# Patient Record
Sex: FEMALE | Race: WHITE | Employment: OTHER | URBAN - METROPOLITAN AREA
[De-identification: names, ages, dates, MRNs, and addresses within clinical notes are randomized per-mention and may not be internally consistent; named-entity substitution may affect disease eponyms.]

---

## 2017-02-27 DIAGNOSIS — G89.4 CHRONIC PAIN SYNDROME: Chronic | ICD-10-CM

## 2017-02-27 NOTE — TELEPHONE ENCOUNTER
Reason for Call:  Medication or medication refill:    Do you use a [facility]?  Name of the pharmacy and phone number for the current request:  Will pu at [facility]    Name of the medication requested:oxyCODONE-acetaminophen (PERCOCET) 5-325 MG per tablet     Other request: call pt when ready to pu at [facility]    Can we leave a detailed message on this number? YES    Phone number patient can be reached at: Home number on file [phone] (home)    Best Time:     Call taken on 2/27/2017 at 9:11 AM by HAN PARIS

## 2017-02-28 NOTE — TELEPHONE ENCOUNTER
Oxycodone-acetaminophen 5-325 mg      Last Written Prescription Date:  10/27/16  Last Fill Quantity: 60,   # refills: 0  Last Office Visit with [facility], P or [facility] prescribing provider: 8/31/16  Future Office visit:       Routing refill request to provider for review/approval because:  Drug not on the [facility], [facility] or [facility] refill protocol or controlled substance

## 2017-03-01 RX ORDER — OXYCODONE AND ACETAMINOPHEN 5; 325 MG/1; MG/1
0.5 TABLET ORAL 2 TIMES DAILY PRN
Qty: 60 TABLET | Refills: 0 | Status: SHIPPED | OUTPATIENT
Start: 2017-03-01 | End: 2017-06-09

## 2017-03-01 NOTE — TELEPHONE ENCOUNTER
Controlled Substance Refill Request for oxyCODONE-acetaminophen (PERCOCET) 5-325 MG per tablet  Problem List Complete:  YesPatient is followed by COY MILNER for ongoing prescription of pain medication.  All refills should be approved by this provider, or covering partner.    Medication(s): percocet.   Maximum quantity per month: 30  Clinic visit frequency required: Q 6  months     Controlled substance agreement:  Encounter-Level CSA:     There are no encounter-level csa.        Pain Clinic evaluation in the past: No    DIRE Total Score(s):  No flowsheet data found.    Last [place]NP website verification:  ?   [email or web address]  No CSA on file    Last  check - 7/7/16       checked in past 6 months?  Yes 03/01/2017   RX [place] ([place] reviewed:  reviewed- no concerns    [place] profile:  [email or web address]

## 2017-04-19 DIAGNOSIS — I10 ESSENTIAL HYPERTENSION: ICD-10-CM

## 2017-04-19 RX ORDER — METOPROLOL SUCCINATE 25 MG/1
TABLET, EXTENDED RELEASE ORAL
Qty: 90 TABLET | Refills: 1 | Status: SHIPPED | OUTPATIENT
Start: 2017-04-19 | End: 2017-11-28

## 2017-04-19 NOTE — TELEPHONE ENCOUNTER
Metoprolol 25 mg      Last Written Prescription Date: 1/7/16  Last Fill Quantity: 90, # refills: 3    Last Office Visit with G, P or [facility] prescribing provider:  8/31/16   Future Office Visit:        BP Readings from Last 3 Encounters:   08/31/16 120/70   12/22/15 140/70   09/29/15 128/68

## 2017-05-01 ENCOUNTER — TELEPHONE (OUTPATIENT)
Dept: FAMILY MEDICINE | Facility: CLINIC | Age: 82
End: 2017-05-01

## 2017-05-01 NOTE — TELEPHONE ENCOUNTER
Theresa Alves is a 89 year old female who's niece Ericka calls with right arm pain.    NURSING ASSESSMENT:  Description: Pain with abduction of right arm, small bruise on outside.  Onset/duration: Friday   Precip. factors: Fall   Associated symptoms: No SOB, no chest pain.   Improves/worsens symptoms:  none  Pain scale (0-10)   10/10  LMP/preg/breast feeding:  NA   Last exam/Treatment:  8/31/16   Allergies: No Known Allergies    MEDICATIONS:   Taking medication(s) as prescribed? Yes - taking percocet   Taking over the counter medication(s?) N/A  Any medication side effects? No significant side effects    Any barriers to taking medication(s) as prescribed?  No  Medication(s) improving/managing symptoms?  N/A  Medication reconciliation completed: No      NURSING PLAN: Nursing advice to patient given    RECOMMENDED DISPOSITION:  See in 24 hours - appt available at clinic today, niece will talk to pt and call reception to schedule.   Will comply with recommendation: Yes  If further questions/concerns or if symptoms do not improve, worsen or new symptoms develop, call your PCP or [facility] as soon as possible.      Guideline used:  Telephone Triage Protocols for Nurses, Fifth Edition, Maria D Drake RN

## 2017-06-09 DIAGNOSIS — G89.4 CHRONIC PAIN SYNDROME: Chronic | ICD-10-CM

## 2017-06-09 RX ORDER — OXYCODONE AND ACETAMINOPHEN 5; 325 MG/1; MG/1
0.5 TABLET ORAL 2 TIMES DAILY PRN
Qty: 60 TABLET | Refills: 0 | Status: SHIPPED | OUTPATIENT
Start: 2017-06-09 | End: 2017-07-05

## 2017-06-09 NOTE — TELEPHONE ENCOUNTER
Controlled Substance Refill Request for oxyCODONE-acetaminophen (PERCOCET) 5-325 MG per tablet  Problem List Complete:  YesPatient is followed by COY MILNER for ongoing prescription of pain medication.  All refills should be approved by this provider, or covering partner.    Medication(s): percocet.   Maximum quantity per month: 30  Clinic visit frequency required: Q 6  months     Controlled substance agreement:  Encounter-Level CSA:     There are no encounter-level csa.        Pain Clinic evaluation in the past: No    DIRE Total Score(s):  No flowsheet data found.    Last [facility] website verification:  ?   [email or web address]  No CSA on file    Last  check - 03/01/2017-no concerns       checked in past 6 months?  Yes 03/01/2017       Last Written Prescription Date: 03/01/2017  Last Fill Quantity: 60,  # refills: 0   Last Office Visit with [facility], [facility] or [facility] prescribing provider: 08/31/2016

## 2017-06-09 NOTE — TELEPHONE ENCOUNTER
Called and left voicemail. Informed patient script is ready for pickup at [facility] location.    Princess CARIE Pearl, CMA

## 2017-06-09 NOTE — TELEPHONE ENCOUNTER
Reason for Call:  Medication or medication refill:    Do you use a [facility]?  Name of the pharmacy and phone number for the current request:  Patient will  at [facility]    Name of the medication requested: oxyCODONE-acetaminophen (PERCOCET) 5-325 MG per tablet    Other request: none    Can we leave a detailed message on this number? YES    Phone number patient can be reached at: Home number on file [phone] (home)    Best Time: any    Call taken on 6/9/2017 at 8:45 AM by Pricilla Ramirez

## 2017-07-05 DIAGNOSIS — G89.4 CHRONIC PAIN SYNDROME: Chronic | ICD-10-CM

## 2017-07-05 RX ORDER — OXYCODONE AND ACETAMINOPHEN 5; 325 MG/1; MG/1
0.5 TABLET ORAL 2 TIMES DAILY PRN
Qty: 60 TABLET | Refills: 0 | Status: SHIPPED | OUTPATIENT
Start: 2017-07-05 | End: 2017-09-19

## 2017-07-05 NOTE — TELEPHONE ENCOUNTER
Controlled Substance Refill Request for oxyCODONE-acetaminophen (PERCOCET) 5-325 MG per tablet  Problem List Complete:  YePatient is followed by COY MILNER for ongoing prescription of pain medication.  All refills should be approved by this provider, or covering partner.    Medication(s): percocet.   Maximum quantity per month: 30  Clinic visit frequency required: Q 6  months     Controlled substance agreement:  Encounter-Level CSA:     There are no encounter-level csa.        Pain Clinic evaluation in the past: No    DIRE Total Score(s):  No flowsheet data found.    Last [facility] website verification:  ?   [email or web address]  No CSA on file    Last  check - 03/01/2017-no concerns     checked in past 6 months?  Yes 03/01/2017         Last Written Prescription Date: 06/09/2017  Last Fill Quantity: 60,  # refills: 0   Last Office Visit with [facility], [facility] or [facility] prescribing provider: 08/31/2016

## 2017-09-19 DIAGNOSIS — G89.4 CHRONIC PAIN SYNDROME: Chronic | ICD-10-CM

## 2017-09-19 RX ORDER — OXYCODONE AND ACETAMINOPHEN 5; 325 MG/1; MG/1
0.5 TABLET ORAL 2 TIMES DAILY PRN
Qty: 60 TABLET | Refills: 0 | Status: SHIPPED | OUTPATIENT
Start: 2017-09-19 | End: 2017-11-28

## 2017-09-19 NOTE — TELEPHONE ENCOUNTER
Controlled Substance Refill Request for Percocet  Problem List Complete:  Yes    Patient is followed by COY MILNER for ongoing prescription of pain medication.  All refills should be approved by this provider, or covering partner.    Medication(s): percocet.   Maximum quantity per month: 30  Clinic visit frequency required: Q 6  months     Controlled substance agreement:  Encounter-Level CSA:     There are no encounter-level csa.        Pain Clinic evaluation in the past: No    DIRE Total Score(s):  No flowsheet data found.    Last [facility] website verification:  ?   [email or web address]  No CSA on file    Last [facility] check - 9-19-17-no concerns

## 2017-09-19 NOTE — TELEPHONE ENCOUNTER
Reason for Call:  Medication or medication refill:  Do you use a [facility]?  Name of the pharmacy and phone number for the current request:  [id number] - [place], [place] - [address]  Name of the medication requested: oxyCODONE-acetaminophen (PERCOCET) 5-325 MG per tablet  Other request: none  Can we leave a detailed message on this number? YES  Phone number patient can be reached at: Home number on file [phone] (home)  Best Time: any  Call taken on 9/19/2017 at 8:05 AM by MILAGROS PNEA

## 2017-09-19 NOTE — TELEPHONE ENCOUNTER
Called patient to let her know that the Rx for percocet is ready for p/u at [facility]. She states that Annette will pick it up.

## 2017-09-19 NOTE — TELEPHONE ENCOUNTER
Oxycodone-acetaminophen 5-325 mg      Last Written Prescription Date:  7/5/17  Last Fill Quantity: 60,   # refills: 0  Last Office Visit with [facility], [facility] or [facility] prescribing provider: 8/31/16  Future Office visit:       Routing refill request to provider for review/approval because:  Drug not on the [facility], [facility] or [facility] refill protocol or controlled substance

## 2017-10-25 DIAGNOSIS — E78.5 HYPERLIPIDEMIA WITH TARGET LDL LESS THAN 130: Chronic | ICD-10-CM

## 2017-10-26 RX ORDER — SIMVASTATIN 20 MG
TABLET ORAL
Qty: 15 TABLET | Refills: 0 | Status: SHIPPED | OUTPATIENT
Start: 2017-10-26 | End: 2017-11-28

## 2017-10-26 NOTE — TELEPHONE ENCOUNTER
Medication is being filled for 1 time refill only due to:  Patient needs to be seen because it has been more than one year since last visit.    Last Office Visit with G, [facility] or [facility] prescribing provider: 08/31/2016       Lab Results   Component Value Date    CHOL 184 01/10/2014     Lab Results   Component Value Date    HDL 68 01/10/2014     Lab Results   Component Value Date    LDL 79 08/31/2016    LDL 95 01/10/2014     Lab Results   Component Value Date    TRIG 106 01/10/2014     Lab Results   Component Value Date    CHOLHDLRATIO 2.7 01/10/2014

## 2017-11-28 ENCOUNTER — OFFICE VISIT (OUTPATIENT)
Dept: FAMILY MEDICINE | Facility: CLINIC | Age: 82
End: 2017-11-28
Payer: COMMERCIAL

## 2017-11-28 VITALS
SYSTOLIC BLOOD PRESSURE: 146 MMHG | RESPIRATION RATE: 20 BRPM | TEMPERATURE: 97.5 F | HEIGHT: 62 IN | HEART RATE: 71 BPM | DIASTOLIC BLOOD PRESSURE: 72 MMHG | BODY MASS INDEX: 23.92 KG/M2 | WEIGHT: 130 LBS | OXYGEN SATURATION: 95 %

## 2017-11-28 DIAGNOSIS — J84.10 PULMONARY FIBROSIS (H): Chronic | ICD-10-CM

## 2017-11-28 DIAGNOSIS — I10 HYPERTENSION GOAL BP (BLOOD PRESSURE) < 140/80: ICD-10-CM

## 2017-11-28 DIAGNOSIS — G89.4 CHRONIC PAIN SYNDROME: Primary | ICD-10-CM

## 2017-11-28 DIAGNOSIS — N18.30 CHRONIC KIDNEY DISEASE, STAGE III (MODERATE) (H): ICD-10-CM

## 2017-11-28 DIAGNOSIS — M47.816 SPONDYLOSIS OF LUMBAR REGION WITHOUT MYELOPATHY OR RADICULOPATHY: ICD-10-CM

## 2017-11-28 DIAGNOSIS — E78.5 HYPERLIPIDEMIA WITH TARGET LDL LESS THAN 130: Chronic | ICD-10-CM

## 2017-11-28 DIAGNOSIS — I50.9 CHRONIC CONGESTIVE HEART FAILURE, UNSPECIFIED CONGESTIVE HEART FAILURE TYPE: ICD-10-CM

## 2017-11-28 LAB
BASOPHILS # BLD AUTO: 0.1 10E9/L (ref 0–0.2)
BASOPHILS NFR BLD AUTO: 1 %
DIFFERENTIAL METHOD BLD: ABNORMAL
EOSINOPHIL # BLD AUTO: 0.3 10E9/L (ref 0–0.7)
EOSINOPHIL NFR BLD AUTO: 4.9 %
ERYTHROCYTE [DISTWIDTH] IN BLOOD BY AUTOMATED COUNT: 14.4 % (ref 10–15)
HCT VFR BLD AUTO: 36.8 % (ref 35–47)
HGB BLD-MCNC: 12.1 G/DL (ref 11.7–15.7)
LYMPHOCYTES # BLD AUTO: 1.6 10E9/L (ref 0.8–5.3)
LYMPHOCYTES NFR BLD AUTO: 24 %
MCH RBC QN AUTO: 33.3 PG (ref 26.5–33)
MCHC RBC AUTO-ENTMCNC: 32.9 G/DL (ref 31.5–36.5)
MCV RBC AUTO: 101 FL (ref 78–100)
MONOCYTES # BLD AUTO: 1.1 10E9/L (ref 0–1.3)
MONOCYTES NFR BLD AUTO: 16 %
NEUTROPHILS # BLD AUTO: 3.7 10E9/L (ref 1.6–8.3)
NEUTROPHILS NFR BLD AUTO: 54.1 %
NT-PROBNP SERPL-MCNC: 2510 PG/ML (ref 0–450)
PLATELET # BLD AUTO: 261 10E9/L (ref 150–450)
RBC # BLD AUTO: 3.63 10E12/L (ref 3.8–5.2)
WBC # BLD AUTO: 6.8 10E9/L (ref 4–11)

## 2017-11-28 PROCEDURE — 99214 OFFICE O/P EST MOD 30 MIN: CPT | Performed by: INTERNAL MEDICINE

## 2017-11-28 PROCEDURE — 80061 LIPID PANEL: CPT | Performed by: INTERNAL MEDICINE

## 2017-11-28 PROCEDURE — 36415 COLL VENOUS BLD VENIPUNCTURE: CPT | Performed by: INTERNAL MEDICINE

## 2017-11-28 PROCEDURE — 83880 ASSAY OF NATRIURETIC PEPTIDE: CPT | Performed by: INTERNAL MEDICINE

## 2017-11-28 PROCEDURE — 80050 GENERAL HEALTH PANEL: CPT | Performed by: INTERNAL MEDICINE

## 2017-11-28 RX ORDER — METOPROLOL SUCCINATE 25 MG/1
25 TABLET, EXTENDED RELEASE ORAL DAILY
Qty: 90 TABLET | Refills: 3 | Status: SHIPPED | OUTPATIENT
Start: 2017-11-28 | End: 2018-02-21

## 2017-11-28 RX ORDER — LOSARTAN POTASSIUM 100 MG/1
100 TABLET ORAL DAILY
Qty: 90 TABLET | Refills: 3 | Status: SHIPPED | OUTPATIENT
Start: 2017-11-28 | End: 2018-02-26

## 2017-11-28 RX ORDER — OXYCODONE AND ACETAMINOPHEN 5; 325 MG/1; MG/1
0.5 TABLET ORAL 2 TIMES DAILY PRN
Qty: 60 TABLET | Refills: 0 | Status: ON HOLD | OUTPATIENT
Start: 2017-12-04 | End: 2018-03-17

## 2017-11-28 RX ORDER — SIMVASTATIN 20 MG
TABLET ORAL
Qty: 45 TABLET | Refills: 3 | Status: SHIPPED | OUTPATIENT
Start: 2017-11-28 | End: 2019-01-01

## 2017-11-28 ASSESSMENT — PATIENT HEALTH QUESTIONNAIRE - PHQ9
SUM OF ALL RESPONSES TO PHQ QUESTIONS 1-9: 6
SUM OF ALL RESPONSES TO PHQ QUESTIONS 1-9: 6
10. IF YOU CHECKED OFF ANY PROBLEMS, HOW DIFFICULT HAVE THESE PROBLEMS MADE IT FOR YOU TO DO YOUR WORK, TAKE CARE OF THINGS AT HOME, OR GET ALONG WITH OTHER PEOPLE: SOMEWHAT DIFFICULT

## 2017-11-28 NOTE — LETTER
November 29, 2017      Theresa RENNY Alves  [address]   [facility] [facility] [address]        Dear ,    We are writing to inform you of your test results.             Most of your lab results are either good, or acceptable, including the liver, kidney, potassium, thyroid, bone marrow, cholesterol, and glucose levels.   Be sure to take the 20 mg of furosemide on 2 days per week.     Resulted Orders   Comprehensive metabolic panel (BMP + Alb, Alk Phos, ALT, AST, Total. Bili, TP)   Result Value Ref Range    Sodium 136 133 - 144 mmol/L    Potassium 4.6 3.4 - 5.3 mmol/L    Chloride 105 94 - 109 mmol/L    Carbon Dioxide 22 20 - 32 mmol/L    Anion Gap 9 3 - 14 mmol/L    Glucose 100 (H) 70 - 99 mg/dL      Comment:      Non Fasting    Urea Nitrogen 30 7 - 30 mg/dL    Creatinine 1.19 (H) 0.52 - 1.04 mg/dL    GFR Estimate 42 (L) >60 mL/min/1.7m2      Comment:      Non  GFR Calc    GFR Estimate If Black 51 (L) >60 mL/min/1.7m2      Comment:       GFR Calc    Calcium 9.1 8.5 - 10.1 mg/dL    Bilirubin Total 1.2 0.2 - 1.3 mg/dL    Albumin 3.4 3.4 - 5.0 g/dL    Protein Total 7.3 6.8 - 8.8 g/dL    Alkaline Phosphatase 60 40 - 150 U/L    ALT 15 0 - 50 U/L    AST 15 0 - 45 U/L   Lipid Profile (Chol, Trig, HDL, LDL calc)   Result Value Ref Range    Cholesterol 142 <200 mg/dL    Triglycerides 107 <150 mg/dL      Comment:      Non Fasting    HDL Cholesterol 69 >49 mg/dL    LDL Cholesterol Calculated 52 <100 mg/dL      Comment:      Desirable:       <100 mg/dl    Non HDL Cholesterol 73 <130 mg/dL   TSH with free T4 reflex   Result Value Ref Range    TSH 2.77 0.40 - 4.00 mU/L   CBC with platelets and differential   Result Value Ref Range    WBC 6.8 4.0 - 11.0 10e9/L    RBC Count 3.63 (L) 3.8 - 5.2 10e12/L    Hemoglobin 12.1 11.7 - 15.7 g/dL    Hematocrit 36.8 35.0 - 47.0 %     (H) 78 - 100 fl    MCH 33.3 (H) 26.5 - 33.0 pg    MCHC 32.9 31.5 - 36.5 g/dL    RDW 14.4 10.0 - 15.0 %     Platelet Count 261 150 - 450 10e9/L    Diff Method Automated Method     % Neutrophils 54.1 %    % Lymphocytes 24.0 %    % Monocytes 16.0 %    % Eosinophils 4.9 %    % Basophils 1.0 %    Absolute Neutrophil 3.7 1.6 - 8.3 10e9/L    Absolute Lymphocytes 1.6 0.8 - 5.3 10e9/L    Absolute Monocytes 1.1 0.0 - 1.3 10e9/L    Absolute Eosinophils 0.3 0.0 - 0.7 10e9/L    Absolute Basophils 0.1 0.0 - 0.2 10e9/L   BNP-N terminal pro   Result Value Ref Range    N-Terminal Pro Bnp 2510 (H) 0 - 450 pg/mL      Comment:         Reference range shown and results flagged as abnormal are for the outpatient,   non acute settings. Establishing a baseline value for each individual patient   is useful for follow-up.  Suggested inpatient cut points for confirming diagnosis of CHF in an acute   setting are:   >450 pg/mL (age 18 to less than 50)   >900 pg/mL (age 50 to less than 75)   >1800 pg/mL (75 yrs and older)  An inpatient or emergency department NT-proPBNP <300 pg/mL effectively rules   out acute CHF, with 99% negative predictive value.          If you have any questions or concerns, please call the clinic at the number listed above.       Sincerely,        Marbin Rouse MD

## 2017-11-28 NOTE — NURSING NOTE
"Chief Complaint   Patient presents with     Lipids     Hypertension     Recheck Medication       Initial /72 (BP Location: Left arm, Patient Position: Chair, Cuff Size: Adult Regular)  Pulse 71  Temp 97.5  F (36.4  C)  Resp 20  Ht 5' 1.5\" (1.562 m)  Wt 130 lb (59 kg)  LMP  (LMP Unknown)  SpO2 95%  Breastfeeding? No  BMI 24.17 kg/m2 Estimated body mass index is 24.17 kg/(m^2) as calculated from the following:    Height as of this encounter: 5' 1.5\" (1.562 m).    Weight as of this encounter: 130 lb (59 kg).  Medication Reconciliation: complete   Doris Ramos LPN  "

## 2017-11-28 NOTE — MR AVS SNAPSHOT
After Visit Summary   11/28/2017    Theresa Alves    MRN: [id number]           Patient Information     Date Of Birth          6/22/1924        Visit Information        Provider Department      11/28/2017 2:15 PM Marbin Rouse MD [facility]        Today's Diagnoses     Chronic pain syndrome    -  1    Pulmonary fibrosis        Chronic kidney disease, stage III (moderate)        Chronic congestive heart failure, unspecified congestive heart failure type (H)        Hyperlipidemia with target LDL less than 130        Hypertension goal BP (blood pressure) < 140/80        Spondylosis of lumbar region without myelopathy or radiculopathy          Care Instructions    I will let you know your lab results.   Have a good safe winter.                      Follow-ups after your visit        Additional Services     DME REFERRAL       Please supply a rolling walker with brakes and seat.                  Who to contact     If you have questions or need follow up information about today's clinic visit or your schedule please contact [facility] directly at [phone].  Normal or non-critical lab and imaging results will be communicated to you by MyChart, letter or phone within 4 business days after the clinic has received the results. If you do not hear from us within 7 days, please contact the clinic through MyChart or phone. If you have a critical or abnormal lab result, we will notify you by phone as soon as possible.  Submit refill requests through [employer] [facility] or call your pharmacy and they will forward the refill request to us. Please allow 3 business days for your refill to be completed.          Additional Information About Your Visit        Care EveryWhere ID     This is your Care EveryWhere ID. This could be used by other organizations to access your [place] medical records  [id number]        Your Vitals Were     Pulse Temperature  "Respirations Height Last Period Pulse Oximetry    71 97.5  F (36.4  C) 20 5' 1.5\" (1.562 m) (LMP Unknown) 95%    Breastfeeding? BMI (Body Mass Index)                No 24.17 kg/m2           Blood Pressure from Last 3 Encounters:   11/28/17 146/72   08/31/16 120/70   12/22/15 140/70    Weight from Last 3 Encounters:   11/28/17 130 lb (59 kg)   08/31/16 133 lb (60.3 kg)   12/22/15 134 lb (60.8 kg)              We Performed the Following     BNP-N terminal pro     CBC with platelets and differential     Comprehensive metabolic panel (BMP + Alb, Alk Phos, ALT, AST, Total. Bili, TP)     DME REFERRAL     Lipid Profile (Chol, Trig, HDL, LDL calc)     TSH with free T4 reflex          Today's Medication Changes          These changes are accurate as of: 11/28/17  2:51 PM.  If you have any questions, ask your nurse or doctor.               These medicines have changed or have updated prescriptions.        Dose/Directions    losartan 100 MG tablet   Commonly known as:  COZAAR   This may have changed:  See the new instructions.   Used for:  Hypertension goal BP (blood pressure) < 140/80   Changed by:  Marbin Rouse MD        Dose:  100 mg   Take 1 tablet (100 mg) by mouth daily   Quantity:  90 tablet   Refills:  3       metoprolol 25 MG 24 hr tablet   Commonly known as:  TOPROL-XL   This may have changed:  See the new instructions.   Used for:  Chronic congestive heart failure, unspecified congestive heart failure type (H), Hypertension goal BP (blood pressure) < 140/80   Changed by:  Marbin Rouse MD        Dose:  25 mg   Take 1 tablet (25 mg) by mouth daily   Quantity:  90 tablet   Refills:  3       simvastatin 20 MG tablet   Commonly known as:  ZOCOR   This may have changed:  See the new instructions.   Used for:  Hyperlipidemia with target LDL less than 130   Changed by:  Marbin Rouse MD        TAKE HALF TABLET BY MOUTH AT BEDTIME   Quantity:  45 tablet   Refills:  3            Where to get your medicines    "   These medications were sent to [id number] - [place], [place] - [address]  [address], [facility] [address]     Phone:  [phone]     losartan 100 MG tablet    metoprolol 25 MG 24 hr tablet    simvastatin 20 MG tablet         Some of these will need a paper prescription and others can be bought over the counter.  Ask your nurse if you have questions.     Bring a paper prescription for each of these medications     oxyCODONE-acetaminophen 5-325 MG per tablet                Primary Care Provider Office Phone # Fax #    Marbin Rouse [phone]       [address] [facility] [address]        Equal Access to Services     KITTY SALINAS : Hadii lata pendleton hadasho Sorhiannaali, waaxda luqadaha, qaybta kaalmada adeegyada, renee de luna . So [facility] [phone].    ATENCIÓN: Si habla español, tiene a salmon disposición servicios gratuitos de asistencia lingüística. [facility] [phone].    We comply with applicable federal civil rights laws and [place] laws. We do not discriminate on the basis of race, color, national origin, age, disability, sex, sexual orientation, or gender identity.            Thank you!     Thank you for choosing [facility]  for your care. Our goal is always to provide you with excellent care. Hearing back from our patients is one way we can continue to improve our services. Please take a few minutes to complete the written survey that you may receive in the mail after your visit with us. Thank you!             Your Updated Medication List - Protect others around you: Learn how to safely use, store and throw away your medicines at www.disposemymeds.org.          This list is accurate as of: 11/28/17  2:51 PM.  Always use your most recent med list.                   Brand Name Dispense Instructions for use Diagnosis    aspirin 81 MG chewable tablet      Take 81 mg by mouth daily. Every other day         furosemide 20 MG tablet    LASIX    30 tablet    Take 1 tablet (20 mg) by mouth twice a week    Hypertension goal BP (blood pressure) < 140/80       losartan 100 MG tablet    COZAAR    90 tablet    Take 1 tablet (100 mg) by mouth daily    Hypertension goal BP (blood pressure) < 140/80       metoprolol 25 MG 24 hr tablet    TOPROL-XL    90 tablet    Take 1 tablet (25 mg) by mouth daily    Chronic congestive heart failure, unspecified congestive heart failure type (H), Hypertension goal BP (blood pressure) < 140/80       oxyCODONE-acetaminophen 5-325 MG per tablet   Start taking on:  12/4/2017    PERCOCET    60 tablet    Take 0.5 tablets by mouth 2 times daily as needed for pain    Chronic pain syndrome       simvastatin 20 MG tablet    ZOCOR    45 tablet    TAKE HALF TABLET BY MOUTH AT BEDTIME    Hyperlipidemia with target LDL less than 130       VITAMIN B 12 PO      Take  by mouth.        VITAMIN D (CHOLECALCIFEROL) PO      Take 1,000 Units by mouth daily.

## 2017-11-29 LAB
ALBUMIN SERPL-MCNC: 3.4 G/DL (ref 3.4–5)
ALP SERPL-CCNC: 60 U/L (ref 40–150)
ALT SERPL W P-5'-P-CCNC: 15 U/L (ref 0–50)
ANION GAP SERPL CALCULATED.3IONS-SCNC: 9 MMOL/L (ref 3–14)
AST SERPL W P-5'-P-CCNC: 15 U/L (ref 0–45)
BILIRUB SERPL-MCNC: 1.2 MG/DL (ref 0.2–1.3)
BUN SERPL-MCNC: 30 MG/DL (ref 7–30)
CALCIUM SERPL-MCNC: 9.1 MG/DL (ref 8.5–10.1)
CHLORIDE SERPL-SCNC: 105 MMOL/L (ref 94–109)
CHOLEST SERPL-MCNC: 142 MG/DL
CO2 SERPL-SCNC: 22 MMOL/L (ref 20–32)
CREAT SERPL-MCNC: 1.19 MG/DL (ref 0.52–1.04)
GFR SERPL CREATININE-BSD FRML MDRD: 42 ML/MIN/1.7M2
GLUCOSE SERPL-MCNC: 100 MG/DL (ref 70–99)
HDLC SERPL-MCNC: 69 MG/DL
LDLC SERPL CALC-MCNC: 52 MG/DL
NONHDLC SERPL-MCNC: 73 MG/DL
POTASSIUM SERPL-SCNC: 4.6 MMOL/L (ref 3.4–5.3)
PROT SERPL-MCNC: 7.3 G/DL (ref 6.8–8.8)
SODIUM SERPL-SCNC: 136 MMOL/L (ref 133–144)
TRIGL SERPL-MCNC: 107 MG/DL
TSH SERPL DL<=0.005 MIU/L-ACNC: 2.77 MU/L (ref 0.4–4)

## 2017-12-01 DIAGNOSIS — I50.9 CHRONIC CONGESTIVE HEART FAILURE, UNSPECIFIED CONGESTIVE HEART FAILURE TYPE: Primary | ICD-10-CM

## 2017-12-01 DIAGNOSIS — I10 HYPERTENSION GOAL BP (BLOOD PRESSURE) < 140/80: ICD-10-CM

## 2017-12-01 NOTE — TELEPHONE ENCOUNTER
Reason for Call:  Medication or medication refill:    Do you use a [facility]?  Name of the pharmacy and phone number for the current request:  [facility]    Name of the medication requested: furosemide (LASIX) 20 MG tablet    Other request: Pt started taking Lasix again but noticed that it  17.  Pt did take one and it was effective but she needs a new rx.    Can we leave a detailed message on this number? YES    Phone number patient can be reached at: Home number on file [phone] (home)    Best Time: ok    Call taken on 2017 at 9:08 AM by ESTEPHANIE MORENO

## 2017-12-05 NOTE — TELEPHONE ENCOUNTER
LOV 11/28/17    Requested Prescriptions   Pending Prescriptions Disp Refills     furosemide (LASIX) 20 MG tablet 30 tablet 3     Sig: Take 1 tablet (20 mg) by mouth twice a week    Diuretics (Including Combos) Protocol Failed    12/1/2017  9:09 AM       Failed - Blood pressure under 140/90    BP Readings from Last 3 Encounters:   11/28/17 146/72   08/31/16 120/70   12/22/15 140/70                Failed - Normal serum creatinine on file in past 12 months    Recent Labs   Lab Test  11/28/17   1454   CR  1.19*             Passed - Recent or future visit with authorizing provider's specialty    Patient had office visit in the last year or has a visit in the next 30 days with authorizing provider.  See chart review.              Passed - Patient is age 18 or older       Passed - No active pregancy on record       Passed - Normal serum potassium on file in past 12 months    Recent Labs   Lab Test  11/28/17   1454   POTASSIUM  4.6                   Passed - Normal serum sodium on file in past 12 months    Recent Labs   Lab Test  11/28/17   1454   NA  136             Passed - No positive pregnancy test in past 12 months

## 2017-12-06 RX ORDER — FUROSEMIDE 20 MG
20 TABLET ORAL
Qty: 30 TABLET | Refills: 11 | Status: SHIPPED | OUTPATIENT
Start: 2017-12-07 | End: 2018-02-21

## 2017-12-06 NOTE — TELEPHONE ENCOUNTER
Routing refill request to provider for review/approval because:  Failed BP and creatinine level goals

## 2018-01-01 ENCOUNTER — TELEPHONE (OUTPATIENT)
Dept: FAMILY MEDICINE | Facility: CLINIC | Age: 83
End: 2018-01-01

## 2018-01-01 DIAGNOSIS — Z53.9 DIAGNOSIS NOT YET DEFINED: Primary | ICD-10-CM

## 2018-01-01 PROCEDURE — G0180 MD CERTIFICATION HHA PATIENT: HCPCS | Performed by: INTERNAL MEDICINE

## 2018-02-19 ENCOUNTER — TELEPHONE (OUTPATIENT)
Dept: FAMILY MEDICINE | Facility: CLINIC | Age: 83
End: 2018-02-19

## 2018-02-19 NOTE — TELEPHONE ENCOUNTER
Reason for call:  Patient reporting a symptom    Symptom or request: back pain and breathing issue    Duration (how long have symptoms been present): unknown    Have you been treated for this before? Yes    Additional comments: states MLO knows back pain and breathing issues-refused appointment states just wants a call    Phone Number patient can be reached at:  Home number on file [phone] (home)    Best Time:  asap    Can we leave a detailed message on this number:  YES    Call taken on 2/19/2018 at 11:29 AM by HAN PARIS

## 2018-02-19 NOTE — TELEPHONE ENCOUNTER
GLORYI only to Dr. Marbin Rouse       1. Patient was called and wants Dr. Marbin Rouse to know she gets very SOB just up and about in the house, needs sit to catch breath.  Taking 20mg Lasix twice/week; no swelling that she she can see.  Has not had cough except little cough this morning-cough is dry.      2. Urine dark yellow in the morning. Doesn't drink much fluids. encouraged to do so more than just coffee.    3. Back pain- uses 1/2 Percocet in the am with heating pad use. Takes edge off of the pain. Only uses the 1/2 Percocet/ day. Upper right arm gets painful; sleeps on both sides.    Appointment set up for 2-21-18.

## 2018-02-21 ENCOUNTER — RADIANT APPOINTMENT (OUTPATIENT)
Dept: GENERAL RADIOLOGY | Facility: CLINIC | Age: 83
End: 2018-02-21
Attending: INTERNAL MEDICINE
Payer: COMMERCIAL

## 2018-02-21 ENCOUNTER — OFFICE VISIT (OUTPATIENT)
Dept: FAMILY MEDICINE | Facility: CLINIC | Age: 83
End: 2018-02-21
Payer: COMMERCIAL

## 2018-02-21 VITALS
BODY MASS INDEX: 24.29 KG/M2 | TEMPERATURE: 97.8 F | OXYGEN SATURATION: 94 % | SYSTOLIC BLOOD PRESSURE: 120 MMHG | HEART RATE: 80 BPM | HEIGHT: 62 IN | WEIGHT: 132 LBS | RESPIRATION RATE: 22 BRPM | DIASTOLIC BLOOD PRESSURE: 70 MMHG

## 2018-02-21 DIAGNOSIS — I48.91 ATRIAL FIBRILLATION, UNSPECIFIED TYPE (H): Primary | ICD-10-CM

## 2018-02-21 DIAGNOSIS — J84.10 PULMONARY FIBROSIS (H): Chronic | ICD-10-CM

## 2018-02-21 DIAGNOSIS — R06.02 SOB (SHORTNESS OF BREATH): ICD-10-CM

## 2018-02-21 DIAGNOSIS — M79.621 PAIN OF RIGHT UPPER ARM: ICD-10-CM

## 2018-02-21 DIAGNOSIS — I50.9 CHRONIC CONGESTIVE HEART FAILURE, UNSPECIFIED CONGESTIVE HEART FAILURE TYPE: ICD-10-CM

## 2018-02-21 DIAGNOSIS — I10 HYPERTENSION GOAL BP (BLOOD PRESSURE) < 140/80: ICD-10-CM

## 2018-02-21 LAB
BASOPHILS # BLD AUTO: 0.1 10E9/L (ref 0–0.2)
BASOPHILS NFR BLD AUTO: 1.1 %
DIFFERENTIAL METHOD BLD: ABNORMAL
EOSINOPHIL # BLD AUTO: 0.3 10E9/L (ref 0–0.7)
EOSINOPHIL NFR BLD AUTO: 3.6 %
ERYTHROCYTE [DISTWIDTH] IN BLOOD BY AUTOMATED COUNT: 15.1 % (ref 10–15)
HCT VFR BLD AUTO: 38.5 % (ref 35–47)
HGB BLD-MCNC: 12.7 G/DL (ref 11.7–15.7)
LYMPHOCYTES # BLD AUTO: 2.2 10E9/L (ref 0.8–5.3)
LYMPHOCYTES NFR BLD AUTO: 25.7 %
MCH RBC QN AUTO: 34.5 PG (ref 26.5–33)
MCHC RBC AUTO-ENTMCNC: 33 G/DL (ref 31.5–36.5)
MCV RBC AUTO: 105 FL (ref 78–100)
MONOCYTES # BLD AUTO: 1.3 10E9/L (ref 0–1.3)
MONOCYTES NFR BLD AUTO: 15.2 %
NEUTROPHILS # BLD AUTO: 4.6 10E9/L (ref 1.6–8.3)
NEUTROPHILS NFR BLD AUTO: 54.4 %
NT-PROBNP SERPL-MCNC: 8519 PG/ML (ref 0–450)
PLATELET # BLD AUTO: 220 10E9/L (ref 150–450)
RBC # BLD AUTO: 3.68 10E12/L (ref 3.8–5.2)
WBC # BLD AUTO: 8.4 10E9/L (ref 4–11)

## 2018-02-21 PROCEDURE — 80048 BASIC METABOLIC PNL TOTAL CA: CPT | Performed by: INTERNAL MEDICINE

## 2018-02-21 PROCEDURE — 73060 X-RAY EXAM OF HUMERUS: CPT | Mod: RT

## 2018-02-21 PROCEDURE — 85025 COMPLETE CBC W/AUTO DIFF WBC: CPT | Performed by: INTERNAL MEDICINE

## 2018-02-21 PROCEDURE — 99214 OFFICE O/P EST MOD 30 MIN: CPT | Mod: 25 | Performed by: INTERNAL MEDICINE

## 2018-02-21 PROCEDURE — 71046 X-RAY EXAM CHEST 2 VIEWS: CPT | Mod: FY

## 2018-02-21 PROCEDURE — 83880 ASSAY OF NATRIURETIC PEPTIDE: CPT | Performed by: INTERNAL MEDICINE

## 2018-02-21 PROCEDURE — 93000 ELECTROCARDIOGRAM COMPLETE: CPT | Performed by: INTERNAL MEDICINE

## 2018-02-21 PROCEDURE — 36415 COLL VENOUS BLD VENIPUNCTURE: CPT | Performed by: INTERNAL MEDICINE

## 2018-02-21 RX ORDER — METOPROLOL SUCCINATE 50 MG/1
50 TABLET, EXTENDED RELEASE ORAL DAILY
Qty: 90 TABLET | Refills: 3 | Status: SHIPPED | OUTPATIENT
Start: 2018-02-21 | End: 2018-02-26

## 2018-02-21 RX ORDER — FUROSEMIDE 20 MG
20 TABLET ORAL EVERY OTHER DAY
Qty: 30 TABLET | Refills: 11 | Status: ON HOLD | COMMUNITY
Start: 2018-02-21 | End: 2018-03-17

## 2018-02-21 NOTE — NURSING NOTE
"Chief Complaint   Patient presents with     Breathing Problem     Consult       Initial /70 (BP Location: Left arm, Patient Position: Chair, Cuff Size: Adult Regular)  Pulse 80  Temp 97.8  F (36.6  C)  Resp 22  Ht 5' 1.5\" (1.562 m)  Wt 132 lb (59.9 kg)  LMP  (LMP Unknown)  SpO2 94%  Breastfeeding? No  BMI 24.54 kg/m2 Estimated body mass index is 24.54 kg/(m^2) as calculated from the following:    Height as of this encounter: 5' 1.5\" (1.562 m).    Weight as of this encounter: 132 lb (59.9 kg).  Medication Reconciliation: complete   Doris Ramos LPN  "

## 2018-02-21 NOTE — PATIENT INSTRUCTIONS
Today we increased the metoprolol to 50 mg per day, and the furosemide to 4 days per week.                  If the Cardiology people cannot see you within 2 weeks, then please see me.

## 2018-02-21 NOTE — PROGRESS NOTES
"  SUBJECTIVE:   Theresa Alves is a 89 year old female who presents to clinic today for the following health issues:    Follow up on SOB-been getting worse over past month? and other multiple health concerns today.                     Here with a friend/relative.                                                                               Getting more SOB; sometimes \"I don't even get dressed\".         Furosemide BIW.           She states it now feels like her heart races with minimal activity.                                                                                                                         Right upper arm pain; during the night.               She reports no injury, and demonstrates fairly good range of motion right shoulder.                           She still lives in an independent apartment.               Having ongoing, worsening back pain.            Problem list and histories reviewed & adjusted, as indicated.      Current Outpatient Prescriptions   Medication Sig Dispense Refill     furosemide (LASIX) 20 MG tablet Take 1 tablet (20 mg) by mouth twice a week 30 tablet 11     simvastatin (ZOCOR) 20 MG tablet TAKE HALF TABLET BY MOUTH AT BEDTIME 45 tablet 3     metoprolol (TOPROL-XL) 25 MG 24 hr tablet Take 1 tablet (25 mg) by mouth daily 90 tablet 3     losartan (COZAAR) 100 MG tablet Take 1 tablet (100 mg) by mouth daily 90 tablet 3     oxyCODONE-acetaminophen (PERCOCET) 5-325 MG per tablet Take 0.5 tablets by mouth 2 times daily as needed for pain 60 tablet 0     Cyanocobalamin (VITAMIN B 12 PO) Take  by mouth.       aspirin 81 MG chewable tablet Take 81 mg by mouth daily. Every other day       VITAMIN D, CHOLECALCIFEROL, PO Take 1,000 Units by mouth daily.         No Known Allergies  BP Readings from Last 3 Encounters:   02/21/18 120/70   11/28/17 146/72   08/31/16 120/70    Wt Readings from Last 3 Encounters:   02/21/18 132 lb (59.9 kg)   11/28/17 130 lb (59 kg)   08/31/16 133 lb " "(60.3 kg)                    Reviewed and updated as needed this visit by clinical staff       Reviewed and updated as needed this visit by Provider         ROS: See above plus  CONSTITUTIONAL:NEGATIVE for fever, chills, change in weight and POSITIVE  for fatigue  RESP:POSITIVE for cough-non productive and SOB/dyspnea and NEGATIVE for hemoptysis and wheezing  CV: POSITIVE for dyspnea on exertion and NEGATIVE for chest pain/chest pressure, orthopnea and palpitations  MUSCULOSKELETAL: POSITIVE  for back pain   NEURO: NEGATIVE for memory problems    OBJECTIVE:                                                    /70 (BP Location: Left arm, Patient Position: Chair, Cuff Size: Adult Regular)  Pulse 80  Temp 97.8  F (36.6  C)  Resp 22  Ht 5' 1.5\" (1.562 m)  Wt 132 lb (59.9 kg)  LMP  (LMP Unknown)  SpO2 94%  Breastfeeding? No  BMI 24.54 kg/m2  Body mass index is 24.54 kg/(m^2).  GENERAL APPEARANCE: alert, mild distress and fatigued  RESP: rales bibasilar  CV: normal S1 S2, no S3 or S4, no murmur, click or rub and irregular rhythm  MS: decreased range of motion spine, with kyphoscoliosis  NEURO: mentation intact, speech normal and gait abnormal ; she uses a walker    Diagnostic test results:  Results for orders placed or performed in visit on 02/21/18 (from the past 24 hour(s))   CBC with platelets and differential   Result Value Ref Range    WBC 8.4 4.0 - 11.0 10e9/L    RBC Count 3.68 (L) 3.8 - 5.2 10e12/L    Hemoglobin 12.7 11.7 - 15.7 g/dL    Hematocrit 38.5 35.0 - 47.0 %     (H) 78 - 100 fl    MCH 34.5 (H) 26.5 - 33.0 pg    MCHC 33.0 31.5 - 36.5 g/dL    RDW 15.1 (H) 10.0 - 15.0 %    Platelet Count 220 150 - 450 10e9/L    Diff Method Automated Method     % Neutrophils 54.4 %    % Lymphocytes 25.7 %    % Monocytes 15.2 %    % Eosinophils 3.6 %    % Basophils 1.1 %    Absolute Neutrophil 4.6 1.6 - 8.3 10e9/L    Absolute Lymphocytes 2.2 0.8 - 5.3 10e9/L    Absolute Monocytes 1.3 0.0 - 1.3 10e9/L    " Absolute Eosinophils 0.3 0.0 - 0.7 10e9/L    Absolute Basophils 0.1 0.0 - 0.2 10e9/L   X-ray of the right humerus unremarkable.  CXR -bilateral pleural effusions with basilar congestion versus fibrosis.  Marked kyphosis.  EKG - atrial fibrillation, rate 125 bpm, with voltage criteria for LVH     ASSESSMENT/PLAN:                                                        ICD-10-CM    1. Atrial fibrillation, unspecified type (H) I48.91 EKG 12-lead complete w/read - Clinics     CARDIOLOGY EVAL ADULT REFERRAL     CANCELED: CARDIOLOGY EVAL ADULT REFERRAL   2. SOB (shortness of breath) R06.02 EKG 12-lead complete w/read - Clinics     XR Chest 2 Views     BNP-N terminal pro     Basic metabolic panel  (Ca, Cl, CO2, Creat, Gluc, K, Na, BUN)     CBC with platelets and differential     CARDIOLOGY EVAL ADULT REFERRAL        3. Pulmonary fibrosis J84.10 XR Chest 2 Views   4. Chronic congestive heart failure, unspecified congestive heart failure type (H) I50.9 EKG 12-lead complete w/read - Clinics     BNP-N terminal pro     Basic metabolic panel  (Ca, Cl, CO2, Creat, Gluc, K, Na, BUN)     CBC with platelets and differential     metoprolol succinate (TOPROL-XL) 50 MG 24 hr tablet     furosemide (LASIX) 20 MG tablet     CARDIOLOGY EVAL ADULT REFERRAL        5. Pain of right upper arm M79.621 XR Humerus Right G/E 2 Views   6. Hypertension goal BP (blood pressure) < 140/80 I10 metoprolol succinate (TOPROL-XL) 50 MG 24 hr tablet     furosemide (LASIX) 20 MG tablet       We discussed her situation at length.  We need to slow her ventricular rate.  However note that at rest for several minutes, her pulse does slow quite a bit.            We discussed anticoagulation.  So far she is not interested.  We discussed treatment options for atrial fibrillation.  She would like a cardiology consult.                  Patient Instructions   Today we increased the metoprolol to 50 mg per day, and the furosemide to 4 days per week.                  If  the Cardiology people cannot see you within 2 weeks, then please see me.         Marbin Rouse MD  [facility]                   Results for orders placed or performed in visit on 02/21/18   BNP-N terminal pro   Result Value Ref Range    N-Terminal Pro Bnp 8519 (H) 0 - 450 pg/mL   Basic metabolic panel  (Ca, Cl, CO2, Creat, Gluc, K, Na, BUN)   Result Value Ref Range    Sodium 139 133 - 144 mmol/L    Potassium 4.9 3.4 - 5.3 mmol/L    Chloride 106 94 - 109 mmol/L    Carbon Dioxide 26 20 - 32 mmol/L    Anion Gap 7 3 - 14 mmol/L    Glucose 107 (H) 70 - 99 mg/dL    Urea Nitrogen 30 7 - 30 mg/dL    Creatinine 1.29 (H) 0.52 - 1.04 mg/dL    GFR Estimate 39 (L) >60 mL/min/1.7m2    GFR Estimate If Black 47 (L) >60 mL/min/1.7m2    Calcium 9.3 8.5 - 10.1 mg/dL

## 2018-02-21 NOTE — MR AVS SNAPSHOT
After Visit Summary   2/21/2018    Theresa Alves    MRN: [id number]           Patient Information     Date Of Birth          6/22/1924        Visit Information        Provider Department      2/21/2018 3:00 PM Marbin Rouse MD [facility]        Today's Diagnoses     SOB (shortness of breath)    -  1    Atrial fibrillation, unspecified type (H)        Pulmonary fibrosis        Chronic congestive heart failure, unspecified congestive heart failure type (H)        Pain of right upper arm        Hypertension goal BP (blood pressure) < 140/80          Care Instructions    Today we increased the metoprolol to 50 mg per day, and the furosemide to 4 days per week.                  If the Cardiology people cannot see you within 2 weeks, then please see me.             Follow-ups after your visit        Additional Services     CARDIOLOGY EVAL ADULT REFERRAL       Preferred location:  [facility] [phone]   [email or web address]              PLEASE SEE THIS PATIENT FOR A CONSULT. SHE HAS RAPID ATRIAL FIB AND CHF.         Please be aware that coverage of these services is subject to the terms and limitations of your health insurance plan.  Call member services at your health plan with any benefit or coverage questions.      Please bring the following to your appointment:  Any x-rays, CTs or MRIs which have been performed. Contact the facility where they were done to arrange for  prior to your scheduled appointment.    List of current medications  This referral request   Any documents/labs given to you for this referral                  Who to contact     If you have questions or need follow up information about today's clinic visit or your schedule please contact [facility] directly at [phone].  Normal or non-critical lab and imaging results will be communicated to  "you by MyChart, letter or phone within 4 business days after the clinic has received the results. If you do not hear from us within 7 days, please contact the clinic through MyChart or phone. If you have a critical or abnormal lab result, we will notify you by phone as soon as possible.  Submit refill requests through [email or web address] or call your pharmacy and they will forward the refill request to us. Please allow 3 business days for your refill to be completed.          Additional Information About Your Visit        Care EveryWhere ID     This is your Care EveryWhere ID. This could be used by other organizations to access your [place] medical records  [id number]        Your Vitals Were     Pulse Temperature Respirations Height Last Period Pulse Oximetry    80 97.8  F (36.6  C) 22 5' 1.5\" (1.562 m) (LMP Unknown) 94%    Breastfeeding? BMI (Body Mass Index)                No 24.54 kg/m2           Blood Pressure from Last 3 Encounters:   02/21/18 120/70   11/28/17 146/72   08/31/16 120/70    Weight from Last 3 Encounters:   02/21/18 132 lb (59.9 kg)   11/28/17 130 lb (59 kg)   08/31/16 133 lb (60.3 kg)              We Performed the Following     Basic metabolic panel  (Ca, Cl, CO2, Creat, Gluc, K, Na, BUN)     BNP-N terminal pro     CARDIOLOGY EVAL ADULT REFERRAL     CBC with platelets and differential     EKG 12-lead complete w/read - Clinics          Today's Medication Changes          These changes are accurate as of 2/21/18  4:35 PM.  If you have any questions, ask your nurse or doctor.               These medicines have changed or have updated prescriptions.        Dose/Directions    LASIX 20 MG tablet   This may have changed:    - how much to take  - how to take this  - when to take this  - additional instructions   Used for:  Hypertension goal BP (blood pressure) < 140/80, Chronic congestive heart failure, unspecified congestive heart failure type (H)   Generic drug:  furosemide   Changed by:  Marbin Rouse MD    "     20 mg, 4 times per week   Quantity:  30 tablet   Refills:  11       metoprolol succinate 50 MG 24 hr tablet   Commonly known as:  TOPROL-XL   This may have changed:    - medication strength  - how much to take   Used for:  Chronic congestive heart failure, unspecified congestive heart failure type (H), Hypertension goal BP (blood pressure) < 140/80   Changed by:  Marbin Rouse MD        Dose:  50 mg   Take 1 tablet (50 mg) by mouth daily   Quantity:  90 tablet   Refills:  3            Where to get your medicines      These medications were sent to [id number] - [place], [place] - [address]  [address] [facility] [address]     Phone:  [phone]     metoprolol succinate 50 MG 24 hr tablet                Primary Care Provider Office Phone # Fax #    Marbin Rouse [phone] [address] [facility] [address]        Equal Access to Services     [facility]: Hadii aad ku hadasho Soomaali, waaxda luqadaha, qaybta kaalmada adeegyada, waxay idiin hayaan steffi de luna . So [facility] [phone].    ATENCIÓN: Si habla español, tiene a salmon disposición servicios gratuitos de asistencia lingüística. Llame al [phone].    We comply with applicable federal civil rights laws and [place] laws. We do not discriminate on the basis of race, color, national origin, age, disability, sex, sexual orientation, or gender identity.            Thank you!     Thank you for choosing [facility]  for your care. Our goal is always to provide you with excellent care. Hearing back from our patients is one way we can continue to improve our services. Please take a few minutes to complete the written survey that you may receive in the mail after your visit with us. Thank you!             Your Updated Medication List - Protect others around you: Learn how to safely use, store and throw away your medicines at www.disposemymeds.org.           This list is accurate as of 2/21/18  4:35 PM.  Always use your most recent med list.                   Brand Name Dispense Instructions for use Diagnosis    aspirin 81 MG chewable tablet      Take 81 mg by mouth daily. Every other day        LASIX 20 MG tablet   Generic drug:  furosemide     30 tablet    20 mg, 4 times per week    Hypertension goal BP (blood pressure) < 140/80, Chronic congestive heart failure, unspecified congestive heart failure type (H)       losartan 100 MG tablet    COZAAR    90 tablet    Take 1 tablet (100 mg) by mouth daily    Hypertension goal BP (blood pressure) < 140/80       metoprolol succinate 50 MG 24 hr tablet    TOPROL-XL    90 tablet    Take 1 tablet (50 mg) by mouth daily    Chronic congestive heart failure, unspecified congestive heart failure type (H), Hypertension goal BP (blood pressure) < 140/80       oxyCODONE-acetaminophen 5-325 MG per tablet    PERCOCET    60 tablet    Take 0.5 tablets by mouth 2 times daily as needed for pain    Chronic pain syndrome       simvastatin 20 MG tablet    ZOCOR    45 tablet    TAKE HALF TABLET BY MOUTH AT BEDTIME    Hyperlipidemia with target LDL less than 130       VITAMIN B 12 PO      Take  by mouth.        VITAMIN D (CHOLECALCIFEROL) PO      Take 1,000 Units by mouth daily.

## 2018-02-22 LAB
ANION GAP SERPL CALCULATED.3IONS-SCNC: 7 MMOL/L (ref 3–14)
BUN SERPL-MCNC: 30 MG/DL (ref 7–30)
CALCIUM SERPL-MCNC: 9.3 MG/DL (ref 8.5–10.1)
CHLORIDE SERPL-SCNC: 106 MMOL/L (ref 94–109)
CO2 SERPL-SCNC: 26 MMOL/L (ref 20–32)
CREAT SERPL-MCNC: 1.29 MG/DL (ref 0.52–1.04)
GFR SERPL CREATININE-BSD FRML MDRD: 39 ML/MIN/1.7M2
GLUCOSE SERPL-MCNC: 107 MG/DL (ref 70–99)
POTASSIUM SERPL-SCNC: 4.9 MMOL/L (ref 3.4–5.3)
SODIUM SERPL-SCNC: 139 MMOL/L (ref 133–144)

## 2018-02-26 ENCOUNTER — OFFICE VISIT (OUTPATIENT)
Dept: CARDIOLOGY | Facility: CLINIC | Age: 83
End: 2018-02-26
Attending: INTERNAL MEDICINE
Payer: COMMERCIAL

## 2018-02-26 VITALS
DIASTOLIC BLOOD PRESSURE: 65 MMHG | BODY MASS INDEX: 24.11 KG/M2 | HEIGHT: 62 IN | HEART RATE: 112 BPM | WEIGHT: 131 LBS | SYSTOLIC BLOOD PRESSURE: 99 MMHG

## 2018-02-26 DIAGNOSIS — I10 HYPERTENSION GOAL BP (BLOOD PRESSURE) < 140/80: ICD-10-CM

## 2018-02-26 DIAGNOSIS — I50.9 CHRONIC CONGESTIVE HEART FAILURE, UNSPECIFIED CONGESTIVE HEART FAILURE TYPE: ICD-10-CM

## 2018-02-26 DIAGNOSIS — I48.91 ATRIAL FIBRILLATION, UNSPECIFIED TYPE (H): Primary | ICD-10-CM

## 2018-02-26 PROCEDURE — 93005 ELECTROCARDIOGRAM TRACING: CPT | Performed by: INTERNAL MEDICINE

## 2018-02-26 PROCEDURE — 99204 OFFICE O/P NEW MOD 45 MIN: CPT | Performed by: INTERNAL MEDICINE

## 2018-02-26 RX ORDER — LOSARTAN POTASSIUM 25 MG/1
25 TABLET ORAL DAILY
Qty: 90 TABLET | Refills: 3 | Status: ON HOLD | OUTPATIENT
Start: 2018-02-26 | End: 2018-03-18

## 2018-02-26 RX ORDER — METOPROLOL SUCCINATE 100 MG/1
100 TABLET, EXTENDED RELEASE ORAL DAILY
Qty: 90 TABLET | Refills: 3 | Status: ON HOLD | OUTPATIENT
Start: 2018-02-26 | End: 2018-03-18

## 2018-02-26 NOTE — LETTER
2/26/2018      Marbin Rouse MD  [address]  [facility] [address]      RE: Theresa Alves       Dear Colleague,    I had the pleasure of seeing Theresa Alves in the [facility].    Service Date: 02/26/2018      REASON FOR CLINIC VISIT:  Establish Cardiology care for newly diagnosed atrial fibrillation.      HISTORY OF PRESENT ILLNESS:  Ms. Alves is a very pleasant, 89-year-old female with a history of hypertension with a history of pulmonary fibrosis and chronic kidney disease, stage 3, who is here to establish Cardiology care.  She is accompanied by her niece.  Last week the patient went to her Primary Care physician's office with dyspnea on exertion and was newly diagnosed with atrial fibrillation with elevated ventricular rates, and at that time metoprolol dose was increased from 25-50 mg daily.  The patient tells me that for the last 1-11/2 months, she has been having progressive shortness of breath with exertion.  She has long-standing dyspnea on exertion, which was mild, but over the last 11/2 months doing any physical activity, she would get short of breath walking a few steps.  At rest, she feels fine.  No orthopnea or PND.  Weights are stable.  EKG done last week was personally reviewed by me, and it shows atrial fibrillation with ventricular rate of 125 beats per minute.  There are some nonspecific diffuse ST changes.  She denies any chest discomfort or tightness.  EKG done today also confirmed that she is in atrial fibrillation with ventricular rate slightly better controlled at 106 beats per minute.  She had an echocardiogram done in 2016 that showed normal LV function and mild LVH.  Severe left atrial enlargement was noted at that time.  There was a comment about a possible brief run of SVT versus atrial fibrillation.  She is on baby aspirin.  She denies any history of bright blood per rectum, melena, hematuria or intracranial bleed.  She does  have frequent wheezing.  No epistaxis.  She uses a walker and lives in [place] independently, although she does have a lady help her with some activities of daily living, like cleaning and cooking once a week.  Presently, she is on metoprolol succinate 50 mg daily and losartan 100 mg daily.  She had 1 fall last year, but no falls in the last 6-7 months.  She uses a walker for ambulation.  She has no history of stroke, MI or CAD.      PHYSICAL EXAMINATION:   VITAL SIGNS:  Blood pressure 99/65, heart rate 112 and irregular, weight 131 pounds, BMI 24.4.   GENERAL:  The patient appears pleasant, comfortable.   NECK:  JVP appears normal with negative hepatojugular reflex.   CARDIOVASCULAR:  Irregular, tachycardia.  No murmur, rub or gallop.   RESPIRATORY:  There are bilateral crackles at the bases.  According to the patient, she always had been told that she had Velcro-like sounds in her lungs, likely due to pulmonary fibrosis history.   ABDOMEN:  Soft, nontender.   EXTREMITIES:  No pitting pedal edema.   NEUROLOGIC:  Alert, oriented x3.     PSYCHIATRIC:  Normal affect.   SKIN:  No obvious rash.     HEENT:  No pallor or icterus.      IMAGING:  EKG as noted above.      IMPRESSION AND PLAN:  A pleasant, 89-year-old female with newly diagnosed atrial fibrillation with rapid ventricular rate with symptoms of dyspnea on exertion.  I had a long discussion with the patient and her niece regarding the pathophysiology of atrial fibrillation, rate versus rhythm control strategy and stroke prophylaxis.  Her CHADS-VASc score is at least 4, and oral anticoagulation is recommended.  She does not have any obvious contraindication.  We discussed the risk of oral anticoagulation, which is an increased risk of bleeding.  She does have a history of 1 fall, but no fall in the last 6-7 months, and she uses a walker for ambulation.  After weighing the pros and cons of oral anticoagulation, the patient wishes to proceed with oral  anticoagulation.  We will use Xarelto 15 mg daily with the evening meals.  I recommend discontinuing aspirin.  Her ventricular rates are still elevated.  I had a long discussion with the patient regarding rate versus rhythm control strategy.  The patient prefers conservative management and as least invasive procedures as possible, which is quite reasonable given her age.  We will respect her wishes.  At this time, I recommend rate control strategy by increasing metoprolol from  mg daily.  I am hopeful that with better rate control, she will feel better.  I also recommend checking an echocardiogram to see what her LV function is.  It is possible that she may have some mild cardiomyopathy due to rapid ventricular rate.  Given the blood pressure is on the borderline low side, I recommend decreasing losartan from 100-25 mg daily, so this will allow more room for increase in metoprolol dose.  In case she continues to be symptomatic and ventricular rates are difficult to control, or she continues to be symptomatic despite adequate ventricular rate control, a rhythm control strategy can be considered.  To be noted, in the past, the left atrium was severely enlarged, so this does decrease the chances of maintaining sinus rhythm.  We will have her back in the Cardiology Clinic in about 7-10 days' time with an echocardiogram, and if she continues to be symptomatic, oral anticoagulation-guided cardioversion can be considered.   1.  Newly diagnosed atrial fibrillation.  Ventricular rate is rapid.   2.  Dyspnea on exertion, progressive at least for the last 11/2 months.  This could be due to a combination of rapid ventricular rate, loss of atrial kick and maybe some element of cardiomyopathy.  No chest discomfort.   3.  Hypertension.   4.  Chronic kidney disease, stage 3.   5.  History of pulmonary fibrosis.      RECOMMENDATIONS:   1.  For now, rate control strategy for atrial fibrillation.  Increase metoprolol succinate  to 100 mg daily.   2.  Decrease losartan to 25 mg daily.   3.  Xarelto 15 mg daily with evening meals.   4.  Discontinue aspirin.   5.  Echocardiogram:  Follow up in 7-10 days' time.  If ventricular rates are well controlled and she continues to be symptomatic, cardioversion can be considered.  I discussed briefly about BRENNEN-guided cardioversion versus oral anticoagulant cardioversion.  The patient does not want to undergo any invasive procedure as much as possible and prefers oral anticoagulation-guided cardioversion if needed.   6.  The plan was extensively discussed with the patient and her niece.      Fifty minutes of total time was spent with more than 50% in counseling.         ANGELINE CARROLL MD             D: 2018   T: 2018   MT: kellen      Name:     YANELI CHUA   MRN:      [id number]        Account:      [id number]   :      1924           Service Date: 2018      Document: [id number]         Outpatient Encounter Prescriptions as of 2018   Medication Sig Dispense Refill     rivaroxaban ANTICOAGULANT (XARELTO) 15 MG TABS tablet Take 1 tablet (15 mg) by mouth daily (with dinner) 30 tablet 3     metoprolol succinate (TOPROL-XL) 100 MG 24 hr tablet Take 1 tablet (100 mg) by mouth daily 90 tablet 3     losartan (COZAAR) 25 MG tablet Take 1 tablet (25 mg) by mouth daily 90 tablet 3     furosemide (LASIX) 20 MG tablet 20 mg, 4 times per week 30 tablet 11     simvastatin (ZOCOR) 20 MG tablet TAKE HALF TABLET BY MOUTH AT BEDTIME 45 tablet 3     oxyCODONE-acetaminophen (PERCOCET) 5-325 MG per tablet Take 0.5 tablets by mouth 2 times daily as needed for pain 60 tablet 0     Cyanocobalamin (VITAMIN B 12 PO) Take  by mouth.       VITAMIN D, CHOLECALCIFEROL, PO Take 1,000 Units by mouth daily.         [DISCONTINUED] metoprolol succinate (TOPROL-XL) 50 MG 24 hr tablet Take 1 tablet (50 mg) by mouth daily 90 tablet 3     [DISCONTINUED] losartan (COZAAR) 100 MG tablet Take 1 tablet (100 mg)  by mouth daily 90 tablet 3     [DISCONTINUED] aspirin 81 MG chewable tablet Take 81 mg by mouth daily. Every other day       No facility-administered encounter medications on file as of 2/26/2018.        Again, thank you for allowing me to participate in the care of your patient.      Sincerely,    Jefry Walker MD     [facility]

## 2018-02-26 NOTE — MR AVS SNAPSHOT
After Visit Summary   2/26/2018    Theresa Alves    MRN: [id number]           Patient Information     Date Of Birth          6/22/1924        Visit Information        Provider Department      2/26/2018 11:15 AM Jefry Walker MD [facility]        Today's Diagnoses     Atrial fibrillation, unspecified type (H)    -  1    Chronic congestive heart failure, unspecified congestive heart failure type (H)        Hypertension goal BP (blood pressure) < 140/80           Follow-ups after your visit        Additional Services     Follow-Up with Cardiac Advanced Practice Provider                 Future tests that were ordered for you today     Open Future Orders        Priority Expected Expires Ordered    Follow-Up with Cardiac Advanced Practice Provider Routine 3/8/2018 2/26/2019 2/26/2018    Echocardiogram Routine 3/8/2018 2/26/2019 2/26/2018            Who to contact     If you have questions or need follow up information about today's clinic visit or your schedule please contact [facility] directly at [phone].  Normal or non-critical lab and imaging results will be communicated to you by MyChart, letter or phone within 4 business days after the clinic has received the results. If you do not hear from us within 7 days, please contact the clinic through [employer] or phone. If you have a critical or abnormal lab result, we will notify you by phone as soon as possible.  Submit refill requests through [employer] or call your pharmacy and they will forward the refill request to us. Please allow 3 business days for your refill to be completed.          Additional Information About Your Visit        Care EveryWhere ID     This is your Care EveryWhere ID. This could be used by other organizations to access your [facility] medical records  [id number]        Your Vitals Were     Pulse Height Last Period BMI (Body Mass Index)          112  "1.562 m (5' 1.5\") (LMP Unknown) 24.35 kg/m2         Blood Pressure from Last 3 Encounters:   02/26/18 99/65   02/21/18 120/70   11/28/17 146/72    Weight from Last 3 Encounters:   02/26/18 59.4 kg (131 lb)   02/21/18 59.9 kg (132 lb)   11/28/17 59 kg (130 lb)              We Performed the Following     EKG 12-lead complete w/read - Clinics (performed today)          Today's Medication Changes          These changes are accurate as of 2/26/18 12:00 PM.  If you have any questions, ask your nurse or doctor.               Start taking these medicines.        Dose/Directions    rivaroxaban ANTICOAGULANT 15 MG Tabs tablet   Commonly known as:  XARELTO   Used for:  Atrial fibrillation, unspecified type (H)   Started by:  Jefry Walker MD        Dose:  15 mg   Take 1 tablet (15 mg) by mouth daily (with dinner)   Quantity:  30 tablet   Refills:  3         These medicines have changed or have updated prescriptions.        Dose/Directions    losartan 25 MG tablet   Commonly known as:  COZAAR   This may have changed:    - medication strength  - how much to take   Used for:  Hypertension goal BP (blood pressure) < 140/80   Changed by:  Jefry Walker MD        Dose:  25 mg   Take 1 tablet (25 mg) by mouth daily   Quantity:  90 tablet   Refills:  3       metoprolol succinate 100 MG 24 hr tablet   Commonly known as:  TOPROL-XL   This may have changed:    - medication strength  - how much to take   Used for:  Chronic congestive heart failure, unspecified congestive heart failure type (H), Hypertension goal BP (blood pressure) < 140/80   Changed by:  Jefry Walker MD        Dose:  100 mg   Take 1 tablet (100 mg) by mouth daily   Quantity:  90 tablet   Refills:  3         Stop taking these medicines if you haven't already. Please contact your care team if you have questions.     aspirin 81 MG chewable tablet   Stopped by:  Jefry Walker MD                Where to get your medicines      These medications were sent to [facility] " [id number] - [place], [place] - [address]  [address], [facility] [address]     Phone:  [phone]     losartan 25 MG tablet    metoprolol succinate 100 MG 24 hr tablet    rivaroxaban ANTICOAGULANT 15 MG Tabs tablet                Primary Care Provider Office Phone # Fax #    Marbin Rouse [phone] [address]  [facility] [address]        Equal Access to Services     [facility]: Hadii aad ku hadasho Soomaali, waaxda luqadaha, qaybta kaalmada adeegyada, waxay idiin hayaan adeeg kharash laagata . So [facility] [phone].    ATENCIÓN: Si habla español, tiene a salmon disposición servicios gratuitos de asistencia lingüística. Ll[facility] [phone].    We comply with applicable federal civil rights laws and [place] laws. We do not discriminate on the basis of race, color, national origin, age, disability, sex, sexual orientation, or gender identity.            Thank you!     Thank you for choosing [facility]  for your care. Our goal is always to provide you with excellent care. Hearing back from our patients is one way we can continue to improve our services. Please take a few minutes to complete the written survey that you may receive in the mail after your visit with us. Thank you!             Your Updated Medication List - Protect others around you: Learn how to safely use, store and throw away your medicines at www.disposemymeds.org.          This list is accurate as of 2/26/18 12:00 PM.  Always use your most recent med list.                   Brand Name Dispense Instructions for use Diagnosis    LASIX 20 MG tablet   Generic drug:  furosemide     30 tablet    20 mg, 4 times per week    Hypertension goal BP (blood pressure) < 140/80, Chronic congestive heart failure, unspecified congestive heart failure type (H)       losartan 25 MG tablet    COZAAR    90 tablet    Take 1 tablet (25 mg) by mouth daily    Hypertension goal  BP (blood pressure) < 140/80       metoprolol succinate 100 MG 24 hr tablet    TOPROL-XL    90 tablet    Take 1 tablet (100 mg) by mouth daily    Chronic congestive heart failure, unspecified congestive heart failure type (H), Hypertension goal BP (blood pressure) < 140/80       oxyCODONE-acetaminophen 5-325 MG per tablet    PERCOCET    60 tablet    Take 0.5 tablets by mouth 2 times daily as needed for pain    Chronic pain syndrome       rivaroxaban ANTICOAGULANT 15 MG Tabs tablet    XARELTO    30 tablet    Take 1 tablet (15 mg) by mouth daily (with dinner)    Atrial fibrillation, unspecified type (H)       simvastatin 20 MG tablet    ZOCOR    45 tablet    TAKE HALF TABLET BY MOUTH AT BEDTIME    Hyperlipidemia with target LDL less than 130       VITAMIN B 12 PO      Take  by mouth.        VITAMIN D (CHOLECALCIFEROL) PO      Take 1,000 Units by mouth daily.

## 2018-02-26 NOTE — PROGRESS NOTES
Service Date: 02/26/2018      REASON FOR CLINIC VISIT:  Establish Cardiology care for newly diagnosed atrial fibrillation.      HISTORY OF PRESENT ILLNESS:  Ms. Alves is a very pleasant, 89-year-old female with a history of hypertension with a history of pulmonary fibrosis and chronic kidney disease, stage 3, who is here to establish Cardiology care.  She is accompanied by her niece.  Last week the patient went to her Primary Care physician's office with dyspnea on exertion and was newly diagnosed with atrial fibrillation with elevated ventricular rates, and at that time metoprolol dose was increased from 25-50 mg daily.  The patient tells me that for the last 1-11/2 months, she has been having progressive shortness of breath with exertion.  She has long-standing dyspnea on exertion, which was mild, but over the last 11/2 months doing any physical activity, she would get short of breath walking a few steps.  At rest, she feels fine.  No orthopnea or PND.  Weights are stable.  EKG done last week was personally reviewed by me, and it shows atrial fibrillation with ventricular rate of 125 beats per minute.  There are some nonspecific diffuse ST changes.  She denies any chest discomfort or tightness.  EKG done today also confirmed that she is in atrial fibrillation with ventricular rate slightly better controlled at 106 beats per minute.  She had an echocardiogram done in 2016 that showed normal LV function and mild LVH.  Severe left atrial enlargement was noted at that time.  There was a comment about a possible brief run of SVT versus atrial fibrillation.  She is on baby aspirin.  She denies any history of bright blood per rectum, melena, hematuria or intracranial bleed.  She does have frequent wheezing.  No epistaxis.  She uses a walker and lives in [place] independently, although she does have a lady help her with some activities of daily living, like cleaning and cooking once a week.  Presently, she is on  metoprolol succinate 50 mg daily and losartan 100 mg daily.  She had 1 fall last year, but no falls in the last 6-7 months.  She uses a walker for ambulation.  She has no history of stroke, MI or CAD.      PHYSICAL EXAMINATION:   VITAL SIGNS:  Blood pressure 99/65, heart rate 112 and irregular, weight 131 pounds, BMI 24.4.   GENERAL:  The patient appears pleasant, comfortable.   NECK:  JVP appears normal with negative hepatojugular reflex.   CARDIOVASCULAR:  Irregular, tachycardia.  No murmur, rub or gallop.   RESPIRATORY:  There are bilateral crackles at the bases.  According to the patient, she always had been told that she had Velcro-like sounds in her lungs, likely due to pulmonary fibrosis history.   ABDOMEN:  Soft, nontender.   EXTREMITIES:  No pitting pedal edema.   NEUROLOGIC:  Alert, oriented x3.     PSYCHIATRIC:  Normal affect.   SKIN:  No obvious rash.     HEENT:  No pallor or icterus.      IMAGING:  EKG as noted above.      IMPRESSION AND PLAN:  A pleasant, 89-year-old female with newly diagnosed atrial fibrillation with rapid ventricular rate with symptoms of dyspnea on exertion.  I had a long discussion with the patient and her niece regarding the pathophysiology of atrial fibrillation, rate versus rhythm control strategy and stroke prophylaxis.  Her CHADS-VASc score is at least 4, and oral anticoagulation is recommended.  She does not have any obvious contraindication.  We discussed the risk of oral anticoagulation, which is an increased risk of bleeding.  She does have a history of 1 fall, but no fall in the last 6-7 months, and she uses a walker for ambulation.  After weighing the pros and cons of oral anticoagulation, the patient wishes to proceed with oral anticoagulation.  We will use Xarelto 15 mg daily with the evening meals.  I recommend discontinuing aspirin.  Her ventricular rates are still elevated.  I had a long discussion with the patient regarding rate versus rhythm control strategy.   The patient prefers conservative management and as least invasive procedures as possible, which is quite reasonable given her age.  We will respect her wishes.  At this time, I recommend rate control strategy by increasing metoprolol from  mg daily.  I am hopeful that with better rate control, she will feel better.  I also recommend checking an echocardiogram to see what her LV function is.  It is possible that she may have some mild cardiomyopathy due to rapid ventricular rate.  Given the blood pressure is on the borderline low side, I recommend decreasing losartan from 100-25 mg daily, so this will allow more room for increase in metoprolol dose.  In case she continues to be symptomatic and ventricular rates are difficult to control, or she continues to be symptomatic despite adequate ventricular rate control, a rhythm control strategy can be considered.  To be noted, in the past, the left atrium was severely enlarged, so this does decrease the chances of maintaining sinus rhythm.  We will have her back in the Cardiology Clinic in about 7-10 days' time with an echocardiogram, and if she continues to be symptomatic, oral anticoagulation-guided cardioversion can be considered.   1.  Newly diagnosed atrial fibrillation.  Ventricular rate is rapid.   2.  Dyspnea on exertion, progressive at least for the last 11/2 months.  This could be due to a combination of rapid ventricular rate, loss of atrial kick and maybe some element of cardiomyopathy.  No chest discomfort.   3.  Hypertension.   4.  Chronic kidney disease, stage 3.   5.  History of pulmonary fibrosis.      RECOMMENDATIONS:   1.  For now, rate control strategy for atrial fibrillation.  Increase metoprolol succinate to 100 mg daily.   2.  Decrease losartan to 25 mg daily.   3.  Xarelto 15 mg daily with evening meals.   4.  Discontinue aspirin.   5.  Echocardiogram:  Follow up in 7-10 days' time.  If ventricular rates are well controlled and she continues  to be symptomatic, cardioversion attempt can be considered.  I discussed briefly about BRENNEN-guided cardioversion versus oral anticoagulant cardioversion.  The patient does not want to undergo any invasive procedure as much as possible and prefers oral anticoagulation-guided cardioversion if needed.   6.  The plan was extensively discussed with the patient and her niece.      Fifty minutes of total time was spent with more than 50% in counseling.         ANGELINE CARORLL MD             D: 2018   T: 2018   MT: kellen      Name:     YANELI CHUA   MRN:      [id number]        Account:      [id number]   :      1924           Service Date: 2018      Document: [id number]

## 2018-02-26 NOTE — PROGRESS NOTES
HPI and Plan:   See dictation(#[id number])    Orders Placed This Encounter   Procedures     Follow-Up with Cardiac Advanced Practice Provider     EKG 12-lead complete w/read - Clinics (performed today)     Echocardiogram       Orders Placed This Encounter   Medications     rivaroxaban ANTICOAGULANT (XARELTO) 15 MG TABS tablet     Sig: Take 1 tablet (15 mg) by mouth daily (with dinner)     Dispense:  30 tablet     Refill:  3     metoprolol succinate (TOPROL-XL) 100 MG 24 hr tablet     Sig: Take 1 tablet (100 mg) by mouth daily     Dispense:  90 tablet     Refill:  3     losartan (COZAAR) 25 MG tablet     Sig: Take 1 tablet (25 mg) by mouth daily     Dispense:  90 tablet     Refill:  3       Medications Discontinued During This Encounter   Medication Reason     aspirin 81 MG chewable tablet      metoprolol succinate (TOPROL-XL) 50 MG 24 hr tablet Reorder     losartan (COZAAR) 100 MG tablet Reorder         Encounter Diagnoses   Name Primary?     Atrial fibrillation, unspecified type (H) Yes     Chronic congestive heart failure, unspecified congestive heart failure type (H)      Hypertension goal BP (blood pressure) < 140/80        CURRENT MEDICATIONS:  Current Outpatient Prescriptions   Medication Sig Dispense Refill     rivaroxaban ANTICOAGULANT (XARELTO) 15 MG TABS tablet Take 1 tablet (15 mg) by mouth daily (with dinner) 30 tablet 3     metoprolol succinate (TOPROL-XL) 100 MG 24 hr tablet Take 1 tablet (100 mg) by mouth daily 90 tablet 3     losartan (COZAAR) 25 MG tablet Take 1 tablet (25 mg) by mouth daily 90 tablet 3     furosemide (LASIX) 20 MG tablet 20 mg, 4 times per week 30 tablet 11     simvastatin (ZOCOR) 20 MG tablet TAKE HALF TABLET BY MOUTH AT BEDTIME 45 tablet 3     oxyCODONE-acetaminophen (PERCOCET) 5-325 MG per tablet Take 0.5 tablets by mouth 2 times daily as needed for pain 60 tablet 0     Cyanocobalamin (VITAMIN B 12 PO) Take  by mouth.       VITAMIN D, CHOLECALCIFEROL, PO Take 1,000 Units by mouth  daily.         [DISCONTINUED] metoprolol succinate (TOPROL-XL) 50 MG 24 hr tablet Take 1 tablet (50 mg) by mouth daily 90 tablet 3     [DISCONTINUED] losartan (COZAAR) 100 MG tablet Take 1 tablet (100 mg) by mouth daily 90 tablet 3       ALLERGIES   No Known Allergies    PAST MEDICAL HISTORY:  Past Medical History:   Diagnosis Date     Chronic back pain      Pulmonary fibrosis (H)      Unspecified essential hypertension     Essential hypertension       PAST SURGICAL HISTORY:  Past Surgical History:   Procedure Laterality Date     CARPAL TUNNEL RELEASE RT/LT  1990's    R     CATARACT IOL, RT/LT  5/03    R     HERNIA REPAIR, INGUINAL RT/LT  1954    R     hiatal hernia repair  6/10    large paraesophageal hiatal hernia; Nissen fundoplication        FAMILY HISTORY:  Family History   Problem Relation Age of Onset     Unknown/Adopted Mother      Unknown/Adopted Father      DIABETES No family hx of      Coronary Artery Disease No family hx of      Hypertension No family hx of      Hyperlipidemia No family hx of      CEREBROVASCULAR DISEASE No family hx of      Breast Cancer No family hx of      Colon Cancer No family hx of      Prostate Cancer No family hx of      Other Cancer No family hx of      Depression No family hx of      Anxiety Disorder No family hx of      MENTAL ILLNESS No family hx of      Substance Abuse No family hx of      Anesthesia Reaction No family hx of      Asthma No family hx of      OSTEOPOROSIS No family hx of      Genetic Disorder No family hx of      Thyroid Disease No family hx of      Obesity No family hx of        SOCIAL HISTORY:  Social History     Social History     Marital status:      Spouse name: N/A     Number of children: 0     Years of education: N/A     Occupational History     worked at [employer] for 40 yrs Retired     Social History Main Topics     Smoking status: Never Smoker     Smokeless tobacco: Never Used     Alcohol use Yes      Comment: daily-vodka tonic x2     Drug use: No  "    Sexual activity: No     Other Topics Concern     None     Social History Narrative       Review of Systems:  Skin:  Negative       Eyes:  Positive for glasses    ENT:  Negative      Respiratory:  Positive for shortness of breath;dyspnea on exertion     Cardiovascular:    Positive for;fatigue a-fib  Gastroenterology: Negative      Genitourinary:  Negative      Musculoskeletal:  Negative      Neurologic:  Negative      Psychiatric:  Negative      Heme/Lymph/Imm:  Negative      Endocrine:  Negative        Physical Exam:  Vitals: BP 99/65  Pulse 112  Ht 1.562 m (5' 1.5\")  Wt 59.4 kg (131 lb)  LMP  (LMP Unknown)  BMI 24.35 kg/m2    Constitutional:           Skin:             Head:           Eyes:           Lymph:      ENT:           Neck:           Respiratory:            Cardiac:                                                           GI:           Extremities and Muscular Skeletal:                 Neurological:           Psych:             CC  Marbin Rouse MD  [address]  [place] [address]                  "

## 2018-02-26 NOTE — LETTER
2/26/2018    Marbin Rouse MD  [address]  [facility] [address]    RE: Theresa Alves       Dear Colleague,    I had the pleasure of seeing Theresa Alves in the [facility].    HPI and Plan:   See dictation(#[id number])    Orders Placed This Encounter   Procedures     Follow-Up with Cardiac Advanced Practice Provider     EKG 12-lead complete w/read - Clinics (performed today)     Echocardiogram       Orders Placed This Encounter   Medications     rivaroxaban ANTICOAGULANT (XARELTO) 15 MG TABS tablet     Sig: Take 1 tablet (15 mg) by mouth daily (with dinner)     Dispense:  30 tablet     Refill:  3     metoprolol succinate (TOPROL-XL) 100 MG 24 hr tablet     Sig: Take 1 tablet (100 mg) by mouth daily     Dispense:  90 tablet     Refill:  3     losartan (COZAAR) 25 MG tablet     Sig: Take 1 tablet (25 mg) by mouth daily     Dispense:  90 tablet     Refill:  3       Medications Discontinued During This Encounter   Medication Reason     aspirin 81 MG chewable tablet      metoprolol succinate (TOPROL-XL) 50 MG 24 hr tablet Reorder     losartan (COZAAR) 100 MG tablet Reorder         Encounter Diagnoses   Name Primary?     Atrial fibrillation, unspecified type (H) Yes     Chronic congestive heart failure, unspecified congestive heart failure type (H)      Hypertension goal BP (blood pressure) < 140/80        CURRENT MEDICATIONS:  Current Outpatient Prescriptions   Medication Sig Dispense Refill     rivaroxaban ANTICOAGULANT (XARELTO) 15 MG TABS tablet Take 1 tablet (15 mg) by mouth daily (with dinner) 30 tablet 3     metoprolol succinate (TOPROL-XL) 100 MG 24 hr tablet Take 1 tablet (100 mg) by mouth daily 90 tablet 3     losartan (COZAAR) 25 MG tablet Take 1 tablet (25 mg) by mouth daily 90 tablet 3     furosemide (LASIX) 20 MG tablet 20 mg, 4 times per week 30 tablet 11     simvastatin (ZOCOR) 20 MG tablet TAKE HALF TABLET BY MOUTH AT BEDTIME 45 tablet 3      oxyCODONE-acetaminophen (PERCOCET) 5-325 MG per tablet Take 0.5 tablets by mouth 2 times daily as needed for pain 60 tablet 0     Cyanocobalamin (VITAMIN B 12 PO) Take  by mouth.       VITAMIN D, CHOLECALCIFEROL, PO Take 1,000 Units by mouth daily.         [DISCONTINUED] metoprolol succinate (TOPROL-XL) 50 MG 24 hr tablet Take 1 tablet (50 mg) by mouth daily 90 tablet 3     [DISCONTINUED] losartan (COZAAR) 100 MG tablet Take 1 tablet (100 mg) by mouth daily 90 tablet 3       ALLERGIES   No Known Allergies    PAST MEDICAL HISTORY:  Past Medical History:   Diagnosis Date     Chronic back pain      Pulmonary fibrosis (H)      Unspecified essential hypertension     Essential hypertension       PAST SURGICAL HISTORY:  Past Surgical History:   Procedure Laterality Date     CARPAL TUNNEL RELEASE RT/LT  1990's    R     CATARACT IOL, RT/LT  5/03    R     HERNIA REPAIR, INGUINAL RT/LT  1954    R     hiatal hernia repair  6/10    large paraesophageal hiatal hernia; Nissen fundoplication        FAMILY HISTORY:  Family History   Problem Relation Age of Onset     Unknown/Adopted Mother      Unknown/Adopted Father      DIABETES No family hx of      Coronary Artery Disease No family hx of      Hypertension No family hx of      Hyperlipidemia No family hx of      CEREBROVASCULAR DISEASE No family hx of      Breast Cancer No family hx of      Colon Cancer No family hx of      Prostate Cancer No family hx of      Other Cancer No family hx of      Depression No family hx of      Anxiety Disorder No family hx of      MENTAL ILLNESS No family hx of      Substance Abuse No family hx of      Anesthesia Reaction No family hx of      Asthma No family hx of      OSTEOPOROSIS No family hx of      Genetic Disorder No family hx of      Thyroid Disease No family hx of      Obesity No family hx of        SOCIAL HISTORY:  Social History     Social History     Marital status:      Spouse name: N/A     Number of children: 0     Years of  "education: N/A     Occupational History     worked at [email or web address] for 40 yrs Retired     Social History Main Topics     Smoking status: Never Smoker     Smokeless tobacco: Never Used     Alcohol use Yes      Comment: daily-vodka tonic x2     Drug use: No     Sexual activity: No     Other Topics Concern     None     Social History Narrative       Review of Systems:  Skin:  Negative       Eyes:  Positive for glasses    ENT:  Negative      Respiratory:  Positive for shortness of breath;dyspnea on exertion     Cardiovascular:    Positive for;fatigue a-fib  Gastroenterology: Negative      Genitourinary:  Negative      Musculoskeletal:  Negative      Neurologic:  Negative      Psychiatric:  Negative      Heme/Lymph/Imm:  Negative      Endocrine:  Negative        Physical Exam:  Vitals: BP 99/65  Pulse 112  Ht 1.562 m (5' 1.5\")  Wt 59.4 kg (131 lb)  LMP  (LMP Unknown)  BMI 24.35 kg/m2    Constitutional:           Skin:             Head:           Eyes:           Lymph:      ENT:           Neck:           Respiratory:            Cardiac:                                                           GI:           Extremities and Muscular Skeletal:                 Neurological:           Psych:             CC  Marbin Rouse MD  [address] [place] [address]                    Thank you for allowing me to participate in the care of your patient.      Sincerely,     Jefry Walker MD     [facility]    cc:   Marbin Rouse MD  [address] [place] [address]        "

## 2018-03-01 ENCOUNTER — TELEPHONE (OUTPATIENT)
Dept: CARDIOLOGY | Facility: CLINIC | Age: 83
End: 2018-03-01

## 2018-03-01 NOTE — TELEPHONE ENCOUNTER
Patient is a 89 year old woman who is on Xarelto for AF and has noticed in the last few days her left nostril keeps bleeding after she blows her nose.  It does take a long time to for it to stop.  She has had problems with nose bleeds in the past.  She has a niece that does help her with transporting to office visits.  I contacted ENT in [place] and they have 8 different sites and the earliest appointment would be next week. Her niece is off work tomorrow and she may need to call for an appointment with ENT clinics if she feels it is necessary.  Recommend to patient to try to avoid blowing her nose if at all possible.  If bleeding does not sop she needs to go to ED.  Has an OV next week.  Reviewed the importance of staying on Xarelto.  Patient will call back if any other questions or concerns.

## 2018-03-06 ENCOUNTER — DOCUMENTATION ONLY (OUTPATIENT)
Dept: CARDIOLOGY | Facility: CLINIC | Age: 83
End: 2018-03-06

## 2018-03-06 ENCOUNTER — HOSPITAL ENCOUNTER (OUTPATIENT)
Dept: CARDIOLOGY | Facility: CLINIC | Age: 83
Discharge: HOME | End: 2018-03-06
Attending: INTERNAL MEDICINE | Admitting: INTERNAL MEDICINE
Payer: COMMERCIAL

## 2018-03-06 ENCOUNTER — TELEPHONE (OUTPATIENT)
Dept: NURSING | Facility: CLINIC | Age: 83
End: 2018-03-06

## 2018-03-06 DIAGNOSIS — I48.91 ATRIAL FIBRILLATION, UNSPECIFIED TYPE (H): ICD-10-CM

## 2018-03-06 PROCEDURE — 93306 TTE W/DOPPLER COMPLETE: CPT | Mod: 26 | Performed by: INTERNAL MEDICINE

## 2018-03-06 PROCEDURE — 93306 TTE W/DOPPLER COMPLETE: CPT

## 2018-03-06 NOTE — PROGRESS NOTES
ECHO ready for review.    F/u OV with Abigal Liegl on 3/8/18 to review with Patient.     Will message Dr. Walker for review and any new recommendations.     ECHO  Interpretation Summary     1. The left ventricle is normal in size. The visual ejection fraction is  estimated at 50-55%.  2. The right ventricle is normal size. The right ventricular systolic function  is mildly reduced.  3. There is severe biatrial enlargement.  4. There is moderate (2+) tricuspid regurgitation.  5. Moderate (46-55mmHg) pulmonary hypertension is present. The right  ventricular systolic pressure is approximated at 50mmHg plus the right atrial  pressure.  6. There is mild (1+) aortic regurgitation.  7. IVC is mildly dilated with ~50% respiratory collapse.     Echo from 1/2016 showed EF 60-65%, mild TR, limited TR jet to measure RVSP,  but likely normal.     _____________________________________________________________________________  __        Left Ventricle  The left ventricle is normal in size. There is mild concentric left  ventricular hypertrophy. The visual ejection fraction is estimated at 50-55%.  Diastolic function not assessed due to atrial fibrillation. Normal left  ventricular wall motion.     Right Ventricle  The right ventricle is normal size. The right ventricular systolic function is  mildly reduced.     Atria  There is severe biatrial enlargement. There is no atrial shunt seen.     Mitral Valve  There is mild (1+) mitral regurgitation.     Tricuspid Valve  There is moderate (2+) tricuspid regurgitation. Moderate (46-55mmHg) pulmonary  hypertension is present. The right ventricular systolic pressure is  approximated at 50mmHg plus the right atrial pressure.        Aortic Valve  There is mild (1+) aortic regurgitation.     Pulmonic Valve  The pulmonic valve is normal in structure and function.     Vessels  Normal ascending, transverse (arch), and descending aorta. IVC is mildly  dilated with ~50% respiratory  collapse.     Pericardium  There is no pericardial effusion.     Rhythm  The rhythm was atrial fibrillation.     _____________________________________________________________________________  __  MMode/2D Measurements & Calculations  IVSd: 1.2 cm  LVIDd: 4.3 cm  LVIDs: 3.2 cm  LVPWd: 1.4 cm  FS: 25.6 %  LV mass(C)d: 203.4 grams  LV mass(C)dI: 129.3 grams/m2  Ao root diam: 2.9 cm     asc Aorta Diam: 2.9 cm  LVOT diam: 1.8 cm  LVOT area: 2.4 cm2  LA Volume (BP): 116.0 ml  LA Volume Index (BP): 73.9 ml/m2  RWT: 0.64  TAPSE: 1.3 cm        Doppler Measurements & Calculations  MV E max geni: 69.8 cm/sec  MV dec time: 0.12 sec     AI P1/2t: 548.6 msec  TR max geni: 329.7 cm/sec  TR max P.6 mmHg  E/E' avg: 10.8  Lateral E/e': 11.6  Medial E/e': 10.0

## 2018-03-06 NOTE — PROGRESS NOTES
Borderline reduced LVEF and severely enlarged LA. Latter makes rhythm control difficult. Would favor rate control unless still symptomatic despite rate control.    Thanks  Jefry

## 2018-03-06 NOTE — TELEPHONE ENCOUNTER
Representative for [facility] requesting copy of order for patient's walker and office notes related to order. Faxed to [phone].

## 2018-03-08 ENCOUNTER — OFFICE VISIT (OUTPATIENT)
Dept: CARDIOLOGY | Facility: CLINIC | Age: 83
End: 2018-03-08
Attending: INTERNAL MEDICINE
Payer: COMMERCIAL

## 2018-03-08 VITALS
DIASTOLIC BLOOD PRESSURE: 74 MMHG | BODY MASS INDEX: 24.33 KG/M2 | HEIGHT: 62 IN | HEART RATE: 64 BPM | WEIGHT: 132.2 LBS | SYSTOLIC BLOOD PRESSURE: 121 MMHG

## 2018-03-08 DIAGNOSIS — I48.91 ATRIAL FIBRILLATION, UNSPECIFIED TYPE (H): ICD-10-CM

## 2018-03-08 PROCEDURE — 99214 OFFICE O/P EST MOD 30 MIN: CPT | Performed by: PHYSICIAN ASSISTANT

## 2018-03-08 NOTE — MR AVS SNAPSHOT
After Visit Summary   3/8/2018    Theresa Alves    MRN: [id number]           Patient Information     Date Of Birth          6/22/1924        Visit Information        Provider Department      3/8/2018 2:30 PM Citlali Umana PA-C [facility]   [facility]        Today's Diagnoses     Atrial fibrillation, unspecified type (H)          Care Instructions    Thank you for your [facility] visit today. Your provider has recommended the following:  Medication Changes:  No medication changes today.  Recommendations:  Schedule the holter monitor today to see what your heart rates are doing.  Follow-up:  See us back for cardiology follow up in a few weeks.  Reminder:  Please bring in all current medications, over the counter supplements and vitamin bottles to your next appointment.            [facility]            Follow-ups after your visit        Additional Services     Follow-Up with Cardiac Advanced Practice Provider                 Future tests that were ordered for you today     Open Future Orders        Priority Expected Expires Ordered    Follow-Up with Cardiac Advanced Practice Provider Routine 3/29/2018 3/8/2019 3/8/2018    Holter Monitor 24 hour - Adult Routine 3/15/2018 3/8/2019 3/8/2018            Who to contact     If you have questions or need follow up information about today's clinic visit or your schedule please contact [facility]   JESSI directly at [phone].  Normal or non-critical lab and imaging results will be communicated to you by MyChart, letter or phone within 4 business days after the clinic has received the results. If you do not hear from us within 7 days, please contact the clinic through MyChart or phone. If you have a critical or abnormal lab result, we will notify you by phone as soon as possible.  Submit refill requests through [email or web address] or call your pharmacy and they will  "forward the refill request to us. Please allow 3 business days for your refill to be completed.          Additional Information About Your Visit        MyChart Information     [employer] lets you send messages to your doctor, view your test results, renew your prescriptions, schedule appointments and more. To sign up, go to [email or web address] . Click on \"Log in\" on the left side of the screen, which will take you to the Welcome page. Then click on \"Sign up Now\" on the right side of the page.     You will be asked to enter the access code listed below, as well as some personal information. Please follow the directions to create your username and password.     Your access code is: [email or web address]  Expires: 2018  3:09 PM     Your access code will  in 90 days. If you need help or a new code, please call your [facility] clinic or [phone].        Care EveryWhere ID     This is your Care EveryWhere ID. This could be used by other organizations to access your [facility] medical records  [id number]        Your Vitals Were     Pulse Height Last Period BMI (Body Mass Index)          64 1.562 m (5' 1.5\") (LMP Unknown) 24.57 kg/m2         Blood Pressure from Last 3 Encounters:   18 121/74   18 99/65   18 120/70    Weight from Last 3 Encounters:   18 60 kg (132 lb 3.2 oz)   18 59.4 kg (131 lb)   18 59.9 kg (132 lb)              We Performed the Following     Follow-Up with Cardiac Advanced Practice Provider        Primary Care Provider Office Phone # Fax #    Marbin Rouse [phone] [address] [facility] [address]        Equal Access to Services     BG SALINAS : Valdemar Lobo, watobi delgado, qaybta ada lynn, renee emery. So [facility] [phone].    ATENCIÓN: Si habla español, tiene a salmon disposición servicios gratuitos de asistencia lingüística. Llame al [phone].    We comply with " applicable federal civil rights laws and [place] laws. We do not discriminate on the basis of race, color, national origin, age, disability, sex, sexual orientation, or gender identity.            Thank you!     Thank you for choosing [facility]  for your care. Our goal is always to provide you with excellent care. Hearing back from our patients is one way we can continue to improve our services. Please take a few minutes to complete the written survey that you may receive in the mail after your visit with us. Thank you!             Your Updated Medication List - Protect others around you: Learn how to safely use, store and throw away your medicines at www.disposemymeds.org.          This list is accurate as of 3/8/18  3:09 PM.  Always use your most recent med list.                   Brand Name Dispense Instructions for use Diagnosis    LASIX 20 MG tablet   Generic drug:  furosemide     30 tablet    20 mg, 4 times per week    Hypertension goal BP (blood pressure) < 140/80, Chronic congestive heart failure, unspecified congestive heart failure type (H)       losartan 25 MG tablet    COZAAR    90 tablet    Take 1 tablet (25 mg) by mouth daily    Hypertension goal BP (blood pressure) < 140/80       metoprolol succinate 100 MG 24 hr tablet    TOPROL-XL    90 tablet    Take 1 tablet (100 mg) by mouth daily    Chronic congestive heart failure, unspecified congestive heart failure type (H), Hypertension goal BP (blood pressure) < 140/80       oxyCODONE-acetaminophen 5-325 MG per tablet    PERCOCET    60 tablet    Take 0.5 tablets by mouth 2 times daily as needed for pain    Chronic pain syndrome       rivaroxaban ANTICOAGULANT 15 MG Tabs tablet    XARELTO    30 tablet    Take 1 tablet (15 mg) by mouth daily (with dinner)    Atrial fibrillation, unspecified type (H)       simvastatin 20 MG tablet    ZOCOR    45 tablet    TAKE HALF TABLET BY MOUTH AT BEDTIME    Hyperlipidemia with  target LDL less than 130       VITAMIN B 12 PO      Take  by mouth.        VITAMIN D (CHOLECALCIFEROL) PO      Take 1,000 Units by mouth daily.

## 2018-03-08 NOTE — PATIENT INSTRUCTIONS
Thank you for your [facility] visit today. Your provider has recommended the following:  Medication Changes:  No medication changes today.  Recommendations:  Schedule the holter monitor today to see what your heart rates are doing.  Follow-up:  See us back for cardiology follow up in a few weeks.  Reminder:  Please bring in all current medications, over the counter supplements and vitamin bottles to your next appointment.            [facility]

## 2018-03-08 NOTE — LETTER
3/8/2018    Marbin Rouse MD  [address]  [facility] [address]    RE: Theresa Alves       Dear Colleague,    I had the pleasure of seeing Theresa Alves in the [facility].      Cardiology Progress Note    Date of Service: 03/08/2018  Patient seen today in follow up of: atrial fibrillation with RVR  Primary cardiologist: Dr. Walker    HPI:  Theresa Alves is a very pleasant 89-year-old female with a history of hypertension, pulmonary fibrosis, CKD stage III was recently seen by Dr. Walker for a new diagnosis of atrial fibrillation with RVR.  She was seen by her PCP on 2/21/18.  She had noted about a month and a half of exertional dyspnea which was progressive. She has some mild chronic shortness of breath with exertion due to her underlying pulmonary fibrosis. An EKG at this visit showed atrial fibrillation with a ventricular rate of 125 bpm.  Her metoprolol dose was increased from 25-50 mg daily and she was referred to cardiology clinic.    She saw Dr. Walker on 2/26/18.persistent atrial fibrillation  EKG at that visit showed with a rate of 106 bpm.  After long discussion, given her [id number] score of at least for oral anticoagulation was recommended.  She elected to start Xarelto 15 mg daily.  Her baby aspirin was discontinued.  Additionally, her losartan was decreased from 100 mg daily to 25 mg daily to allow room for rate control.  Metoprolol XL was increased from 50 mg to 100 mg daily.      A repeat echocardiogram was ordered which was done on 3/6/18.  LVEF was 50-55%.  Severe biatrial enlargement was noted.  There is 2+ tricuspid regurgitation.  Moderate pulmonary hypertension was present as well.  Given the severe biatrial enlargement, rate control was recommended unless she was symptomatic despite adequate rate control.    She returns today for follow-up with her niece and another friend she does not like taking the blood thinners or her water pills at  all..  She tells me she continues to not feel very well.  Her exertional shortness of breath has not improved.  She overall feels weak.  She notes that usually she was able to walk down the cantu to a chair in the hallway before having to stop and now has to stop prior to this.  She has chronic back pain which she tells me is contributing to this as well.  This seems to be more of a subacute change for the past at least one month.  She continues to deny chest discomfort.  She has not been aware of her rapid heartbeats. She has some mild lightheadedness when she does things too quickly. No presyncope.     He did call the nursing staff she reported some nosebleeds. She had one nosebleed that persisted for quite a while but eventually this resolved on its own.    ASSESSMENT/PLAN:  1.  Persistent atrial fibrillation with some rapid ventricular rates.  Her metoprolol dose has been titrated up for better rate control. HR are improved today in the 60 - 70s at rest. Her echocardiogram shows a low normal ejection fraction of 55%.  She has severe biatrial enlargement. She continues to feel poorly with weakness and exertional dyspnea. This sounds somewhat subacute, and I suspect it may possibly be multifactorial. Her HRs are better in clinic today. It is possible she is still having some rapid rates with activity. Additionally, some of her symptoms could be related to the Metoprolol, the atrial fibrillation itself, or perhaps some noncardiac issues. We discussed the options going forward including a holter monitor to assess her rate control with activity. We also discussed switching Metoprolol to a calcium channel blocker to see if this helps. We briefly touched on cardioversion as an option going forward if she continues to feel poorly. She wants to avoid any unnecessary procedures and so we would have to wait until she has been on anticoagulation for four weeks prior to proceeding as she does not want to have a BRENNEN. In the  end, we elected to proceed with a holter monitor. If her rates remain elevated with activity, we will titrate up her Metoprolol. She has some mild lightheadedness so I did not change this today. If not, we will consider switching her medications or pursuing cardioversion and a rhythm control strategy. This will be more difficult given her severe biatrial enlargement and we talked about that today. She is appropriately anticoagulated with Xarelto. She will continue on her lasix 20 mg every other day. I will see her back after the Holter monitor to discuss further. I have asked her to contact us with any issues in the interim.  2.  Hypertension. This is controlled today.     Orders this Visit:  Orders Placed This Encounter   Procedures     Follow-Up with Cardiac Advanced Practice Provider     Holter Monitor 24 hour - Adult     No orders of the defined types were placed in this encounter.    There are no discontinued medications.    CURRENT MEDICATIONS:  Current Outpatient Prescriptions   Medication Sig Dispense Refill     rivaroxaban ANTICOAGULANT (XARELTO) 15 MG TABS tablet Take 1 tablet (15 mg) by mouth daily (with dinner) 30 tablet 3     metoprolol succinate (TOPROL-XL) 100 MG 24 hr tablet Take 1 tablet (100 mg) by mouth daily 90 tablet 3     losartan (COZAAR) 25 MG tablet Take 1 tablet (25 mg) by mouth daily 90 tablet 3     furosemide (LASIX) 20 MG tablet 20 mg, 4 times per week 30 tablet 11     simvastatin (ZOCOR) 20 MG tablet TAKE HALF TABLET BY MOUTH AT BEDTIME 45 tablet 3     oxyCODONE-acetaminophen (PERCOCET) 5-325 MG per tablet Take 0.5 tablets by mouth 2 times daily as needed for pain 60 tablet 0     Cyanocobalamin (VITAMIN B 12 PO) Take  by mouth.       VITAMIN D, CHOLECALCIFEROL, PO Take 1,000 Units by mouth daily.         ALLERGIES  No Known Allergies  PAST MEDICAL HISTORY:  Past Medical History:   Diagnosis Date     Chronic back pain      Pulmonary fibrosis (H)      Unspecified essential hypertension      Essential hypertension     PAST SURGICAL HISTORY:  Past Surgical History:   Procedure Laterality Date     CARPAL TUNNEL RELEASE RT/LT  1990's    R     CATARACT IOL, RT/LT  5/03    R     HERNIA REPAIR, INGUINAL RT/LT  1954    R     hiatal hernia repair  6/10    large paraesophageal hiatal hernia; Nissen fundoplication      FAMILY HISTORY:  Family History   Problem Relation Age of Onset     Unknown/Adopted Mother      Unknown/Adopted Father      DIABETES No family hx of      Coronary Artery Disease No family hx of      Hypertension No family hx of      Hyperlipidemia No family hx of      CEREBROVASCULAR DISEASE No family hx of      Breast Cancer No family hx of      Colon Cancer No family hx of      Prostate Cancer No family hx of      Other Cancer No family hx of      Depression No family hx of      Anxiety Disorder No family hx of      MENTAL ILLNESS No family hx of      Substance Abuse No family hx of      Anesthesia Reaction No family hx of      Asthma No family hx of      OSTEOPOROSIS No family hx of      Genetic Disorder No family hx of      Thyroid Disease No family hx of      Obesity No family hx of      SOCIAL HISTORY:  Social History     Social History     Marital status:      Spouse name: N/A     Number of children: 0     Years of education: N/A     Occupational History     worked at [employer] for 40 yrs Retired     Social History Main Topics     Smoking status: Never Smoker     Smokeless tobacco: Never Used     Alcohol use Yes      Comment: daily-vodka tonic x2     Drug use: No     Sexual activity: No     Other Topics Concern     None     Social History Narrative     Review of Systems:  Skin:  Negative     Eyes:    glasses  ENT:  Negative    Respiratory:  Positive for shortness of breath;dyspnea on exertion  Cardiovascular:  Negative;palpitations;chest pain;edema;exercise intolerance Positive for;fatigue;lightheadedness;dizziness  Gastroenterology: Negative    Genitourinary:  Positive for  "nocturia  Musculoskeletal:  Positive for back pain;arthritis  Neurologic:  Positive for numbness or tingling of feet  Psychiatric:  Negative    Heme/Lymph/Imm:  Negative    Endocrine:  Negative       Physical Exam:  Vitals: /74  Pulse 64  Ht 1.562 m (5' 1.5\")  Wt 60 kg (132 lb 3.2 oz)  LMP  (LMP Unknown)  BMI 24.57 kg/m2   Wt Readings from Last 4 Encounters:   03/08/18 60 kg (132 lb 3.2 oz)   02/26/18 59.4 kg (131 lb)   02/21/18 59.9 kg (132 lb)   11/28/17 59 kg (130 lb)     GEN: well nourished, in no acute distress.  HEENT:  Pupils equal, round. Sclerae nonicteric.   NECK: Supple, no masses appreciated..  C/V: IRR,   RESP: Respirations are unlabored. Clear to auscultation bilaterally without wheezing, rales, or rhonchi.  GI: Abdomen soft, nontender.  EXTREM: 1+ bilateral pedal edema.   NEURO: Alert and oriented, cooperative.  SKIN: Warm and dry.     Recent Lab Results:  LIPID RESULTS:  Lab Results   Component Value Date    CHOL 142 11/28/2017    HDL 69 11/28/2017    LDL 52 11/28/2017    TRIG 107 11/28/2017    CHOLHDLRATIO 2.7 01/10/2014     LIVER ENZYME RESULTS:  Lab Results   Component Value Date    AST 15 11/28/2017    ALT 15 11/28/2017     CBC RESULTS:  Lab Results   Component Value Date    WBC 8.4 02/21/2018    RBC 3.68 (L) 02/21/2018    HGB 12.7 02/21/2018    HCT 38.5 02/21/2018     (H) 02/21/2018    MCH 34.5 (H) 02/21/2018    MCHC 33.0 02/21/2018    RDW 15.1 (H) 02/21/2018     02/21/2018     BMP RESULTS:  Lab Results   Component Value Date     02/21/2018    POTASSIUM 4.9 02/21/2018    CHLORIDE 106 02/21/2018    CO2 26 02/21/2018    ANIONGAP 7 02/21/2018     (H) 02/21/2018    BUN 30 02/21/2018    CR 1.29 (H) 02/21/2018    GFRESTIMATED 39 (L) 02/21/2018    GFRESTBLACK 47 (L) 02/21/2018    PAWEL 9.3 02/21/2018      A1C RESULTS:  No results found for: A1C  INR RESULTS:  No results found for: INR    New/Pertinent imaging results since last visit:  Echocardiogram " 3/6/18  Interpretation Summary  1. The left ventricle is normal in size. The visual ejection fraction is estimated at 50-55%.  2. The right ventricle is normal size. The right ventricular systolic function  is mildly reduced.  3. There is severe biatrial enlargement.  4. There is moderate (2+) tricuspid regurgitation.  5. Moderate (46-55mmHg) pulmonary hypertension is present. The right  ventricular systolic pressure is approximated at 50mmHg plus the right atrial pressure.  6. There is mild (1+) aortic regurgitation.  7. IVC is mildly dilated with ~50% respiratory collapse.    Citlali Umana PA-C  [facility]      Thank you for allowing me to participate in the care of your patient.    Sincerely,     Citlali Umana PA-C     [facility]

## 2018-03-08 NOTE — LETTER
3/8/2018    Marbin Rouse MD  [address]  [facility] [id number]    RE: Theresa Alves       Dear Colleague,    I had the pleasure of seeing Theresa Alves in the [facility].      Cardiology Progress Note    Date of Service: 03/08/2018  Patient seen today in follow up of: atrial fibrillation with RVR  Primary cardiologist: Dr. Walker    HPI:  Theresa Alves is a very pleasant 89-year-old female with a history of hypertension, pulmonary fibrosis, CKD stage III was recently seen by Dr. Walker for a new diagnosis of atrial fibrillation with RVR.  She was seen by her PCP on 2/21/18.  She had noted about a month and a half of exertional dyspnea which was progressive. She has some mild chronic shortness of breath with exertion due to her underlying pulmonary fibrosis. An EKG at this visit showed atrial fibrillation with a ventricular rate of 125 bpm.  Her metoprolol dose was increased from 25-50 mg daily and she was referred to cardiology clinic.    She saw Dr. Walker on 2/26/18.persistent atrial fibrillation  EKG at that visit showed with a rate of 106 bpm.  After long discussion, given her [id number] score of at least for oral anticoagulation was recommended.  She elected to start Xarelto 15 mg daily.  Her baby aspirin was discontinued.  Additionally, her losartan was decreased from 100 mg daily to 25 mg daily to allow room for rate control.  Metoprolol XL was increased from 50 mg to 100 mg daily.      A repeat echocardiogram was ordered which was done on 3/6/18.  LVEF was 50-55%.  Severe biatrial enlargement was noted.  There is 2+ tricuspid regurgitation.  Moderate pulmonary hypertension was present as well.  Given the severe biatrial enlargement, rate control was recommended unless she was symptomatic despite adequate rate control.    She returns today for follow-up with her niece and another friend she does not like taking the blood thinners or her water pills at  all..  She tells me she continues to not feel very well.  Her exertional shortness of breath has not improved.  She overall feels weak.  She notes that usually she was able to walk down the cantu to a chair in the hallway before having to stop and now has to stop prior to this.  She has chronic back pain which she tells me is contributing to this as well.  This seems to be more of a subacute change for the past at least one month.  She continues to deny chest discomfort.  She has not been aware of her rapid heartbeats. She has some mild lightheadedness when she does things too quickly. No presyncope.     He did call the nursing staff she reported some nosebleeds. She had one nosebleed that persisted for quite a while but eventually this resolved on its own.    ASSESSMENT/PLAN:  1.  Persistent atrial fibrillation with some rapid ventricular rates.  Her metoprolol dose has been titrated up for better rate control. HR are improved today in the 60 - 70s at rest. Her echocardiogram shows a low normal ejection fraction of 55%.  She has severe biatrial enlargement. She continues to feel poorly with weakness and exertional dyspnea. This sounds somewhat subacute, and I suspect it may possibly be multifactorial. Her HRs are better in clinic today. It is possible she is still having some rapid rates with activity. Additionally, some of her symptoms could be related to the Metoprolol, the atrial fibrillation itself, or perhaps some noncardiac issues. We discussed the options going forward including a holter monitor to assess her rate control with activity. We also discussed switching Metoprolol to a calcium channel blocker to see if this helps. We briefly touched on cardioversion as an option going forward if she continues to feel poorly. She wants to avoid any unnecessary procedures and so we would have to wait until she has been on anticoagulation for four weeks prior to proceeding as she does not want to have a BRENNEN. In the  end, we elected to proceed with a holter monitor. If her rates remain elevated with activity, we will titrate up her Metoprolol. She has some mild lightheadedness so I did not change this today. If not, we will consider switching her medications or pursuing cardioversion and a rhythm control strategy. This will be more difficult given her severe biatrial enlargement and we talked about that today. She is appropriately anticoagulated with Xarelto. She will continue on her lasix 20 mg every other day. I will see her back after the Holter monitor to discuss further. I have asked her to contact us with any issues in the interim.  2.  Hypertension. This is controlled today.     Orders this Visit:  Orders Placed This Encounter   Procedures     Follow-Up with Cardiac Advanced Practice Provider     Holter Monitor 24 hour - Adult     No orders of the defined types were placed in this encounter.    There are no discontinued medications.    CURRENT MEDICATIONS:  Current Outpatient Prescriptions   Medication Sig Dispense Refill     rivaroxaban ANTICOAGULANT (XARELTO) 15 MG TABS tablet Take 1 tablet (15 mg) by mouth daily (with dinner) 30 tablet 3     metoprolol succinate (TOPROL-XL) 100 MG 24 hr tablet Take 1 tablet (100 mg) by mouth daily 90 tablet 3     losartan (COZAAR) 25 MG tablet Take 1 tablet (25 mg) by mouth daily 90 tablet 3     furosemide (LASIX) 20 MG tablet 20 mg, 4 times per week 30 tablet 11     simvastatin (ZOCOR) 20 MG tablet TAKE HALF TABLET BY MOUTH AT BEDTIME 45 tablet 3     oxyCODONE-acetaminophen (PERCOCET) 5-325 MG per tablet Take 0.5 tablets by mouth 2 times daily as needed for pain 60 tablet 0     Cyanocobalamin (VITAMIN B 12 PO) Take  by mouth.       VITAMIN D, CHOLECALCIFEROL, PO Take 1,000 Units by mouth daily.         ALLERGIES  No Known Allergies  PAST MEDICAL HISTORY:  Past Medical History:   Diagnosis Date     Chronic back pain      Pulmonary fibrosis (H)      Unspecified essential hypertension      Essential hypertension     PAST SURGICAL HISTORY:  Past Surgical History:   Procedure Laterality Date     CARPAL TUNNEL RELEASE RT/LT  1990's    R     CATARACT IOL, RT/LT  5/03    R     HERNIA REPAIR, INGUINAL RT/LT  1954    R     hiatal hernia repair  6/10    large paraesophageal hiatal hernia; Nissen fundoplication      FAMILY HISTORY:  Family History   Problem Relation Age of Onset     Unknown/Adopted Mother      Unknown/Adopted Father      DIABETES No family hx of      Coronary Artery Disease No family hx of      Hypertension No family hx of      Hyperlipidemia No family hx of      CEREBROVASCULAR DISEASE No family hx of      Breast Cancer No family hx of      Colon Cancer No family hx of      Prostate Cancer No family hx of      Other Cancer No family hx of      Depression No family hx of      Anxiety Disorder No family hx of      MENTAL ILLNESS No family hx of      Substance Abuse No family hx of      Anesthesia Reaction No family hx of      Asthma No family hx of      OSTEOPOROSIS No family hx of      Genetic Disorder No family hx of      Thyroid Disease No family hx of      Obesity No family hx of      SOCIAL HISTORY:  Social History     Social History     Marital status:      Spouse name: N/A     Number of children: 0     Years of education: N/A     Occupational History     worked at [email or web address] for 40 yrs Retired     Social History Main Topics     Smoking status: Never Smoker     Smokeless tobacco: Never Used     Alcohol use Yes      Comment: daily-vodka tonic x2     Drug use: No     Sexual activity: No     Other Topics Concern     None     Social History Narrative     Review of Systems:  Skin:  Negative     Eyes:    glasses  ENT:  Negative    Respiratory:  Positive for shortness of breath;dyspnea on exertion  Cardiovascular:  Negative;palpitations;chest pain;edema;exercise intolerance Positive for;fatigue;lightheadedness;dizziness  Gastroenterology: Negative    Genitourinary:  Positive for  "nocturia  Musculoskeletal:  Positive for back pain;arthritis  Neurologic:  Positive for numbness or tingling of feet  Psychiatric:  Negative    Heme/Lymph/Imm:  Negative    Endocrine:  Negative       Physical Exam:  Vitals: /74  Pulse 64  Ht 1.562 m (5' 1.5\")  Wt 60 kg (132 lb 3.2 oz)  LMP  (LMP Unknown)  BMI 24.57 kg/m2   Wt Readings from Last 4 Encounters:   03/08/18 60 kg (132 lb 3.2 oz)   02/26/18 59.4 kg (131 lb)   02/21/18 59.9 kg (132 lb)   11/28/17 59 kg (130 lb)     GEN: well nourished, in no acute distress.  HEENT:  Pupils equal, round. Sclerae nonicteric.   NECK: Supple, no masses appreciated..  C/V: IRR,   RESP: Respirations are unlabored. Clear to auscultation bilaterally without wheezing, rales, or rhonchi.  GI: Abdomen soft, nontender.  EXTREM: 1+ bilateral pedal edema.   NEURO: Alert and oriented, cooperative.  SKIN: Warm and dry.     Recent Lab Results:  LIPID RESULTS:  Lab Results   Component Value Date    CHOL 142 11/28/2017    HDL 69 11/28/2017    LDL 52 11/28/2017    TRIG 107 11/28/2017    CHOLHDLRATIO 2.7 01/10/2014     LIVER ENZYME RESULTS:  Lab Results   Component Value Date    AST 15 11/28/2017    ALT 15 11/28/2017     CBC RESULTS:  Lab Results   Component Value Date    WBC 8.4 02/21/2018    RBC 3.68 (L) 02/21/2018    HGB 12.7 02/21/2018    HCT 38.5 02/21/2018     (H) 02/21/2018    MCH 34.5 (H) 02/21/2018    MCHC 33.0 02/21/2018    RDW 15.1 (H) 02/21/2018     02/21/2018     BMP RESULTS:  Lab Results   Component Value Date     02/21/2018    POTASSIUM 4.9 02/21/2018    CHLORIDE 106 02/21/2018    CO2 26 02/21/2018    ANIONGAP 7 02/21/2018     (H) 02/21/2018    BUN 30 02/21/2018    CR 1.29 (H) 02/21/2018    GFRESTIMATED 39 (L) 02/21/2018    GFRESTBLACK 47 (L) 02/21/2018    PAWEL 9.3 02/21/2018      A1C RESULTS:  No results found for: A1C  INR RESULTS:  No results found for: INR    New/Pertinent imaging results since last visit:  Echocardiogram " 3/6/18  Interpretation Summary  1. The left ventricle is normal in size. The visual ejection fraction is estimated at 50-55%.  2. The right ventricle is normal size. The right ventricular systolic function  is mildly reduced.  3. There is severe biatrial enlargement.  4. There is moderate (2+) tricuspid regurgitation.  5. Moderate (46-55mmHg) pulmonary hypertension is present. The right  ventricular systolic pressure is approximated at 50mmHg plus the right atrial pressure.  6. There is mild (1+) aortic regurgitation.  7. IVC is mildly dilated with ~50% respiratory collapse.    Citlali Umana PA-C  [facility]      Thank you for allowing me to participate in the care of your patient.      Sincerely,     Citlali Umana PA-C     [facility]    cc:   Jefry Walker MD  [address]  [place] [address]

## 2018-03-08 NOTE — PROGRESS NOTES
Cardiology Progress Note    Date of Service: 03/08/2018  Patient seen today in follow up of: atrial fibrillation with RVR  Primary cardiologist: Dr. Walker    HPI:  Theresa Alves is a very pleasant 89-year-old female with a history of hypertension, pulmonary fibrosis, CKD stage III was recently seen by Dr. Walker for a new diagnosis of atrial fibrillation with RVR.  She was seen by her PCP on 2/21/18.  She had noted about a month and a half of exertional dyspnea which was progressive. She has some mild chronic shortness of breath with exertion due to her underlying pulmonary fibrosis. An EKG at this visit showed atrial fibrillation with a ventricular rate of 125 bpm.  Her metoprolol dose was increased from 25-50 mg daily and she was referred to cardiology clinic.    She saw Dr. Walker on 2/26/18.persistent atrial fibrillation  EKG at that visit showed with a rate of 106 bpm.  After long discussion, given her [id number] score of at least for oral anticoagulation was recommended.  She elected to start Xarelto 15 mg daily.  Her baby aspirin was discontinued.  Additionally, her losartan was decreased from 100 mg daily to 25 mg daily to allow room for rate control.  Metoprolol XL was increased from 50 mg to 100 mg daily.      A repeat echocardiogram was ordered which was done on 3/6/18.  LVEF was 50-55%.  Severe biatrial enlargement was noted.  There is 2+ tricuspid regurgitation.  Moderate pulmonary hypertension was present as well.  Given the severe biatrial enlargement, rate control was recommended unless she was symptomatic despite adequate rate control.    She returns today for follow-up with her niece and another friend she does not like taking the blood thinners or her water pills at all..  She tells me she continues to not feel very well.  Her exertional shortness of breath has not improved.  She overall feels weak.  She notes that usually she was able to walk down the cantu to a chair in the hallway before  having to stop and now has to stop prior to this.  She has chronic back pain which she tells me is contributing to this as well.  This seems to be more of a subacute change for the past at least one month.  She continues to deny chest discomfort.  She has not been aware of her rapid heartbeats. She has some mild lightheadedness when she does things too quickly. No presyncope.     He did call the nursing staff she reported some nosebleeds. She had one nosebleed that persisted for quite a while but eventually this resolved on its own.    ASSESSMENT/PLAN:  1.  Persistent atrial fibrillation with some rapid ventricular rates.  Her metoprolol dose has been titrated up for better rate control. HR are improved today in the 60 - 70s at rest. Her echocardiogram shows a low normal ejection fraction of 55%.  She has severe biatrial enlargement. She continues to feel poorly with weakness and exertional dyspnea. This sounds somewhat subacute, and I suspect it may possibly be multifactorial. Her HRs are better in clinic today. It is possible she is still having some rapid rates with activity. Additionally, some of her symptoms could be related to the Metoprolol, the atrial fibrillation itself, or perhaps some noncardiac issues. We discussed the options going forward including a holter monitor to assess her rate control with activity. We also discussed switching Metoprolol to a calcium channel blocker to see if this helps. We briefly touched on cardioversion as an option going forward if she continues to feel poorly. She wants to avoid any unnecessary procedures and so we would have to wait until she has been on anticoagulation for four weeks prior to proceeding as she does not want to have a BRENNEN. In the end, we elected to proceed with a holter monitor. If her rates remain elevated with activity, we will titrate up her Metoprolol. She has some mild lightheadedness so I did not change this today. If not, we will consider switching  her medications or pursuing cardioversion and a rhythm control strategy. This will be more difficult given her severe biatrial enlargement and we talked about that today. She is appropriately anticoagulated with Xarelto. She will continue on her lasix 20 mg every other day. I will see her back after the Holter monitor to discuss further. I have asked her to contact us with any issues in the interim.  2.  Hypertension. This is controlled today.     Orders this Visit:  Orders Placed This Encounter   Procedures     Follow-Up with Cardiac Advanced Practice Provider     Holter Monitor 24 hour - Adult     No orders of the defined types were placed in this encounter.    There are no discontinued medications.    CURRENT MEDICATIONS:  Current Outpatient Prescriptions   Medication Sig Dispense Refill     rivaroxaban ANTICOAGULANT (XARELTO) 15 MG TABS tablet Take 1 tablet (15 mg) by mouth daily (with dinner) 30 tablet 3     metoprolol succinate (TOPROL-XL) 100 MG 24 hr tablet Take 1 tablet (100 mg) by mouth daily 90 tablet 3     losartan (COZAAR) 25 MG tablet Take 1 tablet (25 mg) by mouth daily 90 tablet 3     furosemide (LASIX) 20 MG tablet 20 mg, 4 times per week 30 tablet 11     simvastatin (ZOCOR) 20 MG tablet TAKE HALF TABLET BY MOUTH AT BEDTIME 45 tablet 3     oxyCODONE-acetaminophen (PERCOCET) 5-325 MG per tablet Take 0.5 tablets by mouth 2 times daily as needed for pain 60 tablet 0     Cyanocobalamin (VITAMIN B 12 PO) Take  by mouth.       VITAMIN D, CHOLECALCIFEROL, PO Take 1,000 Units by mouth daily.         ALLERGIES  No Known Allergies  PAST MEDICAL HISTORY:  Past Medical History:   Diagnosis Date     Chronic back pain      Pulmonary fibrosis (H)      Unspecified essential hypertension     Essential hypertension     PAST SURGICAL HISTORY:  Past Surgical History:   Procedure Laterality Date     CARPAL TUNNEL RELEASE RT/LT  1990's    R     CATARACT IOL, RT/LT  5/03    R     HERNIA REPAIR, INGUINAL RT/LT  1954    R      hiatal hernia repair  6/10    large paraesophageal hiatal hernia; Nissen fundoplication      FAMILY HISTORY:  Family History   Problem Relation Age of Onset     Unknown/Adopted Mother      Unknown/Adopted Father      DIABETES No family hx of      Coronary Artery Disease No family hx of      Hypertension No family hx of      Hyperlipidemia No family hx of      CEREBROVASCULAR DISEASE No family hx of      Breast Cancer No family hx of      Colon Cancer No family hx of      Prostate Cancer No family hx of      Other Cancer No family hx of      Depression No family hx of      Anxiety Disorder No family hx of      MENTAL ILLNESS No family hx of      Substance Abuse No family hx of      Anesthesia Reaction No family hx of      Asthma No family hx of      OSTEOPOROSIS No family hx of      Genetic Disorder No family hx of      Thyroid Disease No family hx of      Obesity No family hx of      SOCIAL HISTORY:  Social History     Social History     Marital status:      Spouse name: N/A     Number of children: 0     Years of education: N/A     Occupational History     worked at [employer] for 40 yrs Retired     Social History Main Topics     Smoking status: Never Smoker     Smokeless tobacco: Never Used     Alcohol use Yes      Comment: daily-vodka tonic x2     Drug use: No     Sexual activity: No     Other Topics Concern     None     Social History Narrative     Review of Systems:  Skin:  Negative     Eyes:    glasses  ENT:  Negative    Respiratory:  Positive for shortness of breath;dyspnea on exertion  Cardiovascular:  Negative;palpitations;chest pain;edema;exercise intolerance Positive for;fatigue;lightheadedness;dizziness  Gastroenterology: Negative    Genitourinary:  Positive for nocturia  Musculoskeletal:  Positive for back pain;arthritis  Neurologic:  Positive for numbness or tingling of feet  Psychiatric:  Negative    Heme/Lymph/Imm:  Negative    Endocrine:  Negative       Physical Exam:  Vitals: /74  Pulse  "64  Ht 1.562 m (5' 1.5\")  Wt 60 kg (132 lb 3.2 oz)  LMP  (LMP Unknown)  BMI 24.57 kg/m2   Wt Readings from Last 4 Encounters:   03/08/18 60 kg (132 lb 3.2 oz)   02/26/18 59.4 kg (131 lb)   02/21/18 59.9 kg (132 lb)   11/28/17 59 kg (130 lb)     GEN: well nourished, in no acute distress.  HEENT:  Pupils equal, round. Sclerae nonicteric.   NECK: Supple, no masses appreciated..  C/V: IRR,   RESP: Respirations are unlabored. Clear to auscultation bilaterally without wheezing, rales, or rhonchi.  GI: Abdomen soft, nontender.  EXTREM: 1+ bilateral pedal edema.   NEURO: Alert and oriented, cooperative.  SKIN: Warm and dry.     Recent Lab Results:  LIPID RESULTS:  Lab Results   Component Value Date    CHOL 142 11/28/2017    HDL 69 11/28/2017    LDL 52 11/28/2017    TRIG 107 11/28/2017    CHOLHDLRATIO 2.7 01/10/2014     LIVER ENZYME RESULTS:  Lab Results   Component Value Date    AST 15 11/28/2017    ALT 15 11/28/2017     CBC RESULTS:  Lab Results   Component Value Date    WBC 8.4 02/21/2018    RBC 3.68 (L) 02/21/2018    HGB 12.7 02/21/2018    HCT 38.5 02/21/2018     (H) 02/21/2018    MCH 34.5 (H) 02/21/2018    MCHC 33.0 02/21/2018    RDW 15.1 (H) 02/21/2018     02/21/2018     BMP RESULTS:  Lab Results   Component Value Date     02/21/2018    POTASSIUM 4.9 02/21/2018    CHLORIDE 106 02/21/2018    CO2 26 02/21/2018    ANIONGAP 7 02/21/2018     (H) 02/21/2018    BUN 30 02/21/2018    CR 1.29 (H) 02/21/2018    GFRESTIMATED 39 (L) 02/21/2018    GFRESTBLACK 47 (L) 02/21/2018    PAWEL 9.3 02/21/2018      A1C RESULTS:  No results found for: A1C  INR RESULTS:  No results found for: INR    New/Pertinent imaging results since last visit:  Echocardiogram 3/6/18  Interpretation Summary  1. The left ventricle is normal in size. The visual ejection fraction is estimated at 50-55%.  2. The right ventricle is normal size. The right ventricular systolic function  is mildly reduced.  3. There is severe biatrial " enlargement.  4. There is moderate (2+) tricuspid regurgitation.  5. Moderate (46-55mmHg) pulmonary hypertension is present. The right  ventricular systolic pressure is approximated at 50mmHg plus the right atrial pressure.  6. There is mild (1+) aortic regurgitation.  7. IVC is mildly dilated with ~50% respiratory collapse.    Citlalihal Umana PA-C  [facility]

## 2018-03-14 ENCOUNTER — TELEPHONE (OUTPATIENT)
Dept: CARDIOLOGY | Facility: CLINIC | Age: 83
End: 2018-03-14

## 2018-03-14 PROBLEM — I50.9 CHRONIC CONGESTIVE HEART FAILURE, UNSPECIFIED CONGESTIVE HEART FAILURE TYPE: Status: ACTIVE | Noted: 2017-12-06

## 2018-03-14 NOTE — TELEPHONE ENCOUNTER
Patient  states she has had a nose bleed every day since she was seen by Mague and one episode was so severe she almost called 911.  It was difficult to stop the bleeding.  Current dose 15mg at dinner.  She is wondering if she can have a lesser dose and she states she does not eat very much.  She is scheduled for a Holter 3- and F/U with Mague on 4-5-2018.  Will forward to [facility] for recommendation of current Xarelto dose.

## 2018-03-14 NOTE — TELEPHONE ENCOUNTER
There is not a lower effective dose for Xarelto. If it becomes an issue we would have to stop it all together. I recommend she get a saline nasal spray to keep things moist. If she continues to have nosebleeds we could consider sending her to ENT. Thanks. MARILIN Umana PA-C

## 2018-03-15 NOTE — TELEPHONE ENCOUNTER
Patient notified of recommendations to try saline nasal spray and she states she is planning on seeing her PMD for f/u on fatigue.  Patient verbalizes understanding.

## 2018-03-16 ENCOUNTER — HOSPITAL ENCOUNTER (INPATIENT)
Facility: CLINIC | Age: 83
LOS: 3 days | Discharge: NURSING, REHAB OR LONG-TERM CARE | DRG: 280 | End: 2018-03-20
Attending: EMERGENCY MEDICINE | Admitting: INTERNAL MEDICINE
Payer: COMMERCIAL

## 2018-03-16 ENCOUNTER — APPOINTMENT (OUTPATIENT)
Dept: GENERAL RADIOLOGY | Facility: CLINIC | Age: 83
DRG: 280 | End: 2018-03-16
Attending: EMERGENCY MEDICINE
Payer: COMMERCIAL

## 2018-03-16 DIAGNOSIS — R06.09 DOE (DYSPNEA ON EXERTION): ICD-10-CM

## 2018-03-16 DIAGNOSIS — G89.29 CHRONIC MIDLINE LOW BACK PAIN WITHOUT SCIATICA: ICD-10-CM

## 2018-03-16 DIAGNOSIS — I48.91 ATRIAL FIBRILLATION, UNSPECIFIED TYPE (H): ICD-10-CM

## 2018-03-16 DIAGNOSIS — I48.91 ATRIAL FIBRILLATION WITH RVR (H): ICD-10-CM

## 2018-03-16 DIAGNOSIS — I48.91 UNCONTROLLED ATRIAL FIBRILLATION (H): ICD-10-CM

## 2018-03-16 DIAGNOSIS — J34.89 NASAL DRYNESS: Primary | ICD-10-CM

## 2018-03-16 DIAGNOSIS — Z79.01 ANTICOAGULATED: ICD-10-CM

## 2018-03-16 DIAGNOSIS — R04.0 EPISTAXIS: ICD-10-CM

## 2018-03-16 DIAGNOSIS — M54.50 CHRONIC MIDLINE LOW BACK PAIN WITHOUT SCIATICA: ICD-10-CM

## 2018-03-16 LAB
ALBUMIN SERPL-MCNC: 3.5 G/DL (ref 3.4–5)
ALP SERPL-CCNC: 92 U/L (ref 40–150)
ALT SERPL W P-5'-P-CCNC: 45 U/L (ref 0–50)
ANION GAP SERPL CALCULATED.3IONS-SCNC: 8 MMOL/L (ref 3–14)
AST SERPL W P-5'-P-CCNC: 43 U/L (ref 0–45)
BASOPHILS # BLD AUTO: 0 10E9/L (ref 0–0.2)
BASOPHILS NFR BLD AUTO: 0.3 %
BILIRUB SERPL-MCNC: 1.2 MG/DL (ref 0.2–1.3)
BUN SERPL-MCNC: 36 MG/DL (ref 7–30)
CALCIUM SERPL-MCNC: 9.1 MG/DL (ref 8.5–10.1)
CHLORIDE SERPL-SCNC: 104 MMOL/L (ref 94–109)
CO2 SERPL-SCNC: 26 MMOL/L (ref 20–32)
CREAT SERPL-MCNC: 1.12 MG/DL (ref 0.52–1.04)
DIFFERENTIAL METHOD BLD: ABNORMAL
EOSINOPHIL # BLD AUTO: 0.5 10E9/L (ref 0–0.7)
EOSINOPHIL NFR BLD AUTO: 4 %
ERYTHROCYTE [DISTWIDTH] IN BLOOD BY AUTOMATED COUNT: 14.9 % (ref 10–15)
GFR SERPL CREATININE-BSD FRML MDRD: 45 ML/MIN/1.7M2
GLUCOSE SERPL-MCNC: 127 MG/DL (ref 70–99)
HCT VFR BLD AUTO: 38.3 % (ref 35–47)
HGB BLD-MCNC: 12.7 G/DL (ref 11.7–15.7)
IMM GRANULOCYTES # BLD: 0.1 10E9/L (ref 0–0.4)
IMM GRANULOCYTES NFR BLD: 0.6 %
INTERPRETATION ECG - MUSE: NORMAL
LYMPHOCYTES # BLD AUTO: 1.5 10E9/L (ref 0.8–5.3)
LYMPHOCYTES NFR BLD AUTO: 13.7 %
MCH RBC QN AUTO: 34.1 PG (ref 26.5–33)
MCHC RBC AUTO-ENTMCNC: 33.2 G/DL (ref 31.5–36.5)
MCV RBC AUTO: 103 FL (ref 78–100)
MONOCYTES # BLD AUTO: 1.6 10E9/L (ref 0–1.3)
MONOCYTES NFR BLD AUTO: 13.9 %
NEUTROPHILS # BLD AUTO: 7.6 10E9/L (ref 1.6–8.3)
NEUTROPHILS NFR BLD AUTO: 67.5 %
NRBC # BLD AUTO: 0.1 10*3/UL
NRBC BLD AUTO-RTO: 1 /100
NT-PROBNP SERPL-MCNC: ABNORMAL PG/ML (ref 0–1800)
PLATELET # BLD AUTO: 189 10E9/L (ref 150–450)
POTASSIUM SERPL-SCNC: 4.7 MMOL/L (ref 3.4–5.3)
PROT SERPL-MCNC: 7.9 G/DL (ref 6.8–8.8)
RBC # BLD AUTO: 3.72 10E12/L (ref 3.8–5.2)
SODIUM SERPL-SCNC: 138 MMOL/L (ref 133–144)
TROPONIN I SERPL-MCNC: 0.03 UG/L (ref 0–0.04)
TSH SERPL DL<=0.005 MIU/L-ACNC: 4.13 MU/L (ref 0.4–4)
WBC # BLD AUTO: 11.2 10E9/L (ref 4–11)

## 2018-03-16 PROCEDURE — 96361 HYDRATE IV INFUSION ADD-ON: CPT

## 2018-03-16 PROCEDURE — 99285 EMERGENCY DEPT VISIT HI MDM: CPT | Mod: 25

## 2018-03-16 PROCEDURE — 86901 BLOOD TYPING SEROLOGIC RH(D): CPT | Performed by: EMERGENCY MEDICINE

## 2018-03-16 PROCEDURE — 25000128 H RX IP 250 OP 636: Performed by: EMERGENCY MEDICINE

## 2018-03-16 PROCEDURE — 84439 ASSAY OF FREE THYROXINE: CPT | Performed by: EMERGENCY MEDICINE

## 2018-03-16 PROCEDURE — 25000125 ZZHC RX 250: Performed by: EMERGENCY MEDICINE

## 2018-03-16 PROCEDURE — 96374 THER/PROPH/DIAG INJ IV PUSH: CPT

## 2018-03-16 PROCEDURE — 85025 COMPLETE CBC W/AUTO DIFF WBC: CPT | Performed by: EMERGENCY MEDICINE

## 2018-03-16 PROCEDURE — 80053 COMPREHEN METABOLIC PANEL: CPT | Performed by: EMERGENCY MEDICINE

## 2018-03-16 PROCEDURE — 93005 ELECTROCARDIOGRAM TRACING: CPT

## 2018-03-16 PROCEDURE — 84443 ASSAY THYROID STIM HORMONE: CPT | Performed by: EMERGENCY MEDICINE

## 2018-03-16 PROCEDURE — 71046 X-RAY EXAM CHEST 2 VIEWS: CPT

## 2018-03-16 PROCEDURE — 84484 ASSAY OF TROPONIN QUANT: CPT | Performed by: EMERGENCY MEDICINE

## 2018-03-16 PROCEDURE — 84481 FREE ASSAY (FT-3): CPT | Performed by: EMERGENCY MEDICINE

## 2018-03-16 PROCEDURE — 86850 RBC ANTIBODY SCREEN: CPT | Performed by: EMERGENCY MEDICINE

## 2018-03-16 PROCEDURE — 83880 ASSAY OF NATRIURETIC PEPTIDE: CPT | Performed by: EMERGENCY MEDICINE

## 2018-03-16 PROCEDURE — 86900 BLOOD TYPING SEROLOGIC ABO: CPT | Performed by: EMERGENCY MEDICINE

## 2018-03-16 RX ORDER — DILTIAZEM HYDROCHLORIDE 5 MG/ML
15 INJECTION INTRAVENOUS ONCE
Status: COMPLETED | OUTPATIENT
Start: 2018-03-16 | End: 2018-03-16

## 2018-03-16 RX ORDER — TRANEXAMIC ACID 100 MG/ML
500 INJECTION, SOLUTION INTRAVENOUS ONCE
Status: DISCONTINUED | OUTPATIENT
Start: 2018-03-16 | End: 2018-03-19

## 2018-03-16 RX ADMIN — SODIUM CHLORIDE 500 ML: 9 INJECTION, SOLUTION INTRAVENOUS at 23:50

## 2018-03-16 RX ADMIN — DILTIAZEM HYDROCHLORIDE 15 MG: 5 INJECTION INTRAVENOUS at 23:28

## 2018-03-16 ASSESSMENT — ENCOUNTER SYMPTOMS
DIARRHEA: 0
SHORTNESS OF BREATH: 1
RHINORRHEA: 0
DYSURIA: 0
FEVER: 0
VOMITING: 0
SORE THROAT: 0
COUGH: 0
NAUSEA: 0

## 2018-03-16 NOTE — IP AVS SNAPSHOT
` `     [facility] CARDIAC SPECIALTY CARE: [phone]                 INTERAGENCY TRANSFER FORM - NOTES (H&P, Discharge Summary, Consults, Procedures, Therapies)   3/16/2018                    Hospital Admission Date: 3/16/2018  YANELI CHUA   : 1924  Sex: Female        Patient PCP Information     Provider PCP Type    Marbin Rouse MD General         History & Physicals      H&P by Diamond Zamora MD at 3/17/2018  2:53 AM     Author:  Diamond Zamora MD Service:  Cardiology Author Type:  Physician    Filed:  3/17/2018  6:07 AM Date of Service:  3/17/2018  2:53 AM Creation Time:  3/17/2018  2:53 AM    Status:  Addendum :  Diamond Zamora MD (Physician)         [facility]    History and Physical  Hospitalist       Date of Admission:  3/16/2018    Assessment & Plan   89-year-old female with a history of hypertension, pulmonary fibrosis, CKD stage III who presents to the [facility] ED with atrial fibrillation with RVR. In ED, EKG showed afib with rvr to 140. Chest xray showed mild diffuse interstitial opacities may be slightly more prominent. Component of superimposed interstitial edema/CHF is not excluded. Was given 15 mg of dilt and 0.5 L of NS. This is not a new diagnosis, as she has been following closely with her cardiologist for her afib. Her outpatient cardiology notes from Dr. Walker mention her worsening SOB as possibly multifactorial, as some of her symptoms could be related to the Metoprolol, the atrial fibrillation itself, or perhaps some noncardiac issues. Cardioversion was discussed with her, but she wanted to wait 4 weeks on anticoagulation prior to proceeding (it has only been 3 weeks on the anticoagulation).    Problem 1: SOB likely 2/2 to Afib with rvr  - patient with much elevated HRs today. Degree of elevation in HRs seems to correspond with symptoms. Received 15 mg of IV dilt in ED.  - for now, will cont home dose metop and not regularly schedule dilt. With her  BNP very high at 16 k and chest xray slightly worse than one prior, it is possible she may beenfit from some gentle diuresis. Have ordered 20 mg of IV lasix - can give and assess response.   - if patient needs more blockade for HR control, will give small doses of dilt.  - EP consult appropriate here given difficulty with rate control, and now considering rhythm control strategy. Ordered.     Problem 2: Epistaxis  - resolved in ED without packing, hemoglobin steady.[AK1.1] 2/2 Xarelto use. Consider use of another agent or lower dose.[AK1.2]    Problem 3: HTN  - cont losartan 25 mg.    # Pain Assessment:   Current Pain Score 9/5/2012 9/4/2012 9/4/2012   Pain score (0-10) 5 5 7   Pain location Back Back -   Pain descriptors Aching - -   Theresa s pain level was assessed and she currently denies pain.      DVT Prophylaxis: Warfarin  Code Status: Full Code    Disposition: Expected discharge in 2 days once afib is better controlled and SOB improves.    Diamond Zamora MD    Primary Care Physician   Marbin Rouse    Chief Complaint   SOB    History is obtained from the patient    History of Present Illness   hTeresa Alves is a very pleasant 89-year-old female with a history of hypertension, pulmonary fibrosis, CKD stage III who presents to the [facility] ED with atrial fibrillation with RVR.  Her diagnosis of afib is new from 2/21/18 -  she saw Dr. Walker on 2/26/18.persistent atrial fibrillation. EKG at that visit showed with a rate of 106 bpm.  After long discussion, given her [id number] score of at least for oral anticoagulation was recommended.  She elected to start Xarelto 15 mg daily.  Her baby aspirin was discontinued.  Additionally, her losartan was decreased from 100 mg daily to 25 mg daily to allow room for rate control.  Metoprolol XL was increased from 50 mg to 100 mg daily.       A repeat echocardiogram was ordered which was done on 3/6/18.  LVEF was 50-55%.  Severe biatrial enlargement was noted.  There is  2+ tricuspid regurgitation.  Moderate pulmonary hypertension was present as well.  Given the severe biatrial enlargement, rate control was recommended unless she was symptomatic despite adequate rate control.     The patient reports having shortness of breath over the past 6 months which acutely worsened over the past week to the point where she is unable to ambulate with her walker as she normally can do at baseline. She endorses chest pain a few nights ago which has since resolved. She denies other symptoms such as fever, cough, rhinorrhea, sore throat, vomiting, nausea, diarrhea, or dysuria. She overall feels weak.  She notes that usually she was able to walk down the cantu to a chair in the hallway before having to stop and now has to stop prior to this.    She has been having nosebleeds every day (sometimes more than once a day) for the past week. She can normally stop them herself but presents tonight after a nose bleed that started at 2130 and had not stopped (but eventually did in ED with no packing).     In ED, EKG showed afib with rvr to 140. Chest xray showed mild diffuse interstitial opacities may be  slightly more prominent. Most of the interstitial prominence was present previously and is likely due to fibrosis. Component of superimposed interstitial edema/CHF is not excluded. Was given 15 mg of dilt and 0.5 L of NS.       Past Medical History    I have reviewed this patient's medical history and updated it with pertinent information if needed.[AK1.1]   Past Medical History:   Diagnosis Date     Chronic back pain      Pulmonary fibrosis (H)      Unspecified essential hypertension     Essential hypertension[AK1.3]       Past Surgical History   I have reviewed this patient's surgical history and updated it with pertinent information if needed.[AK1.1]  Past Surgical History:   Procedure Laterality Date     CARPAL TUNNEL RELEASE RT/LT  1990's    R     CATARACT IOL, RT/LT  5/03    R     HERNIA REPAIR,  INGUINAL RT/LT      R     hiatal hernia repair  6/10    large paraesophageal hiatal hernia; Nissen fundoplication[AK1.3]        Prior to Admission Medications   Prior to Admission Medications   Prescriptions Last Dose Informant Patient Reported? Taking?   Cyanocobalamin (VITAMIN B 12 PO)   Yes No   Sig: Take  by mouth.   VITAMIN D, CHOLECALCIFEROL, PO   Yes No   Sig: Take 1,000 Units by mouth daily.     furosemide (LASIX) 20 MG tablet   Yes No   Si mg, 4 times per week   losartan (COZAAR) 25 MG tablet   No No   Sig: Take 1 tablet (25 mg) by mouth daily   metoprolol succinate (TOPROL-XL) 100 MG 24 hr tablet   No No   Sig: Take 1 tablet (100 mg) by mouth daily   oxyCODONE-acetaminophen (PERCOCET) 5-325 MG per tablet   No No   Sig: Take 0.5 tablets by mouth 2 times daily as needed for pain   rivaroxaban ANTICOAGULANT (XARELTO) 15 MG TABS tablet   No No   Sig: Take 1 tablet (15 mg) by mouth daily (with dinner)   simvastatin (ZOCOR) 20 MG tablet   No No   Sig: TAKE HALF TABLET BY MOUTH AT BEDTIME      Facility-Administered Medications: None     Allergies   No Known Allergies    Social History   I have reviewed this patient's social history and updated it with pertinent information if needed. Theresa Alves[AK1.1]  reports that she has never smoked. She has never used smokeless tobacco. She reports that she drinks alcohol. She reports that she does not use illicit drugs.[AK1.3]    Family History   I have reviewed this patient's family history and updated it with pertinent information if needed.[AK1.1]   Family History   Problem Relation Age of Onset     Unknown/Adopted Mother      Unknown/Adopted Father      DIABETES No family hx of      Coronary Artery Disease No family hx of      Hypertension No family hx of      Hyperlipidemia No family hx of      CEREBROVASCULAR DISEASE No family hx of      Breast Cancer No family hx of      Colon Cancer No family hx of      Prostate Cancer No family hx of      Other  Cancer No family hx of      Depression No family hx of      Anxiety Disorder No family hx of      MENTAL ILLNESS No family hx of      Substance Abuse No family hx of      Anesthesia Reaction No family hx of      Asthma No family hx of      OSTEOPOROSIS No family hx of      Genetic Disorder No family hx of      Thyroid Disease No family hx of      Obesity No family hx of[AK1.3]        Review of Systems   The 10 point Review of Systems is negative other than noted in the HPI or here.     Physical Exam   Temp: 97.7  F (36.5  C) Temp src: Oral BP: 128/82 Pulse: 131 Heart Rate: 94 Resp: 16 SpO2: 97 %      Vital Signs with Ranges  Temp:  [97.7  F (36.5  C)] 97.7  F (36.5  C)  Pulse:  [131] 131  Heart Rate:  [] 94  Resp:  [16-22] 16  BP: (128)/(82) 128/82  SpO2:  [94 %-98 %] 97 %  130 lbs 0 oz    GEN: NAD, pleasant  HEENT: no icterus  CV: RRR, normal s1/s2, no murmurs/rubs/s3/s4, no heave. JVP normal.   CHEST: CTAB  ABD: soft, NT/ND, NABS  : no flank/suprapubic tenderness  NEURO: AA&Ox3, fluent/appropriate, motor grossly nonfocal  PSYCH: cooperative, affect appropriate    Data   Data reviewed today:  I personally reviewed the chest x-ray image(s) showing slight pulm edema.    Recent Labs  Lab 03/16/18  2304   WBC 11.2*   HGB 12.7   *         POTASSIUM 4.7   CHLORIDE 104   CO2 26   BUN 36*   CR 1.12*   ANIONGAP 8   PAWEL 9.1   *   ALBUMIN 3.5   PROTTOTAL 7.9   BILITOTAL 1.2   ALKPHOS 92   ALT 45   AST 43   TROPI 0.033       Imaging:  Recent Results (from the past 24 hour(s))   XR Chest 2 Views    Narrative    XR CHEST 2 VW  3/16/2018 11:48 PM     INDICATION: Dyspnea on exertion.    COMPARISON: 2/21/2018.      Impression    IMPRESSION: Cardiomegaly. Mild diffuse interstitial opacities may be  slightly more prominent. Most of the interstitial prominence was  present previously and is likely due to fibrosis. Component of  superimposed interstitial edema/CHF is not excluded. Possible  small  pleural effusions. Degenerative changes and kyphosis thoracic spine.    KATIA BALDERRAMA MD[AK1.1]        Revision History        User Key Date/Time User Provider Type Action    > AK1.2 3/17/2018  6:07 AM Diamond Zamora MD Physician Addend     AK1.3 3/17/2018  3:09 AM Diamond Zamora MD Physician Sign     AK1.1 3/17/2018  2:53 AM Diamond Zamora MD Physician                      Discharge Summaries      Discharge Summaries by Edis Seay MD at 3/18/2018  9:03 AM     Author:  Edis Seay MD Service:  Hospitalist Author Type:  Physician    Filed:  3/20/2018  8:27 AM Date of Service:  3/18/2018  9:03 AM Creation Time:  3/18/2018  9:03 AM    Status:  Signed :  Edis Seay MD (Physician)         [facility]    Discharge Summary  Hospitalist    Date of Admission:  3/16/2018  Date of Discharge:[JM1.1]  3/20/2018[JM1.2]  Discharging Provider:[JM1.1] Edis Seay[JM1.3], MD    Discharge Diagnoses[JM1.1]   Principal Problem:    Atrial fibrillation w RVR  Active Problems:    Pulmonary fibrosis    Hypertension goal BP (blood pressure) < 140/80    SOB (shortness of breath)    Memory loss      History of Present Illness[JM1.3]   89-year-old female with a history of hypertension, pulmonary fibrosis, CKD stage III who presents to the [facility] ED with atrial fibrillation with RVR with EKG showing afib with rvr to 140. Chest xray showed mild diffuse interstitial opacities may be slightly more prominent. Component of superimposed interstitial edema/CHF is not excluded. Was given 15 mg of dilt and 0.5 L of NS. This is not a new diagnosis, as she has been following closely with her cardiologist for her afib. Her outpatient cardiology notes from Dr. Walker mention her worsening SOB as possibly multifactorial, as some of her symptoms could be related to the Metoprolol, the atrial fibrillation itself, or perhaps some noncardiac issues. Cardioversion was discussed with  her, but she wanted to wait 4 weeks on anticoagulation prior to proceeding (it has only been 3 weeks on the anticoagulation).[JM1.1]    Hospital Course[JM1.3]   Seen by cardiology, her Toprol  mg was increased to 200 mg daily, but heart rate still 130's.  Her Cozaar was discontinued due to lower BP (100 to 110 systolic).  At that point IV Digoxin given with heart rate then 70-90's in afib, and SOB seemed better, and able to ambulate without significant dyspnea.  She has been on Xarelto 15 mg daily because of the afib, but unfortunately has had daily nose bleeds for the last several days, did get Tranexamic Acid once to nares with little benefit, was given nasal saline nasal spray to help with dryness, and because on continued bleeding Xarelto will be stopped for 3 days, at which point she can try to resume it.  Follow up in 3 days, check BMP then.  She will be on Digoxin 0.125 mg daily, and her creatinine is 1.15 (estimated GFR 44)[JM1.1], and her Digoxin level at time of discharge was 1.2.[JM1.2]      Edis Seay MD  Pager: [phone]  Cell Phone:  [phone][JM1.1]       Significant Results and Procedures[JM1.3]   As above[JM1.1]    Pending Results[JM1.3]   These results will be followed up by Dr. Seay[JM1.1]  Unresulted Labs Ordered in the Past 30 Days of this Admission     No orders found from 1/15/2018 to 3/17/2018.          Code Status[JM1.3]   Full Code[JM1.1]       Primary Care Physician   Marbin Rouse    Physical Exam   Temp: 98.4  F (36.9  C) Temp src: Oral BP: 120/75   Heart Rate: 72 Resp: 18 SpO2: 95 % O2 Device: None (Room air)    Vitals:    03/18/18 0131 03/19/18 0100 03/20/18 0000   Weight: 59 kg (130 lb) 57.2 kg (126 lb) 56.2 kg (124 lb)[JM1.3]     Vital Signs with Ranges[JM1.1]  Temp:  [97.5  F (36.4  C)-98.4  F (36.9  C)] 98.4  F (36.9  C)  Heart Rate:  [65-83] 72  Resp:  [16-18] 18  BP: (107-129)/(64-75) 120/75  SpO2:  [92 %-98 %] 95 %  I/O last 3 completed shifts:  In:  540 [P.O.:540]  Out: 1875 [Urine:1875][JM1.3]    Exam on discharge:   Lungs clear  CV -- irreg S1S2  Ankles without edema    # Discharge Pain Plan:   - Patient currently has NO PAIN and is not being prescribed pain medications on discharge.[JM1.1]      Discharge Disposition[JM1.3]   Discharged to home  Condition at discharge: Good[JM1.1]    Consultations This Hospital Stay   ELECTROPHYSIOLOGY IP CONSULT  SOCIAL WORK IP CONSULT  PHYSICAL THERAPY ADULT IP CONSULT  OCCUPATIONAL THERAPY ADULT IP CONSULT    Time Spent on this Encounter[JM1.3]   I spent a total of 35 minutes discharging this patient.[JM1.1]     Discharge Orders     Reason for your hospital stay   Atrial fib with rapid rate     Activity   Your activity upon discharge: activity as tolerated     Discharge Instructions   Call Dr. Granados at Pager [phone] if questions, or Cell Phone [phone].     Follow-up and recommended labs and tests    BMP in 3 days and provider at TCU to follow up in 3-5 days.  Dry weight is 120 to 125 lbs.-- consider holding or decreasing diuretic if weight < 120 lbs.     Full Code     Diet   Follow this diet upon discharge: Orders Placed This Encounter     Combination Diet Regular Diet Adult       Discharge Medications   Current Discharge Medication List      START taking these medications    Details   digoxin (LANOXIN) 125 MCG tablet Take 1 tablet (125 mcg) by mouth daily  Qty: 30 tablet, Refills: 3    Associated Diagnoses: Atrial fibrillation with RVR (H)      sodium chloride (OCEAN) 0.65 % nasal spray Spray 1 spray into both nostrils 3 times daily as needed for congestion  Refills: 0    Associated Diagnoses: Nasal dryness         CONTINUE these medications which have CHANGED    Details   furosemide (LASIX) 20 MG tablet Take 2 tablets (40 mg) by mouth daily  Qty: 30 tablet    Associated Diagnoses: HORTON (dyspnea on exertion)      rivaroxaban ANTICOAGULANT (XARELTO) 15 MG TABS tablet Take 1 tablet (15 mg) by mouth daily (with  dinner)  Qty: 30 tablet, Refills: 3    Comments: Hold for 3 days because of nose bleeds.  Associated Diagnoses: Atrial fibrillation, unspecified type (H)      metoprolol succinate (TOPROL-XL) 200 MG 24 hr tablet Take 1 tablet (200 mg) by mouth daily  Qty: 30 tablet, Refills: 3    Associated Diagnoses: Atrial fibrillation with RVR (H)         CONTINUE these medications which have NOT CHANGED    Details   oxyCODONE-acetaminophen (PERCOCET) 5-325 MG per tablet Take 0.5 tablets by mouth every morning      simvastatin (ZOCOR) 20 MG tablet TAKE HALF TABLET BY MOUTH AT BEDTIME  Qty: 45 tablet, Refills: 3    Associated Diagnoses: Hyperlipidemia with target LDL less than 130      Cyanocobalamin (VITAMIN B 12 PO) Take 1 tablet by mouth daily       VITAMIN D, CHOLECALCIFEROL, PO Take 1,000 Units by mouth daily.           STOP taking these medications       losartan (COZAAR) 25 MG tablet Comments:   Reason for Stopping:             Allergies   No Known Allergies  Data[JM1.3]   Most Recent 3 CBC's:[JM1.2]  Recent Labs   Lab Test  03/18/18   0540  03/16/18   2304  02/21/18   1531   WBC  7.7  11.2*  8.4   HGB  11.4*  12.7  12.7   MCV  101*  103*  105*   PLT  187  189  220[JM1.3]      Most Recent 3 BMP's:[JM1.2]  Recent Labs   Lab Test  03/20/18   0540  03/19/18   0621  03/18/18   0540   NA  138  137  138   POTASSIUM  4.3  4.1  4.3   CHLORIDE  106  106  107   CO2  24  22  21   BUN  32*  33*  34*   CR  1.28*  1.14*  1.15*   ANIONGAP  8  9  10   PAWEL  8.3*  8.5  8.6   GLC  88  94  85[JM1.3]     Most Recent 2 LFT's:[JM1.2]  Recent Labs   Lab Test  03/18/18   0540  03/16/18   2304   AST  24  43   ALT  27  45   ALKPHOS  67  92   BILITOTAL  1.8*  1.2[JM1.3]     Most Recent INR's and Anticoagulation Dosing History:  Anticoagulation Dose History     There is no flowsheet data to display.        Most Recent 3 Troponin's:[JM1.2]  Recent Labs   Lab Test  03/16/18   2304  09/03/12   1535  09/03/12   0201   TROPI  0.033  0.049*  0.093*[JM1.3]  "    Most Recent Cholesterol Panel:[JM1.2]  Recent Labs   Lab Test  11/28/17   1454   CHOL  142   LDL  52   HDL  69   TRIG  107[JM1.3]     Most Recent 6 Bacteria Isolates From Any Culture (See EPIC Reports for Culture Details):[JM1.2]  Recent Labs   Lab Test  09/28/12   1029  09/03/12   1645  09/02/12   1840  09/02/12   1825  09/02/12   1820   CULT  >100,000 colonies/mL Methicillin resistant Staphylococcus aureus (MRSA) 10 to 50,000 colonies/mL Coagulase negative Staphylococcus  No growth  Cultured on the 1st day of incubation: Escherichia coli Critical Value, preliminary result only, called to and read back by Carlos Dennis ( patient's nurse @ 1636 on 9/3/2012, cn.  Cultured on the 1st day of incubation: Escherichia coli Critical Value, preliminary result only, called to and read back by Carlos Dennis 9/3/12 @1636 cn Susceptibility testing done on previous specimen  >100,000 colonies/mL Escherichia coli[JM1.3]     Most Recent TSH, T4 and A1c Labs:[JM1.2]  Recent Labs   Lab Test  03/16/18   2304   TSH  4.13*   T4  1.38[JM1.3]         Revision History        User Key Date/Time User Provider Type Action    > JM1.3 3/20/2018  8:27 AM Edis Granados MD Physician Sign     JM1.2 3/20/2018  8:23 AM Edis Granados MD Physician Share     JM1.1 3/18/2018  9:15 AM Edis Granados MD Physician Share                     Consult Notes      Consults by Silvana Jones RD, LD at 3/17/2018  3:25 PM     Author:  Silvana Jones RD, LD Service:  Nutrition Author Type:  Registered Dietitian    Filed:  3/17/2018  3:25 PM Date of Service:  3/17/2018  3:25 PM Creation Time:  3/17/2018  3:22 PM    Status:  Signed :  Silvana Jones RD, LD (Registered Dietitian)         BRIEF NUTRITION ASSESSMENT      REASON FOR ASSESSMENT:  Admit Screen - Reduced oral intake over the last month    NUTRITION HISTORY:  Patient reports that she has not had any recent reduced appetite or intake.  \"I don't like " "to eat.  Except for breakfast\".  States that this is baseline for her.  She likes a lot of healthy foods such and fruits and vegetable.  \"I used to like fudge and candy, but I don't care much for that anymore\".    CURRENT DIET AND INTAKE:  Diet:  Regular diet               Consumed 50% of the omelet - \"it was too overcooked\", 100% of the fruit, and nearly 100% of the toast     ANTHROPOMETRICS:  Height: 5'5\"  Weight: 59 kg (130#)(3/16)  BMI: 21.63 kg/m2  IBW:  56.8 kg   Weight Status: Normal BMI  %IBW: 104%  Weight History:[KK1.1]   Wt Readings from Last 10 Encounters:   03/16/18 59 kg (130 lb)   03/08/18 60 kg (132 lb 3.2 oz)   02/26/18 59.4 kg (131 lb)   02/21/18 59.9 kg (132 lb)   11/28/17 59 kg (130 lb)   08/31/16 60.3 kg (133 lb)   12/22/15 60.8 kg (134 lb)   09/29/15 59.9 kg (132 lb)   01/28/15 61.7 kg (136 lb)   10/01/14 59 kg (130 lb)[KK1.2]   Weight has been stable       LABS:  Labs noted    MALNUTRITION:  Patient does not meet two of the following criteria necessary for diagnosing malnutrition: significant weight loss, reduced intake, subcutaneous fat loss, muscle loss or fluid retention    NUTRITION INTERVENTION:  Nutrition Diagnosis:  No nutrition diagnosis at this time.    Implementation:  Nutrition Education:  Per Provider order if indicated.    FOLLOW UP/MONITORING:   Will re-evaluate in 7 - 10 days, or sooner, if re-consulted.    Silvana Jones RD, LD, [facility]   Clinical Dietitian - [facility][KK1.1]                Revision History        User Key Date/Time User Provider Type Action    > KK1.2 3/17/2018  3:25 PM Silvana Jones RD, ANGELA Registered Dietitian Sign     KK1.1 3/17/2018  3:22 PM Silvana Jones RD, ANGELA Registered Dietitian             Consults by Rashid Dillard MD at 3/17/2018  9:46 AM     Author:  Rashid Dillard MD Service:  Cardiology Author Type:  Physician    Filed:  3/17/2018 10:00 AM Date of Service:  3/17/2018  9:46 AM Creation Time: "  3/17/2018  9:45 AM    Status:  Signed :  Rashid Dillard MD (Physician)     Consult Orders:    1. Electrophysiology IP Consult: Patient to be seen: Routine - within 24 hours; recalcitrant afib; Consultant may enter orders: Yes [[id number]] ordered by Diamond Zamora MD at 03/17/18 0306                [facility]    Cardiology Consultation     Date of Admission:  3/16/2018[YR1.1]    Assessment & Plan[YR1.2]   Theresa Alves is a 89 year old female who was admitted on 3/16/2018. I was asked to see the patient for AF    Summary:   89 yo F with  1) Acute decomenpensated diastolic heart failure HFpEF  2) Pulmonary fibrosis, kyphoscoliosis  3) Hypertension  4) CKD stage 3  5) Persistent AF on xarelto for the last month with persistent epistaxis  6) Significant epistaxis on admission  7) Afib with RVR  8) type 2 MI from heart failure    -Increase rate control with metoprolol increased to 200 daily. Stopped losartan  -Decompensated diastolic heart failure with worsening dyspnea, NTproBNP, pulmonary congestion on X ray and elevated JVP. Will diurese 1 L negative today. Given 20 IV lasix. If does not respond in 6 hours increase dose to 40 IV lasix for next dose in 6 hours  -Hold anticoagulation given frequent epistaxis. WIll need to readress risks benefits of anticoagulation. Currently patient is apprehensive about further anticoagulation given her persistent epistaxis.[YR1.1]    Rashid Dillard[YR1.2], MD[YR1.1]    Code Status    Full Code    Reason for Consult[YR1.2]   Reason for consult: Atrial fibrillation[YR1.1]    Primary Care Physician   Marbin Rouse    Chief Complaint[YR1.2]   Shortness of breath, epistaxis, atrial fibrillation    History is obtained from the patient. She presented with persistent epistaxis from home and has been on xarelto since the beginning of March. She has been having difficult to manage epistaxis since starting that has been frustrating for her and  yesterday was persistent so she presented to the ED. Was also having worsening shortness of breath for the last week and is currently dyspneic even walking a few feet. Denies chest pain and denies symptomatic palpitations. Currently feeling well apart from significant dyspnea on exertion. No stroke or MI in the past.    Last echo showed EF 55% with severe atrial enlargment moderate TR and elevated RVSP consistent with HFpEF.    EKG shows AF with RVR    X ray cardiomegaly with pulmonary congestion worse than prior mild fibrotic changes on last EKG    Labs show Cr 1.1 at baseline, NTproBNP 09271 higher than last month when it was 8519, troponin elevation of 0.03[YR1.1]    History of Present Illness[YR1.2]   Theresa Alves is a 89 year old female who presents with epistaxis[YR1.1]    Past Medical History[YR1.2]   I have reviewed this patient's medical history and updated it with pertinent information if needed.[YR1.1]   Past Medical History:   Diagnosis Date     Chronic back pain      Pulmonary fibrosis (H)      Unspecified essential hypertension     Essential hypertension[YR1.3]       Past Surgical History[YR1.2]   I have reviewed this patient's surgical history and updated it with pertinent information if needed.[YR1.1]  Past Surgical History:   Procedure Laterality Date     CARPAL TUNNEL RELEASE RT/LT      R     CATARACT IOL, RT/LT      R     HERNIA REPAIR, INGUINAL RT/LT      R     hiatal hernia repair  6/10    large paraesophageal hiatal hernia; Nissen fundoplication[YR1.3]        Prior to Admission Medications   Prior to Admission Medications   Prescriptions Last Dose Informant Patient Reported? Taking?   Cyanocobalamin (VITAMIN B 12 PO)   Yes No   Sig: Take  by mouth.   VITAMIN D, CHOLECALCIFEROL, PO   Yes No   Sig: Take 1,000 Units by mouth daily.     furosemide (LASIX) 20 MG tablet   Yes No   Si mg, 4 times per week   losartan (COZAAR) 25 MG tablet   No No   Sig: Take 1 tablet (25 mg)  by mouth daily   metoprolol succinate (TOPROL-XL) 100 MG 24 hr tablet   No No   Sig: Take 1 tablet (100 mg) by mouth daily   oxyCODONE-acetaminophen (PERCOCET) 5-325 MG per tablet   No No   Sig: Take 0.5 tablets by mouth 2 times daily as needed for pain   rivaroxaban ANTICOAGULANT (XARELTO) 15 MG TABS tablet   No No   Sig: Take 1 tablet (15 mg) by mouth daily (with dinner)   simvastatin (ZOCOR) 20 MG tablet   No No   Sig: TAKE HALF TABLET BY MOUTH AT BEDTIME      Facility-Administered Medications: None     Allergies   No Known Allergies    Social History[YR1.2]   I have reviewed this patient's social history and updated it with pertinent information if needed. Theresa LAWSON Alves[YR1.1]  reports that she has never smoked. She has never used smokeless tobacco. She reports that she drinks alcohol. She reports that she does not use illicit drugs.[YR1.3]    Family History[YR1.2]   I have reviewed this patient's family history and updated it with pertinent information if needed.[YR1.1]   Family History   Problem Relation Age of Onset     Unknown/Adopted Mother      Unknown/Adopted Father      DIABETES No family hx of      Coronary Artery Disease No family hx of      Hypertension No family hx of      Hyperlipidemia No family hx of      CEREBROVASCULAR DISEASE No family hx of      Breast Cancer No family hx of      Colon Cancer No family hx of      Prostate Cancer No family hx of      Other Cancer No family hx of      Depression No family hx of      Anxiety Disorder No family hx of      MENTAL ILLNESS No family hx of      Substance Abuse No family hx of      Anesthesia Reaction No family hx of      Asthma No family hx of      OSTEOPOROSIS No family hx of      Genetic Disorder No family hx of      Thyroid Disease No family hx of      Obesity No family hx of[YR1.3]        Review of Systems[YR1.2]   The 10 point Review of Systems is negative other than noted in the HPI or here.[YR1.1]     Physical Exam   Temp: 97.7  F (36.5   C) Temp src: Oral BP: 131/78 Pulse: 91 Heart Rate: 108 Resp: 16 SpO2: 93 % O2 Device: None (Room air)[YR1.2]    Vital Signs with Ranges[YR1.1]  Temp:  [97.7  F (36.5  C)] 97.7  F (36.5  C)  Pulse:  [] 91  Heart Rate:  [] 108  Resp:  [16-22] 16  BP: (121-145)/(78-95) 131/78  SpO2:  [93 %-99 %] 93 %  130 lbs 0 oz[YR1.2]    Constitutional: A and Ox3  Eyes: normal  ENT: No bleedinf currently, JVD elevated to nursing home up neck in upright position  Respiratory: Bilateral inspiratory crackles heard  Cardiovascular: irregularly iregular, 1/6 HSM in tricuspid area  GI: soft, nont tony  Lymph/Hematologic: normal  Genitourinary: Not examined  Skin: normal  Musculoskeletal: no edema  Neurologic: no focal deficit  Neuropsychiatric: normal[YR1.1]    Data[YR1.2]   Results for orders placed or performed during the hospital encounter of 03/16/18 (from the past 24 hour(s))   EKG 12 lead   Result Value Ref Range    Interpretation ECG Click View Image link to view waveform and result    CBC with platelets + differential   Result Value Ref Range    WBC 11.2 (H) 4.0 - 11.0 10e9/L    RBC Count 3.72 (L) 3.8 - 5.2 10e12/L    Hemoglobin 12.7 11.7 - 15.7 g/dL    Hematocrit 38.3 35.0 - 47.0 %     (H) 78 - 100 fl    MCH 34.1 (H) 26.5 - 33.0 pg    MCHC 33.2 31.5 - 36.5 g/dL    RDW 14.9 10.0 - 15.0 %    Platelet Count 189 150 - 450 10e9/L    Diff Method Automated Method     % Neutrophils 67.5 %    % Lymphocytes 13.7 %    % Monocytes 13.9 %    % Eosinophils 4.0 %    % Basophils 0.3 %    % Immature Granulocytes 0.6 %    Nucleated RBCs 1 (H) 0 /100    Absolute Neutrophil 7.6 1.6 - 8.3 10e9/L    Absolute Lymphocytes 1.5 0.8 - 5.3 10e9/L    Absolute Monocytes 1.6 (H) 0.0 - 1.3 10e9/L    Absolute Eosinophils 0.5 0.0 - 0.7 10e9/L    Absolute Basophils 0.0 0.0 - 0.2 10e9/L    Abs Immature Granulocytes 0.1 0 - 0.4 10e9/L    Absolute Nucleated RBC 0.1    Comprehensive metabolic panel   Result Value Ref Range    Sodium 138 133 - 144  mmol/L    Potassium 4.7 3.4 - 5.3 mmol/L    Chloride 104 94 - 109 mmol/L    Carbon Dioxide 26 20 - 32 mmol/L    Anion Gap 8 3 - 14 mmol/L    Glucose 127 (H) 70 - 99 mg/dL    Urea Nitrogen 36 (H) 7 - 30 mg/dL    Creatinine 1.12 (H) 0.52 - 1.04 mg/dL    GFR Estimate 45 (L) >60 mL/min/1.7m2    GFR Estimate If Black 55 (L) >60 mL/min/1.7m2    Calcium 9.1 8.5 - 10.1 mg/dL    Bilirubin Total 1.2 0.2 - 1.3 mg/dL    Albumin 3.5 3.4 - 5.0 g/dL    Protein Total 7.9 6.8 - 8.8 g/dL    Alkaline Phosphatase 92 40 - 150 U/L    ALT 45 0 - 50 U/L    AST 43 0 - 45 U/L   Nt probnp inpatient   Result Value Ref Range    N-Terminal Pro BNP Inpatient 85406 (H) 0 - 1800 pg/mL   TSH   Result Value Ref Range    TSH 4.13 (H) 0.40 - 4.00 mU/L   Troponin I   Result Value Ref Range    Troponin I ES 0.033 0.000 - 0.045 ug/L   ABO/Rh type and screen   Result Value Ref Range    ABO A     RH(D) Pos     Antibody Screen Neg     Test Valid Only At [facility]        Specimen Expires 03/19/2018    T4 free   Result Value Ref Range    T4 Free 1.38 0.76 - 1.46 ng/dL   XR Chest 2 Views    Narrative    XR CHEST 2 VW  3/16/2018 11:48 PM     INDICATION: Dyspnea on exertion.    COMPARISON: 2/21/2018.      Impression    IMPRESSION: Cardiomegaly. Mild diffuse interstitial opacities may be  slightly more prominent. Most of the interstitial prominence was  present previously and is likely due to fibrosis. Component of  superimposed interstitial edema/CHF is not excluded. Possible small  pleural effusions. Degenerative changes and kyphosis thoracic spine.    KATIA BALDERRAMA MD[YR1.4]                Revision History        User Key Date/Time User Provider Type Action    > YR1.3 3/17/2018 10:00 AM Rashid Dillard MD Physician Sign     YR1.4 3/17/2018  9:48 AM Rashid Dillard MD Physician      YR1.2 3/17/2018  9:46 AM Rashid Dillard MD Physician      YR1.1 3/17/2018  9:45 AM Rashid Dillard,  MD Physician                      Progress Notes - Physician (Notes from 03/17/18 through 03/20/18)      Progress Notes by Katya Montoya LICSW at 3/19/2018  8:39 PM     Author:  Katya Montoya LICSW Service:  Social Work Author Type:      Filed:  3/19/2018  8:49 PM Date of Service:  3/19/2018  8:39 PM Creation Time:  3/19/2018  8:39 PM    Status:  Signed :  Katya Montoya LICSW ()         Care Transition Initial Assessment -   Reason For Consult: discharge planning  Met with: Patient    Principal Problem:    Atrial fibrillation w RVR  Active Problems:    Pulmonary fibrosis    Hypertension goal BP (blood pressure) < 140/80    SOB (shortness of breath)    Memory loss       DATA  Lives With: alone  Living Arrangements: apartment     Identified issues/concerns regarding health management:       Transportation Available: family or friend will provide    Per social service protocol for discharge planning.  Patient was admitted on 3-16-18 with atrial fibrillation with RVR.  The tentative date of discharge is 3-20-18.  Reviewed chart and spoke with patient regarding discharge plans.  Per patient report, patient lives alone in an apartment with no steps.  Patient uses a rolling walker at baseline.  Patient has a shower chair and grab bars in the bathroom.  Patient receives assistance with cleaning, grocery shopping, meals, and transportation.  Reviewed the therapy discharge recommendations of tcu placement on discharge and patient is in agreement.  Patient states she would like to go to [place] on discharge.  If they do not have a bed, other possible options include [facility] and [facility].  Referrals sent, via discharge on the double, to check bed availability.    ASSESSMENT  Cognitive Status:  awake and alert  Concerns to be addressed: discharge planning.     PLAN  Financial costs for the patient includes N/A.  Patient given options and choices for discharge TCU  choices.  Patient/family is agreeable to the plan?  Yes  Patient Goals and Preferences: TCU on discharge.  Patient anticipates discharging to:  TCU.    Will confirm a bed, continue to follow, and assist with a safe discharge plan.[SJ1.1]    Katya Montoya[SJ1.2], MS, [facility]  [phone][SJ1.1]           Revision History        User Key Date/Time User Provider Type Action    > SJ1.2 3/19/2018  8:49 PM Katya Montoya LICSW  Sign     SJ1.1 3/19/2018  8:39 PM Katya Montoya LICSW              Progress Notes by Pineda Renee MD at 3/19/2018  1:05 PM     Author:  Pineda Renee MD Service:  Cardiology Author Type:  Physician    Filed:  3/19/2018  6:33 PM Date of Service:  3/19/2018  1:05 PM Creation Time:  3/19/2018  1:05 PM    Status:  Signed :  Pineda Renee MD (Physician)         [facility]  Cardiology Progress Note    Date of Service (when I saw the patient):[KC1.1] 03/19/2018[KC1.2]  Primary Cardiologist: Dr. Walker[KC1.1]       Assessment & Plan[KC1.2]   Theresa Alves is a 89 year old female who was admitted on 3/16/2018 with worsening shortness of breath, exertional fatigue and A-fib with RVR. She carries a history of hypertension, pulmonary fibrosis, CKD stage III and atrial fibrillation. She was planning for elective cardioversion after 30 days of uninterrupted anticoagulation, unfortunately she had a significant episode of epistaxis and Xarelto is being held.     1.  : Atrial Fibrillation   - Rate controlled on Toprol XL, Digoxin, Anticoagulation is on hold due to Epistaxis.   - Patient wishes to not resume any anticoagulation at this time.   - CHADsVasc - 5,  Risks and benefits discussed with patient and family. Family would like some time to discuss other options with patient.    - last echo confirmed normal LV function, mod TR, mod PH, mild AR.      2.  : Exertional shortness of breath   - Mild improvement  - Admission  "BNP elevated  - Continue on PO Lasix  - Down 4 lbs from admission        3. Hypertension  - Well controlled on BB   - LE edema managed on compression stockings.       4.  : Epistaxis  - No reoccurrence   - Xarelto on hold[KC1.1]    I have reviewed patient data, examined patient, and agree with medical plan.    Pineda Renee MD FAC[NC1.1]      JUSTIN Perkins CNP[KC1.2]  Text Page  (M-F, 7:30 am - 4:00 pm)[KC1.1]    Interval History[KC1.2]   C/o exertional shortness of breath, orthopnea.  Denies chest pain, palpitations, LE edema. Refusing anticoagulation at this time, family wishes to discuss other options with patient. Tele confirms rate controlled A-fib (72). No evidence of nose bleeds. Family concerned with her poor appetite. She eats 1 meal per day and 1 \"boost\" supplement.[KC1.1]     Physical Exam   Temp: 98  F (36.7  C) Temp src: Oral BP: 112/71   Heart Rate: 72 Resp: 18 SpO2: 94 % O2 Device: None (Room air)    Vitals:    03/16/18 2300 03/18/18 0131 03/19/18 0100   Weight: 59 kg (130 lb) 59 kg (130 lb) 57.2 kg (126 lb)[KC1.2]     Vital Signs with Ranges[KC1.1]  Temp:  [98  F (36.7  C)-98.6  F (37  C)] 98  F (36.7  C)  Heart Rate:  [] 72  Resp:  [16-18] 18  BP: (112-143)/(71-98) 112/71  SpO2:  [93 %-95 %] 94 %  I/O last 3 completed shifts:  In: 540 [P.O.:540]  Out: 1700 [Urine:1700][KC1.2]    GEN:  In general, this is a thin elderly female in no acute distress. Patient ambulatory.  HEENT:  Pupils equal, round. Sclerae nonicteric. Clear oropharynx. Mucous membranes moist.  NECK: Supple, no masses appreciated. Trachea midline. Neg JVD   C/V: Irregular rhythm, controlled rate, audible murmur   RESP: Respirations are unlabored. No use of accessory muscles. Trace rales bilaterally.  GI: Abdomen soft, nontender, nondistended.  EXTREM: No LE edema. No cyanosis or clubbing.  NEURO: Alert and oriented, cooperative.   PSYCH: Normal affect.  SKIN: Warm and dry. No rashes or petechiae appreciated.[KC1.1] "       Medications     - MEDICATION INSTRUCTIONS -         furosemide  40 mg Oral Daily     simvastatin  20 mg Oral At Bedtime     cholecalciferol  1,000 Units Oral Daily     oxyCODONE IR  2.5 mg Oral Daily    And     acetaminophen  162.5 mg Oral Daily     metoprolol succinate  200 mg Oral Daily     sodium chloride  1 spray Both Nostrils 3 times daily     digoxin  125 mcg Oral Daily       Data[KC1.2]   Reviewed[KC1.1]      JUSTIN Perkins CNP[KC1.2] 3/19/2018[KC1.1]     Revision History        User Key Date/Time User Provider Type Action    > NC1.1 3/19/2018  6:33 PM Pineda Renee MD Physician Sign     KC1.2 3/19/2018  1:53 PM Arlen Lee APRN CNP Nurse Practitioner Sign     KC1.1 3/19/2018  1:05 PM Arlen Lee APRN CNP Nurse Practitioner             Progress Notes by Katya Owen RN at 3/19/2018  1:03 PM     Author:  Katya Owen RN Service:  Care Coordinator Author Type:      Filed:  3/19/2018  4:49 PM Date of Service:  3/19/2018  1:03 PM Creation Time:  3/19/2018  1:03 PM    Status:  Addendum :  Katya Owne RN ()         Met with the pt at the request of the MD. Per Dr. Granados the pt needs to go to TCU. He also stated she would prefer [facility] in [place]. Met with the pt and her dtrs to request more choices in case [facility] does not have any beds. A list of TCUs was given to the pt to review. Encouraged to pick out at least 3 choices. The pt stated she does NOT want to go to [facility]. Went over the those TCUs with amenity fee's.[SM1.1]     1431 Pt would prefer to go to [facility], [facility], or [facility][SM1.2]. Pt states she does not want to pay an amenity fee.[SM1.3]        Revision History        User Key Date/Time User Provider Type Action    > SM1.3 3/19/2018  4:49 PM Katya Owen RN Case Manager Addend     SM1.2 3/19/2018  4:32 PM Katya Owen, RN Case Manager Addend     SM1.1 3/19/2018  1:06  PM Katya Owen, RN Case Manager Sign            Progress Notes by Edis Seay MD at 3/19/2018 11:56 AM     Author:  Edis Seay MD Service:  Hospitalist Author Type:  Physician    Filed:  3/19/2018  3:45 PM Date of Service:  3/19/2018 11:56 AM Creation Time:  3/19/2018 11:56 AM    Status:  Signed :  Edis Seay MD (Physician)         [facility]    Hospitalist Progress Note    Assessment & Plan   Theresa Alves is a 89 year old female who was admitted on 3/16/2018 with SOB and afib with RVR    Impression:   Principal Problem:    Atrial fibrillation w RVR  Active Problems:    Pulmonary fibrosis    Hypertension goal BP (blood pressure) < 140/80    SOB (shortness of breath)    Memory loss    Epistaxis -- related to Xarelto    Plan:  Lasix 40 mg oral daily, check BMP and Digoxin level in AM, patient agreeable to TCU -- wants [facility].       DVT Prophylaxis: Xarelto -- but now held 2nd to epistaxis   Code Status: Prior    Disposition: Expected discharge in 1 day once heart rate controlled and breathing better .    Edis Seay MD  Pager [phone]  Cell Phone [phone]  Text Page (7am to 6pm)    Interval History   Still SBO with walking, but heart rate better and nose bleed has stopped.[JM1.1]  Wait done 4 lbs with lasix.[JM1.2]     Physical Exam   Temp: 98.3  F (36.8  C) Temp src: Tympanic BP: 129/73   Heart Rate: 83 Resp: 16 SpO2: 93 % O2 Device: None (Room air)    Vitals:    03/16/18 2300 03/18/18 0131 03/19/18 0100   Weight: 59 kg (130 lb) 59 kg (130 lb) 57.2 kg (126 lb)     Vital Signs with Ranges  Temp:  [98.3  F (36.8  C)-98.6  F (37  C)] 98.3  F (36.8  C)  Heart Rate:  [] 83  Resp:  [16-18] 16  BP: (123-143)/(72-98) 129/73  SpO2:  [93 %-95 %] 93 %  I/O last 3 completed shifts:  In: 540 [P.O.:540]  Out: 1700 [Urine:1700]    # Pain Assessment:   Current Pain Score 3/19/2018 3/19/2018 3/18/2018   Patient currently in  pain? yes denies sleeping: patient not able to self report   Pain score (0-10) 6 - -   Pain location Back - -   Pain descriptors Constant;Sore - -   Theresa s pain level was assessed and she currently denies pain.        Constitutional: Awake, alert, cooperative, no apparent distress  Respiratory: Clear to auscultation bilaterally, no crackles or wheezing  Cardiovascular: irregular S1 and S2, and no murmur noted  GI: Normal bowel sounds, soft, non-distended, non-tender  Extrem: No calf tenderness, no ankle edema  Neuro: Ox3, no focal motor or sensory deficits    Medications     - MEDICATION INSTRUCTIONS -         furosemide  40 mg Oral Daily     simvastatin  20 mg Oral At Bedtime     cholecalciferol  1,000 Units Oral Daily     oxyCODONE IR  2.5 mg Oral Daily    And     acetaminophen  162.5 mg Oral Daily     metoprolol succinate  200 mg Oral Daily     sodium chloride  1 spray Both Nostrils 3 times daily     digoxin  125 mcg Oral Daily       Data     Recent Labs  Lab 03/19/18  0621 03/18/18  0540 03/16/18  2304   WBC  --  7.7 11.2*   HGB  --  11.4* 12.7   MCV  --  101* 103*   PLT  --  187 189    138 138   POTASSIUM 4.1 4.3 4.7   CHLORIDE 106 107 104   CO2 22 21 26   BUN 33* 34* 36*   CR 1.14* 1.15* 1.12*   ANIONGAP 9 10 8   PAWEL 8.5 8.6 9.1   GLC 94 85 127*   ALBUMIN  --  2.9* 3.5   PROTTOTAL  --  6.3* 7.9   BILITOTAL  --  1.8* 1.2   ALKPHOS  --  67 92   ALT  --  27 45   AST  --  24 43   TROPI  --   --  0.033       Imaging:   No results found for this or any previous visit (from the past 24 hour(s)).[JM1.1]     Revision History        User Key Date/Time User Provider Type Action    > JM1.2 3/19/2018  3:45 PM Edis Granados MD Physician Sign     JM1.1 3/19/2018 11:56 AM Edis Granados MD Physician             Progress Notes by Mat Cheng at 3/19/2018  2:17 PM     Author:  Mat Cheng Service:  Spiritual Health Author Type:      Filed:  3/19/2018  2:23 PM Date of Service:   3/19/2018  2:17 PM Creation Time:  3/19/2018  2:17 PM    Status:  Signed :  Mat Cheng ()         SPIRITUAL HEALTH SERVICES Progress Note  [facility]    F/U visit.  Pt was with 2 nieces who demonstrated loving support of pt.  Pt overall states she's doing well and hopeful about ongoing improvement.  Short term, pt reflected on her possible need for a blood thinner and likely need for rehab after DSC.  Pt and her family thoughtfully spoke about the pros and cons of each decision, and SH affirmed this careful discernment.  Pt said she belongs to [facility] and faithfully listens each week to their [employer] services.  Pt cites no other concerns and invited prayer to close this visit.  SH provided emotional/spiritual support and prayer.                                                                                                                                           Mat Cheng M.Div., [facility]  Staff   Pager [phone][DE1.1]       Revision History        User Key Date/Time User Provider Type Action    > DE1.1 3/19/2018  2:23 PM Mat Cheng  Sign            Progress Notes by Edis Granados MD at 3/18/2018  8:47 PM     Author:  Edis Granados MD Service:  Hospitalist Author Type:  Physician    Filed:  3/18/2018  8:49 PM Date of Service:  3/18/2018  8:47 PM Creation Time:  3/18/2018  8:47 PM    Status:  Signed :  Edis Granados MD (Physician)         [facility]    Hospitalist Progress Note    Assessment & Plan   Theresa Alves is a 89 year old female who was admitted on 3/16/2018 with SOB and afib with RVR    Impression:   Principal Problem:    Atrial fibrillation w RVR  Active Problems:    Pulmonary fibrosis    Hypertension goal BP (blood pressure) < 140/80    SOB (shortness of breath)    Memory loss    Epistaxis -- related to Xarelto    Plan:  Continue IV Lasix, check breathing and BMP in AM.      DVT  Prophylaxis: Xarelto -- but now held 2nd to epistaxis   Code Status: Prior    Disposition: Expected discharge in 1 day once heart rate controlled and breathing better .    Edis Seay MD  Pager [phone]  Cell Phone [phone]  Text Page (7am to 6pm)    Interval History   Still SBO with walking, but heart rate better and nose bleed has stopped.   Physical Exam   Temp: 98.3  F (36.8  C) Temp src: Oral BP: 131/78 Pulse: 75 Heart Rate: 70 Resp: 18 SpO2: 94 % O2 Device: None (Room air)    Vitals:    03/16/18 2300 03/18/18 0131   Weight: 59 kg (130 lb) 59 kg (130 lb)     Vital Signs with Ranges  Temp:  [98.3  F (36.8  C)-99.7  F (37.6  C)] 98.3  F (36.8  C)  Pulse:  [] 75  Heart Rate:  [] 70  Resp:  [18-20] 18  BP: ()/(66-98) 131/78  SpO2:  [94 %-96 %] 94 %  I/O last 3 completed shifts:  In: 660 [P.O.:660]  Out: 1300 [Urine:1300]    # Pain Assessment:   Current Pain Score 3/18/2018 3/18/2018 3/17/2018   Patient currently in pain? sleeping: patient not able to self report yes yes   Pain score (0-10) - 6 5   Pain location - Back Back   Pain descriptors - Sore Aching   Theresa s pain level was assessed and she currently denies pain.        Constitutional: Awake, alert, cooperative, no apparent distress  Respiratory: Clear to auscultation bilaterally, no crackles or wheezing  Cardiovascular: irregular S1 and S2, and no murmur noted  GI: Normal bowel sounds, soft, non-distended, non-tender  Extrem: No calf tenderness, no ankle edema  Neuro: Ox3, no focal motor or sensory deficits    Medications     - MEDICATION INSTRUCTIONS -         simvastatin  20 mg Oral At Bedtime     cholecalciferol  1,000 Units Oral Daily     oxyCODONE IR  2.5 mg Oral Daily    And     acetaminophen  162.5 mg Oral Daily     metoprolol succinate  200 mg Oral Daily     sodium chloride  1 spray Both Nostrils 3 times daily     digoxin  125 mcg Oral Daily     tranexamic acid  500 mg Left nostril Once       Data     Recent  Labs  Lab 03/18/18  0540 03/16/18  2304   WBC 7.7 11.2*   HGB 11.4* 12.7   * 103*    189    138   POTASSIUM 4.3 4.7   CHLORIDE 107 104   CO2 21 26   BUN 34* 36*   CR 1.15* 1.12*   ANIONGAP 10 8   PAWEL 8.6 9.1   GLC 85 127*   ALBUMIN 2.9* 3.5   PROTTOTAL 6.3* 7.9   BILITOTAL 1.8* 1.2   ALKPHOS 67 92   ALT 27 45   AST 24 43   TROPI  --  0.033       Imaging:   No results found for this or any previous visit (from the past 24 hour(s)).[JM1.1]     Revision History        User Key Date/Time User Provider Type Action    > JM1.1 3/18/2018  8:49 PM Edis Granados MD Physician Sign            Progress Notes by Irene Santillan PT at 3/18/2018  4:42 PM     Author:  Irene Santillan PT Service:  (none) Author Type:  Physical Therapist    Filed:  3/18/2018  4:42 PM Date of Service:  3/18/2018  4:42 PM Creation Time:  3/18/2018  4:42 PM    Status:  Signed :  Irene Santillan PT (Physical Therapist)          03/18/18 1500   Quick Adds   Type of Visit Initial PT Evaluation   Living Environment   Lives With alone   Living Arrangements apartment   Number of Stairs to Enter Home 0   Number of Stairs Within Home 0   Transportation Available family or friend will provide   Self-Care   Usual Activity Tolerance moderate   Current Activity Tolerance poor   Regular Exercise no   Equipment Currently Used at Home walker, rolling;shower chair;grab bar  (FWW and 4WW)   Activity/Exercise/Self-Care Comment Has a friend who does her cleaning, grocery shopping and helps with meals. Friends who drive her to appts as needed as well.   Functional Level Prior   Ambulation 1-->assistive equipment   Transferring 1-->assistive equipment   Toileting 1-->assistive equipment   Bathing 3-->assistive equipment and person   Dressing 0-->independent   Eating 0-->independent   Communication 0-->understands/communicates without difficulty   Swallowing 0-->swallows foods/liquids without difficulty   Cognition 0 - no  cognition issues reported   Fall history within last six months no   Which of the above functional risks had a recent onset or change? ambulation;transferring;toileting;bathing   General Information   Onset of Illness/Injury or Date of Surgery - Date 03/16/18   Referring Physician Diamond Zamora MD   Patient/Family Goals Statement None stated   Pertinent History of Current Problem (include personal factors and/or comorbidities that impact the POC) 89 year old female who was admitted on 3/16/2018 with SOB and afib with RVR, PMH significant for pulmonary fibrosis   Precautions/Limitations fall precautions   General Info Comments Activity: up ad marshall   Cognitive Status Examination   Orientation orientation to person, place and time   Level of Consciousness alert   Follows Commands and Answers Questions 100% of the time   Personal Safety and Judgment intact   Memory intact   Pain Assessment   Patient Currently in Pain No   Posture    Posture Forward head position;Protracted shoulders;Kyphosis;Scoliosis   Range of Motion (ROM)   ROM Comment Grossly WFL for AROM of LEs   Strength   Strength Comments Proximal LE weakness observed with transfers and bed mobility   Bed Mobility   Bed Mobility Comments min A supine <> sit   Transfer Skills   Transfer Comments min A sit <> stand with B UE support   Balance   Balance Comments Impaired balance with transfers and gait requiring mod A to correct overt LOB with pivot transfers and B UE support for lateral stability   Coordination   Coordination Comments WFL   General Therapy Interventions   Planned Therapy Interventions balance training;bed mobility training;gait training;neuromuscular re-education;strengthening;transfer training;risk factor education;home program guidelines;progressive activity/exercise   Clinical Impression   Criteria for Skilled Therapeutic Intervention yes, treatment indicated   PT Diagnosis unsteady gait and transfers, high fall risk wtih mobility   Influenced by  "the following impairments balance impairments, postural deficits, HORTON/SOB, fatigue, weakness   Functional limitations due to impairments unsteady gait and transfers, high fall risk wtih mobility   Clinical Presentation Stable/Uncomplicated   Clinical Presentation Rationale improving medical status   Clinical Decision Making (Complexity) Low complexity   Therapy Frequency` daily   Predicted Duration of Therapy Intervention (days/wks) 3 days   Anticipated Discharge Disposition Transitional Care Facility   Risk & Benefits of therapy have been explained Yes   Patient, Family & other staff in agreement with plan of care Yes   [facility] TM \"6 Clicks\"   2016, Trustees of [facility], under license to [employer].  All rights reserved.   6 Clicks Short Forms Basic Mobility Inpatient Short Form   [facility] AM-PAC  \"6 Clicks\" V.2 Basic Mobility Inpatient Short Form   1. Turning from your back to your side while in a flat bed without using bedrails? 3 - A Little   2. Moving from lying on your back to sitting on the side of a flat bed without using bedrails? 3 - A Little   3. Moving to and from a bed to a chair (including a wheelchair)? 3 - A Little   4. Standing up from a chair using your arms (e.g., wheelchair, or bedside chair)? 3 - A Little   5. To walk in hospital room? 3 - A Little   6. Climbing 3-5 steps with a railing? 2 - A Lot   Basic Mobility Raw Score (Score out of 24.Lower scores equate to lower levels of function) 17   Total Evaluation Time   Total Evaluation Time (Minutes) 9[KM1.1]        Revision History        User Key Date/Time User Provider Type Action    > KM1.1 3/18/2018  4:42 PM Irene Santillan, HECTOR Physical Therapist Sign            Progress Notes by Rashid Dillard MD at 3/18/2018  9:43 AM     Author:  Rashid Dillard MD Service:  Cardiology Author Type:  Physician    Filed:  3/18/2018  9:47 AM Date of Service:  3/18/2018  9:43 AM Creation " Time:  3/18/2018  9:43 AM    Status:  Signed :  Rashid Dillard MD (Physician)         [facility]    Cardiology Progress Note     Assessment & Plan   Theresa Alves is a 89 year old female who was admitted on 3/16/2018.     Summary:   89 yo F with  1) Acute decomenpensated diastolic heart failure HFpEF  2) Pulmonary fibrosis, kyphoscoliosis  3) Hypertension  4) CKD stage 3  5) Persistent AF on xarelto for the last month with persistent epistaxis  6) Significant epistaxis on admission  7) Afib with RVR  8) type 2 MI from heart failure     -Increase rate control with metoprolol increased to 200 daily. Should reach steady state by tomorrow.  -Was loaded with digoxin yesterday. If rates controlled tomorrow would decrease digoxin to q48 hours or discontinue with her age and renal function. Rates should improve as heart failure is controlled better  -Diuresed well with 20 IV lasix yesterday. Remains with elevated JVP and rales. Repeat 20 IV this am.   -Hold anticoagulation given frequent epistaxis. WIll need to readress risks benefits of anticoagulation. Currently patient is apprehensive about further anticoagulation given her persistent epistaxis and appears that she does not want to continue.    Rashid Dillard MD    Interval History   Brisk diuresis with lasix but remains dyspneic    Physical Exam   Temp: 99.7  F (37.6  C) Temp src: Oral BP: 131/81 Pulse: 111 Heart Rate: 77 Resp: 18 SpO2: 96 % O2 Device: None (Room air)    Vitals:    03/16/18 2300 03/18/18 0131   Weight: 59 kg (130 lb) 59 kg (130 lb)     Vital Signs with Ranges  Temp:  [97.2  F (36.2  C)-99.7  F (37.6  C)] 99.7  F (37.6  C)  Pulse:  [111] 111  Heart Rate:  [] 77  Resp:  [18-20] 18  BP: (107-144)/(67-96) 131/81  SpO2:  [93 %-96 %] 96 %  I/O last 3 completed shifts:  In: 360 [P.O.:360]  Out: 1700 [Urine:1700]    Patient Active Problem List   Diagnosis     Health Care Home     Chronic kidney disease, stage  III (moderate)     Hyperlipidemia with target LDL less than 130     Edema     Pulmonary fibrosis     Preventive measure     Hypertension goal BP (blood pressure) < 140/80     SOB (shortness of breath)     Congestive heart failure (H)     Chronic fatigue     Dizziness - light-headed     Degenerative arthritis of spine     Osteoporosis     Weight loss     Right shoulder pain     Low back pain     Physical deconditioning     Chronic pain syndrome     Memory loss     Numbness of right foot     Chronic congestive heart failure, unspecified congestive heart failure type (H)     Atrial fibrillation, unspecified type (H)     Atrial fibrillation w RVR     Constitutional: A and Ox3  Eyes: normal  ENT: No bleedinf currently, JVD elevated to CHCF up neck in upright position  Respiratory: Bilateral inspiratory crackles heard in base  Cardiovascular: irregularly iregular, 1/6 HSM in tricuspid area  GI: soft, nont tony  Lymph/Hematologic: normal  Genitourinary: Not examined  Skin: normal  Musculoskeletal: no edema  Neurologic: no focal deficit  Neuropsychiatric: normal    Medications     - MEDICATION INSTRUCTIONS -         furosemide  20 mg Intravenous Once     simvastatin  20 mg Oral At Bedtime     cholecalciferol  1,000 Units Oral Daily     oxyCODONE IR  2.5 mg Oral Daily    And     acetaminophen  162.5 mg Oral Daily     metoprolol succinate  200 mg Oral Daily     sodium chloride  1 spray Both Nostrils 3 times daily     digoxin  125 mcg Oral Daily     tranexamic acid  500 mg Left nostril Once       Data[YR1.1]   Results for orders placed or performed during the hospital encounter of 03/16/18 (from the past 24 hour(s))   Comprehensive metabolic panel   Result Value Ref Range    Sodium 138 133 - 144 mmol/L    Potassium 4.3 3.4 - 5.3 mmol/L    Chloride 107 94 - 109 mmol/L    Carbon Dioxide 21 20 - 32 mmol/L    Anion Gap 10 3 - 14 mmol/L    Glucose 85 70 - 99 mg/dL    Urea Nitrogen 34 (H) 7 - 30 mg/dL    Creatinine 1.15 (H) 0.52  - 1.04 mg/dL    GFR Estimate 44 (L) >60 mL/min/1.7m2    GFR Estimate If Black 53 (L) >60 mL/min/1.7m2    Calcium 8.6 8.5 - 10.1 mg/dL    Bilirubin Total 1.8 (H) 0.2 - 1.3 mg/dL    Albumin 2.9 (L) 3.4 - 5.0 g/dL    Protein Total 6.3 (L) 6.8 - 8.8 g/dL    Alkaline Phosphatase 67 40 - 150 U/L    ALT 27 0 - 50 U/L    AST 24 0 - 45 U/L   CBC with platelets   Result Value Ref Range    WBC 7.7 4.0 - 11.0 10e9/L    RBC Count 3.37 (L) 3.8 - 5.2 10e12/L    Hemoglobin 11.4 (L) 11.7 - 15.7 g/dL    Hematocrit 34.0 (L) 35.0 - 47.0 %     (H) 78 - 100 fl    MCH 33.8 (H) 26.5 - 33.0 pg    MCHC 33.5 31.5 - 36.5 g/dL    RDW 14.7 10.0 - 15.0 %    Platelet Count 187 150 - 450 10e9/L[YR1.2]        Revision History        User Key Date/Time User Provider Type Action    > YR1.2 3/18/2018  9:47 AM Rashid Dillard MD Physician Sign     YR1.1 3/18/2018  9:43 AM Rashid Dillard MD Physician             Progress Notes by Sami Turner RN at 3/17/2018  8:43 PM     Author:  Sami Turner RN Service:  Skilled Nursing Author Type:  Registered Nurse    Filed:  3/17/2018  8:46 PM Date of Service:  3/17/2018  8:43 PM Creation Time:  3/17/2018  8:43 PM    Status:  Signed :  Sami Turner RN (Registered Nurse)         1800 Dig held d/t HR 70's-104, orders to hold if HR <90, pt better rate controlled after first dose.  Up to BSC w/ 1-2 assist, diuresing well.  Denies pain.  Pt only had one meal today, did not want to eat more.  Nose bleed this afternoon stopped w/ gauze packing, pt also coughing up scant blood, Xarelto held.[MS1.1]     Revision History        User Key Date/Time User Provider Type Action    > MS1.1 3/17/2018  8:46 PM Sami Turner, RN Registered Nurse Sign            Progress Notes by Edis Granados MD at 3/17/2018 11:18 AM     Author:  Edis Granados MD Service:  Hospitalist Author Type:  Physician    Filed:  3/17/2018  5:11 PM Date of Service:  3/17/2018  11:18 AM Creation Time:  3/17/2018 11:18 AM    Status:  Signed :  Edis Seay MD (Physician)         [facility]    Hospitalist Progress Note[JM1.1]    Assessment & Plan[JM1.2]   Theresa Alves is a 89 year old female who was admitted on 3/16/2018[JM1.1] with SOB and afib with RVR[JM1.3]    Impression:[JM1.1]   Principal Problem:    Atrial fibrillation w RVR  Active Problems:    Pulmonary fibrosis    Hypertension goal BP (blood pressure) < 140/80    SOB (shortness of breath)    Memory loss[JM1.2]    Epistaxis -- related to Xarelto[JM1.3]    Plan:[JM1.1]  Seen by cardiology, Metoprolol succinate  increased to 200 mg daily and BP down to 110 systolic, Heart rate still 130 -- will add digoxin (IV load ordered) and then oral daily digoxin dose starting tomorrow.[JM1.3]      DVT Prophylaxis:[JM1.1] Xarelto -- but now held 2nd to epistaxis[JM1.3]   Code Status:[JM1.1] Full Code[JM1.2]    Disposition: Expected discharge in[JM1.1] 1[JM1.3] day once[JM1.1] heart rate controlled.[JM1.3] .[JM1.1]    Edis Seay[JM1.2], MD  Pager [phone]  Cell Phone [phone]  Text Page (7am to 6pm)[JM1.1]    Interval History[JM1.2]   Has had nose bleeds off and on this past week.  Nose is dry.  Heart rate 130's this AM.  SOB when admitted, feels OK at rest.[JM1.3]     Physical Exam   Temp: 97.2  F (36.2  C) Temp src: Oral BP: 107/87 Pulse: 91 Heart Rate: 114 Resp: 18 SpO2: 95 % O2 Device: None (Room air)    Vitals:    03/16/18 2300   Weight: 59 kg (130 lb)[JM1.2]     Vital Signs with Ranges[JM1.1]  Temp:  [97.2  F (36.2  C)-97.9  F (36.6  C)] 97.2  F (36.2  C)  Pulse:  [] 91  Heart Rate:  [] 114  Resp:  [16-22] 18  BP: (107-145)/(78-96) 107/87  SpO2:  [93 %-99 %] 95 %  I/O last 3 completed shifts:  In: -   Out: 900 [Urine:900][JM1.2]    # Pain Assessment:[JM1.1]   Current Pain Score 3/17/2018 3/17/2018 3/17/2018   Patient currently in pain? denies denies denies   Pain score  (0-10) - - -   Pain location - - -   Pain descriptors - - -[JM1.2]   Theresa alfaro pain level was assessed and she currently denies pain.[JM1.3]        Constitutional: Awake, alert, cooperative, no apparent distress  Respiratory: Clear to auscultation bilaterally, no crackles or wheezing  Cardiovascular:[JM1.1] irr[JM1.3]egular S1 and S2, and no murmur noted  GI: Normal bowel sounds, soft, non-distended, non-tender  Extrem: No calf tenderness, no ankle edema  Neuro: Ox[JM1.1]3 but took 2 guesses to get date correct[JM1.3], no focal motor or sensory deficits[JM1.1]    Medications     - MEDICATION INSTRUCTIONS -         simvastatin  20 mg Oral At Bedtime     cholecalciferol  1,000 Units Oral Daily     oxyCODONE IR  2.5 mg Oral Daily    And     acetaminophen  162.5 mg Oral Daily     [START ON 3/18/2018] metoprolol succinate  200 mg Oral Daily     sodium chloride  1 spray Both Nostrils 3 times daily     digoxin  250 mcg Intravenous Q3H     [START ON 3/18/2018] digoxin  125 mcg Oral Daily     tranexamic acid  500 mg Left nostril Once       Data     Recent Labs  Lab 03/16/18  2304   WBC 11.2*   HGB 12.7   *         POTASSIUM 4.7   CHLORIDE 104   CO2 26   BUN 36*   CR 1.12*   ANIONGAP 8   PAWEL 9.1   *   ALBUMIN 3.5   PROTTOTAL 7.9   BILITOTAL 1.2   ALKPHOS 92   ALT 45   AST 43   TROPI 0.033[JM1.2]       Imaging:[JM1.1]   Recent Results (from the past 24 hour(s))   XR Chest 2 Views    Narrative    XR CHEST 2 VW  3/16/2018 11:48 PM     INDICATION: Dyspnea on exertion.    COMPARISON: 2/21/2018.      Impression    IMPRESSION: Cardiomegaly. Mild diffuse interstitial opacities may be  slightly more prominent. Most of the interstitial prominence was  present previously and is likely due to fibrosis. Component of  superimposed interstitial edema/CHF is not excluded. Possible small  pleural effusions. Degenerative changes and kyphosis thoracic spine.    KATIA BALDERRAMA MD[JM1.2]        Revision History      "   User Key Date/Time User Provider Type Action    > JM1.2 3/17/2018  5:11 PM Edis Granados MD Physician Sign     JM1.3 3/17/2018  5:06 PM Edis Granados MD Physician      JM1.1 3/17/2018 11:18 AM Edis Granados MD Physician             Progress Notes by Carrie Stuart at 3/17/2018  1:42 PM     Author:  Carrie Stuart Service:  Spiritual Health Author Type:      Filed:  3/17/2018  1:50 PM Date of Service:  3/17/2018  1:42 PM Creation Time:  3/17/2018  1:42 PM    Status:  Signed :  Carrie Stuart ()         SPIRITUAL HEALTH SERVICES Progress Note  [facility]    Visited pt per request. Pt[EW1.1] has had several medical struggles but is hoping to go home tomorrow. When asked if pt has any family supporting her, she replied, \"No. I've been alone for 23 years.\" Pt has no living siblings and pt's   decades ago. Pt never had children. Pt has several friends and nieces and nephews who are supportive, and pt names them as blessings in her life. Pt is [employer] and misses going to [employer]. Currently, pt listens to a [employer] service at [facility] that is broadcast over the phone. Pt calls in every  morning. SH provided reflective listening, emotional support, and prayer.  will continue to follow pt as available.[EW1.2]      Carrie Stuart  Chaplain Resident  Pager: [phone]  Office: [phone][EW1.1]     Revision History        User Key Date/Time User Provider Type Action    > EW1.2 3/17/2018  1:50 PM Carrie Stuart  Sign     EW1.1 3/17/2018  1:42 PM Carrie Stuart              ED Provider Notes by Lacie Browne MD at 3/16/2018 10:55 PM     Author:  Lacie Browne MD Service:  Emergency Medicine Author Type:  Physician    Filed:  3/17/2018  8:36 AM Date of Service:  3/16/2018 10:55 PM Creation Time:  3/16/2018 11:10 PM    Status:  Signed :  Lacie Browne MD (Physician)           History     Chief Complaint:  Epistaxis and " shortness of breath     The history is provided by[AA1.1] the patient[KD1.1].      Theresa Alves is a 89 year old female with a history of recently diagnosed A[AA1.1]F[KD1.1]ib[AA1.1] (February 2018)[KD1.1] on Xarelto[AA1.1] (starting 3/1/18)[KD1.1],[AA1.1] pulmonary fibrosis,[KD1.1] and CKD who presents with epistaxis[AA1.1] and dyspnea on exertion[KD1.1].  The patient reports having shortness of breath over the past 6 months which acutely worsened over the past week to the point where she is unable to ambulate with her walker as she normally[AA1.1] does[KD1.1] at baseline.[AA1.1] Notes shortness of breath present only with exertion and denies current dyspnea.[KD1.1] She endorses chest pain a few nights ago which[AA1.1] spontaneously[KD1.1] resolved. She denies other symptoms such as fever, cough, rhinorrhea, sore throat, vomiting, nausea, diarrhea, or dysuria. She is followed closely by her cardiologist and[AA1.1] has plan for h[KD1.1]olter monitor[AA1.1] to be[KD1.1] placed in 3 days and has seen her PCP within the past few weeks.[AA1.1] She is also concerned regarding[KD1.1] nosebleeds every day (sometimes more than once a day) for the past week. She can normally stop them herself[AA1.1] after a few minutes with pressure,[KD1.1] but presents tonight after a nose bleed that started[AA1.1] about 1.5 hours ago[KD1.1] and has not stopped.     Allergies:  No known drug allergies      Medications:    Xarelto  Metoprolol  Losartan  Lasix  Simvastatin     Past Medical History:    Chronic back pain  Pulmonary fibrosis  Hypertension  A[AA1.1]F[KD1.1]ib  Hyperlipidemia  CHF  CKD Stage III    Past Surgical History:    Carpal tunnel release  Cataract surgery  Hernia repair x2    Family History:    History review. No contributing family history.     Social History:  Smoking status: no  Alcohol use: yes[AA1.1]   The patient lives in a private, independent residence[AA1.2]  PCP: Marbin Rouse   Patient presents  with[AA1.1] friend, Annette[KD1.1].   Marital Status:        Review of Systems   Constitutional: Negative for fever.   HENT: Positive for nosebleeds. Negative for rhinorrhea and sore throat.    Respiratory: Positive for shortness of breath. Negative for cough.    Cardiovascular: Positive for chest pain (resolved).   Gastrointestinal: Negative for diarrhea, nausea and vomiting.   Genitourinary: Negative for dysuria.   All other systems reviewed and are negative.    Physical Exam[AA1.1]     Patient Vitals for the past 24 hrs:   BP Temp Temp src Pulse Heart Rate Resp SpO2 Weight   03/17/18 0030 - - - - 94 16 97 % -   03/17/18 0015 - - - - 87 22 97 % -   03/17/18 0000 - - - - 83 16 95 % -   03/16/18 2330 - - - - 146 22 94 % -   03/16/18 2315 - - - - 125 - 95 % -   03/16/18 2300 128/82 97.7  F (36.5  C) Oral 131 131 20 98 % 59 kg (130 lb)[AA1.3]     Physical Exam[AA1.1]  General: Well-developed and well-nourished; well appearing elderly  female; cooperative  Head:  Atraumatic  Eyes:  Conjunctivae, lids, and sclerae are normal  ENT:    Right nare normal[AA1.4];[KD1.1] left nare with slow oozing without easily identifiable source; no septal hematoma; moist mucous membranes  Neck:  Supple; normal range of motion  CV:  Tachycardic rate and irregularly irregular rhythm; normal heart sounds with no murmurs, rubs, or gallops detected  Resp:  No respiratory distress;[AA1.4] scattered rales throughout without[KD1.1] without decreased breath sounds, wheezing, or rhonchi  GI:  Soft; non-distended; non-tender    MS:  Normal ROM; no bilateral lower extremity edema  Skin:  Warm; non-diaphoretic; no pallor  Neuro:  Awake; A&Ox3; normal strength  Psych: Normal mood and affect; normal speech  Vitals reviewed.[AA1.4]    Emergency Department Course   EKG  Indication: shortness of breath   Time: 2258  Rate 139 bpm. QRS duration 72. QT/QTc 302/459.   Atrial fibrillation with rapid ventricular response.   Cannot rule out  anterior infarct, age undetermined.[AA1.1]   ST & T wave abnormality, consider inferolateral ischemia.    Abnormal ECG.  No[AA1.5] acute ST changes.[AA1.1]  No significant changes[AA1.5] as compared to prior, dated[AA1.1] 2[AA1.5]/[AA1.1]26[AA1.5]/[AA1.1]18[AA1.5].     Imaging:[AA1.1]  Radiographic findings were communicated with the patient who voiced understanding of the findings.    X-ray Chest, 2 views:  Cardiomegaly. Mild diffuse interstitial opacities may be  slightly more prominent. Most of the interstitial prominence was  present previously and is likely due to fibrosis. Component of  superimposed interstitial edema/CHF is not excluded. Possible small  pleural effusions. Degenerative changes and kyphosis thoracic spine.   As read by Radiology.[AA1.6]     Laboratory:[AA1.1]  CBC: WBC 11.2 (H), o/w WNL (HGB 12.7,  )  CMP: Glucose 127 (H), BUN 36 (H), Creatinine 1.12 (H), GFR 45 (L), o/w WNL   BNP: 10570 (H)  TSH: 4.13 (H)  T3 free:[AA1.6] pending[AA1.4]  T4 free: 1.38  Troponin: 0.033  Blood type: A+[AA1.6]     Interventions:[AA1.1]  2328: Diltiazem 15 mg IV  2350: NS 0.5L IV Bolus[AA1.6]       Emergency Department Course:  Past medical records, nursing notes, and vitals reviewed.  2305: I performed an exam of the patient and obtained history, as documented above. GCS 15.[AA1.1]  IV inserted and blood drawn.  2340: I rechecked the patient.[AA1.7] She continues to have minimal bleeding from the left nare.[AA1.8]  0037: I rechecked the patient[AA1.9] and[AA1.10] placed[KD1.1] TXA[AA1.11] soaked cotton ball[KD1.1].  0106: I rechecked and updated the patient. The first[AA1.10] TXA[AA1.11] did not work.[AA1.10]  0132: I rechecked the patient and[AA1.12] her bleeding has stopped[AA1.13].[AA1.12]  0200: The patient failed trial of ambulation with significant dyspnea and heart rate to 130.  Findings and plan explained to the[AA1.6] p[KD1.1]atient and[AA1.6] friend[KD1.1] who consent to admission.[AA1.6]      0218[AA1.2]: Discussed the patient with [AA1.6] Noah[AA1.14], who will admit the patient to a[AA1.6] [facility][AA1.2] bed for further monitoring, evaluation, and treatment.[AA1.6]      Impression & Plan      Medical Decision Making:[AA1.1]  Joyce is a 89-year-old female who lives alone and reports she was recently diagnosed with atrial fibrillation and started on Xarelto March 1.  She notes she has had shortness of breath for about 6 months but it worsened over the last week such that she has significant dyspnea with exertion[KD1.2] and is unable to ambulate at baseline[KD1.1].  She also notes that she has had epistaxis daily for the last few days though it has resolved with pressure at home.  She called the ambulance today for epistaxis which has not resolved and they noted she was significantly dyspneic on exertion[KD1.2],[KD1.1] even[KD1.2] with a few steps to[KD1.1] their cart.  On examination patient has a very mild oozing from the left nare and is in atrial fibrillation with rapid ventricular response with a rate in the 150s during my exam.  Otherwise, patient's exam is reassuring.[KD1.2]  Scattered rales on lung auscultation expected in a patient with pulmonary fibrosis.[KD1.1]    EKG confirms atrial fibrillation with RVR without acute ST changes.  Patient was given 0.25 mg/kg of diltiazem with appropriate improvement in her heart rate.  Patient then remained in atrial fibrillation with controlled rates throughout the remainder of her emergency department stay.  I used a TXA soaked cotton ball with near resolution of the epistaxis.  After further pressure she then had resolution of the epistaxis though nursing staff reports she had one other smaller episode that also resolved with pressure.  CBC is reassuring with normal hemoglobin.  CMP is at patient's baseline with a creatinine of 1.12.  BNP is significantly elevated at 16,679 though chest x-ray[KD1.2] shows[AA1.14] only slight more prominence in her  diffuse interstitial opacities.  There is no clear evidence of pulmonary edema or CHF exacerbation.  She does not have any lower extremity edema on exam and I am somewhat concerned that her uncontrolled A[KD1.2]F[KD1.1]ib with RVR with exertion may be due to a degree of dehydration.  Patient did[KD1.2] get[KD1.1] 500 cc of IV fluids during her workup though on trial[KD1.2] of[KD1.1] ambulation[KD1.2],[AA1.14] when patient stood[KD1.2],[KD1.1] she became significantly short of breath and her heart rate returned to 130 bpm.  Thus, it is unlikely that dehydration is the etiology of her uncontrolled A[KD1.2]F[KD1.1]ib.  Of note, troponin is negative at 0.[KD1.2]0[AA1.14]33 and TSH is elevated at 4 with a normal T4 and T3 pending.  It is highly unlikely that the source[KD1.2] of[AA1.14] patient's shortness of breath is pulmonary embolus as she is appropriately anticoagulated.  However, exact etiology of her dyspnea on exertion is unclear and given her persistent symptoms with standing in the emergency department as stated before, as well as uncontrolled atrial fibrillation requiring diltiazem, I believe patient warrants admission to the hospital for further monitoring, workup, and treatment as indicated.[KD1.2]  Dyspnea may simply be due to worsening underlying pulmonary fibrosis.[KD1.1]  I discussed this with the patient and she is[KD1.2] pleased with plan as she is worried about[KD1.1] discharge[KD1.2] because[KD1.1] she lives alone.[KD1.2]  I[KD1.1] reviewed the results of her laboratory and imaging studies and I answered all her questions.  She and her friend Annette at bedside verbalized understanding.  I discussed patient's case with [KD1.2] Noah[AA1.14], hospitalist, who accepts admission[KD1.2] and[KD1.1] has no further orders.[KD1.2]    Diagnosis:[AA1.1]    ICD-10-CM    1. Uncontrolled atrial fibrillation (H) I48.91 Comprehensive metabolic panel     Nt probnp inpatient     Nt probnp inpatient     TSH     TSH      Troponin I     Troponin I     ABO/Rh type and screen     ABO/Rh type and screen     T3 Free     T3 Free     T4 free     T4 free     CANCELED: Nt probnp inpatient (BNP)     CANCELED: TSH     CANCELED: Troponin I     CANCELED: T3 Free     CANCELED: T4 free   2. Atrial fibrillation with RVR (H) I48.91    3. Epistaxis R04.0    4. Anticoagulated Z79.01    5. HORTON (dyspnea on exertion) R06.09[AA1.3]        Disposition:[AA1.1]  Admitted to[AA1.6] [facility][KD1.2]    Gladis Colon  3/16/2018    EMERGENCY DEPARTMENT  I, Gladis Isaiah, am serving as a scribe at 11:14 PM on 3/16/2018 to document services personally performed by Lacie Browne MD based on my observations and the provider's statements to me.[AA1.1]        Lacie Browne MD  03/17/18 0836  [KD1.3]     Revision History        User Key Date/Time User Provider Type Action    > KD1.3 3/17/2018  8:36 AM Lacie Browne MD Physician Sign     KD1.1 3/17/2018  8:24 AM Lacie Browne MD Physician      AA1.14 3/17/2018  5:37 AM Gladis Colon Scribe Share     KD1.2 3/17/2018  5:31 AM Lacie Browne MD Physician Share     AA1.4 3/17/2018  4:52 AM Gladis Colon Scribe Share     AA1.2 3/17/2018  2:28 AM Gladis Colon Scribe Share     AA1.3 3/17/2018  2:04 AM Vimal Colonyssa Scribe Share     AA1.6 3/17/2018  1:59 AM Gladis Colon Scribe      AA1.11 3/17/2018  1:36 AM Gladis Colon Scribe Share     [N/A] 3/17/2018  1:34 AM Vimal Colonyssa Scribe Share     AA1.13 3/17/2018  1:33 AM Vimal Colonyssa Scribe Share     AA1.12 3/17/2018  1:32 AM Gladis Colon Scribe      AA1.10 3/17/2018  1:12 AM Isaiah Gladis Scribe Share     AA1.9 3/17/2018 12:39 AM Isaiah, Gladis Scribe Share     AA1.8 3/16/2018 11:51 PM Isaiah, Gladis Scribe Share     AA1.7 3/16/2018 11:46 PM Isaiah, Gladis Sotoibe Share     [N/A] 3/16/2018 11:39 PM Isaiah, Gladis Scribe Share     AA1.5 3/16/2018 11:27 PM Isaiah, Gladis Scribe Share     AA1.1 3/16/2018 11:10 PM Isaiah, Gladis Brittanyibe             ED Notes by Gianna Brambila,  RN at 3/17/2018  3:57 AM     Author:  Quern, Gianna, RN Service:  Emergency Medicine Author Type:  Registered Nurse    Filed:  3/17/2018  3:58 AM Date of Service:  3/17/2018  3:57 AM Creation Time:  3/17/2018  3:58 AM    Status:  Signed :  Gianna Brambila RN (Registered Nurse)         Patient had a minor nose bleed out of left nostril, stopped with five minutes of pressure being held[SQ1.1]     Revision History        User Key Date/Time User Provider Type Action    > SQ1.1 3/17/2018  3:58 AM Gianna Brambila RN Registered Nurse Sign            Progress Notes by Stephani Patricio RN at 3/17/2018  3:27 AM     Author:  Stephani Patricio RN Service:  (none) Author Type:  Registered Nurse    Filed:  3/17/2018  3:27 AM Date of Service:  3/17/2018  3:27 AM Creation Time:  3/17/2018  3:27 AM    Status:  Signed :  Stephani Patricio RN (Registered Nurse)         RECEIVING UNIT ED HANDOFF REVIEW    ED Nurse Handoff Report was reviewed by: Stephani Patricio on March 17, 2018 at 3:27 AM[JA1.1]            Revision History        User Key Date/Time User Provider Type Action    > JA1.1 3/17/2018  3:27 AM Stephani Patricio RN Registered Nurse Sign            ED Notes by Nayana Christiansen RN at 3/17/2018  1:20 AM     Author:  Nayana Christiansen RN Service:  (none) Author Type:  Registered Nurse    Filed:  3/17/2018  1:23 AM Date of Service:  3/17/2018  1:20 AM Creation Time:  3/17/2018  1:23 AM    Status:  Signed :  Nayana Christiansen RN (Registered Nurse)         [facility]  ED Nurse Handoff Report    ED Chief complaint: Epistaxis (pt on blood thinner for recent dx of afib;  nose bleeding and pt also short of breath with exertion / more so than usual)      ED Diagnosis:   Final diagnoses:   Uncontrolled atrial fibrillation (H)   Atrial fibrillation with RVR (H)   Epistaxis   Anticoagulated   HORTON (dyspnea on exertion)       Code Status: Full Code    Allergies: No Known Allergies    Activity level -  Baseline/Home:  Independent    Activity Level - Current:   Stand with Assist     Needed?: No    Isolation: Yes  Infection: Not Applicable  MRSA    Bariatric?: No    Vital Signs:   Vitals:    03/16/18 2330 03/17/18 0000 03/17/18 0015 03/17/18 0030   BP:       Pulse:       Resp: 22 16 22 16   Temp:       TempSrc:       SpO2: 94% 95% 97% 97%   Weight:           Cardiac Rhythm: ,   Cardiac  Cardiac Rhythm: Atrial fibrillation    Pain level:      Is this patient confused?: No     Patient Report: Initial Complaint: nose bleed  Focused Assessment: 89 year old female with a history of recently diagnosed Afib on Xarelto, CHF, Hypertension and CKD who presents with epistaxis.  She presents tonight with two complaints of both worsening shortness of breath and epistaxis. The patient reports having shortness of breath over the past 6 months which acutely worsened over the past week to the point where she is unable to ambulate with her walker as she normally can do at baseline. She endorses chest pain a few nights ago which has since resolved. She denies other symptoms such as fever, cough, rhinorrhea, sore throat, vomiting, nausea, diarrhea, or dysuria. She is followed closely by her cardiologist and will have a Holter monitor placed in 3 days and has seen her PCP within the past few weeks.      The patient reports being diagnosed with Afib on 2/21/18 and has been taking blood thinners since March 1. She has been having nosebleeds every day (sometimes more than once a day) for the past week. She can normally stop them herself but presents tonight after a nose bleed that started at 2130 and has not stopped.   Pt A&O.    Tests Performed: labs  Abnormal Results:[KC1.1]   Results for orders placed or performed during the hospital encounter of 03/16/18 (from the past 24 hour(s))   EKG 12 lead   Result Value Ref Range    Interpretation ECG Click View Image link to view waveform and result    CBC with platelets +  differential   Result Value Ref Range    WBC 11.2 (H) 4.0 - 11.0 10e9/L    RBC Count 3.72 (L) 3.8 - 5.2 10e12/L    Hemoglobin 12.7 11.7 - 15.7 g/dL    Hematocrit 38.3 35.0 - 47.0 %     (H) 78 - 100 fl    MCH 34.1 (H) 26.5 - 33.0 pg    MCHC 33.2 31.5 - 36.5 g/dL    RDW 14.9 10.0 - 15.0 %    Platelet Count 189 150 - 450 10e9/L    Diff Method Automated Method     % Neutrophils 67.5 %    % Lymphocytes 13.7 %    % Monocytes 13.9 %    % Eosinophils 4.0 %    % Basophils 0.3 %    % Immature Granulocytes 0.6 %    Nucleated RBCs 1 (H) 0 /100    Absolute Neutrophil 7.6 1.6 - 8.3 10e9/L    Absolute Lymphocytes 1.5 0.8 - 5.3 10e9/L    Absolute Monocytes 1.6 (H) 0.0 - 1.3 10e9/L    Absolute Eosinophils 0.5 0.0 - 0.7 10e9/L    Absolute Basophils 0.0 0.0 - 0.2 10e9/L    Abs Immature Granulocytes 0.1 0 - 0.4 10e9/L    Absolute Nucleated RBC 0.1    Comprehensive metabolic panel   Result Value Ref Range    Sodium 138 133 - 144 mmol/L    Potassium 4.7 3.4 - 5.3 mmol/L    Chloride 104 94 - 109 mmol/L    Carbon Dioxide 26 20 - 32 mmol/L    Anion Gap 8 3 - 14 mmol/L    Glucose 127 (H) 70 - 99 mg/dL    Urea Nitrogen 36 (H) 7 - 30 mg/dL    Creatinine 1.12 (H) 0.52 - 1.04 mg/dL    GFR Estimate 45 (L) >60 mL/min/1.7m2    GFR Estimate If Black 55 (L) >60 mL/min/1.7m2    Calcium 9.1 8.5 - 10.1 mg/dL    Bilirubin Total 1.2 0.2 - 1.3 mg/dL    Albumin 3.5 3.4 - 5.0 g/dL    Protein Total 7.9 6.8 - 8.8 g/dL    Alkaline Phosphatase 92 40 - 150 U/L    ALT 45 0 - 50 U/L    AST 43 0 - 45 U/L   Nt probnp inpatient   Result Value Ref Range    N-Terminal Pro BNP Inpatient 96643 (H) 0 - 1800 pg/mL   TSH   Result Value Ref Range    TSH 4.13 (H) 0.40 - 4.00 mU/L   Troponin I   Result Value Ref Range    Troponin I ES 0.033 0.000 - 0.045 ug/L   ABO/Rh type and screen   Result Value Ref Range    ABO A     RH(D) Pos     Antibody Screen Neg     Test Valid Only At [facility]        Specimen Expires 03/19/2018    XR Chest 2 Views    Narrative     XR CHEST 2 VW  3/16/2018 11:48 PM     INDICATION: Dyspnea on exertion.    COMPARISON: 2/21/2018.      Impression    IMPRESSION: Cardiomegaly. Mild diffuse interstitial opacities may be  slightly more prominent. Most of the interstitial prominence was  present previously and is likely due to fibrosis. Component of  superimposed interstitial edema/CHF is not excluded. Possible small  pleural effusions. Degenerative changes and kyphosis thoracic spine.    KATIA BALDERRAMA MD[KC1.2]       Treatments provided: packing to nose, dilt    Family Comments: friend at bedside    OBS brochure/video discussed/provided to patient: N/A    ED Medications:   Medications   tranexamic acid (CYKLOKAPRON) spray 500 mg (not administered)   0.9% sodium chloride BOLUS (500 mLs Intravenous New Bag 3/16/18 9612)   diltiazem (CARDIZEM) injection 15 mg (15 mg Intravenous Given 3/16/18 6439)       Drips infusing?:  No    For the majority of the shift this patient was Green.   Interventions performed were xx.    Severe Sepsis OR Septic Shock Diagnosis Present: No      ED NURSE PHONE NUMBER: call dept[KC1.1]            Revision History        User Key Date/Time User Provider Type Action    > KC1.2 3/17/2018  1:23 AM Nayaan Christiansen, RN Registered Nurse Sign     KC1.1 3/17/2018  1:20 AM Nayana Christiansen, RN Registered Nurse                   Procedure Notes     No notes of this type exist for this encounter.         Progress Notes - Therapies (Notes from 03/17/18 through 03/20/18)      Progress Notes by Irene Santillan PT at 3/18/2018  4:42 PM     Author:  Irene Santillan PT Service:  (none) Author Type:  Physical Therapist    Filed:  3/18/2018  4:42 PM Date of Service:  3/18/2018  4:42 PM Creation Time:  3/18/2018  4:42 PM    Status:  Signed :  Irene Santillan PT (Physical Therapist)          03/18/18 1500   Quick Adds   Type of Visit Initial PT Evaluation   Living Environment   Lives With alone   Living Arrangements apartment    Number of Stairs to Enter Home 0   Number of Stairs Within Home 0   Transportation Available family or friend will provide   Self-Care   Usual Activity Tolerance moderate   Current Activity Tolerance poor   Regular Exercise no   Equipment Currently Used at Home walker, rolling;shower chair;grab bar  (FWW and 4WW)   Activity/Exercise/Self-Care Comment Has a friend who does her cleaning, grocery shopping and helps with meals. Friends who drive her to appts as needed as well.   Functional Level Prior   Ambulation 1-->assistive equipment   Transferring 1-->assistive equipment   Toileting 1-->assistive equipment   Bathing 3-->assistive equipment and person   Dressing 0-->independent   Eating 0-->independent   Communication 0-->understands/communicates without difficulty   Swallowing 0-->swallows foods/liquids without difficulty   Cognition 0 - no cognition issues reported   Fall history within last six months no   Which of the above functional risks had a recent onset or change? ambulation;transferring;toileting;bathing   General Information   Onset of Illness/Injury or Date of Surgery - Date 03/16/18   Referring Physician Diamond Zamora MD   Patient/Family Goals Statement None stated   Pertinent History of Current Problem (include personal factors and/or comorbidities that impact the POC) 89 year old female who was admitted on 3/16/2018 with SOB and afib with RVR, PMH significant for pulmonary fibrosis   Precautions/Limitations fall precautions   General Info Comments Activity: up ad marshall   Cognitive Status Examination   Orientation orientation to person, place and time   Level of Consciousness alert   Follows Commands and Answers Questions 100% of the time   Personal Safety and Judgment intact   Memory intact   Pain Assessment   Patient Currently in Pain No   Posture    Posture Forward head position;Protracted shoulders;Kyphosis;Scoliosis   Range of Motion (ROM)   ROM Comment Grossly WFL for AROM of LEs   Strength  "  Strength Comments Proximal LE weakness observed with transfers and bed mobility   Bed Mobility   Bed Mobility Comments min A supine <> sit   Transfer Skills   Transfer Comments min A sit <> stand with B UE support   Balance   Balance Comments Impaired balance with transfers and gait requiring mod A to correct overt LOB with pivot transfers and B UE support for lateral stability   Coordination   Coordination Comments WFL   General Therapy Interventions   Planned Therapy Interventions balance training;bed mobility training;gait training;neuromuscular re-education;strengthening;transfer training;risk factor education;home program guidelines;progressive activity/exercise   Clinical Impression   Criteria for Skilled Therapeutic Intervention yes, treatment indicated   PT Diagnosis unsteady gait and transfers, high fall risk wtih mobility   Influenced by the following impairments balance impairments, postural deficits, HORTON/SOB, fatigue, weakness   Functional limitations due to impairments unsteady gait and transfers, high fall risk wtih mobility   Clinical Presentation Stable/Uncomplicated   Clinical Presentation Rationale improving medical status   Clinical Decision Making (Complexity) Low complexity   Therapy Frequency` daily   Predicted Duration of Therapy Intervention (days/wks) 3 days   Anticipated Discharge Disposition Transitional Care Facility   Risk & Benefits of therapy have been explained Yes   Patient, Family & other staff in agreement with plan of care Yes   [facility] [employer] \"6 Clicks\"   2016, Trustees of [facility], under license to [employer].  All rights reserved.   6 Clicks Short Forms Basic Mobility Inpatient Short Form   [facility] [employer]  \"6 Clicks\" V.2 Basic Mobility Inpatient Short Form   1. Turning from your back to your side while in a flat bed without using bedrails? 3 - A Little   2. Moving from lying on your back to sitting on the side of a flat bed without using " bedrails? 3 - A Little   3. Moving to and from a bed to a chair (including a wheelchair)? 3 - A Little   4. Standing up from a chair using your arms (e.g., wheelchair, or bedside chair)? 3 - A Little   5. To walk in hospital room? 3 - A Little   6. Climbing 3-5 steps with a railing? 2 - A Lot   Basic Mobility Raw Score (Score out of 24.Lower scores equate to lower levels of function) 17   Total Evaluation Time   Total Evaluation Time (Minutes) 9[KM1.1]        Revision History        User Key Date/Time User Provider Type Action    > KM1.1 3/18/2018  4:42 PM Irene Santillan PT Physical Therapist Sign

## 2018-03-16 NOTE — IP AVS SNAPSHOT
` [facility]: [phone]            Medication Administration Report for Theresa Alves as of 03/20/18 1316   Legend:    Given Hold Not Given Due Canceled Entry Other Actions    Time Time (Time) Time  Time-Action       Inactive    Active    Linked        Medications 03/14/18 03/15/18 03/16/18 03/17/18 03/18/18 03/19/18 03/20/18    apixaban ANTICOAGULANT (ELIQUIS) tablet 2.5 mg  Dose: 2.5 mg  Freq: 2 TIMES DAILY Route: PO  Start: 03/20/18 1215          [ ] 1215       [ ] 2100           cholecalciferol (vitamin D3) tablet 1,000 Units  Dose: 1,000 Units  Freq: DAILY Route: PO  Start: 03/17/18 0900       0855 (1,000 Units)-Given        0910 (1,000 Units)-Given        0907 (1,000 Units)-Given        0839 (1,000 Units)-Given           digoxin (LANOXIN) tablet 125 mcg  Dose: 125 mcg  Freq: DAILY Route: PO  Start: 03/18/18 0900   Admin Instructions: Hold if heart rate < 70         0910 (125 mcg)-Given        0907 (125 mcg)-Given        0839 (125 mcg)-Given           furosemide (LASIX) tablet 40 mg  Dose: 40 mg  Freq: DAILY Route: PO  Start: 03/19/18 1200         1250 (40 mg)-Given        0839 (40 mg)-Given           melatonin tablet 1 mg  Dose: 1 mg  Freq: AT BEDTIME PRN Route: PO  PRN Reason: sleep  Start: 03/17/18 0441   Admin Instructions: Do not give unless at least 6 hours of uninterrupted sleep is expected.               metoprolol succinate (TOPROL-XL) 24 hr tablet 200 mg  Dose: 200 mg  Freq: DAILY Route: PO  Start: 03/18/18 0900   Admin Instructions: DO NOT CRUSH. Tablet may be split in half along score line.         0910 (200 mg)-Given        0907 (200 mg)-Given        0843 (200 mg)-Given           naloxone (NARCAN) injection 0.1-0.4 mg  Dose: 0.1-0.4 mg  Freq: EVERY 2 MIN PRN Route: IV  PRN Reason: opioid reversal  Start: 03/17/18 0441   Admin Instructions: For respiratory rate LESS than or EQUAL to 8.  Partial reversal dose:  0.1 mg titrated q 2 minutes for Analgesia  Side Effects Monitoring Sedation Level of 3 (frequently drowsy, arousable, drifts to sleep during conversation).Full reversal dose:  0.4 mg bolus for Analgesia Side Effects Monitoring Sedation Level of 4 (somnolent, minimal or no response to stimulation).  For ordered doses up to 2mg give IVP. Give each 0.4mg over 15 seconds in emergency situations. For non-emergent situations further dilute in 9mL of NS to facilitate titration of response.               oxyCODONE IR (ROXICODONE) half-tab 2.5 mg  Dose: 2.5 mg  Freq: DAILY Route: PO  Start: 03/17/18 0945   Admin Instructions: Give with Tylenol liquid to = 1/2 tab of Percocet        1050 (2.5 mg)-Given        0910 (2.5 mg)-Given        0907 (2.5 mg)-Given        0839 (2.5 mg)-Given          And  acetaminophen (TYLENOL) solution 162.5 mg  Dose: 162.5 mg  Freq: DAILY Route: PO  Start: 03/17/18 0945   Admin Instructions: Give with Oxycodone to = 1/2 tab of Percocet  Maximum acetaminophen dose from all sources= 75 mg/kg/day not to exceed 4 grams/day.                0910 (162.5 mg)-Given        0907 (162.5 mg)-Given        0839 (162.5 mg)-Given           Patient is already receiving anticoagulation with heparin, enoxaparin (LOVENOX), warfarin (COUMADIN)  or other anticoagulant medication  Freq: CONTINUOUS PRN Route: XX  Start: 03/17/18 0441              simvastatin (ZOCOR) tablet 20 mg  Dose: 20 mg  Freq: AT BEDTIME Route: PO  Start: 03/17/18 0445       0506 (20 mg)-Given       2119 (20 mg)-Given        2059 (20 mg)-Given               2133 (20 mg)-Given        [ ] 2200           sodium chloride (OCEAN) 0.65 % nasal spray 1 spray  Dose: 1 spray  Freq: 3 TIMES DAILY Route: BOTH NOSTRIL  Start: 03/17/18 1215       (1340)-Not Given [C]       2124 (1 spray)-Given        0610 (1 spray)-Given       (1425)-Not Given       2104 (1 spray)-Given        (0912)-Not Given       (1301)-Not Given       (2006)-Not Given        0648 (1 spray)-Given       [ ] 1300       [ ] 1900           Completed Medications  Medications 03/14/18 03/15/18 03/16/18 03/17/18 03/18/18 03/19/18 03/20/18         Dose: 375 mcg  Freq: ONCE Route: IV  Start: 03/17/18 1500   End: 03/17/18 1545   Admin Instructions: Up to 0.5 mg may be given IV Push undiluted over a minimum of 5 minutes.  Vesicant. For ordered doses up to 0.5 mg, give IV Push undiluted over a minimum of 5 minutes.Monitor apical pulse for 60 seconds before giving dose. Hold if pulse is less than 60.        1540 (375 mcg)-Given [C]                Dose: 20 mg  Freq: ONCE Route: IV  Start: 03/18/18 0945   End: 03/18/18 1035   Admin Instructions: For ordered doses up to 40 mg, give IV Push undiluted over 1-2 minutes.         1034 (20 mg)-Given            Discontinued Medications  Medications 03/14/18 03/15/18 03/16/18 03/17/18 03/18/18 03/19/18 03/20/18         Dose: 250 mcg  Freq: EVERY 3 HOURS Route: IV  Start: 03/17/18 1800   End: 03/17/18 2359   Admin Instructions: Up to 0.5 mg may be given IV Push undiluted over a minimum of 5 minutes.  Hold if heart rate < 90  Vesicant. For ordered doses up to 0.5 mg, give IV Push undiluted over a minimum of 5 minutes.Monitor apical pulse for 60 seconds before giving dose. Hold if pulse is less than 60.        (1824)-Not Given [C]       (2118)-Not Given [C]       2359-Med Discontinued            Dose: 15 mg  Freq: DAILY WITH SUPPER Route: PO  Start: 03/17/18 1700   End: 03/17/18 1450       1450-Med Discontinued            Dose: 500 mg  Freq: ONCE Route: LEFT NOSTRIL  Start: 03/16/18 2352   End: 03/19/18 1104   Admin Instructions: This is the injectable product to be used intraNASALLY. Withdraw dose from vial and apply using gauze.                 1104-Med Discontinued

## 2018-03-16 NOTE — IP AVS SNAPSHOT
"[facility] CARDIAC SPECIALTY CARE: [phone]                                              INTERAGENCY TRANSFER FORM - PHYSICIAN ORDERS   3/16/2018                    Hospital Admission Date: 3/16/2018  YANELI CHUA   : 1924  Sex: Female        Attending Provider: Diamond Zamora MD     Allergies:  No Known Allergies    Infection:  MRSA-Contact Isolation   Service:  HOSPITALIST    Ht:  1.651 m (5' 5\")   Wt:  56.2 kg (124 lb)   Admission Wt:  59 kg (130 lb)    BMI:  20.63 kg/m 2   BSA:  1.61 m 2            Patient PCP Information     Provider PCP Type    Marbin Rouse MD General      ED Clinical Impression     Diagnosis Description Comment Added By Time Added    Uncontrolled atrial fibrillation (H) [I48.91] Uncontrolled atrial fibrillation (H) [I48.91]  Lacie Browne MD 3/16/2018 11:19 PM    Atrial fibrillation with RVR (H) [I48.91] Atrial fibrillation with RVR (H) [I48.91]  Lacie Browne MD 3/16/2018 11:19 PM    Epistaxis [R04.0] Epistaxis [R04.0]  Lacie Browne MD 3/16/2018 11:19 PM    Anticoagulated [Z79.01] Anticoagulated [Z79.01]  Lacie Browne MD 3/16/2018 11:19 PM    HORTON (dyspnea on exertion) [R06.09] HORTON (dyspnea on exertion) [R06.09]  Lacie Browne MD 3/16/2018 11:19 PM      Hospital Problems as of 3/20/2018              Priority Class Noted POA    Pulmonary fibrosis High  Unknown Yes    Hypertension goal BP (blood pressure) < 140/80 High  2013 Yes    SOB (shortness of breath) Medium  2013 Yes    Memory loss Medium  2016 Yes    * (Principal)Atrial fibrillation w RVR Medium  3/17/2018 Yes      Non-Hospital Problems as of 3/20/2018              Priority Class Noted    Health Care Home Low  2012    Chronic kidney disease, stage III (moderate) Medium  2012    Hyperlipidemia with target LDL less than 130 Medium  2012    Edema Medium  2012    Preventive measure Low  2013    Congestive heart failure (H) Medium  2013    Chronic fatigue " Medium  5/17/2013    Dizziness - light-headed Medium  5/17/2013    Degenerative arthritis of spine Medium  10/1/2013    Osteoporosis Medium  4/1/2014    Weight loss Medium  10/1/2014    Right shoulder pain Medium  1/28/2015    Low back pain Medium  2/3/2015    Physical deconditioning Medium  2/3/2015    Chronic pain syndrome Medium  9/14/2015    Numbness of right foot Medium  8/31/2016    Chronic congestive heart failure, unspecified congestive heart failure type (H) Medium  12/6/2017    Atrial fibrillation, unspecified type (H) Medium  2/21/2018      Code Status History     Date Active Date Inactive Code Status Order ID Comments User Context    3/20/2018 11:57 AM  Full Code [id number]  Edis Granados MD Outpatient    3/18/2018  9:02 AM 3/20/2018 11:57 AM Full Code [id number]  Edis Granados MD Outpatient    3/17/2018  4:41 AM 3/18/2018  9:02 AM Full Code [id number]  Diamond Zamora MD Inpatient    9/5/2012  9:19 AM 3/17/2018  4:41 AM Full Code [id number]  Estefany Pelletier PA-C Outpatient    9/2/2012  9:53 PM 9/5/2012  9:19 AM Full Code [id number]  Kigns Dennis RN Inpatient         Medication Review      START taking        Dose / Directions Comments    apixaban ANTICOAGULANT 2.5 MG tablet   Commonly known as:  ELIQUIS   Used for:  Uncontrolled atrial fibrillation (H)   Notes to Patient:  Take everyday with breakfast and dinner        Dose:  2.5 mg   Take 1 tablet (2.5 mg) by mouth 2 times daily   Refills:  1    For afib       digoxin 125 MCG tablet   Commonly known as:  LANOXIN   Used for:  Atrial fibrillation with RVR (H)        Dose:  125 mcg   Take 1 tablet (125 mcg) by mouth daily   Quantity:  30 tablet   Refills:  3        sodium chloride 0.65 % nasal spray   Commonly known as:  OCEAN   Used for:  Nasal dryness        Dose:  1 spray   Spray 1 spray into both nostrils 3 times daily as needed for congestion   Refills:  0          CONTINUE these medications which may have CHANGED, or  have new prescriptions. If we are uncertain of the size of tablets/capsules you have at home, strength may be listed as something that might have changed.        Dose / Directions Comments    furosemide 20 MG tablet   Commonly known as:  LASIX   This may have changed:    - medication strength  - how much to take  - when to take this   Used for:  HORTON (dyspnea on exertion)        Dose:  40 mg   Take 2 tablets (40 mg) by mouth daily   Quantity:  30 tablet   Refills:  0        metoprolol succinate 200 MG 24 hr tablet   Commonly known as:  TOPROL-XL   This may have changed:    - medication strength  - how much to take   Used for:  Atrial fibrillation with RVR (H)        Dose:  200 mg   Take 1 tablet (200 mg) by mouth daily   Quantity:  30 tablet   Refills:  3          CONTINUE these medications which have NOT CHANGED        Dose / Directions Comments    oxyCODONE-acetaminophen 5-325 MG per tablet   Commonly known as:  PERCOCET   Used for:  Chronic midline low back pain without sciatica        Dose:  0.5 tablet   Take 0.5 tablets by mouth every morning   Quantity:  14 tablet   Refills:  0        simvastatin 20 MG tablet   Commonly known as:  ZOCOR   Used for:  Hyperlipidemia with target LDL less than 130        TAKE HALF TABLET BY MOUTH AT BEDTIME   Quantity:  45 tablet   Refills:  3        VITAMIN B 12 PO        Dose:  1 tablet   Take 1 tablet by mouth daily   Refills:  0        VITAMIN D (CHOLECALCIFEROL) PO        Dose:  1000 Units   Take 1,000 Units by mouth daily.   Refills:  0          STOP taking     losartan 25 MG tablet   Commonly known as:  COZAAR           rivaroxaban ANTICOAGULANT 15 MG Tabs tablet   Commonly known as:  XARELTO                   Summary of Visit     Reason for your hospital stay       Atrial fib with rapid rate       Reason for your hospital stay       Atrial fib with rapid rate, and associated CHF with SOB             After Care     Activity       Your activity upon discharge: activity as  tolerated       Activity - Up ad marshall           Additional Discharge Instructions       Call Dr. Granados at Pager [phone] if questions, or Cell Phone [phone].       Advance Diet as Tolerated       Follow this diet upon discharge: Orders Placed This Encounter      Combination Diet Regular Diet Adult      Diet       Diet       Follow this diet upon discharge: Orders Placed This Encounter      Combination Diet Regular Diet Adult       Discharge Instructions       Call Dr. Granados at Pager [phone] if questions, or Cell Phone [phone].       General info for SNF       Length of Stay Estimate: Short Term Care: Estimated # of Days <30  Condition at Discharge: Improving  Level of care:skilled   Rehabilitation Potential: Good  Admission H&P remains valid and up-to-date: Yes  Recent Chemotherapy: N/A  Use Nursing Home Standing Orders: Yes       Mantoux instructions       Give two-step Mantoux (PPD) Per Facility Policy Yes             Referrals     Occupational Therapy Adult Consult       Evaluate and treat as clinically indicated.    Reason:  weakness       Physical Therapy Adult Consult       Evaluate and treat as clinically indicated.    Reason:  weakness             Your next 10 appointments already scheduled     Apr 03, 2018 12:30 PM CDT   Return Visit with Citlali Umana PA-C   [facility] ([facility])    [address] [place] [id number] [phone]              Follow-Up Appointment Instructions     Future Labs/Procedures    Follow-up and recommended labs and tests      Comments:    BMP in 3 days and provider at TCU to follow up in 3-5 days.  Dry weight is 120 to 125 lbs.-- consider holding or decreasing diuretic if weight < 120 lbs.      Follow-Up Appointment Instructions     Follow-up and recommended labs and tests        BMP in 3 days and provider at TCU to follow up in 3-5 days.  Dry weight is 120 to 125 lbs.-- consider  holding or decreasing diuretic if weight < 120 lbs.             Statement of Approval     Ordered          03/20/18 0821  I have reviewed and agree with all the recommendations and orders detailed in this document.  EFFECTIVE NOW     Comments:  Discharge to TCU when bed available.   Approved and electronically signed by:  Edis Granados MD

## 2018-03-16 NOTE — IP AVS SNAPSHOT
` ` Patient Information     Patient Name Sex Theresa Perez ([id number]) Female 1924       Room Bed    [facility] 0256-01      Patient Demographics     Address Phone    [address]   [facility] [place] [address] [phone] (Home)  [phone] (Mobile)      Patient Ethnicity & Race     Ethnic Group Patient Race    American White      Emergency Contact(s)     Name Relation Home Work Mobile    Caron Montoya Relative [phone]      Ericka Kelley Relative [phone]      Ny (Weinert) Jan Relative [phone]        Documents on File        Status Date Received Description       Documents for the Patient    Privacy Notice - [place] Received 12     Face Sheet Received () 07/02/10     Insurance Card Received () 10/02/12 [facility]    External Medication Information Consent Accepted 12     Patient ID Received () 17     Consent for Services - Hospital/Clinic Received () 12     Privacy Notice - [place] Received 12     Consent for Services - Hospital/Clinic  () 11     Privacy Notice - [place] Received 12     Consent for Services - Hospital/Clinic Received () 12     Insurance Card Received 12 Medicare    Consent for EHR Access  13 Copied from existing Consent for services - C/HOD collected on 2012    [facility] Specified Other       Insurance Card Received () 13 [facility]    Insurance Card Received 17 Medicare    Consent for Services - Hospital/Clinic Received () 10/01/13     Consent to Communicate Received 14 Consent to Communicate    Consent for Services - Hospital/Clinic Received () 10/01/14     Insurance Card Received () 17 [facility]    Consent for Services - Hospital/Clinic Received () 12/22/15     Consent for Services/Privacy Notice - Hospital/Clinic Received () 16     HIM SARAH Authorization  () 17 Authorization for  batch [employer] 17    HIM SARAH Authorization  () 17 Authorization for batch [employer] CMS 17    Care Everywhere Prospective Auth Received 17     Consent for Services/Privacy Notice - Hospital/Clinic Received 17     Insurance Card Received 18 [employer]       Documents for the Encounter    CMS IM for Patient Signature Received 18     Discharge Attachment   METHICILLIN-RESISTANT STAPHYLOCOCCUS AUREUS (MRSA) (ENGLISH)    Discharge Attachment   RIVAROXABAN (ENGLISH)    Discharge Attachment   FALLS, PREVENTING, EXERCISES TO IMPROVE BALANCE, FLEXIBILITY, STRENGTH, AND STAYING POWER (ENGLISH)    Discharge Attachment   SHORTNESS OF BREATH: MAXIMIZING YOUR ENERGY (ENGLISH)      Admission Information     Attending Provider Admitting Provider Admission Type Admission Date/Time    Diamond Zamora MD Khan, Asrar, MD Emergency 18    Discharge Date Hospital Service Auth/Cert Status Service Area     Hospitalist [facility]    Unit Room/Bed Admission Status       SH CARDIAC SPEC CARE 0256/0256-01 Admission (Confirmed)       Admission     Complaint    Atrial fibrillation (H)      Hospital Account     Name Acct ID Class Status Primary Coverage    Theresa Alves V [id number] Inpatient Open UCARE - [employer] FOR [employer]            Guarantor Account (for Hospital Account #[id number])     Name Relation to Pt Service Area Active? Acct Type    Theresa Alves V  FCS Yes Personal/Family    Address Phone          [address] [place] [address] [phone](H)              Coverage Information (for Hospital Account #[id number])     F/O Payor/Plan Precert #    UCARE/UCARE FOR SENIORS     Subscriber Subscriber #    Theresa Alves [id number]    Address Phone    [address]  [place] [address] [phone]

## 2018-03-16 NOTE — IP AVS SNAPSHOT
MRN:[id number]                      After Visit Summary   3/16/2018    Theresa Alves    MRN: [id number]           Thank you!     Thank you for choosing [facility] for your care. Our goal is always to provide you with excellent care. Hearing back from our patients is one way we can continue to improve our services. Please take a few minutes to complete the written survey that you may receive in the mail after you visit with us. Thank you!        Patient Information     Date Of Birth          6/22/1924        Designated Caregiver       Most Recent Value    Caregiver    Will someone help with your care after discharge? yes    Name of designated caregiver florencia Bryant    Phone number of caregiver home [phone] cell [phone]    Caregiver address [address] [facility]      About your hospital stay     You were admitted on:  March 17, 2018 You last received care in the:  [facility]    You were discharged on:  March 20, 2018        Reason for your hospital stay       Atrial fib with rapid rate            Reason for your hospital stay       Atrial fib with rapid rate, and associated CHF with SOB                  Who to Call     For medical emergencies, please call 911.  For non-urgent questions about your medical care, please call your primary care provider or clinic, [phone]          Attending Provider     Provider Specialty    Lacie Browne MD Emergency Medicine    Diamond Zamora MD Internal Medicine       Primary Care Provider Office Phone # Fax #    Marbin Rouse [phone]      After Care Instructions     Activity       Your activity upon discharge: activity as tolerated            Activity - Up ad marshall           Additional Discharge Instructions       Call Dr. Granados at Pager [phone] if questions, or Cell Phone [phone].            Advance Diet as Tolerated       Follow this diet upon discharge: Orders Placed  This Encounter      Combination Diet Regular Diet Adult      Diet            Diet       Follow this diet upon discharge: Orders Placed This Encounter      Combination Diet Regular Diet Adult            Discharge Instructions       Call Dr. Granados at Pager [phone] if questions, or Cell Phone [phone].            General info for SNF       Length of Stay Estimate: Short Term Care: Estimated # of Days <30  Condition at Discharge: Improving  Level of care:skilled   Rehabilitation Potential: Good  Admission H&P remains valid and up-to-date: Yes  Recent Chemotherapy: N/A  Use Nursing Home Standing Orders: Yes            Mantoux instructions       Give two-step Mantoux (PPD) Per Facility Policy Yes                  Follow-up Appointments     Follow-up and recommended labs and tests        BMP in 3 days and provider at TCU to follow up in 3-5 days.  Dry weight is 120 to 125 lbs.-- consider holding or decreasing diuretic if weight < 120 lbs.                  Your next 10 appointments already scheduled     Apr 03, 2018 12:30 PM CDT   Return Visit with Citlali Umana PA-C   [facility] ([facility])    [address] [place] [address] [id number] [phone]              Additional Services     Occupational Therapy Adult Consult       Evaluate and treat as clinically indicated.    Reason:  weakness            Physical Therapy Adult Consult       Evaluate and treat as clinically indicated.    Reason:  weakness                  Pending Results     No orders found from 3/14/2018 to 3/17/2018.            Statement of Approval     Ordered          03/20/18 0821  I have reviewed and agree with all the recommendations and orders detailed in this document.  EFFECTIVE NOW     Comments:  Discharge to TCU when bed available.   Approved and electronically signed by:  Edis Granados MD             Admission Information     Date & Time Provider Department  "Dept. Phone    3/16/2018 Diamond Zamora MD [facility] [phone]      Your Vitals Were     Blood Pressure Pulse Temperature Respirations Height Weight    99/53 (BP Location: Right arm) 75 98.1  F (36.7  C) (Oral) 18 1.651 m (5' 5\") 56.2 kg (124 lb)    Last Period Pulse Oximetry BMI (Body Mass Index)             (LMP Unknown) 92% 20.63 kg/m2         [employer] Information     [facility] lets you send messages to your doctor, view your test results, renew your prescriptions, schedule appointments and more. To sign up, go to [email or web address] . Click on \"Log in\" on the left side of the screen, which will take you to the Welcome page. Then click on \"Sign up Now\" on the right side of the page.     You will be asked to enter the access code listed below, as well as some personal information. Please follow the directions to create your username and password.     Your access code is: [email or web address]  Expires: 2018  4:09 PM     Your access code will  in 90 days. If you need help or a new code, please call your [facility] clinic or [phone].        Care EveryWhere ID     This is your Care EveryWhere ID. This could be used by other organizations to access your [facility] medical records  [id number]        Equal Access to Services     BG SALINAS : Hadii lata dunlap Soarnoldo, waaxda luqadaha, qaybta kaalmada leah, renee emery. So [facility] [phone].    ATENCIÓN: Si habla español, tiene a salmon disposición servicios gratuitos de asistencia lingüística. Leo tobar [phone].    We comply with applicable federal civil rights laws and [place] laws. We do not discriminate on the basis of race, color, national origin, age, disability, sex, sexual orientation, or gender identity.               Review of your medicines      START taking        Dose / Directions    apixaban ANTICOAGULANT 2.5 MG tablet   Commonly known as:  ELIQUIS   Used for:  Uncontrolled " atrial fibrillation (H)   Notes to Patient:  Take everyday with breakfast and dinner        Dose:  2.5 mg   Take 1 tablet (2.5 mg) by mouth 2 times daily   Refills:  1       digoxin 125 MCG tablet   Commonly known as:  LANOXIN   Used for:  Atrial fibrillation with RVR (H)        Dose:  125 mcg   Take 1 tablet (125 mcg) by mouth daily   Quantity:  30 tablet   Refills:  3       sodium chloride 0.65 % nasal spray   Commonly known as:  OCEAN   Used for:  Nasal dryness        Dose:  1 spray   Spray 1 spray into both nostrils 3 times daily as needed for congestion   Refills:  0         CONTINUE these medicines which may have CHANGED, or have new prescriptions. If we are uncertain of the size of tablets/capsules you have at home, strength may be listed as something that might have changed.        Dose / Directions    furosemide 20 MG tablet   Commonly known as:  LASIX   This may have changed:    - medication strength  - how much to take  - when to take this   Used for:  HORTON (dyspnea on exertion)        Dose:  40 mg   Take 2 tablets (40 mg) by mouth daily   Quantity:  30 tablet   Refills:  0       metoprolol succinate 200 MG 24 hr tablet   Commonly known as:  TOPROL-XL   This may have changed:    - medication strength  - how much to take   Used for:  Atrial fibrillation with RVR (H)        Dose:  200 mg   Take 1 tablet (200 mg) by mouth daily   Quantity:  30 tablet   Refills:  3         CONTINUE these medicines which have NOT CHANGED        Dose / Directions    oxyCODONE-acetaminophen 5-325 MG per tablet   Commonly known as:  PERCOCET   Used for:  Chronic midline low back pain without sciatica        Dose:  0.5 tablet   Take 0.5 tablets by mouth every morning   Quantity:  14 tablet   Refills:  0       simvastatin 20 MG tablet   Commonly known as:  ZOCOR   Used for:  Hyperlipidemia with target LDL less than 130        TAKE HALF TABLET BY MOUTH AT BEDTIME   Quantity:  45 tablet   Refills:  3       VITAMIN B 12 PO        Dose:   1 tablet   Take 1 tablet by mouth daily   Refills:  0       VITAMIN D (CHOLECALCIFEROL) PO        Dose:  1000 Units   Take 1,000 Units by mouth daily.   Refills:  0         STOP taking     losartan 25 MG tablet   Commonly known as:  COZAAR           rivaroxaban ANTICOAGULANT 15 MG Tabs tablet   Commonly known as:  XARELTO                Where to get your medicines      These medications were sent to [facility] [place], [place] - [address]  [address], [place] [address]     Phone:  [phone]     digoxin 125 MCG tablet    metoprolol succinate 200 MG 24 hr tablet         Some of these will need a paper prescription and others can be bought over the counter. Ask your nurse if you have questions.     Bring a paper prescription for each of these medications     oxyCODONE-acetaminophen 5-325 MG per tablet       You don't need a prescription for these medications     apixaban ANTICOAGULANT 2.5 MG tablet    furosemide 20 MG tablet    sodium chloride 0.65 % nasal spray                Protect others around you: Learn how to safely use, store and throw away your medicines at www.disposemymeds.org.        Information about OPIOIDS     PRESCRIPTION OPIOIDS: WHAT YOU NEED TO KNOW    Prescription opioids can be used to help relieve moderate to severe pain and are often prescribed following a surgery or injury, or for certain health conditions. These medications can be an important part of treatment but also come with serious risks. It is important to work with your health care provider to make sure you are getting the safest, most effective care.    WHAT ARE THE RISKS AND SIDE EFFECTS OF OPIOID USE?  Prescription opioids carry serious risks of addiction and overdose, especially with prolonged use. An opioid overdose, often marked by slowed breathing can cause sudden death. The use of prescription opioids can have a number of side effects as well, even when taken as directed:      Tolerance -  meaning you might need to take more of a medication for the same pain relief    Physical dependence - meaning you have symptoms of withdrawal when a medication is stopped    Increased sensitivity to pain    Constipation    Nausea, vomiting, and dry mouth    Sleepiness and dizziness    Confusion    Depression    Low levels of testosterone that can result in lower sex drive, energy, and strength    Itching and sweating    RISKS ARE GREATER WITH:    History of drug misuse, substance use disorder, or overdose    Mental health conditions (such as depression or anxiety)    Sleep apnea    Older age (65 years or older)    Pregnancy    Avoid alcohol while taking prescription opioids.   Also, unless specifically advised by your health care provider, medications to avoid include:    Benzodiazepines (such as Xanax or Valium)    Muscle relaxants (such as Soma or Flexeril)    Hypnotics (such as Ambien or Lunesta)    Other prescription opioids    KNOW YOUR OPTIONS:  Talk to your health care provider about ways to manage your pain that do not involve prescription opioids. Some of these options may actually work better and have fewer risks and side effects:    Pain relievers such as acetaminophen, ibuprofen, and naproxen    Some medications that are also used for depression or seizures    Physical therapy and exercise    Cognitive behavioral therapy, a psychological, goal-directed approach, in which patients learn how to modify physical, behavioral, and emotional triggers of pain and stress    IF YOU ARE PRESCRIBED OPIOIDS FOR PAIN:    Never take opioids in greater amounts or more often than prescribed    Follow up with your primary health care provider and work together to create a plan on how to manage your pain.    Talk about ways to help manage your pain that do not involve prescription opioids    Talk about all concerns and side effects    Help prevent misuse and abuse    Never sell or share prescription opioids    Never use  another person's prescription opioids    Store prescription opioids in a secure place and out of reach of others (this may include visitors, children, friends, and family)    Visit www.cdc.gov/drugoverdose to learn about risks of opioid abuse and overdose    If you believe you may be struggling with addiction, tell your health care provider and ask for guidance or call [facility]'s National Helpline at 6-616-313-HELP    LEARN MORE / www.cdc.gov/drugoverdose/prescribing/guideline.html    Safely dispose of unused prescription opioids: Find your local drug take-back programs and more information about the importance of safe disposal at www.doseofreality.[place].gov             Medication List: This is a list of all your medications and when to take them. Check marks below indicate your daily home schedule. Keep this list as a reference.      Medications           Morning Afternoon Evening Bedtime As Needed    apixaban ANTICOAGULANT 2.5 MG tablet   Commonly known as:  ELIQUIS   Take 1 tablet (2.5 mg) by mouth 2 times daily   Next Dose Due:  TONIGHT (3/20) at dinner   Notes to Patient:  Take everyday with breakfast and dinner                                      digoxin 125 MCG tablet   Commonly known as:  LANOXIN   Take 1 tablet (125 mcg) by mouth daily   Last time this was given:  125 mcg on 3/20/2018  8:39 AM   Next Dose Due:  Tomorrow (3/21) in AM                                   furosemide 20 MG tablet   Commonly known as:  LASIX   Take 2 tablets (40 mg) by mouth daily   Last time this was given:  40 mg on 3/20/2018  8:39 AM   Next Dose Due:  Tomorrow (3/21) in AM                                     metoprolol succinate 200 MG 24 hr tablet   Commonly known as:  TOPROL-XL   Take 1 tablet (200 mg) by mouth daily   Last time this was given:  200 mg on 3/20/2018  8:43 AM   Next Dose Due:  Tomorrow (3/21) in AM                                     oxyCODONE-acetaminophen 5-325 MG per tablet   Commonly known as:  PERCOCET   Take  0.5 tablets by mouth every morning   Next Dose Due:  Tomorrow (3/21) in AM                                   simvastatin 20 MG tablet   Commonly known as:  ZOCOR   TAKE HALF TABLET BY MOUTH AT BEDTIME   Last time this was given:  20 mg on 3/19/2018  9:33 PM   Next Dose Due:  TONIGHT (3/20) at bedtime                                   sodium chloride 0.65 % nasal spray   Commonly known as:  OCEAN   Spray 1 spray into both nostrils 3 times daily as needed for congestion   Last time this was given:  1 spray on 3/20/2018  6:48 AM                                   VITAMIN B 12 PO   Take 1 tablet by mouth daily   Next Dose Due:  Tomorrow (3/21) in AM                                     VITAMIN D (CHOLECALCIFEROL) PO   Take 1,000 Units by mouth daily.   Last time this was given:  1,000 Units on 3/20/2018  8:39 AM   Next Dose Due:  Tomorrow (3/21) in AM                                               More Information        Methicillin-Resistant Staphylococcus aureus (MRSA)  What is MRSA?  Staphylococcus aureus bacteria or  staph  is a very common germ. It is found on the skin or in the nose of many people. Sometimes the bacteria causes no problem, or it causes a mild infection. But it can also cause severe infections of the skin, lungs, blood, or other organs or tissues. Some staph infections can be easily treated with antibiotics. But one type of staph, Methicillin-Resistant staphylococcus areas (MRSA) can t. It s called Methicillin-Resistant because the antibiotic methicillin, which used to be effective treatment, no longer works. MRSA is common in hospitals and nursing homes or long-term care facilities. It is also spreading among healthy children and adults outside the health care system. There are two different ways MRSA can appear in the body:    Colonization: When a person carries the MRSA bacteria (often in nose or on skin), but is healthy. This person can spread MRSA to others.    Infection: When a person gets sick  because of the bacteria, it's called being infected with MRSA. This person can also spread MRSA to others. If not treated properly, MRSA infections can be very serious.    What are the symptoms of MRSA infection?  MRSA skin infections start as small red bumps on the skin that look like pimples or spider bites. The small bumps usually get larger and become swollen, painful, warm to the touch and filled with pus. MRSA can also start in other ways, and it can spread deeper into the body. Common places and symptoms include:    Urinary tract: pain and burning when urinating, the need to urinate more often, fever    Blood: high fever, chills, nausea and vomiting, shortness of breath, confusion    Bone, muscle, or other tissue infections    Lungs: pneumonia in one or both lungs    Surgical wound infections    Heart: Infection of the lining of the heart called endocarditis  Who s at risk?  Anyone can get a staph infection. But certain factors make infection more likely, including:    A current or recent stay in the hospital    Living in a nursing home or long-term care facility or other crowded areas, like  barracks or penitentiary    Taking antibiotics    Having certain health conditions, such as diabetes or HIV    Getting kidney dialysis    Sharing sports equipment, razors or other sharp objects  How does MRSA spread?  MRSA usually spreads through skin-to-skin contact, whether through contact with an infected person or on the hands of health care workers who work with infected patients. MRSA can also spread through contact with contaminated objects, such as cart handles and bedrails or shared towels or athletic equipment.  How is MRSA treated?  MRSA infections are usually treated with antibiotics. These may be in pill form or through a vein (intravenous or IV). If you have a skin abscess, we may drain it.   Patients who test positive for MRSA colonization may go through a process called decolonization. We apply a topical  antibiotic inside your nose to kill the bacteria. We may also wash your skin with a special soap.  Controlling and Preventing MRSA: In the hospital  Hospitals and nursing homes control and prevent MRSA by doing the following:    Handwashing. This is the single most important way to prevent the spread of germs.    Protective clothing. Health care workers and visitors may wear gloves and a gown when entering the room of a patient with MRSA. They remove these items before leaving.    Avoid antibiotic overuse. Too much use can cause germs to resist some antibiotics.    Monitoring. Hospitals monitor the spread of MRSA and educate all staff on the best ways to prevent it.  What you can do as a patient    Ask all hospital staff to wash their hands before touching you. Don t be afraid to speak up!    Wash your own hands often with soap and warm water, or use an alcohol-based hand gel. This is especially important:    After using the bathroom    After touching a bandage    Before eating    Encourage family and friends to wash hands as well    If you need to have a test done, such as an X-ray, follow instructions from staff. You may need to change into a clean hospital gown and wash your hands just before leaving your room.  Controlling and Preventing MRSA: at Home  Patients:    Wash your hands often with soap and warm water, or use an alcohol-based hand gel. This is especially important:    After using the bathroom    After touching a bandage    Before eating    Follow instructions we give you for caring for surgical wounds or any tubes that you have, such as a catheter or dialysis port.    Keep cuts and scrapes clean and covered until they heal.    Do not share towels, razors, clothing or athletic equipment.    Take all antibiotics your doctor prescribed. Don t take half doses or stop the antibiotics, even if you feel better.  Caregivers:    Wash your hands well with soap and warm water before and after any contact with the  patient. You can use an alcohol-based hand gel if your hands aren t visibly dirty.    Wear disposable gloves when changing a bandage, touching an infected wound or handling dirty laundry. Throw away the gloves after each use. Then wash your hands well.    Change the patient s bedding once a week, or more often if it s soiled with feces or body fluids. Wash and dry it alone in a washer and dryer using the warmest temperatures recommended on the labels. Use liquid bleach during the wash cycle if the label permits.    Clean surfaces like tabletops and sinks really well. Keep bathrooms, toilets and bedside commodes clean. A mixture of 1/4 cup of bleach to 1 quart of water works great for this.  Understanding drug resistance  Hard-to-kill (resistant) germs, such as MRSA, develop when antibiotics are taken longer than needed. They can also develop when antibiotics are taken when they aren't needed, or are not taken exactly as directed. This is because any germs that survive treatment with an antibiotic can multiply and thus create more resistant germs. The more often antibiotics are used, the more chances resistant germs have to develop. This is why your care team may not prescribe antibiotics unless he or she is certain that they are needed.  Date Last Reviewed: 10/1/2016    6688-1622 [employer]. [address] [place] [address], [place] [address]. All rights reserved. This information is not intended as a substitute for professional medical care. Always follow your healthcare professional's instructions.  This information has been modified by your health care provider with permission from the publisher.                Exercises to Prevent Falls  Certain types of exercises may help make you less likely to fall. Try the ones below. Or do other exercises that your healthcare provider suggests. Depending on your health, you may need to start slowly. Don't let that stop you. Even small amounts of exercise can help you.  "Be sure to talk to your healthcare provider before starting any exercise program.       Improve balance  Many types of exercise can help improve balance. Myron chi and yoga are good examples. Here's another one to try. You can do it anytime and almost anywhere.    Stand next to a counter or solid support.    Push yourself up onto your tiptoes.    Hold for 5 seconds. If you start to lose your balance, hold on to the counter.    Rest and repeat 5 times. Work up to holding for 20 to 30 seconds, if you can. Increase flexibility  Being more flexible makes it easier for you to move around safely. Try exercises like the seated hamstring stretch.    Sit in a chair and put one foot on a stool.    Straighten your leg and reach with both hands down either side of your leg. Reach as far down your leg as you can.    Hold for about 20 seconds.    Go back to the starting position. Then repeat 5 times. Switch legs. Build strength  \"Resistance\" exercises help build strength. You can do them without equipment. Or you can use weights, elastic bands, or special machines. One such exercise is called the biceps curl. You can hold a 1-pound weight or even a can of soup. Do this exercise at least 3 times a week. Strive for every day.    Sit up straight in a chair.    Keep your elbow close to your body and your wrist straight.    Bend your arm, moving your hand up to your shoulder. Then slowly lower your arm.    Repeat 5 times. Switch to the other arm.   Build your staying power  Aerobic exercises make your heart and lungs stronger so you can keep moving longer. Walking and swimming are two of the best types of exercises you can do. Using a stationary bike is great, too. Find an aerobic exercise that you enjoy. Start slowly and build up. Even 5 minutes is helpful. Aim for a goal of 30 minutes, at least 3 times a week. You don't have to do 30 minutes in 1 session. Break it up and walk a little throughout the day.  More helpful tips    Start " easy. Slowly work up to doing more.    Talk with your healthcare provider about the best exercises for you.    Call senior centers or health clubs about exercise programs.    If needed, have a family member watch you walk every so often to check your stability.    Exercise with a friend. Choose an activity you both enjoy.    Consider chela chi or yoga to strengthen your balance.    Try exercises that you can do anytime, anywhere. Here are 2 examples. Have someone with you when you first try these:    Practice walking by placing 1 foot right in front of the other.    Stand up and sit down 10 times. Repeat this throughout the day.   Date Last Reviewed: 5/1/2017 2000-2017 [employer]. [address] [place] [address], [place] [address]. All rights reserved. This information is not intended as a substitute for professional medical care. Always follow your healthcare professional's instructions.                Shortness of Breath: Maximizing Your Energy    Fear of shortness of breath may stop you from being as active as you once were. You don t have to live this way. Managing your time and pacing yourself can help you conserve energy and do more. It s even OK if you re short of breath sometimes. You can learn to work through this without limiting your activities.  Manage your time  Shortness of breath can make everyday tasks take longer. This means there s less time to do the things you enjoy. You can help prevent this by managing your time. Try these tips:    Plan ahead so your tasks are spaced throughout the day. As you plan, keep in mind the times of day you tend to have the most energy.    Do only one thing at a time. Finish one task before starting another.    Gather everything you need before you start a task. This cuts out unneeded steps while you re working.    Think about what you really need to do. Be realistic about what you can get done in a day.  Balance activity and rest    When you re tired, your  activities will take longer. Fatigue also makes you more likely to get an infection. Plan your day so that your tasks are spaced throughout the day. To have more energy:    Stop and rest when you need to. Don t wait until you re overtired.    Switch back and forth between hard tasks and easy ones.    Give yourself plenty of time for each task, so you don t have to hurry.    Take 20- to 30-minute rest breaks after meals and throughout the day.    If an activity takes a lot of energy, break it into smaller parts. For instance, fold the laundry first. Then take a break before putting it away.    Try not to exert yourself in extreme cold or heat.   Find ways to conserve energy  Conserving your energy can help you stay active and breathe better. The way you use your body during a task can help you conserve energy. Think of ways to make things easier, and take your time to ease shortness of breath.  For some tasks, you can also use special aids designed to reduce the amount of energy needed. Here are some tips:    Sit whenever possible, and keep your arms close to your body. Use slow, smooth motions.    Sit to dress and undress, shave, brush your teeth, and comb your hair. Use a long-handled reacher to pull on socks and shoes, and long-handled items like shoe horns.    Sit on a bench to shower. Use warm water, not hot. (Steam can make it harder to breathe.) Dry off by wrapping yourself in a terrycloth robe.    Use energy-saving appliances, such as an electric can opener, a power toothbrush, and a .    Use a cart with wheels to move groceries, laundry, dishes, and other items around the house. Some carts have seats so you can rest when you need to.    Use lightweight, nonstick pots and pans to cook. Soak dirty dishes instead of scrubbing them. Air-dry dishes, or use a .    Mix, cook, serve, and store foods in the same dish.    Keep the things you use most at waist level, so you can get them without  reaching or bending.    Use steps slowly, pausing at each step. If you have steps outside or in your home, think about adding ramps or stair lifts.    Think about ways that others can help you. You might get help from friends, family members, or home health aides.  Remember to breathe  Sometimes people with an illness or condition that affects the lungs try to rush through tasks so they won't get short of breath. This uses more energy and can actually increase shortness of breath. Instead, slow down and pace your breathing. These tips may help:    Move slowly during tasks that take a lot of effort, such as climbing stairs or pushing a shopping cart.    Use pursed-lip and diaphragmatic breathing while you go about a task.    Breathe out (exhale) when you exert effort. For example, breathe out as you lift up a grocery bag. Once you re holding the bag, breathe in. Ask the  at the [employer] to pack your grocery bags so they are light and easy to carry.    Concentrate on taking slow, deep breaths. If your breathing is shallow, you don t take in as much air.    Remember that it s OK to be short of breath. Just pace yourself and do pursed-lip breathing.  Talk with your healthcare provider about:    Whether you should use supplemental oxygen    Whether you need a referral to occupational and physical therapy. Therapists can help you with exercise and daily activities, and how to make things easier.  Pursed-lip breathing  This type of breathing helps you exhale better. Breathing this way during activity will help you reduce shortness of breath:    Relax your neck and shoulder muscles. Inhale slowly through your nose for 2 counts or more.    Pucker your lips as if you are going to blow out a candle. Exhale slowly and gently through your lips for at least twice as long as you inhaled.   Date Last Reviewed: 5/1/2016 2000-2017 [employer]. [address] [facility], [place] [address]. All rights reserved.  This information is not intended as a substitute for professional medical care. Always follow your healthcare professional's instructions.                Apixaban Oral tablet  What is this medicine?  APIXABAN (a PIX a ban) is an anticoagulant (blood thinner). It is used to lower the chance of stroke in people with a medical condition called atrial fibrillation. It is also used to treat or prevent blood clots in the lungs or in the veins.  This medicine may be used for other purposes; ask your health care provider or pharmacist if you have questions.  What should I tell my health care provider before I take this medicine?  They need to know if you have any of these conditions:    bleeding disorders    bleeding in the brain    blood in your stools (black or tarry stools) or if you have blood in your vomit    history of stomach bleeding    kidney disease    liver disease    mechanical heart valve    an unusual or allergic reaction to apixaban, other medicines, foods, dyes, or preservatives    pregnant or trying to get pregnant    breast-feeding  How should I use this medicine?  Take this medicine by mouth with a glass of water. Follow the directions on the prescription label. You can take it with or without food. If it upsets your stomach, take it with food. Take your medicine at regular intervals. Do not take it more often than directed. Do not stop taking except on your doctor's advice. Stopping this medicine may increase your risk of a blot clot. Be sure to refill your prescription before you run out of medicine.  Talk to your pediatrician regarding the use of this medicine in children. Special care may be needed.  Overdosage: If you think you have taken too much of this medicine contact a poison control center or emergency room at once.  NOTE: This medicine is only for you. Do not share this medicine with others.  What if I miss a dose?  If you miss a dose, take it as soon as you can. If it is almost time for your  next dose, take only that dose. Do not take double or extra doses.  What may interact with this medicine?  This medicine may interact with the following:    aspirin and aspirin-like medicines    certain medicines for fungal infections like ketoconazole and itraconazole    certain medicines for seizures like carbamazepine and phenytoin    certain medicines that treat or prevent blood clots like warfarin, enoxaparin, and dalteparin    clarithromycin    NSAIDs, medicines for pain and inflammation, like ibuprofen or naproxen    rifampin    ritonavir    Yeadon's wort  This list may not describe all possible interactions. Give your health care provider a list of all the medicines, herbs, non-prescription drugs, or dietary supplements you use. Also tell them if you smoke, drink alcohol, or use illegal drugs. Some items may interact with your medicine.  What should I watch for while using this medicine?  Notify your doctor or health care professional and seek emergency treatment if you develop breathing problems; changes in vision; chest pain; severe, sudden headache; pain, swelling, warmth in the leg; trouble speaking; sudden numbness or weakness of the face, arm, or leg. These can be signs that your condition has gotten worse.  If you are going to have surgery, tell your doctor or health care professional that you are taking this medicine.  Tell your health care professional that you use this medicine before you have a spinal or epidural procedure. Sometimes people who take this medicine have bleeding problems around the spine when they have a spinal or epidural procedure. This bleeding is very rare. If you have a spinal or epidural procedure while on this medicine, call your health care professional immediately if you have back pain, numbness or tingling (especially in your legs and feet), muscle weakness, paralysis, or loss of bladder or bowel control.  Avoid sports and activities that might cause injury while you are  using this medicine. Severe falls or injuries can cause unseen bleeding. Be careful when using sharp tools or knives. Consider using an electric razor. Take special care brushing or flossing your teeth. Report any injuries, bruising, or red spots on the skin to your doctor or health care professional.  What side effects may I notice from receiving this medicine?  Side effects that you should report to your doctor or health care professional as soon as possible:    allergic reactions like skin rash, itching or hives, swelling of the face, lips, or tongue    signs and symptoms of bleeding such as bloody or black, tarry stools; red or dark-brown urine; spitting up blood or brown material that looks like coffee grounds; red spots on the skin; unusual bruising or bleeding from the eye, gums, or nose  This list may not describe all possible side effects. Call your doctor for medical advice about side effects. You may report side effects to FDA at 7-613-FDA-0492.  Where should I keep my medicine?  Keep out of the reach of children.  Store at room temperature between 20 and 25 degrees C (68 and 77 degrees F). Throw away any unused medicine after the expiration date.  NOTE: This sheet is a summary. It may not cover all possible information. If you have questions about this medicine, talk to your doctor, pharmacist, or health care provider.  NOTE:This sheet is a summary. It may not cover all possible information. If you have questions about this medicine, talk to your doctor, pharmacist, or health care provider. Copyright  2016 [employer]

## 2018-03-16 NOTE — IP AVS SNAPSHOT
"` `     [facility]: [phone]                                              INTERAGENCY TRANSFER FORM - NURSING   3/16/2018                    Hospital Admission Date: 3/16/2018  YANELI CHUA   : [id number]  Sex: Female        Attending Provider: Diamond Zamora MD     Allergies:  No Known Allergies    Infection:  MRSA-Contact Isolation   Service:  HOSPITALIST    Ht:  1.651 m (5' 5\")   Wt:  56.2 kg (124 lb)   Admission Wt:  59 kg (130 lb)    BMI:  20.63 kg/m 2   BSA:  1.61 m 2            Patient PCP Information     Provider PCP Type    Marbin Rouse MD General      Current Code Status     Date Active Code Status Order ID Comments User Context       Prior      Code Status History     Date Active Date Inactive Code Status Order ID Comments User Context    3/20/2018 11:57 AM  Full Code [id number]  Edis Granados MD Outpatient    3/18/2018  9:02 AM 3/20/2018 11:57 AM Full Code [id number]  Edis Granados MD Outpatient    3/17/2018  4:41 AM 3/18/2018  9:02 AM Full Code [id number]  Diamond Zamora MD Inpatient    2012  9:19 AM 3/17/2018  4:41 AM Full Code [id number]  Estefany Pelletier PA-C Outpatient    2012  9:53 PM 2012  9:19 AM Full Code [id number]  Kings Dennis RN Inpatient      Advance Directives        Scanned docmt in ACP Activity?           Yes, scanned ACP docmt        Hospital Problems as of 3/20/2018              Priority Class Noted POA    Pulmonary fibrosis High  Unknown Yes    Hypertension goal BP (blood pressure) < 140/80 High  2013 Yes    SOB (shortness of breath) Medium  2013 Yes    Memory loss Medium  2016 Yes    * (Principal)Atrial fibrillation w RVR Medium  3/17/2018 Yes      Non-Hospital Problems as of 3/20/2018              Priority Class Noted    Health Care Home Low  2012    Chronic kidney disease, stage III (moderate) Medium  2012    Hyperlipidemia with target LDL less than 130 Medium  2012    " Edema Medium  9/11/2012    Preventive measure Low  5/7/2013    Congestive heart failure (H) Medium  5/8/2013    Chronic fatigue Medium  5/17/2013    Dizziness - light-headed Medium  5/17/2013    Degenerative arthritis of spine Medium  10/1/2013    Osteoporosis Medium  4/1/2014    Weight loss Medium  10/1/2014    Right shoulder pain Medium  1/28/2015    Low back pain Medium  2/3/2015    Physical deconditioning Medium  2/3/2015    Chronic pain syndrome Medium  9/14/2015    Numbness of right foot Medium  8/31/2016    Chronic congestive heart failure, unspecified congestive heart failure type (H) Medium  12/6/2017    Atrial fibrillation, unspecified type (H) Medium  2/21/2018      Immunizations     Name Date      Pneumo Conj 13-V (2010&after) 09/29/15     Pneumococcal 23 valent 10/26/09     TD (ADULT, 7+) 10/26/09          END      ASSESSMENT     Discharge Profile Flowsheet     DISCHARGE NEEDS ASSESSMENT     Other Resources  Other (see comment) 03/12/15 1102    Concerns To Be Addressed  no discharge needs identified;denies needs/concerns at this time 03/12/15 1102   Senior Linkage Line Referral Placed  -- (N/A) 03/12/15 1102    Concerns Comments  Patient needs help with bathing 02/26/15 1113   F/U Appointment Brochure Provided  -- (N/A) 03/12/15 1102    Equipment Currently Used at Home  walker, rolling;shower chair;grab bar (FWW and 4WW) 03/18/18 1639   Existing Resources/Services  Housekeeping/Chore Agency (Friend helps her with laundry, cleaning and grocery shopping) 03/12/15 1102    Transportation Available  family or friend will provide 03/18/18 1639   SKIN      # of Referrals Placed by CTS  Post Acute Facilities 03/19/18 2039   Inspection of bony prominences  Full 03/20/18 1308    Equipment Used at Home  cane, straight;walker, rolling 03/12/15 1102   Inspection under devices  Full 03/19/18 2006    GASTROINTESTINAL (ADULT,PEDIATRIC,OB)     Skin WDL  ex 03/20/18 0056    GI WDL  WDL 03/20/18 1308   Skin  "Color/Characteristics  bruised (ecchymotic) 03/20/18 1308    Abdominal Appearance  rounded;nondistended 03/20/18 0929   Skin Temperature  warm 03/20/18 1308    All Quadrants Bowel Sounds  audible and active in all quadrants 03/20/18 0056   Skin Moisture  dry 03/20/18 1308    Last Bowel Movement  03/19/18 03/19/18 1734   Skin Elasticity  quick return to original state 03/20/18 0929    Passing flatus  yes 03/20/18 0056   Skin Integrity  bruise(s) 03/20/18 1308    COMMUNICATION ASSESSMENT     SAFETY      Patient's communication style  spoken language (English or Bilingual) 03/16/18 2256   Safety WDL  WDL 03/20/18 1308    FINAL RESOURCES     All Alarms  alarm(s) activated and audible 03/20/18 0929    Resources List  Other Resources 03/12/15 1102                      Assessment WDL (Within Defined Limits) Definitions           Safety WDL     Effective: 09/28/15    Row Information: <b>WDL Definition:</b> Bed in low position, wheels locked; call light in reach; upper side rails up x 2; ID band on<br> <font color=\"gray\"><i>Item=AS safety wdl>>List=AS safety wdl>>Version=F14</i></font>      Skin WDL     Effective: 09/28/15    Row Information: <b>WDL Definition:</b> Warm; dry; intact; elastic; without discoloration; pressure points without redness<br> <font color=\"gray\"><i>Item=AS skin wdl>>List=AS skin wdl>>Version=F14</i></font>      Vitals     Vital Signs Flowsheet     QUICK ADDS     Side Effects Monitoring: Respiratory Quality  R 03/20/18 0926    Quick Adds  Comments 03/18/18 1523   Side Effects Monitoring: Respiratory Depth  N 03/20/18 0926    COMMENTS     Side Effects Monitoring: Sedation Level  1 03/20/18 0926    Comments  Ambulation in room with PT 03/18/18 1541   HEIGHT AND WEIGHT      VITAL SIGNS     Height  1.651 m (5' 5\") 03/17/18 0438    Temp  98.1  F (36.7  C) 03/20/18 1126   Weight  56.2 kg (124 lb) 03/20/18 0053    Temp src  Oral 03/20/18 1126   Weight Method  Standing scale 03/20/18 0053    Resp  18 03/20/18 " 1126   POSITIONING      Pulse  75 03/18/18 1141   Body Position  independently positioning 03/20/18 0313    Heart Rate  72 03/20/18 1126   Head of Bed (HOB)  HOB at 20-30 degrees 03/20/18 0313    Pulse/Heart Rate Source  Monitor 03/20/18 1126   Positioning/Transfer Devices  pillows;in use 03/20/18 0313    BP  99/53 03/20/18 1126   DAILY CARE      BP Location  Right arm 03/20/18 1126   Activity Management  activity adjusted per tolerance 03/20/18 0929    OXYGEN THERAPY     Activity Assistance Provided  assistance, 1 person 03/20/18 0652    SpO2  92 % 03/20/18 1126   Assistive Device Utilized  walker;gait belt 03/20/18 0652    O2 Device  None (Room air) 03/20/18 1126   ECG      PAIN/COMFORT     Equipment  electrodes changed;telemetry batteries changed 03/17/18 1305    Patient Currently in Pain  yes 03/20/18 0926   EKG MONITORING      Preferred Pain Scale  number (Numeric Rating Pain Scale) 03/20/18 0926   Cardiac Regularity  Irregular 03/16/18 2315    0-10 Pain Scale  5 03/20/18 0839   Cardiac Rhythm  Atrial fibrillation 03/16/18 2315    Pain Location  Back 03/20/18 0926   BERENICE COMA SCALE      Pain Descriptors  Constant 03/20/18 0926   Best Eye Response  4-->(E4) spontaneous 03/20/18 0929    Pain Management Interventions  pain plan reviewed with patient/caregiver 03/20/18 0926   Best Motor Response  6-->(M6) obeys commands 03/20/18 0929    Pain Intervention(s)  Medication (See eMAR) 03/20/18 0926   Best Verbal Response  5-->(V5) oriented 03/20/18 0929    Response to Interventions  No change in pain 03/19/18 1327   Berenice Coma Scale Score  15 03/20/18 0929    ANALGESIA SIDE EFFECTS MONITORING                   Patient Lines/Drains/Airways Status    Active LINES/DRAINS/AIRWAYS     Name: Placement date: Placement time: Site: Days: Last dressing change:    Wound 09/03/12 Left Elbow Abrasion(s) 09/03/12   0000   Elbow   2024     Wound 03/19/18 Inner;Bilateral redness on medial feet 03/19/18   0900      1              Patient Lines/Drains/Airways Status    Active PICC/CVC     None            Intake/Output Detail Report     Date Intake   Output Net    Shift P.O. IV Piggyback Total Urine Total       Noc 03/18/18 2300 - 03/19/18 0659 -- -- -- 400 400 -400    Day 03/19/18 0700 - 03/19/18 1459 300 -- 300 150 150 150    Maggie 03/19/18 1500 - 03/19/18 2259 240 -- 240 1150 1150 -910    Noc 03/19/18 2300 - 03/20/18 0659 -- -- -- 575 575 -575    Day 03/20/18 0700 - 03/20/18 1459 -- -- -- 125 125 -125      Last Void/BM       Most Recent Value    Urine Occurrence 1 at 03/19/2018 1958    Stool Occurrence 1 at 03/19/2018 1958      Case Management/Discharge Planning     Case Management/Discharge Planning Flowsheet     REFERRAL INFORMATION     Concerns To Be Addressed  no discharge needs identified;denies needs/concerns at this time 03/12/15 1102    Did the Initial Social Work Assessment result in a Social Work Case?  Yes 03/19/18 2039   Concerns Comments  Patient needs help with bathing 02/26/15 1113    Admission Type  inpatient 03/19/18 2039   DISCHARGE PLANNING      Arrived From  home or self-care 03/19/18 2039   Transportation Available  family or friend will provide 03/18/18 1639    Referral Source  physician 03/19/18 2039   Equipment Used at Home  cane, straight;walker, rolling 03/12/15 1102    # of Referrals Placed by CTS  Post Acute Facilities 03/19/18 2039   FINAL RESOURCES      Post Acute Facilities  TCU 03/19/18 2039   Equipment Currently Used at Home  walker, rolling;shower chair;grab bar (FWW and 4WW) 03/18/18 1639    Reason For Consult  discharge planning 03/19/18 2039   Resources List  Other Resources 03/12/15 1102    Record Reviewed  medical record 03/19/18 2039   Other Resources  Other (see comment) 03/12/15 1102    CTS Assigned to Case  Katya Jan 03/19/18 2039   Senior Linkage Line Referral Placed  -- (N/A) 03/12/15 1102    LIVING ENVIRONMENT     F/U Appointment Brochure Provided  -- (N/A) 03/12/15 1102    Lives With  alone  03/19/18 2039   Existing Resources/Services  Housekeeping/Chore Agency (Friend helps her with laundry, cleaning and grocery shopping) 03/12/15 1102    Living Arrangements  apartment 03/19/18 2039   ABUSE RISK SCREEN      Provides Primary Care For  no one 03/19/18 2039   QUESTION TO PATIENT:  Has a member of your family or a partner(now or in the past) intimidated, hurt, manipulated, or controlled you in any way?  no 03/16/18 2303    ASSESSMENT OF FAMILY/SOCIAL SUPPORT     QUESTION TO PATIENT: Do you feel safe going back to the place where you are living?  yes 03/16/18 2303    Marital Status   03/19/18 2039   OBSERVATION: Is there reason to believe there has been maltreatment of a vulnerable adult (ie. Physical/Sexual/Emotional abuse, self neglect, lack of adequate food, shelter, medical care, or financial exploitation)?  no 03/16/18 2303    EMPLOYMENT     OTHER      Do you work full or part-time?  no 03/19/18 2039   Are you depressed or being treated for depression?  No 03/17/18 0533    COPING/STRESS     HOMICIDE RISK      Major Change/Loss/Stressor  none 03/17/18 0533   Feels Like Hurting Others  no 03/16/18 2303    ASSESSMENT/CONCERNS TO BE ADDRESSED

## 2018-03-16 NOTE — IP AVS SNAPSHOT
"          [facility] CARDIAC SPECIALTY CARE: [phone]                                              INTERAGENCY TRANSFER FORM - LAB / IMAGING / EKG / EMG RESULTS   3/16/2018                    Hospital Admission Date: 3/16/2018  YANELI CHUA   : 1924  Sex: Female        Attending Provider: Diamond Zamora MD     Allergies:  No Known Allergies    Infection:  MRSA-Contact Isolation   Service:  HOSPITALIST    Ht:  1.651 m (5' 5\")   Wt:  56.2 kg (124 lb)   Admission Wt:  59 kg (130 lb)    BMI:  20.63 kg/m 2   BSA:  1.61 m 2            Patient PCP Information     Provider PCP Type    Marbin Rouse MD General         Lab Results - 3 Days      Digoxin level [[id number]]  Resulted: 18 06, Result status: Final result    Ordering provider: Edis Granados MD  18 0000 Resulting lab: [facility]    Specimen Information    Type Source Collected On   Blood  18 0540          Components       Value Reference Range Flag Lab   Digoxin Level 1.2 0.5 - 2.0 ug/L  FrStHsLb            Basic metabolic panel [[id number]] (Abnormal)  Resulted: 18 0611, Result status: Final result    Ordering provider: Edis Granados MD  18 0000 Resulting lab: [facility]    Specimen Information    Type Source Collected On   Blood  18 0540          Components       Value Reference Range Flag Lab   Sodium 138 133 - 144 mmol/L  FrStHsLb   Potassium 4.3 3.4 - 5.3 mmol/L  FrStHsLb   Chloride 106 94 - 109 mmol/L  FrStHsLb   Carbon Dioxide 24 20 - 32 mmol/L  FrStHsLb   Anion Gap 8 3 - 14 mmol/L  FrStHsLb   Glucose 88 70 - 99 mg/dL  FrStHsLb   Urea Nitrogen 32 7 - 30 mg/dL H FrStHsLb   Creatinine 1.28 0.52 - 1.04 mg/dL H FrStHsLb   GFR Estimate 39 >60 mL/min/1.7m2 L FrStHsLb   Comment:  Non  GFR Calc   GFR Estimate If Black 47 >60 mL/min/1.7m2 L FrStHsLb   Comment:  African American GFR Calc   Calcium 8.3 8.5 - 10.1 mg/dL L FrStHsLb       "      Basic metabolic panel [[id number]] (Abnormal)  Resulted: 03/19/18 0658, Result status: Final result    Ordering provider: Edis Granados MD  03/19/18 0000 Resulting lab: [facility]    Specimen Information    Type Source Collected On   Blood  03/19/18 0621          Components       Value Reference Range Flag Lab   Sodium 137 133 - 144 mmol/L  FrStHsLb   Potassium 4.1 3.4 - 5.3 mmol/L  FrStHsLb   Chloride 106 94 - 109 mmol/L  FrStHsLb   Carbon Dioxide 22 20 - 32 mmol/L  FrStHsLb   Anion Gap 9 3 - 14 mmol/L  FrStHsLb   Glucose 94 70 - 99 mg/dL  FrStHsLb   Urea Nitrogen 33 7 - 30 mg/dL H FrStHsLb   Creatinine 1.14 0.52 - 1.04 mg/dL H FrStHsLb   GFR Estimate 44 >60 mL/min/1.7m2 L FrStHsLb   Comment:  Non  GFR Calc   GFR Estimate If Black 54 >60 mL/min/1.7m2 L FrStHsLb   Comment:  African American GFR Calc   Calcium 8.5 8.5 - 10.1 mg/dL  FrStHsLb            Comprehensive metabolic panel [[id number]] (Abnormal)  Resulted: 03/18/18 0613, Result status: Final result    Ordering provider: Diamond Zamora MD  03/18/18 0000 Resulting lab: [facility]    Specimen Information    Type Source Collected On   Blood  03/18/18 0540          Components       Value Reference Range Flag Lab   Sodium 138 133 - 144 mmol/L  FrStHsLb   Potassium 4.3 3.4 - 5.3 mmol/L  FrStHsLb   Chloride 107 94 - 109 mmol/L  FrStHsLb   Carbon Dioxide 21 20 - 32 mmol/L  FrStHsLb   Anion Gap 10 3 - 14 mmol/L  FrStHsLb   Glucose 85 70 - 99 mg/dL  FrStHsLb   Urea Nitrogen 34 7 - 30 mg/dL H FrStHsLb   Creatinine 1.15 0.52 - 1.04 mg/dL H FrStHsLb   GFR Estimate 44 >60 mL/min/1.7m2 L FrStHsLb   Comment:  Non  GFR Calc   GFR Estimate If Black 53 >60 mL/min/1.7m2 L FrStHsLb   Comment:  African American GFR Calc   Calcium 8.6 8.5 - 10.1 mg/dL  FrStHsLb   Bilirubin Total 1.8 0.2 - 1.3 mg/dL H FrStHsLb   Albumin 2.9 3.4 - 5.0 g/dL L FrStHsLb   Protein Total 6.3 6.8 - 8.8 g/dL L FrStHsLb   Alkaline  Phosphatase 67 40 - 150 U/L  FrStHsLb   ALT 27 0 - 50 U/L  FrStHsLb   AST 24 0 - 45 U/L  FrStHsLb            CBC with platelets [[id number]] (Abnormal)  Resulted: 03/18/18 0549, Result status: Final result    Ordering provider: Diamond Zamora MD  03/18/18 0000 Resulting lab: [facility] [place]    Specimen Information    Type Source Collected On   Blood  03/18/18 0540          Components       Value Reference Range Flag Lab   WBC 7.7 4.0 - 11.0 10e9/L  FrStHsLb   RBC Count 3.37 3.8 - 5.2 10e12/L L FrStHsLb   Hemoglobin 11.4 11.7 - 15.7 g/dL L FrStHsLb   Hematocrit 34.0 35.0 - 47.0 % L FrStHsLb    78 - 100 fl H FrStHsLb   MCH 33.8 26.5 - 33.0 pg H FrStHsLb   MCHC 33.5 31.5 - 36.5 g/dL  FrStHsLb   RDW 14.7 10.0 - 15.0 %  FrStHsLb   Platelet Count 187 150 - 450 10e9/L  FrStHsLb            T3 Free [[id number]]  Resulted: 03/17/18 1103, Result status: Final result    Ordering provider: Lacie Browne MD  03/16/18 2304 Resulting lab: [facility]    Specimen Information    Type Source Collected On     03/16/18 2304          Components       Value Reference Range Flag Lab   Free T3 2.9 2.3 - 4.2 pg/mL  51            T4 free [[id number]]  Resulted: 03/17/18 0153, Result status: Final result    Ordering provider: Lacie Browne MD  03/16/18 2304 Resulting lab: [facility] [place]    Specimen Information    Type Source Collected On     03/16/18 2304          Components       Value Reference Range Flag Lab   T4 Free 1.38 0.76 - 1.46 ng/dL  FrStHsLb            TSH [[id number]] (Abnormal)  Resulted: 03/16/18 2351, Result status: Final result    Ordering provider: Lacie Browne MD  03/16/18 2304 Resulting lab: [facility] [place]    Specimen Information    Type Source Collected On     03/16/18 0246          Components       Value Reference Range Flag Lab   TSH 4.13 0.40 - 4.00 mU/L H FrStHsLb            Troponin I [[id number]]  Resulted: 03/16/18 7311, Result status:  Final result    Ordering provider: Lacie Browne MD  03/16/18 2304 Resulting lab: [facility]    Specimen Information    Type Source Collected On     03/16/18 2304          Components       Value Reference Range Flag Lab   Troponin I ES 0.033 0.000 - 0.045 ug/L  FrStHsLb   Comment:         The 99th percentile for upper reference range is 0.045 ug/L.  Troponin values   in the range of 0.045 - 0.120 ug/L may be associated with risks of adverse   clinical events.              Nt probnp inpatient [[id number]] (Abnormal)  Resulted: 03/16/18 2347, Result status: Final result    Ordering provider: Lacie Browne MD  03/16/18 2304 Resulting lab: [facility]    Specimen Information    Type Source Collected On     03/16/18 2304          Components       Value Reference Range Flag Lab   N-Terminal Pro BNP Inpatient 00851 0 - 1800 pg/mL H FrStHsLb   Comment:            Reference range shown and results flagged as abnormal are suggested inpatient   cut points for confirming diagnosis if CHF in an acute setting. Establishing a   baseline value for each individual patient is useful for follow-up. An   inpatient or emergency department NT-proPBNP <300 pg/mL effectively rules out   acute CHF, with 99% negative predictive value.  The outpatient non-acute reference range for ruling out CHF is:   0-125 pg/mL (age 18 to less than 75)   0-450 pg/mL (age 75 yrs and older)              Comprehensive metabolic panel [[id number]] (Abnormal)  Resulted: 03/16/18 2337, Result status: Final result    Ordering provider: Lacie Browne MD  03/16/18 2305 Resulting lab: [facility]    Specimen Information    Type Source Collected On   Blood  03/16/18 2304          Components       Value Reference Range Flag Lab   Sodium 138 133 - 144 mmol/L  FrStHsLb   Potassium 4.7 3.4 - 5.3 mmol/L  FrStHsLb   Chloride 104 94 - 109 mmol/L  FrStHsLb   Carbon Dioxide 26 20 - 32 mmol/L  FrStHsLb   Anion Gap 8 3 - 14  mmol/L  FrStHsLb   Glucose 127 70 - 99 mg/dL H FrStHsLb   Urea Nitrogen 36 7 - 30 mg/dL H FrStHsLb   Creatinine 1.12 0.52 - 1.04 mg/dL H FrStHsLb   GFR Estimate 45 >60 mL/min/1.7m2 L FrStHsLb   Comment:  Non  GFR Calc   GFR Estimate If Black 55 >60 mL/min/1.7m2 L FrStHsLb   Comment:  African American GFR Calc   Calcium 9.1 8.5 - 10.1 mg/dL  FrStHsLb   Bilirubin Total 1.2 0.2 - 1.3 mg/dL  FrStHsLb   Albumin 3.5 3.4 - 5.0 g/dL  FrStHsLb   Protein Total 7.9 6.8 - 8.8 g/dL  FrStHsLb   Alkaline Phosphatase 92 40 - 150 U/L  FrStHsLb   ALT 45 0 - 50 U/L  FrStHsLb   AST 43 0 - 45 U/L  FrStHsLb            CBC with platelets + differential [[id number]] (Abnormal)  Resulted: 03/16/18 2317, Result status: Final result    Ordering provider: Lacie Browne MD  03/16/18 2305 Resulting lab: [facility]    Specimen Information    Type Source Collected On   Blood  03/16/18 2304          Components       Value Reference Range Flag Lab   WBC 11.2 4.0 - 11.0 10e9/L H FrStHsLb   RBC Count 3.72 3.8 - 5.2 10e12/L L FrStHsLb   Hemoglobin 12.7 11.7 - 15.7 g/dL  FrStHsLb   Hematocrit 38.3 35.0 - 47.0 %  FrStHsLb    78 - 100 fl H FrStHsLb   MCH 34.1 26.5 - 33.0 pg H FrStHsLb   MCHC 33.2 31.5 - 36.5 g/dL  FrStHsLb   RDW 14.9 10.0 - 15.0 %  FrStHsLb   Platelet Count 189 150 - 450 10e9/L  FrStHsLb   Diff Method Automated Method   FrStHsLb   % Neutrophils 67.5 %  FrStHsLb   % Lymphocytes 13.7 %  FrStHsLb   % Monocytes 13.9 %  FrStHsLb   % Eosinophils 4.0 %  FrStHsLb   % Basophils 0.3 %  FrStHsLb   % Immature Granulocytes 0.6 %  FrStHsLb   Nucleated RBCs 1 0 /100 H FrStHsLb   Absolute Neutrophil 7.6 1.6 - 8.3 10e9/L  FrStHsLb   Absolute Lymphocytes 1.5 0.8 - 5.3 10e9/L  FrStHsLb   Absolute Monocytes 1.6 0.0 - 1.3 10e9/L H FrStHsLb   Absolute Eosinophils 0.5 0.0 - 0.7 10e9/L  FrStHsLb   Absolute Basophils 0.0 0.0 - 0.2 10e9/L  FrStHsLb   Abs Immature Granulocytes 0.1 0 - 0.4 10e9/L  FrStHsLb   Absolute  Nucleated RBC 0.1   FrStHsLb            Testing Performed By     Lab - Abbreviation Name Director Address Valid Date Range    14 - FrStHsLb [facility] Unknown [address] [place] [address] 05/08/15 1057 - Present    51 - Unknown [facility] Unknown [address] [facility] [address] 12/31/14 1010 - Present            Unresulted Labs     None         Imaging Results - 3 Days      XR Chest 2 Views [[id number]]  Resulted: 03/17/18 0036, Result status: Final result    Ordering provider: Lacie Browne MD  03/16/18 2318 Resulted by: Larry Hwang MD    Performed: 03/16/18 2333 - 03/16/18 2348 Resulting lab: RADIOLOGY RESULTS    Narrative:       XR CHEST 2 VW  3/16/2018 11:48 PM     INDICATION: Dyspnea on exertion.    COMPARISON: 2/21/2018.      Impression:       IMPRESSION: Cardiomegaly. Mild diffuse interstitial opacities may be  slightly more prominent. Most of the interstitial prominence was  present previously and is likely due to fibrosis. Component of  superimposed interstitial edema/CHF is not excluded. Possible small  pleural effusions. Degenerative changes and kyphosis thoracic spine.    LARRY HWANG MD      Testing Performed By     Lab - Abbreviation Name Director Address Valid Date Range    104 - Rad Rslts RADIOLOGY RESULTS Unknown Unknown 02/16/05 1553 - Present            Encounter-Level Documents:     There are no encounter-level documents.      Order-Level Documents:     There are no order-level documents.

## 2018-03-17 PROBLEM — I48.91 ATRIAL FIBRILLATION (H): Status: ACTIVE | Noted: 2018-03-17

## 2018-03-17 LAB
ABO + RH BLD: NORMAL
ABO + RH BLD: NORMAL
BLD GP AB SCN SERPL QL: NORMAL
BLOOD BANK CMNT PATIENT-IMP: NORMAL
SPECIMEN EXP DATE BLD: NORMAL
T3FREE SERPL-MCNC: 2.9 PG/ML (ref 2.3–4.2)
T4 FREE SERPL-MCNC: 1.38 NG/DL (ref 0.76–1.46)

## 2018-03-17 PROCEDURE — 25000132 ZZH RX MED GY IP 250 OP 250 PS 637: Performed by: INTERNAL MEDICINE

## 2018-03-17 PROCEDURE — 99222 1ST HOSP IP/OBS MODERATE 55: CPT | Performed by: INTERNAL MEDICINE

## 2018-03-17 PROCEDURE — 25000128 H RX IP 250 OP 636: Performed by: INTERNAL MEDICINE

## 2018-03-17 PROCEDURE — 21000001 ZZH R&B HEART CARE

## 2018-03-17 PROCEDURE — 99232 SBSQ HOSP IP/OBS MODERATE 35: CPT | Performed by: INTERNAL MEDICINE

## 2018-03-17 RX ORDER — OXYCODONE AND ACETAMINOPHEN 5; 325 MG/1; MG/1
0.5 TABLET ORAL EVERY MORNING
Status: ON HOLD | COMMUNITY
End: 2018-03-20

## 2018-03-17 RX ORDER — DIGOXIN 0.25 MG/ML
250 INJECTION INTRAMUSCULAR; INTRAVENOUS
Status: DISPENSED | OUTPATIENT
Start: 2018-03-17 | End: 2018-03-17

## 2018-03-17 RX ORDER — LOSARTAN POTASSIUM 25 MG/1
25 TABLET ORAL DAILY
Status: DISCONTINUED | OUTPATIENT
Start: 2018-03-17 | End: 2018-03-17

## 2018-03-17 RX ORDER — DIGOXIN 125 MCG
125 TABLET ORAL DAILY
Status: DISCONTINUED | OUTPATIENT
Start: 2018-03-18 | End: 2018-03-20 | Stop reason: HOSPADM

## 2018-03-17 RX ORDER — METOPROLOL SUCCINATE 100 MG/1
200 TABLET, EXTENDED RELEASE ORAL DAILY
Status: DISCONTINUED | OUTPATIENT
Start: 2018-03-18 | End: 2018-03-20 | Stop reason: HOSPADM

## 2018-03-17 RX ORDER — METOPROLOL SUCCINATE 100 MG/1
100 TABLET, EXTENDED RELEASE ORAL DAILY
Status: DISCONTINUED | OUTPATIENT
Start: 2018-03-17 | End: 2018-03-17

## 2018-03-17 RX ORDER — NALOXONE HYDROCHLORIDE 0.4 MG/ML
.1-.4 INJECTION, SOLUTION INTRAMUSCULAR; INTRAVENOUS; SUBCUTANEOUS
Status: DISCONTINUED | OUTPATIENT
Start: 2018-03-17 | End: 2018-03-20 | Stop reason: HOSPADM

## 2018-03-17 RX ORDER — DIGOXIN 0.25 MG/ML
375 INJECTION INTRAMUSCULAR; INTRAVENOUS ONCE
Status: COMPLETED | OUTPATIENT
Start: 2018-03-17 | End: 2018-03-17

## 2018-03-17 RX ORDER — SIMVASTATIN 20 MG
20 TABLET ORAL AT BEDTIME
Status: DISCONTINUED | OUTPATIENT
Start: 2018-03-17 | End: 2018-03-20 | Stop reason: HOSPADM

## 2018-03-17 RX ORDER — METOPROLOL SUCCINATE 100 MG/1
100 TABLET, EXTENDED RELEASE ORAL DAILY
Status: COMPLETED | OUTPATIENT
Start: 2018-03-17 | End: 2018-03-17

## 2018-03-17 RX ORDER — FUROSEMIDE 10 MG/ML
20 INJECTION INTRAMUSCULAR; INTRAVENOUS ONCE
Status: COMPLETED | OUTPATIENT
Start: 2018-03-17 | End: 2018-03-17

## 2018-03-17 RX ADMIN — METOPROLOL SUCCINATE 100 MG: 100 TABLET, EXTENDED RELEASE ORAL at 08:56

## 2018-03-17 RX ADMIN — Medication 2.5 MG: at 10:50

## 2018-03-17 RX ADMIN — Medication 1 SPRAY: at 21:24

## 2018-03-17 RX ADMIN — SIMVASTATIN 20 MG: 20 TABLET, FILM COATED ORAL at 05:06

## 2018-03-17 RX ADMIN — DIGOXIN 375 MCG: 0.25 INJECTION INTRAMUSCULAR; INTRAVENOUS at 15:40

## 2018-03-17 RX ADMIN — SIMVASTATIN 20 MG: 20 TABLET, FILM COATED ORAL at 21:19

## 2018-03-17 RX ADMIN — LOSARTAN POTASSIUM 25 MG: 25 TABLET ORAL at 09:07

## 2018-03-17 RX ADMIN — METOPROLOL SUCCINATE 100 MG: 100 TABLET, EXTENDED RELEASE ORAL at 10:50

## 2018-03-17 RX ADMIN — FUROSEMIDE 20 MG: 10 INJECTION, SOLUTION INTRAVENOUS at 08:56

## 2018-03-17 RX ADMIN — CHOLECALCIFEROL TAB 25 MCG (1000 UNIT) 1000 UNITS: 25 TAB at 08:55

## 2018-03-17 ASSESSMENT — PAIN DESCRIPTION - DESCRIPTORS: DESCRIPTORS: ACHING

## 2018-03-17 NOTE — ED NOTES
[facility]  ED Nurse Handoff Report    ED Chief complaint: Epistaxis (pt on blood thinner for recent dx of afib;  nose bleeding and pt also short of breath with exertion / more so than usual)      ED Diagnosis:   Final diagnoses:   Uncontrolled atrial fibrillation (H)   Atrial fibrillation with RVR (H)   Epistaxis   Anticoagulated   HORTON (dyspnea on exertion)       Code Status: Full Code    Allergies: No Known Allergies    Activity level - Baseline/Home:  Independent    Activity Level - Current:   Stand with Assist     Needed?: No    Isolation: Yes  Infection: Not Applicable  MRSA    Bariatric?: No    Vital Signs:   Vitals:    03/16/18 2330 03/17/18 0000 03/17/18 0015 03/17/18 0030   BP:       Pulse:       Resp: 22 16 22 16   Temp:       TempSrc:       SpO2: 94% 95% 97% 97%   Weight:           Cardiac Rhythm: ,   Cardiac  Cardiac Rhythm: Atrial fibrillation    Pain level:      Is this patient confused?: No     Patient Report: Initial Complaint: nose bleed  Focused Assessment: 89 year old female with a history of recently diagnosed Afib on Xarelto, CHF, Hypertension and CKD who presents with epistaxis.  She presents tonight with two complaints of both worsening shortness of breath and epistaxis. The patient reports having shortness of breath over the past 6 months which acutely worsened over the past week to the point where she is unable to ambulate with her walker as she normally can do at baseline. She endorses chest pain a few nights ago which has since resolved. She denies other symptoms such as fever, cough, rhinorrhea, sore throat, vomiting, nausea, diarrhea, or dysuria. She is followed closely by her cardiologist and will have a Holter monitor placed in 3 days and has seen her PCP within the past few weeks.      The patient reports being diagnosed with Afib on 2/21/18 and has been taking blood thinners since March 1. She has been having nosebleeds every day (sometimes more than once  a day) for the past week. She can normally stop them herself but presents tonight after a nose bleed that started at 2130 and has not stopped.   Pt A&O.    Tests Performed: labs  Abnormal Results:   Results for orders placed or performed during the hospital encounter of 03/16/18 (from the past 24 hour(s))   EKG 12 lead   Result Value Ref Range    Interpretation ECG Click View Image link to view waveform and result    CBC with platelets + differential   Result Value Ref Range    WBC 11.2 (H) 4.0 - 11.0 10e9/L    RBC Count 3.72 (L) 3.8 - 5.2 10e12/L    Hemoglobin 12.7 11.7 - 15.7 g/dL    Hematocrit 38.3 35.0 - 47.0 %     (H) 78 - 100 fl    MCH 34.1 (H) 26.5 - 33.0 pg    MCHC 33.2 31.5 - 36.5 g/dL    RDW 14.9 10.0 - 15.0 %    Platelet Count 189 150 - 450 10e9/L    Diff Method Automated Method     % Neutrophils 67.5 %    % Lymphocytes 13.7 %    % Monocytes 13.9 %    % Eosinophils 4.0 %    % Basophils 0.3 %    % Immature Granulocytes 0.6 %    Nucleated RBCs 1 (H) 0 /100    Absolute Neutrophil 7.6 1.6 - 8.3 10e9/L    Absolute Lymphocytes 1.5 0.8 - 5.3 10e9/L    Absolute Monocytes 1.6 (H) 0.0 - 1.3 10e9/L    Absolute Eosinophils 0.5 0.0 - 0.7 10e9/L    Absolute Basophils 0.0 0.0 - 0.2 10e9/L    Abs Immature Granulocytes 0.1 0 - 0.4 10e9/L    Absolute Nucleated RBC 0.1    Comprehensive metabolic panel   Result Value Ref Range    Sodium 138 133 - 144 mmol/L    Potassium 4.7 3.4 - 5.3 mmol/L    Chloride 104 94 - 109 mmol/L    Carbon Dioxide 26 20 - 32 mmol/L    Anion Gap 8 3 - 14 mmol/L    Glucose 127 (H) 70 - 99 mg/dL    Urea Nitrogen 36 (H) 7 - 30 mg/dL    Creatinine 1.12 (H) 0.52 - 1.04 mg/dL    GFR Estimate 45 (L) >60 mL/min/1.7m2    GFR Estimate If Black 55 (L) >60 mL/min/1.7m2    Calcium 9.1 8.5 - 10.1 mg/dL    Bilirubin Total 1.2 0.2 - 1.3 mg/dL    Albumin 3.5 3.4 - 5.0 g/dL    Protein Total 7.9 6.8 - 8.8 g/dL    Alkaline Phosphatase 92 40 - 150 U/L    ALT 45 0 - 50 U/L    AST 43 0 - 45 U/L   Nt probnp inpatient    Result Value Ref Range    N-Terminal Pro BNP Inpatient 21963 (H) 0 - 1800 pg/mL   TSH   Result Value Ref Range    TSH 4.13 (H) 0.40 - 4.00 mU/L   Troponin I   Result Value Ref Range    Troponin I ES 0.033 0.000 - 0.045 ug/L   ABO/Rh type and screen   Result Value Ref Range    ABO A     RH(D) Pos     Antibody Screen Neg     Test Valid Only At [facility]        Specimen Expires 03/19/2018    XR Chest 2 Views    Narrative    XR CHEST 2 VW  3/16/2018 11:48 PM     INDICATION: Dyspnea on exertion.    COMPARISON: 2/21/2018.      Impression    IMPRESSION: Cardiomegaly. Mild diffuse interstitial opacities may be  slightly more prominent. Most of the interstitial prominence was  present previously and is likely due to fibrosis. Component of  superimposed interstitial edema/CHF is not excluded. Possible small  pleural effusions. Degenerative changes and kyphosis thoracic spine.    KATIA BALDERRAMA MD       Treatments provided: packing to nose, dilt    Family Comments: friend at bedside    OBS brochure/video discussed/provided to patient: N/A    ED Medications:   Medications   tranexamic acid (CYKLOKAPRON) spray 500 mg (not administered)   0.9% sodium chloride BOLUS (500 mLs Intravenous New Bag 3/16/18 7059)   diltiazem (CARDIZEM) injection 15 mg (15 mg Intravenous Given 3/16/18 9932)       Drips infusing?:  No    For the majority of the shift this patient was Green.   Interventions performed were xx.    Severe Sepsis OR Septic Shock Diagnosis Present: No      ED NURSE PHONE NUMBER: call dept

## 2018-03-17 NOTE — H&P
[facility]    History and Physical  Hospitalist       Date of Admission:  3/16/2018    Assessment & Plan   89-year-old female with a history of hypertension, pulmonary fibrosis, CKD stage III who presents to the [facility] ED with atrial fibrillation with RVR. In ED, EKG showed afib with rvr to 140. Chest xray showed mild diffuse interstitial opacities may be slightly more prominent. Component of superimposed interstitial edema/CHF is not excluded. Was given 15 mg of dilt and 0.5 L of NS. This is not a new diagnosis, as she has been following closely with her cardiologist for her afib. Her outpatient cardiology notes from Dr. Walker mention her worsening SOB as possibly multifactorial, as some of her symptoms could be related to the Metoprolol, the atrial fibrillation itself, or perhaps some noncardiac issues. Cardioversion was discussed with her, but she wanted to wait 4 weeks on anticoagulation prior to proceeding (it has only been 3 weeks on the anticoagulation).    Problem 1: SOB likely 2/2 to Afib with rvr  - patient with much elevated HRs today. Degree of elevation in HRs seems to correspond with symptoms. Received 15 mg of IV dilt in ED.  - for now, will cont home dose metop and not regularly schedule dilt. With her BNP very high at 16 k and chest xray slightly worse than one prior, it is possible she may beenfit from some gentle diuresis. Have ordered 20 mg of IV lasix - can give and assess response.   - if patient needs more blockade for HR control, will give small doses of dilt.  - EP consult appropriate here given difficulty with rate control, and now considering rhythm control strategy. Ordered.     Problem 2: Epistaxis  - resolved in ED without packing, hemoglobin steady. 2/2 Xarelto use. Consider use of another agent or lower dose.    Problem 3: HTN  - cont losartan 25 mg.    # Pain Assessment:   Current Pain Score 9/5/2012 9/4/2012 9/4/2012   Pain score (0-10) 5 5 7   Pain location Back  Back -   Pain descriptors Aching - -   Theresa s pain level was assessed and she currently denies pain.      DVT Prophylaxis: Warfarin  Code Status: Full Code    Disposition: Expected discharge in 2 days once afib is better controlled and SOB improves.    Diamond Zamora MD    Primary Care Physician   Marbin Rouse    Chief Complaint   SOB    History is obtained from the patient    History of Present Illness   Theresa Alves is a very pleasant 89-year-old female with a history of hypertension, pulmonary fibrosis, CKD stage III who presents to the [facility] ED with atrial fibrillation with RVR.  Her diagnosis of afib is new from 2/21/18 -  she saw Dr. Walker on 2/26/18.persistent atrial fibrillation. EKG at that visit showed with a rate of 106 bpm.  After long discussion, given her [id number] score of at least for oral anticoagulation was recommended.  She elected to start Xarelto 15 mg daily.  Her baby aspirin was discontinued.  Additionally, her losartan was decreased from 100 mg daily to 25 mg daily to allow room for rate control.  Metoprolol XL was increased from 50 mg to 100 mg daily.       A repeat echocardiogram was ordered which was done on 3/6/18.  LVEF was 50-55%.  Severe biatrial enlargement was noted.  There is 2+ tricuspid regurgitation.  Moderate pulmonary hypertension was present as well.  Given the severe biatrial enlargement, rate control was recommended unless she was symptomatic despite adequate rate control.     The patient reports having shortness of breath over the past 6 months which acutely worsened over the past week to the point where she is unable to ambulate with her walker as she normally can do at baseline. She endorses chest pain a few nights ago which has since resolved. She denies other symptoms such as fever, cough, rhinorrhea, sore throat, vomiting, nausea, diarrhea, or dysuria. She overall feels weak.  She notes that usually she was able to walk down the cantu to a chair in the  hallway before having to stop and now has to stop prior to this.    She has been having nosebleeds every day (sometimes more than once a day) for the past week. She can normally stop them herself but presents tonight after a nose bleed that started at 2130 and had not stopped (but eventually did in ED with no packing).     In ED, EKG showed afib with rvr to 140. Chest xray showed mild diffuse interstitial opacities may be  slightly more prominent. Most of the interstitial prominence was present previously and is likely due to fibrosis. Component of superimposed interstitial edema/CHF is not excluded. Was given 15 mg of dilt and 0.5 L of NS.       Past Medical History    I have reviewed this patient's medical history and updated it with pertinent information if needed.   Past Medical History:   Diagnosis Date     Chronic back pain      Pulmonary fibrosis (H)      Unspecified essential hypertension     Essential hypertension       Past Surgical History   I have reviewed this patient's surgical history and updated it with pertinent information if needed.  Past Surgical History:   Procedure Laterality Date     CARPAL TUNNEL RELEASE RT/LT      R     CATARACT IOL, RT/LT      R     HERNIA REPAIR, INGUINAL RT/LT      R     hiatal hernia repair  6/10    large paraesophageal hiatal hernia; Nissen fundoplication        Prior to Admission Medications   Prior to Admission Medications   Prescriptions Last Dose Informant Patient Reported? Taking?   Cyanocobalamin (VITAMIN B 12 PO)   Yes No   Sig: Take  by mouth.   VITAMIN D, CHOLECALCIFEROL, PO   Yes No   Sig: Take 1,000 Units by mouth daily.     furosemide (LASIX) 20 MG tablet   Yes No   Si mg, 4 times per week   losartan (COZAAR) 25 MG tablet   No No   Sig: Take 1 tablet (25 mg) by mouth daily   metoprolol succinate (TOPROL-XL) 100 MG 24 hr tablet   No No   Sig: Take 1 tablet (100 mg) by mouth daily   oxyCODONE-acetaminophen (PERCOCET) 5-325 MG per tablet   No  No   Sig: Take 0.5 tablets by mouth 2 times daily as needed for pain   rivaroxaban ANTICOAGULANT (XARELTO) 15 MG TABS tablet   No No   Sig: Take 1 tablet (15 mg) by mouth daily (with dinner)   simvastatin (ZOCOR) 20 MG tablet   No No   Sig: TAKE HALF TABLET BY MOUTH AT BEDTIME      Facility-Administered Medications: None     Allergies   No Known Allergies    Social History   I have reviewed this patient's social history and updated it with pertinent information if needed. Theresa Alves  reports that she has never smoked. She has never used smokeless tobacco. She reports that she drinks alcohol. She reports that she does not use illicit drugs.    Family History   I have reviewed this patient's family history and updated it with pertinent information if needed.   Family History   Problem Relation Age of Onset     Unknown/Adopted Mother      Unknown/Adopted Father      DIABETES No family hx of      Coronary Artery Disease No family hx of      Hypertension No family hx of      Hyperlipidemia No family hx of      CEREBROVASCULAR DISEASE No family hx of      Breast Cancer No family hx of      Colon Cancer No family hx of      Prostate Cancer No family hx of      Other Cancer No family hx of      Depression No family hx of      Anxiety Disorder No family hx of      MENTAL ILLNESS No family hx of      Substance Abuse No family hx of      Anesthesia Reaction No family hx of      Asthma No family hx of      OSTEOPOROSIS No family hx of      Genetic Disorder No family hx of      Thyroid Disease No family hx of      Obesity No family hx of        Review of Systems   The 10 point Review of Systems is negative other than noted in the HPI or here.     Physical Exam   Temp: 97.7  F (36.5  C) Temp src: Oral BP: 128/82 Pulse: 131 Heart Rate: 94 Resp: 16 SpO2: 97 %      Vital Signs with Ranges  Temp:  [97.7  F (36.5  C)] 97.7  F (36.5  C)  Pulse:  [131] 131  Heart Rate:  [] 94  Resp:  [16-22] 16  BP: (128)/(82)  128/82  SpO2:  [94 %-98 %] 97 %  130 lbs 0 oz    GEN: NAD, pleasant  HEENT: no icterus  CV: RRR, normal s1/s2, no murmurs/rubs/s3/s4, no heave. JVP normal.   CHEST: CTAB  ABD: soft, NT/ND, NABS  : no flank/suprapubic tenderness  NEURO: AA&Ox3, fluent/appropriate, motor grossly nonfocal  PSYCH: cooperative, affect appropriate    Data   Data reviewed today:  I personally reviewed the chest x-ray image(s) showing slight pulm edema.    Recent Labs  Lab 03/16/18  2304   WBC 11.2*   HGB 12.7   *         POTASSIUM 4.7   CHLORIDE 104   CO2 26   BUN 36*   CR 1.12*   ANIONGAP 8   PAWEL 9.1   *   ALBUMIN 3.5   PROTTOTAL 7.9   BILITOTAL 1.2   ALKPHOS 92   ALT 45   AST 43   TROPI 0.033       Imaging:  Recent Results (from the past 24 hour(s))   XR Chest 2 Views    Narrative    XR CHEST 2 VW  3/16/2018 11:48 PM     INDICATION: Dyspnea on exertion.    COMPARISON: 2/21/2018.      Impression    IMPRESSION: Cardiomegaly. Mild diffuse interstitial opacities may be  slightly more prominent. Most of the interstitial prominence was  present previously and is likely due to fibrosis. Component of  superimposed interstitial edema/CHF is not excluded. Possible small  pleural effusions. Degenerative changes and kyphosis thoracic spine.    KATIA BALDERRAMA MD

## 2018-03-17 NOTE — PROGRESS NOTES
"SPIRITUAL HEALTH SERVICES Progress Note  [facility]    [place]ited [place] per request. Pt has had several medical struggles but is hoping to go home tomorrow. When asked if pt has any family supporting her, she replied, \"No. I've been alone for 23 years.\" Pt has no living siblings and pt's   decades ago. Pt never had children. Pt has several friends and nieces and nephews who are supportive, and pt names them as blessings in her life. Pt is [employer]al and misses going to [employer]. Currently, pt listens to a [employer] service at [facility] that is broadcast over the phone. Pt calls in every  morning.  provided reflective listening, emotional support, and prayer.  will continue to follow pt as available.      Carrie Stuart  Chaplain Resident  Pager: [phone]  Office: [phone]  "

## 2018-03-17 NOTE — ED NOTES
Patient had a minor nose bleed out of left nostril, stopped with five minutes of pressure being held

## 2018-03-17 NOTE — CONSULTS
[facility]    Cardiology Consultation     Date of Admission:  3/16/2018    Assessment & Plan   Theresa Alves is a 89 year old female who was admitted on 3/16/2018. I was asked to see the patient for AF    Summary:   89 yo F with  1) Acute decomenpensated diastolic heart failure HFpEF  2) Pulmonary fibrosis, kyphoscoliosis  3) Hypertension  4) CKD stage 3  5) Persistent AF on xarelto for the last month with persistent epistaxis  6) Significant epistaxis on admission  7) Afib with RVR  8) type 2 MI from heart failure    -Increase rate control with metoprolol increased to 200 daily. Stopped losartan  -Decompensated diastolic heart failure with worsening dyspnea, NTproBNP, pulmonary congestion on X ray and elevated JVP. Will diurese 1 L negative today. Given 20 IV lasix. If does not respond in 6 hours increase dose to 40 IV lasix for next dose in 6 hours  -Hold anticoagulation given frequent epistaxis. WIll need to readress risks benefits of anticoagulation. Currently patient is apprehensive about further anticoagulation given her persistent epistaxis.    Rashid Dillard MD    Code Status    Full Code    Reason for Consult   Reason for consult: Atrial fibrillation    Primary Care Physician   Marbin Rouse    Chief Complaint   Shortness of breath, epistaxis, atrial fibrillation    History is obtained from the patient. She presented with persistent epistaxis from home and has been on xarelto since the beginning of March. She has been having difficult to manage epistaxis since starting that has been frustrating for her and yesterday was persistent so she presented to the ED. Was also having worsening shortness of breath for the last week and is currently dyspneic even walking a few feet. Denies chest pain and denies symptomatic palpitations. Currently feeling well apart from significant dyspnea on exertion. No stroke or MI in the past.    Last echo showed EF 55% with severe atrial enlargment moderate  TR and elevated RVSP consistent with HFpEF.    EKG shows AF with RVR    X ray cardiomegaly with pulmonary congestion worse than prior mild fibrotic changes on last EKG    Labs show Cr 1.1 at baseline, NTproBNP 87271 higher than last month when it was 8519, troponin elevation of 0.03    History of Present Illness   Theresa Alves is a 89 year old female who presents with epistaxis    Past Medical History   I have reviewed this patient's medical history and updated it with pertinent information if needed.   Past Medical History:   Diagnosis Date     Chronic back pain      Pulmonary fibrosis (H)      Unspecified essential hypertension     Essential hypertension       Past Surgical History   I have reviewed this patient's surgical history and updated it with pertinent information if needed.  Past Surgical History:   Procedure Laterality Date     CARPAL TUNNEL RELEASE RT/LT      R     CATARACT IOL, RT/LT      R     HERNIA REPAIR, INGUINAL RT/LT      R     hiatal hernia repair  6/10    large paraesophageal hiatal hernia; Nissen fundoplication        Prior to Admission Medications   Prior to Admission Medications   Prescriptions Last Dose Informant Patient Reported? Taking?   Cyanocobalamin (VITAMIN B 12 PO)   Yes No   Sig: Take  by mouth.   VITAMIN D, CHOLECALCIFEROL, PO   Yes No   Sig: Take 1,000 Units by mouth daily.     furosemide (LASIX) 20 MG tablet   Yes No   Si mg, 4 times per week   losartan (COZAAR) 25 MG tablet   No No   Sig: Take 1 tablet (25 mg) by mouth daily   metoprolol succinate (TOPROL-XL) 100 MG 24 hr tablet   No No   Sig: Take 1 tablet (100 mg) by mouth daily   oxyCODONE-acetaminophen (PERCOCET) 5-325 MG per tablet   No No   Sig: Take 0.5 tablets by mouth 2 times daily as needed for pain   rivaroxaban ANTICOAGULANT (XARELTO) 15 MG TABS tablet   No No   Sig: Take 1 tablet (15 mg) by mouth daily (with dinner)   simvastatin (ZOCOR) 20 MG tablet   No No   Sig: TAKE HALF TABLET BY  MOUTH AT BEDTIME      Facility-Administered Medications: None     Allergies   No Known Allergies    Social History   I have reviewed this patient's social history and updated it with pertinent information if needed. Theresa Alves  reports that she has never smoked. She has never used smokeless tobacco. She reports that she drinks alcohol. She reports that she does not use illicit drugs.    Family History   I have reviewed this patient's family history and updated it with pertinent information if needed.   Family History   Problem Relation Age of Onset     Unknown/Adopted Mother      Unknown/Adopted Father      DIABETES No family hx of      Coronary Artery Disease No family hx of      Hypertension No family hx of      Hyperlipidemia No family hx of      CEREBROVASCULAR DISEASE No family hx of      Breast Cancer No family hx of      Colon Cancer No family hx of      Prostate Cancer No family hx of      Other Cancer No family hx of      Depression No family hx of      Anxiety Disorder No family hx of      MENTAL ILLNESS No family hx of      Substance Abuse No family hx of      Anesthesia Reaction No family hx of      Asthma No family hx of      OSTEOPOROSIS No family hx of      Genetic Disorder No family hx of      Thyroid Disease No family hx of      Obesity No family hx of        Review of Systems   The 10 point Review of Systems is negative other than noted in the HPI or here.     Physical Exam   Temp: 97.7  F (36.5  C) Temp src: Oral BP: 131/78 Pulse: 91 Heart Rate: 108 Resp: 16 SpO2: 93 % O2 Device: None (Room air)    Vital Signs with Ranges  Temp:  [97.7  F (36.5  C)] 97.7  F (36.5  C)  Pulse:  [] 91  Heart Rate:  [] 108  Resp:  [16-22] 16  BP: (121-145)/(78-95) 131/78  SpO2:  [93 %-99 %] 93 %  130 lbs 0 oz    Constitutional: A and Ox3  Eyes: normal  ENT: No bleedinf currently, JVD elevated to intermediate up neck in upright position  Respiratory: Bilateral inspiratory crackles heard  Cardiovascular:  irregularly iregular, 1/6 HSM in tricuspid area  GI: soft, nont tony  Lymph/Hematologic: normal  Genitourinary: Not examined  Skin: normal  Musculoskeletal: no edema  Neurologic: no focal deficit  Neuropsychiatric: normal    Data   Results for orders placed or performed during the hospital encounter of 03/16/18 (from the past 24 hour(s))   EKG 12 lead   Result Value Ref Range    Interpretation ECG Click View Image link to view waveform and result    CBC with platelets + differential   Result Value Ref Range    WBC 11.2 (H) 4.0 - 11.0 10e9/L    RBC Count 3.72 (L) 3.8 - 5.2 10e12/L    Hemoglobin 12.7 11.7 - 15.7 g/dL    Hematocrit 38.3 35.0 - 47.0 %     (H) 78 - 100 fl    MCH 34.1 (H) 26.5 - 33.0 pg    MCHC 33.2 31.5 - 36.5 g/dL    RDW 14.9 10.0 - 15.0 %    Platelet Count 189 150 - 450 10e9/L    Diff Method Automated Method     % Neutrophils 67.5 %    % Lymphocytes 13.7 %    % Monocytes 13.9 %    % Eosinophils 4.0 %    % Basophils 0.3 %    % Immature Granulocytes 0.6 %    Nucleated RBCs 1 (H) 0 /100    Absolute Neutrophil 7.6 1.6 - 8.3 10e9/L    Absolute Lymphocytes 1.5 0.8 - 5.3 10e9/L    Absolute Monocytes 1.6 (H) 0.0 - 1.3 10e9/L    Absolute Eosinophils 0.5 0.0 - 0.7 10e9/L    Absolute Basophils 0.0 0.0 - 0.2 10e9/L    Abs Immature Granulocytes 0.1 0 - 0.4 10e9/L    Absolute Nucleated RBC 0.1    Comprehensive metabolic panel   Result Value Ref Range    Sodium 138 133 - 144 mmol/L    Potassium 4.7 3.4 - 5.3 mmol/L    Chloride 104 94 - 109 mmol/L    Carbon Dioxide 26 20 - 32 mmol/L    Anion Gap 8 3 - 14 mmol/L    Glucose 127 (H) 70 - 99 mg/dL    Urea Nitrogen 36 (H) 7 - 30 mg/dL    Creatinine 1.12 (H) 0.52 - 1.04 mg/dL    GFR Estimate 45 (L) >60 mL/min/1.7m2    GFR Estimate If Black 55 (L) >60 mL/min/1.7m2    Calcium 9.1 8.5 - 10.1 mg/dL    Bilirubin Total 1.2 0.2 - 1.3 mg/dL    Albumin 3.5 3.4 - 5.0 g/dL    Protein Total 7.9 6.8 - 8.8 g/dL    Alkaline Phosphatase 92 40 - 150 U/L    ALT 45 0 - 50 U/L    AST  43 0 - 45 U/L   Nt probnp inpatient   Result Value Ref Range    N-Terminal Pro BNP Inpatient 20645 (H) 0 - 1800 pg/mL   TSH   Result Value Ref Range    TSH 4.13 (H) 0.40 - 4.00 mU/L   Troponin I   Result Value Ref Range    Troponin I ES 0.033 0.000 - 0.045 ug/L   ABO/Rh type and screen   Result Value Ref Range    ABO A     RH(D) Pos     Antibody Screen Neg     Test Valid Only At [facility]        Specimen Expires 03/19/2018    T4 free   Result Value Ref Range    T4 Free 1.38 0.76 - 1.46 ng/dL   XR Chest 2 Views    Narrative    XR CHEST 2 VW  3/16/2018 11:48 PM     INDICATION: Dyspnea on exertion.    COMPARISON: 2/21/2018.      Impression    IMPRESSION: Cardiomegaly. Mild diffuse interstitial opacities may be  slightly more prominent. Most of the interstitial prominence was  present previously and is likely due to fibrosis. Component of  superimposed interstitial edema/CHF is not excluded. Possible small  pleural effusions. Degenerative changes and kyphosis thoracic spine.    KATIA BALDERRAMA MD

## 2018-03-17 NOTE — PHARMACY-ADMISSION MEDICATION HISTORY
Admission medication history interview status for the 3/16/2018  admission is complete. See EPIC admission navigator for prior to admission medications     Medication history source reliability:Good    Actions taken by pharmacist (provider contacted, etc):None     Additional medication history information not noted on PTA med list :None    Medication reconciliation/reorder completed by provider prior to medication history? No    Time spent in this activity:  15 minutes    Prior to Admission medications    Medication Sig Last Dose Taking? Auth Provider   oxyCODONE-acetaminophen (PERCOCET) 5-325 MG per tablet Take 0.5 tablets by mouth every morning 3/16/2018 at AM Yes Unknown, Entered By History   FUROSEMIDE PO Take 20 mg by mouth every other day 3/15/2018 at AM Yes Unknown, Entered By History   rivaroxaban ANTICOAGULANT (XARELTO) 15 MG TABS tablet Take 1 tablet (15 mg) by mouth daily (with dinner) 3/16/2018 at PM Yes Jefry Walker MD   metoprolol succinate (TOPROL-XL) 100 MG 24 hr tablet Take 1 tablet (100 mg) by mouth daily 3/16/2018 at AM Yes Jefry Walker MD   losartan (COZAAR) 25 MG tablet Take 1 tablet (25 mg) by mouth daily 3/16/2018 at AM Yes Jefry Walker MD   simvastatin (ZOCOR) 20 MG tablet TAKE HALF TABLET BY MOUTH AT BEDTIME 3/16/2018 at HS Yes Marbin Rouse MD   Cyanocobalamin (VITAMIN B 12 PO) Take 1 tablet by mouth daily  3/16/2018 at AM Yes Reported, Patient   VITAMIN D, CHOLECALCIFEROL, PO Take 1,000 Units by mouth daily.   3/16/2018 at AM Yes Unknown, Entered By History

## 2018-03-17 NOTE — PROGRESS NOTES
[facility]    Hospitalist Progress Note    Assessment & Plan   Theresa Alves is a 89 year old female who was admitted on 3/16/2018 with SOB and afib with RVR    Impression:   Principal Problem:    Atrial fibrillation w RVR  Active Problems:    Pulmonary fibrosis    Hypertension goal BP (blood pressure) < 140/80    SOB (shortness of breath)    Memory loss    Epistaxis -- related to Xarelto    Plan:  Seen by cardiology, Metoprolol succinate  increased to 200 mg daily and BP down to 110 systolic, Heart rate still 130 -- will add digoxin (IV load ordered) and then oral daily digoxin dose starting tomorrow.      DVT Prophylaxis: Xarelto -- but now held 2nd to epistaxis   Code Status: Full Code    Disposition: Expected discharge in 1 day once heart rate controlled. .    Edis Seay MD  Pager [phone]  Cell Phone [phone]  Text Page (7am to 6pm)    Interval History   Has had nose bleeds off and on this past week.  Nose is dry.  Heart rate 130's this AM.  SOB when admitted, feels OK at rest.     Physical Exam   Temp: 97.2  F (36.2  C) Temp src: Oral BP: 107/87 Pulse: 91 Heart Rate: 114 Resp: 18 SpO2: 95 % O2 Device: None (Room air)    Vitals:    03/16/18 2300   Weight: 59 kg (130 lb)     Vital Signs with Ranges  Temp:  [97.2  F (36.2  C)-97.9  F (36.6  C)] 97.2  F (36.2  C)  Pulse:  [] 91  Heart Rate:  [] 114  Resp:  [16-22] 18  BP: (107-145)/(78-96) 107/87  SpO2:  [93 %-99 %] 95 %  I/O last 3 completed shifts:  In: -   Out: 900 [Urine:900]    # Pain Assessment:   Current Pain Score 3/17/2018 3/17/2018 3/17/2018   Patient currently in pain? denies denies denies   Pain score (0-10) - - -   Pain location - - -   Pain descriptors - - -   Theresa alfaro pain level was assessed and she currently denies pain.        Constitutional: Awake, alert, cooperative, no apparent distress  Respiratory: Clear to auscultation bilaterally, no crackles or wheezing  Cardiovascular: irregular S1  and S2, and no murmur noted  GI: Normal bowel sounds, soft, non-distended, non-tender  Extrem: No calf tenderness, no ankle edema  Neuro: Ox3 but took 2 guesses to get date correct, no focal motor or sensory deficits    Medications     - MEDICATION INSTRUCTIONS -         simvastatin  20 mg Oral At Bedtime     cholecalciferol  1,000 Units Oral Daily     oxyCODONE IR  2.5 mg Oral Daily    And     acetaminophen  162.5 mg Oral Daily     [START ON 3/18/2018] metoprolol succinate  200 mg Oral Daily     sodium chloride  1 spray Both Nostrils 3 times daily     digoxin  250 mcg Intravenous Q3H     [START ON 3/18/2018] digoxin  125 mcg Oral Daily     tranexamic acid  500 mg Left nostril Once       Data     Recent Labs  Lab 03/16/18  2304   WBC 11.2*   HGB 12.7   *         POTASSIUM 4.7   CHLORIDE 104   CO2 26   BUN 36*   CR 1.12*   ANIONGAP 8   PAWEL 9.1   *   ALBUMIN 3.5   PROTTOTAL 7.9   BILITOTAL 1.2   ALKPHOS 92   ALT 45   AST 43   TROPI 0.033       Imaging:   Recent Results (from the past 24 hour(s))   XR Chest 2 Views    Narrative    XR CHEST 2 VW  3/16/2018 11:48 PM     INDICATION: Dyspnea on exertion.    COMPARISON: 2/21/2018.      Impression    IMPRESSION: Cardiomegaly. Mild diffuse interstitial opacities may be  slightly more prominent. Most of the interstitial prominence was  present previously and is likely due to fibrosis. Component of  superimposed interstitial edema/CHF is not excluded. Possible small  pleural effusions. Degenerative changes and kyphosis thoracic spine.    KATIA BALDERRAMA MD

## 2018-03-17 NOTE — PROVIDER NOTIFICATION
MD Notification    Notified Person: MD    Notified Person Name: Dr. Granados    Notification Date/Time: 3/17/2018 @ 1455    Notification Interaction:     Purpose of Notification: Pt's 's-150's Afib RVR    Orders Received: Dig load then start PO in morning    Comments:

## 2018-03-17 NOTE — PLAN OF CARE
Problem: Patient Care Overview  Goal: Plan of Care/Patient Progress Review  Outcome: Improving  Heart Failure Care Pathway  GOALS TO BE MET BEFORE DISCHARGE:    1. Decrease congestion and/or edema with diuretic therapy to achieve near      optimal volume status.            Dyspnea improved:  No, please explain: dyspneiic with exertion            Edema improved:     No, please explain: just arrived on unit        Net I/O and Weights since admission:                       Vitals:    03/16/18 2300   Weight: 59 kg (130 lb)       2.  O2 sats > 92% on RA or at prior home O2 therapy level.          Current oxygenation status:       SpO2: 99 %         O2 Device: None (Room air),            Able to wean O2 this shift to keep sats > 92%:  NA       Does patient use Home O2? No    3.  Tolerates ambulation and mobility near baseline: No, please explain: pt lives alone baseline, could barely stand for a minute for RN to remove clothing without losing her breathe        How many times did the patient ambulate with nursing staff this shift? 1 at bedside    Please review the Heart Failure Care Pathway for additional HF goal outcomes.    Stephani Patricio RN  3/17/2018     Pt VSS on RA. Tele afib rvr. A&Ox4. Contact precautions initiated. Up with A of 1 and walker, using commode at bedside. Skin integrity intact. Pt states she has chronic back pain she takes percocet for as well as a heating pad. Slept. Plan for ep to consult and to continue to diurese. Continue to monitor.

## 2018-03-17 NOTE — ED PROVIDER NOTES
History     Chief Complaint:  Epistaxis and shortness of breath     The history is provided by the patient.      Theresa Alves is a 89 year old female with a history of recently diagnosed AFib (February 2018) on Xarelto (starting 3/1/18), pulmonary fibrosis, and CKD who presents with epistaxis and dyspnea on exertion.  The patient reports having shortness of breath over the past 6 months which acutely worsened over the past week to the point where she is unable to ambulate with her walker as she normally does at baseline. Notes shortness of breath present only with exertion and denies current dyspnea. She endorses chest pain a few nights ago which spontaneously resolved. She denies other symptoms such as fever, cough, rhinorrhea, sore throat, vomiting, nausea, diarrhea, or dysuria. She is followed closely by her cardiologist and has plan for holter monitor to be placed in 3 days and has seen her PCP within the past few weeks. She is also concerned regarding nosebleeds every day (sometimes more than once a day) for the past week. She can normally stop them herself after a few minutes with pressure, but presents tonight after a nose bleed that started about 1.5 hours ago and has not stopped.     Allergies:  No known drug allergies      Medications:    Xarelto  Metoprolol  Losartan  Lasix  Simvastatin     Past Medical History:    Chronic back pain  Pulmonary fibrosis  Hypertension  AFib  Hyperlipidemia  CHF  CKD Stage III    Past Surgical History:    Carpal tunnel release  Cataract surgery  Hernia repair x2    Family History:    History review. No contributing family history.     Social History:  Smoking status: no  Alcohol use: yes   The patient lives in a private, independent residence  PCP: Marbin Rouse   Patient presents with friend, Annette.   Marital Status:        Review of Systems   Constitutional: Negative for fever.   HENT: Positive for nosebleeds. Negative for rhinorrhea and sore throat.     Respiratory: Positive for shortness of breath. Negative for cough.    Cardiovascular: Positive for chest pain (resolved).   Gastrointestinal: Negative for diarrhea, nausea and vomiting.   Genitourinary: Negative for dysuria.   All other systems reviewed and are negative.    Physical Exam     Patient Vitals for the past 24 hrs:   BP Temp Temp src Pulse Heart Rate Resp SpO2 Weight   03/17/18 0030 - - - - 94 16 97 % -   03/17/18 0015 - - - - 87 22 97 % -   03/17/18 0000 - - - - 83 16 95 % -   03/16/18 2330 - - - - 146 22 94 % -   03/16/18 2315 - - - - 125 - 95 % -   03/16/18 2300 128/82 97.7  F (36.5  C) Oral 131 131 20 98 % 59 kg (130 lb)     Physical Exam  General: Well-developed and well-nourished; well appearing elderly  female; cooperative  Head:  Atraumatic  Eyes:  Conjunctivae, lids, and sclerae are normal  ENT:    Right nare normal; left nare with slow oozing without easily identifiable source; no septal hematoma; moist mucous membranes  Neck:  Supple; normal range of motion  CV:  Tachycardic rate and irregularly irregular rhythm; normal heart sounds with no murmurs, rubs, or gallops detected  Resp:  No respiratory distress; scattered rales throughout without without decreased breath sounds, wheezing, or rhonchi  GI:  Soft; non-distended; non-tender    MS:  Normal ROM; no bilateral lower extremity edema  Skin:  Warm; non-diaphoretic; no pallor  Neuro:  Awake; A&Ox3; normal strength  Psych: Normal mood and affect; normal speech  Vitals reviewed.    Emergency Department Course   EKG  Indication: shortness of breath   Time: 2258  Rate 139 bpm. QRS duration 72. QT/QTc 302/459.   Atrial fibrillation with rapid ventricular response.   Cannot rule out anterior infarct, age undetermined.   ST & T wave abnormality, consider inferolateral ischemia.    Abnormal ECG.  No acute ST changes.  No significant changes as compared to prior, dated 2/26/18.     Imaging:  Radiographic findings were communicated with the  patient who voiced understanding of the findings.    X-ray Chest, 2 views:  Cardiomegaly. Mild diffuse interstitial opacities may be  slightly more prominent. Most of the interstitial prominence was  present previously and is likely due to fibrosis. Component of  superimposed interstitial edema/CHF is not excluded. Possible small  pleural effusions. Degenerative changes and kyphosis thoracic spine.   As read by Radiology.     Laboratory:  CBC: WBC 11.2 (H), o/w WNL (HGB 12.7,  )  CMP: Glucose 127 (H), BUN 36 (H), Creatinine 1.12 (H), GFR 45 (L), o/w WNL   BNP: 65774 (H)  TSH: 4.13 (H)  T3 free: pending  T4 free: 1.38  Troponin: 0.033  Blood type: A+     Interventions:  2328: Diltiazem 15 mg IV  2350: NS 0.5L IV Bolus       Emergency Department Course:  Past medical records, nursing notes, and vitals reviewed.  2305: I performed an exam of the patient and obtained history, as documented above. GCS 15.  IV inserted and blood drawn.  2340: I rechecked the patient. She continues to have minimal bleeding from the left nare.  0037: I rechecked the patient and placed TXA soaked cotton ball.  0106: I rechecked and updated the patient. The first TXA did not work.  0132: I rechecked the patient and her bleeding has stopped.  0200: The patient failed trial of ambulation with significant dyspnea and heart rate to 130.  Findings and plan explained to the patient and friend who consent to admission.     0218: Discussed the patient with Dr. Zamora, who will admit the patient [place] [facility] [place] for further monitoring, evaluation, and treatment.      Impression & Plan      Medical Decision Making:  Joyce is a 89-year-old female who lives alone and reports she was recently diagnosed with atrial fibrillation and started on Xarelto March 1.  She notes she has had shortness of breath for about 6 months but it worsened over the last week such that she has significant dyspnea with exertion and is unable to ambulate at baseline.  She also  notes that she has had epistaxis daily for the last few days though it has resolved with pressure at home.  She called the ambulance today for epistaxis which has not resolved and they noted she was significantly dyspneic on exertion, even with a few steps to their cart.  On examination patient has a very mild oozing from the left nare and is in atrial fibrillation with rapid ventricular response with a rate in the 150s during my exam.  Otherwise, patient's exam is reassuring.  Scattered rales on lung auscultation expected in a patient with pulmonary fibrosis.    EKG confirms atrial fibrillation with RVR without acute ST changes.  Patient was given 0.25 mg/kg of diltiazem with appropriate improvement in her heart rate.  Patient then remained in atrial fibrillation with controlled rates throughout the remainder of her emergency department stay.  I used a TXA soaked cotton ball with near resolution of the epistaxis.  After further pressure she then had resolution of the epistaxis though nursing staff reports she had one other smaller episode that also resolved with pressure.  CBC is reassuring with normal hemoglobin.  CMP is at patient's baseline with a creatinine of 1.12.  BNP is significantly elevated at 16,679 though chest x-ray shows only slight more prominence in her diffuse interstitial opacities.  There is no clear evidence of pulmonary edema or CHF exacerbation.  She does not have any lower extremity edema on exam and I am somewhat concerned that her uncontrolled AFib with RVR with exertion may be due to a degree of dehydration.  Patient did get 500 cc of IV fluids during her workup though on trial of ambulation, when patient stood, she became significantly short of breath and her heart rate returned to 130 bpm.  Thus, it is unlikely that dehydration is the etiology of her uncontrolled AFib.  Of note, troponin is negative at 0.033 and TSH is elevated at 4 with a normal T4 and T3 pending.  It is highly unlikely  that the source of patient's shortness of breath is pulmonary embolus as she is appropriately anticoagulated.  However, exact etiology of her dyspnea on exertion is unclear and given her persistent symptoms with standing in the emergency department as stated before, as well as uncontrolled atrial fibrillation requiring diltiazem, I believe patient warrants admission to the hospital for further monitoring, workup, and treatment as indicated.  Dyspnea may simply be due to worsening underlying pulmonary fibrosis.  I discussed this with the patient and she is pleased with plan as she is worried about discharge because she lives alone.  I reviewed the results of her laboratory and imaging studies and I answered all her questions.  She and her friend Annette at bedside verbalized understanding.  I discussed patient's case with Dr. Zamora, hospitalist, who accepts admission and has no further orders.    Diagnosis:    ICD-10-CM    1. Uncontrolled atrial fibrillation (H) I48.91 Comprehensive metabolic panel     Nt probnp inpatient     Nt probnp inpatient     TSH     TSH     Troponin I     Troponin I     ABO/Rh type and screen     ABO/Rh type and screen     T3 Free     T3 Free     T4 free     T4 free     CANCELED: Nt probnp inpatient (BNP)     CANCELED: TSH     CANCELED: Troponin I     CANCELED: T3 Free     CANCELED: T4 free   2. Atrial fibrillation with RVR (H) I48.91    3. Epistaxis R04.0    4. Anticoagulated Z79.01    5. HORTON (dyspnea on exertion) R06.09        Disposition:  Admitted to [facility]    Gladis Colon  3/16/2018    EMERGENCY DEPARTMENT  [place], am serving as a scribe at 11:14 PM on 3/16/2018 to document services personally performed by Lacie Browne MD based on my observations and the provider's statements to me.        Lacie Browne MD  03/17/18 0836

## 2018-03-17 NOTE — CONSULTS
"BRIEF NUTRITION ASSESSMENT      REASON FOR ASSESSMENT:  Admit Screen - Reduced oral intake over the last month    NUTRITION HISTORY:  Patient reports that she has not had any recent reduced appetite or intake.  \"I don't like to eat.  Except for breakfast\".  States that this is baseline for her.  She likes a lot of healthy foods such and fruits and vegetable.  \"I used to like fudge and candy, but I don't care much for that anymore\".    CURRENT DIET AND INTAKE:  Diet:  Regular diet               Consumed 50% of the omelet - \"it was too overcooked\", 100% of the fruit, and nearly 100% of the toast     ANTHROPOMETRICS:  Height: 5'5\"  Weight: 59 kg (130#)(3/16)  BMI: 21.63 kg/m2  IBW:  56.8 kg   Weight Status: Normal BMI  %IBW: 104%  Weight History:   Wt Readings from Last 10 Encounters:   03/16/18 59 kg (130 lb)   03/08/18 60 kg (132 lb 3.2 oz)   02/26/18 59.4 kg (131 lb)   02/21/18 59.9 kg (132 lb)   11/28/17 59 kg (130 lb)   08/31/16 60.3 kg (133 lb)   12/22/15 60.8 kg (134 lb)   09/29/15 59.9 kg (132 lb)   01/28/15 61.7 kg (136 lb)   10/01/14 59 kg (130 lb)   Weight has been stable       LABS:  Labs noted    MALNUTRITION:  Patient does not meet two of the following criteria necessary for diagnosing malnutrition: significant weight loss, reduced intake, subcutaneous fat loss, muscle loss or fluid retention    NUTRITION INTERVENTION:  Nutrition Diagnosis:  No nutrition diagnosis at this time.    Implementation:  Nutrition Education:  Per Provider order if indicated.    FOLLOW UP/MONITORING:   Will re-evaluate in 7 - 10 days, or sooner, if re-consulted.    Silvana Jones RD, LD, [facility]   Clinical Dietitian - [facility]             "

## 2018-03-17 NOTE — PROGRESS NOTES
RECEIVING UNIT ED HANDOFF REVIEW    ED Nurse Handoff Report was reviewed by: Stephani Patricio on March 17, 2018 at 3:27 AM

## 2018-03-18 ENCOUNTER — APPOINTMENT (OUTPATIENT)
Dept: PHYSICAL THERAPY | Facility: CLINIC | Age: 83
DRG: 280 | End: 2018-03-18
Attending: INTERNAL MEDICINE
Payer: COMMERCIAL

## 2018-03-18 LAB
ALBUMIN SERPL-MCNC: 2.9 G/DL (ref 3.4–5)
ALP SERPL-CCNC: 67 U/L (ref 40–150)
ALT SERPL W P-5'-P-CCNC: 27 U/L (ref 0–50)
ANION GAP SERPL CALCULATED.3IONS-SCNC: 10 MMOL/L (ref 3–14)
AST SERPL W P-5'-P-CCNC: 24 U/L (ref 0–45)
BILIRUB SERPL-MCNC: 1.8 MG/DL (ref 0.2–1.3)
BUN SERPL-MCNC: 34 MG/DL (ref 7–30)
CALCIUM SERPL-MCNC: 8.6 MG/DL (ref 8.5–10.1)
CHLORIDE SERPL-SCNC: 107 MMOL/L (ref 94–109)
CO2 SERPL-SCNC: 21 MMOL/L (ref 20–32)
CREAT SERPL-MCNC: 1.15 MG/DL (ref 0.52–1.04)
ERYTHROCYTE [DISTWIDTH] IN BLOOD BY AUTOMATED COUNT: 14.7 % (ref 10–15)
GFR SERPL CREATININE-BSD FRML MDRD: 44 ML/MIN/1.7M2
GLUCOSE SERPL-MCNC: 85 MG/DL (ref 70–99)
HCT VFR BLD AUTO: 34 % (ref 35–47)
HGB BLD-MCNC: 11.4 G/DL (ref 11.7–15.7)
MCH RBC QN AUTO: 33.8 PG (ref 26.5–33)
MCHC RBC AUTO-ENTMCNC: 33.5 G/DL (ref 31.5–36.5)
MCV RBC AUTO: 101 FL (ref 78–100)
PLATELET # BLD AUTO: 187 10E9/L (ref 150–450)
POTASSIUM SERPL-SCNC: 4.3 MMOL/L (ref 3.4–5.3)
PROT SERPL-MCNC: 6.3 G/DL (ref 6.8–8.8)
RBC # BLD AUTO: 3.37 10E12/L (ref 3.8–5.2)
SODIUM SERPL-SCNC: 138 MMOL/L (ref 133–144)
WBC # BLD AUTO: 7.7 10E9/L (ref 4–11)

## 2018-03-18 PROCEDURE — 80053 COMPREHEN METABOLIC PANEL: CPT | Performed by: INTERNAL MEDICINE

## 2018-03-18 PROCEDURE — 21000001 ZZH R&B HEART CARE

## 2018-03-18 PROCEDURE — 97116 GAIT TRAINING THERAPY: CPT | Mod: GP | Performed by: PHYSICAL THERAPIST

## 2018-03-18 PROCEDURE — 99233 SBSQ HOSP IP/OBS HIGH 50: CPT | Performed by: INTERNAL MEDICINE

## 2018-03-18 PROCEDURE — 85027 COMPLETE CBC AUTOMATED: CPT | Performed by: INTERNAL MEDICINE

## 2018-03-18 PROCEDURE — 40000193 ZZH STATISTIC PT WARD VISIT: Performed by: PHYSICAL THERAPIST

## 2018-03-18 PROCEDURE — 97530 THERAPEUTIC ACTIVITIES: CPT | Mod: GP | Performed by: PHYSICAL THERAPIST

## 2018-03-18 PROCEDURE — 99232 SBSQ HOSP IP/OBS MODERATE 35: CPT | Performed by: INTERNAL MEDICINE

## 2018-03-18 PROCEDURE — 36415 COLL VENOUS BLD VENIPUNCTURE: CPT | Performed by: INTERNAL MEDICINE

## 2018-03-18 PROCEDURE — 97161 PT EVAL LOW COMPLEX 20 MIN: CPT | Mod: GP | Performed by: PHYSICAL THERAPIST

## 2018-03-18 PROCEDURE — 99207 ZZC CDG-MDM COMPONENT: MEETS MODERATE - UP CODED: CPT | Performed by: INTERNAL MEDICINE

## 2018-03-18 PROCEDURE — 25000132 ZZH RX MED GY IP 250 OP 250 PS 637: Performed by: INTERNAL MEDICINE

## 2018-03-18 PROCEDURE — 25000128 H RX IP 250 OP 636: Performed by: INTERNAL MEDICINE

## 2018-03-18 RX ORDER — DIGOXIN 125 MCG
125 TABLET ORAL DAILY
Qty: 30 TABLET | Refills: 3 | Status: SHIPPED | OUTPATIENT
Start: 2018-03-18 | End: 2018-03-18

## 2018-03-18 RX ORDER — FUROSEMIDE 10 MG/ML
20 INJECTION INTRAMUSCULAR; INTRAVENOUS ONCE
Status: COMPLETED | OUTPATIENT
Start: 2018-03-18 | End: 2018-03-18

## 2018-03-18 RX ORDER — DIGOXIN 125 MCG
125 TABLET ORAL DAILY
Qty: 30 TABLET | Refills: 3 | Status: SHIPPED | OUTPATIENT
Start: 2018-03-18 | End: 2018-04-10

## 2018-03-18 RX ORDER — METOPROLOL SUCCINATE 200 MG/1
200 TABLET, EXTENDED RELEASE ORAL DAILY
Qty: 30 TABLET | Refills: 3 | Status: SHIPPED | OUTPATIENT
Start: 2018-03-18 | End: 2018-03-18

## 2018-03-18 RX ORDER — METOPROLOL SUCCINATE 200 MG/1
200 TABLET, EXTENDED RELEASE ORAL DAILY
Qty: 30 TABLET | Refills: 3 | Status: SHIPPED | OUTPATIENT
Start: 2018-03-18 | End: 2018-06-05

## 2018-03-18 RX ADMIN — DIGOXIN 125 MCG: 125 TABLET ORAL at 09:10

## 2018-03-18 RX ADMIN — ACETAMINOPHEN 162.5 MG: 325 SOLUTION ORAL at 09:10

## 2018-03-18 RX ADMIN — METOPROLOL SUCCINATE 200 MG: 100 TABLET, EXTENDED RELEASE ORAL at 09:10

## 2018-03-18 RX ADMIN — SIMVASTATIN 20 MG: 20 TABLET, FILM COATED ORAL at 20:59

## 2018-03-18 RX ADMIN — CHOLECALCIFEROL TAB 25 MCG (1000 UNIT) 1000 UNITS: 25 TAB at 09:10

## 2018-03-18 RX ADMIN — FUROSEMIDE 20 MG: 10 INJECTION, SOLUTION INTRAVENOUS at 10:34

## 2018-03-18 RX ADMIN — Medication 1 SPRAY: at 06:10

## 2018-03-18 RX ADMIN — Medication 2.5 MG: at 09:10

## 2018-03-18 RX ADMIN — Medication 1 SPRAY: at 21:04

## 2018-03-18 ASSESSMENT — PAIN DESCRIPTION - DESCRIPTORS: DESCRIPTORS: SORE

## 2018-03-18 NOTE — PLAN OF CARE
Problem: Cardiac: Heart Failure (Adult)  Goal: Signs and Symptoms of Listed Potential Problems Will be Absent, Minimized or Managed (Cardiac: Heart Failure)  Signs and symptoms of listed potential problems will be absent, minimized or managed by discharge/transition of care (reference Cardiac: Heart Failure (Adult) CPG).   Patient alert and oriented. Short of breath getting up to chair or bathroom- slowly improving throughout the day. Tolerating regular diet. Tele afib. Continue to monitor.

## 2018-03-18 NOTE — PROGRESS NOTES
03/18/18 1500   Quick Adds   Type of Visit Initial PT Evaluation   Living Environment   Lives With alone   Living Arrangements apartment   Number of Stairs to Enter Home 0   Number of Stairs Within Home 0   Transportation Available family or friend will provide   Self-Care   Usual Activity Tolerance moderate   Current Activity Tolerance poor   Regular Exercise no   Equipment Currently Used at Home walker, rolling;shower chair;grab bar  (FWW and 4WW)   Activity/Exercise/Self-Care Comment Has a friend who does her cleaning, grocery shopping and helps with meals. Friends who drive her to appts as needed as well.   Functional Level Prior   Ambulation 1-->assistive equipment   Transferring 1-->assistive equipment   Toileting 1-->assistive equipment   Bathing 3-->assistive equipment and person   Dressing 0-->independent   Eating 0-->independent   Communication 0-->understands/communicates without difficulty   Swallowing 0-->swallows foods/liquids without difficulty   Cognition 0 - no cognition issues reported   Fall history within last six months no   Which of the above functional risks had a recent onset or change? ambulation;transferring;toileting;bathing   General Information   Onset of Illness/Injury or Date of Surgery - Date 03/16/18   Referring Physician Diamond Zamora MD   Patient/Family Goals Statement None stated   Pertinent History of Current Problem (include personal factors and/or comorbidities that impact the POC) 89 year old female who was admitted on 3/16/2018 with SOB and afib with RVR, PMH significant for pulmonary fibrosis   Precautions/Limitations fall precautions   General Info Comments Activity: up ad marshall   Cognitive Status Examination   Orientation orientation to person, place and time   Level of Consciousness alert   Follows Commands and Answers Questions 100% of the time   Personal Safety and Judgment intact   Memory intact   Pain Assessment   Patient Currently in Pain No   Posture    Posture  "Forward head position;Protracted shoulders;Kyphosis;Scoliosis   Range of Motion (ROM)   ROM Comment Grossly WFL for AROM of LEs   Strength   Strength Comments Proximal LE weakness observed with transfers and bed mobility   Bed Mobility   Bed Mobility Comments min A supine <> sit   Transfer Skills   Transfer Comments min A sit <> stand with B UE support   Balance   Balance Comments Impaired balance with transfers and gait requiring mod A to correct overt LOB with pivot transfers and B UE support for lateral stability   Coordination   Coordination Comments WFL   General Therapy Interventions   Planned Therapy Interventions balance training;bed mobility training;gait training;neuromuscular re-education;strengthening;transfer training;risk factor education;home program guidelines;progressive activity/exercise   Clinical Impression   Criteria for Skilled Therapeutic Intervention yes, treatment indicated   PT Diagnosis unsteady gait and transfers, high fall risk wtih mobility   Influenced by the following impairments balance impairments, postural deficits, HORTON/SOB, fatigue, weakness   Functional limitations due to impairments unsteady gait and transfers, high fall risk wtih mobility   Clinical Presentation Stable/Uncomplicated   Clinical Presentation Rationale improving medical status   Clinical Decision Making (Complexity) Low complexity   Therapy Frequency` daily   Predicted Duration of Therapy Intervention (days/wks) 3 days   Anticipated Discharge Disposition Transitional Care Facility   Risk & Benefits of therapy have been explained Yes   Patient, Family & other staff in agreement with plan of care Yes   [facility] [employer] \"6 Clicks\"   2016, Trustees of [facility], under license to [employer].  All rights reserved.   6 Clicks Short Forms Basic Mobility Inpatient Short Form   [facility] [employer]  \"6 Clicks\" V.2 Basic Mobility Inpatient Short Form   1. Turning from your back to your side while in " a flat bed without using bedrails? 3 - A Little   2. Moving from lying on your back to sitting on the side of a flat bed without using bedrails? 3 - A Little   3. Moving to and from a bed to a chair (including a wheelchair)? 3 - A Little   4. Standing up from a chair using your arms (e.g., wheelchair, or bedside chair)? 3 - A Little   5. To walk in hospital room? 3 - A Little   6. Climbing 3-5 steps with a railing? 2 - A Lot   Basic Mobility Raw Score (Score out of 24.Lower scores equate to lower levels of function) 17   Total Evaluation Time   Total Evaluation Time (Minutes) 9

## 2018-03-18 NOTE — DISCHARGE SUMMARY
[facility]    Discharge Summary  Hospitalist    Date of Admission:  3/16/2018  Date of Discharge:  3/20/2018  Discharging Provider: Edis Seay MD    Discharge Diagnoses   Principal Problem:    Atrial fibrillation w RVR  Active Problems:    Pulmonary fibrosis    Hypertension goal BP (blood pressure) < 140/80    SOB (shortness of breath)    Memory loss      History of Present Illness   89-year-old female with a history of hypertension, pulmonary fibrosis, CKD stage III who presents to the [facility] ED with atrial fibrillation with RVR with EKG showing afib with rvr to 140. Chest xray showed mild diffuse interstitial opacities may be slightly more prominent. Component of superimposed interstitial edema/CHF is not excluded. Was given 15 mg of dilt and 0.5 L of NS. This is not a new diagnosis, as she has been following closely with her cardiologist for her afib. Her outpatient cardiology notes from Dr. Walker mention her worsening SOB as possibly multifactorial, as some of her symptoms could be related to the Metoprolol, the atrial fibrillation itself, or perhaps some noncardiac issues. Cardioversion was discussed with her, but she wanted to wait 4 weeks on anticoagulation prior to proceeding (it has only been 3 weeks on the anticoagulation).    Hospital Course   Seen by cardiology, her Toprol  mg was increased to 200 mg daily, but heart rate still 130's.  Her Cozaar was discontinued due to lower BP (100 to 110 systolic).  At that point IV Digoxin given with heart rate then 70-90's in afib, and SOB seemed better, and able to ambulate without significant dyspnea.  She has been on Xarelto 15 mg daily because of the afib, but unfortunately has had daily nose bleeds for the last several days, did get Tranexamic Acid once to nares with little benefit, was given nasal saline nasal spray to help with dryness, and because on continued bleeding Xarelto will be stopped for 3 days, at which point  she can try to resume it.  Follow up in 3 days, check BMP then.  She will be on Digoxin 0.125 mg daily, and her creatinine is 1.15 (estimated GFR 44), and her Digoxin level at time of discharge was 1.2.      Edis Seay MD  Pager: [phone]  Cell Phone:  [phone]       Significant Results and Procedures   As above    Pending Results   These results will be followed up by Dr. Seay  Unresulted Labs Ordered in the Past 30 Days of this Admission     No orders found from 1/15/2018 to 3/17/2018.          Code Status   Full Code       Primary Care Physician   Marbin Rouse    Physical Exam   Temp: 98.4  F (36.9  C) Temp src: Oral BP: 120/75   Heart Rate: 72 Resp: 18 SpO2: 95 % O2 Device: None (Room air)    Vitals:    03/18/18 0131 03/19/18 0100 03/20/18 0000   Weight: 59 kg (130 lb) 57.2 kg (126 lb) 56.2 kg (124 lb)     Vital Signs with Ranges  Temp:  [97.5  F (36.4  C)-98.4  F (36.9  C)] 98.4  F (36.9  C)  Heart Rate:  [65-83] 72  Resp:  [16-18] 18  BP: (107-129)/(64-75) 120/75  SpO2:  [92 %-98 %] 95 %  I/O last 3 completed shifts:  In: 540 [P.O.:540]  Out: 1875 [Urine:1875]    Exam on discharge:   Lungs clear  CV -- irreg S1S2  Ankles without edema    # Discharge Pain Plan:   - Patient currently has NO PAIN and is not being prescribed pain medications on discharge.      Discharge Disposition   Discharged to home  Condition at discharge: Good    Consultations This Hospital Stay   ELECTROPHYSIOLOGY IP CONSULT  SOCIAL WORK IP CONSULT  PHYSICAL THERAPY ADULT IP CONSULT  OCCUPATIONAL THERAPY ADULT IP CONSULT    Time Spent on this Encounter   I spent a total of 35 minutes discharging this patient.     Discharge Orders     Reason for your hospital stay   Atrial fib with rapid rate     Activity   Your activity upon discharge: activity as tolerated     Discharge Instructions   Call Dr. Seay at Pager [phone] if questions, or Cell Phone [phone].     Follow-up and recommended labs and tests     BMP in 3 days and provider at TCU to follow up in 3-5 days.  Dry weight is 120 to 125 lbs.-- consider holding or decreasing diuretic if weight < 120 lbs.     Full Code     Diet   Follow this diet upon discharge: Orders Placed This Encounter     Combination Diet Regular Diet Adult       Discharge Medications   Current Discharge Medication List      START taking these medications    Details   digoxin (LANOXIN) 125 MCG tablet Take 1 tablet (125 mcg) by mouth daily  Qty: 30 tablet, Refills: 3    Associated Diagnoses: Atrial fibrillation with RVR (H)      sodium chloride (OCEAN) 0.65 % nasal spray Spray 1 spray into both nostrils 3 times daily as needed for congestion  Refills: 0    Associated Diagnoses: Nasal dryness         CONTINUE these medications which have CHANGED    Details   furosemide (LASIX) 20 MG tablet Take 2 tablets (40 mg) by mouth daily  Qty: 30 tablet    Associated Diagnoses: HORTON (dyspnea on exertion)      rivaroxaban ANTICOAGULANT (XARELTO) 15 MG TABS tablet Take 1 tablet (15 mg) by mouth daily (with dinner)  Qty: 30 tablet, Refills: 3    Comments: Hold for 3 days because of nose bleeds.  Associated Diagnoses: Atrial fibrillation, unspecified type (H)      metoprolol succinate (TOPROL-XL) 200 MG 24 hr tablet Take 1 tablet (200 mg) by mouth daily  Qty: 30 tablet, Refills: 3    Associated Diagnoses: Atrial fibrillation with RVR (H)         CONTINUE these medications which have NOT CHANGED    Details   oxyCODONE-acetaminophen (PERCOCET) 5-325 MG per tablet Take 0.5 tablets by mouth every morning      simvastatin (ZOCOR) 20 MG tablet TAKE HALF TABLET BY MOUTH AT BEDTIME  Qty: 45 tablet, Refills: 3    Associated Diagnoses: Hyperlipidemia with target LDL less than 130      Cyanocobalamin (VITAMIN B 12 PO) Take 1 tablet by mouth daily       VITAMIN D, CHOLECALCIFEROL, PO Take 1,000 Units by mouth daily.           STOP taking these medications       losartan (COZAAR) 25 MG tablet Comments:   Reason for  Stopping:             Allergies   No Known Allergies  Data   Most Recent 3 CBC's:  Recent Labs   Lab Test  03/18/18   0540  03/16/18   2304  02/21/18   1531   WBC  7.7  11.2*  8.4   HGB  11.4*  12.7  12.7   MCV  101*  103*  105*   PLT  187  189  220      Most Recent 3 BMP's:  Recent Labs   Lab Test  03/20/18   0540  03/19/18   0621  03/18/18   0540   NA  138  137  138   POTASSIUM  4.3  4.1  4.3   CHLORIDE  106  106  107   CO2  24  22  21   BUN  32*  33*  34*   CR  1.28*  1.14*  1.15*   ANIONGAP  8  9  10   PAWEL  8.3*  8.5  8.6   GLC  88  94  85     Most Recent 2 LFT's:  Recent Labs   Lab Test  03/18/18   0540  03/16/18   2304   AST  24  43   ALT  27  45   ALKPHOS  67  92   BILITOTAL  1.8*  1.2     Most Recent INR's and Anticoagulation Dosing History:  Anticoagulation Dose History     There is no flowsheet data to display.        Most Recent 3 Troponin's:  Recent Labs   Lab Test  03/16/18   2304  09/03/12   1535  09/03/12   0201   TROPI  0.033  0.049*  0.093*     Most Recent Cholesterol Panel:  Recent Labs   Lab Test  11/28/17   1454   CHOL  142   LDL  52   HDL  69   TRIG  107     Most Recent 6 Bacteria Isolates From Any Culture (See EPIC Reports for Culture Details):  Recent Labs   Lab Test  09/28/12   1029  09/03/12   1645  09/02/12   1840  09/02/12   1825  09/02/12   1820   CULT  >100,000 colonies/mL Methicillin resistant Staphylococcus aureus (MRSA) 10 to 50,000 colonies/mL Coagulase negative Staphylococcus  No growth  Cultured on the 1st day of incubation: Escherichia coli Critical Value, preliminary result only, called to and read back by Carlos Dennis ( patient's nurse @ 1636 on 9/3/2012, cn.  Cultured on the 1st day of incubation: Escherichia coli Critical Value, preliminary result only, called to and read back by Carlos Dennis 9/3/12 @1636 cn Susceptibility testing done on previous specimen  >100,000 colonies/mL Escherichia coli     Most Recent TSH, T4 and A1c Labs:  Recent Labs   Lab Test  03/16/18    2304   TSH  4.13*   T4  1.38

## 2018-03-18 NOTE — PROGRESS NOTES
1800 Dig held d/t HR 70's-104, orders to hold if HR <90, pt better rate controlled after first dose.  Up to BSC w/ 1-2 assist, diuresing well.  Denies pain.  Pt only had one meal today, did not want to eat more.  Nose bleed this afternoon stopped w/ gauze packing, pt also coughing up scant blood, Xarelto held.

## 2018-03-18 NOTE — PLAN OF CARE
Problem: Patient Care Overview  Goal: Plan of Care/Patient Progress Review  PT: Eval completed and treatment initiated.    Discharge Planner PT   Patient plan for discharge: agreeable to TCU  Current status: requires min A for supine <> sit, min A x 1 for sit <> stand and stand pivot with B UE support. 1 overt LOB when pivoting to commode requiring mod A to correct. Educated on safe transfer techniques with FWW and recs for use of FWW and gait belt when up with nursing staff due to high fall risk. Walker and gait belt placed in room and white board updated with recs. Pt ambulated x 20 ft with FWW and CGA, slowed gait speed, flexed and kyphotic posture. Limited by SOB but all VSS and HR max 103 bpm after ambulation.   Barriers to return to prior living situation: high fall risk, level of assist with transfers, SOB with minimal activity  Recommendations for discharge: TCU  Rationale for recommendations: for daily therapies to progress functional strength, balance and endurance for increased safety and indep with transfers and gait         Entered by: Irene Chatterjee 03/18/2018 3:39 PM

## 2018-03-18 NOTE — PROGRESS NOTES
[facility]    Cardiology Progress Note     Assessment & Plan   Theresa Alves is a 89 year old female who was admitted on 3/16/2018.     Summary:   89 yo F with  1) Acute decomenpensated diastolic heart failure HFpEF  2) Pulmonary fibrosis, kyphoscoliosis  3) Hypertension  4) CKD stage 3  5) Persistent AF on xarelto for the last month with persistent epistaxis  6) Significant epistaxis on admission  7) Afib with RVR  8) type 2 MI from heart failure     -Increase rate control with metoprolol increased to 200 daily. Should reach steady state by tomorrow.  -Was loaded with digoxin yesterday. If rates controlled tomorrow would decrease digoxin to q48 hours or discontinue with her age and renal function. Rates should improve as heart failure is controlled better  -Diuresed well with 20 IV lasix yesterday. Remains with elevated JVP and rales. Repeat 20 IV this am.   -Hold anticoagulation given frequent epistaxis. WIll need to readress risks benefits of anticoagulation. Currently patient is apprehensive about further anticoagulation given her persistent epistaxis and appears that she does not want to continue.    Rashid Dillard MD    Interval History   Brisk diuresis with lasix but remains dyspneic    Physical Exam   Temp: 99.7  F (37.6  C) Temp src: Oral BP: 131/81 Pulse: 111 Heart Rate: 77 Resp: 18 SpO2: 96 % O2 Device: None (Room air)    Vitals:    03/16/18 2300 03/18/18 0131   Weight: 59 kg (130 lb) 59 kg (130 lb)     Vital Signs with Ranges  Temp:  [97.2  F (36.2  C)-99.7  F (37.6  C)] 99.7  F (37.6  C)  Pulse:  [111] 111  Heart Rate:  [] 77  Resp:  [18-20] 18  BP: (107-144)/(67-96) 131/81  SpO2:  [93 %-96 %] 96 %  I/O last 3 completed shifts:  In: 360 [P.O.:360]  Out: 1700 [Urine:1700]    Patient Active Problem List   Diagnosis     Health Care Home     Chronic kidney disease, stage III (moderate)     Hyperlipidemia with target LDL less than 130     Edema     Pulmonary fibrosis      Preventive measure     Hypertension goal BP (blood pressure) < 140/80     SOB (shortness of breath)     Congestive heart failure (H)     Chronic fatigue     Dizziness - light-headed     Degenerative arthritis of spine     Osteoporosis     Weight loss     Right shoulder pain     Low back pain     Physical deconditioning     Chronic pain syndrome     Memory loss     Numbness of right foot     Chronic congestive heart failure, unspecified congestive heart failure type (H)     Atrial fibrillation, unspecified type (H)     Atrial fibrillation w RVR     Constitutional: A and Ox3  Eyes: normal  ENT: No bleedinf currently, JVD elevated to FDC up neck in upright position  Respiratory: Bilateral inspiratory crackles heard in base  Cardiovascular: irregularly iregular, 1/6 HSM in tricuspid area  GI: soft, nont tony  Lymph/Hematologic: normal  Genitourinary: Not examined  Skin: normal  Musculoskeletal: no edema  Neurologic: no focal deficit  Neuropsychiatric: normal    Medications     - MEDICATION INSTRUCTIONS -         furosemide  20 mg Intravenous Once     simvastatin  20 mg Oral At Bedtime     cholecalciferol  1,000 Units Oral Daily     oxyCODONE IR  2.5 mg Oral Daily    And     acetaminophen  162.5 mg Oral Daily     metoprolol succinate  200 mg Oral Daily     sodium chloride  1 spray Both Nostrils 3 times daily     digoxin  125 mcg Oral Daily     tranexamic acid  500 mg Left nostril Once       Data   Results for orders placed or performed during the hospital encounter of 03/16/18 (from the past 24 hour(s))   Comprehensive metabolic panel   Result Value Ref Range    Sodium 138 133 - 144 mmol/L    Potassium 4.3 3.4 - 5.3 mmol/L    Chloride 107 94 - 109 mmol/L    Carbon Dioxide 21 20 - 32 mmol/L    Anion Gap 10 3 - 14 mmol/L    Glucose 85 70 - 99 mg/dL    Urea Nitrogen 34 (H) 7 - 30 mg/dL    Creatinine 1.15 (H) 0.52 - 1.04 mg/dL    GFR Estimate 44 (L) >60 mL/min/1.7m2    GFR Estimate If Black 53 (L) >60 mL/min/1.7m2     Calcium 8.6 8.5 - 10.1 mg/dL    Bilirubin Total 1.8 (H) 0.2 - 1.3 mg/dL    Albumin 2.9 (L) 3.4 - 5.0 g/dL    Protein Total 6.3 (L) 6.8 - 8.8 g/dL    Alkaline Phosphatase 67 40 - 150 U/L    ALT 27 0 - 50 U/L    AST 24 0 - 45 U/L   CBC with platelets   Result Value Ref Range    WBC 7.7 4.0 - 11.0 10e9/L    RBC Count 3.37 (L) 3.8 - 5.2 10e12/L    Hemoglobin 11.4 (L) 11.7 - 15.7 g/dL    Hematocrit 34.0 (L) 35.0 - 47.0 %     (H) 78 - 100 fl    MCH 33.8 (H) 26.5 - 33.0 pg    MCHC 33.5 31.5 - 36.5 g/dL    RDW 14.7 10.0 - 15.0 %    Platelet Count 187 150 - 450 10e9/L

## 2018-03-18 NOTE — PLAN OF CARE
Discharge Planner OT   Patient plan for discharge: TCU   Current status: Pt admitted on 3/16 with acute decompensated heart failure. Per discussion, pt is agreeable to discharging to TCU and will be leaving in the next day or two. Due to this, will defer OT services to this setting.   Barriers to return to prior living situation: See PT note  Recommendations for discharge: Defer discharge recommendations to PT   Rationale for recommendations: Orders completed. If needs change, please notify.        Entered by: Stephani Sotelo 03/18/2018 5:20 PM

## 2018-03-18 NOTE — PLAN OF CARE
Problem: Patient Care Overview  Goal: Plan of Care/Patient Progress Review  Outcome: No Change  Heart Failure Care Pathway  GOALS TO BE MET BEFORE DISCHARGE:    1. Decrease congestion and/or edema with diuretic therapy to achieve near      optimal volume status.            Dyspnea improved:  No, please explain: HORTON present             Edema improved:     +1 BLE edema present         Net I/O and Weights since admission:          03/12 2300 - 03/17 2259  In: 240 [P.O.:240]  Out: 1400 [Urine:1400]  Net: -1160            Vitals:    03/16/18 2300 03/18/18 0131   Weight: 59 kg (130 lb) 59 kg (130 lb)       2.  O2 sats > 92% on RA or at prior home O2 therapy level.          Current oxygenation status:       SpO2: 94 %         O2 Device: None (Room air),            Able to wean O2 this shift to keep sats > 92%:  NA       Does patient use Home O2? No    3.  Tolerates ambulation and mobility near baseline: Yes        How many times did the patient ambulate with nursing staff this shift? 1    Please review the Heart Failure Care Pathway for additional HF goal outcomes.  Pt A&O. Denies chest pain , continues to have HORTON. Afib w/CVR on tele. VSS. Up w/ A-1 to commode. Cardiology following , possible EP consult. Will continue to monitor pt.     Anish Guerrero RN  3/18/2018

## 2018-03-19 LAB
ANION GAP SERPL CALCULATED.3IONS-SCNC: 9 MMOL/L (ref 3–14)
BUN SERPL-MCNC: 33 MG/DL (ref 7–30)
CALCIUM SERPL-MCNC: 8.5 MG/DL (ref 8.5–10.1)
CHLORIDE SERPL-SCNC: 106 MMOL/L (ref 94–109)
CO2 SERPL-SCNC: 22 MMOL/L (ref 20–32)
CREAT SERPL-MCNC: 1.14 MG/DL (ref 0.52–1.04)
GFR SERPL CREATININE-BSD FRML MDRD: 44 ML/MIN/1.7M2
GLUCOSE SERPL-MCNC: 94 MG/DL (ref 70–99)
POTASSIUM SERPL-SCNC: 4.1 MMOL/L (ref 3.4–5.3)
SODIUM SERPL-SCNC: 137 MMOL/L (ref 133–144)

## 2018-03-19 PROCEDURE — 99232 SBSQ HOSP IP/OBS MODERATE 35: CPT | Performed by: INTERNAL MEDICINE

## 2018-03-19 PROCEDURE — 25000132 ZZH RX MED GY IP 250 OP 250 PS 637: Performed by: INTERNAL MEDICINE

## 2018-03-19 PROCEDURE — 21000001 ZZH R&B HEART CARE

## 2018-03-19 PROCEDURE — 36415 COLL VENOUS BLD VENIPUNCTURE: CPT | Performed by: INTERNAL MEDICINE

## 2018-03-19 PROCEDURE — 80048 BASIC METABOLIC PNL TOTAL CA: CPT | Performed by: INTERNAL MEDICINE

## 2018-03-19 RX ORDER — FUROSEMIDE 40 MG
40 TABLET ORAL DAILY
Status: DISCONTINUED | OUTPATIENT
Start: 2018-03-19 | End: 2018-03-20 | Stop reason: HOSPADM

## 2018-03-19 RX ADMIN — SIMVASTATIN 20 MG: 20 TABLET, FILM COATED ORAL at 21:33

## 2018-03-19 RX ADMIN — ACETAMINOPHEN 162.5 MG: 325 SOLUTION ORAL at 09:07

## 2018-03-19 RX ADMIN — METOPROLOL SUCCINATE 200 MG: 100 TABLET, EXTENDED RELEASE ORAL at 09:07

## 2018-03-19 RX ADMIN — FUROSEMIDE 40 MG: 40 TABLET ORAL at 12:50

## 2018-03-19 RX ADMIN — CHOLECALCIFEROL TAB 25 MCG (1000 UNIT) 1000 UNITS: 25 TAB at 09:07

## 2018-03-19 RX ADMIN — DIGOXIN 125 MCG: 125 TABLET ORAL at 09:07

## 2018-03-19 RX ADMIN — Medication 2.5 MG: at 09:07

## 2018-03-19 NOTE — PROGRESS NOTES
"[facility]  Cardiology Progress Note    Date of Service (when I saw the patient): 03/19/2018  Primary Cardiologist: Dr. Walker       Assessment & Plan   Theresa Alves is a 89 year old female who was admitted on 3/16/2018 with worsening shortness of breath, exertional fatigue and A-fib with RVR. She carries a history of hypertension, pulmonary fibrosis, CKD stage III and atrial fibrillation. She was planning for elective cardioversion after 30 days of uninterrupted anticoagulation, unfortunately she had a significant episode of epistaxis and Xarelto is being held.     1.  : Atrial Fibrillation   - Rate controlled on Toprol XL, Digoxin, Anticoagulation is on hold due to Epistaxis.   - Patient wishes to not resume any anticoagulation at this time.   - CHADsVasc - 5,  Risks and benefits discussed with patient and family. Family would like some time to discuss other options with patient.    - last echo confirmed normal LV function, mod TR, mod PH, mild AR.      2.  : Exertional shortness of breath   - Mild improvement  - Admission BNP elevated  - Continue on PO Lasix  - Down 4 lbs from admission        3. Hypertension  - Well controlled on BB   - LE edema managed on compression stockings.       4.  : Epistaxis  - No reoccurrence   - Xarelto on hold    I have reviewed patient data, examined patient, and agree with medical plan.    Pineda Renee MD [facility]      JUSTIN Perkins CNP  Text Page  (M-F, 7:30 am - 4:00 pm)    Interval History   C/o exertional shortness of breath, orthopnea.  Denies chest pain, palpitations, LE edema. Refusing anticoagulation at this time, family wishes to discuss other options with patient. Tele confirms rate controlled A-fib (72). No evidence of nose bleeds. Family concerned with her poor appetite. She eats 1 meal per day and 1 \"boost\" supplement.     Physical Exam   Temp: 98  F (36.7  C) Temp src: Oral BP: 112/71   Heart Rate: 72 Resp: 18 SpO2: 94 % O2 Device: None " (Room air)    Vitals:    03/16/18 2300 03/18/18 0131 03/19/18 0100   Weight: 59 kg (130 lb) 59 kg (130 lb) 57.2 kg (126 lb)     Vital Signs with Ranges  Temp:  [98  F (36.7  C)-98.6  F (37  C)] 98  F (36.7  C)  Heart Rate:  [] 72  Resp:  [16-18] 18  BP: (112-143)/(71-98) 112/71  SpO2:  [93 %-95 %] 94 %  I/O last 3 completed shifts:  In: 540 [P.O.:540]  Out: 1700 [Urine:1700]    GEN:  In general, this is a thin elderly female in no acute distress. Patient ambulatory.  HEENT:  Pupils equal, round. Sclerae nonicteric. Clear oropharynx. Mucous membranes moist.  NECK: Supple, no masses appreciated. Trachea midline. Neg JVD   C/V: Irregular rhythm, controlled rate, audible murmur   RESP: Respirations are unlabored. No use of accessory muscles. Trace rales bilaterally.  GI: Abdomen soft, nontender, nondistended.  EXTREM: No LE edema. No cyanosis or clubbing.  NEURO: Alert and oriented, cooperative.   PSYCH: Normal affect.  SKIN: Warm and dry. No rashes or petechiae appreciated.       Medications     - MEDICATION INSTRUCTIONS -         furosemide  40 mg Oral Daily     simvastatin  20 mg Oral At Bedtime     cholecalciferol  1,000 Units Oral Daily     oxyCODONE IR  2.5 mg Oral Daily    And     acetaminophen  162.5 mg Oral Daily     metoprolol succinate  200 mg Oral Daily     sodium chloride  1 spray Both Nostrils 3 times daily     digoxin  125 mcg Oral Daily       Data   Reviewed      JUSTIN Perkins CNP 3/19/2018

## 2018-03-19 NOTE — PROGRESS NOTES
SPIRITUAL HEALTH SERVICES Progress Note  [facility]    F/U visit.  Pt was with 2 nieces who demonstrated loving support of pt.  Pt overall states she's doing well and hopeful about ongoing improvement.  Short term, pt reflected on her possible need for a blood thinner and likely need for rehab after DSC.  Pt and her family thoughtfully spoke about the pros and cons of each decision, and SH affirmed this careful discernment.  Pt said she belongs to [employer] [facility] and faithfully listens each week to their [employer] services.  Pt cites no other concerns and invited prayer to close this visit.  SH provided emotional/spiritual support and prayer.                                                                                                                                           Mat Cheng M.Div., [facility]  Staff   Pager [phone]

## 2018-03-19 NOTE — PROGRESS NOTES
[facility]    Hospitalist Progress Note    Assessment & Plan   Theresa Alves is a 89 year old female who was admitted on 3/16/2018 with SOB and afib with RVR    Impression:   Principal Problem:    Atrial fibrillation w RVR  Active Problems:    Pulmonary fibrosis    Hypertension goal BP (blood pressure) < 140/80    SOB (shortness of breath)    Memory loss    Epistaxis -- related to Xarelto    Plan:  Lasix 40 mg oral daily, check BMP and Digoxin level in AM, patient agreeable to TCU -- wants [facility].       DVT Prophylaxis: Xarelto -- but now held 2nd to epistaxis   Code Status: Prior    Disposition: Expected discharge in 1 day once heart rate controlled and breathing better .    Edis Seay MD  Pager [phone]  Cell Phone [phone]  Text Page (7am to 6pm)    Interval History   Still SBO with walking, but heart rate better and nose bleed has stopped.  Wait done 4 lbs with lasix.     Physical Exam   Temp: 98.3  F (36.8  C) Temp src: Tympanic BP: 129/73   Heart Rate: 83 Resp: 16 SpO2: 93 % O2 Device: None (Room air)    Vitals:    03/16/18 2300 03/18/18 0131 03/19/18 0100   Weight: 59 kg (130 lb) 59 kg (130 lb) 57.2 kg (126 lb)     Vital Signs with Ranges  Temp:  [98.3  F (36.8  C)-98.6  F (37  C)] 98.3  F (36.8  C)  Heart Rate:  [] 83  Resp:  [16-18] 16  BP: (123-143)/(72-98) 129/73  SpO2:  [93 %-95 %] 93 %  I/O last 3 completed shifts:  In: 540 [P.O.:540]  Out: 1700 [Urine:1700]    # Pain Assessment:   Current Pain Score 3/19/2018 3/19/2018 3/18/2018   Patient currently in pain? yes denies sleeping: patient not able to self report   Pain score (0-10) 6 - -   Pain location Back - -   Pain descriptors Constant;Sore - -   Theresa alfaro pain level was assessed and she currently denies pain.        Constitutional: Awake, alert, cooperative, no apparent distress  Respiratory: Clear to auscultation bilaterally, no crackles or wheezing  Cardiovascular: irregular S1 and S2, and no  murmur noted  GI: Normal bowel sounds, soft, non-distended, non-tender  Extrem: No calf tenderness, no ankle edema  Neuro: Ox3, no focal motor or sensory deficits    Medications     - MEDICATION INSTRUCTIONS -         furosemide  40 mg Oral Daily     simvastatin  20 mg Oral At Bedtime     cholecalciferol  1,000 Units Oral Daily     oxyCODONE IR  2.5 mg Oral Daily    And     acetaminophen  162.5 mg Oral Daily     metoprolol succinate  200 mg Oral Daily     sodium chloride  1 spray Both Nostrils 3 times daily     digoxin  125 mcg Oral Daily       Data     Recent Labs  Lab 03/19/18  0621 03/18/18  0540 03/16/18  2304   WBC  --  7.7 11.2*   HGB  --  11.4* 12.7   MCV  --  101* 103*   PLT  --  187 189    138 138   POTASSIUM 4.1 4.3 4.7   CHLORIDE 106 107 104   CO2 22 21 26   BUN 33* 34* 36*   CR 1.14* 1.15* 1.12*   ANIONGAP 9 10 8   PAWEL 8.5 8.6 9.1   GLC 94 85 127*   ALBUMIN  --  2.9* 3.5   PROTTOTAL  --  6.3* 7.9   BILITOTAL  --  1.8* 1.2   ALKPHOS  --  67 92   ALT  --  27 45   AST  --  24 43   TROPI  --   --  0.033       Imaging:   No results found for this or any previous visit (from the past 24 hour(s)).

## 2018-03-19 NOTE — PROGRESS NOTES
Met with the pt at the request of the MD. Per Dr. Granados the pt needs to go to TCU. He also stated she would prefer [facility] in [place]. Met with the pt and her nieces to request more choices in case [facility] does not have any beds. A list of TCUs was given to the pt to review. Encouraged to pick out at least 3 choices. The pt stated she does NOT want to go to [facility]. Went over the those TCUs with amenity fee's.     1431 Pt would prefer to go to [facility], [facility], or [facility]. Pt states she does not want to pay an amenity fee.

## 2018-03-19 NOTE — PROGRESS NOTES
[facility]    Hospitalist Progress Note    Assessment & Plan   Theresa Alves is a 89 year old female who was admitted on 3/16/2018 with SOB and afib with RVR    Impression:   Principal Problem:    Atrial fibrillation w RVR  Active Problems:    Pulmonary fibrosis    Hypertension goal BP (blood pressure) < 140/80    SOB (shortness of breath)    Memory loss    Epistaxis -- related to Xarelto    Plan:  Continue IV Lasix, check breathing and BMP in AM.      DVT Prophylaxis: Xarelto -- but now held 2nd to epistaxis   Code Status: Prior    Disposition: Expected discharge in 1 day once heart rate controlled and breathing better .    Edis Seay MD  Pager [phone]  Cell Phone [phone]  Text Page (7am to 6pm)    Interval History   Still SBO with walking, but heart rate better and nose bleed has stopped.   Physical Exam   Temp: 98.3  F (36.8  C) Temp src: Oral BP: 131/78 Pulse: 75 Heart Rate: 70 Resp: 18 SpO2: 94 % O2 Device: None (Room air)    Vitals:    03/16/18 2300 03/18/18 0131   Weight: 59 kg (130 lb) 59 kg (130 lb)     Vital Signs with Ranges  Temp:  [98.3  F (36.8  C)-99.7  F (37.6  C)] 98.3  F (36.8  C)  Pulse:  [] 75  Heart Rate:  [] 70  Resp:  [18-20] 18  BP: ()/(66-98) 131/78  SpO2:  [94 %-96 %] 94 %  I/O last 3 completed shifts:  In: 660 [P.O.:660]  Out: 1300 [Urine:1300]    # Pain Assessment:   Current Pain Score 3/18/2018 3/18/2018 3/17/2018   Patient currently in pain? sleeping: patient not able to self report yes yes   Pain score (0-10) - 6 5   Pain location - Back Back   Pain descriptors - Sore Aching   Theresa alfaro pain level was assessed and she currently denies pain.        Constitutional: Awake, alert, cooperative, no apparent distress  Respiratory: Clear to auscultation bilaterally, no crackles or wheezing  Cardiovascular: irregular S1 and S2, and no murmur noted  GI: Normal bowel sounds, soft, non-distended, non-tender  Extrem: No calf  tenderness, no ankle edema  Neuro: Ox3, no focal motor or sensory deficits    Medications     - MEDICATION INSTRUCTIONS -         simvastatin  20 mg Oral At Bedtime     cholecalciferol  1,000 Units Oral Daily     oxyCODONE IR  2.5 mg Oral Daily    And     acetaminophen  162.5 mg Oral Daily     metoprolol succinate  200 mg Oral Daily     sodium chloride  1 spray Both Nostrils 3 times daily     digoxin  125 mcg Oral Daily     tranexamic acid  500 mg Left nostril Once       Data     Recent Labs  Lab 03/18/18  0540 03/16/18  2304   WBC 7.7 11.2*   HGB 11.4* 12.7   * 103*    189    138   POTASSIUM 4.3 4.7   CHLORIDE 107 104   CO2 21 26   BUN 34* 36*   CR 1.15* 1.12*   ANIONGAP 10 8   PAWEL 8.6 9.1   GLC 85 127*   ALBUMIN 2.9* 3.5   PROTTOTAL 6.3* 7.9   BILITOTAL 1.8* 1.2   ALKPHOS 67 92   ALT 27 45   AST 24 43   TROPI  --  0.033       Imaging:   No results found for this or any previous visit (from the past 24 hour(s)).

## 2018-03-19 NOTE — PLAN OF CARE
Problem: Cardiac: Heart Failure (Adult)  Goal: Signs and Symptoms of Listed Potential Problems Will be Absent, Minimized or Managed (Cardiac: Heart Failure)  Signs and symptoms of listed potential problems will be absent, minimized or managed by discharge/transition of care (reference Cardiac: Heart Failure (Adult) CPG).   Outcome: No Change  Heart Failure Care Pathway  GOALS TO BE MET BEFORE DISCHARGE:    1. Decrease congestion and/or edema with diuretic therapy to achieve near      optimal volume status.            Dyspnea improved:  No, please explain: HORTON; unable to tolerate longer distance walk            Edema improved:     Yes        Net I/O and Weights since admission:          03/14 0700 - 03/19 0659  In: 900 [P.O.:900]  Out: 3400 [Urine:3400]  Net: -2500            Vitals:    03/16/18 2300 03/18/18 0131 03/19/18 0100   Weight: 59 kg (130 lb) 59 kg (130 lb) 57.2 kg (126 lb)       2.  O2 sats > 92% on RA or at prior home O2 therapy level.          Current oxygenation status:       SpO2: 94 %         O2 Device: None (Room air),            Able to wean O2 this shift to keep sats > 92%:  Yes       Does patient use Home O2? No    3.  Tolerates ambulation and mobility near baseline: No, please explain: cannot tolerate longer distance. Ambulating to bathroom with SBA, walker, and GB        How many times did the patient ambulate with nursing staff this shift? 1    Please review the Heart Failure Care Pathway for additional HF goal outcomes.    A/Ox4. Crackles on lower lobes. Baseline n/t on BLE. SBA, walker, and GB for mobility. HORTON. Chronic back pain. Sclerosis. Redness on medial feet. Tele A.fib w/ CVR. Continue digoxin, toprol and po lasix. Pt refusing xaleto. Cardiology aware and education given. Regular diet. PIV SL. Voiding. Passing flatus. Afebrile. VSS on RA. Discharge pending to TCU.     Gifty Avelar RN  3/19/2018

## 2018-03-19 NOTE — PLAN OF CARE
Problem: Patient Care Overview  Goal: Plan of Care/Patient Progress Review  Outcome: No Change  Pt A&O. Pt has HORTON, denies chest pain. A fib w/ CVR. On metoprolol for rate control. VSS. Up to bedside commode w. A-1 and walker.Likely TCU on d/c. Will continue to monitor pt.

## 2018-03-20 VITALS
BODY MASS INDEX: 20.66 KG/M2 | HEIGHT: 65 IN | HEART RATE: 75 BPM | WEIGHT: 124 LBS | DIASTOLIC BLOOD PRESSURE: 53 MMHG | TEMPERATURE: 98.1 F | OXYGEN SATURATION: 92 % | RESPIRATION RATE: 18 BRPM | SYSTOLIC BLOOD PRESSURE: 99 MMHG

## 2018-03-20 VITALS
SYSTOLIC BLOOD PRESSURE: 111 MMHG | WEIGHT: 127.3 LBS | RESPIRATION RATE: 18 BRPM | TEMPERATURE: 97.2 F | OXYGEN SATURATION: 95 % | BODY MASS INDEX: 21.18 KG/M2 | HEART RATE: 69 BPM | DIASTOLIC BLOOD PRESSURE: 69 MMHG

## 2018-03-20 LAB
ANION GAP SERPL CALCULATED.3IONS-SCNC: 8 MMOL/L (ref 3–14)
BUN SERPL-MCNC: 32 MG/DL (ref 7–30)
CALCIUM SERPL-MCNC: 8.3 MG/DL (ref 8.5–10.1)
CHLORIDE SERPL-SCNC: 106 MMOL/L (ref 94–109)
CO2 SERPL-SCNC: 24 MMOL/L (ref 20–32)
CREAT SERPL-MCNC: 1.28 MG/DL (ref 0.52–1.04)
DIGOXIN SERPL-MCNC: 1.2 UG/L (ref 0.5–2)
GFR SERPL CREATININE-BSD FRML MDRD: 39 ML/MIN/1.7M2
GLUCOSE SERPL-MCNC: 88 MG/DL (ref 70–99)
POTASSIUM SERPL-SCNC: 4.3 MMOL/L (ref 3.4–5.3)
SODIUM SERPL-SCNC: 138 MMOL/L (ref 133–144)

## 2018-03-20 PROCEDURE — 36415 COLL VENOUS BLD VENIPUNCTURE: CPT | Performed by: INTERNAL MEDICINE

## 2018-03-20 PROCEDURE — 25000132 ZZH RX MED GY IP 250 OP 250 PS 637: Performed by: INTERNAL MEDICINE

## 2018-03-20 PROCEDURE — 80048 BASIC METABOLIC PNL TOTAL CA: CPT | Performed by: INTERNAL MEDICINE

## 2018-03-20 PROCEDURE — 25000132 ZZH RX MED GY IP 250 OP 250 PS 637: Performed by: NURSE PRACTITIONER

## 2018-03-20 PROCEDURE — 99232 SBSQ HOSP IP/OBS MODERATE 35: CPT | Performed by: INTERNAL MEDICINE

## 2018-03-20 PROCEDURE — 80162 ASSAY OF DIGOXIN TOTAL: CPT | Performed by: INTERNAL MEDICINE

## 2018-03-20 PROCEDURE — 99207 ZZC CDG-MDM COMPONENT: MEETS LOW - DOWN CODED: CPT | Performed by: INTERNAL MEDICINE

## 2018-03-20 RX ORDER — FUROSEMIDE 20 MG
40 TABLET ORAL DAILY
Qty: 30 TABLET
Start: 2018-03-20 | End: 2018-05-03

## 2018-03-20 RX ORDER — OXYCODONE AND ACETAMINOPHEN 5; 325 MG/1; MG/1
0.5 TABLET ORAL EVERY MORNING
Qty: 14 TABLET | Refills: 0 | Status: SHIPPED | OUTPATIENT
Start: 2018-03-20 | End: 2018-04-10

## 2018-03-20 RX ADMIN — DIGOXIN 125 MCG: 125 TABLET ORAL at 08:39

## 2018-03-20 RX ADMIN — CHOLECALCIFEROL TAB 25 MCG (1000 UNIT) 1000 UNITS: 25 TAB at 08:39

## 2018-03-20 RX ADMIN — APIXABAN 2.5 MG: 2.5 TABLET, FILM COATED ORAL at 13:23

## 2018-03-20 RX ADMIN — FUROSEMIDE 40 MG: 40 TABLET ORAL at 08:39

## 2018-03-20 RX ADMIN — Medication 2.5 MG: at 08:39

## 2018-03-20 RX ADMIN — ACETAMINOPHEN 162.5 MG: 325 SOLUTION ORAL at 08:39

## 2018-03-20 RX ADMIN — METOPROLOL SUCCINATE 200 MG: 100 TABLET, EXTENDED RELEASE ORAL at 08:43

## 2018-03-20 RX ADMIN — Medication 1 SPRAY: at 06:48

## 2018-03-20 ASSESSMENT — PAIN DESCRIPTION - DESCRIPTORS: DESCRIPTORS: CONSTANT

## 2018-03-20 NOTE — PLAN OF CARE
Problem: Patient Care Overview  Goal: Plan of Care/Patient Progress Review  Outcome: Improving  A&O. VSS on room air. Tele: A fib w/ CVR, HR 70-90's. No c/o SOB or CP overnight. HORTON improving per patient report. Up w/ SBA and walker to bathroom. Red blanchable heels, LE elevated. Plan to d/c TCU when bed available. Pt voiced her preference to go to [facility] if beds available.

## 2018-03-20 NOTE — PROGRESS NOTES
[facility]  Cardiology Progress Note    Date of Service (when I saw the patient): 03/20/2018  Primary Cardiologist: Dr. Walker       Assessment & Plan   Theresa Alves is a 89 year old female who was admitted on 3/16/2018 with worsening shortness of breath, exertional fatigue and A-fib with RVR. She carries a history of hypertension, pulmonary fibrosis, CKD stage III and atrial fibrillation. She was planning for elective cardioversion after 30 days of uninterrupted anticoagulation, unfortunately she had a significant episode of epistaxis and Xarelto was held.     1.  : Atrial Fibrillation   - Rate controlled on Toprol XL, Digoxin   - Dig level 1.2   - Patient will consent to Eliquis as it is an acceptable cost. She meets criteria for 2.5mg BID ( > 80 yr and < 60kg)  - CHADsVasc - 5,  Risks and benefits discussed with patient and family.     - last echo confirmed normal LV function, mod TR, mod PH, mild AR.      2.  : Exertional shortness of breath   - Improving, faint crackles in bases  - Encourage activity and PT once she transfers to TCU  - Continue on Lasix,   - Down 6lbs from admission    3. Hypertension  - Mildly low this am  - Continue on BB   - LE edema managed on compression stockings.       4.  : Epistaxis  - No reoccurrence   - Will start low dose Eliquis today, watch closely for bleeding.     Disposition: OK to transfer to TCU on a cardiac standpoint. Start Eliquis and monitor closely for bleeding. Continue on Dig and metoprolol for rate control of a-fib. Follow up with cardiology [facility] on 4/3/18.     I have reviewed patient data, examined patient, and agree with medical plan.    Pineda Renee MD [facility]        JUSTIN Perkins CNP  Text Page  (M-F, 7:30 am - 4:00 pm)    Interval History    Exertional shortness of breath improving, c/o fatigue.  Denies chest pain, palpitations, LE edema. Agreeable to Eliquis if cost is acceptable. Tele confirms rate controlled A-fib (67). No  evidence of nose bleeds. Appetite better today.       Physical Exam   Temp: 98.1  F (36.7  C) Temp src: Oral BP: 99/53   Heart Rate: 72 Resp: 18 SpO2: 92 % O2 Device: None (Room air)    Vitals:    03/18/18 0131 03/19/18 0100 03/20/18 0000   Weight: 59 kg (130 lb) 57.2 kg (126 lb) 56.2 kg (124 lb)     Vital Signs with Ranges  Temp:  [97.5  F (36.4  C)-98.4  F (36.9  C)] 98.1  F (36.7  C)  Heart Rate:  [65-72] 72  Resp:  [18] 18  BP: ()/(53-75) 99/53  SpO2:  [92 %-98 %] 92 %  I/O last 3 completed shifts:  In: 540 [P.O.:540]  Out: 1875 [Urine:1875]    GEN:  In general, this is a thin elderly female in no acute distress. Patient ambulatory.  HEENT:  Pupils equal, round. Sclerae nonicteric. Clear oropharynx. Mucous membranes moist.  NECK: Supple, no masses appreciated. Trachea midline. Neg JVD   C/V: Irregular rhythm, controlled rate, audible murmur   RESP: Respirations are unlabored. No use of accessory muscles. Trace rales bilaterally.  GI: Abdomen soft, nontender, nondistended.  EXTREM: No LE edema. No cyanosis or clubbing.  NEURO: Alert and oriented, cooperative.   PSYCH: Normal affect.  SKIN: Warm and dry. No rashes or petechiae appreciated.       Medications     - MEDICATION INSTRUCTIONS -         furosemide  40 mg Oral Daily     simvastatin  20 mg Oral At Bedtime     cholecalciferol  1,000 Units Oral Daily     oxyCODONE IR  2.5 mg Oral Daily    And     acetaminophen  162.5 mg Oral Daily     metoprolol succinate  200 mg Oral Daily     sodium chloride  1 spray Both Nostrils 3 times daily     digoxin  125 mcg Oral Daily       Data   Reviewed      JUSTIN Perkins CNP 3/20/2018

## 2018-03-20 NOTE — PROGRESS NOTES
Care Transition Initial Assessment -   Reason For Consult: discharge planning  Met with: Patient    Principal Problem:    Atrial fibrillation w RVR  Active Problems:    Pulmonary fibrosis    Hypertension goal BP (blood pressure) < 140/80    SOB (shortness of breath)    Memory loss       DATA  Lives With: alone  Living Arrangements: apartment     Identified issues/concerns regarding health management:       Transportation Available: family or friend will provide    Per social service protocol for discharge planning.  Patient was admitted on 3-16-18 with atrial fibrillation with RVR.  The tentative date of discharge is 3-20-18.  Reviewed chart and spoke with patient regarding discharge plans.  Per patient report, patient lives alone in an apartment with no steps.  Patient uses a rolling walker at baseline.  Patient has a shower chair and grab bars in the bathroom.  Patient receives assistance with cleaning, grocery shopping, meals, and transportation.  Reviewed the therapy discharge recommendations of tcu placement on discharge and patient is in agreement.  Patient states she would like to go to [facility] on discharge.  If they do not have a bed, other possible options include [facility] and [facility].  Referrals sent, via discharge on the double, to check bed availability.    ASSESSMENT  Cognitive Status:  awake and alert  Concerns to be addressed: discharge planning.     PLAN  Financial costs for the patient includes N/A.  Patient given options and choices for discharge TCU choices.  Patient/family is agreeable to the plan?  Yes  Patient Goals and Preferences: TCU on discharge.  Patient anticipates discharging to:  TCU.    Will confirm a bed, continue to follow, and assist with a safe discharge plan.    TRISHA Oliva, [facility]  [phone]

## 2018-03-20 NOTE — PLAN OF CARE
Problem: Patient Care Overview  Goal: Plan of Care/Patient Progress Review  Outcome: Adequate for Discharge Date Met: 03/20/18  VSS. No c/o pain. IV's were removed. Pt belongings are accounted for. Discharge education complete with emphasis on importance of resuming anticoagulation today. Pt plans to d/c to [facility] today via wheelchair with niece. No further questions at this time.

## 2018-03-20 NOTE — DISCHARGE SUMMARY
[facility]    Discharge Summary  Hospitalist    Date of Admission:  3/16/2018  Date of Discharge:  3/20/2018   Discharging Provider: Edis Seay MD    Discharge Diagnoses   Principal Problem:    Atrial fibrillation w RVR  Active Problems:    Pulmonary fibrosis    Hypertension goal BP (blood pressure) < 140/80    SOB (shortness of breath)    Memory loss      History of Present Illness   89-year-old female with a history of hypertension, pulmonary fibrosis, CKD stage III who presents to the [facility] ED with atrial fibrillation with RVR. In ED, EKG showed afib with rvr to 140. Chest xray showed mild diffuse interstitial opacities may be slightly more prominent. Component of superimposed interstitial edema/CHF is not excluded. Was given 15 mg of dilt and 0.5 L of NS. This is not a new diagnosis, as she has been following closely with her cardiologist for her afib. Her outpatient cardiology notes from Dr. Walker mention her worsening SOB as possibly multifactorial, as some of her symptoms could be related to the Metoprolol, the atrial fibrillation itself, or perhaps some noncardiac issues. Cardioversion was discussed with her, but she wanted to wait 4 weeks on anticoagulation prior to proceeding (it has only been 3 weeks on the anticoagulation).    Hospital Course    Seen by cardiology, her Toprol  mg was increased to 200 mg daily, but heart rate still 130's.  Her Cozaar was discontinued due to lower BP (100 to 110 systolic).  At that point IV Digoxin given with heart rate then 70-90's in afib, and SOB seemed better, and able to ambulate without significant dyspnea.  She has been on Xarelto 15 mg daily because of the afib, but unfortunately has had daily nose bleeds for the last several days, did get Tranexamic Acid once to nares with little benefit, was given nasal saline nasal spray to help with dryness, and because on continued bleeding Xarelto will be stopped for 3 days, at which  point she can try to resume it.  Follow up in 3 days, check BMP then.  She will be on Digoxin 0.125 mg daily, and her creatinine is 1.15 (estimated GFR 44), and her Digoxin level at time of discharge was 1.2.        Edis Seay MD  Pager: [phone]  Cell Phone:  [phone]       Significant Results and Procedures   As above    Pending Results   These results will be followed up by Dr. Seay  Unresulted Labs Ordered in the Past 30 Days of this Admission     No orders found from 1/15/2018 to 3/17/2018.          Code Status   Full Code       Primary Care Physician   Marbin Rouse    Physical Exam   Temp: 98.1  F (36.7  C) Temp src: Oral BP: 99/53   Heart Rate: 72 Resp: 18 SpO2: 92 % O2 Device: None (Room air)    Vitals:    03/18/18 0131 03/19/18 0100 03/20/18 0000   Weight: 59 kg (130 lb) 57.2 kg (126 lb) 56.2 kg (124 lb)     Vital Signs with Ranges  Temp:  [97.5  F (36.4  C)-98.4  F (36.9  C)] 98.1  F (36.7  C)  Heart Rate:  [65-72] 72  Resp:  [18] 18  BP: ()/(53-75) 99/53  SpO2:  [92 %-98 %] 92 %  I/O last 3 completed shifts:  In: 540 [P.O.:540]  Out: 1875 [Urine:1875]    Exam on discharge:   Lungs clear  CV -- irreg S1S2    # Discharge Pain Plan:   - Patient currently has NO PAIN and is not being prescribed pain medications on discharge.      Discharge Disposition   Discharged to long-term care facility  Condition at discharge: Good    Consultations This Hospital Stay   ELECTROPHYSIOLOGY IP CONSULT  SOCIAL WORK IP CONSULT  PHYSICAL THERAPY ADULT IP CONSULT  OCCUPATIONAL THERAPY ADULT IP CONSULT  PHYSICAL THERAPY ADULT IP CONSULT  OCCUPATIONAL THERAPY ADULT IP CONSULT    Time Spent on this Encounter   I spent a total of 35 minutes discharging this patient.     Discharge Orders     Reason for your hospital stay   Atrial fib with rapid rate     Activity   Your activity upon discharge: activity as tolerated     Discharge Instructions   Call Dr. Seay at Pager [phone] if questions, or  Cell Phone [phone].     Follow-up and recommended labs and tests    BMP in 3 days and provider at TCU to follow up in 3-5 days.  Dry weight is 120 to 125 lbs.-- consider holding or decreasing diuretic if weight < 120 lbs.     General info for SNF   Length of Stay Estimate: Short Term Care: Estimated # of Days <30  Condition at Discharge: Improving  Level of care:skilled   Rehabilitation Potential: Good  Admission H&P remains valid and up-to-date: Yes  Recent Chemotherapy: N/A  Use Nursing Home Standing Orders: Yes     Mantoux instructions   Give two-step Mantoux (PPD) Per Facility Policy Yes     Reason for your hospital stay   Atrial fib with rapid rate, and associated CHF with SOB     Additional Discharge Instructions   Call Dr. Granados at Pager [phone] if questions, or Cell Phone [phone].     Activity - Up ad marshall     Full Code     Full Code     Physical Therapy Adult Consult   Evaluate and treat as clinically indicated.    Reason:  weakness     Occupational Therapy Adult Consult   Evaluate and treat as clinically indicated.    Reason:  weakness     Diet   Follow this diet upon discharge: Orders Placed This Encounter     Combination Diet Regular Diet Adult     Advance Diet as Tolerated   Follow this diet upon discharge: Orders Placed This Encounter     Combination Diet Regular Diet Adult     Diet       Discharge Medications   Current Discharge Medication List      START taking these medications    Details   apixaban ANTICOAGULANT (ELIQUIS) 2.5 MG tablet Take 1 tablet (2.5 mg) by mouth 2 times daily  Refills: 1    Comments: For afib  Associated Diagnoses: Uncontrolled atrial fibrillation (H)      digoxin (LANOXIN) 125 MCG tablet Take 1 tablet (125 mcg) by mouth daily  Qty: 30 tablet, Refills: 3    Associated Diagnoses: Atrial fibrillation with RVR (H)      sodium chloride (OCEAN) 0.65 % nasal spray Spray 1 spray into both nostrils 3 times daily as needed for congestion  Refills: 0    Associated  Diagnoses: Nasal dryness         CONTINUE these medications which have CHANGED    Details   furosemide (LASIX) 20 MG tablet Take 2 tablets (40 mg) by mouth daily  Qty: 30 tablet    Associated Diagnoses: HORTON (dyspnea on exertion)      oxyCODONE-acetaminophen (PERCOCET) 5-325 MG per tablet Take 0.5 tablets by mouth every morning  Qty: 14 tablet, Refills: 0    Associated Diagnoses: Chronic midline low back pain without sciatica      metoprolol succinate (TOPROL-XL) 200 MG 24 hr tablet Take 1 tablet (200 mg) by mouth daily  Qty: 30 tablet, Refills: 3    Associated Diagnoses: Atrial fibrillation with RVR (H)         CONTINUE these medications which have NOT CHANGED    Details   simvastatin (ZOCOR) 20 MG tablet TAKE HALF TABLET BY MOUTH AT BEDTIME  Qty: 45 tablet, Refills: 3    Associated Diagnoses: Hyperlipidemia with target LDL less than 130      Cyanocobalamin (VITAMIN B 12 PO) Take 1 tablet by mouth daily       VITAMIN D, CHOLECALCIFEROL, PO Take 1,000 Units by mouth daily.           STOP taking these medications       rivaroxaban ANTICOAGULANT (XARELTO) 15 MG TABS tablet Comments:   Reason for Stopping:         losartan (COZAAR) 25 MG tablet Comments:   Reason for Stopping:             Allergies   No Known Allergies  Data   Most Recent 3 CBC's:  Recent Labs   Lab Test  03/18/18   0540  03/16/18   2304  02/21/18   1531   WBC  7.7  11.2*  8.4   HGB  11.4*  12.7  12.7   MCV  101*  103*  105*   PLT  187  189  220      Most Recent 3 BMP's:  Recent Labs   Lab Test  03/20/18   0540  03/19/18   0621  03/18/18   0540   NA  138  137  138   POTASSIUM  4.3  4.1  4.3   CHLORIDE  106  106  107   CO2  24  22  21   BUN  32*  33*  34*   CR  1.28*  1.14*  1.15*   ANIONGAP  8  9  10   PAWEL  8.3*  8.5  8.6   GLC  88  94  85     Most Recent 2 LFT's:  Recent Labs   Lab Test  03/18/18   0540  03/16/18   2304   AST  24  43   ALT  27  45   ALKPHOS  67  92   BILITOTAL  1.8*  1.2     Most Recent INR's and Anticoagulation Dosing  History:  Anticoagulation Dose History     There is no flowsheet data to display.        Most Recent 3 Troponin's:  Recent Labs   Lab Test  03/16/18   2304  09/03/12   1535  09/03/12   0201   TROPI  0.033  0.049*  0.093*     Most Recent Cholesterol Panel:  Recent Labs   Lab Test  11/28/17   1454   CHOL  142   LDL  52   HDL  69   TRIG  107     Most Recent 6 Bacteria Isolates From Any Culture (See EPIC Reports for Culture Details):  Recent Labs   Lab Test  09/28/12   1029  09/03/12   1645  09/02/12   1840  09/02/12   1825  09/02/12   1820   CULT  >100,000 colonies/mL Methicillin resistant Staphylococcus aureus (MRSA) 10 to 50,000 colonies/mL Coagulase negative Staphylococcus  No growth  Cultured on the 1st day of incubation: Escherichia coli Critical Value, preliminary result only, called to and read back by Carlos Dennis ( patient's nurse @ 1636 on 9/3/2012, cn.  Cultured on the 1st day of incubation: Escherichia coli Critical Value, preliminary result only, called to and read back by Carlos Dennis 9/3/12 @1636 cn Susceptibility testing done on previous specimen  >100,000 colonies/mL Escherichia coli     Most Recent TSH, T4 and A1c Labs:  Recent Labs   Lab Test  03/16/18   2304   TSH  4.13*   T4  1.38

## 2018-03-20 NOTE — PROGRESS NOTES
Checked with discharge pharmacy for cost estimate on Eliquis. Per Pharmacy Eliquis is $40 a month. Pt thinks this is much more reasonable than Xarelto and agrees to take this medication.

## 2018-03-20 NOTE — PLAN OF CARE
Problem: Cardiac: Heart Failure (Adult)  Goal: Signs and Symptoms of Listed Potential Problems Will be Absent, Minimized or Managed (Cardiac: Heart Failure)  Signs and symptoms of listed potential problems will be absent, minimized or managed by discharge/transition of care (reference Cardiac: Heart Failure (Adult) CPG).   A/o x4. VSS. RA. IV SL. Tele Afib CVR. Having pauses, longest one 2.2 asymptomatic. PO lasix. HORTON improving per pt. Regular diet, tolerating well. +BM this shift. Voiding. Holding Xarelto x3 days due to nose bleed.  Plan d/c to tcu once HORTON is improved.     Heart Failure Care Pathway  GOALS TO BE MET BEFORE DISCHARGE:    1. Decrease congestion and/or edema with diuretic therapy to achieve near      optimal volume status.            Dyspnea improved:  Yes            Edema improved:     Yes        Net I/O and Weights since admission:          03/14 1500 - 03/19 1459  In: 1200 [P.O.:1200]  Out: 3550 [Urine:3550]  Net: -2350            Vitals:    03/16/18 2300 03/18/18 0131 03/19/18 0100   Weight: 59 kg (130 lb) 59 kg (130 lb) 57.2 kg (126 lb)       2.  O2 sats > 92% on RA or at prior home O2 therapy level.          Current oxygenation status:       SpO2: 96 %         O2 Device: None (Room air),            Able to wean O2 this shift to keep sats > 92%:  NA       Does patient use Home O2? No    3.  Tolerates ambulation and mobility near baseline: Yes        How many times did the patient ambulate with nursing staff this shift? 4    Please review the Heart Failure Care Pathway for additional HF goal outcomes.    Ana Garcia RN  3/19/2018

## 2018-03-21 ENCOUNTER — CARE COORDINATION (OUTPATIENT)
Dept: CARDIOLOGY | Facility: CLINIC | Age: 83
End: 2018-03-21

## 2018-03-21 ENCOUNTER — NURSING HOME VISIT (OUTPATIENT)
Dept: GERIATRICS | Facility: CLINIC | Age: 83
End: 2018-03-21
Payer: COMMERCIAL

## 2018-03-21 DIAGNOSIS — I10 HYPERTENSION GOAL BP (BLOOD PRESSURE) < 140/80: ICD-10-CM

## 2018-03-21 DIAGNOSIS — I50.31 ACUTE DIASTOLIC CONGESTIVE HEART FAILURE (H): ICD-10-CM

## 2018-03-21 DIAGNOSIS — R04.0 EPISTAXIS: ICD-10-CM

## 2018-03-21 DIAGNOSIS — N18.30 CHRONIC KIDNEY DISEASE, STAGE III (MODERATE) (H): ICD-10-CM

## 2018-03-21 DIAGNOSIS — J84.10 PULMONARY FIBROSIS (H): ICD-10-CM

## 2018-03-21 DIAGNOSIS — I48.20 CHRONIC ATRIAL FIBRILLATION (H): Primary | ICD-10-CM

## 2018-03-21 DIAGNOSIS — R53.81 PHYSICAL DECONDITIONING: ICD-10-CM

## 2018-03-21 PROCEDURE — 99309 SBSQ NF CARE MODERATE MDM 30: CPT | Performed by: NURSE PRACTITIONER

## 2018-03-21 NOTE — PROGRESS NOTES
[place] [facility]  PRIMARY CARE PROVIDER AND CLINIC:  Marbin Rouse [address] / [facility] [address]  Chief Complaint   Patient presents with     Hospital F/U       HPI:    Theresa Alves is a 89 year old  (6/22/1924), history of hypertension, pulmonary fibrosis, CKD stage III who presents to the [facility] ED with atrial fibrillation with RVR admitted to the [facility] from Hospital  [facility].  Hospital stay 3/16/18 through 3/20/18.    Atrial fIb with RVR: metoprolol xl increased from 100 to 200mg QD, Cozaar DC due to low BP, IV digoxin given, on xarelto PTA  Espistaxis: xarelto held due to epistaxis, resolved without packing, restarted AC prior to discharge after discussing risks and benefit  DCHF: acute Echo shows , elevated BNP, pulmonary congetion, IV lasix   Admitted to this facility for  rehab, medical management and nursing care.  HPI information obtained from: facility chart records, facility staff, patient report and [facility] chart review.  Current issues are: On exam today patient is alert, pleasant, states SOB has improved, continues to have HORTON, no SOB at rest, denies CP, palpitations, N/V/D or constipation, fever, states she has not had epistaxis in 2 days, no other concerns at this time.        Last 3 BPs: 111/69, 114/62, 98/61 mmHg  HR Ranges: 69 bpm  Admission Weight: 127.3 lbs    CODE STATUS/ADVANCE DIRECTIVES DISCUSSION:   DNR  Patient's living condition: lives alone    ALLERGIES:Review of patient's allergies indicates no known allergies.  PAST MEDICAL HISTORY:  has a past medical history of Chronic back pain; Pulmonary fibrosis (H); and Unspecified essential hypertension.  PAST SURGICAL HISTORY:  has a past surgical history that includes hiatal hernia repair (6/10); cataract iol, rt/lt (5/03); hernia repair, inguinal rt/lt (1954); and carpal tunnel release rt/lt (1990's).  FAMILY HISTORY: family history includes Unknown/Adopted in her  father and mother. There is no history of DIABETES, Coronary Artery Disease, Hypertension, Hyperlipidemia, CEREBROVASCULAR DISEASE, Breast Cancer, Colon Cancer, Prostate Cancer, Other Cancer, Depression, Anxiety Disorder, MENTAL ILLNESS, Substance Abuse, Anesthesia Reaction, Asthma, OSTEOPOROSIS, Genetic Disorder, Thyroid Disease, or Obesity.  SOCIAL HISTORY:  reports that she has never smoked. She has never used smokeless tobacco. She reports that she drinks alcohol. She reports that she does not use illicit drugs.    Post Discharge Medication Reconciliation Status: discharge medications reconciled, continue medications without change.  Current Outpatient Prescriptions   Medication Sig Dispense Refill     furosemide (LASIX) 20 MG tablet Take 2 tablets (40 mg) by mouth daily 30 tablet      oxyCODONE-acetaminophen (PERCOCET) 5-325 MG per tablet Take 0.5 tablets by mouth every morning 14 tablet 0     apixaban ANTICOAGULANT (ELIQUIS) 2.5 MG tablet Take 1 tablet (2.5 mg) by mouth 2 times daily  1     metoprolol succinate (TOPROL-XL) 200 MG 24 hr tablet Take 1 tablet (200 mg) by mouth daily 30 tablet 3     digoxin (LANOXIN) 125 MCG tablet Take 1 tablet (125 mcg) by mouth daily 30 tablet 3     sodium chloride (OCEAN) 0.65 % nasal spray Spray 1 spray into both nostrils 3 times daily as needed for congestion  0     simvastatin (ZOCOR) 20 MG tablet TAKE HALF TABLET BY MOUTH AT BEDTIME 45 tablet 3     Cyanocobalamin (VITAMIN B 12 PO) Take 1 tablet by mouth daily        VITAMIN D, CHOLECALCIFEROL, PO Take 1,000 Units by mouth daily.           ROS:  10 point ROS of systems including Constitutional, Eyes, Respiratory, Cardiovascular, Gastroenterology, Genitourinary, Integumentary, Muscularskeletal, Psychiatric were all negative except for pertinent positives noted in my HPI.    Exam:  /69  Pulse 69  Temp 97.2  F (36.2  C)  Resp 18  Wt 127 lb 4.8 oz (57.7 kg)  LMP  (LMP Unknown)  SpO2 95%  BMI 21.18 kg/m2  GENERAL  APPEARANCE:  Alert, in no distress, thin  ENT:  Mouth and posterior oropharynx normal, moist mucous membranes, [place]  EYES:  EOM, conjunctivae, lids, pupils and irises normal, PERRL  RESP:  respiratory effort and palpation of chest normal, lungs clear to auscultation , no respiratory distress, diminished breath sounds bases bilaterally  CV:  Palpation and auscultation of heart done , HR irregular, rate controlled, no murmur, rub, or gallop, peripheral edema 1+ in LE bilaterally  ABDOMEN:  normal bowel sounds, soft, nontender, no hepatosplenomegaly or other masses  M/S:   Examination of:   right upper extremity, left upper extremity, right lower extremity and left lower extremity  Inspection, ROM, stability and muscle strength normal  SKIN:  Inspection of skin and subcutaneous tissue baseline  NEURO:   Cranial nerves 2-12 are normal tested and grossly at patient's baseline, speech WNL  PSYCH:  affect and mood normal    Lab/Diagnostic data:  CBC RESULTS:   Recent Labs   Lab Test  03/18/18   0540  03/16/18   2304   WBC  7.7  11.2*   RBC  3.37*  3.72*   HGB  11.4*  12.7   HCT  34.0*  38.3   MCV  101*  103*   MCH  33.8*  34.1*   MCHC  33.5  33.2   RDW  14.7  14.9   PLT  187  189       Last Basic Metabolic Panel:  Recent Labs   Lab Test  03/20/18   0540  03/19/18   0621   NA  138  137   POTASSIUM  4.3  4.1   CHLORIDE  106  106   PAWEL  8.3*  8.5   CO2  24  22   BUN  32*  33*   CR  1.28*  1.14*   GLC  88  94       Liver Function Studies -   Recent Labs   Lab Test  03/18/18   0540  03/16/18   2304   PROTTOTAL  6.3*  7.9   ALBUMIN  2.9*  3.5   BILITOTAL  1.8*  1.2   ALKPHOS  67  92   AST  24  43   ALT  27  45       TSH   Date Value Ref Range Status   03/16/2018 4.13 (H) 0.40 - 4.00 mU/L Final   11/28/2017 2.77 0.40 - 4.00 mU/L Final       ASSESSMENT/PLAN:  Chronic atrial fibrillation (H)  Hypertension goal BP (blood pressure) < 140/80  Physical deconditioning  Acute/ongoing: HR goal < 100, SBP goal > 100  Vitals daily and prn,  BMP twice weekly, continue digoxin 125mcg QD, metoprolol xl 200mg QD, apixaban 2.5mg BID  F/u with cardiology in 2-3 weeks    Acute diastolic congestive heart failure (H)  Chronic kidney disease, stage III (moderate)  Acute/ongiong dry weight 120-125lbs, daily weights, vitals daily and prn, BMP twice weekly, lasix 40mg QD, metoprolol xl 200mg QD, losartan DC during hospitalization due to low BP    Epistaxis  Intermittent bleeding, continue to monitor on apixaban 2.5mg BID, ocean nasal spray in each nostril TID for dry nose    Pulmonary fibrosis  Chronic: monitor SaO2 at rest and with activity, goal SaO2 > 90%       Orders:  BMP twice weekly  Hgb weekly  Daily weights    Total time with patient visit: 25 minutes including discussions about the POC and care coordination with the patient. Greater than 50% of total time spent with counseling and coordinating care due to review chart and clarify orders.      Electronically signed by:  Tonya Lynn Haase, APRN CNP

## 2018-03-21 NOTE — PROGRESS NOTES
Patient was evaluated by cardiology while inpatient for afib with RVR. RN called [facility] TCU to confirm the follow up plan for patient with Care Coordinator. RN confirmed with Care Coordinator that patient has a scheduled F/U appt on 4/3/18 with SARA Citlali Liegl. RN confirmed with Care Coordinator that patient will bring list of daily weights/vitals, last progress note, MAR, active orders, and provider order sheet to appt.

## 2018-03-22 ENCOUNTER — TRANSFERRED RECORDS (OUTPATIENT)
Dept: HEALTH INFORMATION MANAGEMENT | Facility: CLINIC | Age: 83
End: 2018-03-22

## 2018-03-22 LAB
BUN SERPL-MCNC: 32 MG/DL (ref 9–26)
CALCIUM SERPL-MCNC: 9 MG/DL (ref 8.4–10.2)
CHLORIDE SERPLBLD-SCNC: 106 MMOL/L (ref 98–109)
CO2 SERPL-SCNC: 23 MMOL/L (ref 22–31)
CREAT SERPL-MCNC: 1.17 MG/DL (ref 0.55–1.02)
GFR SERPL CREATININE-BSD FRML MDRD: 40 ML/MIN/1.73M2
GLUCOSE SERPL-MCNC: 91 MG/DL (ref 70–100)
POTASSIUM SERPL-SCNC: 4.5 MMOL/L (ref 3.5–5.2)
SODIUM SERPL-SCNC: 137 MMOL/L (ref 136–145)

## 2018-03-24 ENCOUNTER — NURSING HOME VISIT (OUTPATIENT)
Dept: GERIATRICS | Facility: CLINIC | Age: 83
End: 2018-03-24
Payer: COMMERCIAL

## 2018-03-24 DIAGNOSIS — I10 HYPERTENSION GOAL BP (BLOOD PRESSURE) < 140/80: ICD-10-CM

## 2018-03-24 DIAGNOSIS — I50.31 ACUTE DIASTOLIC CONGESTIVE HEART FAILURE (H): ICD-10-CM

## 2018-03-24 DIAGNOSIS — R04.0 EPISTAXIS: ICD-10-CM

## 2018-03-24 DIAGNOSIS — I48.20 CHRONIC ATRIAL FIBRILLATION (H): Primary | ICD-10-CM

## 2018-03-24 PROCEDURE — 99305 1ST NF CARE MODERATE MDM 35: CPT | Performed by: INTERNAL MEDICINE

## 2018-03-26 ENCOUNTER — TRANSFERRED RECORDS (OUTPATIENT)
Dept: HEALTH INFORMATION MANAGEMENT | Facility: CLINIC | Age: 83
End: 2018-03-26

## 2018-03-26 LAB
BUN SERPL-MCNC: 34 MG/DL (ref 9–26)
CALCIUM SERPL-MCNC: 9.3 MG/DL (ref 8.4–10.2)
CHLORIDE SERPLBLD-SCNC: 103 MMOL/L (ref 98–109)
CO2 SERPL-SCNC: 24 MMOL/L (ref 22–31)
CREAT SERPL-MCNC: 1.29 MG/DL (ref 0.55–1.02)
GFR SERPL CREATININE-BSD FRML MDRD: 36 ML/MIN/1.73M2
GLUCOSE SERPL-MCNC: 76 MG/DL (ref 70–100)
POTASSIUM SERPL-SCNC: 4.4 MMOL/L (ref 3.5–5.2)
SODIUM SERPL-SCNC: 136 MMOL/L (ref 136–145)

## 2018-03-28 ENCOUNTER — NURSING HOME VISIT (OUTPATIENT)
Dept: GERIATRICS | Facility: CLINIC | Age: 83
End: 2018-03-28
Payer: COMMERCIAL

## 2018-03-28 VITALS
BODY MASS INDEX: 20.48 KG/M2 | RESPIRATION RATE: 16 BRPM | TEMPERATURE: 96.9 F | SYSTOLIC BLOOD PRESSURE: 109 MMHG | HEART RATE: 70 BPM | OXYGEN SATURATION: 95 % | WEIGHT: 123.1 LBS | DIASTOLIC BLOOD PRESSURE: 64 MMHG

## 2018-03-28 DIAGNOSIS — I10 HYPERTENSION GOAL BP (BLOOD PRESSURE) < 140/80: ICD-10-CM

## 2018-03-28 DIAGNOSIS — J84.10 PULMONARY FIBROSIS (H): ICD-10-CM

## 2018-03-28 DIAGNOSIS — D62 ANEMIA DUE TO BLOOD LOSS, ACUTE: ICD-10-CM

## 2018-03-28 DIAGNOSIS — I48.20 CHRONIC ATRIAL FIBRILLATION (H): Primary | ICD-10-CM

## 2018-03-28 DIAGNOSIS — I50.31 ACUTE DIASTOLIC CONGESTIVE HEART FAILURE (H): ICD-10-CM

## 2018-03-28 DIAGNOSIS — N18.30 CHRONIC KIDNEY DISEASE, STAGE III (MODERATE) (H): ICD-10-CM

## 2018-03-28 DIAGNOSIS — R04.0 EPISTAXIS: ICD-10-CM

## 2018-03-28 DIAGNOSIS — R53.81 PHYSICAL DECONDITIONING: ICD-10-CM

## 2018-03-28 PROCEDURE — 99309 SBSQ NF CARE MODERATE MDM 30: CPT | Performed by: NURSE PRACTITIONER

## 2018-03-28 NOTE — PROGRESS NOTES
[place] [facility]    Chief Complaint   Patient presents with     RECHECK       HPI:    Theresa Alves is a 89 year old  (6/22/1924), who is being seen today for an episodic care visit at [facility].  HPI information obtained from: facility chart records, facility staff, patient report and [facility] chart review.Today's concern is:  Atrial fib: patient denies CP, palpitations, SOB, working with therapy walking up to 100 feet, states she feels tired and fatigued.   Anemia: slightly low in hospital, patient feels like she has anemia because she is fatigued, wondering about starting iron, denies SOB, vertigo  CHF: weights stable, denies SOB at rest or during the night, some HORTON  Epistaxis: no further nose bleeds during TCU stay  Last 3 BPs: 109/64, 131/79, 126/77 mmHg  HR Ranges: 68-75 bpm  Admission Weight: 127.3 lbs  Current Weight: 3/28: 123.1 lbs, 3/27: 122.4 lbs, 3/26: 123.8 lbs      ALLERGIES: Review of patient's allergies indicates no known allergies.  Past Medical, Surgical, Family and Social History reviewed and updated in [facility].    Current Outpatient Prescriptions   Medication Sig Dispense Refill     furosemide (LASIX) 20 MG tablet Take 2 tablets (40 mg) by mouth daily 30 tablet      oxyCODONE-acetaminophen (PERCOCET) 5-325 MG per tablet Take 0.5 tablets by mouth every morning 14 tablet 0     apixaban ANTICOAGULANT (ELIQUIS) 2.5 MG tablet Take 1 tablet (2.5 mg) by mouth 2 times daily  1     metoprolol succinate (TOPROL-XL) 200 MG 24 hr tablet Take 1 tablet (200 mg) by mouth daily 30 tablet 3     digoxin (LANOXIN) 125 MCG tablet Take 1 tablet (125 mcg) by mouth daily 30 tablet 3     sodium chloride (OCEAN) 0.65 % nasal spray Spray 1 spray into both nostrils 3 times daily as needed for congestion  0     simvastatin (ZOCOR) 20 MG tablet TAKE HALF TABLET BY MOUTH AT BEDTIME 45 tablet 3     Cyanocobalamin (VITAMIN B 12 PO) Take 1 tablet by mouth daily        VITAMIN D, CHOLECALCIFEROL, PO  Take 1,000 Units by mouth daily.         Medications reviewed:  Medications reconciled to facility chart and changes were made to reflect current medications as identified as above med list. Below are the changes that were made:   Medications stopped since last EPIC medication reconciliation:   There are no discontinued medications.    Medications started since last [facility] medication reconciliation:  No orders of the defined types were placed in this encounter.        REVIEW OF SYSTEMS:  10 point ROS of systems including Constitutional, Eyes, Respiratory, Cardiovascular, Gastroenterology, Genitourinary, Integumentary, Muscularskeletal, Psychiatric were all negative except for pertinent positives noted in my HPI.    Physical Exam:  /64  Pulse 70  Temp 96.9  F (36.1  C)  Resp 16  Wt 123 lb 1.6 oz (55.8 kg)  LMP  (LMP Unknown)  SpO2 95%  BMI 20.48 kg/m2  GENERAL APPEARANCE:  Alert, in no distress, thin  ENT:  Mouth and posterior oropharynx normal, moist mucous membranes, [place]  EYES:  EOM, conjunctivae, lids, pupils and irises normal, PERRL  RESP:  respiratory effort and palpation of chest normal, lungs clear to auscultation , no respiratory distress, diminished breath sounds bases bilaterally  CV:  Palpation and auscultation of heart done , HR irregular, rate controlled, no murmur, rub, or gallop, peripheral edema trace + in LE bilaterally  ABDOMEN:  normal bowel sounds, soft, nontender, no hepatosplenomegaly or other masses  M/S:   Examination of:   right upper extremity, left upper extremity, right lower extremity and left lower extremity  Inspection, ROM, stability and muscle strength normal  SKIN:  Inspection of skin and subcutaneous tissue baseline  NEURO:   Cranial nerves 2-12 are normal tested and grossly at patient's baseline, speech WNL  PSYCH:  affect and mood normal    Recent Labs:    CBC RESULTS:    Recent Labs   Lab Test 3/27/18  03/18/18   0540  03/16/18   2304   WBC   7.7  11.2*   RBC   3.37*   3.72*   HGB 13.2  11.4*  12.7   HCT   34.0*  38.3   MCV   101*  103*   MCH   33.8*  34.1*   MCHC   33.5  33.2   RDW   14.7  14.9   PLT   187  189       Last Basic Metabolic Panel:  Recent Labs   Lab Test 03/26/18 03/22/18   NA  136  137   POTASSIUM  4.4  4.5   CHLORIDE  103  106   PAWEL  9.3  9.0   CO2  24  23   BUN  34*  32*   CR  1.29*  1.17*   GLC  76  91       Liver Function Studies -   Recent Labs   Lab Test  03/18/18   0540  03/16/18   2304   PROTTOTAL  6.3*  7.9   ALBUMIN  2.9*  3.5   BILITOTAL  1.8*  1.2   ALKPHOS  67  92   AST  24  43   ALT  27  45       TSH   Date Value Ref Range Status   03/16/2018 4.13 (H) 0.40 - 4.00 mU/L Final   11/28/2017 2.77 0.40 - 4.00 mU/L Final         Assessment/Plan:  Chronic atrial fibrillation (H)  Hypertension goal BP (blood pressure) < 140/80  Physical deconditioning  Acute/ongoing: HR goal < 100, SBP goal > 100  Vitals daily and prn, BMP twice weekly, continue digoxin 125mcg QD, metoprolol xl 200mg QD, apixaban 2.5mg BID  F/u with cardiology in 2-3 weeks     Anemia:improved: Hgb up from hospitalization, no need for iron supplement, will continue to monitor    Acute diastolic congestive heart failure (H)  Chronic kidney disease, stage III (moderate)  Acute/ongiong dry weight 120-125lbs, daily weights, vitals daily and prn, BMP twice weekly, lasix 40mg QD, metoprolol xl 200mg QD, losartan DC during hospitalization due to low BP     Epistaxis  Intermittent bleeding, continue to monitor on apixaban 2.5mg BID, ocean nasal spray in each nostril TID for dry nose, no further epistaxis during TCU stay          Orders:  No new orders today        Electronically signed by  Tonya Lynn Haase, APRN CNP

## 2018-03-29 ENCOUNTER — TRANSFERRED RECORDS (OUTPATIENT)
Dept: HEALTH INFORMATION MANAGEMENT | Facility: CLINIC | Age: 83
End: 2018-03-29

## 2018-03-29 ENCOUNTER — DISCHARGE SUMMARY NURSING HOME (OUTPATIENT)
Dept: GERIATRICS | Facility: CLINIC | Age: 83
End: 2018-03-29
Payer: COMMERCIAL

## 2018-03-29 VITALS
DIASTOLIC BLOOD PRESSURE: 78 MMHG | HEART RATE: 63 BPM | SYSTOLIC BLOOD PRESSURE: 142 MMHG | RESPIRATION RATE: 15 BRPM | OXYGEN SATURATION: 95 % | BODY MASS INDEX: 20.48 KG/M2 | TEMPERATURE: 99 F | WEIGHT: 123.1 LBS

## 2018-03-29 DIAGNOSIS — I48.20 CHRONIC ATRIAL FIBRILLATION (H): Primary | ICD-10-CM

## 2018-03-29 DIAGNOSIS — R04.0 EPISTAXIS: ICD-10-CM

## 2018-03-29 DIAGNOSIS — N18.30 CHRONIC KIDNEY DISEASE, STAGE III (MODERATE) (H): ICD-10-CM

## 2018-03-29 DIAGNOSIS — I50.31 ACUTE DIASTOLIC CONGESTIVE HEART FAILURE (H): ICD-10-CM

## 2018-03-29 DIAGNOSIS — D62 ANEMIA DUE TO BLOOD LOSS, ACUTE: ICD-10-CM

## 2018-03-29 DIAGNOSIS — R53.81 PHYSICAL DECONDITIONING: ICD-10-CM

## 2018-03-29 DIAGNOSIS — I10 HYPERTENSION GOAL BP (BLOOD PRESSURE) < 140/80: ICD-10-CM

## 2018-03-29 LAB
BUN SERPL-MCNC: 37 MG/DL (ref 9–26)
CALCIUM SERPL-MCNC: 9.2 MG/DL (ref 8.4–10.2)
CHLORIDE SERPLBLD-SCNC: 104 MMOL/L (ref 98–109)
CO2 SERPL-SCNC: 22 MMOL/L (ref 22–31)
CREAT SERPL-MCNC: 1.23 MG/DL (ref 0.55–1.02)
GFR SERPL CREATININE-BSD FRML MDRD: 38 ML/MIN/1.73M2
GLUCOSE SERPL-MCNC: 89 MG/DL (ref 70–100)
POTASSIUM SERPL-SCNC: 4.2 MMOL/L (ref 3.5–5.2)
SODIUM SERPL-SCNC: 134 MMOL/L (ref 136–145)

## 2018-03-29 PROCEDURE — 99316 NF DSCHRG MGMT 30 MIN+: CPT | Performed by: NURSE PRACTITIONER

## 2018-03-29 NOTE — PROGRESS NOTES
[place] [facility] DISCHARGE SUMMARY    PATIENT'S NAME: Theresa Alves  YOB: 1924  MEDICAL RECORD NUMBER:  [id number]    PRIMARY CARE PROVIDER AND CLINIC RESPONSIBLE AFTER TRANSFER: Marbin Rouse [address] / [facility] [address]     CODE STATUS/ADVANCE DIRECTIVES DISCUSSION:   DNR only     No Known Allergies    TRANSFERRING PROVIDERS: Tonya Lynn Haase, APRN CNP, Dr. Carolyn MD  DATE OF SNF ADMISSION:  March / 20 / 2018  DATE OF SNF (anticipated) DISCHARGE: April / 02 / 2018  DISCHARGE DISPOSITION: [facility] Provider   Nursing Facility: [facility] stay 3/16/18 to 3/20/18.     Condition on Discharge:  Stable.  Function:  Walking up to 150 feet using a RW, indep with ADL's  Cognitive Scores: BIMS: 15/15, SBT: 2/28    Equipment: walker    DISCHARGE DIAGNOSIS:   1. Chronic atrial fibrillation (H)    2. Hypertension goal BP (blood pressure) < 140/80    3. Physical deconditioning    4. Anemia due to blood loss, acute    5. Acute diastolic congestive heart failure (H)    6. Chronic kidney disease, stage III (moderate)    7. Epistaxis            PAST MEDICAL HISTORY:  has a past medical history of Chronic back pain; Pulmonary fibrosis (H); and Unspecified essential hypertension.    DISCHARGE MEDICATIONS:  Current Outpatient Prescriptions   Medication Sig Dispense Refill     furosemide (LASIX) 20 MG tablet Take 2 tablets (40 mg) by mouth daily 30 tablet      oxyCODONE-acetaminophen (PERCOCET) 5-325 MG per tablet Take 0.5 tablets by mouth every morning 14 tablet 0     apixaban ANTICOAGULANT (ELIQUIS) 2.5 MG tablet Take 1 tablet (2.5 mg) by mouth 2 times daily  1     metoprolol succinate (TOPROL-XL) 200 MG 24 hr tablet Take 1 tablet (200 mg) by mouth daily 30 tablet 3     digoxin (LANOXIN) 125 MCG tablet Take 1 tablet (125 mcg) by mouth daily 30 tablet 3     sodium chloride (OCEAN) 0.65 % nasal spray Spray 1 spray into both nostrils 3 times  daily as needed for congestion  0     simvastatin (ZOCOR) 20 MG tablet TAKE HALF TABLET BY MOUTH AT BEDTIME 45 tablet 3     Cyanocobalamin (VITAMIN B 12 PO) Take 1 tablet by mouth daily        VITAMIN D, CHOLECALCIFEROL, PO Take 1,000 Units by mouth daily.           MEDICATION CHANGES/RATIONALE:   There were no changes in medication during TCU stay  Patient was wondering about starting on iron supplement but HGb is stable.   Last 3 BPs: 142/78, 109/66, 109/64 mmHg  HR Ranges: 63-75 bpm  Admission Weight: 127.3 lbs  Current Weight: 123.1 lbs    Controlled medications sent with patient:   not applicable/none     ROS:    10 point ROS of systems including Constitutional, Eyes, Respiratory, Cardiovascular, Gastroenterology, Genitourinary, Integumentary, Muscularskeletal, Psychiatric were all negative except for pertinent positives noted in my HPI.    Physical Exam:   Vitals: /78  Pulse 63  Temp 99  F (37.2  C)  Resp 15  Wt 123 lb 1.6 oz (55.8 kg)  LMP  (LMP Unknown)  SpO2 95%  BMI 20.48 kg/m2  BMI= Body mass index is 20.48 kg/(m^2).  GENERAL APPEARANCE:  Alert, in no distress, thin  ENT:  Mouth and posterior oropharynx normal, moist mucous membranes, [place]  EYES:  EOM, conjunctivae, lids, pupils and irises normal, PERRL  RESP:  respiratory effort and palpation of chest normal, lungs clear to auscultation , no respiratory distress, diminished breath sounds bases bilaterally  CV:  Palpation and auscultation of heart done , HR irregular, rate controlled, no murmur, rub, or gallop, peripheral edema trace + in LE bilaterally  ABDOMEN:  normal bowel sounds, soft, nontender, no hepatosplenomegaly or other masses  M/S:   Examination of:   right upper extremity, left upper extremity, right lower extremity and left lower extremity  Inspection, ROM, stability and muscle strength normal  SKIN:  Inspection of skin and subcutaneous tissue baseline  NEURO:   Cranial nerves 2-12 are normal tested and grossly at patient's  baseline, speech WNL  PSYCH:  affect and mood normal    HPI Nursing Facility Course: Patient progressed in therapy to walking up to 150 feet using a RW, indep with ADL's will return home to live in apt alone with home PT and HHA through [facility].   HPI information obtained from: facility chart records, facility staff, patient report and [facility] chart review.      Chronic atrial fibrillation (H)  Hypertension goal BP (blood pressure) < 140/80  Physical deconditioning  Acute/ongoing: HR goal < 100, SBP goal > 100 continue digoxin 125mcg QD, metoprolol xl 200mg QD, apixaban 2.5mg BID  F/u with cardiology as OP within one week      Anemia:stable, f/u with PCP     Acute diastolic congestive heart failure (H)  Chronic kidney disease, stage III (moderate)  Acute/ongiong dry weight 120-125lbs, continue daily weights,  lasix 40mg QD, metoprolol xl 200mg QD, losartan DC during hospitalization due to low BP      Epistaxis  resolved, continue to monitor on apixaban 2.5mg BID, ocean nasal spray in each nostril TID for dry nose, no further epistaxis during TCU stay       DISCHARGE PLAN:  Physical Therapy, Home Health Aide and From:  [facility]  Patient instructed to follow-up with:  PCP in 7-10 days       Current [place] scheduled appointments:  Future Appointments  Date Time Provider Department Center   4/4/2018 11:30 AM Marbin Rouse MD [facility]       MTM referral needed and placed by this provider: No    Pending labs: none  SNF labs   CBC RESULTS:    Recent Labs   Lab Test 3/27/18  03/18/18   0540  03/16/18   2304   WBC   7.7  11.2*   RBC   3.37*  3.72*   HGB 13.2  11.4*  12.7   HCT   34.0*  38.3   MCV   101*  103*   MCH   33.8*  34.1*   MCHC   33.5  33.2   RDW   14.7  14.9   PLT   187  189       Last Basic Metabolic Panel:  Recent Labs   Lab Test 03/26/18 03/22/18   NA  136  137   POTASSIUM  4.4  4.5   CHLORIDE  103  106   PAWEL  9.3  9.0   CO2  24  23   BUN  34*  32*   CR  1.29*  1.17*   GLC  76  91       Liver  Function Studies -   Recent Labs   Lab Test  03/18/18   0540  03/16/18   2304   PROTTOTAL  6.3*  7.9   ALBUMIN  2.9*  3.5   BILITOTAL  1.8*  1.2   ALKPHOS  67  92   AST  24  43   ALT  27  45       TSH   Date Value Ref Range Status   03/16/2018 4.13 (H) 0.40 - 4.00 mU/L Final   11/28/2017 2.77 0.40 - 4.00 mU/L Final         Discharge Treatments:none    TOTAL DISCHARGE TIME:   Greater than 30 minutes  Electronically signed by:  Tonya Lynn Haase, APRN CNP

## 2018-04-03 ENCOUNTER — PATIENT OUTREACH (OUTPATIENT)
Dept: CARE COORDINATION | Facility: CLINIC | Age: 83
End: 2018-04-03

## 2018-04-03 NOTE — LETTER
Health Care Home - Access Care Plan    About Me  Patient Name:  Theresa Alves    YOB: 1924  Age:                             89 year old   Julio Cesar MRN:            [id number] Telephone Information:     Home Phone [phone]   Mobile [phone]       Address:    [address]   [facility] [place] [facility] [address] Email address:  No e-mail address on record      Emergency Contact(s)  Name Relationship Lgl Grd Work Phone Home Phone Mobile Phone   1. TUNG OLIVER Relative   [phone]    2. HOPE TOAN Relative   [phone]    3. BREANN (FAY) KENNETH* Relative   [phone]              Health Maintenance: Routine Health maintenance Reviewed: Not assessed    My Access Plan  Medical Emergency 911   Questions or concerns during clinic hours Primary Clinic Line, I will call the clinic directly: [facility] - [phone]   24 Hour Appointment Line [phone] or  [phone] [place] [phone]) (toll free)   24 Hour Nurse Line [phone] (toll free)   Questions or concerns outside clinic hours 24 Hour Appointment Line, I will call the after-hours on-call line:   [facility] [phone] or [phone]) (toll-free)   Preferred Urgent Care     Preferred Hospital [facility]  [phone]   Preferred Pharmacy [id number] - [place], [place] - [address]     Behavioral Health Crisis Line The National Suicide Prevention Lifeline at 1-504.507.7135 or 914     My Care Team Members  Patient Care Team       Relationship Specialty Notifications Start End    Marbin Rouse MD PCP - General Internal Medicine  9/2/12     Phone: [phone] Fax: [phone]         [address] [facility] [address]    Alyce Reyes, ALISHA Clinic Care Coordinator Primary Care - CC Admissions 4/3/18     Phone: [phone] Fax: [phone]            My Medical and Care Information  Problem List   Patient Active  Problem List   Diagnosis     Health Care Home     Chronic kidney disease, stage III (moderate)     Hyperlipidemia with target LDL less than 130     Edema     Pulmonary fibrosis     Preventive measure     Hypertension goal BP (blood pressure) < 140/80     SOB (shortness of breath)     Congestive heart failure (H)     Chronic fatigue     Dizziness - light-headed     Degenerative arthritis of spine     Osteoporosis     Weight loss     Right shoulder pain     Low back pain     Physical deconditioning     Chronic pain syndrome     Memory loss     Numbness of right foot     Chronic congestive heart failure, unspecified congestive heart failure type (H)     Atrial fibrillation, unspecified type (H)     Atrial fibrillation w RVR      Current Medications and Allergies:  See printed Medication Report

## 2018-04-03 NOTE — PROGRESS NOTES
Clinic Care Coordination Contact  Care Coordination Communication    Referral Source: IP Handoff    Clinical Data: Patient was hospitalized at [facility]  from 3/16/18 to 3/20/18 with diagnosis of chronic atrial fib and physical deconditioning.    Patient was transferred to [facility]. Discharged to home on 4/2/18 with  home care     Home Care Contact:              Home Care Agency: [place]              Contact name () and phone number: HECTOR Stokes              Care Coordination contacted home care: Yes              Anticipated start of care date: 4/3/18    Clinic care coordinator spoke with HECTOR Stokes. She is requesting nursing visit as well, pt 2 times per week for three weeks, HHA weekly for three weeks.  Verbal orders given. Divya added clinic care coordinator's contact information to patient's home car echart.     Patient Contact:               Introduced self and role of care coordination.               Discharge instructions were reviewed with patient/caregiver.               Do you have any questions about your medications? Spoke with both patient and her niece Hope.  We reviewed medications.               Follow up appointment is scheduled for 4/10/18.              Provided 24 Hour Nurse Line and/or 24 Hour Appointment Scheduling: Yes              Home care has contacted patient: Yes              Patient questions/concerns: Verified appointment and medications    Niece states patient has a neighbor that checks on her frequently and helps with housekeeping, grocery shopping and meal prep. Family is concerned about her ability to stay in independent apartment in the future.      Plan: RN/SW Care Coordinator will await notification from home care staff informing RN/SW Care Coordinator of patients discharge plans/needs. RN/SW Care Coordinator will review chart and outreach to home care every 4 weeks and as needed.      Alyce Reyes RN, CCM - Care Coordinator     4/3/2018    2:05  PM  [phone]

## 2018-04-09 ENCOUNTER — TELEPHONE (OUTPATIENT)
Dept: FAMILY MEDICINE | Facility: CLINIC | Age: 83
End: 2018-04-09

## 2018-04-09 ENCOUNTER — HOSPITAL ENCOUNTER (OUTPATIENT)
Dept: CARDIOLOGY | Facility: CLINIC | Age: 83
Discharge: HOME | End: 2018-04-09
Attending: PHYSICIAN ASSISTANT | Admitting: PHYSICIAN ASSISTANT
Payer: COMMERCIAL

## 2018-04-09 DIAGNOSIS — I48.91 ATRIAL FIBRILLATION, UNSPECIFIED TYPE (H): ICD-10-CM

## 2018-04-09 PROCEDURE — 93227 XTRNL ECG REC<48 HR R&I: CPT | Performed by: INTERNAL MEDICINE

## 2018-04-09 PROCEDURE — 93225 XTRNL ECG REC<48 HRS REC: CPT

## 2018-04-10 ENCOUNTER — OFFICE VISIT (OUTPATIENT)
Dept: FAMILY MEDICINE | Facility: CLINIC | Age: 83
End: 2018-04-10
Payer: COMMERCIAL

## 2018-04-10 VITALS
SYSTOLIC BLOOD PRESSURE: 120 MMHG | OXYGEN SATURATION: 95 % | BODY MASS INDEX: 22.08 KG/M2 | RESPIRATION RATE: 20 BRPM | TEMPERATURE: 97.8 F | WEIGHT: 120 LBS | DIASTOLIC BLOOD PRESSURE: 60 MMHG | HEIGHT: 62 IN | HEART RATE: 62 BPM

## 2018-04-10 DIAGNOSIS — I50.9 CHRONIC CONGESTIVE HEART FAILURE, UNSPECIFIED CONGESTIVE HEART FAILURE TYPE: Primary | ICD-10-CM

## 2018-04-10 DIAGNOSIS — J84.10 PULMONARY FIBROSIS (H): Chronic | ICD-10-CM

## 2018-04-10 DIAGNOSIS — G89.29 CHRONIC MIDLINE LOW BACK PAIN WITHOUT SCIATICA: ICD-10-CM

## 2018-04-10 DIAGNOSIS — J20.9 ACUTE BRONCHITIS, UNSPECIFIED ORGANISM: ICD-10-CM

## 2018-04-10 DIAGNOSIS — M54.50 CHRONIC MIDLINE LOW BACK PAIN WITHOUT SCIATICA: ICD-10-CM

## 2018-04-10 DIAGNOSIS — I48.91 ATRIAL FIBRILLATION, UNSPECIFIED TYPE (H): ICD-10-CM

## 2018-04-10 DIAGNOSIS — R53.82 CHRONIC FATIGUE: ICD-10-CM

## 2018-04-10 PROCEDURE — 99214 OFFICE O/P EST MOD 30 MIN: CPT | Performed by: INTERNAL MEDICINE

## 2018-04-10 RX ORDER — DOXYCYCLINE HYCLATE 100 MG
100 TABLET ORAL 2 TIMES DAILY
Qty: 14 TABLET | Refills: 0 | Status: SHIPPED | OUTPATIENT
Start: 2018-04-10 | End: 2018-06-05

## 2018-04-10 RX ORDER — LOSARTAN POTASSIUM 25 MG/1
TABLET ORAL
COMMUNITY
Start: 2018-02-26 | End: 2018-04-10

## 2018-04-10 RX ORDER — OXYCODONE AND ACETAMINOPHEN 5; 325 MG/1; MG/1
0.5 TABLET ORAL EVERY MORNING
Qty: 30 TABLET | Refills: 0 | Status: SHIPPED | OUTPATIENT
Start: 2018-05-07 | End: 2018-06-05

## 2018-04-10 RX ORDER — FUROSEMIDE 40 MG
TABLET ORAL
COMMUNITY
Start: 2018-04-02 | End: 2018-04-10

## 2018-04-10 RX ORDER — DIGOXIN 125 MCG
125 TABLET ORAL EVERY OTHER DAY
Qty: 30 TABLET | Refills: 3 | COMMUNITY
Start: 2018-04-10 | End: 2018-05-07

## 2018-04-10 ASSESSMENT — ANXIETY QUESTIONNAIRES
3. WORRYING TOO MUCH ABOUT DIFFERENT THINGS: NOT AT ALL
6. BECOMING EASILY ANNOYED OR IRRITABLE: MORE THAN HALF THE DAYS
GAD7 TOTAL SCORE: 2
4. TROUBLE RELAXING: NOT AT ALL
7. FEELING AFRAID AS IF SOMETHING AWFUL MIGHT HAPPEN: NOT AT ALL
7. FEELING AFRAID AS IF SOMETHING AWFUL MIGHT HAPPEN: NOT AT ALL
2. NOT BEING ABLE TO STOP OR CONTROL WORRYING: NOT AT ALL
GAD7 TOTAL SCORE: 2
5. BEING SO RESTLESS THAT IT IS HARD TO SIT STILL: NOT AT ALL
GAD7 TOTAL SCORE: 2
1. FEELING NERVOUS, ANXIOUS, OR ON EDGE: NOT AT ALL

## 2018-04-10 ASSESSMENT — PATIENT HEALTH QUESTIONNAIRE - PHQ9
SUM OF ALL RESPONSES TO PHQ QUESTIONS 1-9: 9
SUM OF ALL RESPONSES TO PHQ QUESTIONS 1-9: 9
10. IF YOU CHECKED OFF ANY PROBLEMS, HOW DIFFICULT HAVE THESE PROBLEMS MADE IT FOR YOU TO DO YOUR WORK, TAKE CARE OF THINGS AT HOME, OR GET ALONG WITH OTHER PEOPLE: NOT DIFFICULT AT ALL

## 2018-04-10 NOTE — NURSING NOTE
"Chief Complaint   Patient presents with     Hospital F/U       Initial /84 (BP Location: Left arm, Patient Position: Chair, Cuff Size: Adult Regular)  Pulse 62  Temp 97.8  F (36.6  C)  Resp 20  Ht 5' 1.5\" (1.562 m)  Wt 120 lb (54.4 kg)  LMP  (LMP Unknown)  SpO2 95%  Breastfeeding? No  BMI 22.31 kg/m2 Estimated body mass index is 22.31 kg/(m^2) as calculated from the following:    Height as of this encounter: 5' 1.5\" (1.562 m).    Weight as of this encounter: 120 lb (54.4 kg).  Medication Reconciliation: complete   Doris Ramos LPN  "

## 2018-04-10 NOTE — PROGRESS NOTES
SUBJECTIVE:   Theresa Alves is a 89 year old female who presents to clinic today for the following health issues:          Hospital Follow-up Visit:    Hospital/Nursing Home/ Rehab Facility: [facility]  Date of Admission: 3/16/2018(Hosp.) and 3/21/2018()  Date of Discharge: 3/20/2018(Hosp.) and 4/02/2018()  Reason(s) for Admission: A-Fib with RVR            Problems taking medications regularly:  None       Medication changes since discharge: see med list       Problems adhering to non-medication therapy:  None    Summary of hospitalization:  [facility] discharge summary reviewed  See outside records, reviewed and scanned  Diagnostic Tests/Treatments reviewed.  Follow up needed: 5/2/2018 with Cardio  And discuss 24Holter Monitor results  Other Healthcare Providers Involved in Patient s Care:         Homecare and Care Coordination  Update since discharge: stable.     Post Discharge Medication Reconciliation: discharge medications reconciled and changed, per note/orders (see AVS).  Plan of care communicated with patient and family     Coding guidelines for this visit:  Type of Medical   Decision Making Face-to-Face Visit       within 7 Days of discharge Face-to-Face Visit        within 14 days of discharge   Moderate Complexity [id number]   High Complexity [id number]                                 Here with 2 nieces.               She was hospitalized 4 weeks ago with rapid atrial fibrillation.  Heart rate was slowed with metoprolol and digoxin and she was diuresed.                  Then she was transferred to a rehab center.     Her chief complaint currently is chronic fatigue.            She also now has a cough productive of yellow mucus which is a new problem for her.  Her weight seems to have stabilized at 120 pounds.  She was having nosebleeds with anticoagulation, and is now taking Eliquis 2.5 mg twice daily, and has not had more epistaxis in the  last 3 days.  She is currently wearing a Holter monitor.                        She is considering a move to assisted living.        She continues with oxycodone 2.5 mg once per day and is requesting a refill.  Problem list and histories reviewed & adjusted, as indicated.  Additional history: as documented    Current Outpatient Prescriptions   Medication Sig Dispense Refill     furosemide (LASIX) 20 MG tablet Take 2 tablets (40 mg) by mouth daily 30 tablet      oxyCODONE-acetaminophen (PERCOCET) 5-325 MG per tablet Take 0.5 tablets by mouth every morning 14 tablet 0     apixaban ANTICOAGULANT (ELIQUIS) 2.5 MG tablet Take 1 tablet (2.5 mg) by mouth 2 times daily  1     metoprolol succinate (TOPROL-XL) 200 MG 24 hr tablet Take 1 tablet (200 mg) by mouth daily 30 tablet 3     digoxin (LANOXIN) 125 MCG tablet Take 1 tablet (125 mcg) by mouth daily 30 tablet 3     sodium chloride (OCEAN) 0.65 % nasal spray Spray 1 spray into both nostrils 3 times daily as needed for congestion  0     simvastatin (ZOCOR) 20 MG tablet TAKE HALF TABLET BY MOUTH AT BEDTIME 45 tablet 3     Cyanocobalamin (VITAMIN B 12 PO) Take 1 tablet by mouth daily        VITAMIN D, CHOLECALCIFEROL, PO Take 1,000 Units by mouth daily.         losartan (COZAAR) 25 MG tablet        [DISCONTINUED] furosemide (LASIX) 40 MG tablet        No Known Allergies  BP Readings from Last 3 Encounters:   04/10/18 140/84   03/29/18 142/78   03/28/18 109/64    Wt Readings from Last 3 Encounters:   04/10/18 120 lb (54.4 kg)   03/29/18 123 lb 1.6 oz (55.8 kg)   03/28/18 123 lb 1.6 oz (55.8 kg)                    Reviewed and updated as needed this visit by clinical staff       Reviewed and updated as needed this visit by Provider         ROS:  CONSTITUTIONAL:NEGATIVE  for chills and fever   RESP:POSITIVE for cough-productive and dyspnea on exertion  CV: NEGATIVE for chest pain, palpitations or peripheral edema  MUSCULOSKELETAL: POSITIVE  for back pain   NEURO: POSITIVE for  "gait disturbance    OBJECTIVE:                                                    /60  Pulse 62  Temp 97.8  F (36.6  C)  Resp 20  Ht 5' 1.5\" (1.562 m)  Wt 120 lb (54.4 kg)  LMP  (LMP Unknown)  SpO2 95%  Breastfeeding? No  BMI 22.31 kg/m2  Body mass index is 22.31 kg/(m^2).  GENERAL APPEARANCE: alert, no distress and fatigued  RESP: rales bibasilar  CV: irregular rhythm and the rate is controlled, no leg edema  MS: arthritic changes of the spine  NEURO: mentation intact, speech normal and gait abnormal ; she uses a walker    Diagnostic test results:  Recent Results (from the past 1000 hour(s))   EKG 12 lead    Collection Time: 03/16/18 10:58 PM   Result Value Ref Range    Interpretation ECG Click View Image link to view waveform and result    CBC with platelets + differential    Collection Time: 03/16/18 11:04 PM   Result Value Ref Range    WBC 11.2 (H) 4.0 - 11.0 10e9/L    RBC Count 3.72 (L) 3.8 - 5.2 10e12/L    Hemoglobin 12.7 11.7 - 15.7 g/dL    Hematocrit 38.3 35.0 - 47.0 %     (H) 78 - 100 fl    MCH 34.1 (H) 26.5 - 33.0 pg    MCHC 33.2 31.5 - 36.5 g/dL    RDW 14.9 10.0 - 15.0 %    Platelet Count 189 150 - 450 10e9/L    Diff Method Automated Method     % Neutrophils 67.5 %    % Lymphocytes 13.7 %    % Monocytes 13.9 %    % Eosinophils 4.0 %    % Basophils 0.3 %    % Immature Granulocytes 0.6 %    Nucleated RBCs 1 (H) 0 /100    Absolute Neutrophil 7.6 1.6 - 8.3 10e9/L    Absolute Lymphocytes 1.5 0.8 - 5.3 10e9/L    Absolute Monocytes 1.6 (H) 0.0 - 1.3 10e9/L    Absolute Eosinophils 0.5 0.0 - 0.7 10e9/L    Absolute Basophils 0.0 0.0 - 0.2 10e9/L    Abs Immature Granulocytes 0.1 0 - 0.4 10e9/L    Absolute Nucleated RBC 0.1    Comprehensive metabolic panel    Collection Time: 03/16/18 11:04 PM   Result Value Ref Range    Sodium 138 133 - 144 mmol/L    Potassium 4.7 3.4 - 5.3 mmol/L    Chloride 104 94 - 109 mmol/L    Carbon Dioxide 26 20 - 32 mmol/L    Anion Gap 8 3 - 14 mmol/L    Glucose 127 (H) " 70 - 99 mg/dL    Urea Nitrogen 36 (H) 7 - 30 mg/dL    Creatinine 1.12 (H) 0.52 - 1.04 mg/dL    GFR Estimate 45 (L) >60 mL/min/1.7m2    GFR Estimate If Black 55 (L) >60 mL/min/1.7m2    Calcium 9.1 8.5 - 10.1 mg/dL    Bilirubin Total 1.2 0.2 - 1.3 mg/dL    Albumin 3.5 3.4 - 5.0 g/dL    Protein Total 7.9 6.8 - 8.8 g/dL    Alkaline Phosphatase 92 40 - 150 U/L    ALT 45 0 - 50 U/L    AST 43 0 - 45 U/L   Nt probnp inpatient    Collection Time: 03/16/18 11:04 PM   Result Value Ref Range    N-Terminal Pro BNP Inpatient 95584 (H) 0 - 1800 pg/mL   TSH    Collection Time: 03/16/18 11:04 PM   Result Value Ref Range    TSH 4.13 (H) 0.40 - 4.00 mU/L   Troponin I    Collection Time: 03/16/18 11:04 PM   Result Value Ref Range    Troponin I ES 0.033 0.000 - 0.045 ug/L   ABO/Rh type and screen    Collection Time: 03/16/18 11:04 PM   Result Value Ref Range    ABO A     RH(D) Pos     Antibody Screen Neg     Test Valid Only At [facility]        Specimen Expires 03/19/2018    T3 Free    Collection Time: 03/16/18 11:04 PM   Result Value Ref Range    Free T3 2.9 2.3 - 4.2 pg/mL   T4 free    Collection Time: 03/16/18 11:04 PM   Result Value Ref Range    T4 Free 1.38 0.76 - 1.46 ng/dL   Comprehensive metabolic panel    Collection Time: 03/18/18  5:40 AM   Result Value Ref Range    Sodium 138 133 - 144 mmol/L    Potassium 4.3 3.4 - 5.3 mmol/L    Chloride 107 94 - 109 mmol/L    Carbon Dioxide 21 20 - 32 mmol/L    Anion Gap 10 3 - 14 mmol/L    Glucose 85 70 - 99 mg/dL    Urea Nitrogen 34 (H) 7 - 30 mg/dL    Creatinine 1.15 (H) 0.52 - 1.04 mg/dL    GFR Estimate 44 (L) >60 mL/min/1.7m2    GFR Estimate If Black 53 (L) >60 mL/min/1.7m2    Calcium 8.6 8.5 - 10.1 mg/dL    Bilirubin Total 1.8 (H) 0.2 - 1.3 mg/dL    Albumin 2.9 (L) 3.4 - 5.0 g/dL    Protein Total 6.3 (L) 6.8 - 8.8 g/dL    Alkaline Phosphatase 67 40 - 150 U/L    ALT 27 0 - 50 U/L    AST 24 0 - 45 U/L   CBC with platelets    Collection Time: 03/18/18  5:40 AM   Result  Value Ref Range    WBC 7.7 4.0 - 11.0 10e9/L    RBC Count 3.37 (L) 3.8 - 5.2 10e12/L    Hemoglobin 11.4 (L) 11.7 - 15.7 g/dL    Hematocrit 34.0 (L) 35.0 - 47.0 %     (H) 78 - 100 fl    MCH 33.8 (H) 26.5 - 33.0 pg    MCHC 33.5 31.5 - 36.5 g/dL    RDW 14.7 10.0 - 15.0 %    Platelet Count 187 150 - 450 10e9/L   Basic metabolic panel    Collection Time: 03/19/18  6:21 AM   Result Value Ref Range    Sodium 137 133 - 144 mmol/L    Potassium 4.1 3.4 - 5.3 mmol/L    Chloride 106 94 - 109 mmol/L    Carbon Dioxide 22 20 - 32 mmol/L    Anion Gap 9 3 - 14 mmol/L    Glucose 94 70 - 99 mg/dL    Urea Nitrogen 33 (H) 7 - 30 mg/dL    Creatinine 1.14 (H) 0.52 - 1.04 mg/dL    GFR Estimate 44 (L) >60 mL/min/1.7m2    GFR Estimate If Black 54 (L) >60 mL/min/1.7m2    Calcium 8.5 8.5 - 10.1 mg/dL   Digoxin level    Collection Time: 03/20/18  5:40 AM   Result Value Ref Range    Digoxin Level 1.2 0.5 - 2.0 ug/L   Basic metabolic panel    Collection Time: 03/20/18  5:40 AM   Result Value Ref Range    Sodium 138 133 - 144 mmol/L    Potassium 4.3 3.4 - 5.3 mmol/L    Chloride 106 94 - 109 mmol/L    Carbon Dioxide 24 20 - 32 mmol/L    Anion Gap 8 3 - 14 mmol/L    Glucose 88 70 - 99 mg/dL    Urea Nitrogen 32 (H) 7 - 30 mg/dL    Creatinine 1.28 (H) 0.52 - 1.04 mg/dL    GFR Estimate 39 (L) >60 mL/min/1.7m2    GFR Estimate If Black 47 (L) >60 mL/min/1.7m2    Calcium 8.3 (L) 8.5 - 10.1 mg/dL   Basic metabolic panel    Collection Time: 03/22/18 12:00 AM   Result Value Ref Range    Sodium 137 136 - 145 mmol/L    Potassium 4.5 3.5 - 5.2 mmol/L    Chloride 106 98 - 109 mmol/L    Carbon Dioxide 23 22 - 31 mmol/L    Glucose 91 70 - 100 mg/dL    Urea Nitrogen 32 (H) 9 - 26 mg/dL    Creatinine 1.17 (H) 0.55 - 1.02 mg/dL    Calcium 9.0 8.4 - 10.2 mg/dL    GFR Estimate 40 (L) >60 ml/min/1.73m2    GFR Estimate If Black 47 (L) >60 ml/min/1.73m2   Basic metabolic panel    Collection Time: 03/26/18 12:00 AM   Result Value Ref Range    Sodium 136 136 - 145  mmol/L    Potassium 4.4 3.5 - 5.2 mmol/L    Chloride 103 98 - 109 mmol/L    Carbon Dioxide 24 22 - 31 mmol/L    Glucose 76 70 - 100 mg/dL    Urea Nitrogen 34 (A) 9 - 26 mg/dL    Creatinine 1.29 (A) 0.55 - 1.02 mg/dL    Calcium 9.3 8.4 - 10.2 mg/dL    GFR Estimate 36 (A) >60 ml/min/1.73m2    GFR Estimate If Black 41 (A) >60 ml/min/1.73m2   Basic metabolic panel    Collection Time: 03/29/18 12:00 AM   Result Value Ref Range    Sodium 134 (L) 136 - 145 mmol/L    Potassium 4.2 3.5 - 5.2 mmol/L    Chloride 104 98 - 109 mmol/L    Carbon Dioxide 22 22 - 31 mmol/L    Glucose 89 70 - 100 mg/dL    Urea Nitrogen 37 (H) 9 - 26 mg/dL    Creatinine 1.23 (H) 0.55 - 1.02 mg/dL    Calcium 9.2 8.4 - 10.2 mg/dL    GFR Estimate 38 (L) >60 ml/min/1.73m2    GFR Estimate If Black 44 (L) >60 ml/min/1.73m2            ASSESSMENT/PLAN:                                                        ICD-10-CM    1. Chronic congestive heart failure, unspecified congestive heart failure type (H) I50.9    2. Atrial fibrillation, unspecified type (H) I48.91    3. Pulmonary fibrosis J84.10    4. Chronic fatigue R53.82    5. Acute bronchitis, unspecified organism J20.9 doxycycline (VIBRA-TABS) 100 MG tablet   6. Chronic midline low back pain without sciatica M54.5 oxyCODONE-acetaminophen (PERCOCET) 5-325 MG per tablet    G89.29        Summary and implications:  We discussed her situation at length.            Her fatigue is chronic and multifactorial and I do not expect it to improve.            Her atrial fibrillation is under control.  Her digoxin level was 1.2, and she is at risk of it getting higher.  Therefore I recommend that she cut back the dosage.                   She seems to be dealing with a bronchitis in addition to her pulmonary fibrosis, so doxycycline will be added.  She has follow-up with cardiology in 3 weeks, and I should see her in approximately 2 months.                                        She can continue the low-dose  oxycodone.  Patient Instructions   Let's do this: cut back the digoxin to every other day.                Take the doxycycline twice per day for 7 days.            Hopefully this will help cut back the productive cough.                                                Marbin Rouse MD  [facility]  Answers for HPI/ROS submitted by the patient on 4/10/2018   If you checked off any problems, how difficult have these problems made it for you to do your work, take care of things at home, or get along with other people?: Not difficult at all  PHQ9 TOTAL SCORE: 9  LAUREANO 7 TOTAL SCORE: 2

## 2018-04-10 NOTE — PATIENT INSTRUCTIONS
Let's do this: cut back the digoxin to every other day.                Take the doxycycline twice per day for 7 days.            Hopefully this will help cut back the productive cough.

## 2018-04-10 NOTE — MR AVS SNAPSHOT
After Visit Summary   4/10/2018    Theresa Alves    MRN: [id number]           Patient Information     Date Of Birth          6/22/1924        Visit Information        Provider Department      4/10/2018 11:30 AM Marbin Rouse MD [facility]        Today's Diagnoses     Chronic congestive heart failure, unspecified congestive heart failure type (H)    -  1    Atrial fibrillation, unspecified type (H)        Pulmonary fibrosis        Chronic fatigue        Acute bronchitis, unspecified organism        Chronic midline low back pain without sciatica          Care Instructions    Let's do this: cut back the digoxin to every other day.                Take the doxycycline twice per day for 7 days.            Hopefully this will help cut back the productive cough.                                                    Follow-ups after your visit        Your next 10 appointments already scheduled     May 02, 2018  1:50 PM CDT   Return Discharge with Lolis Davenport PA-C   [facility]    [address] [place] [address] [id number] [phone] OPT 2              Who to contact     If you have questions or need follow up information about today's clinic visit or your schedule please contact [facility] directly at [phone].  Normal or non-critical lab and imaging results will be communicated to you by MyChart, letter or phone within 4 business days after the clinic has received the results. If you do not hear from us within 7 days, please contact the clinic through MyChart or phone. If you have a critical or abnormal lab result, we will notify you by phone as soon as possible.  Submit refill requests through [employer] or call your pharmacy and they will forward the refill request to us. Please allow 3 business days for your refill to be completed.          Additional  "Information About Your Visit        MyChart Information     [email or web address] lets you send messages to your doctor, view your test results, renew your prescriptions, schedule appointments and more. To sign up, go to [email or web address] . Click on \"Log in\" on the left side of the screen, which will take you to the Welcome page. Then click on \"Sign up Now\" on the right side of the page.     You will be asked to enter the access code listed below, as well as some personal information. Please follow the directions to create your username and password.     Your access code is: [email or web address]  Expires: 2018  4:09 PM     Your access code will  in 90 days. If you need help or a new code, please call your [facility] clinic or [phone].        Care EveryWhere ID     This is your Care EveryWhere ID. This could be used by other organizations to access your [facility] medical records  [id number]        Your Vitals Were     Pulse Temperature Respirations Height Last Period Pulse Oximetry    62 97.8  F (36.6  C) 20 5' 1.5\" (1.562 m) (LMP Unknown) 95%    Breastfeeding? BMI (Body Mass Index)                No 22.31 kg/m2           Blood Pressure from Last 3 Encounters:   04/10/18 120/60   18 142/78   18 109/64    Weight from Last 3 Encounters:   04/10/18 120 lb (54.4 kg)   18 123 lb 1.6 oz (55.8 kg)   18 123 lb 1.6 oz (55.8 kg)              Today, you had the following     No orders found for display         Today's Medication Changes          These changes are accurate as of 4/10/18 12:35 PM.  If you have any questions, ask your nurse or doctor.               Start taking these medicines.        Dose/Directions    doxycycline 100 MG tablet   Commonly known as:  VIBRA-TABS   Used for:  Acute bronchitis, unspecified organism   Started by:  Marbin Rouse MD        Dose:  100 mg   Take 1 tablet (100 mg) by mouth 2 times daily   Quantity:  14 tablet   Refills:  0         These medicines have changed or " have updated prescriptions.        Dose/Directions    digoxin 125 MCG tablet   Commonly known as:  LANOXIN   This may have changed:    - when to take this  - additional instructions   Changed by:  Marbin Rouse MD        Dose:  125 mcg   Take 1 tablet (125 mcg) by mouth every other day As of 4/10/18   Quantity:  30 tablet   Refills:  3            Where to get your medicines      These medications were sent to [id number] - [place], [place] - [address]  [address] [facility] [address]     Phone:  [phone]     doxycycline 100 MG tablet         Some of these will need a paper prescription and others can be bought over the counter.  Ask your nurse if you have questions.     Bring a paper prescription for each of these medications     oxyCODONE-acetaminophen 5-325 MG per tablet                Primary Care Provider Office Phone # Fax #    Marbin Rouse [phone] [address] [facility] [address]        Equal Access to Services     [facility]: Hadii aad ku hadasho Soomaali, waaxda luqadaha, qaybta kaalmada adeegyada, waxay idiin hayaaadriana de luna . So [facility] [phone].    ATENCIÓN: Si habla español, tiene a salmon disposición servicios gratuitos de asistencia lingüística. [facility] [phone].    We comply with applicable federal civil rights laws and [place] laws. We do not discriminate on the basis of race, color, national origin, age, disability, sex, sexual orientation, or gender identity.            Thank you!     Thank you for choosing [facility]  for your care. Our goal is always to provide you with excellent care. Hearing back from our patients is one way we can continue to improve our services. Please take a few minutes to complete the written survey that you may receive in the mail after your visit with us. Thank you!             Your Updated Medication List - Protect others around you: Learn how to  safely use, store and throw away your medicines at www.disposemymeds.org.          This list is accurate as of 4/10/18 12:35 PM.  Always use your most recent med list.                   Brand Name Dispense Instructions for use Diagnosis    apixaban ANTICOAGULANT 2.5 MG tablet    ELIQUIS     Take 1 tablet (2.5 mg) by mouth 2 times daily    Uncontrolled atrial fibrillation (H)       digoxin 125 MCG tablet    LANOXIN    30 tablet    Take 1 tablet (125 mcg) by mouth every other day As of 4/10/18        doxycycline 100 MG tablet    VIBRA-TABS    14 tablet    Take 1 tablet (100 mg) by mouth 2 times daily    Acute bronchitis, unspecified organism       furosemide 20 MG tablet    LASIX    30 tablet    Take 2 tablets (40 mg) by mouth daily    HORTON (dyspnea on exertion)       metoprolol succinate 200 MG 24 hr tablet    TOPROL-XL    30 tablet    Take 1 tablet (200 mg) by mouth daily    Atrial fibrillation with RVR (H)       oxyCODONE-acetaminophen 5-325 MG per tablet   Start taking on:  5/7/2018    PERCOCET    30 tablet    Take 0.5 tablets by mouth every morning    Chronic midline low back pain without sciatica       simvastatin 20 MG tablet    ZOCOR    45 tablet    TAKE HALF TABLET BY MOUTH AT BEDTIME    Hyperlipidemia with target LDL less than 130       sodium chloride 0.65 % nasal spray    OCEAN     Spray 1 spray into both nostrils 3 times daily as needed for congestion    Nasal dryness       VITAMIN B 12 PO      Take 1 tablet by mouth daily        VITAMIN D (CHOLECALCIFEROL) PO      Take 1,000 Units by mouth daily.

## 2018-04-11 ASSESSMENT — PATIENT HEALTH QUESTIONNAIRE - PHQ9: SUM OF ALL RESPONSES TO PHQ QUESTIONS 1-9: 9

## 2018-04-11 ASSESSMENT — ANXIETY QUESTIONNAIRES: GAD7 TOTAL SCORE: 2

## 2018-04-13 LAB — INTERPRETATION MONITOR -MUSE: NORMAL

## 2018-04-16 ENCOUNTER — TELEPHONE (OUTPATIENT)
Dept: FAMILY MEDICINE | Facility: CLINIC | Age: 83
End: 2018-04-16

## 2018-04-16 NOTE — TELEPHONE ENCOUNTER
Reason for Call:  Form, our goal is to have forms completed with 72 hours, however, some forms may require a visit or additional information.    Type of letter, form or note:  medical    Who is the form from?: Home care    Where did the form come from: form was faxed in    What clinic location was the form placed at?: [facility]    Where the form was placed: 's Box: Marbin Rouse MD    What number is listed as a contact on the form?: [phone]       Additional comments: HC SN 2 w 1, 1 w 2, 3 as needed     Call taken on 4/16/2018 at 4:32 PM by Rachana Ontiveros

## 2018-04-17 ENCOUNTER — TELEPHONE (OUTPATIENT)
Dept: FAMILY MEDICINE | Facility: CLINIC | Age: 83
End: 2018-04-17

## 2018-04-17 DIAGNOSIS — Z53.9 DIAGNOSIS NOT YET DEFINED: Primary | ICD-10-CM

## 2018-04-17 PROCEDURE — G0180 MD CERTIFICATION HHA PATIENT: HCPCS | Performed by: INTERNAL MEDICINE

## 2018-04-17 NOTE — TELEPHONE ENCOUNTER
Reason for Call:  Form, our goal is to have forms completed with 72 hours, however, some forms may require a visit or additional information.    Type of letter, form or note:  medical    Who is the form from?: Home care    Where did the form come from: form was faxed in    What clinic location was the form placed at?: [facility]    Where the form was placed: 's Box: Marbin Rouse MD    What number is listed as a contact on the form?: [phone]       Additional comments: [facility] 4/3/18 to 6/1/18    Call taken on 4/17/2018 at 12:26 PM by Rachana Ontiveros

## 2018-04-19 ENCOUNTER — TELEPHONE (OUTPATIENT)
Dept: NURSING | Facility: CLINIC | Age: 83
End: 2018-04-19

## 2018-04-19 NOTE — TELEPHONE ENCOUNTER
Verbal okay given for SKN visits 1x week x3 wks,HHA 1x week x3 wks,with 3 as needed. Will send in order for signature.

## 2018-04-20 ENCOUNTER — TELEPHONE (OUTPATIENT)
Dept: FAMILY MEDICINE | Facility: CLINIC | Age: 83
End: 2018-04-20

## 2018-04-25 ENCOUNTER — TELEPHONE (OUTPATIENT)
Dept: FAMILY MEDICINE | Facility: CLINIC | Age: 83
End: 2018-04-25

## 2018-04-25 NOTE — TELEPHONE ENCOUNTER
Reason for Call:  Form, our goal is to have forms completed with 72 hours, however, some forms may require a visit or additional information.    Type of letter, form or note:  medical    Who is the form from?: Home care    Where did the form come from: form was faxed in    What clinic location was the form placed at?: [facility]    Where the form was placed: 's Box: Marbin Rouse MD    What number is listed as a contact on the form?: [phone]       Additional comments: [facility] SN 1 w 3, 3 as needed & HA 1 w 4    Call taken on 4/25/2018 at 1:58 PM by Rachana Ontiveros

## 2018-04-25 NOTE — TELEPHONE ENCOUNTER
Reason for Call:  Form, our goal is to have forms completed with 72 hours, however, some forms may require a visit or additional information.    Type of letter, form or note:  medical    Who is the form from?: Home care    Where did the form come from: form was faxed in    What clinic location was the form placed at?: [facility]    Where the form was placed: 's Box: Marbin Rouse MD    What number is listed as a contact on the form?: [phone]       Additional comments: [facility] PT 2 w 2    Call taken on 4/25/2018 at 2:19 PM by Rachana Ontiveros

## 2018-05-02 ENCOUNTER — OFFICE VISIT (OUTPATIENT)
Dept: CARDIOLOGY | Facility: CLINIC | Age: 83
End: 2018-05-02
Attending: PHYSICIAN ASSISTANT
Payer: COMMERCIAL

## 2018-05-02 ENCOUNTER — PATIENT OUTREACH (OUTPATIENT)
Dept: CARE COORDINATION | Facility: CLINIC | Age: 83
End: 2018-05-02

## 2018-05-02 VITALS
SYSTOLIC BLOOD PRESSURE: 133 MMHG | DIASTOLIC BLOOD PRESSURE: 70 MMHG | BODY MASS INDEX: 21.9 KG/M2 | HEIGHT: 62 IN | HEART RATE: 50 BPM | WEIGHT: 119 LBS

## 2018-05-02 DIAGNOSIS — E78.5 HYPERLIPIDEMIA WITH TARGET LDL LESS THAN 130: Primary | Chronic | ICD-10-CM

## 2018-05-02 DIAGNOSIS — I48.91 ATRIAL FIBRILLATION, UNSPECIFIED TYPE (H): ICD-10-CM

## 2018-05-02 DIAGNOSIS — I50.30 (HFPEF) HEART FAILURE WITH PRESERVED EJECTION FRACTION (H): ICD-10-CM

## 2018-05-02 LAB
ANION GAP SERPL CALCULATED.3IONS-SCNC: 13.7 MMOL/L (ref 6–17)
BUN SERPL-MCNC: 34 MG/DL (ref 7–30)
CALCIUM SERPL-MCNC: 9.8 MG/DL (ref 8.5–10.5)
CHLORIDE SERPL-SCNC: 97 MMOL/L (ref 98–107)
CO2 SERPL-SCNC: 29 MMOL/L (ref 23–29)
CREAT SERPL-MCNC: 1.65 MG/DL (ref 0.7–1.3)
GFR SERPL CREATININE-BSD FRML MDRD: 29 ML/MIN/1.7M2
GLUCOSE SERPL-MCNC: 144 MG/DL (ref 70–105)
HGB BLD-MCNC: 12.5 G/DL (ref 11.7–15.7)
POTASSIUM SERPL-SCNC: 4.7 MMOL/L (ref 3.5–5.1)
SODIUM SERPL-SCNC: 135 MMOL/L (ref 136–145)

## 2018-05-02 PROCEDURE — 85018 HEMOGLOBIN: CPT | Performed by: PHYSICIAN ASSISTANT

## 2018-05-02 PROCEDURE — 99214 OFFICE O/P EST MOD 30 MIN: CPT | Performed by: PHYSICIAN ASSISTANT

## 2018-05-02 PROCEDURE — 80048 BASIC METABOLIC PNL TOTAL CA: CPT | Performed by: PHYSICIAN ASSISTANT

## 2018-05-02 PROCEDURE — 36415 COLL VENOUS BLD VENIPUNCTURE: CPT | Performed by: PHYSICIAN ASSISTANT

## 2018-05-02 NOTE — PROGRESS NOTES
Clinic Care Coordination Contact  Care Team Conversations    Clinic care coordinator outreach to  home care.   Patient continues to receive home care nursing weekly, HHA weekly and PT twice a week.     Patient has had follow up PCP visit. Heart rate is currently controlled.     Patient has CORE clinic appointment today, 5/2/18.     Will continue to follow in clinic care coordination.

## 2018-05-02 NOTE — PROGRESS NOTES
[id number]  HPI and Plan:   See dictation    Orders this Visit:  Orders Placed This Encounter   Procedures     Basic metabolic panel     Hemoglobin     Basic metabolic panel     Palliative Care Referral     Follow-Up with [facility] Clinic - SARA visit     No orders of the defined types were placed in this encounter.    There are no discontinued medications.      Encounter Diagnoses   Name Primary?     Atrial fibrillation, unspecified type (H)      Hyperlipidemia with target LDL less than 130 Yes     (HFpEF) heart failure with preserved ejection fraction (H)        CURRENT MEDICATIONS:  Current Outpatient Prescriptions   Medication Sig Dispense Refill     apixaban ANTICOAGULANT (ELIQUIS) 2.5 MG tablet Take 1 tablet (2.5 mg) by mouth 2 times daily  1     Cyanocobalamin (VITAMIN B 12 PO) Take 1 tablet by mouth daily        digoxin (LANOXIN) 125 MCG tablet Take 1 tablet (125 mcg) by mouth every other day As of 4/10/18 30 tablet 3     furosemide (LASIX) 20 MG tablet Take 2 tablets (40 mg) by mouth daily 30 tablet      metoprolol succinate (TOPROL-XL) 200 MG 24 hr tablet Take 1 tablet (200 mg) by mouth daily 30 tablet 3     [START ON 5/7/2018] oxyCODONE-acetaminophen (PERCOCET) 5-325 MG per tablet Take 0.5 tablets by mouth every morning 30 tablet 0     simvastatin (ZOCOR) 20 MG tablet TAKE HALF TABLET BY MOUTH AT BEDTIME (Patient taking differently: Pt take one tab every other day.) 45 tablet 3     sodium chloride (OCEAN) 0.65 % nasal spray Spray 1 spray into both nostrils 3 times daily as needed for congestion  0     VITAMIN D, CHOLECALCIFEROL, PO Take 1,000 Units by mouth daily.         doxycycline (VIBRA-TABS) 100 MG tablet Take 1 tablet (100 mg) by mouth 2 times daily (Patient not taking: Reported on 5/2/2018) 14 tablet 0       ALLERGIES   No Known Allergies    PAST MEDICAL HISTORY:  Past Medical History:   Diagnosis Date     Chronic back pain      Pulmonary fibrosis (H)      Unspecified essential hypertension     Essential  hypertension       PAST SURGICAL HISTORY:  Past Surgical History:   Procedure Laterality Date     CARPAL TUNNEL RELEASE RT/LT  1990's    R     CATARACT IOL, RT/LT  5/03    R     HERNIA REPAIR, INGUINAL RT/LT  1954    R     hiatal hernia repair  6/10    large paraesophageal hiatal hernia; Nissen fundoplication        FAMILY HISTORY:  Family History   Problem Relation Age of Onset     Unknown/Adopted Mother      Unknown/Adopted Father      DIABETES No family hx of      Coronary Artery Disease No family hx of      Hypertension No family hx of      Hyperlipidemia No family hx of      CEREBROVASCULAR DISEASE No family hx of      Breast Cancer No family hx of      Colon Cancer No family hx of      Prostate Cancer No family hx of      Other Cancer No family hx of      Depression No family hx of      Anxiety Disorder No family hx of      MENTAL ILLNESS No family hx of      Substance Abuse No family hx of      Anesthesia Reaction No family hx of      Asthma No family hx of      OSTEOPOROSIS No family hx of      Genetic Disorder No family hx of      Thyroid Disease No family hx of      Obesity No family hx of        SOCIAL HISTORY:  Social History     Social History     Marital status:      Spouse name: N/A     Number of children: 0     Years of education: N/A     Occupational History     worked at [employer] for 40 yrs Retired     Social History Main Topics     Smoking status: Never Smoker     Smokeless tobacco: Never Used     Alcohol use Yes      Comment: daily-vodka tonic x2     Drug use: No     Sexual activity: No     Other Topics Concern     None     Social History Narrative       Review of Systems:  Skin:  Negative     Eyes:  Positive for glasses  ENT:  Negative    Respiratory:  Positive for shortness of breath;dyspnea on exertion  Cardiovascular:  Negative;palpitations;chest pain;edema;exercise intolerance Positive for;fatigue;lightheadedness;dizziness  Gastroenterology: Negative    Genitourinary:  Positive for  "nocturia  Musculoskeletal:  Positive for back pain;arthritis  Neurologic:  Positive for numbness or tingling of feet  Psychiatric:  Negative    Heme/Lymph/Imm:  Negative    Endocrine:  Negative      Physical Exam:  Vitals: /70  Pulse 50  Ht 1.562 m (5' 1.5\")  Wt 54 kg (119 lb)  LMP  (LMP Unknown)  BMI 22.12 kg/m2   Please refer to dictation for physical exam    Recent Lab Results:  LIPID RESULTS:  Lab Results   Component Value Date    CHOL 142 11/28/2017    HDL 69 11/28/2017    LDL 52 11/28/2017    TRIG 107 11/28/2017    CHOLHDLRATIO 2.7 01/10/2014       LIVER ENZYME RESULTS:  Lab Results   Component Value Date    AST 24 03/18/2018    ALT 27 03/18/2018       CBC RESULTS:  Lab Results   Component Value Date    WBC 7.7 03/18/2018    RBC 3.37 (L) 03/18/2018    HGB 11.4 (L) 03/18/2018    HCT 34.0 (L) 03/18/2018     (H) 03/18/2018    MCH 33.8 (H) 03/18/2018    MCHC 33.5 03/18/2018    RDW 14.7 03/18/2018     03/18/2018       BMP RESULTS:  Lab Results   Component Value Date     (L) 03/29/2018    POTASSIUM 4.2 03/29/2018    CHLORIDE 104 03/29/2018    CO2 22 03/29/2018    ANIONGAP 8 03/20/2018    GLC 89 03/29/2018    BUN 37 (H) 03/29/2018    CR 1.23 (H) 03/29/2018    GFRESTIMATED 38 (L) 03/29/2018    GFRESTBLACK 44 (L) 03/29/2018    PAWEL 9.2 03/29/2018        A1C RESULTS:  No results found for: A1C    INR RESULTS:  No results found for: INR        CC  Citlali Umana PA-C  [address]  [address]  [place] [id number]      "

## 2018-05-02 NOTE — PATIENT INSTRUCTIONS
Please call CORE nurse for any questions or concerns:       [phone]  Your monitor showed that your pulse was good.      Please make an appointment with Dr. Leiva to discuss how to live with your back pain, breathing etc.      1. Medication changes:  I'll leave it up to you if you want to stay on the Eliquis or not.  If you do, please use vaseline in your nose to prevent nosebleeds.      2.  Weigh yourself daily and write it down.     3. Call CORE nurse if your weight is up more than 2 pounds in one day, or 5 pounds in one week.    4. Call CORE nurse if you feel more short of breath, have more abdominal bloating or leg swelling.    5. Eat a low sodium diet (less than 2,000mg daily). If you eat less salt, you will retain less fluid.     6.  We'll check labs today.      7.  Follow up: with myself in 6 weeks.      Scheduling phone number: [phone]  Reminder: Please bring in all current medications, over the counter supplements and vitamin bottles to your next appointment.

## 2018-05-02 NOTE — MR AVS SNAPSHOT
After Visit Summary   5/2/2018    Theresa Alves    MRN: [id number]           Patient Information     Date Of Birth          6/22/1924        Visit Information        Provider Department      5/2/2018 1:50 PM More, Lolis Gonzalez PA-C [facility]        Today's Diagnoses     Hyperlipidemia with target LDL less than 130    -  1    Atrial fibrillation, unspecified type (H)        (HFpEF) heart failure with preserved ejection fraction (H)          Care Instructions    Please call CORE nurse for any questions or concerns:       [phone]  Your monitor showed that your pulse was good.      Please make an appointment with Dr. Leiva to discuss how to live with your back pain, breathing etc.      1. Medication changes:  I'll leave it up to you if you want to stay on the Eliquis or not.  If you do, please use vaseline in your nose to prevent nosebleeds.      2.  Weigh yourself daily and write it down.     3. Call CORE nurse if your weight is up more than 2 pounds in one day, or 5 pounds in one week.    4. Call CORE nurse if you feel more short of breath, have more abdominal bloating or leg swelling.    5. Eat a low sodium diet (less than 2,000mg daily). If you eat less salt, you will retain less fluid.     6.  We'll check labs today.      7.  Follow up: with myself in 6 weeks.      Scheduling phone number: [phone]  Reminder: Please bring in all current medications, over the counter supplements and vitamin bottles to your next appointment.            Follow-ups after your visit        Additional Services     Palliative Care Referral       Your provider has referred you to Palliative Care Services.    To schedule an Outpatient Palliative Care Referral appointment, please call: [facility]: [facility] [phone]   [email or web address]    Please be aware that coverage of these services is subject to the terms and limitations  of your health insurance plan.  Call member services at your health plan with any benefit or coverage questions.      Please bring the following with you to your appointment:    (1) Any X-Rays, CTs or MRIs which have been performed.  Contact the facility where they were done to arrange for  prior to your scheduled appointment.   (2) If you have recently seen a provider outside of the [facility], please bring your most recent clinic note and/or imaging results  (3) List of current medications - please bring ALL of the medications that you are currently taking (in their original bottles) to your appointment  (4) This referral request  (5) Any documents/labs given to you for this referral    Services Requested: Evaluate and treat     Please complete the following questions:  1. What is patient's life-limiting diagnosis?   2. What is the reason for the referral?   3. What is the patient's prognosis?     Palliative Care Definition:    Palliative Care is specialized medical care for people with serious illness.  This type of care is focused on providing patients with relief from symptoms, pain and stresses of serious illness - whatever the diagnosis may be.  The goal of Palliative Care is to improve quality of life for both the patient and the family.  Palliative Care is provided by a team of doctors, nurses and other specialists who work with the patient's other doctors to provide an extra layer of support.  Palliative Care is appropriate at any age and at any stage in a serious illness, and can be provided together with curative treatment.                  Your next 10 appointments already scheduled     May 08, 2018 11:15 AM CDT   New Visit with Omar Leiva MD   [facility] ([facility])    [address]  [facility] [address]   [phone]            Jun 13, 2018  1:50 PM CDT   LAB with [facility]   [facility]  [facility]    [address]  [facility] [address]   [phone]           Please do not eat 10-12 hours before your appointment if you are coming in fasting for labs on lipids, cholesterol, or glucose (sugar). This does not apply to pregnant women. Water, hot tea and black coffee (with nothing added) are okay. Do not drink other fluids, diet soda or chew gum.            Jun 13, 2018  3:00 PM CDT   RETURN 45 with Lolis Davenport PA-C   [facility] ([facility])    [address]  [facility] [address]   [phone] OPT 2              Future tests that were ordered for you today     Open Future Orders        Priority Expected Expires Ordered    Palliative Care Referral Routine 5/9/2018 5/2/2019 5/2/2018    Basic metabolic panel Routine 5/2/2018 5/2/2019 5/2/2018    Hemoglobin Routine 5/2/2018 5/2/2019 5/2/2018            Who to contact     If you have questions or need follow up information about today's clinic visit or your schedule please contact [facility] directly at [phone].  Normal or non-critical lab and imaging results will be communicated to you by [employer], letter or phone within 4 business days after the clinic has received the results. If you do not hear from us within 7 days, please contact the clinic through [employer] or phone. If you have a critical or abnormal lab result, we will notify you by phone as soon as possible.  Submit refill requests through [email or web address] or call your pharmacy and they will forward the refill request to us. Please allow 3 business days for your refill to be completed.          Additional Information About Your Visit        [employer] Information     [email or web address] lets you send messages to your doctor, view your test results, renew your prescriptions, schedule appointments and more. To sign up, go to [email or web address] . Click on \"Log in\" " "on the left side of the screen, which will take you to the Welcome page. Then click on \"Sign up Now\" on the right side of the page.     You will be asked to enter the access code listed below, as well as some personal information. Please follow the directions to create your username and password.     Your access code is: [email or web address]  Expires: 2018  4:09 PM     Your access code will  in 90 days. If you need help or a new code, please call your [facility] clinic or [phone].        Care EveryWhere ID     This is your Care EveryWhere ID. This could be used by other organizations to access your [facility] medical records  [id number]        Your Vitals Were     Pulse Height Last Period BMI (Body Mass Index)          50 1.562 m (5' 1.5\") (LMP Unknown) 22.12 kg/m2         Blood Pressure from Last 3 Encounters:   18 133/70   04/10/18 120/60   18 142/78    Weight from Last 3 Encounters:   18 54 kg (119 lb)   04/10/18 54.4 kg (120 lb)   18 55.8 kg (123 lb 1.6 oz)              We Performed the Following     Follow-Up with Cardiac Advanced Practice Provider          Today's Medication Changes          These changes are accurate as of 18  2:44 PM.  If you have any questions, ask your nurse or doctor.               These medicines have changed or have updated prescriptions.        Dose/Directions    simvastatin 20 MG tablet   Commonly known as:  ZOCOR   This may have changed:  additional instructions   Used for:  Hyperlipidemia with target LDL less than 130        TAKE HALF TABLET BY MOUTH AT BEDTIME   Quantity:  45 tablet   Refills:  3                Primary Care Provider Office Phone # Fax #    Marbin Rouse [phone] [address]  [facility] [address]        Equal Access to Services     KITTY SALINAS : Valdemar Lobo, waaxda luqadaha, qaybta kaalmada leah, renee emery. [facility] [phone].    ATENCIÓN: " Si kristin wells, tiene a salmon disposición servicios gratuitos de asistencia lingüística. Leo tobar [phone].    We comply with applicable federal civil rights laws and [place] laws. We do not discriminate on the basis of race, color, national origin, age, disability, sex, sexual orientation, or gender identity.            Thank you!     Thank you for choosing [facility]  for your care. Our goal is always to provide you with excellent care. Hearing back from our patients is one way we can continue to improve our services. Please take a few minutes to complete the written survey that you may receive in the mail after your visit with us. Thank you!             Your Updated Medication List - Protect others around you: Learn how to safely use, store and throw away your medicines at www.disposemymeds.org.          This list is accurate as of 5/2/18  2:44 PM.  Always use your most recent med list.                   Brand Name Dispense Instructions for use Diagnosis    apixaban ANTICOAGULANT 2.5 MG tablet    ELIQUIS     Take 1 tablet (2.5 mg) by mouth 2 times daily    Uncontrolled atrial fibrillation (H)       digoxin 125 MCG tablet    LANOXIN    30 tablet    Take 1 tablet (125 mcg) by mouth every other day As of 4/10/18        doxycycline 100 MG tablet    VIBRA-TABS    14 tablet    Take 1 tablet (100 mg) by mouth 2 times daily    Acute bronchitis, unspecified organism       furosemide 20 MG tablet    LASIX    30 tablet    Take 2 tablets (40 mg) by mouth daily    HORTON (dyspnea on exertion)       metoprolol succinate 200 MG 24 hr tablet    TOPROL-XL    30 tablet    Take 1 tablet (200 mg) by mouth daily    Atrial fibrillation with RVR (H)       oxyCODONE-acetaminophen 5-325 MG per tablet   Start taking on:  5/7/2018    PERCOCET    30 tablet    Take 0.5 tablets by mouth every morning    Chronic midline low back pain without sciatica       simvastatin 20 MG tablet    ZOCOR    45 tablet     TAKE HALF TABLET BY MOUTH AT BEDTIME    Hyperlipidemia with target LDL less than 130       sodium chloride 0.65 % nasal spray    OCEAN     Spray 1 spray into both nostrils 3 times daily as needed for congestion    Nasal dryness       VITAMIN B 12 PO      Take 1 tablet by mouth daily        VITAMIN D (CHOLECALCIFEROL) PO      Take 1,000 Units by mouth daily.

## 2018-05-02 NOTE — LETTER
5/2/2018    Marbin Rouse MD  [address]  [facility] [address]    RE: Theresa Alves       Dear Colleague,    I had the pleasure of seeing Theresa Alves in the [facility].    [id number]  HPI and Plan:   See dictation    Orders this Visit:  Orders Placed This Encounter   Procedures     Basic metabolic panel     Hemoglobin     Basic metabolic panel     Palliative Care Referral     Follow-Up with [facility] - [facility] visit     No orders of the defined types were placed in this encounter.    There are no discontinued medications.      Encounter Diagnoses   Name Primary?     Atrial fibrillation, unspecified type (H)      Hyperlipidemia with target LDL less than 130 Yes     (HFpEF) heart failure with preserved ejection fraction (H)        CURRENT MEDICATIONS:  Current Outpatient Prescriptions   Medication Sig Dispense Refill     apixaban ANTICOAGULANT (ELIQUIS) 2.5 MG tablet Take 1 tablet (2.5 mg) by mouth 2 times daily  1     Cyanocobalamin (VITAMIN B 12 PO) Take 1 tablet by mouth daily        digoxin (LANOXIN) 125 MCG tablet Take 1 tablet (125 mcg) by mouth every other day As of 4/10/18 30 tablet 3     furosemide (LASIX) 20 MG tablet Take 2 tablets (40 mg) by mouth daily 30 tablet      metoprolol succinate (TOPROL-XL) 200 MG 24 hr tablet Take 1 tablet (200 mg) by mouth daily 30 tablet 3     [START ON 5/7/2018] oxyCODONE-acetaminophen (PERCOCET) 5-325 MG per tablet Take 0.5 tablets by mouth every morning 30 tablet 0     simvastatin (ZOCOR) 20 MG tablet TAKE HALF TABLET BY MOUTH AT BEDTIME (Patient taking differently: Pt take one tab every other day.) 45 tablet 3     sodium chloride (OCEAN) 0.65 % nasal spray Spray 1 spray into both nostrils 3 times daily as needed for congestion  0     VITAMIN D, CHOLECALCIFEROL, PO Take 1,000 Units by mouth daily.         doxycycline (VIBRA-TABS) 100 MG tablet Take 1 tablet (100 mg) by mouth 2 times daily (Patient not taking: Reported on  5/2/2018) 14 tablet 0       ALLERGIES   No Known Allergies    PAST MEDICAL HISTORY:  Past Medical History:   Diagnosis Date     Chronic back pain      Pulmonary fibrosis (H)      Unspecified essential hypertension     Essential hypertension       PAST SURGICAL HISTORY:  Past Surgical History:   Procedure Laterality Date     CARPAL TUNNEL RELEASE RT/LT  1990's    R     CATARACT IOL, RT/LT  5/03    R     HERNIA REPAIR, INGUINAL RT/LT  1954    R     hiatal hernia repair  6/10    large paraesophageal hiatal hernia; Nissen fundoplication        FAMILY HISTORY:  Family History   Problem Relation Age of Onset     Unknown/Adopted Mother      Unknown/Adopted Father      DIABETES No family hx of      Coronary Artery Disease No family hx of      Hypertension No family hx of      Hyperlipidemia No family hx of      CEREBROVASCULAR DISEASE No family hx of      Breast Cancer No family hx of      Colon Cancer No family hx of      Prostate Cancer No family hx of      Other Cancer No family hx of      Depression No family hx of      Anxiety Disorder No family hx of      MENTAL ILLNESS No family hx of      Substance Abuse No family hx of      Anesthesia Reaction No family hx of      Asthma No family hx of      OSTEOPOROSIS No family hx of      Genetic Disorder No family hx of      Thyroid Disease No family hx of      Obesity No family hx of        SOCIAL HISTORY:  Social History     Social History     Marital status:      Spouse name: N/A     Number of children: 0     Years of education: N/A     Occupational History     worked at [employer] for 40 yrs Retired     Social History Main Topics     Smoking status: Never Smoker     Smokeless tobacco: Never Used     Alcohol use Yes      Comment: daily-vodka tonic x2     Drug use: No     Sexual activity: No     Other Topics Concern     None     Social History Narrative       Review of Systems:  Skin:  Negative     Eyes:  Positive for glasses  ENT:  Negative    Respiratory:  Positive for  "shortness of breath;dyspnea on exertion  Cardiovascular:  Negative;palpitations;chest pain;edema;exercise intolerance Positive for;fatigue;lightheadedness;dizziness  Gastroenterology: Negative    Genitourinary:  Positive for nocturia  Musculoskeletal:  Positive for back pain;arthritis  Neurologic:  Positive for numbness or tingling of feet  Psychiatric:  Negative    Heme/Lymph/Imm:  Negative    Endocrine:  Negative      Physical Exam:  Vitals: /70  Pulse 50  Ht 1.562 m (5' 1.5\")  Wt 54 kg (119 lb)  LMP  (LMP Unknown)  BMI 22.12 kg/m2   Please refer to dictation for physical exam    Recent Lab Results:  LIPID RESULTS:  Lab Results   Component Value Date    CHOL 142 11/28/2017    HDL 69 11/28/2017    LDL 52 11/28/2017    TRIG 107 11/28/2017    CHOLHDLRATIO 2.7 01/10/2014       LIVER ENZYME RESULTS:  Lab Results   Component Value Date    AST 24 03/18/2018    ALT 27 03/18/2018       CBC RESULTS:  Lab Results   Component Value Date    WBC 7.7 03/18/2018    RBC 3.37 (L) 03/18/2018    HGB 11.4 (L) 03/18/2018    HCT 34.0 (L) 03/18/2018     (H) 03/18/2018    MCH 33.8 (H) 03/18/2018    MCHC 33.5 03/18/2018    RDW 14.7 03/18/2018     03/18/2018       BMP RESULTS:  Lab Results   Component Value Date     (L) 03/29/2018    POTASSIUM 4.2 03/29/2018    CHLORIDE 104 03/29/2018    CO2 22 03/29/2018    ANIONGAP 8 03/20/2018    GLC 89 03/29/2018    BUN 37 (H) 03/29/2018    CR 1.23 (H) 03/29/2018    GFRESTIMATED 38 (L) 03/29/2018    GFRESTBLACK 44 (L) 03/29/2018    PAWEL 9.2 03/29/2018        A1C RESULTS:  No results found for: A1C    INR RESULTS:  No results found for: INR        CC  Citlali Umana PA-C  [address]  [place] [address]        Thank you for allowing me to participate in the care of your patient.      Sincerely,     Lolis Davenport PA-C     [facility]    cc:   Citlali Umana PA-C  [address]  [place] [address]        "

## 2018-05-02 NOTE — LETTER
2018      Marbin Rouse MD  [address]  [facility] [address]      RE: Theresa Alves       Dear Colleague,    I had the pleasure of seeing Theresa Alves in the [facility].    Service Date: 2018      PRIMARY CARDIOLOGIST:  Dr. Walker.      REASON FOR VISIT:  Hospital followup, AFib.      HISTORY OF PRESENT ILLNESS:  Ms. Alves is a 89-year-old woman with past medical history significant for the followin.  Hypertension.   2.  Pulmonary fibrosis.   3.  Stage III CKD.   4.  Kyphoscoliosis.   5.  Recent diagnosis of atrial fibrillation with hospitalization in mid March for difficult-to-control rates.  At that point, she was also felt to have some heart failure with preserved EF due to tachycardia.  At that hospitalization her rates were controlled with Toprol 200 mg daily as well as digoxin.  She was switched from Xarelto to Eliquis 2.5 mg b.i.d.  In addition, she continued on Lasix 40 mg daily.  She was discharged to TCU and now has been home about 2 weeks.  At this point, she tells me she continues to be short of breath.  She is fine if she is sitting still, but if she does anything at all, she is winded.  She also is very frustrated with the fact that she has had about 5 nosebleeds since she has been home.  She had 3 on Monday alone.  These stop after some effort but then restart very easily when she tries to remove the gauze or blow her nose again.  She is using a nasal spray.  She denies orthopnea or PND.  She lies flat in bed but on her side and is able to breathe just fine.  She has not had any peripheral edema.  She also complains of leg weakness.  She is getting physical therapy.      SOCIAL HISTORY:  She lives in an independent apartment at the [facility].  She is considering moving to [facility], which has some assisted living with it.  She has no appetite, so she hardly eats at all, although she does have 1 vodka-tonic nightly.   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  She comes in today with her 2 nieces.  She does not have any children.      PHYSICAL EXAMINATION:   GENERAL:  Elderly, frail-appearing woman who appears somewhat frustrated.   HEENT:  Normocephalic, atraumatic.   SKIN:  Pale without ecchymosis.   HEART:  Irregular but not tachycardic.  There is a 2/6 systolic murmur heard best at the lower left sternal border.  Her heart is irregular.   LUNGS:  Have rales that her dry through the middle lobes.   EXTREMITIES:  Do not have peripheral edema.   NECK:  Veins are flat at 90 degrees.      ASSESSMENT AND PLAN:   1.  Persistent atrial fibrillation in a woman with severe biatrial enlargement.  We would be unlikely to maintain sinus rhythm with cardioversion.  Her rates are well controlled on Holter monitor that was completed 04/09.  At that point, her average heart rate was 57 with a minimum of 35 in the morning and maximum of 139.  I think this is likely the best control we will get on her, although there is a chance that she is slightly garrett and we could consider decreasing her Toprol down to 150 at the next visit or even decreasing digoxin or discontinuing it.  I discussed with her anticoagulation.  Her CHADS-VASc is a minimum of 4 for age, female gender and hypertension.  I discussed her risk of stroke today versus her risk of bleeding.  While the nosebleeds are inconvenient, they are likely insignificant from a hemoglobin standpoint, although frustrating from a lifestyle standpoint.  We are very open about if she does not want to be on anticoagulation, I will not force her to do that as long as she understands the risk of a stroke, which may be fatal, but worse would be debilitating.  The patient will consider it.  In the meantime, I recommend she use nasal spray plus Vaseline inside her nose to help with moisture and hopefully prevent bleeding.   2.  Heart failure with preserved  EF, likely secondary to loss of atrial kick with her AFib.  No creatinine since the end of March.  We will repeat a BMP today as well as a hemoglobin.   3.  Hypertension, well controlled.   4.  End-of-life discussion.  I did discuss with this patient if she hopes to live to 100 if she is okay passing away and what her concerns are.  She tells me she does not care if she lives to 100, and if she dies she is okay with that because she does not feel like she has a reasonable quality of life at this time.  She is bothered by back pain and shortness of breath, neither of which we have solutions or cures for.  I offered her a visit today with our Palliative Care doctor to discuss coping with these chronic illnesses and anything he may have to offer to help her as well.  She is agreeable to that, and that will be arranged with Dr. Leiva.      As long as the patient would like to continue following in clinic and not going down the palliative or hospice approach, we will see her back in about 6 weeks.  BMP and hemoglobin today, and BMP prior to her next visit.      Thank you for allowing me to participate in this delightful patient's care.      LENO Medrano PA-C             D: 2018   T: 2018   MT: JENNIFER      Name:     YANELI CHUA   MRN:      [id number]        Account:      [id number]   :      1924           Service Date: 2018      Document: [id number]         Outpatient Encounter Prescriptions as of 2018   Medication Sig Dispense Refill     Cyanocobalamin (VITAMIN B 12 PO) Take 1 tablet by mouth daily        metoprolol succinate (TOPROL-XL) 200 MG 24 hr tablet Take 1 tablet (200 mg) by mouth daily 30 tablet 3     oxyCODONE-acetaminophen (PERCOCET) 5-325 MG per tablet Take 0.5 tablets by mouth every morning 30 tablet 0     simvastatin (ZOCOR) 20 MG tablet TAKE HALF TABLET BY MOUTH AT BEDTIME (Patient taking differently: Pt take one tab every other day.)  45 tablet 3     sodium chloride (OCEAN) 0.65 % nasal spray Spray 1 spray into both nostrils 3 times daily as needed for congestion  0     VITAMIN D, CHOLECALCIFEROL, PO Take 1,000 Units by mouth daily.         [DISCONTINUED] apixaban ANTICOAGULANT (ELIQUIS) 2.5 MG tablet Take 1 tablet (2.5 mg) by mouth 2 times daily  1     [DISCONTINUED] digoxin (LANOXIN) 125 MCG tablet Take 1 tablet (125 mcg) by mouth every other day As of 4/10/18 30 tablet 3     [DISCONTINUED] furosemide (LASIX) 20 MG tablet Take 2 tablets (40 mg) by mouth daily 30 tablet      doxycycline (VIBRA-TABS) 100 MG tablet Take 1 tablet (100 mg) by mouth 2 times daily (Patient not taking: Reported on 5/2/2018) 14 tablet 0     No facility-administered encounter medications on file as of 5/2/2018.        Again, thank you for allowing me to participate in the care of your patient.      Sincerely,    Lolis Davenport PA-C     [facility]

## 2018-05-03 ENCOUNTER — TELEPHONE (OUTPATIENT)
Dept: CARDIOLOGY | Facility: CLINIC | Age: 83
End: 2018-05-03

## 2018-05-03 DIAGNOSIS — R06.09 DOE (DYSPNEA ON EXERTION): ICD-10-CM

## 2018-05-03 RX ORDER — FUROSEMIDE 20 MG
TABLET ORAL
Qty: 45 TABLET | Refills: 1
Start: 2018-05-03 | End: 2018-05-07

## 2018-05-03 NOTE — PROGRESS NOTES
Service Date: 2018      PRIMARY CARDIOLOGIST:  Dr. Walker.      REASON FOR VISIT:  Hospital followup, AFib.      HISTORY OF PRESENT ILLNESS:  Ms. Alves is a 89-year-old woman with past medical history significant for the followin.  Hypertension.   2.  Pulmonary fibrosis.   3.  Stage III CKD.   4.  Kyphoscoliosis.   5.  Recent diagnosis of atrial fibrillation with hospitalization in mid March for difficult-to-control rates.  At that point, she was also felt to have some heart failure with preserved EF due to tachycardia.  At that hospitalization her rates were controlled with Toprol 200 mg daily as well as digoxin.  She was switched from Xarelto to Eliquis 2.5 mg b.i.d.  In addition, she continued on Lasix 40 mg daily.  She was discharged to TCU and now has been home about 2 weeks.  At this point, she tells me she continues to be short of breath.  She is fine if she is sitting still, but if she does anything at all, she is winded.  She also is very frustrated with the fact that she has had about 5 nosebleeds since she has been home.  She had 3 on Monday alone.  These stop after some effort but then restart very easily when she tries to remove the gauze or blow her nose again.  She is using a nasal spray.  She denies orthopnea or PND.  She lies flat in bed but on her side and is able to breathe just fine.  She has not had any peripheral edema.  She also complains of leg weakness.  She is getting physical therapy.      SOCIAL HISTORY:  She lives in an independent apartment at the [facility].  She is considering moving to [facility], which has some assisted living with it.  She has no appetite, so she hardly eats at all, although she does have 1 vodka-tonic nightly.  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  She comes in today with her 2 nieces.  She does not have any children.      PHYSICAL EXAMINATION:   GENERAL:  Elderly, frail-appearing woman who  appears somewhat frustrated.   HEENT:  Normocephalic, atraumatic.   SKIN:  Pale without ecchymosis.   HEART:  Irregular but not tachycardic.  There is a 2/6 systolic murmur heard best at the lower left sternal border.  Her heart is irregular.   LUNGS:  Have rales that her dry through the middle lobes.   EXTREMITIES:  Do not have peripheral edema.   NECK:  Veins are flat at 90 degrees.      ASSESSMENT AND PLAN:   1.  Persistent atrial fibrillation in a woman with severe biatrial enlargement.  We would be unlikely to maintain sinus rhythm with cardioversion.  Her rates are well controlled on Holter monitor that was completed 04/09.  At that point, her average heart rate was 57 with a minimum of 35 in the morning and maximum of 139.  I think this is likely the best control we will get on her, although there is a chance that she is slightly garrett and we could consider decreasing her Toprol down to 150 at the next visit or even decreasing digoxin or discontinuing it.  I discussed with her anticoagulation.  Her CHADS-VASc is a minimum of 4 for age, female gender and hypertension.  I discussed her risk of stroke today versus her risk of bleeding.  While the nosebleeds are inconvenient, they are likely insignificant from a hemoglobin standpoint, although frustrating from a lifestyle standpoint.  We are very open about if she does not want to be on anticoagulation, I will not force her to do that as long as she understands the risk of a stroke, which may be fatal, but worse would be debilitating.  The patient will consider it.  In the meantime, I recommend she use nasal spray plus Vaseline inside her nose to help with moisture and hopefully prevent bleeding.   2.  Heart failure with preserved EF, likely secondary to loss of atrial kick with her AFib.  No creatinine since the end of March.  We will repeat a BMP today as well as a hemoglobin.   3.  Hypertension, well controlled.   4.  End-of-life discussion.  I did discuss  with this patient if she hopes to live to 100 if she is okay passing away and what her concerns are.  She tells me she does not care if she lives to 100, and if she dies she is okay with that because she does not feel like she has a reasonable quality of life at this time.  She is bothered by back pain and shortness of breath, neither of which we have solutions or cures for.  I offered her a visit today with our Palliative Care doctor to discuss coping with these chronic illnesses and anything he may have to offer to help her as well.  She is agreeable to that, and that will be arranged with Dr. Leiva.      As long as the patient would like to continue following in clinic and not going down the palliative or hospice approach, we will see her back in about 6 weeks.  BMP and hemoglobin today, and BMP prior to her next visit.      Thank you for allowing me to participate in this delightful patient's care.      LENO Medrano PA-C             D: 2018   T: 2018   MT: JENNIFER      Name:     YANELI CHUA   MRN:      [id number]        Account:      [id number]   :      1924           Service Date: 2018      Document: [id number]

## 2018-05-03 NOTE — TELEPHONE ENCOUNTER
Per results note:  Notes Recorded by Lolis Davenport PA-C on 5/3/2018 at 4:06 PM  Cr elevated from baseline suggesting dehydration.  pls have her decrease lasix to 20 mg alternating with 40 mg every other day.    Hgb is normal.  No significant blood loss from nosebleeds.  Lolis Davenport PA-C 5/3/2018 4:05 PM    Patient called. Updated on medication change from Lasix 40 mg daily to 20 mg every other day, alternating with 20 mg every other day.   Also updated with normal Hgb.   Verbalized that she would call us should questions or concerns arise.

## 2018-05-07 DIAGNOSIS — I50.9 CHRONIC CONGESTIVE HEART FAILURE, UNSPECIFIED CONGESTIVE HEART FAILURE TYPE: Primary | ICD-10-CM

## 2018-05-07 DIAGNOSIS — R06.09 DOE (DYSPNEA ON EXERTION): ICD-10-CM

## 2018-05-07 DIAGNOSIS — I48.91 UNCONTROLLED ATRIAL FIBRILLATION (H): ICD-10-CM

## 2018-05-07 RX ORDER — DIGOXIN 125 MCG
125 TABLET ORAL EVERY OTHER DAY
Qty: 15 TABLET | Refills: 11 | Status: SHIPPED | OUTPATIENT
Start: 2018-05-07 | End: 2018-06-05

## 2018-05-07 RX ORDER — FUROSEMIDE 40 MG
40 TABLET ORAL DAILY
Qty: 90 TABLET | Refills: 4 | Status: SHIPPED | OUTPATIENT
Start: 2018-05-07 | End: 2018-06-14

## 2018-05-07 NOTE — TELEPHONE ENCOUNTER
Reason for Call:  Medication or medication refill:    Do you use a [facility]?  Name of the pharmacy and phone number for the current request:  [facility]     Name of the medication requested:   apixaban ANTICOAGULANT (ELIQUIS) 2.5 MG tablet , digoxin 125mg and lasix saying 40mg  All out of medication and worried.  Pharmacy gave her 3 pills on Friday  Other request: needs today    Can we leave a detailed message on this number? YES    Phone number patient can be reached at: Home number on file [phone] (home)    Best Time: today    Call taken on 5/7/2018 at 9:21 AM by DA GARCIA

## 2018-05-07 NOTE — TELEPHONE ENCOUNTER
"Requested Prescriptions   Pending Prescriptions Disp Refills     furosemide (LASIX) 20 MG tablet 45 tablet 1    Last Written Prescription Date:  5/3/18  Last Fill Quantity: 45  # refills: 1  Last office visit: 4/10/2018   Future Office Visit:     Sig: Lasix 20 mg every other day, alternating with 40 mg every other day.    Diuretics (Including Combos) Protocol Failed    5/7/2018  9:25 AM       Failed - Normal serum creatinine on file in past 12 months    Recent Labs   Lab Test  05/02/18   1447   CR  1.65*             Failed - Normal serum sodium on file in past 12 months    Recent Labs   Lab Test  05/02/18   1447   NA  135*             Passed - Blood pressure under 140/90 in past 12 months    BP Readings from Last 3 Encounters:   05/02/18 133/70   04/10/18 120/60   03/29/18 142/78                Passed - Recent (12 mo) or future (30 days) visit within the authorizing provider's specialty    Patient had office visit in the last 12 months or has a visit in the next 30 days with authorizing provider or within the authorizing provider's specialty.  See \"Patient Info\" tab in inbasket, or \"Choose Columns\" in Meds & Orders section of the refill encounter.           Passed - Patient is age 18 or older       Passed - No active pregancy on record       Passed - Normal serum potassium on file in past 12 months    Recent Labs   Lab Test  05/02/18   1447   POTASSIUM  4.7                   Passed - No positive pregnancy test in past 12 months        digoxin (LANOXIN) 125 MCG tablet 30 tablet 3    Last Written Prescription Date:  4/10/18  Last Fill Quantity: 30,  # refills: 3   Last office visit: 4/10/2018    Future Office Visit:     Sig: Take 1 tablet (125 mcg) by mouth every other day As of 4/10/18    Cardiac Glycoside Agents Protocol Failed    5/7/2018  9:25 AM       Failed - Normal serum creatinine on file in past 12 mos    Recent Labs   Lab Test  05/02/18   1447   CR  1.65*            Passed - Normal digoxin level on file in " "past 12 mos    Recent Labs   Lab Test  03/20/18   0540   DIGOXIN  1.2            Passed - Patient is 18 years of age or older       Passed - Patient is not pregnant       Passed - No positive pregnancy test on file in past 12 mos       Passed - Recent (6 mo) or future (30 days) visit within the authorizing provider's specialty    Patient had office visit in the last 6 months or has a visit in the next 30 days with authorizing provider or within the authorizing provider's specialty.  See \"Patient Info\" tab in inbasket, or \"Choose Columns\" in Meds & Orders section of the refill encounter.            apixaban ANTICOAGULANT (ELIQUIS) 2.5 MG tablet  1    Last Written Prescription Date:  3/20/18 Last Fill Quantity: ?,  # refills: 1  Last office visit: 4/10/2018 with prescribing provider:  sophie Seay   Future Office Visit:     Sig: Take 1 tablet (2.5 mg) by mouth 2 times daily    Platelet Inhibitors Failed    5/7/2018  9:25 AM       Failed - Normal serum creatinine on file in past 12 months    Recent Labs   Lab Test  05/02/18   1447   CR  1.65*            Passed - Normal ALT on file in past 12 months    Recent Labs   Lab Test  03/18/18   0540   ALT  27            Passed - Normal HGB on file in past 12 months    Recent Labs   Lab Test  05/02/18   1447   HGB  12.5              Passed - Normal AST on file in past 12 months    Recent Labs   Lab Test  03/18/18   0540   AST  24            Passed - Normal Platelets on file in past 12 months    Recent Labs   Lab Test  03/18/18   0540   PLT  187              Passed - Recent (12 mo) or future (30 days) visit within the authorizing provider's specialty    Patient had office visit in the last 12 months or has a visit in the next 30 days with authorizing provider or within the authorizing provider's specialty.  See \"Patient Info\" tab in inbasket, or \"Choose Columns\" in Meds & Orders section of the refill encounter.           Passed - Patient is age 18 or older       Passed " - No active pregnancy on record       Passed - No positive pregnancy test in past 12 months

## 2018-05-07 NOTE — TELEPHONE ENCOUNTER
Routing refill request to provider for review/approval because:  Medications are reported/historical

## 2018-05-08 ENCOUNTER — OFFICE VISIT (OUTPATIENT)
Dept: PALLIATIVE MEDICINE | Facility: CLINIC | Age: 83
End: 2018-05-08
Payer: COMMERCIAL

## 2018-05-08 ENCOUNTER — TELEPHONE (OUTPATIENT)
Dept: FAMILY MEDICINE | Facility: CLINIC | Age: 83
End: 2018-05-08

## 2018-05-08 ENCOUNTER — HOSPITAL ENCOUNTER (EMERGENCY)
Facility: CLINIC | Age: 83
Discharge: HOME | End: 2018-05-08
Attending: EMERGENCY MEDICINE | Admitting: EMERGENCY MEDICINE
Payer: COMMERCIAL

## 2018-05-08 VITALS
OXYGEN SATURATION: 99 % | TEMPERATURE: 97.9 F | RESPIRATION RATE: 18 BRPM | HEART RATE: 65 BPM | WEIGHT: 119 LBS | DIASTOLIC BLOOD PRESSURE: 89 MMHG | HEIGHT: 65 IN | SYSTOLIC BLOOD PRESSURE: 142 MMHG | BODY MASS INDEX: 19.83 KG/M2

## 2018-05-08 VITALS
SYSTOLIC BLOOD PRESSURE: 136 MMHG | HEART RATE: 64 BPM | BODY MASS INDEX: 21.77 KG/M2 | HEIGHT: 62 IN | WEIGHT: 118.3 LBS | DIASTOLIC BLOOD PRESSURE: 58 MMHG

## 2018-05-08 DIAGNOSIS — I50.30 (HFPEF) HEART FAILURE WITH PRESERVED EJECTION FRACTION (H): Primary | ICD-10-CM

## 2018-05-08 DIAGNOSIS — Z79.01 LONG TERM CURRENT USE OF ANTICOAGULANT THERAPY: ICD-10-CM

## 2018-05-08 DIAGNOSIS — G89.29 CHRONIC MIDLINE LOW BACK PAIN WITHOUT SCIATICA: ICD-10-CM

## 2018-05-08 DIAGNOSIS — M54.50 CHRONIC MIDLINE LOW BACK PAIN WITHOUT SCIATICA: ICD-10-CM

## 2018-05-08 DIAGNOSIS — I48.20 CHRONIC ATRIAL FIBRILLATION (H): ICD-10-CM

## 2018-05-08 DIAGNOSIS — R04.0 EPISTAXIS: ICD-10-CM

## 2018-05-08 LAB — HGB BLD-MCNC: 12 G/DL (ref 11.7–15.7)

## 2018-05-08 PROCEDURE — 99284 EMERGENCY DEPT VISIT MOD MDM: CPT

## 2018-05-08 PROCEDURE — 30901 CONTROL OF NOSEBLEED: CPT | Mod: RT

## 2018-05-08 PROCEDURE — 99205 OFFICE O/P NEW HI 60 MIN: CPT | Performed by: INTERNAL MEDICINE

## 2018-05-08 PROCEDURE — 85018 HEMOGLOBIN: CPT | Performed by: EMERGENCY MEDICINE

## 2018-05-08 RX ORDER — OXYMETAZOLINE HYDROCHLORIDE 0.05 G/100ML
2 SPRAY NASAL ONCE
Status: DISCONTINUED | OUTPATIENT
Start: 2018-05-08 | End: 2018-05-08 | Stop reason: HOSPADM

## 2018-05-08 ASSESSMENT — ENCOUNTER SYMPTOMS: BRUISES/BLEEDS EASILY: 1

## 2018-05-08 NOTE — DISCHARGE INSTRUCTIONS
Humidifier, vaseline in your nares before bed in 2 days.  Avoid straining, bending over, picking or blowing the nose.  You may use the nose clamp for up to 30 minutes to stop bleeding. Recheck in the clinic this week if you have further bleeding, return to the ER if it becomes heavy and you can't get it stopped.  You may want to talk to your doctor about seeing an ENT doctor.        Nosebleed (Adult)    Bleeding from the nose most commonly occurs because of injury or drying and cracking of the inner lining of the nose. Most nosebleeds are because of dry air or nose-picking. They can occur during a common cold or an allergy attack. They can also occur on a very hot day, or from dry air in the winter.  If the bleeding site is found, it may be cauterized. This means it is treated to cause a blood clot to form. This may be done with a chemical, heat, or electricity. If the bleeding continues after the site is cauterized, or if the site cannot be found, packing may be put in your nose. This is to apply pressure and stop the bleeding. The packing may be made of gauze or sponge. A small balloon catheter is sometimes used. These must be removed by your healthcare provider. Some types of packing dissolve on their own. If you are taking blood thinning (anticoagulant) medicine, you may have a blood test.  Home care    If packing was put in your nose, unless told otherwise, do not pull on it or try to remove it yourself. You will be given an appointment to have it removed. You may also have been given antibiotics to prevent a sinus infection. If so, finish all of the medicine.    Don't blow your nose for 12 hours after the bleeding stops. This will allow a strong blood clot to form. Don't pick your nose. This may restart bleeding.    Don't drink alcohol or hot liquids for the next 2 days. Alcohol or hot liquids in your mouth can dilate blood vessels in your nose. This can cause bleeding to start again.    Don't take ibuprofen,  naproxen, or medicines that contain aspirin. These thin the blood and may cause your nose to bleed. You may take acetaminophen for pain, unless another pain medicine was prescribed.    If the bleeding starts again, sit up and lean forward to prevent swallowing blood. Pinch your nose tightly on both sides, as shown above, for 10 to 15 minutes. Time yourself. Don t release the pressure on your nose until 10 minutes is up. If bleeding does not stop, continue to pinch your nose and call your healthcare provider or return to this facility.    If you have a cold, allergies, or dry nasal membranes, lubricate the nasal passages. Apply a small amount of petroleum jelly inside the nose with a cotton swab twice a day (morning and night).    Don't overheat your home. This can dry the air and make your condition worse.    Put a humidifier in the room where you sleep. This will add moisture to the air. Clean the humidifier as advised by the .    Use a saline nasal spray to keep nasal passages moist.    Don't pick your nose. Keep fingernails trimmed to decrease risk of bleeds.    Don't smoke.  Follow-up care  Follow up with your healthcare provider, or as advised. Nasal packing should be rechecked or removed within 2 to 3 days.  When to seek medical advice  Call your healthcare provider right away if any of these occur.    You have another nosebleed that you cannot control    Dizziness, weakness, or fainting    You become tired or confused    Fever of 100.4 F (38 C) or higher, or as directed by your healthcare provider    Headache    Sinus or facial pain    Shortness of breath or trouble breathing  Date Last Reviewed: 11/1/2017 2000-2017 [employer]. [address] [place] [address], [place] [address]. All rights reserved. This information is not intended as a substitute for professional medical care. Always follow your healthcare professional's instructions.

## 2018-05-08 NOTE — ED AVS SNAPSHOT
Emergency Department    [address] [facility] [address]    Phone:  [phone]    Fax:  [phone]                                       Theresa Alves   MRN: [id number]    Department:   Emergency Department   Date of Visit:  5/8/2018           After Visit Summary Signature Page     I have received my discharge instructions, and my questions have been answered. I have discussed any challenges I see with this plan with the nurse or doctor.    ..........................................................................................................................................  Patient/Patient Representative Signature      ..........................................................................................................................................  Patient Representative Print Name and Relationship to Patient    ..................................................               ................................................  Date                                            Time    ..........................................................................................................................................  Reviewed by Signature/Title    ...................................................              ..............................................  Date                                                            Time

## 2018-05-08 NOTE — ED AVS SNAPSHOT
Emergency Department    [address] [facility] [address]    Phone:  [phone]    Fax:  [phone]                                       Theresa Alves   MRN: [id number]    Department:   Emergency Department   Date of Visit:  5/8/2018           Patient Information     Date Of Birth          6/22/1924        Your diagnoses for this visit were:     Epistaxis     Long term current use of anticoagulant therapy        You were seen by Pricilla Alvarez MD.      Follow-up Information     Follow up with Marbin Rouse MD.    Specialty:  Internal Medicine    Contact information:    [address]  [facility] [address] [phone]          Discharge Instructions       Humidifier, vaseline in your nares before bed in 2 days.  Avoid straining, bending over, picking or blowing the nose.  You may use the nose clamp for up to 30 minutes to stop bleeding. Recheck in the clinic this week if you have further bleeding, return to the ER if it becomes heavy and you can't get it stopped.  You may want to talk to your doctor about seeing an ENT doctor.        Nosebleed (Adult)    Bleeding from the nose most commonly occurs because of injury or drying and cracking of the inner lining of the nose. Most nosebleeds are because of dry air or nose-picking. They can occur during a common cold or an allergy attack. They can also occur on a very hot day, or from dry air in the winter.  If the bleeding site is found, it may be cauterized. This means it is treated to cause a blood clot to form. This may be done with a chemical, heat, or electricity. If the bleeding continues after the site is cauterized, or if the site cannot be found, packing may be put in your nose. This is to apply pressure and stop the bleeding. The packing may be made of gauze or sponge. A small balloon catheter is sometimes used. These must be removed by your healthcare provider. Some types of packing dissolve on their own. If you  are taking blood thinning (anticoagulant) medicine, you may have a blood test.  Home care    If packing was put in your nose, unless told otherwise, do not pull on it or try to remove it yourself. You will be given an appointment to have it removed. You may also have been given antibiotics to prevent a sinus infection. If so, finish all of the medicine.    Don't blow your nose for 12 hours after the bleeding stops. This will allow a strong blood clot to form. Don't pick your nose. This may restart bleeding.    Don't drink alcohol or hot liquids for the next 2 days. Alcohol or hot liquids in your mouth can dilate blood vessels in your nose. This can cause bleeding to start again.    Don't take ibuprofen, naproxen, or medicines that contain aspirin. These thin the blood and may cause your nose to bleed. You may take acetaminophen for pain, unless another pain medicine was prescribed.    If the bleeding starts again, sit up and lean forward to prevent swallowing blood. Pinch your nose tightly on both sides, as shown above, for 10 to 15 minutes. Time yourself. Don t release the pressure on your nose until 10 minutes is up. If bleeding does not stop, continue to pinch your nose and call your healthcare provider or return to this facility.    If you have a cold, allergies, or dry nasal membranes, lubricate the nasal passages. Apply a small amount of petroleum jelly inside the nose with a cotton swab twice a day (morning and night).    Don't overheat your home. This can dry the air and make your condition worse.    Put a humidifier in the room where you sleep. This will add moisture to the air. Clean the humidifier as advised by the .    Use a saline nasal spray to keep nasal passages moist.    Don't pick your nose. Keep fingernails trimmed to decrease risk of bleeds.    Don't smoke.  Follow-up care  Follow up with your healthcare provider, or as advised. Nasal packing should be rechecked or removed within 2 to  3 days.  When to seek medical advice  Call your healthcare provider right away if any of these occur.    You have another nosebleed that you cannot control    Dizziness, weakness, or fainting    You become tired or confused    Fever of 100.4 F (38 C) or higher, or as directed by your healthcare provider    Headache    Sinus or facial pain    Shortness of breath or trouble breathing  Date Last Reviewed: 11/1/2017 2000-2017 [employer]. [address] [place] [id number]. All rights reserved. This information is not intended as a substitute for professional medical care. Always follow your healthcare professional's instructions.            Your next 10 appointments already scheduled     May 08, 2018 11:15 AM CDT   New Visit with mOar Leiva MD   [facility] ([facility])    [address] [place], [place] [address]  [facility] [address] [phone]            Jun 13, 2018  1:50 PM CDT   LAB with [facility]   [facility] ([facility])    [address] [place] [address] [phone]           Please do not eat 10-12 hours before your appointment if you are coming in fasting for labs on lipids, cholesterol, or glucose (sugar). This does not apply to pregnant women. Water, hot tea and black coffee (with nothing added) are okay. Do not drink other fluids, diet soda or chew gum.            Jun 13, 2018  3:00 PM CDT   Return Visit with FRANCISCA WalshC   [facility]   [place] ([facility])    [address] [place] [id number] [phone] OPT 2              24 Hour Appointment Hotline       To make an appointment at any [facility], call [phone] ([phone]). If you don't have a family doctor or clinic, we will help you find one. [facility] are conveniently located to serve the needs of you and your  family.             Review of your medicines      Our records show that you are taking the medicines listed below. If these are incorrect, please call your family doctor or clinic.        Dose / Directions Last dose taken    apixaban ANTICOAGULANT 2.5 MG tablet   Commonly known as:  ELIQUIS   Dose:  2.5 mg   Quantity:  60 tablet        Take 1 tablet (2.5 mg) by mouth 2 times daily   Refills:  11        digoxin 125 MCG tablet   Commonly known as:  LANOXIN   Dose:  125 mcg   Quantity:  15 tablet        Take 1 tablet (125 mcg) by mouth every other day As of 4/10/18   Refills:  11        doxycycline 100 MG tablet   Commonly known as:  VIBRA-TABS   Dose:  100 mg   Quantity:  14 tablet        Take 1 tablet (100 mg) by mouth 2 times daily   Refills:  0        furosemide 40 MG tablet   Commonly known as:  LASIX   Dose:  40 mg   Quantity:  90 tablet        Take 1 tablet (40 mg) by mouth daily   Refills:  4        metoprolol succinate 200 MG 24 hr tablet   Commonly known as:  TOPROL-XL   Dose:  200 mg   Quantity:  30 tablet        Take 1 tablet (200 mg) by mouth daily   Refills:  3        oxyCODONE-acetaminophen 5-325 MG per tablet   Commonly known as:  PERCOCET   Dose:  0.5 tablet   Quantity:  30 tablet        Take 0.5 tablets by mouth every morning   Refills:  0        simvastatin 20 MG tablet   Commonly known as:  ZOCOR   Quantity:  45 tablet        TAKE HALF TABLET BY MOUTH AT BEDTIME   Refills:  3        sodium chloride 0.65 % nasal spray   Commonly known as:  OCEAN   Dose:  1 spray        Spray 1 spray into both nostrils 3 times daily as needed for congestion   Refills:  0        VITAMIN B 12 PO   Dose:  1 tablet        Take 1 tablet by mouth daily   Refills:  0        VITAMIN D (CHOLECALCIFEROL) PO   Dose:  1000 Units        Take 1,000 Units by mouth daily.   Refills:  0                Procedures and tests performed during your visit     Hemoglobin      Orders Needing Specimen Collection     None      Pending Results      No orders found from 5/6/2018 to 5/9/2018.            Pending Culture Results     No orders found from 5/6/2018 to 5/9/2018.            Pending Results Instructions     If you had any lab results that were not finalized at the time of your Discharge, you can call the ED Lab Result RN at [phone]. You will be contacted by this team for any positive Lab results or changes in treatment. The nurses are available 7 days a week from 10A to 6:30P.  You can leave a message 24 hours per day and they will return your call.        Test Results From Your Hospital Stay        5/8/2018 10:47 AM      Component Results     Component Value Ref Range & Units Status    Hemoglobin 12.0 11.7 - 15.7 g/dL Final                Clinical Quality Measure: Blood Pressure Screening     Your blood pressure was checked while you were in the emergency department today. The last reading we obtained was  BP: 142/89 . Please read the guidelines below about what these numbers mean and what you should do about them.  If your systolic blood pressure (the top number) is less than 120 and your diastolic blood pressure (the bottom number) is less than 80, then your blood pressure is normal. There is nothing more that you need to do about it.  If your systolic blood pressure (the top number) is 120-139 or your diastolic blood pressure (the bottom number) is 80-89, your blood pressure may be higher than it should be. You should have your blood pressure rechecked within a year by a primary care provider.  If your systolic blood pressure (the top number) is 140 or greater or your diastolic blood pressure (the bottom number) is 90 or greater, you may have high blood pressure. High blood pressure is treatable, but if left untreated over time it can put you at risk for heart attack, stroke, or kidney failure. You should have your blood pressure rechecked by a primary care provider within the next 4 weeks.  If your provider in the emergency department today  "gave you specific instructions to follow-up with your doctor or provider even sooner than that, you should follow that instruction and not wait for up to 4 weeks for your follow-up visit.        Thank you for choosing [facility]       Thank you for choosing [facility] for your care. Our goal is always to provide you with excellent care. Hearing back from our patients is one way we can continue to improve our services. Please take a few minutes to complete the written survey that you may receive in the mail after you visit with us. Thank you!        [employer] Information     [employer] lets you send messages to your doctor, view your test results, renew your prescriptions, schedule appointments and more. To sign up, go to [email or web address] . Click on \"Log in\" on the left side of the screen, which will take you to the Welcome page. Then click on \"Sign up Now\" on the right side of the page.     You will be asked to enter the access code listed below, as well as some personal information. Please follow the directions to create your username and password.     Your access code is: [email or web address]  Expires: 2018  4:09 PM     Your access code will  in 90 days. If you need help or a new code, please call your [facility] clinic or [phone].        Care EveryWhere ID     This is your Care EveryWhere ID. This could be used by other organizations to access your [facility] medical records  [id number]        Equal Access to Services     KITTY SALINAS : Hadii lata Lobo, waaxda sandy, qaybta kaalmada leah, renee de luna . So [facility] [phone].    ATENCIÓN: Si habla español, tiene a salmon disposición servicios gratuitos de asistencia lingüística. Aguedaame al [phone].    We comply with applicable federal civil rights laws and [place] laws. We do not discriminate on the basis of race, color, national origin, age, disability, sex, sexual orientation, or gender identity.          "   After Visit Summary       This is your record. Keep this with you and show to your community pharmacist(s) and doctor(s) at your next visit.

## 2018-05-08 NOTE — ED PROVIDER NOTES
"  History     Chief Complaint:  Epistaxis    NIMCO Alves is a 89 year old female with a history of hypertension, hyperlipidemia, CHF, CKD, and atrial fibrillation on Eliquis who presents via EMS with epistaxis. The patient reports she developed a nose bleed this morning around 8:30 am that she could not control given her anticoagulant use, prompting her to call EMS for transport to the ED. She states she has had nose bleeds like this before, most recently one week ago, and was told to take half her Eliquis in the morning and half in the evening rather than all of it at once. She states she stopped taking it for a few days and did not have any issues with bleeding, so she restarted her Eliquis last night. She denies any trauma to the nose. Of note, the patient has not seen ENT for this issue yet. She does have a cardiology appointment scheduled for later this morning.    Allergies:  No known drug allergies     Medications:    Eliquis  Lanoxin  Lasix  Metoprolol succinate  Zocor     Past Medical History:    Chronic back pain  Pulmonary fibrosis  Hypertension  Hyperlipidemia  CKD  CHF  Chronic fatigue  Atrial fibrillation  Osteoporosis    Past Surgical History:    Carpal tunnel release  Cataract surgery  Inguinal hernia repair  Hiatal hernia repair    Family History:    History reviewed. No pertinent family history.      Social History:  Smoking status: Never smoker  Alcohol use: Yes   Marital Status:   [5]     Review of Systems   HENT: Positive for nosebleeds.    Hematological: Bruises/bleeds easily.   All other systems reviewed and are negative.    Physical Exam     Patient Vitals for the past 24 hrs:   BP Temp Temp src Pulse Resp SpO2 Height Weight   05/08/18 0946 142/89 97.9  F (36.6  C) Oral 65 20 98 % 1.651 m (5' 5\") 54 kg (119 lb)      Physical Exam  Nursing note and vitals reviewed.    Constitutional:  Appears well-developed and well-nourished, comfortable.  Nose clamp in place.  HENT: "    No evidence of facial or scalp trauma.      Nose reveals some blood in the right nares.  Location of bleeding is noted over the anterior aspect of the right septum.  No active bleeding at this time.  No discharge.      Oropharynx reveals some blood but no active bleeding at this time.  Eyes:    Conjunctivae are normal without injection. No lid droop.     Pupils are equal, round, and reactive to light.      Right eye exhibits no discharge. Left eye exhibits no discharge.      No scleral icterus.  Cardiovascular:  Normal rate, regular rhythm with normal S1 and S2.      Normal heart sounds and peripheral pulses 2+ and equal.       No murmur or lauren.  Pulmonary:  Effort normal and breath sounds clear to auscultation bilaterally.         No wheezes. No rales. No chest wall tenderness.    Skin:    Skin is warm and dry. No rash noted. No diaphoresis.      No erythema. No pallor.  No lesions.  Psychiatric:   Behavior is normal. Appropriate mood and affect.     Judgment and thought content normal.     Emergency Department Course   Laboratory:  Hemoglobin: 12.0    Procedures:  Silver Nitrate Cautery:    PROCEDURE NOTE: The patient's right nare was prepped with Afrin nasal spray.  The location of the patient's bleeding in the right anterior nare was identified and cauterized with silver nitrate.  Due to continued bleeding, I put Arixtra powder in and put a clamp on for 10 minutes and bleeding stopped. The patient tolerated the procedure well and there were no complications.  She was observed in the emergency department following the procedure and had no recurrence of his bleeding.    Interventions:  1005: Afrin 2 sprays Intranasal     Emergency Department Course:  The patient arrived in the emergency department via EMS.  Past medical records, nursing notes, and vitals reviewed.  1004: I performed an exam of the patient and obtained history, as documented above.  IV inserted and blood drawn.     1034: I rechecked the  patient. I cauterized the patient's nosebleed, as noted above. Explained findings to the patient.    1103: I rechecked the patient. Findings and plan explained to the Patient. Patient discharged home with instructions regarding supportive care, medications, and reasons to return. The importance of close follow-up was reviewed.      Impression & Plan    Medical Decision Making:  Patient is on chronic anticoagulants with Eliquis.  She comes in with a recurrent nosebleed in the right nare.  It was stopped with a clamp.  She blew her nose, Afrin was sprayed in, and the nose clamp was reapplied.  I then saw the location over the anterior part of the anterior plexus and cauterized it with silver nitrate.  It started bleeding again and pressure was applied.  It stopped bleeding and then I put Arixtra powder in and put the clamp on for another 10 minutes.  She was rechecked and there was no further bleeding.  She got up and moved around and no further bleeding.  Pressure was good.  She has an appointment with the cardiologist at 1115 and she will be wheeled up there with the instructions to return if she has recurrent bleeding.  She should follow-up with her doctor regarding the nosebleed and I gave her strict instructions not to strain her bend over or pick or blow her nose.  She may want to see an ENT at some point for consultation as this has become more frequent and recurrent.    Diagnosis:    ICD-10-CM   1. Epistaxis R04.0   2. Long term current use of anticoagulant therapy Z79.01     Disposition:  Discharged to home. Humidifier, Vaseline in your nares before bed in 2 days.  Avoid straining, bending over, picking or blowing the nose.  You may use the nose clamp for up to 30 minutes to stop bleeding. Recheck in the clinic this week if you have further bleeding, return to the ER if it becomes heavy and you can't get it stopped.  You may want to talk to your doctor about seeing an ENT doctor.    Noemí Lo  5/8/2018     EMERGENCY DEPARTMENT    I, Noemí Lo, am serving as a scribe at 10:04 AM on 5/8/2018 to document services personally performed by Pricilla Alvarez MD based on my observations and the provider's statements to me.       Pricilla Alvarez MD  05/08/18 5548

## 2018-05-08 NOTE — ED NOTES
Bed: ED02  Expected date: 5/8/18  Expected time: 9:36 AM  Means of arrival: Ambulance  Comments:  432 83F nose bleed ETA 0905

## 2018-05-08 NOTE — TELEPHONE ENCOUNTER
Marianne, Hope calling.    Theresa was in the ED today with bloody nose- this has resolved but she describes the experience as very stressful for Theresa.  She also made it to her Palliative care appt today.     Theresa and her neices had a discussion with [facility] ED and Dr Leiva and decided to go off Eliquis.  Should she  go back to taking a baby aspirin every other day as she did in the past?  She is looking for Dr Elias advice.         Also they discovered at the ED appt she was not taking digoxin every other day as the med list reads. She was taking it daily.   Should she switch to every other day now?  They are looking for Dr Elias advice on this also.    Triage: ok to call Theresa with Dr Elias advice on baby aspirin and digoxin  Marbella Borja RN- Triage [employer]

## 2018-05-08 NOTE — TELEPHONE ENCOUNTER
Okay to resume aspirin, unless this also results in major problems of epistaxis.                                                                      Yes, she should take the digoxin every other day.

## 2018-05-08 NOTE — PROGRESS NOTES
Palliative Staff/Outpatient Clinic    (This note was transcribed using voice recognition software. While I review and edit the transcription, I may miss errors. Also, the software capitalizes words strangely sometimes. Please let me know of any serious mis-transcriptions and I will addend this note.)    CC/Patient ID:  She has A fib with RVR, HFpEF, CKD eGFR ~30, on metoprolol and Eliquis. Complicated by frequent nose bleeds.     From recent cardiology note:  End-of-life discussion.  I did discuss with this patient if she hopes to live to 100 if she is okay passing away and what her concerns are.  She tells me she does not care if she lives to 100, and if she dies she is okay with that because she does not feel like she has a reasonable quality of life at this time.  She is bothered by back pain and shortness of breath, neither of which we have solutions or cures for.  I offered her a visit today with our Palliative Care doctor to discuss coping with these chronic illnesses and anything he may have to offer to help her as well.  She is agreeable to that, and that will be arranged with Dr. Leiva.         History:  She is with her nieces today who supplement the history.  Notably she was just in the emergency room this morning for another nose bleed which he describes as awful and serious.  It was cauterized and temporarily stopped.  She notes that she had held her evening Eliquis for couple days and then took it again last night and then had this nosebleed early this morning.    We have a long discussion about her health condition goals of care and plan of care.  She describes her quality of life is poor at baseline.  She has significant dyspnea on exertion and can walk at most down her whole which she thinks is 90 feet before needing to stop for about 5 minutes to catch her breath.  Obviously this is very limiting for her.  She also has very significant chronic low back pain which she describes as being from  "scoliosis and spinal stenosis and describes an extensive number of injections of various types over the years and physical therapy and notes that \"nothing helps.\"  She does take half a Percocet in the morning and heating pads and Salon Pas patches which helped modestly.    She is demoralized with this but denies depressive symptoms.  She does want to die but also indicates her major goals are to have her acceptable quality of life and that much more important to her than longevity.  We talk a lot about the risks and benefits of ongoing systemic anticoagulation with her MARILIN. fib.  She was quoted a 8% annual stroke risk, she tells me.  We talked about systemic anticoagulation cuts at risk probably in half.  However obviously it is causing recurrent and morbid nosebleeds that are bad enough she needs to seek emergency care for them.  She does not want to have a stroke but essentially tells me if she did have a serious stroke she would just want comfort care and not want life prolonging treatments for that.  Again quality is more important to her than time.  It sounds like in the past she has tolerated baby aspirin use without nosebleeds she tells me she has not had a history of peptic ulcer disease.    She believes she has a DNR/DNI order and I let her know that she is full code in our system.  She thinks she has a healthcare directive but we do not have it on file unfortunately.  She would want her nieces as well as a nephew to be her decision makers she thinks.  We talk about CODE STATUS and is very clear she wants a DNR/DNI order we completed a POLST today.    SH:   She lives in an independent apartment at the [facility].  She is considering moving to [facility] which has some assisted living with it.  She has no appetite, so she hardly eats at all, although she does have 1 vodka-tonic nightly.  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 building.  She comes in today with her " "2 nieces.  She does not have any children.     ROS:  Comprehensive review of systems is negative otherwise     PE: /58  Pulse 64  Ht 1.562 m (5' 1.5\")  Wt 53.7 kg (118 lb 4.8 oz)  LMP  (LMP Unknown)  BMI 21.99 kg/m2  Alert pleasant frail woman sitting in a wheelchair in her dressing down which is covered in blood.  Head NCAT nares have dried blood but no active bleeding.  Mouth is dry without any lesions.  Neck is supple without adenopathy.  Lungs unlabored, clear bilaterally.  CV is slow and irregular with an S1 and S2.  Back  has moderate kyphoscoliosis and nontender otherwise.  Abdomen is soft nontender nondistended.  Trace ankle edema little worse on the right.  Skin is fragile with scattered bruises.  Affect is flat sensorium is clear speech is fluent memory and thought processes are grossly normal    Impression & Recommendations:  89-year-old woman with A. fib on systemic anticoagulation in the context of heart failure with preserved ejection fraction.  Chronic low back pain from spinal stenosis and arthritis.    Discussed her overall situation and goals of care including the decision about anticoagulation at length.  She does understand she has a modest the increased stroke risk with stopping anticoagulant agent.  Aspirin does reduce stroke risk but not as much as systemic anti-coagulation.  However given her recurrent and \"awful\" epistaxis she experiences it we agreed today to stop the systemic anticoagulation.  I recommend she take a baby aspirin daily.   To an extent I spoke with her and with her nieces about what she would want if she had a serious stroke, my impression is the patient would want comfort care and would not want to be kept alive if she had a serious stroke but she was not really definitive about that.  She very clearly wants a DNR/DNI order me completed a POLST.  I do not think she qualifies for hospice care right now.  I think she is receiving great care from her heart failure " team and her primary care doctor and she should continue follow-up with them.  I am happy to see her at any time if further conversations can be helpful for her for any of her caregivers.  They have our contact information.    Try diclofenac gel for her back pain.    Serious Illness Conversation    SERIOUS ILLNESS CONVERSATION 5/8/2018   People Present Patient;Other family   Other Family Name(s) NEICES   What is your understanding now of where you are with your illness? Appropriate/accurate understanding of prognosis   How much information about what is likely to be ahead with your illness would you like from me? Patient asks to be fully informed   What did you communicate to the patient? Time - Chronic disease/injury.  Prognosis in future will change based on trajectory.   Prognosis Communication Comments Serious/life threatening complication could happen at any time   What are your most important goals if your health situation worsens? Select all that apply Live as long as possible as long as quality of life is good (document what that means to patient in comment row)   What are your biggest fears and worries about the future with your health? Loss of control   What gives you strength as you think about your future with your illness? Family/loved ones   If you become sicker, how much are you willing to go through for the possibility of gaining more time?  Be in the hospital   Recommendation/Next Step Comment REcommended she review her HCD with her neices to make sure still accurate         Chart data reviewed today:    Family History   Problem Relation Age of Onset     Unknown/Adopted Mother      Unknown/Adopted Father      DIABETES No family hx of      Coronary Artery Disease No family hx of      Hypertension No family hx of      Hyperlipidemia No family hx of      CEREBROVASCULAR DISEASE No family hx of      Breast Cancer No family hx of      Colon Cancer No family hx of      Prostate Cancer No family hx of       Other Cancer No family hx of      Depression No family hx of      Anxiety Disorder No family hx of      MENTAL ILLNESS No family hx of      Substance Abuse No family hx of      Anesthesia Reaction No family hx of      Asthma No family hx of      OSTEOPOROSIS No family hx of      Genetic Disorder No family hx of      Thyroid Disease No family hx of      Obesity No family hx of      Past Medical History:   Diagnosis Date     Chronic back pain      Pulmonary fibrosis (H)      Unspecified essential hypertension     Essential hypertension     Past Surgical History:   Procedure Laterality Date     CARPAL TUNNEL RELEASE RT/LT  1990's    R     CATARACT IOL, RT/LT  5/03    R     HERNIA REPAIR, INGUINAL RT/LT  1954    R     hiatal hernia repair  6/10    large paraesophageal hiatal hernia; Nissen fundoplication      No Known Allergies       Medications:   I have reviewed this patient's medication profile.      Data reviewed:  I reviewed recent labs and imaging, comments on pertinent findings:  Echo  1. The left ventricle is normal in size. The visual ejection fraction is  estimated at 50-55%.  2. The right ventricle is normal size. The right ventricular systolic function  is mildly reduced.  3. There is severe biatrial enlargement.  4. There is moderate (2+) tricuspid regurgitation.  5. Moderate (46-55mmHg) pulmonary hypertension is present. The right  ventricular systolic pressure is approximated at 50mmHg plus the right atrial  pressure.  6. There is mild (1+) aortic regurgitation.  7. IVC is mildly dilated with ~50% respiratory collapse.    Thank you for involving us in the patient's care.   Omar Leiva MD / Palliative Medicine / Pager [phone] / [facility] Inpatient Team Consult Pager [phone] (answered 8am-430pm M-F) - ok to text page via [email or web address] / After-Hours Answering Service [phone] / Palliative Clinic in the [facility] [place] [facility] [phone] (scheduling); [phone] (triage).

## 2018-05-08 NOTE — MR AVS SNAPSHOT
After Visit Summary   5/8/2018    Theresa Alves    MRN: [id number]           Patient Information     Date Of Birth          6/22/1924        Visit Information        Provider Department      5/8/2018 11:15 AM Omar Leiva MD [facility]        Today's Diagnoses     (HFpEF) heart failure with preserved ejection fraction (H)    -  1    Chronic midline low back pain without sciatica        Epistaxis        Chronic atrial fibrillation (H)           Follow-ups after your visit        Your next 10 appointments already scheduled     Jun 13, 2018  1:50 PM CDT   LAB with [facility]   [facility] ([facility])    [address] [place] [id number]   [phone]           Please do not eat 10-12 hours before your appointment if you are coming in fasting for labs on lipids, cholesterol, or glucose (sugar). This does not apply to pregnant women. Water, hot tea and black coffee (with nothing added) are okay. Do not drink other fluids, diet soda or chew gum.            Jun 13, 2018  3:00 PM CDT   Return Visit with Lolis Davenport PA-C   [facility] ([facility])    [address] [place] [id number] [phone] OPT 2              Who to contact     If you have questions or need follow up information about today's clinic visit or your schedule please contact [facility] directly at [phone].  Normal or non-critical lab and imaging results will be communicated to you by MyChart, letter or phone within 4 business days after the clinic has received the results. If you do not hear from us within 7 days, please contact the clinic through MyChart or phone. If you have a critical or abnormal lab result, we will notify you by phone as soon as possible.  Submit refill requests through  "[employer] or call your pharmacy and they will forward the refill request to us. Please allow 3 business days for your refill to be completed.          Additional Information About Your Visit        MyChart Information     [employer] lets you send messages to your doctor, view your test results, renew your prescriptions, schedule appointments and more. To sign up, go to [email or web address] . Click on \"Log in\" on the left side of the screen, which will take you to the Welcome page. Then click on \"Sign up Now\" on the right side of the page.     You will be asked to enter the access code listed below, as well as some personal information. Please follow the directions to create your username and password.     Your access code is: [email or web address]  Expires: 2018  4:09 PM     Your access code will  in 90 days. If you need help or a new code, please call your [place] clinic or [phone].        Care EveryWhere ID     This is your Care EveryWhere ID. This could be used by other organizations to access your [place] medical records  [id number]        Your Vitals Were     Pulse Height Last Period BMI (Body Mass Index)          64 1.562 m (5' 1.5\") (LMP Unknown) 21.99 kg/m2         Blood Pressure from Last 3 Encounters:   18 142/89   18 136/58   18 133/70    Weight from Last 3 Encounters:   18 54 kg (119 lb)   18 53.7 kg (118 lb 4.8 oz)   18 54 kg (119 lb)              We Performed the Following     DNR/DNI     Palliative Care Referral          Today's Medication Changes          These changes are accurate as of 18 12:51 PM.  If you have any questions, ask your nurse or doctor.               Start taking these medicines.        Dose/Directions    diclofenac 1 % Gel topical gel   Commonly known as:  VOLTAREN   Used for:  Chronic midline low back pain without sciatica   Started by:  Omar Leiva MD        Apply 2 grams to painful areas up to twice a day   Quantity:  100 " g   Refills:  3         These medicines have changed or have updated prescriptions.        Dose/Directions    simvastatin 20 MG tablet   Commonly known as:  ZOCOR   This may have changed:  additional instructions   Used for:  Hyperlipidemia with target LDL less than 130        TAKE HALF TABLET BY MOUTH AT BEDTIME   Quantity:  45 tablet   Refills:  3         Stop taking these medicines if you haven't already. Please contact your care team if you have questions.     apixaban ANTICOAGULANT 2.5 MG tablet   Commonly known as:  ELIQUIS   Stopped by:  Omar Leiva MD                Where to get your medicines      These medications were sent to [facility] #[id number] - [place], [place] - [address]  [address], [facility] [address]     Phone:  [phone]     diclofenac 1 % Gel topical gel                Primary Care Provider Office Phone # Fax #    Marbin Rouse [phone] [address] [facility] [address]        Equal Access to Services     [facility]: Hadii aad ku hadasho Soomaali, waaxda luqadaha, qaybta kaalmada adeegyada, waxay idiin hayaan steffi mackeyarabryon de luna . So [facility] [phone].    ATENCIÓN: Si habla español, tiene a salmon disposición servicios gratuitos de asistencia lingüística. [facility] [phone].    We comply with applicable federal civil rights laws and [place] laws. We do not discriminate on the basis of race, color, national origin, age, disability, sex, sexual orientation, or gender identity.            Thank you!     Thank you for choosing [facility] [place]  for your care. Our goal is always to provide you with excellent care. Hearing back from our patients is one way we can continue to improve our services. Please take a few minutes to complete the written survey that you may receive in the mail after your visit with us. Thank you!             Your Updated Medication List - Protect others around you:  Learn how to safely use, store and throw away your medicines at www.disposemymeds.org.          This list is accurate as of 5/8/18 12:51 PM.  Always use your most recent med list.                   Brand Name Dispense Instructions for use Diagnosis    diclofenac 1 % Gel topical gel    VOLTAREN    100 g    Apply 2 grams to painful areas up to twice a day    Chronic midline low back pain without sciatica       digoxin 125 MCG tablet    LANOXIN    15 tablet    Take 1 tablet (125 mcg) by mouth every other day As of 4/10/18    Uncontrolled atrial fibrillation (H)       doxycycline 100 MG tablet    VIBRA-TABS    14 tablet    Take 1 tablet (100 mg) by mouth 2 times daily    Acute bronchitis, unspecified organism       furosemide 40 MG tablet    LASIX    90 tablet    Take 1 tablet (40 mg) by mouth daily    HORTON (dyspnea on exertion)       metoprolol succinate 200 MG 24 hr tablet    TOPROL-XL    30 tablet    Take 1 tablet (200 mg) by mouth daily    Atrial fibrillation with RVR (H)       oxyCODONE-acetaminophen 5-325 MG per tablet    PERCOCET    30 tablet    Take 0.5 tablets by mouth every morning    Chronic midline low back pain without sciatica       simvastatin 20 MG tablet    ZOCOR    45 tablet    TAKE HALF TABLET BY MOUTH AT BEDTIME    Hyperlipidemia with target LDL less than 130       sodium chloride 0.65 % nasal spray    OCEAN     Spray 1 spray into both nostrils 3 times daily as needed for congestion    Nasal dryness       VITAMIN B 12 PO      Take 1 tablet by mouth daily        VITAMIN D (CHOLECALCIFEROL) PO      Take 1,000 Units by mouth daily.

## 2018-05-09 ENCOUNTER — PATIENT OUTREACH (OUTPATIENT)
Dept: FAMILY MEDICINE | Facility: CLINIC | Age: 83
End: 2018-05-09

## 2018-05-09 NOTE — PROGRESS NOTES
Clinic Care Coordination Contact    Clinic Care Coordination Contact  OUTREACH    Referral Information:  Referral Source: IP Handoff    Primary Diagnosis: Cardiovascular - other    Chief Complaint   Patient presents with     Clinic Care Coordination - Post Hospital     ED visit        [place] Utilization:   Clinic Utilization  Difficulty keeping appointments:: No  Utilization    Last refreshed: 5/8/2018 10:49 PM:  No Show Count (past year) 1       Last refreshed: 5/8/2018 10:49 PM:  ED visits 1       Last refreshed: 5/8/2018 10:49 PM:  Hospital admissions 1          Current as of: 5/8/2018 10:49 PM           Clinical Concerns:    Current Medical Concerns:  89 year old female seen in ED with severe nose bleed.  Patient is on anticoagulation.    Patient was seen in Palliative care after ED visit.  Decision was made to discontinue anticoagulation and to use a baby aspirin daily for stroke prevention.     Patient also signed a POLST.     Left message with patient. Requested to return call if she is still having bleeding.      Chronic pain (GOAL):: No  Health Maintenance Reviewed:        Medication Management:  Manages her own medications     Functional Status:  Dependent ADLs:: Ambulation-walker  Bed or wheelchair confined:: No  Mobility Status: Independent w/Device    Living Situation:  Current living arrangement:: I live alone  Type of residence:: Apartment - [facility]    Patient and nieces are considering assisted living facilities    Diet/Exercise/Sleep:  Diet:: No added salt  Inadequate nutrition (GOAL):: No  Food Insecurity: No  Tube Feeding: No  Exercise:: Currently not exercising  Inadequate activity/exercise (GOAL):: No  Significant changes in sleep pattern (GOAL): No    Transportation:  Transportation concerns (GOAL):: No  Transportation means:: Regular car     Psychosocial:  Worship or spiritual beliefs that impact treatment:: No  Mental health DX:: No  Mental health management concern  (GOAL):: No  Informal Support system:: Family, Neighbors     Financial/Insurance:   Financial/Insurance concerns (GOAL):: No       Resources and Interventions:  Current Resources:   List of home care services:: Skilled Nursing, Physicial Therapy, Home Health Aid;   Community Resources: Home Care    Patient/Caregiver understanding: na    Outreach Frequency: monthly  Future Appointments              In 3 weeks Marbin Rouse MD [facility], [facility]    In 1 month [facility]    In 1 month More, Lolis Gonzalez PA-C [facility] CL[facility]          Plan: Reminded patient of her PCP appointment.     Alyce Reyes RN, CCM - Care Coordinator     5/9/2018    9:50 AM  [phone]

## 2018-05-09 NOTE — LETTER
Health Care Home - Access Care Plan    About Me  Patient Name:  Theresa Alves    YOB: 1924  Age:                             89 year old   Julio Cesar MRN:            [id number] Telephone Information:     Home Phone [phone]   Mobile [phone]       Address:    [address]   [facility] [place] [address] Email address:  No e-mail address on record      Emergency Contact(s)  Name Relationship Lgl Grd Work Phone Home Phone Mobile Phone   1. TUNG OLIVER Relative   [phone]    2. HOPE TOAN Relative   [phone]    3. BREANN (FAY) KENNETH* Relative   [phone]              Health Maintenance:      My Access Plan  Medical Emergency 911   Questions or concerns during clinic hours Primary Clinic Line, I will call the clinic directly: [facility] [phone]   24 Hour Appointment Line [phone] or  [phone]) (toll free)   24 Hour Nurse Line [phone] (toll free)   Questions or concerns outside clinic hours 24 Hour Appointment Line, I will call the after-hours on-call line:   [facility] [phone] or [phone] ([phone]) (toll-free)   Preferred Urgent Care     Preferred Hospital [facility]  [phone]   Preferred Pharmacy [id number] - [place], [place] - [address]     Behavioral Health Crisis Line The National Suicide Prevention Lifeline at 1-801.704.1172 or 910     My Care Team Members  Patient Care Team       Relationship Specialty Notifications Start End    Marbin Rouse MD PCP - General Internal Medicine  9/2/12     Phone: [phone] Fax: [phone]         [address] [facility] [address]    Emely Hernandez, RN Lead Care Coordinator Primary Care - CC Admissions 5/7/18     Phone: [phone] Fax: [phone]               My Medical and Care Information  Problem List   Patient Active Problem List   Diagnosis     Health Care  Home     Chronic kidney disease, stage III (moderate)     Hyperlipidemia with target LDL less than 130     Edema     Pulmonary fibrosis     Preventive measure     Hypertension goal BP (blood pressure) < 140/80     SOB (shortness of breath)     Congestive heart failure (H)     Chronic fatigue     Dizziness - light-headed     Degenerative arthritis of spine     Osteoporosis     Weight loss     Right shoulder pain     Low back pain     Physical deconditioning     Chronic pain syndrome     Memory loss     Numbness of right foot     Chronic congestive heart failure, unspecified congestive heart failure type (H)     Atrial fibrillation, unspecified type (H)     Atrial fibrillation w RVR      Current Medications and Allergies:  See printed Medication Report

## 2018-05-10 ENCOUNTER — TELEPHONE (OUTPATIENT)
Dept: FAMILY MEDICINE | Facility: CLINIC | Age: 83
End: 2018-05-10

## 2018-05-10 NOTE — TELEPHONE ENCOUNTER
Verbal okay given for the following orders:     Skilled nursing services 1 x a week for 3 weeks for: medication education    Home health Aide 1 x a week for 4 weeks  For:bathing and self cares    Gladys Ortiz RN

## 2018-05-17 ENCOUNTER — TELEPHONE (OUTPATIENT)
Dept: FAMILY MEDICINE | Facility: CLINIC | Age: 83
End: 2018-05-17

## 2018-05-25 ENCOUNTER — TELEPHONE (OUTPATIENT)
Dept: FAMILY MEDICINE | Facility: CLINIC | Age: 83
End: 2018-05-25

## 2018-05-25 NOTE — TELEPHONE ENCOUNTER
Reason for Call:  Form, our goal is to have forms completed with 72 hours, however, some forms may require a visit or additional information.    Type of letter, form or note:  medical    Who is the form from?: Home care    Where did the form come from: form was faxed in    What clinic location was the form placed at?: [facility]    Where the form was placed: 's Box: Marbin Rouse MD    What number is listed as a contact on the form?: [phone]       Additional comments: [facility] HA D/C [facility]HA    Call taken on 5/25/2018 at 3:57 PM by Rachana Ontiveros

## 2018-05-29 ENCOUNTER — TELEPHONE (OUTPATIENT)
Dept: FAMILY MEDICINE | Facility: CLINIC | Age: 83
End: 2018-05-29

## 2018-05-29 NOTE — TELEPHONE ENCOUNTER
Reason for Call:  Form, our goal is to have forms completed with 72 hours, however, some forms may require a visit or additional information.    Type of letter, form or note:  medical    Who is the form from?: Home care    Where did the form come from: form was faxed in    What clinic location was the form placed at?: [facility]    Where the form was placed: 's Box: Marbin Rouse MD    What number is listed as a contact on the form?: [phone]       Additional comments: [facility] HA 1 w 2    Call taken on 5/29/2018 at 4:17 PM by Rachana Ontiveros

## 2018-06-05 ENCOUNTER — OFFICE VISIT (OUTPATIENT)
Dept: FAMILY MEDICINE | Facility: CLINIC | Age: 83
End: 2018-06-05
Payer: COMMERCIAL

## 2018-06-05 VITALS
DIASTOLIC BLOOD PRESSURE: 80 MMHG | HEIGHT: 62 IN | WEIGHT: 116 LBS | TEMPERATURE: 97.8 F | RESPIRATION RATE: 20 BRPM | OXYGEN SATURATION: 96 % | BODY MASS INDEX: 21.35 KG/M2 | SYSTOLIC BLOOD PRESSURE: 130 MMHG | HEART RATE: 72 BPM

## 2018-06-05 DIAGNOSIS — I50.9 CHRONIC CONGESTIVE HEART FAILURE, UNSPECIFIED CONGESTIVE HEART FAILURE TYPE: Primary | ICD-10-CM

## 2018-06-05 DIAGNOSIS — I48.91 ATRIAL FIBRILLATION, UNSPECIFIED TYPE (H): ICD-10-CM

## 2018-06-05 DIAGNOSIS — R63.4 WEIGHT LOSS: ICD-10-CM

## 2018-06-05 DIAGNOSIS — G89.29 CHRONIC MIDLINE LOW BACK PAIN WITHOUT SCIATICA: ICD-10-CM

## 2018-06-05 DIAGNOSIS — R53.82 CHRONIC FATIGUE: ICD-10-CM

## 2018-06-05 DIAGNOSIS — M54.50 CHRONIC MIDLINE LOW BACK PAIN WITHOUT SCIATICA: ICD-10-CM

## 2018-06-05 DIAGNOSIS — D62 ANEMIA DUE TO BLOOD LOSS, ACUTE: ICD-10-CM

## 2018-06-05 LAB
BASOPHILS # BLD AUTO: 0.1 10E9/L (ref 0–0.2)
BASOPHILS NFR BLD AUTO: 1.1 %
CORTIS SERPL-MCNC: 16.4 UG/DL (ref 4–22)
DIFFERENTIAL METHOD BLD: ABNORMAL
EOSINOPHIL # BLD AUTO: 0.3 10E9/L (ref 0–0.7)
EOSINOPHIL NFR BLD AUTO: 2.8 %
ERYTHROCYTE [DISTWIDTH] IN BLOOD BY AUTOMATED COUNT: 14.5 % (ref 10–15)
HCT VFR BLD AUTO: 40.3 % (ref 35–47)
HGB BLD-MCNC: 13.4 G/DL (ref 11.7–15.7)
LYMPHOCYTES # BLD AUTO: 2.6 10E9/L (ref 0.8–5.3)
LYMPHOCYTES NFR BLD AUTO: 25.1 %
MCH RBC QN AUTO: 34.6 PG (ref 26.5–33)
MCHC RBC AUTO-ENTMCNC: 33.3 G/DL (ref 31.5–36.5)
MCV RBC AUTO: 104 FL (ref 78–100)
MONOCYTES # BLD AUTO: 1.4 10E9/L (ref 0–1.3)
MONOCYTES NFR BLD AUTO: 13.6 %
NEUTROPHILS # BLD AUTO: 5.9 10E9/L (ref 1.6–8.3)
NEUTROPHILS NFR BLD AUTO: 57.4 %
PLATELET # BLD AUTO: 221 10E9/L (ref 150–450)
RBC # BLD AUTO: 3.87 10E12/L (ref 3.8–5.2)
WBC # BLD AUTO: 10.2 10E9/L (ref 4–11)

## 2018-06-05 PROCEDURE — 99214 OFFICE O/P EST MOD 30 MIN: CPT | Performed by: INTERNAL MEDICINE

## 2018-06-05 PROCEDURE — 82533 TOTAL CORTISOL: CPT | Performed by: INTERNAL MEDICINE

## 2018-06-05 PROCEDURE — 82728 ASSAY OF FERRITIN: CPT | Performed by: INTERNAL MEDICINE

## 2018-06-05 PROCEDURE — 36415 COLL VENOUS BLD VENIPUNCTURE: CPT | Performed by: INTERNAL MEDICINE

## 2018-06-05 PROCEDURE — 85025 COMPLETE CBC W/AUTO DIFF WBC: CPT | Performed by: INTERNAL MEDICINE

## 2018-06-05 RX ORDER — METOPROLOL SUCCINATE 200 MG/1
200 TABLET, EXTENDED RELEASE ORAL DAILY
Qty: 90 TABLET | Refills: 4 | Status: ON HOLD | OUTPATIENT
Start: 2018-06-05 | End: 2018-11-07

## 2018-06-05 RX ORDER — DIGOXIN 125 MCG
125 TABLET ORAL EVERY OTHER DAY
Qty: 45 TABLET | Refills: 4 | Status: ON HOLD | OUTPATIENT
Start: 2018-06-05 | End: 2018-11-07

## 2018-06-05 RX ORDER — OXYCODONE AND ACETAMINOPHEN 5; 325 MG/1; MG/1
0.5 TABLET ORAL EVERY MORNING
Qty: 30 TABLET | Refills: 0 | Status: SHIPPED | OUTPATIENT
Start: 2018-07-02 | End: 2018-08-16

## 2018-06-05 ASSESSMENT — ANXIETY QUESTIONNAIRES
7. FEELING AFRAID AS IF SOMETHING AWFUL MIGHT HAPPEN: NOT AT ALL
2. NOT BEING ABLE TO STOP OR CONTROL WORRYING: NOT AT ALL
3. WORRYING TOO MUCH ABOUT DIFFERENT THINGS: NOT AT ALL
GAD7 TOTAL SCORE: 2
6. BECOMING EASILY ANNOYED OR IRRITABLE: SEVERAL DAYS
5. BEING SO RESTLESS THAT IT IS HARD TO SIT STILL: NOT AT ALL
4. TROUBLE RELAXING: NOT AT ALL
1. FEELING NERVOUS, ANXIOUS, OR ON EDGE: SEVERAL DAYS
7. FEELING AFRAID AS IF SOMETHING AWFUL MIGHT HAPPEN: NOT AT ALL

## 2018-06-05 ASSESSMENT — PATIENT HEALTH QUESTIONNAIRE - PHQ9
SUM OF ALL RESPONSES TO PHQ QUESTIONS 1-9: 14
SUM OF ALL RESPONSES TO PHQ QUESTIONS 1-9: 14
10. IF YOU CHECKED OFF ANY PROBLEMS, HOW DIFFICULT HAVE THESE PROBLEMS MADE IT FOR YOU TO DO YOUR WORK, TAKE CARE OF THINGS AT HOME, OR GET ALONG WITH OTHER PEOPLE: SOMEWHAT DIFFICULT

## 2018-06-05 NOTE — MR AVS SNAPSHOT
After Visit Summary   6/5/2018    Theresa Alves    MRN: [id number]           Patient Information     Date Of Birth          6/22/1924        Visit Information        Provider Department      6/5/2018 11:15 AM Marbin Rouse MD [facility]        Today's Diagnoses     Chronic congestive heart failure, unspecified congestive heart failure type (H)    -  1    Atrial fibrillation, unspecified type (H)        Chronic fatigue        Weight loss        Chronic midline low back pain without sciatica        Anemia due to blood loss, acute           Follow-ups after your visit        Your next 10 appointments already scheduled     Jun 13, 2018  1:50 PM CDT   LAB with [facility]   [facility] [place] ([facility]    [address]  [place] [address] [phone]           Please do not eat 10-12 hours before your appointment if you are coming in fasting for labs on lipids, cholesterol, or glucose (sugar). This does not apply to pregnant women. Water, hot tea and black coffee (with nothing added) are okay. Do not drink other fluids, diet soda or chew gum.            Jun 13, 2018  3:00 PM CDT   Return Visit with Lolis Davenport PA-C   [facility] ([facility])    [facility]  [place] [address] [phone] OPT 2            Sep 11, 2018 11:15 AM CDT   SHORT with Marbin Rouse MD   [facility] ([facility])    [address]  [address]  [place] [address] [phone]              Future tests that were ordered for you today     Open Future Orders        Priority Expected Expires Ordered    Digoxin level Routine 6/13/2018 9/5/2018 6/5/2018            Who to contact     If you have questions or need follow up information about today's clinic visit or your schedule  "please contact [facility] directly at [phone].  Normal or non-critical lab and imaging results will be communicated to you by MyChart, letter or phone within 4 business days after the clinic has received the results. If you do not hear from us within 7 days, please contact the clinic through MyChart or phone. If you have a critical or abnormal lab result, we will notify you by phone as soon as possible.  Submit refill requests through [employer] or call your pharmacy and they will forward the refill request to us. Please allow 3 business days for your refill to be completed.          Additional Information About Your Visit        Care EveryWhere ID     This is your Care EveryWhere ID. This could be used by other organizations to access your [facility] medical records  [id number]        Your Vitals Were     Pulse Temperature Respirations Height Last Period Pulse Oximetry    72 97.8  F (36.6  C) 20 5' 1.5\" (1.562 m) (LMP Unknown) 96%    Breastfeeding? BMI (Body Mass Index)                No 21.56 kg/m2           Blood Pressure from Last 3 Encounters:   06/05/18 130/80   05/08/18 142/89   05/08/18 136/58    Weight from Last 3 Encounters:   06/05/18 116 lb (52.6 kg)   05/08/18 119 lb (54 kg)   05/08/18 118 lb 4.8 oz (53.7 kg)              We Performed the Following     CBC with platelets and differential     Cortisol     Ferritin          Today's Medication Changes          These changes are accurate as of 6/5/18 11:59 AM.  If you have any questions, ask your nurse or doctor.               These medicines have changed or have updated prescriptions.        Dose/Directions    oxyCODONE-acetaminophen 5-325 MG per tablet   Commonly known as:  PERCOCET   This may have changed:  These instructions start on 7/2/2018. If you are unsure what to do until then, ask your doctor or other care provider.   Used for:  Chronic midline low back pain without sciatica   Changed by:  Marbin Rouse MD     "    Dose:  0.5 tablet   Start taking on:  7/2/2018   Take 0.5 tablets by mouth every morning   Quantity:  30 tablet   Refills:  0       simvastatin 20 MG tablet   Commonly known as:  ZOCOR   This may have changed:  additional instructions   Used for:  Hyperlipidemia with target LDL less than 130        TAKE HALF TABLET BY MOUTH AT BEDTIME   Quantity:  45 tablet   Refills:  3            Where to get your medicines      These medications were sent to [id number] - [place], [place] - [address]  [address] [facility] [address]     Phone:  [phone]     digoxin 125 MCG tablet    metoprolol succinate 200 MG 24 hr tablet         Some of these will need a paper prescription and others can be bought over the counter.  Ask your nurse if you have questions.     Bring a paper prescription for each of these medications     oxyCODONE-acetaminophen 5-325 MG per tablet               Information about OPIOIDS     PRESCRIPTION OPIOIDS: WHAT YOU NEED TO KNOW   You have a prescription for an opioid (narcotic) pain medicine. Opioids can cause addiction. If you have a history of chemical dependency of any type, you are at a higher risk of becoming addicted to opioids. Only take this medicine after all other options have been tried. Take it for as short a time and as few doses as possible.     Do not:    Drive. If you drive while taking these medicines, you could be arrested for driving under the influence (DUI).    Operate heavy machinery    Do any other dangerous activities while taking these medicines.     Drink any alcohol while taking these medicines.      Take with any other medicines that contain acetaminophen. Read all labels carefully. Look for the word  acetaminophen  or  Tylenol.  Ask your pharmacist if you have questions or are unsure.    Store your pills in a secure place, locked if possible. We will not replace any lost or stolen medicine. If you don t finish your medicine, please throw away  (dispose) as directed by your pharmacist. The [place] Pollution Control Agency has more information about safe disposal: https://www.pca.[facility].[place].us/living-green/managing-unwanted-medications    All opioids tend to cause constipation. Drink plenty of water and eat foods that have a lot of fiber, such as fruits, vegetables, prune juice, apple juice and high-fiber cereal. Take a laxative (Miralax, milk of magnesia, Colace, Senna) if you don t move your bowels at least every other day.          Primary Care Provider Office Phone # Fax #    Marbin Rouse [phone]       [address] [facility] [address]        Equal Access to Services     KITTY SALINAS : Hadii aad ku hadasho Sorhiannaali, waaxda luqadaha, qaybta kaalmada adeegyada, renee emery. So [facility] [phone].    ATENCIÓN: Si habla español, tiene a salmon disposición servicios gratuitos de asistencia lingüística. Llame al [phone].    We comply with applicable federal civil rights laws and [place] laws. We do not discriminate on the basis of race, color, national origin, age, disability, sex, sexual orientation, or gender identity.            Thank you!     Thank you for choosing [facility]  for your care. Our goal is always to provide you with excellent care. Hearing back from our patients is one way we can continue to improve our services. Please take a few minutes to complete the written survey that you may receive in the mail after your visit with us. Thank you!             Your Updated Medication List - Protect others around you: Learn how to safely use, store and throw away your medicines at www.disposemymeds.org.          This list is accurate as of 6/5/18 11:59 AM.  Always use your most recent med list.                   Brand Name Dispense Instructions for use Diagnosis    ASPIRIN PO      Take 81 mg by mouth every other day        diclofenac 1 % Gel topical gel     VOLTAREN    100 g    Apply 2 grams to painful areas up to twice a day    Chronic midline low back pain without sciatica       digoxin 125 MCG tablet    LANOXIN    45 tablet    Take 1 tablet (125 mcg) by mouth every other day As of 4/10/18    Atrial fibrillation, unspecified type (H)       furosemide 40 MG tablet    LASIX    90 tablet    Take 1 tablet (40 mg) by mouth daily    HORTON (dyspnea on exertion)       metoprolol succinate 200 MG 24 hr tablet    TOPROL-XL    90 tablet    Take 1 tablet (200 mg) by mouth daily    Atrial fibrillation, unspecified type (H)       oxyCODONE-acetaminophen 5-325 MG per tablet   Start taking on:  7/2/2018    PERCOCET    30 tablet    Take 0.5 tablets by mouth every morning    Chronic midline low back pain without sciatica       simvastatin 20 MG tablet    ZOCOR    45 tablet    TAKE HALF TABLET BY MOUTH AT BEDTIME    Hyperlipidemia with target LDL less than 130       sodium chloride 0.65 % nasal spray    OCEAN     Spray 1 spray into both nostrils 3 times daily as needed for congestion    Nasal dryness       VITAMIN B 12 PO      Take 1 tablet by mouth daily        VITAMIN D (CHOLECALCIFEROL) PO      Take 1,000 Units by mouth daily.

## 2018-06-05 NOTE — LETTER
June 6, 2018      Theresa Alves  [address]   [facility] [address]        Dear ,    We are writing to inform you of your test results.            Your hemoglobin is still normal despite your recent problems with nosebleeds.             Your iron level (ferritin) is in the high normal range.                Taking any extra iron is not indicated or necessary.    Resulted Orders   Ferritin   Result Value Ref Range    Ferritin 267 (H) 8 - 252 ng/mL   CBC with platelets and differential   Result Value Ref Range    WBC 10.2 4.0 - 11.0 10e9/L    RBC Count 3.87 3.8 - 5.2 10e12/L    Hemoglobin 13.4 11.7 - 15.7 g/dL    Hematocrit 40.3 35.0 - 47.0 %     (H) 78 - 100 fl    MCH 34.6 (H) 26.5 - 33.0 pg    MCHC 33.3 31.5 - 36.5 g/dL    RDW 14.5 10.0 - 15.0 %    Platelet Count 221 150 - 450 10e9/L    Diff Method Automated Method     % Neutrophils 57.4 %    % Lymphocytes 25.1 %    % Monocytes 13.6 %    % Eosinophils 2.8 %    % Basophils 1.1 %    Absolute Neutrophil 5.9 1.6 - 8.3 10e9/L    Absolute Lymphocytes 2.6 0.8 - 5.3 10e9/L    Absolute Monocytes 1.4 (H) 0.0 - 1.3 10e9/L    Absolute Eosinophils 0.3 0.0 - 0.7 10e9/L    Absolute Basophils 0.1 0.0 - 0.2 10e9/L   Cortisol   Result Value Ref Range    Cortisol Serum 16.4 4 - 22 ug/dL      Comment:      8 AM Cortisol Reference Range = 4-22 ug/dL  4 PM Cortisol Reference Range = 3-17 ug/dL         If you have any questions or concerns, please call the clinic at the number listed above.       Sincerely,        Marbin Rouse MD

## 2018-06-05 NOTE — PROGRESS NOTES
SUBJECTIVE:   Theresa Alves is a 89 year old female who presents to clinic today for the following health issues:      Hypertension Follow-up      Outpatient blood pressures are not being checked at home.     Low Salt Diet: no added salt      Amount of exercise or physical activity: None    Problems taking medications regularly: No    Medication side effects: none    Diet: low salt and low fat/cholesterol             ED/UC Followup:    Facility:  [facility]  Date of visit: 5/8/2018  Reason for visit: Epistaxis  Current Status: stable, off Eliquis now.                         She is here with her niece.               She complains of ongoing severe chronic fatigue, with ongoing dyspnea on exertion, and back pain, and anorexia.       She stopped Eliquis due to recurrent epistaxis.  She is taking low-dose aspirin.                      She wonders about taking iron.            I have wondered about cutting back her beta-blocker dosage.  However she is aware      that her heart rate speeds up when she walks even short distances.                    She is considering moving to assisted living.      Patient Active Problem List    Diagnosis Date Noted     Atrial fibrillation, unspecified type (H) 02/21/2018     Priority: High     Echo nml EF; Xarelto added by Cardiology ; stopped by palliative care due to epistaxis       Chronic congestive heart failure, unspecified congestive heart failure type (H) 12/06/2017     Priority: High     Chronic pain syndrome 09/14/2015     Priority: High     Patient is followed by [facility] for ongoing prescription of pain medication.  All refills should be approved by this provider, or covering partner.    Medication(s): percocet.   Maximum quantity per month: 30  Clinic visit frequency required: Q 6  months     Controlled substance agreement:  Encounter-Level CSA:     There are no encounter-level csa.        Pain Clinic evaluation in the past: No    DIRE Total Score(s):  No flowsheet  data found.    Last [facility] website verification:  ?   [email or web address]  No CSA on file    Last  check -9-18-17-no concerns           Weight loss 10/01/2014     Priority: High     Osteoporosis 04/01/2014     Priority: High     Rx Evista [id number], Actonel [id number]  Problem list name updated by automated process. Provider to review       Chronic fatigue 05/17/2013     Priority: High     SOB (shortness of breath) 05/08/2013     Priority: High     Congestive heart failure (H) 05/08/2013     Priority: High     BNP 1890; echo in 9/12; mild LVH, nml LV EF; trial of furosemide.                            Echo in 1/16= mild LVH,dilated L atrium,? Run of a fib/SVT during the echo       Hypertension goal BP (blood pressure) < 140/80 05/07/2013     Priority: High     Pulmonary fibrosis      Priority: High     See pulmonary notes 1/16 and 2/16         Chronic kidney disease, stage III (moderate) 09/06/2012     Priority: High     Atrial fibrillation w RVR 03/17/2018     Priority: Medium     Memory loss 08/31/2016     Priority: Medium     Numbness of right foot 08/31/2016     Priority: Medium     Low back pain 02/03/2015     Priority: Medium     Diagnosis updated by automated process. Provider to review and confirm.       Physical deconditioning 02/03/2015     Priority: Medium     Right shoulder pain 01/28/2015     Priority: Medium     Degenerative arthritis of spine 10/01/2013     Priority: Medium     With scoliosis; see [employer] data base       Dizziness - light-headed 05/17/2013     Priority: Medium     Edema 09/11/2012     Priority: Medium     Hyperlipidemia with target LDL less than 130 09/06/2012     Priority: Medium     Diagnosis updated by automated process. Provider to review and confirm.       Preventive measure 05/07/2013     Priority: Low     APRIMA DATA BASE UNDER THE 12/11/12 NOTE                            She avoids influenza vaccine  Colonoscopy 5/00;  Pap smear 1990; mammogram 2010  Refuses influenza  vac         [facility] 09/05/2012     Priority: Low     Estefany Ross, BS, RN, PHN  [facility]    [phone]               DX V65.8 REPLACED WITH [id number] [facility] (04/08/2013)           Current Outpatient Prescriptions   Medication Sig Dispense Refill     ASPIRIN PO Take 81 mg by mouth every other day        Cyanocobalamin (VITAMIN B 12 PO) Take 1 tablet by mouth daily        diclofenac (VOLTAREN) 1 % GEL topical gel Apply 2 grams to painful areas up to twice a day 100 g 3     digoxin (LANOXIN) 125 MCG tablet Take 1 tablet (125 mcg) by mouth every other day As of 4/10/18 15 tablet 11     furosemide (LASIX) 40 MG tablet Take 1 tablet (40 mg) by mouth daily 90 tablet 4     metoprolol succinate (TOPROL-XL) 200 MG 24 hr tablet Take 1 tablet (200 mg) by mouth daily 30 tablet 3     oxyCODONE-acetaminophen (PERCOCET) 5-325 MG per tablet Take 0.5 tablets by mouth every morning 30 tablet 0     simvastatin (ZOCOR) 20 MG tablet TAKE HALF TABLET BY MOUTH AT BEDTIME (Patient taking differently: Pt take one tab every other day.) 45 tablet 3     sodium chloride (OCEAN) 0.65 % nasal spray Spray 1 spray into both nostrils 3 times daily as needed for congestion  0     VITAMIN D, CHOLECALCIFEROL, PO Take 1,000 Units by mouth daily.         BP Readings from Last 3 Encounters:   06/05/18 130/80   05/08/18 142/89   05/08/18 136/58    Wt Readings from Last 3 Encounters:   06/05/18 116 lb (52.6 kg)   05/08/18 119 lb (54 kg)   05/08/18 118 lb 4.8 oz (53.7 kg)                    Reviewed and updated as needed this visit by clinical staff       Reviewed and updated as needed this visit by Provider         ROS:  CONSTITUTIONAL:POSITIVE  for fatigue and weight loss  RESP:POSITIVE for dyspnea on exertion  CV: POSITIVE for palpitations and NEGATIVE for chest pain/chest pressure and lower extremity edema  MUSCULOSKELETAL: POSITIVE  for back pain   NEURO: POSITIVE for gait disturbance    OBJECTIVE:                                 "                    /80 (BP Location: Right arm, Patient Position: Chair, Cuff Size: Adult Small)  Pulse 105  Temp 97.8  F (36.6  C)  Resp 20  Ht 5' 1.5\" (1.562 m)  Wt 116 lb (52.6 kg)  LMP  (LMP Unknown)  SpO2 96%  Breastfeeding? No  BMI 21.56 kg/m2  Body mass index is 21.56 kg/(m^2).  GENERAL APPEARANCE: alert and fatigued  RESP: rales bibasilar  CV: normal S1 S2, no S3 or S4, irregularly irregular rhythm and her heart rate was over 100 when she first sat down in the exam room.   Later the heart rate was much slower.            No leg edema.  MS: Kyphosis  NEURO: gait abnormal     Diagnostic test results:  Results for orders placed or performed during the hospital encounter of 05/08/18   Hemoglobin   Result Value Ref Range    Hemoglobin 12.0 11.7 - 15.7 g/dL        ASSESSMENT/PLAN:                                                        ICD-10-CM    1. Chronic congestive heart failure, unspecified congestive heart failure type (H) I50.9    2. Atrial fibrillation, unspecified type (H) I48.91 Digoxin level     digoxin (LANOXIN) 125 MCG tablet     metoprolol succinate (TOPROL-XL) 200 MG 24 hr tablet   3. Chronic fatigue R53.82 Cortisol   4. Weight loss R63.4    5. Chronic midline low back pain without sciatica M54.5 oxyCODONE-acetaminophen (PERCOCET) 5-325 MG per tablet    G89.29    6. Anemia due to blood loss, acute D62 Ferritin     CBC with platelets and differential    due to epistaxis       Summary and implications:  We reviewed her situation at length.        Cutting back her beta-blocker dosage does not seem indicated at this time, given that her heart rate is still relatively rapid with exertion.  She will be seeing cardiology next week,       and they have already ordered a basic metabolic panel; I have added a digoxin level.                 Today at her request we will check iron levels; I have added a cortisol level.                                                She can continue her low-dose " narcotic analgesia.  She just takes one half tablet of a Percocet per day.  Follow up with Provider -3 months or as needed    Marbin Rouse MD  [facility] HospitalXESAnswers for HPI/ROS submitted by the patient on 6/5/2018   If you checked off any problems, how difficult have these problems made it for you to do your work, take care of things at home, or get along with other people?: Somewhat difficult  PHQ9 TOTAL SCORE: 14                    Results for orders placed or performed in visit on 06/05/18   Ferritin   Result Value Ref Range    Ferritin 267 (H) 8 - 252 ng/mL   CBC with platelets and differential   Result Value Ref Range    WBC 10.2 4.0 - 11.0 10e9/L    RBC Count 3.87 3.8 - 5.2 10e12/L    Hemoglobin 13.4 11.7 - 15.7 g/dL    Hematocrit 40.3 35.0 - 47.0 %     (H) 78 - 100 fl    MCH 34.6 (H) 26.5 - 33.0 pg    MCHC 33.3 31.5 - 36.5 g/dL    RDW 14.5 10.0 - 15.0 %    Platelet Count 221 150 - 450 10e9/L    Diff Method Automated Method     % Neutrophils 57.4 %    % Lymphocytes 25.1 %    % Monocytes 13.6 %    % Eosinophils 2.8 %    % Basophils 1.1 %    Absolute Neutrophil 5.9 1.6 - 8.3 10e9/L    Absolute Lymphocytes 2.6 0.8 - 5.3 10e9/L    Absolute Monocytes 1.4 (H) 0.0 - 1.3 10e9/L    Absolute Eosinophils 0.3 0.0 - 0.7 10e9/L    Absolute Basophils 0.1 0.0 - 0.2 10e9/L   Cortisol   Result Value Ref Range    Cortisol Serum 16.4 4 - 22 ug/dL     Letter sent.                       Your hemoglobin is still normal despite your recent problems with nosebleeds.             Your iron level (ferritin) is in the high normal range.                Taking any extra iron is not indicated or necessary.

## 2018-06-06 LAB — FERRITIN SERPL-MCNC: 267 NG/ML (ref 8–252)

## 2018-06-06 ASSESSMENT — ANXIETY QUESTIONNAIRES: GAD7 TOTAL SCORE: 2

## 2018-06-06 ASSESSMENT — PATIENT HEALTH QUESTIONNAIRE - PHQ9: SUM OF ALL RESPONSES TO PHQ QUESTIONS 1-9: 14

## 2018-06-13 ENCOUNTER — OFFICE VISIT (OUTPATIENT)
Dept: CARDIOLOGY | Facility: CLINIC | Age: 83
End: 2018-06-13
Payer: COMMERCIAL

## 2018-06-13 VITALS
OXYGEN SATURATION: 97 % | WEIGHT: 117.6 LBS | DIASTOLIC BLOOD PRESSURE: 66 MMHG | BODY MASS INDEX: 21.64 KG/M2 | SYSTOLIC BLOOD PRESSURE: 120 MMHG | HEIGHT: 62 IN | HEART RATE: 58 BPM

## 2018-06-13 DIAGNOSIS — I50.30 (HFPEF) HEART FAILURE WITH PRESERVED EJECTION FRACTION (H): ICD-10-CM

## 2018-06-13 LAB
ANION GAP SERPL CALCULATED.3IONS-SCNC: 14.6 MMOL/L (ref 6–17)
BUN SERPL-MCNC: 29 MG/DL (ref 7–30)
CALCIUM SERPL-MCNC: 10.1 MG/DL (ref 8.5–10.5)
CHLORIDE SERPL-SCNC: 100 MMOL/L (ref 98–107)
CO2 SERPL-SCNC: 28 MMOL/L (ref 23–29)
CREAT SERPL-MCNC: 1.67 MG/DL (ref 0.7–1.3)
GFR SERPL CREATININE-BSD FRML MDRD: 29 ML/MIN/1.7M2
GLUCOSE SERPL-MCNC: 123 MG/DL (ref 70–105)
POTASSIUM SERPL-SCNC: 4.6 MMOL/L (ref 3.5–5.1)
SODIUM SERPL-SCNC: 138 MMOL/L (ref 136–145)

## 2018-06-13 PROCEDURE — 80048 BASIC METABOLIC PNL TOTAL CA: CPT | Performed by: PHYSICIAN ASSISTANT

## 2018-06-13 PROCEDURE — 36415 COLL VENOUS BLD VENIPUNCTURE: CPT | Performed by: PHYSICIAN ASSISTANT

## 2018-06-13 PROCEDURE — 99214 OFFICE O/P EST MOD 30 MIN: CPT | Performed by: PHYSICIAN ASSISTANT

## 2018-06-13 RX ORDER — FUROSEMIDE 20 MG
TABLET ORAL
Qty: 180 TABLET | Refills: 3 | Status: SHIPPED | OUTPATIENT
Start: 2018-06-13 | End: 2018-07-25

## 2018-06-13 NOTE — LETTER
"2018      Marbin Rouse MD  [address]  [place] [address]      RE: Theresa Alves       Dear Colleague,    I had the pleasure of seeing Theresa Alves in the [facility].    Service Date: 2018      PRIMARY CARDIOLOGIST:  Dr. Walker.      REASON FOR VISIT:  AFib, HFpEF followup.      HISTORY OF PRESENT ILLNESS:  Ms. Alves is a 89-year-old woman with past medical history significant for the followin.  Pulmonary fibrosis.   2.  Hypertension.   3.  Stage III CKD.   4.  Kyphoscoliosis.   5.  Recent diagnosis of atrial fibrillation.   6.  HFpEF.      She was hospitalized in March for difficult to control AFib, and she had retained some fluid at that point.  Her rates were eventually controlled with Toprol 200 mg daily, as well as digoxin.  She was started on Eliquis and post-discharge after TCU she has had multiple nosebleeds that just completely infuriated her.  We had a long discussion about risks and benefits of anticoagulation, and after eventually being seen by Palliative she decided to go down to an aspirin 81 mg daily.  Since then she has had just 1 nosebleed.  Outside of that, she continues to have poor energy, have a stiff and sore back.  She also has dyspnea on exertion and feels winded even getting dressed.  This is stable to slowly worsening.  She tells me that this \"better get better.\"  We discussed hospice and palliative in the past, and she is not interested in either of those.  She denies nhung orthopnea or PND.      SOCIAL HISTORY:  She lives in an independent apartment at [facility].  She is considering moving to [facility], which has some assisted living.  She has a poor appetite, so she does not eat much.  She does drink about 1 vodka-tonic nightly.  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  She comes in today with her niece.  She does not have any children.      PHYSICAL EXAMINATION:   GENERAL:  Elderly, " frail-appearing woman who looks better than last visit.   HEENT:  Normocephalic, atraumatic.   HEART:  Irregular but not tachycardic.  She has a 2/6 murmur heard best at the lower left sternal border.   LUNGS:  With dry rales throughout the middle lobes.   EXTREMITIES:  No peripheral edema.   NECK:  Veins are flat at 90 degrees.      ASSESSMENT AND PLAN:   1.  Persistent atrial fibrillation in a woman with severe biatrial enlargement.  Unlikely to maintain sinus rhythm and with well-controlled rates on a monitor in April.  She does have a CHADS-VASc score of 4 for age, female gender and hypertension, and I previously have spoken at length to her about the risk of stroke versus bleeding, as has Dr. Leiva from Palliative.  She elects to be on aspirin 81 mg daily, which is fine as I know she is making an informed decision, and she would like to continue on this path.  We will continue her on rate controlling medications which include Toprol 200 mg a day and digoxin 125 mcg every other day.   2.  Heart failure with preserved EF, likely secondary to loss of her atrial kick with her AFib.  We will adjust her Lasix from 40 mg every day to 40 mg alternating with 20 mg every other day, as her creatinine is elevated to 1.67 and she appears euvolemic on exam.  I will have her watch her weights closely and call us if they start to increase and we can adjust.   3.  Hypertension, well controlled.   4.  Pulmonary fibrosis, likely the biggest underlying source of her shortness of breath.  I reviewed with her today that this is not a curable disease and I do not expect her to feel better.  We had a long discussion about end of life in the past.  She had no interest in palliative or hospice at that time.  We will continue to do the best we can.  I appreciate Dr. Leiva's assistance as well.  She is a DNR/DNI, which is reasonable.      We will see her back in about 6 weeks with Dr. Walker or myself.      Thank you for allowing me  to participate in her care.      LENO Medrano PA-C             D: 2018   T: 2018   MT: JENNIFER      Name:     YANELI CHUA   MRN:      [id number]        Account:      [id number]   :      1924           Service Date: 2018      Document: [id number]         Outpatient Encounter Prescriptions as of 2018   Medication Sig Dispense Refill     ASPIRIN PO Take 81 mg by mouth every other day        Cyanocobalamin (VITAMIN B 12 PO) Take 1 tablet by mouth daily        diclofenac (VOLTAREN) 1 % GEL topical gel Apply 2 grams to painful areas up to twice a day 100 g 3     digoxin (LANOXIN) 125 MCG tablet Take 1 tablet (125 mcg) by mouth every other day As of 4/10/18 45 tablet 4     furosemide (LASIX) 20 MG tablet Take 2 pills alternating with 1 pill every other day. 180 tablet 3     metoprolol succinate (TOPROL-XL) 200 MG 24 hr tablet Take 1 tablet (200 mg) by mouth daily 90 tablet 4     [START ON 2018] oxyCODONE-acetaminophen (PERCOCET) 5-325 MG per tablet Take 0.5 tablets by mouth every morning 30 tablet 0     simvastatin (ZOCOR) 20 MG tablet TAKE HALF TABLET BY MOUTH AT BEDTIME (Patient taking differently: Pt take one tab every other day.) 45 tablet 3     sodium chloride (OCEAN) 0.65 % nasal spray Spray 1 spray into both nostrils 3 times daily as needed for congestion  0     VITAMIN D, CHOLECALCIFEROL, PO Take 1,000 Units by mouth daily.         [DISCONTINUED] furosemide (LASIX) 40 MG tablet Take 1 tablet (40 mg) by mouth daily 90 tablet 4     No facility-administered encounter medications on file as of 2018.        Again, thank you for allowing me to participate in the care of your patient.      Sincerely,    Lolis Davenport PA-C     [facility]

## 2018-06-13 NOTE — LETTER
6/13/2018    Marbin Rouse MD  [address]  [facility] [address]    RE: Theresa Alves       Dear Colleague,    I had the pleasure of seeing Theresa Alves in the [facility].    [id number]  HPI and Plan:   See dictation    Orders this Visit:  Orders Placed This Encounter   Procedures     Basic metabolic panel     Follow-Up with [facility] - SARA visit     Orders Placed This Encounter   Medications     furosemide (LASIX) 20 MG tablet     Sig: Take 2 pills alternating with 1 pill every other day.     Dispense:  180 tablet     Refill:  3     Medications Discontinued During This Encounter   Medication Reason     furosemide (LASIX) 40 MG tablet          Encounter Diagnosis   Name Primary?     (HFpEF) heart failure with preserved ejection fraction (H)        CURRENT MEDICATIONS:  Current Outpatient Prescriptions   Medication Sig Dispense Refill     ASPIRIN PO Take 81 mg by mouth every other day        Cyanocobalamin (VITAMIN B 12 PO) Take 1 tablet by mouth daily        diclofenac (VOLTAREN) 1 % GEL topical gel Apply 2 grams to painful areas up to twice a day 100 g 3     digoxin (LANOXIN) 125 MCG tablet Take 1 tablet (125 mcg) by mouth every other day As of 4/10/18 45 tablet 4     furosemide (LASIX) 20 MG tablet Take 2 pills alternating with 1 pill every other day. 180 tablet 3     metoprolol succinate (TOPROL-XL) 200 MG 24 hr tablet Take 1 tablet (200 mg) by mouth daily 90 tablet 4     [START ON 7/2/2018] oxyCODONE-acetaminophen (PERCOCET) 5-325 MG per tablet Take 0.5 tablets by mouth every morning 30 tablet 0     simvastatin (ZOCOR) 20 MG tablet TAKE HALF TABLET BY MOUTH AT BEDTIME (Patient taking differently: Pt take one tab every other day.) 45 tablet 3     sodium chloride (OCEAN) 0.65 % nasal spray Spray 1 spray into both nostrils 3 times daily as needed for congestion  0     VITAMIN D, CHOLECALCIFEROL, PO Take 1,000 Units by mouth daily.         [DISCONTINUED]  furosemide (LASIX) 40 MG tablet Take 1 tablet (40 mg) by mouth daily 90 tablet 4       ALLERGIES   No Known Allergies    PAST MEDICAL HISTORY:  Past Medical History:   Diagnosis Date     Chronic back pain      Pulmonary fibrosis (H)      Unspecified essential hypertension     Essential hypertension       PAST SURGICAL HISTORY:  Past Surgical History:   Procedure Laterality Date     CARPAL TUNNEL RELEASE RT/LT  1990's    R     CATARACT IOL, RT/LT  5/03    R     HERNIA REPAIR, INGUINAL RT/LT  1954    R     hiatal hernia repair  6/10    large paraesophageal hiatal hernia; Nissen fundoplication        FAMILY HISTORY:  Family History   Problem Relation Age of Onset     Unknown/Adopted Mother      Unknown/Adopted Father      DIABETES No family hx of      Coronary Artery Disease No family hx of      Hypertension No family hx of      Hyperlipidemia No family hx of      CEREBROVASCULAR DISEASE No family hx of      Breast Cancer No family hx of      Colon Cancer No family hx of      Prostate Cancer No family hx of      Other Cancer No family hx of      Depression No family hx of      Anxiety Disorder No family hx of      MENTAL ILLNESS No family hx of      Substance Abuse No family hx of      Anesthesia Reaction No family hx of      Asthma No family hx of      OSTEOPOROSIS No family hx of      Genetic Disorder No family hx of      Thyroid Disease No family hx of      Obesity No family hx of        SOCIAL HISTORY:  Social History     Social History     Marital status:      Spouse name: N/A     Number of children: 0     Years of education: N/A     Occupational History     worked at [employer] for 40 yrs Retired     Social History Main Topics     Smoking status: Never Smoker     Smokeless tobacco: Never Used     Alcohol use Yes      Comment: daily-vodka tonic x1     Drug use: No     Sexual activity: No     Other Topics Concern     None     Social History Narrative       Review of Systems:  Skin:  Negative     Eyes:  Positive  "for glasses  ENT:  Negative    Respiratory:  Positive for shortness of breath;dyspnea on exertion  Cardiovascular:  Negative;palpitations;chest pain;edema;exercise intolerance Positive for;lightheadedness;dizziness  Gastroenterology: Negative    Genitourinary:  Positive for nocturia  Musculoskeletal:  Positive for back pain;arthritis  Neurologic:  Positive for numbness or tingling of feet  Psychiatric:  Negative    Heme/Lymph/Imm:  Negative    Endocrine:  Negative      Physical Exam:  Vitals: /66 (BP Location: Right arm, Patient Position: Chair, Cuff Size: Adult Small)  Pulse 58  Ht 1.562 m (5' 1.5\")  Wt 53.3 kg (117 lb 9.6 oz)  LMP  (LMP Unknown)  SpO2 97%  BMI 21.86 kg/m2   Please refer to dictation for physical exam    Recent Lab Results:  LIPID RESULTS:  Lab Results   Component Value Date    CHOL 142 11/28/2017    HDL 69 11/28/2017    LDL 52 11/28/2017    TRIG 107 11/28/2017    CHOLHDLRATIO 2.7 01/10/2014       LIVER ENZYME RESULTS:  Lab Results   Component Value Date    AST 24 03/18/2018    ALT 27 03/18/2018       CBC RESULTS:  Lab Results   Component Value Date    WBC 10.2 06/05/2018    RBC 3.87 06/05/2018    HGB 13.4 06/05/2018    HCT 40.3 06/05/2018     (H) 06/05/2018    MCH 34.6 (H) 06/05/2018    MCHC 33.3 06/05/2018    RDW 14.5 06/05/2018     06/05/2018       BMP RESULTS:  Lab Results   Component Value Date     06/13/2018    POTASSIUM 4.6 06/13/2018    CHLORIDE 100 06/13/2018    CO2 28 06/13/2018    ANIONGAP 14.6 06/13/2018     (H) 06/13/2018    BUN 29 06/13/2018    CR 1.67 (H) 06/13/2018    GFRESTIMATED 29 (L) 06/13/2018    GFRESTBLACK 35 (L) 06/13/2018    PAWEL 10.1 06/13/2018        A1C RESULTS:  No results found for: A1C    INR RESULTS:  No results found for: INR        CC  No referring provider defined for this encounter.        Thank you for allowing me to participate in the care of your patient.      Sincerely,     Lolis Davenport PABESSIE     [facility]    cc:   No referring provider defined for this encounter.

## 2018-06-13 NOTE — PATIENT INSTRUCTIONS
Please call [facility] nurse for any questions or concerns:       [phone]    1. Medication changes:  Decrease lasix to 40 mg one day then 20 mg the next day.  Your new pills will be 20 mg tablets.     2.  Weigh yourself daily and write it down.   If your weight hits 120 pounds please call, it may be too much fluid.      3. Call [facility] nurse if you feel more short of breath, have more abdominal bloating or leg swelling.    4. Results: kidney numbers are high but should get better with changes.    Component      Latest Ref Rng & Units 6/13/2018   Sodium      136 - 145 mmol/L 138   Potassium      3.5 - 5.1 mmol/L 4.6   Chloride      98 - 107 mmol/L 100   Carbon Dioxide      23 - 29 mmol/L 28   Anion Gap      6 - 17 mmol/L 14.6   Glucose      70 - 105 mg/dL 123 (H)   Urea Nitrogen      7 - 30 mg/dL 29   Creatinine      0.70 - 1.30 mg/dL 1.67 (H)   GFR Estimate      >60 mL/min/1.7m2 29 (L)   GFR Estimate If Black      >60 mL/min/1.7m2 35 (L)   Calcium      8.5 - 10.5 mg/dL 10.1     7.  Follow up: with Dr. Walker or myself in about 6 weeks.      Scheduling phone number: [phone]  Reminder: Please bring in all current medications, over the counter supplements and vitamin bottles to your next appointment.

## 2018-06-13 NOTE — MR AVS SNAPSHOT
After Visit Summary   6/13/2018    Theresa Alves    MRN: [id number]           Patient Information     Date Of Birth          6/22/1924        Visit Information        Provider Department      6/13/2018 3:00 PM Lolis Davenport PA-C [facility]   [place]        Today's Diagnoses     (HFpEF) heart failure with preserved ejection fraction (H)          Care Instructions    Please call [facility] nurse for any questions or concerns:       [phone]    1. Medication changes:  Decrease lasix to 40 mg one day then 20 mg the next day.  Your new pills will be 20 mg tablets.     2.  Weigh yourself daily and write it down.   If your weight hits 120 pounds please call, it may be too much fluid.      3. Call [facility] nurse if you feel more short of breath, have more abdominal bloating or leg swelling.    4. Results: kidney numbers are high but should get better with changes.    Component      Latest Ref Rng & Units 6/13/2018   Sodium      136 - 145 mmol/L 138   Potassium      3.5 - 5.1 mmol/L 4.6   Chloride      98 - 107 mmol/L 100   Carbon Dioxide      23 - 29 mmol/L 28   Anion Gap      6 - 17 mmol/L 14.6   Glucose      70 - 105 mg/dL 123 (H)   Urea Nitrogen      7 - 30 mg/dL 29   Creatinine      0.70 - 1.30 mg/dL 1.67 (H)   GFR Estimate      >60 mL/min/1.7m2 29 (L)   GFR Estimate If Black      >60 mL/min/1.7m2 35 (L)   Calcium      8.5 - 10.5 mg/dL 10.1     7.  Follow up: with Dr. Walker or myself in about 6 weeks.      Scheduling phone number: [phone]  Reminder: Please bring in all current medications, over the counter supplements and vitamin bottles to your next appointment.          Follow-ups after your visit        Additional Services     Follow-Up with [facility] - SARA visit                 Your next 10 appointments already scheduled     Sep 11, 2018 11:15 AM CDT   SHORT with Marbin Rouse MD   [facility] ([facility])    [address] [id number] [phone]              Future tests that were ordered for you today     Open Future Orders        Priority Expected Expires Ordered    Basic metabolic panel Routine 7/25/2018 6/13/2019 6/13/2018    Follow-Up with [facility] - SARA visit Routine 7/25/2018 6/13/2019 6/13/2018            Who to contact     If you have questions or need follow up information about today's clinic visit or your schedule please contact [facility] directly at [phone].  Normal or non-critical lab and imaging results will be communicated to you by MyChart, letter or phone within 4 business days after the clinic has received the results. If you do not hear from us within 7 days, please contact the clinic through MyChart or phone. If you have a critical or abnormal lab result, we will notify you by phone as soon as possible.  Submit refill requests through [employer] or call your pharmacy and they will forward the refill request to us. Please allow 3 business days for your refill to be completed.          Additional Information About Your Visit        Care EveryWhere ID     This is your Care EveryWhere ID. This could be used by other organizations to access your [facility] medical records  [id number]        Your Vitals Were     Pulse Height Last Period Pulse Oximetry BMI (Body Mass Index)       58 1.562 m (5' 1.5\") (LMP Unknown) 97% 21.86 kg/m2        Blood Pressure from Last 3 Encounters:   06/13/18 120/66   06/05/18 130/80   05/08/18 142/89    Weight from Last 3 Encounters:   06/13/18 53.3 kg (117 lb 9.6 oz)   06/05/18 52.6 kg (116 lb)   05/08/18 54 kg (119 lb)              We Performed the Following     Follow-Up with [facility] - [facility] visit          Today's Medication Changes          These changes are accurate as of 6/13/18  3:31 PM.  If you have any questions, ask your nurse or doctor.               These medicines have " changed or have updated prescriptions.        Dose/Directions    * furosemide 40 MG tablet   Commonly known as:  LASIX   This may have changed:  Another medication with the same name was added. Make sure you understand how and when to take each.   Used for:  HORTON (dyspnea on exertion)   Changed by:  Lolis Davenport PA-C        Dose:  40 mg   Take 1 tablet (40 mg) by mouth daily   Quantity:  90 tablet   Refills:  4       * furosemide 20 MG tablet   Commonly known as:  LASIX   This may have changed:  You were already taking a medication with the same name, and this prescription was added. Make sure you understand how and when to take each.   Used for:  (HFpEF) heart failure with preserved ejection fraction (H)   Changed by:  Lolis Davenport PA-C        Take 2 pills alternating with 1 pill every other day.   Quantity:  180 tablet   Refills:  3       simvastatin 20 MG tablet   Commonly known as:  ZOCOR   This may have changed:  additional instructions   Used for:  Hyperlipidemia with target LDL less than 130        TAKE HALF TABLET BY MOUTH AT BEDTIME   Quantity:  45 tablet   Refills:  3       * Notice:  This list has 2 medication(s) that are the same as other medications prescribed for you. Read the directions carefully, and ask your doctor or other care provider to review them with you.         Where to get your medicines      These medications [facility] to [id number] - [place] [place] [facility] [address] [facility]  [address] [facility] [address]     Phone:  [phone]     furosemide 20 MG tablet                Primary Care Provider Office Phone # Fax #    Marbin Rouse [phone] [address] [facility] [address]        Equal Access to Services     [facility]: Hadii lata pendleton hadasho Soarnoldo, waaxda luqadaha, qaybta kaallyndsey lynn, renee emery. [facility] [phone].    ATENCIÓN: polo Nicholson salmon  disposición servicios gratuitos de asistencia lingüística. Leo tobar [phone].    We comply with applicable federal civil rights laws and [place] laws. We do not discriminate on the basis of race, color, national origin, age, disability, sex, sexual orientation, or gender identity.            Thank you!     Thank you for choosing [facility]  for your care. Our goal is always to provide you with excellent care. Hearing back from our patients is one way we can continue to improve our services. Please take a few minutes to complete the written survey that you may receive in the mail after your visit with us. Thank you!             Your Updated Medication List - Protect others around you: Learn how to safely use, store and throw away your medicines at www.disposemymeds.org.          This list is accurate as of 6/13/18  3:31 PM.  Always use your most recent med list.                   Brand Name Dispense Instructions for use Diagnosis    ASPIRIN PO      Take 81 mg by mouth every other day        diclofenac 1 % Gel topical gel    VOLTAREN    100 g    Apply 2 grams to painful areas up to twice a day    Chronic midline low back pain without sciatica       digoxin 125 MCG tablet    LANOXIN    45 tablet    Take 1 tablet (125 mcg) by mouth every other day As of 4/10/18    Atrial fibrillation, unspecified type (H)       * furosemide 40 MG tablet    LASIX    90 tablet    Take 1 tablet (40 mg) by mouth daily    HORTON (dyspnea on exertion)       * furosemide 20 MG tablet    LASIX    180 tablet    Take 2 pills alternating with 1 pill every other day.    (HFpEF) heart failure with preserved ejection fraction (H)       metoprolol succinate 200 MG 24 hr tablet    TOPROL-XL    90 tablet    Take 1 tablet (200 mg) by mouth daily    Atrial fibrillation, unspecified type (H)       oxyCODONE-acetaminophen 5-325 MG per tablet   Start taking on:  7/2/2018    PERCOCET    30 tablet    Take 0.5 tablets  by mouth every morning    Chronic midline low back pain without sciatica       simvastatin 20 MG tablet    ZOCOR    45 tablet    TAKE HALF TABLET BY MOUTH AT BEDTIME    Hyperlipidemia with target LDL less than 130       sodium chloride 0.65 % nasal spray    OCEAN     Spray 1 spray into both nostrils 3 times daily as needed for congestion    Nasal dryness       VITAMIN B 12 PO      Take 1 tablet by mouth daily        VITAMIN D (CHOLECALCIFEROL) PO      Take 1,000 Units by mouth daily.        * Notice:  This list has 2 medication(s) that are the same as other medications prescribed for you. Read the directions carefully, and ask your doctor or other care provider to review them with you.

## 2018-06-14 NOTE — PROGRESS NOTES
[id number]  HPI and Plan:   See dictation    Orders this Visit:  Orders Placed This Encounter   Procedures     Basic metabolic panel     Follow-Up with [facility] - [facility] visit     Orders Placed This Encounter   Medications     furosemide (LASIX) 20 MG tablet     Sig: Take 2 pills alternating with 1 pill every other day.     Dispense:  180 tablet     Refill:  3     Medications Discontinued During This Encounter   Medication Reason     furosemide (LASIX) 40 MG tablet          Encounter Diagnosis   Name Primary?     (HFpEF) heart failure with preserved ejection fraction (H)        CURRENT MEDICATIONS:  Current Outpatient Prescriptions   Medication Sig Dispense Refill     ASPIRIN PO Take 81 mg by mouth every other day        Cyanocobalamin (VITAMIN B 12 PO) Take 1 tablet by mouth daily        diclofenac (VOLTAREN) 1 % GEL topical gel Apply 2 grams to painful areas up to twice a day 100 g 3     digoxin (LANOXIN) 125 MCG tablet Take 1 tablet (125 mcg) by mouth every other day As of 4/10/18 45 tablet 4     furosemide (LASIX) 20 MG tablet Take 2 pills alternating with 1 pill every other day. 180 tablet 3     metoprolol succinate (TOPROL-XL) 200 MG 24 hr tablet Take 1 tablet (200 mg) by mouth daily 90 tablet 4     [START ON 7/2/2018] oxyCODONE-acetaminophen (PERCOCET) 5-325 MG per tablet Take 0.5 tablets by mouth every morning 30 tablet 0     simvastatin (ZOCOR) 20 MG tablet TAKE HALF TABLET BY MOUTH AT BEDTIME (Patient taking differently: Pt take one tab every other day.) 45 tablet 3     sodium chloride (OCEAN) 0.65 % nasal spray Spray 1 spray into both nostrils 3 times daily as needed for congestion  0     VITAMIN D, CHOLECALCIFEROL, PO Take 1,000 Units by mouth daily.         [DISCONTINUED] furosemide (LASIX) 40 MG tablet Take 1 tablet (40 mg) by mouth daily 90 tablet 4       ALLERGIES   No Known Allergies    PAST MEDICAL HISTORY:  Past Medical History:   Diagnosis Date     Chronic back pain      Pulmonary fibrosis (H)       Unspecified essential hypertension     Essential hypertension       PAST SURGICAL HISTORY:  Past Surgical History:   Procedure Laterality Date     CARPAL TUNNEL RELEASE RT/LT  1990's    R     CATARACT IOL, RT/LT  5/03    R     HERNIA REPAIR, INGUINAL RT/LT  1954    R     hiatal hernia repair  6/10    large paraesophageal hiatal hernia; Nissen fundoplication        FAMILY HISTORY:  Family History   Problem Relation Age of Onset     Unknown/Adopted Mother      Unknown/Adopted Father      DIABETES No family hx of      Coronary Artery Disease No family hx of      Hypertension No family hx of      Hyperlipidemia No family hx of      CEREBROVASCULAR DISEASE No family hx of      Breast Cancer No family hx of      Colon Cancer No family hx of      Prostate Cancer No family hx of      Other Cancer No family hx of      Depression No family hx of      Anxiety Disorder No family hx of      MENTAL ILLNESS No family hx of      Substance Abuse No family hx of      Anesthesia Reaction No family hx of      Asthma No family hx of      OSTEOPOROSIS No family hx of      Genetic Disorder No family hx of      Thyroid Disease No family hx of      Obesity No family hx of        SOCIAL HISTORY:  Social History     Social History     Marital status:      Spouse name: N/A     Number of children: 0     Years of education: N/A     Occupational History     worked at [employer] for 40 yrs Retired     Social History Main Topics     Smoking status: Never Smoker     Smokeless tobacco: Never Used     Alcohol use Yes      Comment: daily-vodka tonic x1     Drug use: No     Sexual activity: No     Other Topics Concern     None     Social History Narrative       Review of Systems:  Skin:  Negative     Eyes:  Positive for glasses  ENT:  Negative    Respiratory:  Positive for shortness of breath;dyspnea on exertion  Cardiovascular:  Negative;palpitations;chest pain;edema;exercise intolerance Positive for;lightheadedness;dizziness  Gastroenterology:  "Negative    Genitourinary:  Positive for nocturia  Musculoskeletal:  Positive for back pain;arthritis  Neurologic:  Positive for numbness or tingling of feet  Psychiatric:  Negative    Heme/Lymph/Imm:  Negative    Endocrine:  Negative      Physical Exam:  Vitals: /66 (BP Location: Right arm, Patient Position: Chair, Cuff Size: Adult Small)  Pulse 58  Ht 1.562 m (5' 1.5\")  Wt 53.3 kg (117 lb 9.6 oz)  LMP  (LMP Unknown)  SpO2 97%  BMI 21.86 kg/m2   Please refer to dictation for physical exam    Recent Lab Results:  LIPID RESULTS:  Lab Results   Component Value Date    CHOL 142 11/28/2017    HDL 69 11/28/2017    LDL 52 11/28/2017    TRIG 107 11/28/2017    CHOLHDLRATIO 2.7 01/10/2014       LIVER ENZYME RESULTS:  Lab Results   Component Value Date    AST 24 03/18/2018    ALT 27 03/18/2018       CBC RESULTS:  Lab Results   Component Value Date    WBC 10.2 06/05/2018    RBC 3.87 06/05/2018    HGB 13.4 06/05/2018    HCT 40.3 06/05/2018     (H) 06/05/2018    MCH 34.6 (H) 06/05/2018    MCHC 33.3 06/05/2018    RDW 14.5 06/05/2018     06/05/2018       BMP RESULTS:  Lab Results   Component Value Date     06/13/2018    POTASSIUM 4.6 06/13/2018    CHLORIDE 100 06/13/2018    CO2 28 06/13/2018    ANIONGAP 14.6 06/13/2018     (H) 06/13/2018    BUN 29 06/13/2018    CR 1.67 (H) 06/13/2018    GFRESTIMATED 29 (L) 06/13/2018    GFRESTBLACK 35 (L) 06/13/2018    PAWEL 10.1 06/13/2018        A1C RESULTS:  No results found for: A1C    INR RESULTS:  No results found for: INR        CC  No referring provider defined for this encounter.      "

## 2018-06-15 NOTE — PROGRESS NOTES
"Service Date: 2018      PRIMARY CARDIOLOGIST:  Dr. Walker.      REASON FOR VISIT:  AFib, HFpEF followup.      HISTORY OF PRESENT ILLNESS:  Ms. Alves is a 89-year-old woman with past medical history significant for the followin.  Pulmonary fibrosis.   2.  Hypertension.   3.  Stage III CKD.   4.  Kyphoscoliosis.   5.  Recent diagnosis of atrial fibrillation.   6.  HFpEF.      She was hospitalized in March for difficult to control AFib, and she had retained some fluid at that point.  Her rates were eventually controlled with Toprol 200 mg daily, as well as digoxin.  She was started on Eliquis and post-discharge after TCU she has had multiple nosebleeds that just completely infuriated her.  We had a long discussion about risks and benefits of anticoagulation, and after eventually being seen by Palliative she decided to go down to an aspirin 81 mg daily.  Since then she has had just 1 nosebleed.  Outside of that, she continues to have poor energy, have a stiff and sore back.  She also has dyspnea on exertion and feels winded even getting dressed.  This is stable to slowly worsening.  She tells me that this \"better get better.\"  We discussed hospice and palliative in the past, and she is not interested in either of those.  She denies nhung orthopnea or PND.      SOCIAL HISTORY:  She lives in an independent apartment at [facility].  She is considering moving to [facility], which has some assisted living.  She has a poor appetite, so she does not eat much.  She does drink about 1 vodka-tonic nightly.  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  She comes in today with her niece.  She does not have any children.      PHYSICAL EXAMINATION:   GENERAL:  Elderly, frail-appearing woman who looks better than last visit.   HEENT:  Normocephalic, atraumatic.   HEART:  Irregular but not tachycardic.  She has a 2/6 murmur heard best at the lower left sternal border.   LUNGS:  With dry rales throughout " the middle lobes.   EXTREMITIES:  No peripheral edema.   NECK:  Veins are flat at 90 degrees.      ASSESSMENT AND PLAN:   1.  Persistent atrial fibrillation in a woman with severe biatrial enlargement.  Unlikely to maintain sinus rhythm and with well-controlled rates on a monitor in April.  She does have a CHADS-VASc score of 4 for age, female gender and hypertension, and I previously have spoken at length to her about the risk of stroke versus bleeding, as has Dr. Leiva from Palliative.  She elects to be on aspirin 81 mg daily, which is fine as I know she is making an informed decision, and she would like to continue on this path.  We will continue her on rate controlling medications which include Toprol 200 mg a day and digoxin 125 mcg every other day.   2.  Heart failure with preserved EF, likely secondary to loss of her atrial kick with her AFib.  We will adjust her Lasix from 40 mg every day to 40 mg alternating with 20 mg every other day, as her creatinine is elevated to 1.67 and she appears euvolemic on exam.  I will have her watch her weights closely and call us if they start to increase and we can adjust.   3.  Hypertension, well controlled.   4.  Pulmonary fibrosis, likely the biggest underlying source of her shortness of breath.  I reviewed with her today that this is not a curable disease and I do not expect her to feel better.  We had a long discussion about end of life in the past.  She had no interest in palliative or hospice at that time.  We will continue to do the best we can.  I appreciate Dr. Leiva's assistance as well.  She is a DNR/DNI, which is reasonable.      We will see her back in about 6 weeks with Dr. Walker or myself.      Thank you for allowing me to participate in her care.      LENO Medrano PA-C             D: 2018   T: 2018   MT: JENNIFER      Name:     YANELI CHUA   MRN:      [id number]        Account:      [id number]   :       06/22/1924           Service Date: 06/13/2018      Document: [id number]

## 2018-06-25 ENCOUNTER — TELEPHONE (OUTPATIENT)
Dept: FAMILY MEDICINE | Facility: CLINIC | Age: 83
End: 2018-06-25

## 2018-06-25 DIAGNOSIS — G89.29 CHRONIC MIDLINE LOW BACK PAIN WITHOUT SCIATICA: ICD-10-CM

## 2018-06-25 DIAGNOSIS — M54.50 CHRONIC MIDLINE LOW BACK PAIN WITHOUT SCIATICA: ICD-10-CM

## 2018-06-25 NOTE — TELEPHONE ENCOUNTER
Reason for Call:  Other call back    Detailed comments: Patient has her Rx for Percocet, but the pharmacy will not fill it early. Patient states she had to take a few extra pills so she is now out. She is requesting an early refill be called into her pharmacy. [facility] on [address]    Phone Number Patient can be reached at: Home number on file [phone] (home)    Best Time: any    Can we leave a detailed message on this number? YES    Call taken on 6/25/2018 at 10:56 AM by Luci Pierce

## 2018-07-20 ENCOUNTER — APPOINTMENT (OUTPATIENT)
Dept: CT IMAGING | Facility: CLINIC | Age: 83
End: 2018-07-20
Attending: EMERGENCY MEDICINE
Payer: COMMERCIAL

## 2018-07-20 ENCOUNTER — APPOINTMENT (OUTPATIENT)
Dept: GENERAL RADIOLOGY | Facility: CLINIC | Age: 83
End: 2018-07-20
Attending: EMERGENCY MEDICINE
Payer: COMMERCIAL

## 2018-07-20 ENCOUNTER — HOSPITAL ENCOUNTER (EMERGENCY)
Facility: CLINIC | Age: 83
Discharge: HOME | End: 2018-07-20
Attending: EMERGENCY MEDICINE | Admitting: EMERGENCY MEDICINE
Payer: COMMERCIAL

## 2018-07-20 VITALS
WEIGHT: 116 LBS | HEIGHT: 66 IN | SYSTOLIC BLOOD PRESSURE: 155 MMHG | RESPIRATION RATE: 16 BRPM | DIASTOLIC BLOOD PRESSURE: 63 MMHG | BODY MASS INDEX: 18.64 KG/M2 | TEMPERATURE: 97.8 F | OXYGEN SATURATION: 96 % | HEART RATE: 73 BPM

## 2018-07-20 DIAGNOSIS — S80.02XA CONTUSION OF LEFT KNEE, INITIAL ENCOUNTER: ICD-10-CM

## 2018-07-20 DIAGNOSIS — S93.401A SPRAIN OF RIGHT ANKLE, UNSPECIFIED LIGAMENT, INITIAL ENCOUNTER: ICD-10-CM

## 2018-07-20 DIAGNOSIS — S09.90XA CLOSED HEAD INJURY, INITIAL ENCOUNTER: ICD-10-CM

## 2018-07-20 PROCEDURE — 73562 X-RAY EXAM OF KNEE 3: CPT | Mod: LT

## 2018-07-20 PROCEDURE — 73610 X-RAY EXAM OF ANKLE: CPT | Mod: RT

## 2018-07-20 PROCEDURE — 70450 CT HEAD/BRAIN W/O DYE: CPT

## 2018-07-20 PROCEDURE — 99285 EMERGENCY DEPT VISIT HI MDM: CPT | Mod: 25

## 2018-07-20 ASSESSMENT — ENCOUNTER SYMPTOMS
NUMBNESS: 1
NECK PAIN: 0

## 2018-07-20 NOTE — ED AVS SNAPSHOT
Emergency Department    [address] [facility] [address]    Phone:  [phone]    Fax:  [phone]                                       Theresa Alves   MRN: [id number]    Department:   Emergency Department   Date of Visit:  7/20/2018           Patient Information     Date Of Birth          6/22/1924        Your diagnoses for this visit were:     Closed head injury, initial encounter     Contusion of left knee, initial encounter     Sprain of right ankle, unspecified ligament, initial encounter        You were seen by Dick Francis MD.      Follow-up Information     Follow up with Marbin Rouse MD.    Specialty:  Internal Medicine    Contact information:    [address]  [facility] [address] [phone]          Discharge Instructions         Discharge Instructions  Head Injury    You have been seen today for a head injury. You were checked for serious problems, like bleeding on the brain, but these problems cannot always be found right away.  Due to this risk, you should not be alone for 24 hours after your injury.  Follow up with your regular physician in 2-3 days. If you are taking a blood thinner, such as aspirin, Pradaxa  (dabigatran), Coumadin  (warfarin), or Plavix  (clopidogrel), you are at especially high risk for immediate or delayed bleeding, and need to re-check with a physician in 24 hours, or sooner if any of the symptoms below happen.     Return to the Emergency Department if:    You are confused, have amnesia, or you are not acting right.    Your headache gets worse or you start to have a really bad headache even with your recommended treatment plan.    You vomit more than once.    You have a convulsion or seizure.    You have trouble walking.    You have weakness or paralysis in an arm or a leg.    You have blood or fluid coming from your ears or nose.    You have new symptoms or anything that worries you.    Sleeping:  It is okay for you to sleep, but  someone should wake you up as instructed by your doctor, and someone should check on you at your usual time to wake up.     Activity:    Do not drive for at least 24 hours.    Do not drive if you have dizzy spells or trouble concentrating, or remembering things.    Do not return to any contact sports until cleared by your regular doctor.     Follow-up:  It is very important that you make an appointment with your clinic and go to the appointment.  If you do not follow-up with your regular doctor, it may result in missing an important development which could result in permanent injury or disability and/or lasting pain.  If there is any problem keeping your appointment, call your doctor or return to the Emergency Department.    MORE INFORMATION:    Concussion:  A concussion is a minor head injury that may cause temporary problems with the way your brain works.  Some symptoms include:  confusion, amnesia, nausea and vomiting, dizziness, fatigue, memory or concentration problems, irritability and sleep problems.    CT Scans: Your evaluation today may have included a CT scan (CAT scan) to look for things like bleeding or a skull fracture (break).  CT scans involve radiation and too many CT scans can cause serious health problems like cancer, especially in children.  Because of this, your doctor may not have ordered a CT scan today if they think you are at low risk for a serious or life threatening problem.    If you were given a prescription for medicine here today, be sure to read all of the information (including the package insert) that comes with your prescription.  This will include important information about the medicine, its side effects, and any warnings that you need to know about.  The pharmacist who fills the prescription can provide more information and answer questions you may have about the medicine.  If you have questions or concerns that the pharmacist cannot address, please call or return to the  Emergency Department.     Opioid Medication Information    Pain medications are among the most commonly prescribed medicines, so we are including this information for all our patients. If you did not receive pain medication or get a prescription for pain medicine, you can ignore it.     You may have been given a prescription for an opioid (narcotic) pain medicine and/or have received a pain medicine while here in the Emergency Department. These medicines can make you drowsy or impaired. You must not drive, operate dangerous equipment, or engage in any other dangerous activities while taking these medications. If you drive while taking these medications, you could be arrested for DUI, or driving under the influence. Do not drink any alcohol while you are taking these medications.     Opioid pain medications can cause addiction. If you have a history of chemical dependency of any type, you are at a higher risk of becoming addicted to pain medications.  Only take these prescribed medications to treat your pain when all other options have been tried. Take it for as short a time and as few doses as possible. Store your pain pills in a secure place, as they are frequently stolen and provide a dangerous opportunity for children or visitors in your house to start abusing these powerful medications. We will not replace any lost or stolen medicine.  As soon as your pain is better, you should flush all your remaining medication.     Many prescription pain medications contain Tylenol  (acetaminophen), including Vicodin , Tylenol #3 , Norco , Lortab , and Percocet .  You should not take any extra pills of Tylenol  if you are using these prescription medications or you can get very sick.  Do not ever take more than 3000 mg of acetaminophen in any 24 hour period.    All opioids tend to cause constipation. Drink plenty of water and eat foods that have a lot of fiber, such as fruits, vegetables, prune juice, apple juice and high  fiber cereal.  Take a laxative if you don t move your bowels at least every other day. Miralax , Milk of Magnesia, Colace , or Senna  can be used to keep you regular.      Remember that you can always come back to the Emergency Department if you are not able to see your regular doctor in the amount of time listed above, if you get any new symptoms, or if there is anything that worries you.          Soft Tissue Contusion  You have a contusion. This is also called a bruise. There is swelling and some bleeding under the skin. This injury generally takes a few days to a few weeks to heal.  During that time, the bruise will typically change in color from reddish, to purple-blue, to greenish-yellow, then to yellow-brown.  Home care    Elevate the injured area to reduce pain and swelling. As much as possible, sit or lie down with the injured area raised about the level of your heart. This is especially important during the first 48 hours.    Ice the injured area to help reduce pain and swelling. Wrap a cold source (ice pack or ice cubes in a plastic bag) in a thin towel. Apply to the bruised area for 20 minutes every 1 to 2 hours the first day. Continue this 3 to 4 times a day until the pain and swelling goes away.    Unless another medicine was prescribed, you can take acetaminophen, ibuprofen, or naproxen to control pain. (If you have chronic liver or kidney disease or ever had a stomach ulcer or gastrointestinal bleeding, talk with your doctor before using these medicines.)  Follow-up care  Follow up with your healthcare provider or our staff as advised. Call if you are not better in 1 to 2 weeks.  When to seek medical advice   Call your healthcare provider right away if you have any of the following:    Increased pain or swelling    Bruise is on an arm or leg and arm or leg becomes cold, blue, numb or tingly    Signs of infection: Warmth, drainage, or increased redness or pain around the contusion    Inability to move  the injured area or body part     Bruise is near your eye and you have problems with your eyesight or eye     Frequent bruising for unknown reasons  Date Last Reviewed: 5/1/2017 2000-2017 [employer] [address] [place] [address], [place] [address]. All rights reserved. This information is not intended as a substitute for professional medical care. Always follow your healthcare professional's instructions.      1.  Tylenol 500 mg tablets, can take 1-2 tablets three times a day  2.  1/2 tablet percocet three times a day as needed for pain  3.  Ice as needed to leg  4.  Return if worse  5.  Follow up with primary MD    Your next 10 appointments already scheduled     Jul 25, 2018 12:20 PM CDT   LAB with [facility]   [facility] ([facility])    [address] [place] [id number] [phone]           Please do not eat 10-12 hours before your appointment if you are coming in fasting for labs on lipids, cholesterol, or glucose (sugar). This does not apply to pregnant women. Water, hot tea and black coffee (with nothing added) are okay. Do not drink other fluids, diet soda or chew gum.            Jul 25, 2018  1:10 PM CDT   Core Return with Lolis Davenport PA-C   [facility] ([facility])    [address] [place] [id number] [phone] OPT 2            Sep 11, 2018 11:15 AM CDT   SHORT with Marbin Rouse MD   [facility]    [address] [id number] [phone]              24 Hour Appointment Hotline       To make an appointment at any [facility], call [phone] ([phone]). If you don't have a family doctor or clinic, we will help you find one. [facility] are conveniently located to serve the needs of you and your family.              Review of your medicines      Our records show that you are taking the medicines listed below. If these are incorrect, please call your family doctor or clinic.        Dose / Directions Last dose taken    ASPIRIN PO   Dose:  81 mg        Take 81 mg by mouth every other day   Refills:  0        diclofenac 1 % Gel topical gel   Commonly known as:  VOLTAREN   Quantity:  100 g        Apply 2 grams to painful areas up to twice a day   Refills:  3        digoxin 125 MCG tablet   Commonly known as:  LANOXIN   Dose:  125 mcg   Quantity:  45 tablet        Take 1 tablet (125 mcg) by mouth every other day As of 4/10/18   Refills:  4        furosemide 20 MG tablet   Commonly known as:  LASIX   Quantity:  180 tablet        Take 2 pills alternating with 1 pill every other day.   Refills:  3        metoprolol succinate 200 MG 24 hr tablet   Commonly known as:  TOPROL-XL   Dose:  200 mg   Quantity:  90 tablet        Take 1 tablet (200 mg) by mouth daily   Refills:  4        oxyCODONE-acetaminophen 5-325 MG per tablet   Commonly known as:  PERCOCET   Dose:  0.5 tablet   Quantity:  30 tablet        Take 0.5 tablets by mouth every morning   Refills:  0        simvastatin 20 MG tablet   Commonly known as:  ZOCOR   Quantity:  45 tablet        TAKE HALF TABLET BY MOUTH AT BEDTIME   Refills:  3        sodium chloride 0.65 % nasal spray   Commonly known as:  OCEAN   Dose:  1 spray        Spray 1 spray into both nostrils 3 times daily as needed for congestion   Refills:  0        VITAMIN B 12 PO   Dose:  1 tablet        Take 1 tablet by mouth daily   Refills:  0        VITAMIN D (CHOLECALCIFEROL) PO   Dose:  1000 Units        Take 1,000 Units by mouth daily.   Refills:  0                Procedures and tests performed during your visit     Ankle XR, G/E 3 views, right    CT Head w/o Contrast    XR Knee Left 3 Views      Orders Needing Specimen Collection     None      Pending Results     Date and Time Order Name Status Description     7/20/2018 1653 CT Head w/o Contrast Preliminary             Pending Culture Results     No orders found from 7/18/2018 to 7/21/2018.            Pending Results Instructions     If you had any lab results that were not finalized at the time of your Discharge, you can call the ED Lab Result RN at [phone]. You will be contacted by this team for any positive Lab results or changes in treatment. The nurses are available 7 days a week from 10A to 6:30P.  You can leave a message 24 hours per day and they will return your call.        Test Results From Your Hospital Stay        7/20/2018  5:34 PM      Narrative     CT SCAN OF THE HEAD WITHOUT CONTRAST   7/20/2018 5:27 PM     HISTORY: Trauma, bruising.    TECHNIQUE:  Axial images of the head and coronal reformations without  IV contrast material.  Radiation dose for this scan was reduced using  automated exposure control, adjustment of the mA and/or kV according  to patient size, or iterative reconstruction technique.    COMPARISON: None.    FINDINGS: Moderate cerebral atrophy is present along with some mild  cerebellar atrophy. Patchy white matter changes are seen in both  hemispheres without mass effect. There is no evidence for intracranial  hemorrhage, mass effect, acute infarct, or skull fracture. There is  some calcification along the left tentorium. This is felt to be  benign. The mastoid air cells are clear. Visualized paranasal sinuses  are clear. Vascular calcifications are noted.        Impression     IMPRESSION: Chronic changes. No evidence for intracranial hemorrhage  or any acute process.         7/20/2018  5:34 PM      Narrative     LEFT KNEE THREE VIEWS  7/20/2018 5:19 PM     HISTORY: Fall, left knee pain, difficult bearing weight.     COMPARISON: None.        Impression     IMPRESSION: There is some mild-to-moderate medial compartment and  patellofemoral joint space narrowing. There is also a small joint  effusion. Extensive vascular calcifications  noted. There is no  evidence for fracture.    PRISCILLA ROWLEY MD         7/20/2018  5:34 PM      Narrative     RIGHT ANKLE THREE VIEWS   7/20/2018 5:19 PM     HISTORY: Fall and bruising, pain.     COMPARISON: None.        Impression     IMPRESSION: Some lateral soft tissue swelling is present. There is no  evidence for acute fracture. There are two small curvilinear densities  inferior to the medial malleolus on the oblique view which are  presumably due to old trauma. Vascular calcifications noted.    PRISCILLA ROWLEY MD                Clinical Quality Measure: Blood Pressure Screening     Your blood pressure was checked while you were in the emergency department today. The last reading we obtained was  BP: 155/63 . Please read the guidelines below about what these numbers mean and what you should do about them.  If your systolic blood pressure (the top number) is less than 120 and your diastolic blood pressure (the bottom number) is less than 80, then your blood pressure is normal. There is nothing more that you need to do about it.  If your systolic blood pressure (the top number) is 120-139 or your diastolic blood pressure (the bottom number) is 80-89, your blood pressure may be higher than it should be. You should have your blood pressure rechecked within a year by a primary care provider.  If your systolic blood pressure (the top number) is 140 or greater or your diastolic blood pressure (the bottom number) is 90 or greater, you may have high blood pressure. High blood pressure is treatable, but if left untreated over time it can put you at risk for heart attack, stroke, or kidney failure. You should have your blood pressure rechecked by a primary care provider within the next 4 weeks.  If your provider in the emergency department today gave you specific instructions to follow-up with your doctor or provider even sooner than that, you should follow that instruction and not wait for up to 4 weeks for your follow-up  visit.        Thank you for choosing [facility]       Thank you for choosing [facility] for your care. Our goal is always to provide you with excellent care. Hearing back from our patients is one way we can continue to improve our services. Please take a few minutes to complete the written survey that you may receive in the mail after you visit with us. Thank you!        Care EveryWhere ID     This is your Care EveryWhere ID. This could be used by other organizations to access your [facility] medical records  [id number]        Equal Access to Services     KITTY SALINAS : Valdemar Lobo, waaxda lujayeadaha, qaybta kaalmada leah, renee de luna . So [facility] [phone].    ATENCIÓN: Si habla español, tiene a salmon disposición servicios gratuitos de asistencia lingüística. Leo al [phone].    We comply with applicable federal civil rights laws and [place] laws. We do not discriminate on the basis of race, color, national origin, age, disability, sex, sexual orientation, or gender identity.            After Visit Summary       This is your record. Keep this with you and show to your community pharmacist(s) and doctor(s) at your next visit.

## 2018-07-20 NOTE — DISCHARGE INSTRUCTIONS
Discharge Instructions  Head Injury    You have been seen today for a head injury. You were checked for serious problems, like bleeding on the brain, but these problems cannot always be found right away.  Due to this risk, you should not be alone for 24 hours after your injury.  Follow up with your regular physician in 2-3 days. If you are taking a blood thinner, such as aspirin, Pradaxa  (dabigatran), Coumadin  (warfarin), or Plavix  (clopidogrel), you are at especially high risk for immediate or delayed bleeding, and need to re-check with a physician in 24 hours, or sooner if any of the symptoms below happen.     Return to the Emergency Department if:    You are confused, have amnesia, or you are not acting right.    Your headache gets worse or you start to have a really bad headache even with your recommended treatment plan.    You vomit more than once.    You have a convulsion or seizure.    You have trouble walking.    You have weakness or paralysis in an arm or a leg.    You have blood or fluid coming from your ears or nose.    You have new symptoms or anything that worries you.    Sleeping:  It is okay for you to sleep, but someone should wake you up as instructed by your doctor, and someone should check on you at your usual time to wake up.     Activity:    Do not drive for at least 24 hours.    Do not drive if you have dizzy spells or trouble concentrating, or remembering things.    Do not return to any contact sports until cleared by your regular doctor.     Follow-up:  It is very important that you make an appointment with your clinic and go to the appointment.  If you do not follow-up with your regular doctor, it may result in missing an important development which could result in permanent injury or disability and/or lasting pain.  If there is any problem keeping your appointment, call your doctor or return to the Emergency Department.    MORE INFORMATION:    Concussion:  A concussion is a minor head  injury that may cause temporary problems with the way your brain works.  Some symptoms include:  confusion, amnesia, nausea and vomiting, dizziness, fatigue, memory or concentration problems, irritability and sleep problems.    CT Scans: Your evaluation today may have included a CT scan (CAT scan) to look for things like bleeding or a skull fracture (break).  CT scans involve radiation and too many CT scans can cause serious health problems like cancer, especially in children.  Because of this, your doctor may not have ordered a CT scan today if they think you are at low risk for a serious or life threatening problem.    If you were given a prescription for medicine here today, be sure to read all of the information (including the package insert) that comes with your prescription.  This will include important information about the medicine, its side effects, and any warnings that you need to know about.  The pharmacist who fills the prescription can provide more information and answer questions you may have about the medicine.  If you have questions or concerns that the pharmacist cannot address, please call or return to the Emergency Department.     Opioid Medication Information    Pain medications are among the most commonly prescribed medicines, so we are including this information for all our patients. If you did not receive pain medication or get a prescription for pain medicine, you can ignore it.     You may have been given a prescription for an opioid (narcotic) pain medicine and/or have received a pain medicine while here in the Emergency Department. These medicines can make you drowsy or impaired. You must not drive, operate dangerous equipment, or engage in any other dangerous activities while taking these medications. If you drive while taking these medications, you could be arrested for DUI, or driving under the influence. Do not drink any alcohol while you are taking these medications.     Opioid pain  medications can cause addiction. If you have a history of chemical dependency of any type, you are at a higher risk of becoming addicted to pain medications.  Only take these prescribed medications to treat your pain when all other options have been tried. Take it for as short a time and as few doses as possible. Store your pain pills in a secure place, as they are frequently stolen and provide a dangerous opportunity for children or visitors in your house to start abusing these powerful medications. We will not replace any lost or stolen medicine.  As soon as your pain is better, you should flush all your remaining medication.     Many prescription pain medications contain Tylenol  (acetaminophen), including Vicodin , Tylenol #3 , Norco , Lortab , and Percocet .  You should not take any extra pills of Tylenol  if you are using these prescription medications or you can get very sick.  Do not ever take more than 3000 mg of acetaminophen in any 24 hour period.    All opioids tend to cause constipation. Drink plenty of water and eat foods that have a lot of fiber, such as fruits, vegetables, prune juice, apple juice and high fiber cereal.  Take a laxative if you don t move your bowels at least every other day. Miralax , Milk of Magnesia, Colace , or Senna  can be used to keep you regular.      Remember that you can always come back to the Emergency Department if you are not able to see your regular doctor in the amount of time listed above, if you get any new symptoms, or if there is anything that worries you.          Soft Tissue Contusion  You have a contusion. This is also called a bruise. There is swelling and some bleeding under the skin. This injury generally takes a few days to a few weeks to heal.  During that time, the bruise will typically change in color from reddish, to purple-blue, to greenish-yellow, then to yellow-brown.  Home care    Elevate the injured area to reduce pain and swelling. As much as  possible, sit or lie down with the injured area raised about the level of your heart. This is especially important during the first 48 hours.    Ice the injured area to help reduce pain and swelling. Wrap a cold source (ice pack or ice cubes in a plastic bag) in a thin towel. Apply to the bruised area for 20 minutes every 1 to 2 hours the first day. Continue this 3 to 4 times a day until the pain and swelling goes away.    Unless another medicine was prescribed, you can take acetaminophen, ibuprofen, or naproxen to control pain. (If you have chronic liver or kidney disease or ever had a stomach ulcer or gastrointestinal bleeding, talk with your doctor before using these medicines.)  Follow-up care  Follow up with your healthcare provider or our staff as advised. Call if you are not better in 1 to 2 weeks.  When to seek medical advice   Call your healthcare provider right away if you have any of the following:    Increased pain or swelling    Bruise is on an arm or leg and arm or leg becomes cold, blue, numb or tingly    Signs of infection: Warmth, drainage, or increased redness or pain around the contusion    Inability to move the injured area or body part     Bruise is near your eye and you have problems with your eyesight or eye     Frequent bruising for unknown reasons  Date Last Reviewed: 5/1/2017 2000-2017 [employer]. [address] [place] [address]. All rights reserved. This information is not intended as a substitute for professional medical care. Always follow your healthcare professional's instructions.      1.  Tylenol 500 mg tablets, can take 1-2 tablets three times a day  2.  1/2 tablet percocet three times a day as needed for pain  3.  Ice as needed to leg  4.  Return if worse  5.  Follow up with primary MD

## 2018-07-20 NOTE — ED PROVIDER NOTES
"  History     Chief Complaint:  Fall     HPI   Theresa Alves is a 89 year old female who presents to the emergency department today for evaluation of a fall. The patient reports that she was sitting out on the porch on 7/15/18 and forgot her walker. When she got up to return inside she states that she tripped over the leash of her neighbor's dog, causing her to fall. Since then she reports pain and bruising to her forehead, left knee, and right ankle. She notes that the pain in her left knee is so bad that she is unable to walk on it. However, she notes that she is able to minimally maneuver with her walker. The patient also endorses numbness in both feet and hip pain, however these things are chronic for her. She explains that she has been using her half a pill of percocet for her chronic pain to also treat her current pains. The patient also states that she has been putting a cold pack on her knee. She denies any neck pain.     Allergies:  No Known Drug Allergies     Medications:    Aspirin  Voltaren  Lanoxin  Lasix  Toprol  Zocor  Percocet    Past Medical History:    Chronic back pain  Pulmonary fibrosis  Hypertension    Past Surgical History:    Right carpal tunnel release  Right cataract  Hiatal hernia repair    Family History:    Family history unknown as the patient is adopted.     Social History:  The patient was accompanied to the ED by family.  Smoking Status: Never Smoker  Smokeless Tobacco: Never Used  Alcohol Use: Positive  Marital Status:        Review of Systems   Musculoskeletal: Negative for neck pain.        Bruising and pain to head, left knee, right ankle  Chronic hip pain   Neurological: Positive for numbness (chronic).   All other systems reviewed and are negative.    Physical Exam     Patient Vitals for the past 24 hrs:   BP Temp Temp src Pulse Resp SpO2 Height Weight   07/20/18 1639 155/63 97.8  F (36.6  C) Oral 73 16 96 % 1.676 m (5' 6\") 52.6 kg (116 lb)     Physical " Exam  GENERAL: well developed, pleasant  HEAD: atraumatic  EYES: pupils reactive, extraocular muscles intact, conjunctivae normal  ENT:  mucus membranes moist  NECK:  trachea midline, normal range of motion  RESPIRATORY: no tachypnea, breath sounds clear to auscultation   CVS: normal S1/S2, no murmurs, intact distal pulses  ABDOMEN: soft, nontender, nondistention  MUSCULOSKELETAL: no deformities, bruising to left head, left knee and anterior shin, dorsum of right foot, and right ankle  SKIN: warm and dry, no acute rashes or ulceration  NEURO: GCS 15, cranial nerves intact, alert and oriented x3  PSYCH:  Mood/affect normal    Emergency Department Course     Imaging:  Radiology findings were communicated with the patient who voiced understanding of the findings.    XR Knee Left 3 Views  There is some mild-to-moderate medial compartment and  patellofemoral joint space narrowing. There is also a small joint  effusion. Extensive vascular calcifications noted. There is no  evidence for fracture.  PRISCILLA ROWLEY MD  Reading per radiology    Ankle XR, G/E 3 views, right  Some lateral soft tissue swelling is present. There is no  evidence for acute fracture. There are two small curvilinear densities  inferior to the medial malleolus on the oblique view which are  presumably due to old trauma. Vascular calcifications noted.  PRISCILLA ROWLEY MD    CT Head w/o Contrast  Chronic changes. No evidence for intracranial hemorrhage  or any acute process.  Reading per radiology    Emergency Department Course:    1640 Nursing notes and vitals reviewed.    1651 I performed an exam of the patient as documented above.     1718 The patient was sent for xrays while in the emergency department, results above.     1720 The patient was sent for a CT while in the emergency department, results above.     1800 I personally reviewed the imaging results with the patient and answered all related questions prior to discharge.    Impression & Plan       Medical Decision Making:  Theresa Alves is a 89 year old female who presents to the emergency department today for evaluation of a fall with increasing pain to her leg. She has predominantly left knee pain as a source of pain. She was able to walk on it for several days, but it is worsening. I do not see any signs of an infection. CT head is unremarkable for subdural or intracranial hemorrhage. Xray of ankle shows some soft tissue swelling but no fracture. Xray of the knee shows a fair amount of arthritis and a small joint effusion but no fracture. We are currently awaiting a road test. I discussed admission vs going home and she would like to go home. She does take a half a percocet daily and does not want anything for pain.     Diagnosis:    ICD-10-CM    1. Closed head injury, initial encounter S09.90XA    2. Contusion of left knee, initial encounter S80.02XA    3. Sprain of right ankle, unspecified ligament, initial encounter S93.401A      Disposition:   The patient is discharged to home.    Discharge Medications:  No discharge medications.    Scribe Disclosure:  I, Prabha Sam, am serving as a scribe at 4:51 PM on 7/20/2018 to document services personally performed by Dick Francis MD based on my observations and the provider's statements to me.     EMERGENCY DEPARTMENT       Dick Francis MD  07/21/18 5232

## 2018-07-20 NOTE — ED AVS SNAPSHOT
Emergency Department    [address] [facility] [address]    Phone:  [phone]    Fax:  [phone]                                       Theresa Alves   MRN: [id number]    Department:   Emergency Department   Date of Visit:  7/20/2018           After Visit Summary Signature Page     I have received my discharge instructions, and my questions have been answered. I have discussed any challenges I see with this plan with the nurse or doctor.    ..........................................................................................................................................  Patient/Patient Representative Signature      ..........................................................................................................................................  Patient Representative Print Name and Relationship to Patient    ..................................................               ................................................  Date                                            Time    ..........................................................................................................................................  Reviewed by Signature/Title    ...................................................              ..............................................  Date                                                            Time

## 2018-07-20 NOTE — ED NOTES
Bed: ED22  Expected date:   Expected time:   Means of arrival:   [facility]5

## 2018-07-23 ENCOUNTER — TELEPHONE (OUTPATIENT)
Dept: FAMILY MEDICINE | Facility: CLINIC | Age: 83
End: 2018-07-23

## 2018-07-23 NOTE — TELEPHONE ENCOUNTER
Patient's niece Ericka called. Reporting pt fell on the porch on the 15th and she was seen at the ER on 7/20. Although the X-rays were clear, she still complains of pain in the left leg closer to the knee. Advised patient to RICE and be seen if symptoms are not getting better. Ericka will talk to the patient and call back for an OV.

## 2018-07-25 ENCOUNTER — OFFICE VISIT (OUTPATIENT)
Dept: CARDIOLOGY | Facility: CLINIC | Age: 83
End: 2018-07-25
Attending: PHYSICIAN ASSISTANT
Payer: COMMERCIAL

## 2018-07-25 VITALS
DIASTOLIC BLOOD PRESSURE: 66 MMHG | BODY MASS INDEX: 21.35 KG/M2 | WEIGHT: 116 LBS | SYSTOLIC BLOOD PRESSURE: 124 MMHG | HEIGHT: 62 IN | HEART RATE: 68 BPM

## 2018-07-25 DIAGNOSIS — I48.91 ATRIAL FIBRILLATION, UNSPECIFIED TYPE (H): ICD-10-CM

## 2018-07-25 DIAGNOSIS — I50.9 CHRONIC CONGESTIVE HEART FAILURE, UNSPECIFIED CONGESTIVE HEART FAILURE TYPE: ICD-10-CM

## 2018-07-25 DIAGNOSIS — I50.30 (HFPEF) HEART FAILURE WITH PRESERVED EJECTION FRACTION (H): ICD-10-CM

## 2018-07-25 DIAGNOSIS — E78.5 HYPERLIPIDEMIA WITH TARGET LDL LESS THAN 130: Chronic | ICD-10-CM

## 2018-07-25 DIAGNOSIS — R06.02 SOB (SHORTNESS OF BREATH): ICD-10-CM

## 2018-07-25 DIAGNOSIS — I10 HYPERTENSION GOAL BP (BLOOD PRESSURE) < 140/80: Primary | ICD-10-CM

## 2018-07-25 LAB
ANION GAP SERPL CALCULATED.3IONS-SCNC: 13 MMOL/L (ref 6–17)
BUN SERPL-MCNC: 31 MG/DL (ref 7–30)
CALCIUM SERPL-MCNC: 9.6 MG/DL (ref 8.5–10.5)
CHLORIDE SERPL-SCNC: 99 MMOL/L (ref 98–107)
CO2 SERPL-SCNC: 27 MMOL/L (ref 23–29)
CREAT SERPL-MCNC: 1.6 MG/DL (ref 0.7–1.3)
GFR SERPL CREATININE-BSD FRML MDRD: 30 ML/MIN/1.7M2
GLUCOSE SERPL-MCNC: 122 MG/DL (ref 70–105)
POTASSIUM SERPL-SCNC: 4 MMOL/L (ref 3.5–5.1)
SODIUM SERPL-SCNC: 135 MMOL/L (ref 136–145)

## 2018-07-25 PROCEDURE — 36415 COLL VENOUS BLD VENIPUNCTURE: CPT | Performed by: PHYSICIAN ASSISTANT

## 2018-07-25 PROCEDURE — 80048 BASIC METABOLIC PNL TOTAL CA: CPT | Performed by: PHYSICIAN ASSISTANT

## 2018-07-25 PROCEDURE — 99214 OFFICE O/P EST MOD 30 MIN: CPT | Performed by: PHYSICIAN ASSISTANT

## 2018-07-25 RX ORDER — FUROSEMIDE 40 MG
40 TABLET ORAL DAILY
COMMUNITY
End: 2018-11-05

## 2018-07-25 RX ORDER — ACETAMINOPHEN 500 MG
500 TABLET ORAL DAILY
Status: ON HOLD | COMMUNITY
End: 2018-11-07

## 2018-07-25 NOTE — PATIENT INSTRUCTIONS
Thanks for coming into [facility] today.    We discussed: use ice on your leg 20 minutes three times a day.  You can take 1 aleve twice a day or 1 advil three times a day- but take these as little as possible.    IF the pain isn't getting better by Friday, please go to [facility] or [facility] walk in clinic.      Medication changes:  Continue other medications.      Follow up: with me with labs in about 3 months.      Please call my nurse at [phone] with any questions or concerns.    Scheduling phone number: [phone]  Reminder: Please bring in all current medications, over the counter supplements and vitamin bottles to your next appointment.

## 2018-07-25 NOTE — MR AVS SNAPSHOT
After Visit Summary   7/25/2018    Theresa Alves    MRN: [id number]           Patient Information     Date Of Birth          6/22/1924        Visit Information        Provider Department      7/25/2018 1:10 PM More, Lolis Gonzalez PA-C [facility]   [place]        Today's Diagnoses     Hypertension goal BP (blood pressure) < 140/80    -  1    (HFpEF) heart failure with preserved ejection fraction (H)        Hyperlipidemia with target LDL less than 130        SOB (shortness of breath)        Chronic congestive heart failure, unspecified congestive heart failure type (H)        Atrial fibrillation, unspecified type (H)          Care Instructions    Thanks for coming into [facility] clinic today.    We discussed: use ice on your leg 20 minutes three times a day.  You can take 1 aleve twice a day or 1 advil three times a day- but take these as little as possible.    IF the pain isn't getting better by Friday, please go to [facility] or [facility] walk in clinic.      Medication changes:  Continue other medications.      Follow up: with me with labs in about 3 months.      Please call my nurse at [phone] with any questions or concerns.    Scheduling phone number: [phone]  Reminder: Please bring in all current medications, over the counter supplements and vitamin bottles to your next appointment.                Follow-ups after your visit        Additional Services     [facility]-Up with [facility]                 Your next 10 appointments already scheduled     Jul 30, 2018  3:00 PM CDT   [facility]    [address] [place] [address] [phone]           Bring a current list of meds and any records pertaining to this visit. For Physicals, please bring immunization records and any  "forms needing to be filled out. Please arrive 10 minutes early to complete paperwork.            Sep 11, 2018 11:15 AM CDT   [facility] MD   [facility]    [address] [place] [id number] [phone]              Future tests that were ordered for you today     Open Future Orders        Priority Expected Expires Ordered    Follow-Up with [facility] Routine 10/23/2018 7/25/2019 7/25/2018    Basic metabolic panel Routine 10/23/2018 7/25/2019 7/25/2018            Who to contact     If you have questions or need follow up information about today's clinic visit or your schedule please contact [facility] directly at [phone].  Normal or non-critical lab and imaging results will be communicated to you by MyChart, letter or phone within 4 business days after the clinic has received the results. If you do not hear from us within 7 days, please contact the clinic through MyChart or phone. If you have a critical or abnormal lab result, we will notify you by phone as soon as possible.  Submit refill requests through [email or web address] or call your pharmacy and they will forward the refill request to us. Please allow 3 business days for your refill to be completed.          Additional Information About Your Visit        Care EveryWhere ID     This is your Care EveryWhere ID. This could be used by other organizations to access your Industry medical records  [id number]        Your Vitals Were     Pulse Height Last Period BMI (Body Mass Index)          68 1.562 m (5' 1.5\") (LMP Unknown) 21.56 kg/m2         Blood Pressure from Last 3 Encounters:   07/25/18 124/66   07/20/18 155/63   06/13/18 120/66    Weight from Last 3 Encounters:   07/25/18 52.6 kg (116 lb)   07/20/18 52.6 kg (116 lb)   06/13/18 53.3 kg (117 lb 9.6 oz)              We Performed the Following     " Follow-Up with [facility]          Today's Medication Changes          These changes are accurate as of 7/25/18  1:46 PM.  If you have any questions, ask your nurse or doctor.               These medicines have changed or have updated prescriptions.        Dose/Directions    furosemide 40 MG tablet   Commonly known as:  LASIX   This may have changed:  Another medication with the same name was removed. Continue taking this medication, and follow the directions you see here.   Changed by:  Lolis Davenport PA-C        Dose:  40 mg   Take 40 mg by mouth daily   Refills:  0       simvastatin 20 MG tablet   Commonly known as:  ZOCOR   This may have changed:  additional instructions   Used for:  Hyperlipidemia with target LDL less than 130        TAKE HALF TABLET BY MOUTH AT BEDTIME   Quantity:  45 tablet   Refills:  3                Primary Care Provider Office Phone # Fax #    Marbin Rouse [phone] [address] [facility] [address]        Equal Access to Services     [facility] Center[facility] : Hadii aad ku hadasho Soomaali, waaxda luqadaha, qaybta kaalmada adeegyada, waxay maria teresain haycarolynn steffi de luna . So [facility] [phone].    ATENCIÓN: Si habla español, tiene a salmon disposición servicios gratuitos de asistencia lingüística. Leo al [phone].    We comply with applicable federal civil rights laws and [place] laws. We do not discriminate on the basis of race, color, national origin, age, disability, sex, sexual orientation, or gender identity.            Thank you!     Thank you for choosing [facility]  for your care. Our goal is always to provide you with excellent care. Hearing back from our patients is one way we can continue to improve our services. Please take a few minutes to complete the written survey that you may receive in the mail after your visit with us. Thank you!             Your Updated Medication List -  Protect others around you: Learn how to safely use, store and throw away your medicines at www.disposemymeds.org.          This list is accurate as of 7/25/18  1:46 PM.  Always use your most recent med list.                   Brand Name Dispense Instructions for use Diagnosis    ASPIRIN PO      Take 81 mg by mouth every other day        diclofenac 1 % Gel topical gel    VOLTAREN    100 g    Apply 2 grams to painful areas up to twice a day    Chronic midline low back pain without sciatica       digoxin 125 MCG tablet    LANOXIN    45 tablet    Take 1 tablet (125 mcg) by mouth every other day As of 4/10/18    Atrial fibrillation, unspecified type (H)       furosemide 40 MG tablet    LASIX     Take 40 mg by mouth daily        metoprolol succinate 200 MG 24 hr tablet    TOPROL-XL    90 tablet    Take 1 tablet (200 mg) by mouth daily    Atrial fibrillation, unspecified type (H)       oxyCODONE-acetaminophen 5-325 MG per tablet    PERCOCET    30 tablet    Take 0.5 tablets by mouth every morning    Chronic midline low back pain without sciatica       simvastatin 20 MG tablet    ZOCOR    45 tablet    TAKE HALF TABLET BY MOUTH AT BEDTIME    Hyperlipidemia with target LDL less than 130       sodium chloride 0.65 % nasal spray    OCEAN     Spray 1 spray into both nostrils 3 times daily as needed for congestion    Nasal dryness       TYLENOL 500 MG tablet   Generic drug:  acetaminophen      Take 1,000 mg by mouth 2 times daily        VITAMIN B 12 PO      Take 1 tablet by mouth daily        VITAMIN D (CHOLECALCIFEROL) PO      Take 1,000 Units by mouth daily.

## 2018-07-25 NOTE — LETTER
2018      Marbin Rouse MD  [address]  [facility] [address]      RE: Theresa Alves       Dear Colleague,    I had the pleasure of seeing Theresa Alves in the [facility].    Service Date: 2018      PRIMARY CARDIOLOGIST:  Dr. Walker.      REASON FOR VISIT:  AFib, HFpEF followup.      HISTORY OF PRESENT ILLNESS:  Ms. Alves is a pleasant 89-year-old woman with past medical history significant for the followin.  Pulmonary fibrosis.   2.  Hypertension.   3.  Stage III CKD.   4.  Kyphoscoliosis.   5.  Persistent atrial fibrillation.   6.  HFpEF.        She was hospitalized in March for difficult-to-control AFib and she was retaining fluid secondary to that.  Eventually, her rates were controlled on Toprol 200 mg daily and digoxin 125 mcg every other day.  She was started on Eliquis but had 1 fairly significant nosebleed and was very distressed about this.  She was eventually seen by Palliative and decided to go back down to 81 mg daily.  Unfortunately, about 10 days ago she tripped over her neighbor's dog's leash and took a fall.  She made it a couple of days, and then the pain progressed and she went to the ER.  She did have imaging of her left knee and her right ankle and a CT of her head.  CT of her head was negative for bleeding.  There was no fracture identified on her leg.  She tells me that since then, the pain has moved down her left knee into her left shin.  She has a hard time walking on it.  Today she woke up, and she has felt very shaky and tired.  All she has had to eat was a Boost drink, where she typically will have an egg sandwich plus some juice.  She said her breathing is stable, which is overall poor.  She can walk from her bed to her chair, although she is short of breath with this.  She denies orthopnea or PND.  She does note that ice helps her pain of her leg.  She also took half of a Percocet.  This is helpful, but it makes  her feel loopy.  She has been taking some Extra-Strength Tylenol, and this has not been helpful.  She has been told to avoid NSAIDs, so she has not taken them.      SOCIAL HISTORY:  She lives in an independent apartment at the [facility].  There is a unit available at [facility], which has assisted living that she is considering.  She has a poor appetite.  She drinks 1 vodka-tonic nightly.  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  She comes in today with her niece, Ericka.  She does not have any children.      PHYSICAL EXAMINATION:   GENERAL:  Elderly, frail-appearing woman but in no acute distress.    HEENT:  She has got ecchymosis on her left forehead and a goose egg.  This is in the healing stages and yellow-green.  She is otherwise normocephalic.   HEART:  Irregularly irregular with a 2/6 systolic murmur heard best at the lower left sternal border.   LUNGS:  Diminished with some dry rales heard throughout both lobes but minimal today.   EXTREMITIES:  Have 1+ edema just in her feet.  Her left shin has ecchymosis covering the proximal 2/3.  Her left knee is bruised.  Her right knee is skinned.  These all appear to be in states of healing.  She is minimally tender.  There is no erythema.  She has 2+ dorsalis pedis and posterior tibialis pulses on the left.  She has good cap refill on her toes.   NECK:  Veins are flat at 90 degrees.      ASSESSMENT AND PLAN:   1.  Recent fall with ongoing left knee and shin pain that she says has progressed.  I did ask her to put ice on her leg, and I think for a very limited short term she could consider taking 1 Aleve twice a day or 1 Advil 3 times a day.  I told her I would prefer if she tries ice.  I also said if she does not continue to improve, I would suggest she visit either a walk-in clinic at one of the local orthopedic practices.  She does have good sensation and circulation, so there is no emergency at this time.   2.  Persistent atrial fibrillation with  associated heart failure with preserved EF.  At one point we tried to put her on 40 mg of Lasix alternating with 20 mg daily.  Her feet got too swollen with this, and we will continue her on 40 mg daily.  We discussed that her creatinine is elevated at 1.6, BUN of 31, and we agreed that we are going for more of a palliative approach and we want her symptoms to be controlled instead of watching her numbers too much, and we will continue on that.  She has a CHADS-VASc score of 4 for age, gender and hypertension, and she has been educated at length, risks and benefits of stroke versus bleeding, and she has elected to be on aspirin 81 mg daily.  Given her extensive bruising with that alone, I think this appears to be a wise decision.   3.  Hypertension, well controlled.   4.  Pulmonary fibrosis, which I suspect is the biggest cause of her underlying shortness of breath.  This is an incurable disease.  She did see Dr. Leiva's clinic for palliative, but has not wish to pursue this further.  Will defer to Dr. Rouse      Thank you for allowing me to participate in this patient's care.  I will see her back in about 3 months in [facility], sooner with concerns.      Lolis Maier PA-C      cc:   Marbin Rouse MD    [address]  [address]         LOLIS MAIER PA-C             D: 2018   T: 2018   MT: JENNIFER      Name:     YANELI CHUA   MRN:      [id number]        Account:      [id number]   :      1924           Service Date: 2018      Document: [id number]         Outpatient Encounter Prescriptions as of 2018   Medication Sig Dispense Refill     acetaminophen (TYLENOL) 500 MG tablet Take 1,000 mg by mouth 2 times daily       ASPIRIN PO Take 81 mg by mouth every other day        Cyanocobalamin (VITAMIN B 12 PO) Take 1 tablet by mouth daily        digoxin (LANOXIN) 125 MCG tablet Take 1 tablet (125 mcg)  by mouth every other day As of 4/10/18 45 tablet 4     furosemide (LASIX) 40 MG tablet Take 40 mg by mouth daily       metoprolol succinate (TOPROL-XL) 200 MG 24 hr tablet Take 1 tablet (200 mg) by mouth daily 90 tablet 4     oxyCODONE-acetaminophen (PERCOCET) 5-325 MG per tablet Take 0.5 tablets by mouth every morning 30 tablet 0     simvastatin (ZOCOR) 20 MG tablet TAKE HALF TABLET BY MOUTH AT BEDTIME (Patient taking differently: Pt take one tab every other day.) 45 tablet 3     VITAMIN D, CHOLECALCIFEROL, PO Take 1,000 Units by mouth daily.         diclofenac (VOLTAREN) 1 % GEL topical gel Apply 2 grams to painful areas up to twice a day (Patient not taking: Reported on 7/25/2018) 100 g 3     sodium chloride (OCEAN) 0.65 % nasal spray Spray 1 spray into both nostrils 3 times daily as needed for congestion (Patient not taking: Reported on 7/25/2018)  0     [DISCONTINUED] furosemide (LASIX) 20 MG tablet Take 2 pills alternating with 1 pill every other day. (Patient not taking: Reported on 7/25/2018) 180 tablet 3     No facility-administered encounter medications on file as of 7/25/2018.        Again, thank you for allowing me to participate in the care of your patient.      Sincerely,    Lolis Davenport PA-C     [facility]

## 2018-07-25 NOTE — PROGRESS NOTES
[id number]  HPI and Plan:   See dictation    Orders this Visit:  Orders Placed This Encounter   Procedures     Basic metabolic panel     [facility]-Up with [facility] - [facility] visit     Orders Placed This Encounter   Medications     furosemide (LASIX) 40 MG tablet     Sig: Take 40 mg by mouth daily     acetaminophen (TYLENOL) 500 MG tablet     Sig: Take 1,000 mg by mouth 2 times daily     Medications Discontinued During This Encounter   Medication Reason     furosemide (LASIX) 20 MG tablet      furosemide (LASIX) 20 MG tablet          Encounter Diagnoses   Name Primary?     (HFpEF) heart failure with preserved ejection fraction (H)      Hypertension goal BP (blood pressure) < 140/80 Yes     Hyperlipidemia with target LDL less than 130      SOB (shortness of breath)      Chronic congestive heart failure, unspecified congestive heart failure type (H)      Atrial fibrillation, unspecified type (H)        CURRENT MEDICATIONS:  Current Outpatient Prescriptions   Medication Sig Dispense Refill     acetaminophen (TYLENOL) 500 MG tablet Take 1,000 mg by mouth 2 times daily       ASPIRIN PO Take 81 mg by mouth every other day        Cyanocobalamin (VITAMIN B 12 PO) Take 1 tablet by mouth daily        digoxin (LANOXIN) 125 MCG tablet Take 1 tablet (125 mcg) by mouth every other day As of 4/10/18 45 tablet 4     furosemide (LASIX) 40 MG tablet Take 40 mg by mouth daily       metoprolol succinate (TOPROL-XL) 200 MG 24 hr tablet Take 1 tablet (200 mg) by mouth daily 90 tablet 4     oxyCODONE-acetaminophen (PERCOCET) 5-325 MG per tablet Take 0.5 tablets by mouth every morning 30 tablet 0     simvastatin (ZOCOR) 20 MG tablet TAKE HALF TABLET BY MOUTH AT BEDTIME (Patient taking differently: Pt take one tab every other day.) 45 tablet 3     VITAMIN D, CHOLECALCIFEROL, PO Take 1,000 Units by mouth daily.         diclofenac (VOLTAREN) 1 % GEL topical gel Apply 2 grams to painful areas up to twice a day (Patient not taking: Reported on  7/25/2018) 100 g 3     sodium chloride (OCEAN) 0.65 % nasal spray Spray 1 spray into both nostrils 3 times daily as needed for congestion (Patient not taking: Reported on 7/25/2018)  0     [DISCONTINUED] furosemide (LASIX) 20 MG tablet Take 2 pills alternating with 1 pill every other day. (Patient not taking: Reported on 7/25/2018) 180 tablet 3       ALLERGIES   No Known Allergies    PAST MEDICAL HISTORY:  Past Medical History:   Diagnosis Date     Chronic back pain      Pulmonary fibrosis (H)      Unspecified essential hypertension     Essential hypertension       PAST SURGICAL HISTORY:  Past Surgical History:   Procedure Laterality Date     CARPAL TUNNEL RELEASE RT/LT  1990's    R     CATARACT IOL, RT/LT  5/03    R     HERNIA REPAIR, INGUINAL RT/LT  1954    R     hiatal hernia repair  6/10    large paraesophageal hiatal hernia; Nissen fundoplication        FAMILY HISTORY:  Family History   Problem Relation Age of Onset     Unknown/Adopted Mother      Unknown/Adopted Father      Diabetes No family hx of      Coronary Artery Disease No family hx of      Hypertension No family hx of      Hyperlipidemia No family hx of      Cerebrovascular Disease No family hx of      Breast Cancer No family hx of      Colon Cancer No family hx of      Prostate Cancer No family hx of      Other Cancer No family hx of      Depression No family hx of      Anxiety Disorder No family hx of      Mental Illness No family hx of      Substance Abuse No family hx of      Anesthesia Reaction No family hx of      Asthma No family hx of      Osteoperosis No family hx of      Genetic Disorder No family hx of      Thyroid Disease No family hx of      Obesity No family hx of        SOCIAL HISTORY:  Social History     Social History     Marital status:      Spouse name: N/A     Number of children: 0     Years of education: N/A     Occupational History     worked at [employer] for 40 yrs Retired     Social History Main Topics     Smoking status:  "Never Smoker     Smokeless tobacco: Never Used     Alcohol use Yes      Comment: daily-vodka tonic x1     Drug use: No     Sexual activity: No     Other Topics Concern     None     Social History Narrative       Review of Systems:  Skin:  Positive for     Eyes:  Positive for glasses  ENT:  Negative    Respiratory:  Positive for cough;dyspnea on exertion  Cardiovascular:    fatigue;Positive for  Gastroenterology: Positive for constipation  Genitourinary:  Negative    Musculoskeletal:  Positive for back pain;arthritis  Neurologic:  Positive for numbness or tingling of feet  Psychiatric:  Negative    Heme/Lymph/Imm:  Negative    Endocrine:  Negative      Physical Exam:  Vitals: /66  Pulse 68  Ht 1.562 m (5' 1.5\")  Wt 52.6 kg (116 lb)  LMP  (LMP Unknown)  BMI 21.56 kg/m2   Please refer to dictation for physical exam    Recent Lab Results:  LIPID RESULTS:  Lab Results   Component Value Date    CHOL 142 11/28/2017    HDL 69 11/28/2017    LDL 52 11/28/2017    TRIG 107 11/28/2017    CHOLHDLRATIO 2.7 01/10/2014       LIVER ENZYME RESULTS:  Lab Results   Component Value Date    AST 24 03/18/2018    ALT 27 03/18/2018       CBC RESULTS:  Lab Results   Component Value Date    WBC 10.2 06/05/2018    RBC 3.87 06/05/2018    HGB 13.4 06/05/2018    HCT 40.3 06/05/2018     (H) 06/05/2018    MCH 34.6 (H) 06/05/2018    MCHC 33.3 06/05/2018    RDW 14.5 06/05/2018     06/05/2018       BMP RESULTS:  Lab Results   Component Value Date     (L) 07/25/2018    POTASSIUM 4.0 07/25/2018    CHLORIDE 99 07/25/2018    CO2 27 07/25/2018    ANIONGAP 13 07/25/2018     (H) 07/25/2018    BUN 31 (H) 07/25/2018    CR 1.60 (H) 07/25/2018    GFRESTIMATED 30 (L) 07/25/2018    GFRESTBLACK 36 (L) 07/25/2018    PAWEL 9.6 07/25/2018        A1C RESULTS:  No results found for: A1C    INR RESULTS:  No results found for: INR        CC  Lolis Davenport PA-C  [address]  [place] [id number]      "

## 2018-07-25 NOTE — LETTER
7/25/2018    Marbin Rouse MD  [address]  [facility] [address]    RE: Theresa Alves       Dear Colleague,    I had the pleasure of seeing Theresa Alves in the [facility].    [id number]  HPI and Plan:   See dictation    Orders this Visit:  Orders Placed This Encounter   Procedures     Basic metabolic panel     Follow-Up with [facility] - [facility] visit     Orders Placed This Encounter   Medications     furosemide (LASIX) 40 MG tablet     Sig: Take 40 mg by mouth daily     acetaminophen (TYLENOL) 500 MG tablet     Sig: Take 1,000 mg by mouth 2 times daily     Medications Discontinued During This Encounter   Medication Reason     furosemide (LASIX) 20 MG tablet      furosemide (LASIX) 20 MG tablet          Encounter Diagnoses   Name Primary?     (HFpEF) heart failure with preserved ejection fraction (H)      Hypertension goal BP (blood pressure) < 140/80 Yes     Hyperlipidemia with target LDL less than 130      SOB (shortness of breath)      Chronic congestive heart failure, unspecified congestive heart failure type (H)      Atrial fibrillation, unspecified type (H)        CURRENT MEDICATIONS:  Current Outpatient Prescriptions   Medication Sig Dispense Refill     acetaminophen (TYLENOL) 500 MG tablet Take 1,000 mg by mouth 2 times daily       ASPIRIN PO Take 81 mg by mouth every other day        Cyanocobalamin (VITAMIN B 12 PO) Take 1 tablet by mouth daily        digoxin (LANOXIN) 125 MCG tablet Take 1 tablet (125 mcg) by mouth every other day As of 4/10/18 45 tablet 4     furosemide (LASIX) 40 MG tablet Take 40 mg by mouth daily       metoprolol succinate (TOPROL-XL) 200 MG 24 hr tablet Take 1 tablet (200 mg) by mouth daily 90 tablet 4     oxyCODONE-acetaminophen (PERCOCET) 5-325 MG per tablet Take 0.5 tablets by mouth every morning 30 tablet 0     simvastatin (ZOCOR) 20 MG tablet TAKE HALF TABLET BY MOUTH AT BEDTIME (Patient taking differently: Pt take one tab every  other day.) 45 tablet 3     VITAMIN D, CHOLECALCIFEROL, PO Take 1,000 Units by mouth daily.         diclofenac (VOLTAREN) 1 % GEL topical gel Apply 2 grams to painful areas up to twice a day (Patient not taking: Reported on 7/25/2018) 100 g 3     sodium chloride (OCEAN) 0.65 % nasal spray Spray 1 spray into both nostrils 3 times daily as needed for congestion (Patient not taking: Reported on 7/25/2018)  0     [DISCONTINUED] furosemide (LASIX) 20 MG tablet Take 2 pills alternating with 1 pill every other day. (Patient not taking: Reported on 7/25/2018) 180 tablet 3       ALLERGIES   No Known Allergies    PAST MEDICAL HISTORY:  Past Medical History:   Diagnosis Date     Chronic back pain      Pulmonary fibrosis (H)      Unspecified essential hypertension     Essential hypertension       PAST SURGICAL HISTORY:  Past Surgical History:   Procedure Laterality Date     CARPAL TUNNEL RELEASE RT/LT  1990's    R     CATARACT IOL, RT/LT  5/03    R     HERNIA REPAIR, INGUINAL RT/LT  1954    R     hiatal hernia repair  6/10    large paraesophageal hiatal hernia; Nissen fundoplication        FAMILY HISTORY:  Family History   Problem Relation Age of Onset     Unknown/Adopted Mother      Unknown/Adopted Father      Diabetes No family hx of      Coronary Artery Disease No family hx of      Hypertension No family hx of      Hyperlipidemia No family hx of      Cerebrovascular Disease No family hx of      Breast Cancer No family hx of      Colon Cancer No family hx of      Prostate Cancer No family hx of      Other Cancer No family hx of      Depression No family hx of      Anxiety Disorder No family hx of      Mental Illness No family hx of      Substance Abuse No family hx of      Anesthesia Reaction No family hx of      Asthma No family hx of      Osteoperosis No family hx of      Genetic Disorder No family hx of      Thyroid Disease No family hx of      Obesity No family hx of        SOCIAL HISTORY:  Social History     Social  "History     Marital status:      Spouse name: N/A     Number of children: 0     Years of education: N/A     Occupational History     worked at [employer] for 40 yrs Retired     Social History Main Topics     Smoking status: Never Smoker     Smokeless tobacco: Never Used     Alcohol use Yes      Comment: daily-vodka tonic x1     Drug use: No     Sexual activity: No     Other Topics Concern     None     Social History Narrative       Review of Systems:  Skin:  Positive for     Eyes:  Positive for glasses  ENT:  Negative    Respiratory:  Positive for cough;dyspnea on exertion  Cardiovascular:    fatigue;Positive for  Gastroenterology: Positive for constipation  Genitourinary:  Negative    Musculoskeletal:  Positive for back pain;arthritis  Neurologic:  Positive for numbness or tingling of feet  Psychiatric:  Negative    Heme/Lymph/Imm:  Negative    Endocrine:  Negative      Physical Exam:  Vitals: /66  Pulse 68  Ht 1.562 m (5' 1.5\")  Wt 52.6 kg (116 lb)  LMP  (LMP Unknown)  BMI 21.56 kg/m2   Please refer to dictation for physical exam    Recent Lab Results:  LIPID RESULTS:  Lab Results   Component Value Date    CHOL 142 11/28/2017    HDL 69 11/28/2017    LDL 52 11/28/2017    TRIG 107 11/28/2017    CHOLHDLRATIO 2.7 01/10/2014       LIVER ENZYME RESULTS:  Lab Results   Component Value Date    AST 24 03/18/2018    ALT 27 03/18/2018       CBC RESULTS:  Lab Results   Component Value Date    WBC 10.2 06/05/2018    RBC 3.87 06/05/2018    HGB 13.4 06/05/2018    HCT 40.3 06/05/2018     (H) 06/05/2018    MCH 34.6 (H) 06/05/2018    MCHC 33.3 06/05/2018    RDW 14.5 06/05/2018     06/05/2018       BMP RESULTS:  Lab Results   Component Value Date     (L) 07/25/2018    POTASSIUM 4.0 07/25/2018    CHLORIDE 99 07/25/2018    CO2 27 07/25/2018    ANIONGAP 13 07/25/2018     (H) 07/25/2018    BUN 31 (H) 07/25/2018    CR 1.60 (H) 07/25/2018    GFRESTIMATED 30 (L) 07/25/2018    GFRESTBLACK 36 (L) " 07/25/2018    PAWEL 9.6 07/25/2018        A1C RESULTS:  No results found for: A1C    INR RESULTS:  No results found for: INR        CC  Lolis Davenport PA-C  [address]  [place] [id number]        Thank you for allowing me to participate in the care of your patient.      Sincerely,     Lolis Davenport PA-C     [facility]    cc:   Lolis Davenport PA-C  [address]  [place] [id number]

## 2018-07-26 NOTE — PROGRESS NOTES
Service Date: 2018      PRIMARY CARDIOLOGIST:  Dr. Walker.      REASON FOR VISIT:  AFib, HFpEF followup.      HISTORY OF PRESENT ILLNESS:  Ms. Alves is a pleasant 89-year-old woman with past medical history significant for the followin.  Pulmonary fibrosis.   2.  Hypertension.   3.  Stage III CKD.   4.  Kyphoscoliosis.   5.  Persistent atrial fibrillation.   6.  HFpEF.        She was hospitalized in March for difficult-to-control AFib and she was retaining fluid secondary to that.  Eventually, her rates were controlled on Toprol 200 mg daily and digoxin 125 mcg every other day.  She was started on Eliquis but had 1 fairly significant nosebleed and was very distressed about this.  She was eventually seen by Palliative and decided to go back down to 81 mg daily.  Unfortunately, about 10 days ago she tripped over her neighbor's dog's leash and took a fall.  She made it a couple of days, and then the pain progressed and she went to the ER.  She did have imaging of her left knee and her right ankle and a CT of her head.  CT of her head was negative for bleeding.  There was no fracture identified on her leg.  She tells me that since then, the pain has moved down her left knee into her left shin.  She has a hard time walking on it.  Today she woke up, and she has felt very shaky and tired.  All she has had to eat was a Boost drink, where she typically will have an egg sandwich plus some juice.  She said her breathing is stable, which is overall poor.  She can walk from her bed to her chair, although she is short of breath with this.  She denies orthopnea or PND.  She does note that ice helps her pain of her leg.  She also took half of a Percocet.  This is helpful, but it makes her feel loopy.  She has been taking some Extra-Strength Tylenol, and this has not been helpful.  She has been told to avoid NSAIDs, so she has not taken them.      SOCIAL HISTORY:  She lives in an independent apartment at [facility] in  [place].  There is a unit available at [facility], which has assisted living that she is considering.  She has a poor appetite.  She drinks 1 vodka-tonic nightly.  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  She comes in today with her niece, Ericka.  She does not have any children.      PHYSICAL EXAMINATION:   GENERAL:  Elderly, frail-appearing woman but in no acute distress.    HEENT:  She has got ecchymosis on her left forehead and a goose egg.  This is in the healing stages and yellow-green.  She is otherwise normocephalic.   HEART:  Irregularly irregular with a 2/6 systolic murmur heard best at the lower left sternal border.   LUNGS:  Diminished with some dry rales heard throughout both lobes but minimal today.   EXTREMITIES:  Have 1+ edema just in her feet.  Her left shin has ecchymosis covering the proximal 2/3.  Her left knee is bruised.  Her right knee is skinned.  These all appear to be in states of healing.  She is minimally tender.  There is no erythema.  She has 2+ dorsalis pedis and posterior tibialis pulses on the left.  She has good cap refill on her toes.   NECK:  Veins are flat at 90 degrees.      ASSESSMENT AND PLAN:   1.  Recent fall with ongoing left knee and shin pain that she says has progressed.  I did ask her to put ice on her leg, and I think for a very limited short term she could consider taking 1 Aleve twice a day or 1 Advil 3 times a day.  I told her I would prefer if she tries ice.  I also said if she does not continue to improve, I would suggest she visit either a walk-in clinic at one of the local orthopedic practices.  She does have good sensation and circulation, so there is no emergency at this time.   2.  Persistent atrial fibrillation with associated heart failure with preserved EF.  At one point we tried to put her on 40 mg of Lasix alternating with 20 mg daily.  Her feet got too swollen with this, and we will continue her on 40 mg daily.  We discussed that her creatinine  is elevated at 1.6, BUN of 31, and we agreed that we are going for more of a palliative approach and we want her symptoms to be controlled instead of watching her numbers too much, and we will continue on that.  She has a CHADS-VASc score of 4 for age, gender and hypertension, and she has been educated at length, risks and benefits of stroke versus bleeding, and she has elected to be on aspirin 81 mg daily.  Given her extensive bruising with that alone, I think this appears to be a wise decision.   3.  Hypertension, well controlled.   4.  Pulmonary fibrosis, which I suspect is the biggest cause of her underlying shortness of breath.  This is an incurable disease.  She did see Dr. Leiva's clinic for palliative, but has not wish to pursue this further.  Will defer to Dr. Rouse      Thank you for allowing me to participate in this patient's care.  I will see her back in about 3 months in [facility], sooner with concerns.      Lolis Maier PA-C      cc:   Marbin Rouse MD    [address]  [id number]         LOLIS MAIER PA-C             D: 2018   T: 2018   MT: JENNIFER      Name:     YANELI CHUA   MRN:      [id number]        Account:      [id number]   :      1924           Service Date: 2018      Document: [id number]

## 2018-07-30 ENCOUNTER — RADIANT APPOINTMENT (OUTPATIENT)
Dept: GENERAL RADIOLOGY | Facility: CLINIC | Age: 83
End: 2018-07-30
Attending: INTERNAL MEDICINE
Payer: COMMERCIAL

## 2018-07-30 ENCOUNTER — OFFICE VISIT (OUTPATIENT)
Dept: FAMILY MEDICINE | Facility: CLINIC | Age: 83
End: 2018-07-30
Payer: COMMERCIAL

## 2018-07-30 VITALS
BODY MASS INDEX: 21.35 KG/M2 | DIASTOLIC BLOOD PRESSURE: 80 MMHG | WEIGHT: 116 LBS | SYSTOLIC BLOOD PRESSURE: 140 MMHG | TEMPERATURE: 97.9 F | OXYGEN SATURATION: 95 % | HEIGHT: 62 IN | HEART RATE: 67 BPM | RESPIRATION RATE: 20 BRPM

## 2018-07-30 DIAGNOSIS — S80.12XD CONTUSION OF LEFT LEG, SUBSEQUENT ENCOUNTER: Primary | ICD-10-CM

## 2018-07-30 DIAGNOSIS — S80.12XD CONTUSION OF LEFT LEG, SUBSEQUENT ENCOUNTER: ICD-10-CM

## 2018-07-30 PROCEDURE — 99213 OFFICE O/P EST LOW 20 MIN: CPT | Performed by: INTERNAL MEDICINE

## 2018-07-30 PROCEDURE — 73590 X-RAY EXAM OF LOWER LEG: CPT | Mod: LT

## 2018-07-30 NOTE — LETTER
July 30, 2018      Theresa Alves  [address]   [facility] [address]        To Whom It May Concern:           Due to multiple health issues and complications, needs to move out of her current living situation as soon as possible, into an assisted living facility.      If you have any questions or concerns, please call the clinic at the number listed above.       Sincerely,        Marbin Rouse MD

## 2018-07-30 NOTE — MR AVS SNAPSHOT
After Visit Summary   7/30/2018    Theresa Alves    MRN: [id number]           Patient Information     Date Of Birth          6/22/1924        Visit Information        Provider Department      7/30/2018 3:00 PM Marbin Rouse MD [facility] [place]        Today's Diagnoses     Contusion of left leg, subsequent encounter    -  1       Follow-ups after your visit        Your next 10 appointments already scheduled     Sep 11, 2018 11:15 AM CDT   SHORT with Marbin Rouse MD   [facility] ([facility]    [address] [facility] [address]  [facility] [address]   [phone]            Oct 15, 2018  1:30 PM CDT   LAB with [facility]   [facility] ([facility])    [address] [place] [id number] [phone]           Please do not eat 10-12 hours before your appointment if you are coming in fasting for labs on lipids, cholesterol, or glucose (sugar). This does not apply to pregnant women. Water, hot tea and black coffee (with nothing added) are okay. Do not drink other fluids, diet soda or chew gum.            Oct 15, 2018  2:30 PM CDT   Core Return with Lolis Davenport PA-C   [facility] ([facility])    [address] [place] [id number] [phone] OPT 2              Who to contact     If you have questions or need follow up information about today's clinic visit or your schedule please contact [facility] directly at [phone].  Normal or non-critical lab and imaging results will be communicated to you by MyChart, letter or phone within 4 business days after the clinic has received the results. If you do not hear from us within 7 days, please contact the clinic through MyChart or phone. If you have a critical or abnormal lab  "result, we will notify you by phone as soon as possible.  Submit refill requests through W4 or call your pharmacy and they will forward the refill request to us. Please allow 3 business days for your refill to be completed.          Additional Information About Your Visit        Care EveryWhere ID     This is your Care EveryWhere ID. This could be used by other organizations to access your [place] medical records  [id number]        Your Vitals Were     Pulse Temperature Respirations Height Last Period Pulse Oximetry    67 97.9  F (36.6  C) 20 5' 1.5\" (1.562 m) (LMP Unknown) 95%    Breastfeeding? BMI (Body Mass Index)                No 21.56 kg/m2           Blood Pressure from Last 3 Encounters:   07/30/18 140/80   07/25/18 124/66   07/20/18 155/63    Weight from Last 3 Encounters:   07/30/18 116 lb (52.6 kg)   07/25/18 116 lb (52.6 kg)   07/20/18 116 lb (52.6 kg)              We Performed the Following     PAF COMPLETED          Today's Medication Changes          These changes are accurate as of 7/30/18  5:49 PM.  If you have any questions, ask your nurse or doctor.               These medicines have changed or have updated prescriptions.        Dose/Directions    simvastatin 20 MG tablet   Commonly known as:  ZOCOR   This may have changed:  additional instructions   Used for:  Hyperlipidemia with target LDL less than 130        TAKE HALF TABLET BY MOUTH AT BEDTIME   Quantity:  45 tablet   Refills:  3                Primary Care Provider Office Phone # Fax #    Marbin Rouse [phone]       [address] [facility] [address]        Equal Access to Services     [facility]: Hadii lata dunlap Soarnoldo, waaxda luqadaha, [place] . So [facility] [phone].    ATENCIÓN: Si habla español, tiene a salmon disposición servicios gratuitos de asistencia lingüística. Llame al [phone].    We comply with applicable federal civil " rights laws and [place] laws. We do not discriminate on the basis of race, color, national origin, age, disability, sex, sexual orientation, or gender identity.            Thank you!     Thank you for choosing [facility]  for your care. Our goal is always to provide you with excellent care. Hearing back from our patients is one way we can continue to improve our services. Please take a few minutes to complete the written survey that you may receive in the mail after your visit with us. Thank you!             Your Updated Medication List - Protect others around you: Learn how to safely use, store and throw away your medicines at www.disposemymeds.org.          This list is accurate as of 7/30/18  5:49 PM.  Always use your most recent med list.                   Brand Name Dispense Instructions for use Diagnosis    ASPIRIN PO      Take 81 mg by mouth every other day        diclofenac 1 % Gel topical gel    VOLTAREN    100 g    Apply 2 grams to painful areas up to twice a day    Chronic midline low back pain without sciatica       digoxin 125 MCG tablet    LANOXIN    45 tablet    Take 1 tablet (125 mcg) by mouth every other day As of 4/10/18    Atrial fibrillation, unspecified type (H)       furosemide 40 MG tablet    LASIX     Take 40 mg by mouth daily        metoprolol succinate 200 MG 24 hr tablet    TOPROL-XL    90 tablet    Take 1 tablet (200 mg) by mouth daily    Atrial fibrillation, unspecified type (H)       oxyCODONE-acetaminophen 5-325 MG per tablet    PERCOCET    30 tablet    Take 0.5 tablets by mouth every morning    Chronic midline low back pain without sciatica       simvastatin 20 MG tablet    ZOCOR    45 tablet    TAKE HALF TABLET BY MOUTH AT BEDTIME    Hyperlipidemia with target LDL less than 130       sodium chloride 0.65 % nasal spray    OCEAN     Spray 1 spray into both nostrils 3 times daily as needed for congestion    Nasal dryness       TYLENOL 500 MG tablet    Generic drug:  acetaminophen      Take 1,000 mg by mouth 2 times daily        VITAMIN B 12 PO      Take 1 tablet by mouth daily        VITAMIN D (CHOLECALCIFEROL) PO      Take 1,000 Units by mouth daily.

## 2018-07-30 NOTE — PROGRESS NOTES
SUBJECTIVE:   Theresa Alves is a 89 year old female who presents to clinic today for the following health issues:      ED/UC Followup:    Facility:  [facility]  Date of visit: 7/20/2018  Reason for visit: 7/15/2018  Current Status: stable, but still having bi-lat leg pain, Left one worse.     Also need to discuss living into assisted living now and needing letter?                       This patient is still having pain, swelling, and ecchymosis the anterior left lower leg from the knee part way down the anterior leg.  We reviewed the emergency room note from July 20, and the left knee x-ray which were negative for fracture.                        for analgesia, she is taking one half tablet Percocet per day, occasionally a second half tablet.             She tried Aleve, but had lots of nausea and stopped right away.              Extra strength Tylenol has not helped much.  Cold packs help.       Walking is even more difficult than it had been.  She arrives today being pushed in a walker.             She has decided she needs to move to assisted living as soon as possible.  She wants me to dictate a letter for her current landlord indicating that she needs to move for health reasons.                 Problem list and histories reviewed & adjusted, as indicated.      Current Outpatient Prescriptions   Medication Sig Dispense Refill     acetaminophen (TYLENOL) 500 MG tablet Take 1,000 mg by mouth 2 times daily       ASPIRIN PO Take 81 mg by mouth every other day        Cyanocobalamin (VITAMIN B 12 PO) Take 1 tablet by mouth daily        digoxin (LANOXIN) 125 MCG tablet Take 1 tablet (125 mcg) by mouth every other day As of 4/10/18 45 tablet 4     furosemide (LASIX) 40 MG tablet Take 40 mg by mouth daily       metoprolol succinate (TOPROL-XL) 200 MG 24 hr tablet Take 1 tablet (200 mg) by mouth daily 90 tablet 4     oxyCODONE-acetaminophen (PERCOCET) 5-325 MG per tablet Take 0.5 tablets by mouth every morning 30 tablet  "0     simvastatin (ZOCOR) 20 MG tablet TAKE HALF TABLET BY MOUTH AT BEDTIME (Patient taking differently: Pt take one tab every other day.) 45 tablet 3     VITAMIN D, CHOLECALCIFEROL, PO Take 1,000 Units by mouth daily.         diclofenac (VOLTAREN) 1 % GEL topical gel Apply 2 grams to painful areas up to twice a day (Patient not taking: Reported on 7/25/2018) 100 g 3     sodium chloride (OCEAN) 0.65 % nasal spray Spray 1 spray into both nostrils 3 times daily as needed for congestion (Patient not taking: Reported on 7/25/2018)  0     BP Readings from Last 3 Encounters:   07/30/18 140/80   07/25/18 124/66   07/20/18 155/63    Wt Readings from Last 3 Encounters:   07/30/18 116 lb (52.6 kg)   07/25/18 116 lb (52.6 kg)   07/20/18 116 lb (52.6 kg)                    Reviewed and updated as needed this visit by clinical staff       Reviewed and updated as needed this visit by Provider         ROS:  CONSTITUTIONAL:NEGATIVE for fever, chills, change in weight  RESP:POSITIVE for dyspnea on exertion  CV: NEGATIVE for chest pain/chest pressure and palpitations  NEURO: POSITIVE for gait disturbance    OBJECTIVE:                                                    /80 (BP Location: Left arm, Patient Position: Chair, Cuff Size: Adult Regular)  Pulse 67  Temp 97.9  F (36.6  C)  Resp 20  Ht 5' 1.5\" (1.562 m)  Wt 116 lb (52.6 kg)  LMP  (LMP Unknown)  SpO2 95%  Breastfeeding? No  BMI 21.56 kg/m2  Body mass index is 21.56 kg/(m^2).  GENERAL APPEARANCE: alert and no distress  CV: irregular rhythm and pitting B/L LE edema to trace  MS: There is ecchymosis and tenderness to palpation the anterior left lower leg, from the knee, half-way down the tibia.  NEURO: gait abnormal     Diagnostic test results:    Recent Results (from the past 24 hour(s))   XR Tibia & Fibula Left 2 Views    Narrative    TIBIA FIBULA LEFT  TWO-THREE VIEWS 7/30/2018 3:53 PM     HISTORY: trauma 10 days ago to left lower leg and left knee; Contusion  of " left leg, subsequent encounter    COMPARISON: None.      Impression    IMPRESSION : There is extensive vascular calcification involving the  leg. Moderate medial compartment narrowing at the knee. No acute  appearing fracture or dislocation.    ARIES BROWN MD          ASSESSMENT/PLAN:                                                        ICD-10-CM    1. Contusion of left leg, subsequent encounter S80.12XD XR Tibia & Fibula Left 2 Views     CANCELED: XR Knee Left 1/2 Views        She can continue to use ice packs, and plain Tylenol, in addition to her low-dose Percocet.        See the letter I typed regarding      her need for assisted living.  Follow up with Provider -as needed    Marbin Rouse MD  [facility]

## 2018-08-12 ENCOUNTER — HOSPITAL ENCOUNTER (EMERGENCY)
Facility: CLINIC | Age: 83
Discharge: HOME | End: 2018-08-13
Attending: EMERGENCY MEDICINE | Admitting: EMERGENCY MEDICINE
Payer: COMMERCIAL

## 2018-08-12 DIAGNOSIS — R04.0 EPISTAXIS: ICD-10-CM

## 2018-08-12 PROCEDURE — 30901 CONTROL OF NOSEBLEED: CPT | Mod: RT

## 2018-08-12 PROCEDURE — 27210182 ZZH KIT NASAL PACKING

## 2018-08-12 PROCEDURE — 99283 EMERGENCY DEPT VISIT LOW MDM: CPT

## 2018-08-12 NOTE — ED AVS SNAPSHOT
Emergency Department    [address] [facility] [address]    Phone:  [phone]    Fax:  [phone]                                       Theresa Alves   MRN: [id number]    Department:   Emergency Department   Date of Visit:  8/12/2018           Patient Information     Date Of Birth          6/22/1924        Your diagnoses for this visit were:     Epistaxis        You were seen by Dixon Millan MD.      Follow-up Information     Follow up with Joanne Novak MD. Schedule an appointment as soon as possible for a visit in 1 week.    Specialty:  Otolaryngology    Why:  As needed    Contact information:    [facility]  [address] [address] [phone]          Follow up with  Emergency Department.    Specialty:  EMERGENCY MEDICINE    Why:  If symptoms worsen    Contact information:    [address] [facility] [address] [phone]        Discharge Instructions         Nosebleed (Adult)    Bleeding from the nose most commonly occurs because of injury or drying and cracking of the inner lining of the nose. Most nosebleeds are because of dry air or nose-picking. They can occur during a common cold or an allergy attack. They can also occur on a very hot day, or from dry air in the winter.  If the bleeding site is found, it may be cauterized. This means it is treated to cause a blood clot to form. This may be done with a chemical, heat, or electricity. If the bleeding continues after the site is cauterized, or if the site cannot be found, packing may be put in your nose. This is to apply pressure and stop the bleeding. The packing may be made of gauze or sponge. A small balloon catheter is sometimes used. These must be removed by your healthcare provider. Some types of packing dissolve on their own. If you are taking blood thinning (anticoagulant) medicine, you may have a blood test.  Home care    If packing was put in your  nose, unless told otherwise, do not pull on it or try to remove it yourself. You will be given an appointment to have it removed. You may also have been given antibiotics to prevent a sinus infection. If so, finish all of the medicine.    Don't blow your nose for 12 hours after the bleeding stops. This will allow a strong blood clot to form. Don't pick your nose. This may restart bleeding.    Don't drink alcohol or hot liquids for the next 2 days. Alcohol or hot liquids in your mouth can dilate blood vessels in your nose. This can cause bleeding to start again.    Don't take ibuprofen, naproxen, or medicines that contain aspirin. These thin the blood and may cause your nose to bleed. You may take acetaminophen for pain, unless another pain medicine was prescribed.    If the bleeding starts again, sit up and lean forward to prevent swallowing blood. Pinch your nose tightly on both sides, as shown above, for 10 to 15 minutes. Time yourself. Don t release the pressure on your nose until 10 minutes is up. If bleeding does not stop, continue to pinch your nose and call your healthcare provider or return to this facility.    If you have a cold, allergies, or dry nasal membranes, lubricate the nasal passages. Apply a small amount of petroleum jelly inside the nose with a cotton swab twice a day (morning and night).    Don't overheat your home. This can dry the air and make your condition worse.    Put a humidifier in the room where you sleep. This will add moisture to the air. Clean the humidifier as advised by the .    Use a saline nasal spray to keep nasal passages moist.    Don't pick your nose. Keep fingernails trimmed to decrease risk of bleeds.    Don't smoke.  Follow-up care  Follow up with your healthcare provider, or as advised. Nasal packing should be rechecked or removed within 2 to 3 days.  When to seek medical advice  Call your healthcare provider right away if any of these occur.    You have  another nosebleed that you cannot control    Dizziness, weakness, or fainting    You become tired or confused    Fever of 100.4 F (38 C) or higher, or as directed by your healthcare provider    Headache    Sinus or facial pain    Shortness of breath or trouble breathing  Date Last Reviewed: 11/1/2017 2000-2017 [employer]. [address] [place] [address]. All rights reserved. This information is not intended as a substitute for professional medical care. Always follow your healthcare professional's instructions.          Your next 10 appointments already scheduled     Sep 11, 2018 11:15 AM CDT   SHORT with Marbin Rouse MD   [facility] ([facility])    [address] [facility] [address]  [facility] [address]   [phone]            Oct 16, 2018  3:00 PM CDT   LAB with [facility]   [facility] [place] ([facility])    [address] [place], [place] [id number]  [facility] [address]   [phone]           Please do not eat 10-12 hours before your appointment if you are coming in fasting for labs on lipids, cholesterol, or glucose (sugar). This does not apply to pregnant women. Water, hot tea and black coffee (with nothing added) are okay. Do not drink other fluids, diet soda or chew gum.            Oct 16, 2018  3:50 PM CDT   Core Return with Lolis Davenport PA-C   [facility] ([facility])    [address] [place], [place] [address]  [facility] [address]   [phone] OPT 2              24 Hour Appointment Hotline       To make an appointment at any [facility], call [phone] ([phone]). If you don't have a family doctor or clinic, we will help you find one. [facility] are conveniently located to serve the needs of you and your family.             Review of your medicines      Our records show that you are taking the medicines  listed below. If these are incorrect, please call your family doctor or clinic.        Dose / Directions Last dose taken    ASPIRIN PO   Dose:  81 mg        Take 81 mg by mouth every other day   Refills:  0        diclofenac 1 % Gel topical gel   Commonly known as:  VOLTAREN   Quantity:  100 g        Apply 2 grams to painful areas up to twice a day   Refills:  3        digoxin 125 MCG tablet   Commonly known as:  LANOXIN   Dose:  125 mcg   Quantity:  45 tablet        Take 1 tablet (125 mcg) by mouth every other day As of 4/10/18   Refills:  4        furosemide 40 MG tablet   Commonly known as:  LASIX   Dose:  40 mg        Take 40 mg by mouth daily   Refills:  0        metoprolol succinate 200 MG 24 hr tablet   Commonly known as:  TOPROL-XL   Dose:  200 mg   Quantity:  90 tablet        Take 1 tablet (200 mg) by mouth daily   Refills:  4        oxyCODONE-acetaminophen 5-325 MG per tablet   Commonly known as:  PERCOCET   Dose:  0.5 tablet   Quantity:  30 tablet        Take 0.5 tablets by mouth every morning   Refills:  0        simvastatin 20 MG tablet   Commonly known as:  ZOCOR   Quantity:  45 tablet        TAKE HALF TABLET BY MOUTH AT BEDTIME   Refills:  3        sodium chloride 0.65 % nasal spray   Commonly known as:  OCEAN   Dose:  1 spray        Spray 1 spray into both nostrils 3 times daily as needed for congestion   Refills:  0        TYLENOL 500 MG tablet   Dose:  1000 mg   Generic drug:  acetaminophen        Take 1,000 mg by mouth 2 times daily   Refills:  0        VITAMIN B 12 PO   Dose:  1 tablet        Take 1 tablet by mouth daily   Refills:  0        VITAMIN D (CHOLECALCIFEROL) PO   Dose:  1000 Units        Take 1,000 Units by mouth daily.   Refills:  0                Orders Needing Specimen Collection     None      Pending Results     No orders found for last 3 day(s).            Pending Culture Results     No orders found for last 3 day(s).            Pending Results Instructions     If you had any lab  results that were not finalized at the time of your Discharge, you can call the ED Lab Result RN at [phone]. You will be contacted by this team for any positive Lab results or changes in treatment. The nurses are available 7 days a week from 10A to 6:30P.  You can leave a message 24 hours per day and they will return your call.        Test Results From Your Hospital Stay               Clinical Quality Measure: Blood Pressure Screening     Your blood pressure was checked while you were in the emergency department today. The last reading we obtained was  BP: 143/62 . Please read the guidelines below about what these numbers mean and what you should do about them.  If your systolic blood pressure (the top number) is less than 120 and your diastolic blood pressure (the bottom number) is less than 80, then your blood pressure is normal. There is nothing more that you need to do about it.  If your systolic blood pressure (the top number) is 120-139 or your diastolic blood pressure (the bottom number) is 80-89, your blood pressure may be higher than it should be. You should have your blood pressure rechecked within a year by a primary care provider.  If your systolic blood pressure (the top number) is 140 or greater or your diastolic blood pressure (the bottom number) is 90 or greater, you may have high blood pressure. High blood pressure is treatable, but if left untreated over time it can put you at risk for heart attack, stroke, or kidney failure. You should have your blood pressure rechecked by a primary care provider within the next 4 weeks.  If your provider in the emergency department today gave you specific instructions to follow-up with your doctor or provider even sooner than that, you should follow that instruction and not wait for up to 4 weeks for your follow-up visit.        Thank you for choosing [facility]       Thank you for choosing [facility] for your care. Our goal is always to provide you with  excellent care. Hearing back from our patients is one way we can continue to improve our services. Please take a few minutes to complete the written survey that you may receive in the mail after you visit with us. Thank you!        Care EveryWhere ID     This is your Care EveryWhere ID. This could be used by other organizations to access your [facility] medical records  [id number]        Equal Access to Services     KITTY SALINAS : Valdemar Lobo, pancho delgado, ronny lynn, renee de luna . So [facility] [phone].    ATENCIÓN: Si habla español, tiene a salmon disposición servicios gratuitos de asistencia lingüística. Llame al [phone].    We comply with applicable federal civil rights laws and [place] laws. We do not discriminate on the basis of race, color, national origin, age, disability, sex, sexual orientation, or gender identity.            After Visit Summary       This is your record. Keep this with you and show to your community pharmacist(s) and doctor(s) at your next visit.

## 2018-08-12 NOTE — ED AVS SNAPSHOT
Emergency Department    [address] [facility] [address]    Phone:  [phone]    Fax:  [phone]                                       Theresa Alves   MRN: [id number]    Department:   Emergency Department   Date of Visit:  8/12/2018           After Visit Summary Signature Page     I have received my discharge instructions, and my questions have been answered. I have discussed any challenges I see with this plan with the nurse or doctor.    ..........................................................................................................................................  Patient/Patient Representative Signature      ..........................................................................................................................................  Patient Representative Print Name and Relationship to Patient    ..................................................               ................................................  Date                                            Time    ..........................................................................................................................................  Reviewed by Signature/Title    ...................................................              ..............................................  Date                                                            Time

## 2018-08-13 ENCOUNTER — PATIENT OUTREACH (OUTPATIENT)
Dept: CARE COORDINATION | Facility: CLINIC | Age: 83
End: 2018-08-13

## 2018-08-13 VITALS
WEIGHT: 116 LBS | RESPIRATION RATE: 20 BRPM | DIASTOLIC BLOOD PRESSURE: 69 MMHG | OXYGEN SATURATION: 95 % | SYSTOLIC BLOOD PRESSURE: 120 MMHG | TEMPERATURE: 97.5 F | HEIGHT: 65 IN | HEART RATE: 69 BPM | BODY MASS INDEX: 19.33 KG/M2

## 2018-08-13 ASSESSMENT — ENCOUNTER SYMPTOMS
FEVER: 0
LIGHT-HEADEDNESS: 0
CHILLS: 0
FATIGUE: 0

## 2018-08-13 NOTE — PROGRESS NOTES
Clinic Care Coordination Contact  [facility]/Voicemail    Referral Source: IP Report  ED visit 8/12/18 for epistaxis after bumping her nose.  History of epistaxis, primarily right nostril, and had cauterization previously.  On baby aspirin.    Clinical Data: Care Coordinator Outreach  Outreach attempted x 1.  Left message on voicemail with call back information and requested return call.  Plan: Care Coordinator will try to reach patient again in 1-2 business days.    Minesh Minor RN  Clinic Care Coordinator  [facility]  Ph: [phone]

## 2018-08-13 NOTE — ED PROVIDER NOTES
"  History     Chief Complaint:  Epistaxis    NIMCO Alves is a 89 year old female, with history of Afib, 3 prior presentations to the ER with epistaxis, and CHF, who presents with epistaxis. She bumped her nose this evening, and her nosebleed began 30 minutes prior to arrival, and has since stopped bleeding. The right nostril is the one that continually causes her problems, and she had it cauterized it during her last visit. She is not on an blood thinners, and stopped taking them 3-4 weeks ago. Denies feeling lightheaded, woozy, or having any trouble bleeding. She has been feeling healthy in the past few days. She takes a baby aspirin daily. She has a clip at home, but did not try to use it.    Allergies:  No Known Drug Allergies      Medications:    Tylenol  Aspirin  Digoxin  Lasix  Metoprolol succinate  Percocet  Zocor    Past Medical History:    Atrial fibrillation  Chronic back pain  Pulmonary fibrosis  Unspecified essential hypertension  Chronic kidney disease, stage III  Congestive heart failure    Past Surgical History:    Carpal tunnel release rt/lt, right  Cataract IOL, RT/LT, right  Hernia repair, inguinal, right    Family History:    No past pertinent family history.     Social History:  Relationship status:   Tobacco use: No  Alcohol use: Yes, daily vodka tonic  The patient presents with a friend.    Marital Status:   [5]     Review of Systems   Constitutional: Negative for chills, fatigue and fever.   HENT: Positive for nosebleeds.    Neurological: Negative for light-headedness.   All other systems reviewed and are negative.    Physical Exam   Vitals:  Patient Vitals for the past 24 hrs:   BP Temp Pulse Resp SpO2 Height Weight   08/13/18 0022 - - - - 95 % - -   08/13/18 0021 120/69 - - - - - -   08/12/18 2307 143/62 97.5  F (36.4  C) 69 20 97 % 1.651 m (5' 5\") 52.6 kg (116 lb)       Physical Exam  Constitutional:  Alert, answers questions appropriately.   Neck:    Normal " range of motion   HEENT:  Area of excoriation on right anterior nasal septum. No active bleeding. Left nare looks normal. No blood in posterior oropharynx. Pupils round, equal  Pulmonary/Chest:  Non-labored respiration.   Neurological:   No facial asymmetry, gait intact  Psychiatric:   The patient has a normal mood and affect.     Emergency Department Course     Procedures:  PROCEDURE: Anterior Nasal Packing    PROCEDURE NOTE: The patient's right anterior nasal septum was coated with surgicel and bacitracin. The patient's epistaxis had stopped prior to arrival and I was able to visualize a single site of former bleeding and applied surgicel and bacitracin. The patient tolerated the procedure well and there were no complications. She was observed in the emergency department following the procedure and had no recurrence of her bleeding.    Emergency Department Course:  Nursing notes and vitals reviewed.  I performed an exam of the patient as documented above.     Findings and plan explained to the Patient. Patient discharged home with instructions regarding supportive care, medications, and reasons to return. The importance of close follow-up was reviewed.    I personally answered all related questions prior to discharge.    Impression & Plan      Medical Decision Making:    Theresa Alves is a 89 year old female who presents for evaluation of nasal bleeding and epistaxis.  The bleeding had ceased prior to ED arrival, interventions in the ED and therefore supportive outpatient management is indicated.  Close follow-up with ENT and primary care; see discharge instructions. There are no signs of coagulopathy causing the bleeding or a general medical condition (thrombocytopenia, DIC, leukemia, etc) causing the bleeding today.    See procedure note above for packing.  There was no bleeding after the nasal hemostatic agent was placed.  Discussed with patient to use humidity and vaseline or bacitracin in nares bid for  the next week.      Diagnosis:    ICD-10-CM    1. Epistaxis R04.0       Disposition:   Discharged to home    CMS Diagnoses: None     Scribe Disclosure:  I, Marian Lux, am serving as a scribe at 11:18 PM on 8/12/2018 to document services personally performed by Dixon Millan MD, based on my observations and the provider's statements to me.     Marian Lux  8/12/2018    EMERGENCY DEPARTMENT       Dixon Millan MD  08/13/18 0752

## 2018-08-13 NOTE — DISCHARGE INSTRUCTIONS
Nosebleed (Adult)    Bleeding from the nose most commonly occurs because of injury or drying and cracking of the inner lining of the nose. Most nosebleeds are because of dry air or nose-picking. They can occur during a common cold or an allergy attack. They can also occur on a very hot day, or from dry air in the winter.  If the bleeding site is found, it may be cauterized. This means it is treated to cause a blood clot to form. This may be done with a chemical, heat, or electricity. If the bleeding continues after the site is cauterized, or if the site cannot be found, packing may be put in your nose. This is to apply pressure and stop the bleeding. The packing may be made of gauze or sponge. A small balloon catheter is sometimes used. These must be removed by your healthcare provider. Some types of packing dissolve on their own. If you are taking blood thinning (anticoagulant) medicine, you may have a blood test.  Home care    If packing was put in your nose, unless told otherwise, do not pull on it or try to remove it yourself. You will be given an appointment to have it removed. You may also have been given antibiotics to prevent a sinus infection. If so, finish all of the medicine.    Don't blow your nose for 12 hours after the bleeding stops. This will allow a strong blood clot to form. Don't pick your nose. This may restart bleeding.    Don't drink alcohol or hot liquids for the next 2 days. Alcohol or hot liquids in your mouth can dilate blood vessels in your nose. This can cause bleeding to start again.    Don't take ibuprofen, naproxen, or medicines that contain aspirin. These thin the blood and may cause your nose to bleed. You may take acetaminophen for pain, unless another pain medicine was prescribed.    If the bleeding starts again, sit up and lean forward to prevent swallowing blood. Pinch your nose tightly on both sides, as shown above, for 10 to 15 minutes. Time yourself. Don t release the  pressure on your nose until 10 minutes is up. If bleeding does not stop, continue to pinch your nose and call your healthcare provider or return to this facility.    If you have a cold, allergies, or dry nasal membranes, lubricate the nasal passages. Apply a small amount of petroleum jelly inside the nose with a cotton swab twice a day (morning and night).    Don't overheat your home. This can dry the air and make your condition worse.    Put a humidifier in the room where you sleep. This will add moisture to the air. Clean the humidifier as advised by the .    Use a saline nasal spray to keep nasal passages moist.    Don't pick your nose. Keep fingernails trimmed to decrease risk of bleeds.    Don't smoke.  Follow-up care  Follow up with your healthcare provider, or as advised. Nasal packing should be rechecked or removed within 2 to 3 days.  When to seek medical advice  Call your healthcare provider right away if any of these occur.    You have another nosebleed that you cannot control    Dizziness, weakness, or fainting    You become tired or confused    Fever of 100.4 F (38 C) or higher, or as directed by your healthcare provider    Headache    Sinus or facial pain    Shortness of breath or trouble breathing  Date Last Reviewed: 11/1/2017 2000-2017 [employer]. [address] [place] [address], [place] [address]. All rights reserved. This information is not intended as a substitute for professional medical care. Always follow your healthcare professional's instructions.

## 2018-08-15 ENCOUNTER — PATIENT OUTREACH (OUTPATIENT)
Dept: CARE COORDINATION | Facility: CLINIC | Age: 83
End: 2018-08-15

## 2018-08-15 NOTE — PROGRESS NOTES
Clinic Care Coordination Contact  [facility]/Voicemail    Referral Source: IP Report  ED visit 8/12/18 for epistaxis after bumping her nose.  History of epistaxis, primarily right nostril, and had cauterization previously.  On baby aspirin.    Clinical Data: Care Coordinator Outreach  Outreach attempted x 2.  Left message on voicemail with call back information and requested return call.  Plan: Care Coordinator will try to reach patient again in 1-2 business days.    Minesh Minor RN  Clinic Care Coordinator  [facility]  Ph: [phone]

## 2018-08-16 ENCOUNTER — PATIENT OUTREACH (OUTPATIENT)
Dept: CARE COORDINATION | Facility: CLINIC | Age: 83
End: 2018-08-16

## 2018-08-16 DIAGNOSIS — M54.50 CHRONIC MIDLINE LOW BACK PAIN WITHOUT SCIATICA: ICD-10-CM

## 2018-08-16 DIAGNOSIS — G89.29 CHRONIC MIDLINE LOW BACK PAIN WITHOUT SCIATICA: ICD-10-CM

## 2018-08-16 ASSESSMENT — ACTIVITIES OF DAILY LIVING (ADL): DEPENDENT_IADLS:: COOKING;TRANSPORTATION

## 2018-08-16 NOTE — PROGRESS NOTES
Clinic Care Coordination Contact    Clinic Care Coordination Contact  OUTREACH    Referral Information:  Referral Source: IP Report  ED visit on 8/12/18 for epistaxis    Primary Diagnosis: Other (include Comment box) (epistaxis)    Chief Complaint   Patient presents with     Clinic Care Coordination - Post Hospital     ED visit- epistaxis      Universal Utilization:   Clinic Utilization  Difficulty keeping appointments:: No  Utilization    Last refreshed: 8/16/2018  1:17 PM:  No Show Count (past year) 1       Last refreshed: 8/16/2018  1:17 PM:  ED visits 3       Last refreshed: 8/16/2018  1:17 PM:  Hospital admissions 1          Current as of: 8/16/2018  1:17 PM           Clinical Concerns:  Current Medical Concerns:  The patient was in the ED on 8/12/18 for epistaxis after bumping her nose; she has had 3 prior admissions for the same issue.  The patient stated her right nostril is the one that usually bleeds. The patient has a history of Afib and was previously on anticoagulation but was changed to a daily baby aspirin.    Today the patient reports she is doing well and has no epistaxis since her ED visit.  She denies issues aside from her chronic back pain due to scoliosis.  The patient continues being mostly independent within her apartment at this time with assistance from her neighbor, but updated that she will be moving into [facility] on 9/8/18.  Regarding her move, the patient stated her nieces will be available to help, and she may hire a moving company.    CCC RN inquired about what additional resources the patient may need between now and when she moves into the assisted living and she declined need for resources at this time but agreed to contact CCC RN with questions/concerns.    Current Behavioral Concerns: None at this time.  PHQ-9 SCORE 11/28/2017 4/10/2018 6/5/2018   Total Score - - -   Total Score MyChart 6 (Mild depression) 9 (Mild depression) 14 (Moderate depression)   Total Score  6 9 14     Education Provided to patient: CCC RN reviewed ED discharge AVS with the patient.  CCC RN provided the patient with symptoms to monitor for which would require additional medical evaluation.    Pain  Pain (GOAL):: Yes  Type: Chronic (>3mo)  Location of chronic pain:: back (scoliosis)  Progression: Unchanged  Description of pain: Aching  Limitation of routine activities due to chronic pain:: Yes    Health Maintenance Reviewed: Due/Overdue   Health Maintenance Due   Topic Date Due     MICROALBUMIN Q1 YEAR  06/22/1925     URINE DRUG SCREEN Q1 YR  [birth year]/22/1939     Medication Management:  CCC RN reviewed ED discharge AVS--no medication changes made.  The patient continues on aspirin daily.    Functional Status:  Dependent ADLs:: Independent, Ambulation-walker  Dependent IADLs:: Cooking, Transportation  Bed or wheelchair confined:: No  Mobility Status: Independent w/Device    Living Situation:  Current living arrangement:: I live alone  Type of residence:: Apartment (moving to [facility] 9/8/18)    Diet/Exercise/Sleep:  Inadequate nutrition (GOAL):: No  Food Insecurity: No  Tube Feeding: No  Significant changes in sleep pattern (GOAL): No    Transportation:  Transportation concerns (GOAL):: No  Transportation means:: Regular car, Family (nieces/nephews)     Psychosocial:  Mental health DX:: No  Mental health management concern (GOAL):: No  Informal Support system:: Family (nieces/nephews), Neighbors     Financial/Insurance:   Financial/Insurance concerns (GOAL):: No    Resources and Interventions:  Current Resources:   Equipment Currently Used at Home: walker, standard    Advance Care Plan/Directive  Advanced Care Plans/Directives on file:: Yes  Type Advanced Care Plans/Directives: POLST     Patient/Caregiver understanding: Yes    Outreach Frequency: monthly  Future Appointments              In 3 weeks [facility]    In 2 months  LAB  [facility] HEART AT [facility] PSA CLIN    In 2 months More, Lolis Gonzalez PA-C [facility] PSA CLIN          Plan:     The patient will attend her follow-up PCP appointment with Dr. Rouse on 9/11/18 at 11:15 am.    The patient declined contacting ENT for an appointment at this time but agreed to do so should she have continued epistaxis episodes.    The patient will contact CCC RN with questions/concerns.    CCC RN will do patient outreach in 4 weeks to assess patient status and provide additional support and resources as needed.      Minesh Minor RN  Clinic Care Coordinator  [facility]  Ph: [phone]

## 2018-08-16 NOTE — TELEPHONE ENCOUNTER
"Reason for Call:  Medication or medication refill:    Do you use a [facility]?  Name of the pharmacy and phone number for the current request:  [facility]    Name of the medication requested: oxyCODONE-acetaminophen (PERCOCET) 5-325 MG per tablet    Other request: call to pu when ready at [facility]-states \"will be hope or someone picking up\"    Can we leave a detailed message on this number? YES    Phone number patient can be reached at: Home number on file [phone] (home)    Best Time:     Call taken on 8/16/2018 at 9:06 AM by HAN PARIS    "

## 2018-08-16 NOTE — TELEPHONE ENCOUNTER
Requested Prescriptions   Pending Prescriptions Disp Refills     oxyCODONE-acetaminophen (PERCOCET) 5-325 MG per tablet      Last Written Prescription Date:  7/2/18  Last Fill Quantity: 30,   # refills: 0  Last Office Visit: 7/30/18 [place]  Future Office visit:    Next 5 appointments (look out 90 days)     Sep 11, 2018 11:15 AM CDT   SHORT with [facility]bin [facility] MD   [facility]    [address] [place] [id number]   [phone]                   Routing refill request to provider for review/approval because:  Drug not on the [facility], [facility] or  Health refill protocol or controlled substance   30 tablet 0     Sig: Take 0.5 tablets by mouth every morning    There is no refill protocol information for this order

## 2018-08-16 NOTE — LETTER
Health Care Home - Access Care Plan    About Me  Patient Name:  Theresa Alves    YOB: 1924  Age:                             89 year old   Julio Cesar MRN:            [id number] Telephone Information:     Home Phone [phone]   Mobile [phone]       Address:    [address]  [facility] [place] [address] Email address:  No e-mail address on record      Emergency Contact(s)  Name Relationship Lgl Grd Work Phone Home Phone Mobile Phone   1. TUNG OLIVER Relative   [phone]    2. HOPE TOAN Relative   [phone]   3. BREANN (FAY) KENNETH* Relative   [phone]              Health Maintenance: Routine Health maintenance Reviewed: Due/Overdue   Health Maintenance Due   Topic Date Due     MICROALBUMIN Q1 YEAR  06/22/1925     URINE DRUG SCREEN Q1 YR  06/22/1939     My Access Plan  Medical Emergency 911   Questions or concerns during clinic hours Primary Clinic Line, I will call the clinic directly: [facility] [phone]   24 Hour Appointment Line [phone] or  [phone]) (toll free)   24 Hour Nurse Line [phone] (toll free)   Questions or concerns outside clinic hours 24 Hour Appointment Line, I will call the after-hours on-call line:   [facility] [phone] or [phone] ([phone]) (toll-free)   Preferred Urgent Care [facility], [phone]   Preferred [facility]  [phone]   Preferred Pharmacy [id number] - [place], [place] - [address]     Behavioral Health Crisis Line The National Suicide Prevention Lifeline at 1-782.438.6397 or 586     My Care Team Members  Patient Care Team       Relationship Specialty Notifications Start Marbin Castro MD PCP - General Internal Medicine  9/2/12     Phone: [phone] Fax: [phone]         [address] [facility] [address]    Iván  Minesh RN Clinic Care Coordinator Primary Care - CC  8/13/18     Phone: [phone]                My Medical and Care Information  Problem List   Patient Active Problem List   Diagnosis     Health Care Home     Chronic kidney disease, stage III (moderate)     Hyperlipidemia with target LDL less than 130     Edema     Pulmonary fibrosis     Preventive measure     Hypertension goal BP (blood pressure) < 140/80     SOB (shortness of breath)     Congestive heart failure (H)     Chronic fatigue     Dizziness - light-headed     Degenerative arthritis of spine     Osteoporosis     Weight loss     Right shoulder pain     Low back pain     Physical deconditioning     Chronic pain syndrome     Memory loss     Numbness of right foot     Chronic congestive heart failure, unspecified congestive heart failure type (H)     Atrial fibrillation, unspecified type (H)     Atrial fibrillation w RVR     Anemia due to blood loss, acute      Current Medications and Allergies:  See Medication Report below  Current Outpatient Prescriptions   Medication     acetaminophen (TYLENOL) 500 MG tablet     ASPIRIN PO     Cyanocobalamin (VITAMIN B 12 PO)     diclofenac (VOLTAREN) 1 % GEL topical gel     digoxin (LANOXIN) 125 MCG tablet     furosemide (LASIX) 40 MG tablet     metoprolol succinate (TOPROL-XL) 200 MG 24 hr tablet     oxyCODONE-acetaminophen (PERCOCET) 5-325 MG per tablet     simvastatin (ZOCOR) 20 MG tablet     sodium chloride (OCEAN) 0.65 % nasal spray     VITAMIN D, CHOLECALCIFEROL, PO     No current facility-administered medications for this visit.

## 2018-08-16 NOTE — LETTER
[place] CARE COORDINATION  [facility]  [address], [place] [address]    August 16, 2018    Theresa Alves  [address]   [facility] [address]      Dear Theresa,    I am a clinic care coordinator who works with Marbin Rouse MD at [facility]. I wanted to thank you for spending the time to talk with me.  I wanted to introduce myself and provide you with my contact information so that you can call me with questions or concerns about your health care. Below is a description of clinic care coordination and how I can further assist you.     The clinic care coordinator is a registered nurse and/or  who understand the health care system. The goal of clinic care coordination is to help you manage your health and improve access to the [facility] system in the most efficient manner. The registered nurse can assist you in meeting your health care goals by providing education, coordinating services, and strengthening the communication among your providers. The  can assist you with financial, behavioral, psychosocial, chemical dependency, counseling, and/or psychiatric resources.    Please feel free to contact me at [phone] with any questions or concerns. We at [facility] are focused on providing you with the highest-quality healthcare experience possible and that all starts with you.     Sincerely,     Minesh Minor RN  Clinic Care Coordinator  [facility]  Ph: [phone]    Enclosed: I have enclosed a copy of a 24 Hour Access Plan. This has helpful phone numbers for you to call when needed. Please keep this in an easy to access place to use as needed.

## 2018-08-17 NOTE — TELEPHONE ENCOUNTER
Controlled Substance Refill Request forOxycodone-acetaminophen (Percocet) 5-325 mgProblem List Complete:  Yes   checked in past 3 months?  Yes 8/17/18 no concerns

## 2018-08-17 NOTE — TELEPHONE ENCOUNTER
Patient states her pharmacy is closed on the weekend and she need to pick her prescription up today.

## 2018-08-18 RX ORDER — OXYCODONE AND ACETAMINOPHEN 5; 325 MG/1; MG/1
TABLET ORAL
Qty: 30 TABLET | Refills: 0 | Status: SHIPPED | OUTPATIENT
Start: 2018-08-18 | End: 2018-10-15

## 2018-08-18 NOTE — TELEPHONE ENCOUNTER
Her last refill was on 6/25/18, should last her 60 days.  Will refill it today but I would recommend she f/u with her PCP specifically about her pain to clarify this...  Is she on palliative care?  --Dr. Phan

## 2018-08-24 ENCOUNTER — TELEPHONE (OUTPATIENT)
Dept: FAMILY MEDICINE | Facility: CLINIC | Age: 83
End: 2018-08-24

## 2018-08-24 NOTE — TELEPHONE ENCOUNTER
Our goal is to have forms completed within 72 hours, however some forms may require a visit or additional information.    What clinic location was the form placed at [facility]     Who is the form from?   Where did the form come from? Faxed to clinic   The form was placed in the inbox of Marbin Rouse MD      Please fax to [phone]  Phone number: [phone]    Additional comments: the [facility]  POL     Call take on 8/24/2018 at 10:45 AM by Rachana Ontiveros

## 2018-08-30 NOTE — TELEPHONE ENCOUNTER
Date of receipt at location: 8/22/18  [facility]? [facility]  Type of form: POLST  Checked over by facilitator? Yes  All pages present? Yes  Any obvious gaps in documentation? No  email sent to [facility]: 8/22/18    Who brought the form in ? Patient

## 2018-09-10 ENCOUNTER — HOSPITAL ENCOUNTER (EMERGENCY)
Facility: CLINIC | Age: 83
Discharge: HOME | End: 2018-09-10
Attending: EMERGENCY MEDICINE | Admitting: EMERGENCY MEDICINE
Payer: COMMERCIAL

## 2018-09-10 VITALS
SYSTOLIC BLOOD PRESSURE: 148 MMHG | HEART RATE: 72 BPM | TEMPERATURE: 97.9 F | BODY MASS INDEX: 19.33 KG/M2 | DIASTOLIC BLOOD PRESSURE: 80 MMHG | OXYGEN SATURATION: 99 % | RESPIRATION RATE: 20 BRPM | WEIGHT: 116 LBS | HEIGHT: 65 IN

## 2018-09-10 DIAGNOSIS — R04.0 EPISTAXIS: ICD-10-CM

## 2018-09-10 PROCEDURE — 30901 CONTROL OF NOSEBLEED: CPT | Mod: RT

## 2018-09-10 PROCEDURE — 99284 EMERGENCY DEPT VISIT MOD MDM: CPT

## 2018-09-10 ASSESSMENT — ENCOUNTER SYMPTOMS
LIGHT-HEADEDNESS: 0
DIZZINESS: 0
SHORTNESS OF BREATH: 1
ABDOMINAL PAIN: 0

## 2018-09-10 NOTE — ED PROVIDER NOTES
History     Chief Complaint:  Epistaxis    NIMCO Alves is a 89 year old female with a history of recurrent epistaxis and atrial fibrillation on Eliquis who presents with epistaxis of the right nare. The patient recalled that the symptoms starting occurring around 1100 today and have not shown any improvement. The patient also notes that she experiences epistaxis often since being prescribed Xarelto, which prompted a switch to Eliquis with no relief. She endorses a recent history of frequent episodes that she states she can usually control on her own. The patient recalls her most recent episode of epistaxis was roughly a month ago and she was encouraged to follow up with ENT, but she has not been able to do so yet. As she was unable to stop her current episode, she presented to the ED for evaluation and treatment. The patient explained she was feeling short of breath but states this is chronic due to her atrial fibrillation. The patient denies any light headedness, dizziness, abdominal pain, any post-nasal drip, or using oxygen at home.     Allergies:  No Known Drug Allergies    Medications:    Aspirin  Diclofenac  Digoxin   Furosemide  Metoprolol succinate   Simvastatin   Sodium chloride   Oxycodone-Acetaminophen    Problem List:    Hypertension  Pulmonary fibrosis  Anemia, acute  Atrial Fibrillation (RVR, H)  Chronic Congestive Heart Failure  Memory loss  Foot numbness  Chronic pain syndrome  Low back pain  Physical deconditioning  Shoulder pain  Weight loss  Osteoporosis  Degenerative arthritis of spine  Shortness of breath  Dizziness  Chronic fatigue  Congestive heart failure  Edema  Chronic kidney disease, stage III  Hyperlipidemia  Preventative measure  At home care    Past Medical History:    Chronic back pain  Pulmonary fibrosis   Essential hypertension    Past Surgical History:    Carpal tunnel release  Cataract IOL  Hernia Repair, Inguinal  Hiatal hernia repair    Family History:    Family  "history reviewed. No pertinent family history.    Social History:  The patient was accompanied to the ED by a friend.  Smoking Status: Never Smoker  Smokeless Tobacco: Never Used  Alcohol Use: Positive  Marital Status:       Review of Systems   HENT: Positive for nosebleeds. Negative for postnasal drip.    Respiratory: Positive for shortness of breath (chronic).    Gastrointestinal: Negative for abdominal pain.   Neurological: Negative for dizziness and light-headedness.   All other systems reviewed and are negative.    Physical Exam     Patient Vitals for the past 24 hrs:   BP Temp Temp src Pulse Heart Rate Resp SpO2 Height Weight   09/10/18 1625 148/80 - - 72 - 20 99 % - -   09/10/18 1342 155/87 97.9  F (36.6  C) Temporal - 75 16 97 % 1.651 m (5' 5\") 52.6 kg (116 lb)     Physical Exam  Eyes:  The pupils are equal and round    Conjunctivae and sclerae are normal  ENT:    The nose is normal    Pinnae are normal    The oropharynx is normal    Nasal polyp on right side, evidence of recent bleeding in right naris. No active bleeding from either naris  CV:  Regular rate and rhythm     No edema  Resp:  Lungs are clear    Non-labored    No rales    No wheezing   GI:  Abdomen is soft, there is no rigidity    No distension    No rebound tenderness   MS:  Normal muscular tone    No asymmetric leg swelling  Skin:  No rash or acute skin lesions noted  Neuro:   Awake, alert.      Speech is normal and fluent.    Face is symmetric.     Moves all extremities    Emergency Department Course     Procedures:    PROCEDURE: Silver Nitrate Cautery  PROCEDURE NOTE: The patient's right nare was prepped with 4% lidocaine nasal spray and Afrin.  The location of the patient's bleeding in the right nare was identified and cauterized with silver nitrate.  The patient tolerated the procedure well and there were no complications.  He was observed in the emergency department following the procedure and had no recurrence of his " bleeding.    Emergency Department Course:    1536 Nursing notes and vitals reviewed.    1538 I performed an exam of the patient as documented above.     1543 I performed the silver nitrate cautery procedure as documented above.    1626 I personally answered all related questions prior to discharge.    Impression & Plan      Medical Decision Making:  Theresa Alves is a 89 year old female on Eliquis who presents for evaluation of nasal bleeding and epistaxis.  The bleeding was controlled with interventions in the ED and therefore supportive outpatient management is indicated.  Close follow-up with ENT and primary care; see discharge instructions. She is on eliquis, which is likely the reason for bleeding.  She also appears to have a nasal polyp in the area of bleeding.    Silver nitrate was used to cauterize the bleeding source, see above procedure note.  The bleeding stopped and did not restart here in ED, therefore no nasal packing is indicated.  Discussed with patient to use humidity and vaseline or bacitracin in nares bid for the next week.      Diagnosis:    ICD-10-CM    1. Epistaxis R04.0      Disposition:   The patient is discharged to home.    Discharge Medications:  No discharge medications.    Scribe Disclosure:  Prabha YOUNG, am serving as a scribe at 4:35 PM on 9/10/2018 to document services personally performed by Dr. Timothy Griffiths MD based on my observations and the provider's statements to me.     EMERGENCY DEPARTMENT       Timothy Griffiths MD  09/10/18 8888

## 2018-09-10 NOTE — ED AVS SNAPSHOT
"  Emergency Department    [address] [facility] [address]    Phone:  [phone]    Fax:  [phone]                                       Theresa Alves   MRN: [id number]    Department:   Emergency Department   Date of Visit:  9/10/2018           Patient Information     Date Of Birth          6/22/1924        Your diagnoses for this visit were:     Epistaxis        You were seen by Timothy Griffiths MD.      Follow-up Information     Follow up with Marbin Rouse MD.    Specialty:  Internal Medicine    Why:  As needed    Contact information:    [address]  [facility] [address] [phone]          Follow up with  Emergency Department.    Specialty:  EMERGENCY MEDICINE    Why:  As needed    Contact information:    [address] [facility] [address] [phone]        Discharge Instructions       Discharge Instructions  Epistaxis    Today you were seen for a nosebleed.   Nosebleeds (the medical term is \"epistaxis\") are very common. Almost every person has had at least one in their lifetime.  Although the amount of blood loss can appear dramatic, nosebleeds rarely cause serious problems.  The most common causes are dry air or nose picking, but they also are common in people who have allergies, high blood pressure, or are on blood thinners (such as Coumadin, Aspirin or Plavix). If you or your child gets a nosebleed, the important thing is to know how to take care of it. With the right self-care, most nosebleeds will stop on their own.    Generally, every Emergency Department visit should have a follow-up clinic visit with either a primary or a specialty clinic/provider. Please follow-up as instructed by your emergency provider today.    Return to the Emergency Department if:    Your nose is bleeding a very large amount of blood and you are unable to stop it.    You get very pale, faint, or tired.    You cannot get the bleeding to stop after following " these instructions.    Treatment:    Your provider may tell you to use a decongestant nose spray, like Afrin  (oxymetazoline), in both nostrils in the morning and at night for the next three days. Do not use this medicine for more than three days at a time.  If you do, it will cause nasal congestion.     Use a moisturizer. A small amount of Vaseline  to the inside of your nostrils for moisture before bed is one option. There are nasal sprays available over-the-counter for this purpose as well.  Using a humidifier in your bedroom or home will help as well when the air is dry.    For the next three days, do not blow your nose or put anything in your nose. You may sniffle, or dab the outside of your nose.    Do not bend with your head below your waist for the next three days. Do not lift anything so heavy that you have to strain.     If you received nasal packing, please do not remove the packing until seen by an Ear, Nose, and Throat (ENT) specialist.  If antibiotics have been given with the packing, please take as directed.    If your nose starts to bleed again:    Blow your nose to get rid of some of the clots that have formed inside your nostrils. This may increase the bleeding temporarily, but that is okay.    Spray decongestant nose spray (like Afrin ) into both nostrils to constrict the blood vessels.    Sit or stand while bending forward slightly at the waist. Do not lie down or tilt your head back. This may cause you to swallow blood and can lead to vomiting.     the soft part of BOTH nostrils at the bottom of your nose and squeeze your nose closed for at least 5 minutes (for children) or 10 to 15 minutes (for adults). Use a clock to time yourself. Do not release the pressure every so often to check whether the bleeding has stopped. Many people hurt their chances of stopping the bleeding by releasing the pressure too soon or too often.    If you follow the steps outlined above, and your nose continues to  bleed, repeat all the steps once more. Apply pressure for a total of at least 30 minutes. If you continue to bleed even then, seek medical attention.  If you were given a prescription for medicine here today, be sure to read all of the information (including the package insert) that comes with your prescription.  This will include important information about the medicine, its side effects, and any warnings that you need to know about.  The pharmacist who fills the prescription can provide more information and answer questions you may have about the medicine.  If you have questions or concerns that the pharmacist cannot address, please call or return to the Emergency Department.   Remember that you can always come back to the Emergency Department if you are not able to see your regular provider in the amount of time listed above, if you get any new symptoms, or if there is anything that worries you.     Your next 10 appointments already scheduled     Sep 11, 2018 11:15 AM CDT   [facility] with Marbin Rouse MD   [facility]    [address] [facility] [address]  [facility] [address]   [phone]            Nov 05, 2018  9:00 AM CST   LAB with [facility]   [facility] ([facility])    [address] [place], [place] [address]  [facility] [id number] [phone]           Please do not eat 10-12 hours before your appointment if you are coming in fasting for labs on lipids, cholesterol, or glucose (sugar). This does not apply to pregnant women. Water, hot tea and black coffee (with nothing added) are okay. Do not drink other fluids, diet soda or chew gum.            Nov 05, 2018 10:10 AM CST   Core Return with Lolis Davenport PA-C   [facility]   [place] ([facility])    [address] [place], [place] [address]  [facility] [id number] [phone] OPT 2              24 Hour  Appointment Hotline       To make an appointment at any [facility], call [phone] ([phone]). If you don't have a family doctor or clinic, we will help you find one. [facility] clinics are conveniently located to serve the needs of you and your family.             Review of your medicines      Our records show that you are taking the medicines listed below. If these are incorrect, please call your family doctor or clinic.        Dose / Directions Last dose taken    ASPIRIN PO   Dose:  81 mg        Take 81 mg by mouth every other day   Refills:  0        diclofenac 1 % Gel topical gel   Commonly known as:  VOLTAREN   Quantity:  100 g        Apply 2 grams to painful areas up to twice a day   Refills:  3        digoxin 125 MCG tablet   Commonly known as:  LANOXIN   Dose:  125 mcg   Quantity:  45 tablet        Take 1 tablet (125 mcg) by mouth every other day As of 4/10/18   Refills:  4        furosemide 40 MG tablet   Commonly known as:  LASIX   Dose:  40 mg        Take 40 mg by mouth daily   Refills:  0        metoprolol succinate 200 MG 24 hr tablet   Commonly known as:  TOPROL-XL   Dose:  200 mg   Quantity:  90 tablet        Take 1 tablet (200 mg) by mouth daily   Refills:  4        oxyCODONE-acetaminophen 5-325 MG per tablet   Commonly known as:  PERCOCET   Quantity:  30 tablet        Take 0.5 tab every morning.  Max of 0.5 tab daily.   Refills:  0        simvastatin 20 MG tablet   Commonly known as:  ZOCOR   Quantity:  45 tablet        TAKE HALF TABLET BY MOUTH AT BEDTIME   Refills:  3        sodium chloride 0.65 % nasal spray   Commonly known as:  OCEAN   Dose:  1 spray        Spray 1 spray into both nostrils 3 times daily as needed for congestion   Refills:  0        TYLENOL 500 MG tablet   Dose:  1000 mg   Generic drug:  acetaminophen        Take 1,000 mg by mouth 2 times daily   Refills:  0        VITAMIN B 12 PO   Dose:  1 tablet        Take 1 tablet by mouth daily   Refills:  0        VITAMIN D  (CHOLECALCIFEROL) PO   Dose:  1000 Units        Take 1,000 Units by mouth daily.   Refills:  0                Orders Needing Specimen Collection     None      Pending Results     No orders found from 9/8/2018 to 9/11/2018.            Pending Culture Results     No orders found from 9/8/2018 to 9/11/2018.            Pending Results Instructions     If you had any lab results that were not finalized at the time of your Discharge, you can call the ED Lab Result RN at [phone]. You will be contacted by this team for any positive Lab results or changes in treatment. The nurses are available 7 days a week from 10A to 6:30P.  You can leave a message 24 hours per day and they will return your call.        Test Results From Your Hospital Stay               Clinical Quality Measure: Blood Pressure Screening     Your blood pressure was checked while you were in the emergency department today. The last reading we obtained was  BP: 155/87 . Please read the guidelines below about what these numbers mean and what you should do about them.  If your systolic blood pressure (the top number) is less than 120 and your diastolic blood pressure (the bottom number) is less than 80, then your blood pressure is normal. There is nothing more that you need to do about it.  If your systolic blood pressure (the top number) is 120-139 or your diastolic blood pressure (the bottom number) is 80-89, your blood pressure may be higher than it should be. You should have your blood pressure rechecked within a year by a primary care provider.  If your systolic blood pressure (the top number) is 140 or greater or your diastolic blood pressure (the bottom number) is 90 or greater, you may have high blood pressure. High blood pressure is treatable, but if left untreated over time it can put you at risk for heart attack, stroke, or kidney failure. You should have your blood pressure rechecked by a primary care provider within the next 4 weeks.  If your  "provider in the emergency department today gave you specific instructions to follow-up with your doctor or provider even sooner than that, you should follow that instruction and not wait for up to 4 weeks for your follow-up visit.        Thank you for choosing [facility]       Thank you for choosing [facility] for your care. Our goal is always to provide you with excellent care. Hearing back from our patients is one way we can continue to improve our services. Please take a few minutes to complete the written survey that you may receive in the mail after you visit with us. Thank you!        [employer] Information     [employer] lets you send messages to your doctor, view your test results, renew your prescriptions, schedule appointments and more. To sign up, go to [email or web address] . Click on \"Log in\" on the left side of the screen, which will take you to the Welcome page. Then click on \"Sign up Now\" on the right side of the page.     You will be asked to enter the access code listed below, as well as some personal information. Please follow the directions to create your username and password.     Your access code is: [email or web address]  Expires: 2018  4:12 PM     Your access code will  in 90 days. If you need help or a new code, please call your [facility] clinic or [phone].        Care EveryWhere ID     This is your Care EveryWhere ID. This could be used by other organizations to access your [facility] medical records  [id number]        Equal Access to Services     KITTY SALINAS : Hadii lata scotto Soarnoldo, waaxda luqadaha, qaybta kaalmada adeegyada, renee de luna . So [facility] [phone].    ATENCIÓN: Si habla español, tiene a salmon disposición servicios gratuitos de asistencia lingüística. Leo al [phone].    We comply with applicable federal civil rights laws and [place] laws. We do not discriminate on the basis of race, color, national origin, age, disability, sex, sexual " orientation, or gender identity.            After Visit Summary       This is your record. Keep this with you and show to your community pharmacist(s) and doctor(s) at your next visit.

## 2018-09-10 NOTE — ED AVS SNAPSHOT
Emergency Department    [address] [facility] [address]    Phone:  [phone]    Fax:  [phone]                                       Theresa Alves   MRN: [id number]    Department:   Emergency Department   Date of Visit:  9/10/2018           After Visit Summary Signature Page     I have received my discharge instructions, and my questions have been answered. I have discussed any challenges I see with this plan with the nurse or doctor.    ..........................................................................................................................................  Patient/Patient Representative Signature      ..........................................................................................................................................  Patient Representative Print Name and Relationship to Patient    ..................................................               ................................................  Date                                            Time    ..........................................................................................................................................  Reviewed by Signature/Title    ...................................................              ..............................................  Date                                                            Time          22EPIC Rev 08/18

## 2018-09-10 NOTE — DISCHARGE INSTRUCTIONS
"Discharge Instructions  Epistaxis    Today you were seen for a nosebleed.   Nosebleeds (the medical term is \"epistaxis\") are very common. Almost every person has had at least one in their lifetime.  Although the amount of blood loss can appear dramatic, nosebleeds rarely cause serious problems.  The most common causes are dry air or nose picking, but they also are common in people who have allergies, high blood pressure, or are on blood thinners (such as Coumadin, Aspirin or Plavix). If you or your child gets a nosebleed, the important thing is to know how to take care of it. With the right self-care, most nosebleeds will stop on their own.    Generally, every Emergency Department visit should have a follow-up clinic visit with either a primary or a specialty clinic/provider. Please follow-up as instructed by your emergency provider today.    Return to the Emergency Department if:    Your nose is bleeding a very large amount of blood and you are unable to stop it.    You get very pale, faint, or tired.    You cannot get the bleeding to stop after following these instructions.    Treatment:    Your provider may tell you to use a decongestant nose spray, like Afrin  (oxymetazoline), in both nostrils in the morning and at night for the next three days. Do not use this medicine for more than three days at a time.  If you do, it will cause nasal congestion.     Use a moisturizer. A small amount of Vaseline  to the inside of your nostrils for moisture before bed is one option. There are nasal sprays available over-the-counter for this purpose as well.  Using a humidifier in your bedroom or home will help as well when the air is dry.    For the next three days, do not blow your nose or put anything in your nose. You may sniffle, or dab the outside of your nose.    Do not bend with your head below your waist for the next three days. Do not lift anything so heavy that you have to strain.     If you received nasal packing, " please do not remove the packing until seen by an Ear, Nose, and Throat (ENT) specialist.  If antibiotics have been given with the packing, please take as directed.    If your nose starts to bleed again:    Blow your nose to get rid of some of the clots that have formed inside your nostrils. This may increase the bleeding temporarily, but that is okay.    Spray decongestant nose spray (like Afrin ) into both nostrils to constrict the blood vessels.    Sit or stand while bending forward slightly at the waist. Do not lie down or tilt your head back. This may cause you to swallow blood and can lead to vomiting.     the soft part of BOTH nostrils at the bottom of your nose and squeeze your nose closed for at least 5 minutes (for children) or 10 to 15 minutes (for adults). Use a clock to time yourself. Do not release the pressure every so often to check whether the bleeding has stopped. Many people hurt their chances of stopping the bleeding by releasing the pressure too soon or too often.    If you follow the steps outlined above, and your nose continues to bleed, repeat all the steps once more. Apply pressure for a total of at least 30 minutes. If you continue to bleed even then, seek medical attention.  If you were given a prescription for medicine here today, be sure to read all of the information (including the package insert) that comes with your prescription.  This will include important information about the medicine, its side effects, and any warnings that you need to know about.  The pharmacist who fills the prescription can provide more information and answer questions you may have about the medicine.  If you have questions or concerns that the pharmacist cannot address, please call or return to the Emergency Department.   Remember that you can always come back to the Emergency Department if you are not able to see your regular provider in the amount of time listed above, if you get any new symptoms, or if  there is anything that worries you.

## 2018-09-11 ENCOUNTER — OFFICE VISIT (OUTPATIENT)
Dept: FAMILY MEDICINE | Facility: CLINIC | Age: 83
End: 2018-09-11
Payer: COMMERCIAL

## 2018-09-11 VITALS
WEIGHT: 116 LBS | OXYGEN SATURATION: 97 % | BODY MASS INDEX: 19.33 KG/M2 | HEIGHT: 65 IN | RESPIRATION RATE: 20 BRPM | DIASTOLIC BLOOD PRESSURE: 66 MMHG | SYSTOLIC BLOOD PRESSURE: 118 MMHG | HEART RATE: 55 BPM | TEMPERATURE: 97.8 F

## 2018-09-11 DIAGNOSIS — R04.0 EPISTAXIS: Primary | ICD-10-CM

## 2018-09-11 DIAGNOSIS — I50.9 CHRONIC CONGESTIVE HEART FAILURE, UNSPECIFIED CONGESTIVE HEART FAILURE TYPE: ICD-10-CM

## 2018-09-11 PROCEDURE — 99213 OFFICE O/P EST LOW 20 MIN: CPT | Performed by: INTERNAL MEDICINE

## 2018-09-11 NOTE — MR AVS SNAPSHOT
After Visit Summary   9/11/2018    Theresa Alves    MRN: [id number]           Patient Information     Date Of Birth          6/22/1924        Visit Information        Provider Department      9/11/2018 11:15 AM Marbin Rouse MD [facility]        Today's Diagnoses     Epistaxis    -  1    Chronic congestive heart failure, unspecified congestive heart failure type (H)          Care Instructions    Call for an ENT appointment regarding the recurrent nose bleeds.                        My suggestion would be that any time you have some nose bleeding,that you skip the aspirin for 3 days.         If the bleeding has stopped, then you could try taking the aspirin again.                      Follow-ups after your visit        Additional Services     OTOLARYNGOLOGY REFERRAL       Your provider has referred you to: [facility]: [facility] and [facility] [place] [phone]   [email or web address]  [facility]: [facility] [place] [phone]   [email or web address]    Please be aware that coverage of these services is subject to the terms and limitations of your health insurance plan.  Call member services at your health plan with any benefit or coverage questions.      Please bring the following with you to your appointment:    (1) Any X-Rays, CTs or MRIs which have been performed.  Contact the facility where they were done to arrange for  prior to your scheduled appointment.   (2) List of current medications  (3) This referral request   (4) Any documents/labs given to you for this referral                  Your next 10 appointments already scheduled     Nov 05, 2018  9:00 AM CST   LAB with [facility]   [facility]    [address] [place] [id number] [phone]           Please do not eat 10-12 hours before your  "appointment if you are coming in fasting for labs on lipids, cholesterol, or glucose (sugar). This does not apply to pregnant women. Water, hot tea and black coffee (with nothing added) are okay. Do not drink other fluids, diet soda or chew gum.            2018 10:10 AM CST   Core Return with Lolis Davenport PA-C   [facility]    [address] [place] [id number] [phone] OPT 2              Who to contact     If you have questions or need follow up information about today's clinic visit or your schedule please contact [facility] directly at [phone].  Normal or non-critical lab and imaging results will be communicated to you by MyChart, letter or phone within 4 business days after the clinic has received the results. If you do not hear from us within 7 days, please contact the clinic through MyChart or phone. If you have a critical or abnormal lab result, we will notify you by phone as soon as possible.  Submit refill requests through Any[employer] or call your pharmacy and they will forward the refill request to us. Please allow 3 business days for your refill to be completed.          Additional Information About Your Visit        Anystream Information     Anystream lets you send messages to your doctor, view your test results, renew your prescriptions, schedule appointments and more. To sign up, go to [email or web address] . Click on \"Log in\" on the left side of the screen, which will take you to the Welcome page. Then click on \"Sign up Now\" on the right side of the page.     You will be asked to enter the access code listed below, as well as some personal information. Please follow the directions to create your username and password.     Your access code is: [email or web address]  Expires: 2018  4:12 PM     Your access code will  in 90 days. If you need help or a new code, please call " "your [facility] clinic or [phone].        Care EveryWhere ID     This is your Care EveryWhere ID. This could be used by other organizations to access your [facility] medical records  [id number]        Your Vitals Were     Pulse Temperature Respirations Height Last Period Pulse Oximetry    55 97.8  F (36.6  C) 20 5' 5\" (1.651 m) (LMP Unknown) 97%    Breastfeeding? BMI (Body Mass Index)                No 19.3 kg/m2           Blood Pressure from Last 3 Encounters:   09/11/18 118/66   09/10/18 148/80   08/13/18 120/69    Weight from Last 3 Encounters:   09/11/18 116 lb (52.6 kg)   09/10/18 116 lb (52.6 kg)   08/12/18 116 lb (52.6 kg)              We Performed the Following     OTOLARYNGOLOGY REFERRAL          Today's Medication Changes          These changes are accurate as of 9/11/18 11:40 AM.  If you have any questions, ask your nurse or doctor.               These medicines have changed or have updated prescriptions.        Dose/Directions    simvastatin 20 MG tablet   Commonly known as:  ZOCOR   This may have changed:  additional instructions   Used for:  Hyperlipidemia with target LDL less than 130        TAKE HALF TABLET BY MOUTH AT BEDTIME   Quantity:  45 tablet   Refills:  3                Primary Care Provider Office Phone # Fax #    Marbin Rouse [phone] [address] [facility] [id number]        Equal Access to Services     BG SALINAS AH: Hadii lata ku hadasho Sorhiannaali, waaxda luqadaha, qaybta kaalmada adeegyada, renee emery. So [facility] [phone].    ATENCIÓN: Si habla español, tiene a salmon disposición servicios gratuitos de asistencia lingüística. Leo al [phone].    We comply with applicable federal civil rights laws and [place] laws. We do not discriminate on the basis of race, color, national origin, age, disability, sex, sexual orientation, or gender identity.            Thank you!     Thank you for choosing [facility] " [facility]  for your care. Our goal is always to provide you with excellent care. Hearing back from our patients is one way we can continue to improve our services. Please take a few minutes to complete the written survey that you may receive in the mail after your visit with us. Thank you!             Your Updated Medication List - Protect others around you: Learn how to safely use, store and throw away your medicines at www.disposemymeds.org.          This list is accurate as of 9/11/18 11:40 AM.  Always use your most recent med list.                   Brand Name Dispense Instructions for use Diagnosis    ASPIRIN PO      Take 81 mg by mouth every other day        diclofenac 1 % Gel topical gel    VOLTAREN    100 g    Apply 2 grams to painful areas up to twice a day    Chronic midline low back pain without sciatica       digoxin 125 MCG tablet    LANOXIN    45 tablet    Take 1 tablet (125 mcg) by mouth every other day As of 4/10/18    Atrial fibrillation, unspecified type (H)       furosemide 40 MG tablet    LASIX     Take 40 mg by mouth daily        metoprolol succinate 200 MG 24 hr tablet    TOPROL-XL    90 tablet    Take 1 tablet (200 mg) by mouth daily    Atrial fibrillation, unspecified type (H)       oxyCODONE-acetaminophen 5-325 MG per tablet    PERCOCET    30 tablet    Take 0.5 tab every morning.  Max of 0.5 tab daily.    Chronic midline low back pain without sciatica       simvastatin 20 MG tablet    ZOCOR    45 tablet    TAKE HALF TABLET BY MOUTH AT BEDTIME    Hyperlipidemia with target LDL less than 130       sodium chloride 0.65 % nasal spray    OCEAN     Spray 1 spray into both nostrils 3 times daily as needed for congestion    Nasal dryness       TYLENOL 500 MG tablet   Generic drug:  acetaminophen      Take 500 mg by mouth daily        VITAMIN B 12 PO      Take 1 tablet by mouth daily        VITAMIN D (CHOLECALCIFEROL) PO      Take 1,000 Units by mouth daily.

## 2018-09-11 NOTE — PROGRESS NOTES
SUBJECTIVE:   Theresa Alves is a 89 year old female who presents to clinic today for the following health issues:      ED/UC Followup:    Facility:  [facility]   Date of visit: 9/10/2018  Reason for visit: Epistaxis  Current Status: stable, still some small amount blood this morning out of right nostril            She was in the emergency room for several hours yesterday, and she is not happy about  that experience.        She is requesting an ENT referral.       She still takes aspirin 81 mg per day.                        Of note, is that she is moving to assisted living today.  She has a POLST in which she indicates no resuscitation for an unwitnessed cardiac arrest.        However she does not have        an advanced directive.          She is here with her niece today, who states she is willing to be the healthcare agent of choice.                                                                             Problem list and histories reviewed & adjusted, as indicated.      Current Outpatient Prescriptions   Medication Sig Dispense Refill     acetaminophen (TYLENOL) 500 MG tablet Take 500 mg by mouth daily        ASPIRIN PO Take 81 mg by mouth every other day        Cyanocobalamin (VITAMIN B 12 PO) Take 1 tablet by mouth daily        digoxin (LANOXIN) 125 MCG tablet Take 1 tablet (125 mcg) by mouth every other day As of 4/10/18 45 tablet 4     furosemide (LASIX) 40 MG tablet Take 40 mg by mouth daily       metoprolol succinate (TOPROL-XL) 200 MG 24 hr tablet Take 1 tablet (200 mg) by mouth daily 90 tablet 4     oxyCODONE-acetaminophen (PERCOCET) 5-325 MG per tablet Take 0.5 tab every morning.  Max of 0.5 tab daily. 30 tablet 0     simvastatin (ZOCOR) 20 MG tablet TAKE HALF TABLET BY MOUTH AT BEDTIME (Patient taking differently: Pt take one tab every other day.) 45 tablet 3     VITAMIN D, CHOLECALCIFEROL, PO Take 1,000 Units by mouth daily.         diclofenac (VOLTAREN) 1 % GEL topical gel Apply 2 grams to  "painful areas up to twice a day (Patient not taking: Reported on 7/25/2018) 100 g 3     sodium chloride (OCEAN) 0.65 % nasal spray Spray 1 spray into both nostrils 3 times daily as needed for congestion (Patient not taking: Reported on 9/11/2018)  0     No Known Allergies  BP Readings from Last 3 Encounters:   09/11/18 118/66   09/10/18 148/80   08/13/18 120/69    Wt Readings from Last 3 Encounters:   09/11/18 116 lb (52.6 kg)   09/10/18 116 lb (52.6 kg)   08/12/18 116 lb (52.6 kg)                    Reviewed and updated as needed this visit by clinical staff       Reviewed and updated as needed this visit by Provider         ROS:  CONSTITUTIONAL:NEGATIVE for fever, chills, change in weight    OBJECTIVE:                                                    /66 (BP Location: Right arm, Patient Position: Chair, Cuff Size: Adult Regular)  Pulse 55  Temp 97.8  F (36.6  C)  Resp 20  Ht 5' 5\" (1.651 m)  Wt 116 lb (52.6 kg)  LMP  (LMP Unknown)  SpO2 97%  Breastfeeding? No  BMI 19.3 kg/m2  Body mass index is 19.3 kg/(m^2).  GENERAL APPEARANCE: alert and no distress  HENT: Dried blood right nasal passage.  I do not see a polyp.  CV: irregular rhythm    Diagnostic test results:  none      ASSESSMENT/PLAN:                                                        ICD-10-CM    1. Epistaxis R04.0 OTOLARYNGOLOGY REFERRAL    recurrent   2. Chronic congestive heart failure, unspecified congestive heart failure type (H) I50.9        She and her niece will work on an advanced directive.              Patient Instructions   Call for an ENT appointment regarding the recurrent nose bleeds.                        My suggestion would be that any time you have some nose bleeding,that you skip the aspirin for 3 days.         If the bleeding has stopped, then you could try taking the aspirin again.                  Marbin Rouse MD  [facility]  "

## 2018-09-11 NOTE — PATIENT INSTRUCTIONS
Call for an ENT appointment regarding the recurrent nose bleeds.                        My suggestion would be that any time you have some nose bleeding,that you skip the aspirin for 3 days.         If the bleeding has stopped, then you could try taking the aspirin again.

## 2018-09-12 ENCOUNTER — PATIENT OUTREACH (OUTPATIENT)
Dept: CARE COORDINATION | Facility: CLINIC | Age: 83
End: 2018-09-12

## 2018-09-12 ASSESSMENT — ACTIVITIES OF DAILY LIVING (ADL): DEPENDENT_IADLS:: COOKING;TRANSPORTATION

## 2018-09-12 NOTE — PROGRESS NOTES
Clinic Care Coordination Contact  [facility]/Voicemail    Referral Source: IP Report  ED visit for epistaxis. PCP appointment 9/11/18; referred to ENT.    Clinical Data: Care Coordinator Outreach  Outreach attempted x 1.  Left message on voicemail with call back information and requested return call.  Plan: Care Coordinator mailed out care coordination introduction letter on 8/16/18. Care Coordinator will try to reach patient again in 3-5 business days.    Minesh Minor RN  Clinic Care Coordinator  [facility] & [facility]  Ph: [phone]

## 2018-09-17 ENCOUNTER — TRANSFERRED RECORDS (OUTPATIENT)
Dept: HEALTH INFORMATION MANAGEMENT | Facility: CLINIC | Age: 83
End: 2018-09-17

## 2018-10-08 ENCOUNTER — PATIENT OUTREACH (OUTPATIENT)
Dept: CARE COORDINATION | Facility: CLINIC | Age: 83
End: 2018-10-08

## 2018-10-08 ASSESSMENT — ACTIVITIES OF DAILY LIVING (ADL): DEPENDENT_IADLS:: COOKING;TRANSPORTATION

## 2018-10-08 NOTE — PROGRESS NOTES
Clinic Care Coordination Contact    Clinic Care Coordination Contact  OUTREACH    Referral Information:  Referral Source: IP Report  Primary Diagnosis: Other (epistaxis)  Chief Complaint   Patient presents with     Clinic Care Coordination - Follow-up     Clinical Concerns:  Current Medical Concerns:  The patient has had multiple ED visits for epistaxis; she had her right nare cauterized during her most recent ED visit.  She reports she was seen by an ENT doctor a few weeks ago; they debrided the area she had cauterized but didn't feel any additional interventions were need at the time of her appointment.  The ENT doctor instructed her to follow up if she continues to have issues.    The patient updated that she currently isn't feeling very well as she has been have issues with early satiety.  She plans to call to schedule an appointment with Dr. Rouse once she is able to talk with her niece to see when she is available to take her to an appointment.    Additionally, the patient reported she moved into assisted living since our last conversation.  She reported some frustration with how much everything costs, but she does think it is good that she is there as they do assist her with quite a bit.  Mainly she receives a daily meal and apartment cleaning which she finds to be very helpful.    The patient would like to focus on maintaining her physical mobility by continuing to walk to the dining cantu each day; see Goals below.    Current Behavioral Concerns: None at this time.    Pain  Pain (GOAL):: Yes  Type: Chronic (>3mo)  Location of chronic pain:: back (scoliosis)  Progression: Unchanged  Description of pain: Aching  Limitation of routine activities due to chronic pain:: Yes    Medication Management:  The patient manages her medications independently     Functional Status:  Dependent ADLs:: Independent, Ambulation-walker  Dependent IADLs:: Cooking, Transportation  Bed or wheelchair confined:: No  Mobility Status:  Independent w/Device    Living Situation:  Current living arrangement:: I live in assisted living  Type of residence:: Assisted living    Diet/Exercise/Sleep:  Diet:: Regular  Inadequate nutrition (GOAL):: No  Food Insecurity: No  Tube Feeding: No  Significant changes in sleep pattern (GOAL): No    Transportation:  Transportation concerns (GOAL):: No  Transportation means:: Regular car, Family     Psychosocial:  Mental health DX:: No  Mental health management concern (GOAL):: No  Informal Support system:: Family, Neighbors     Resources and Interventions:  Equipment Currently Used at Home: walker, standard     Goals:   Goals        General    1. Being Active (pt-stated)     Notes - Note created  10/8/2018  3:18 PM by Minesh Minor RN    Goal Statement: I will prevent physical deconditioning by continuing to walk to the dining cantu each day for the next 3 months.  Measure of Success: The patient will report maintained physical strength and ability to ambulate to the dining cantu.  Supportive Steps to Achieve: CCC RN will continue patient outreaches to offer support.  The patient will continue using her walker for ambulation to promote stability and avoid falls.  Barriers: The patient has chronic back pain which makes it more difficult to walk long distances.  Strengths: The patient is motivated to prevent further physical deconditioning and understands the importance of daily physical activity.  Date to Achieve By: 1/8/19  Patient expressed understanding of goal: Yes          Patient/Caregiver understanding: Yes    Outreach Frequency: monthly  Future Appointments              In 4 weeks [facility] AT [facility]    In 4 weeks More, Lolis Gonzalez PA-C [facility]    In 4 weeks Marbin Rouse MD [facility]          Plan: The patient will schedule an appointment with PCP to have  her early satiety evaluated.  The patient will continue to monitor for epistaxis issues and will contact the ENT doctor should she experience further problems.  The patient will contact CCC RN with questions/concerns.  CCC RN will outreach to patient in approximately 4 weeks to get updates on patient status, assess goal progress, and offer ongoing support and resources as indicated.    Minesh Minor RN  Clinic Care Coordinator  [facility]  Ph: [phone]

## 2018-10-15 DIAGNOSIS — M54.50 CHRONIC MIDLINE LOW BACK PAIN WITHOUT SCIATICA: ICD-10-CM

## 2018-10-15 DIAGNOSIS — G89.29 CHRONIC MIDLINE LOW BACK PAIN WITHOUT SCIATICA: ICD-10-CM

## 2018-10-15 NOTE — TELEPHONE ENCOUNTER
Reason for Call:  Medication or medication refill:  Do you use a [facility]?  Name of the pharmacy and phone number for the current request:  [facility]    Name of the medication requested: oxyCODONE-acetaminophen (PERCOCET) 5-325 MG per     Other request: call when done, Mandy will     Can we leave a detailed message on this number? YES    Phone number patient can be reached at: Home number on file [phone] (home)    Best Time: any    Call taken on 10/15/2018 at 4:58 PM by DA GARCIA

## 2018-10-15 NOTE — TELEPHONE ENCOUNTER
[address]  [address] [facility], [address] [place] [address]  [phone] office/[phone] fax      10/15/Payam Shell NP    Subjective:   Marcha Lanes, a 61 [facility] [address] 11/Birth control/ protection: Condom Reason for Call:  Medication or medication refill:    Do you use a [facility]?  Name of the pharmacy and phone number for the current request:  not applicable    Name of the medication requested: oxyCODONE-acetaminophen (PERCOCET) 5-325 MG per tablet    Other request: Patient states Annette Candelario may be picking prescription up for her when ready    Can we leave a detailed message on this number? YES    Phone number patient can be reached at: Home number on file [phone] (home)    Best Time:     Call taken on 7/5/2017 at 10:57 AM by CHERI RUDOLPH       

## 2018-10-16 RX ORDER — OXYCODONE AND ACETAMINOPHEN 5; 325 MG/1; MG/1
TABLET ORAL
Qty: 30 TABLET | Refills: 0 | Status: SHIPPED | OUTPATIENT
Start: 2018-10-16 | End: 2018-12-04

## 2018-10-16 NOTE — TELEPHONE ENCOUNTER
Requested Prescriptions   Pending Prescriptions Disp Refills     oxyCODONE-acetaminophen (PERCOCET) 5-325 MG per tablet      Last Written Prescription Date:  8/18/18  Last Fill Quantity: 30,   # refills: 0  Last Office Visit: 9/11/18 Nasim  Future Office visit:    Next 5 appointments (look out 90 days)     Nov 07, 2018 11:30 AM CST   Office Visit with Marbin Rouse MD   [facility]    [address] [place] [id number]   [phone]                   Routing refill request to provider for review/approval because:  Drug not on the [facility], P or  Health refill protocol or controlled substance   30 tablet 0     Sig: Take 0.5 tab every morning.  Max of 0.5 tab daily.    There is no refill protocol information for this order

## 2018-10-16 NOTE — TELEPHONE ENCOUNTER
Overview Addendum 9/19/2017 10:43 AM by Elham Goldberg RN      Patient is followed by [facility] for ongoing prescription of pain medication.  All refills should be approved by this provider, or covering partner.     Medication(s): percocet.   Maximum quantity per month: 30  Clinic visit frequency required: Q 6  months      Controlled substance agreement:      Encounter-Level CSA:      There are no encounter-level csa.       Pain Clinic evaluation in the past: No     DIRE Total Score(s):  No flowsheet data found.     Last [facility] website verification:  ?   [email or web address]  No CSA on file     Last  check -10/16/18 no concerns

## 2018-11-05 ENCOUNTER — HOSPITAL ENCOUNTER (INPATIENT)
Facility: CLINIC | Age: 83
LOS: 1 days | Discharge: HOME HEALTH | DRG: 309 | End: 2018-11-07
Attending: EMERGENCY MEDICINE | Admitting: HOSPITALIST
Payer: COMMERCIAL

## 2018-11-05 ENCOUNTER — OFFICE VISIT (OUTPATIENT)
Dept: CARDIOLOGY | Facility: CLINIC | Age: 83
End: 2018-11-05
Attending: PHYSICIAN ASSISTANT
Payer: COMMERCIAL

## 2018-11-05 VITALS
HEART RATE: 68 BPM | DIASTOLIC BLOOD PRESSURE: 80 MMHG | SYSTOLIC BLOOD PRESSURE: 140 MMHG | BODY MASS INDEX: 20.46 KG/M2 | WEIGHT: 111.2 LBS | HEIGHT: 62 IN | OXYGEN SATURATION: 96 %

## 2018-11-05 DIAGNOSIS — I50.30 (HFPEF) HEART FAILURE WITH PRESERVED EJECTION FRACTION (H): ICD-10-CM

## 2018-11-05 DIAGNOSIS — F10.920 ALCOHOL INTOXICATION, UNCOMPLICATED (H): ICD-10-CM

## 2018-11-05 DIAGNOSIS — I48.91 ATRIAL FIBRILLATION WITH SLOW VENTRICULAR RESPONSE (H): ICD-10-CM

## 2018-11-05 DIAGNOSIS — E78.5 HYPERLIPIDEMIA WITH TARGET LDL LESS THAN 130: Chronic | ICD-10-CM

## 2018-11-05 DIAGNOSIS — M62.81 GENERALIZED MUSCLE WEAKNESS: Primary | ICD-10-CM

## 2018-11-05 DIAGNOSIS — I50.30 (HFPEF) HEART FAILURE WITH PRESERVED EJECTION FRACTION (H): Primary | ICD-10-CM

## 2018-11-05 DIAGNOSIS — G89.4 CHRONIC PAIN SYNDROME: ICD-10-CM

## 2018-11-05 DIAGNOSIS — I10 HYPERTENSION GOAL BP (BLOOD PRESSURE) < 140/80: ICD-10-CM

## 2018-11-05 DIAGNOSIS — I95.1 ORTHOSTATIC HYPOTENSION: ICD-10-CM

## 2018-11-05 DIAGNOSIS — R00.1 BRADYCARDIA: ICD-10-CM

## 2018-11-05 DIAGNOSIS — I48.91 ATRIAL FIBRILLATION, UNSPECIFIED TYPE (H): ICD-10-CM

## 2018-11-05 DIAGNOSIS — R06.02 SOB (SHORTNESS OF BREATH): ICD-10-CM

## 2018-11-05 LAB
ANION GAP SERPL CALCULATED.3IONS-SCNC: 10.6 MMOL/L (ref 6–17)
ANION GAP SERPL CALCULATED.3IONS-SCNC: 8 MMOL/L (ref 3–14)
BASOPHILS # BLD AUTO: 0 10E9/L (ref 0–0.2)
BASOPHILS NFR BLD AUTO: 0.4 %
BUN SERPL-MCNC: 28 MG/DL (ref 7–30)
BUN SERPL-MCNC: 28 MG/DL (ref 7–30)
CALCIUM SERPL-MCNC: 8.6 MG/DL (ref 8.5–10.1)
CALCIUM SERPL-MCNC: 9.6 MG/DL (ref 8.5–10.5)
CHLORIDE SERPL-SCNC: 100 MMOL/L (ref 98–107)
CHLORIDE SERPL-SCNC: 101 MMOL/L (ref 94–109)
CO2 SERPL-SCNC: 25 MMOL/L (ref 20–32)
CO2 SERPL-SCNC: 28 MMOL/L (ref 23–29)
CREAT SERPL-MCNC: 1.27 MG/DL (ref 0.52–1.04)
CREAT SERPL-MCNC: 1.51 MG/DL (ref 0.7–1.3)
DIFFERENTIAL METHOD BLD: ABNORMAL
EOSINOPHIL # BLD AUTO: 0.3 10E9/L (ref 0–0.7)
EOSINOPHIL NFR BLD AUTO: 3.1 %
ERYTHROCYTE [DISTWIDTH] IN BLOOD BY AUTOMATED COUNT: 13.3 % (ref 10–15)
ETHANOL SERPL-MCNC: 0.2 G/DL
GFR SERPL CREATININE-BSD FRML MDRD: 32 ML/MIN/1.7M2
GFR SERPL CREATININE-BSD FRML MDRD: 39 ML/MIN/1.7M2
GLUCOSE SERPL-MCNC: 112 MG/DL (ref 70–105)
GLUCOSE SERPL-MCNC: 95 MG/DL (ref 70–99)
HCT VFR BLD AUTO: 39.2 % (ref 35–47)
HGB BLD-MCNC: 13.2 G/DL (ref 11.7–15.7)
IMM GRANULOCYTES # BLD: 0.1 10E9/L (ref 0–0.4)
IMM GRANULOCYTES NFR BLD: 0.6 %
LYMPHOCYTES # BLD AUTO: 3.5 10E9/L (ref 0.8–5.3)
LYMPHOCYTES NFR BLD AUTO: 42.7 %
MCH RBC QN AUTO: 34.9 PG (ref 26.5–33)
MCHC RBC AUTO-ENTMCNC: 33.7 G/DL (ref 31.5–36.5)
MCV RBC AUTO: 104 FL (ref 78–100)
MONOCYTES # BLD AUTO: 1 10E9/L (ref 0–1.3)
MONOCYTES NFR BLD AUTO: 12.6 %
NEUTROPHILS # BLD AUTO: 3.3 10E9/L (ref 1.6–8.3)
NEUTROPHILS NFR BLD AUTO: 40.6 %
NRBC # BLD AUTO: 0.1 10*3/UL
NRBC BLD AUTO-RTO: 1 /100
PLATELET # BLD AUTO: 197 10E9/L (ref 150–450)
POTASSIUM SERPL-SCNC: 4.2 MMOL/L (ref 3.4–5.3)
POTASSIUM SERPL-SCNC: 4.6 MMOL/L (ref 3.5–5.1)
RBC # BLD AUTO: 3.78 10E12/L (ref 3.8–5.2)
SODIUM SERPL-SCNC: 134 MMOL/L (ref 133–144)
SODIUM SERPL-SCNC: 134 MMOL/L (ref 136–145)
TROPONIN I SERPL-MCNC: 0.03 UG/L (ref 0–0.04)
WBC # BLD AUTO: 8.2 10E9/L (ref 4–11)

## 2018-11-05 PROCEDURE — 25000128 H RX IP 250 OP 636: Performed by: EMERGENCY MEDICINE

## 2018-11-05 PROCEDURE — 80320 DRUG SCREEN QUANTALCOHOLS: CPT | Performed by: EMERGENCY MEDICINE

## 2018-11-05 PROCEDURE — 80162 ASSAY OF DIGOXIN TOTAL: CPT | Performed by: EMERGENCY MEDICINE

## 2018-11-05 PROCEDURE — 93005 ELECTROCARDIOGRAM TRACING: CPT

## 2018-11-05 PROCEDURE — 99285 EMERGENCY DEPT VISIT HI MDM: CPT | Mod: 25

## 2018-11-05 PROCEDURE — 36415 COLL VENOUS BLD VENIPUNCTURE: CPT | Performed by: PHYSICIAN ASSISTANT

## 2018-11-05 PROCEDURE — 84484 ASSAY OF TROPONIN QUANT: CPT | Performed by: EMERGENCY MEDICINE

## 2018-11-05 PROCEDURE — 96361 HYDRATE IV INFUSION ADD-ON: CPT

## 2018-11-05 PROCEDURE — 80048 BASIC METABOLIC PNL TOTAL CA: CPT | Performed by: EMERGENCY MEDICINE

## 2018-11-05 PROCEDURE — 80048 BASIC METABOLIC PNL TOTAL CA: CPT | Performed by: PHYSICIAN ASSISTANT

## 2018-11-05 PROCEDURE — 85025 COMPLETE CBC W/AUTO DIFF WBC: CPT | Performed by: EMERGENCY MEDICINE

## 2018-11-05 PROCEDURE — 96360 HYDRATION IV INFUSION INIT: CPT

## 2018-11-05 PROCEDURE — 99214 OFFICE O/P EST MOD 30 MIN: CPT | Performed by: PHYSICIAN ASSISTANT

## 2018-11-05 RX ORDER — FUROSEMIDE 20 MG
60 TABLET ORAL DAILY
Qty: 90 TABLET | Refills: 3 | Status: ON HOLD | OUTPATIENT
Start: 2018-11-05 | End: 2018-11-07

## 2018-11-05 RX ADMIN — SODIUM CHLORIDE 1000 ML: 9 INJECTION, SOLUTION INTRAVENOUS at 23:25

## 2018-11-05 NOTE — LETTER
11/5/2018    Marbin Rouse MD  [address]  [facility] [address]    RE: Theresa Alves       Dear Colleague,    I had the pleasure of seeing Theresa Alves in the [facility].    [id number]  HPI and Plan:   See dictation    Orders this Visit:  Orders Placed This Encounter   Procedures     Basic metabolic panel     N terminal pro BNP outpatient     Follow-Up with [facility] - [facility]ARA visit     Orders Placed This Encounter   Medications     furosemide (LASIX) 20 MG tablet     Sig: Take 3 tablets (60 mg) by mouth daily     Dispense:  90 tablet     Refill:  3     Do not fill dose adjustment only     Medications Discontinued During This Encounter   Medication Reason     ASPIRIN PO      furosemide (LASIX) 40 MG tablet Reorder         Encounter Diagnoses   Name Primary?     (HFpEF) heart failure with preserved ejection fraction (H) Yes     Hypertension goal BP (blood pressure) < 140/80      Hyperlipidemia with target LDL less than 130      SOB (shortness of breath)      Chronic congestive heart failure (H)      Atrial fibrillation, unspecified type (H)        CURRENT MEDICATIONS:  Current Outpatient Prescriptions   Medication Sig Dispense Refill     acetaminophen (TYLENOL) 500 MG tablet Take 500 mg by mouth daily        Cyanocobalamin (VITAMIN B 12 PO) Take 1 tablet by mouth daily        diclofenac (VOLTAREN) 1 % GEL topical gel Apply 2 grams to painful areas up to twice a day 100 g 3     digoxin (LANOXIN) 125 MCG tablet Take 1 tablet (125 mcg) by mouth every other day As of 4/10/18 45 tablet 4     furosemide (LASIX) 20 MG tablet Take 3 tablets (60 mg) by mouth daily 90 tablet 3     metoprolol succinate (TOPROL-XL) 200 MG 24 hr tablet Take 1 tablet (200 mg) by mouth daily 90 tablet 4     oxyCODONE-acetaminophen (PERCOCET) 5-325 MG per tablet Take 0.5 tab every morning.  Max of 0.5 tab daily. 30 tablet 0     simvastatin (ZOCOR) 20 MG tablet TAKE HALF TABLET BY MOUTH AT BEDTIME  (Patient taking differently: Pt take one tab every other day.) 45 tablet 3     sodium chloride (OCEAN) 0.65 % nasal spray Spray 1 spray into both nostrils 3 times daily as needed for congestion  0     VITAMIN D, CHOLECALCIFEROL, PO Take 1,000 Units by mouth daily.         [DISCONTINUED] furosemide (LASIX) 40 MG tablet Take 40 mg by mouth daily         ALLERGIES   No Known Allergies    PAST MEDICAL HISTORY:  Past Medical History:   Diagnosis Date     Chronic back pain      Pulmonary fibrosis (H)      Unspecified essential hypertension     Essential hypertension       PAST SURGICAL HISTORY:  Past Surgical History:   Procedure Laterality Date     CARPAL TUNNEL RELEASE RT/LT  1990's    R     CATARACT IOL, RT/LT  5/03    R     HERNIA REPAIR, INGUINAL RT/LT  1954    R     hiatal hernia repair  6/10    large paraesophageal hiatal hernia; Nissen fundoplication        FAMILY HISTORY:  Family History   Problem Relation Age of Onset     Unknown/Adopted Mother      Unknown/Adopted Father      Diabetes No family hx of      Coronary Artery Disease No family hx of      Hypertension No family hx of      Hyperlipidemia No family hx of      Cerebrovascular Disease No family hx of      Breast Cancer No family hx of      Colon Cancer No family hx of      Prostate Cancer No family hx of      Other Cancer No family hx of      Depression No family hx of      Anxiety Disorder No family hx of      Mental Illness No family hx of      Substance Abuse No family hx of      Anesthesia Reaction No family hx of      Asthma No family hx of      Osteoporosis No family hx of      Genetic Disorder No family hx of      Thyroid Disease No family hx of      Obesity No family hx of        SOCIAL HISTORY:  Social History     Social History     Marital status:      Spouse name: N/A     Number of children: 0     Years of education: N/A     Occupational History     worked at [employer] for 40 yrs Retired     Social History Main Topics     Smoking status:  "Never Smoker     Smokeless tobacco: Never Used     Alcohol use Yes      Comment: daily-vodka tonic x1     Drug use: No     Sexual activity: No     Other Topics Concern     None     Social History Narrative       Review of Systems:  Skin:  Positive for     Eyes:  Positive for glasses  ENT:  Negative    Respiratory:  Positive for dyspnea on exertion  Cardiovascular:    Positive for;palpitations  Gastroenterology: Positive for poor appetite  Genitourinary:  Positive for nocturia  Musculoskeletal:  Positive for back pain;arthritis  Neurologic:  Positive for numbness or tingling of feet  Psychiatric:  Negative    Heme/Lymph/Imm:  Negative    Endocrine:  Negative      Physical Exam:  Vitals: /80  Pulse 68  Ht 1.562 m (5' 1.5\")  Wt 50.4 kg (111 lb 3.2 oz)  LMP  (LMP Unknown)  SpO2 96%  BMI 20.67 kg/m2   Please refer to dictation for physical exam    Recent Lab Results:  LIPID RESULTS:  Lab Results   Component Value Date    CHOL 142 11/28/2017    HDL 69 11/28/2017    LDL 52 11/28/2017    TRIG 107 11/28/2017    CHOLHDLRATIO 2.7 01/10/2014       LIVER ENZYME RESULTS:  Lab Results   Component Value Date    AST 24 03/18/2018    ALT 27 03/18/2018       CBC RESULTS:  Lab Results   Component Value Date    WBC 10.2 06/05/2018    RBC 3.87 06/05/2018    HGB 13.4 06/05/2018    HCT 40.3 06/05/2018     (H) 06/05/2018    MCH 34.6 (H) 06/05/2018    MCHC 33.3 06/05/2018    RDW 14.5 06/05/2018     06/05/2018       BMP RESULTS:  Lab Results   Component Value Date     (L) 11/05/2018    POTASSIUM 4.6 11/05/2018    CHLORIDE 100 11/05/2018    CO2 28 11/05/2018    ANIONGAP 10.6 11/05/2018     (H) 11/05/2018    BUN 28 11/05/2018    CR 1.51 (H) 11/05/2018    GFRESTIMATED 32 (L) 11/05/2018    GFRESTBLACK 39 (L) 11/05/2018    PAWEL 9.6 11/05/2018        A1C RESULTS:  No results found for: A1C    INR RESULTS:  No results found for: INR        CC  Lolis Davenport PARachealC  [address]  [place] " [id number]        Thank you for allowing me to participate in the care of your patient.      Sincerely,     Lolis Davenport PA-C     [facility]    cc:   Lolis Davenport PA-C  [address]  [place] [address]

## 2018-11-05 NOTE — LETTER
2018      Marbin Rouse MD  [address]  [facility] [address]      RE: Theresa Alves       Dear Colleague,    I had the pleasure of seeing Theresa Alves in the [facility].    Service Date: 2018      CLINIC VISIT      PRIMARY CARDIOLOGIST:  Has been Dr. Walker       REASON FOR VISIT:  AFib, HFpEF followup.        HISTORY OF PRESENT ILLNESS:  Ms. Alves is a pleasant 89-year-old woman with past medical history significant for the followin.  Pulmonary fibrosis.    2.  Hypertension.   3.  CKD.   4.  Kyphoscoliosis.   5.  Persistent atrial fibrillation.   6.  HFpEF.        We became involved in her care in 2018 when she was admitted for difficult-to-control AFib and heart failure secondary to that.  Rates were controlled on Toprol and digoxin.  She was started on Eliquis for her AFib but had significant nosebleeds and was profoundly upset about this.  She then eventually went down to an 81 mg aspirin daily.  Even on this, unfortunately, she has had multiple nosebleeds and has been in the ER.  Most recently, she has been 81 mg of aspirin every other day.      Today, she comes in still feeling poorly.  She is short of breath doing any activity, getting dressed or having someone help her get dressed.  When she gets a bath once a week, this is even exhausting her even though she has an aide who is basically bathing her.  She denies nhung orthopnea.  Her appetite is poor but she is sleeping okay.  She is up twice at night to go to the bathroom and occasionally sleeps well after that and sometimes does not.  She has not had peripheral edema.  She tried taking less Lasix and her peripheral edema got worse.  She is not sure what her breathing did.        SOCIAL HISTORY:  She has recently moved into [facility] which is an assisted living in [place].  She drinks 1 vodka tonic nightly.  There, she is charged for any assistance.  For example, when she  needs a ride back to her room after dinner, she is charged $8 for this.  She is charged $2 extra to have someone bring her meal to her room.  She sets up her own pills and knows them inside and out.  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  She comes in with her niece, Ericka.  She does not have not any children.      PHYSICAL EXAMINATION:     GENERAL:  Elderly, frail-appearing woman in no acute distress.   HEENT:  Normocephalic, atraumatic.     CARDIAC:  Heart is irregularly irregular with 2/6 systolic murmur heard best at lower left sternal border.   LUNGS:  Diminished but I do not appreciate wheezes, rales or rhonchi.   EXTREMITIES:  With just 1+ edema in her feet.     NECK:  Veins are flat at 90 degrees.      LABORATORIES:  Creatinine 1.5, BUN 28, potassium 4.6, sodium 134.      ASSESSMENT AND PLAN:     1.  Persistent atrial fibrillation with a CHADS-VASc of 4 for age, gender, hypertension.  Her rates are now controlled and she has been educated on the risk of stroke and the benefits of stroke protection versus bleeding.  This has been an extensive discussion with us, palliative doctor and herself.  At this point, she is taking 81 mg of aspirin every other day.  There is no evidence at all that this will protect her from a stroke.  As such, I recommend just discontinuing it completely and hopefully we can eliminate the nosebleeds knowing though she has about a 6% chance per year of stroke.   2.  Hypertension, elevated today but has not taken meds.  Will follow.   3.  Heart failure with preserved EF, associated with her atrial fibrillation with RVR.  I suspect her shortness of breath is secondary to her pulmonary fibrosis.  That being said, we have not tried maximizing diuretics or pushing them too far.  Given the fact that she is taking more of a palliative-type approach, we will try having her take Lasix 60 mg daily and see if this improves her breathing.  If it does, she will continue on that and we  will just keep a bit of an eye on her creatinine.  I realize it is markedly elevated given her weight but again, at 89, she wants to pursue symptom management and not a cure.  If she remains on 60, we will repeat a BMP in about a week or so.  Otherwise, if it is not helpful, would go back to 40 mg daily.      Thank you for allowing me to participate in this patient's care.       LENO Medrano PA-C             D: 2018   T: 2018   MT: MARITA      Name:     YANELI CHUA   MRN:      [id number]        Account:      [id number]   :      1924           Service Date: 2018      Document: [id number]      Outpatient Encounter Prescriptions as of 2018   Medication Sig Dispense Refill     Cyanocobalamin (VITAMIN B 12 PO) Take 1 tablet by mouth daily        oxyCODONE-acetaminophen (PERCOCET) 5-325 MG per tablet Take 0.5 tab every morning.  Max of 0.5 tab daily. 30 tablet 0     simvastatin (ZOCOR) 20 MG tablet TAKE HALF TABLET BY MOUTH AT BEDTIME (Patient taking differently: Pt take one tab every other day.) 45 tablet 3     sodium chloride (OCEAN) 0.65 % nasal spray Spray 1 spray into both nostrils 3 times daily as needed for congestion  0     VITAMIN D, CHOLECALCIFEROL, PO Take 1,000 Units by mouth daily.         [DISCONTINUED] acetaminophen (TYLENOL) 500 MG tablet Take 500 mg by mouth daily        [DISCONTINUED] diclofenac (VOLTAREN) 1 % GEL topical gel Apply 2 grams to painful areas up to twice a day 100 g 3     [DISCONTINUED] digoxin (LANOXIN) 125 MCG tablet Take 1 tablet (125 mcg) by mouth every other day As of 4/10/18 45 tablet 4     [DISCONTINUED] furosemide (LASIX) 20 MG tablet Take 3 tablets (60 mg) by mouth daily 90 tablet 3     [DISCONTINUED] metoprolol succinate (TOPROL-XL) 200 MG 24 hr tablet Take 1 tablet (200 mg) by mouth daily 90 tablet 4     [DISCONTINUED] ASPIRIN PO Take 81 mg by mouth every other day        [DISCONTINUED] furosemide (LASIX) 40 MG  tablet Take 40 mg by mouth daily       [DISCONTINUED] oxyCODONE-acetaminophen (PERCOCET) 5-325 MG per tablet Take 0.5 tablets by mouth every morning 30 tablet 0     No facility-administered encounter medications on file as of 11/5/2018.      Again, thank you for allowing me to participate in the care of your patient.      Sincerely,    Lolis Davenport PA-C     [facility]

## 2018-11-05 NOTE — PROGRESS NOTES
[id number]  HPI and Plan:   See dictation    Orders this Visit:  Orders Placed This Encounter   Procedures     Basic metabolic panel     [facility] pro BNP outpatient     Follow-Up with [facility] - [facility] visit     Orders Placed This Encounter   Medications     furosemide (LASIX) 20 MG tablet     Sig: Take 3 tablets (60 mg) by mouth daily     Dispense:  90 tablet     Refill:  3     Do not fill dose adjustment only     Medications Discontinued During This Encounter   Medication Reason     ASPIRIN PO      furosemide (LASIX) 40 MG tablet Reorder         Encounter Diagnoses   Name Primary?     (HFpEF) heart failure with preserved ejection fraction (H) Yes     Hypertension goal BP (blood pressure) < 140/80      Hyperlipidemia with target LDL less than 130      SOB (shortness of breath)      Chronic congestive heart failure (H)      Atrial fibrillation, unspecified type (H)        CURRENT MEDICATIONS:  Current Outpatient Prescriptions   Medication Sig Dispense Refill     acetaminophen (TYLENOL) 500 MG tablet Take 500 mg by mouth daily        Cyanocobalamin (VITAMIN B 12 PO) Take 1 tablet by mouth daily        diclofenac (VOLTAREN) 1 % GEL topical gel Apply 2 grams to painful areas up to twice a day 100 g 3     digoxin (LANOXIN) 125 MCG tablet Take 1 tablet (125 mcg) by mouth every other day As of 4/10/18 45 tablet 4     furosemide (LASIX) 20 MG tablet Take 3 tablets (60 mg) by mouth daily 90 tablet 3     metoprolol succinate (TOPROL-XL) 200 MG 24 hr tablet Take 1 tablet (200 mg) by mouth daily 90 tablet 4     oxyCODONE-acetaminophen (PERCOCET) 5-325 MG per tablet Take 0.5 tab every morning.  Max of 0.5 tab daily. 30 tablet 0     simvastatin (ZOCOR) 20 MG tablet TAKE HALF TABLET BY MOUTH AT BEDTIME (Patient taking differently: Pt take one tab every other day.) 45 tablet 3     sodium chloride (OCEAN) 0.65 % nasal spray Spray 1 spray into both nostrils 3 times daily as needed for congestion  0     VITAMIN D, CHOLECALCIFEROL, PO  Take 1,000 Units by mouth daily.         [DISCONTINUED] furosemide (LASIX) 40 MG tablet Take 40 mg by mouth daily         ALLERGIES   No Known Allergies    PAST MEDICAL HISTORY:  Past Medical History:   Diagnosis Date     Chronic back pain      Pulmonary fibrosis (H)      Unspecified essential hypertension     Essential hypertension       PAST SURGICAL HISTORY:  Past Surgical History:   Procedure Laterality Date     CARPAL TUNNEL RELEASE RT/LT  1990's    R     CATARACT IOL, RT/LT  5/03    R     HERNIA REPAIR, INGUINAL RT/LT  1954    R     hiatal hernia repair  6/10    large paraesophageal hiatal hernia; Nissen fundoplication        FAMILY HISTORY:  Family History   Problem Relation Age of Onset     Unknown/Adopted Mother      Unknown/Adopted Father      Diabetes No family hx of      Coronary Artery Disease No family hx of      Hypertension No family hx of      Hyperlipidemia No family hx of      Cerebrovascular Disease No family hx of      Breast Cancer No family hx of      Colon Cancer No family hx of      Prostate Cancer No family hx of      Other Cancer No family hx of      Depression No family hx of      Anxiety Disorder No family hx of      Mental Illness No family hx of      Substance Abuse No family hx of      Anesthesia Reaction No family hx of      Asthma No family hx of      Osteoporosis No family hx of      Genetic Disorder No family hx of      Thyroid Disease No family hx of      Obesity No family hx of        SOCIAL HISTORY:  Social History     Social History     Marital status:      Spouse name: N/A     Number of children: 0     Years of education: N/A     Occupational History     worked at [employer] for 40 yrs Retired     Social History Main Topics     Smoking status: Never Smoker     Smokeless tobacco: Never Used     Alcohol use Yes      Comment: daily-vodka tonic x1     Drug use: No     Sexual activity: No     Other Topics Concern     None     Social History Narrative       Review of  "Systems:  Skin:  Positive for     Eyes:  Positive for glasses  ENT:  Negative    Respiratory:  Positive for dyspnea on exertion  Cardiovascular:    Positive for;palpitations  Gastroenterology: Positive for poor appetite  Genitourinary:  Positive for nocturia  Musculoskeletal:  Positive for back pain;arthritis  Neurologic:  Positive for numbness or tingling of feet  Psychiatric:  Negative    Heme/Lymph/Imm:  Negative    Endocrine:  Negative      Physical Exam:  Vitals: /80  Pulse 68  Ht 1.562 m (5' 1.5\")  Wt 50.4 kg (111 lb 3.2 oz)  LMP  (LMP Unknown)  SpO2 96%  BMI 20.67 kg/m2   Please refer to dictation for physical exam    Recent Lab Results:  LIPID RESULTS:  Lab Results   Component Value Date    CHOL 142 11/28/2017    HDL 69 11/28/2017    LDL 52 11/28/2017    TRIG 107 11/28/2017    CHOLHDLRATIO 2.7 01/10/2014       LIVER ENZYME RESULTS:  Lab Results   Component Value Date    AST 24 03/18/2018    ALT 27 03/18/2018       CBC RESULTS:  Lab Results   Component Value Date    WBC 10.2 06/05/2018    RBC 3.87 06/05/2018    HGB 13.4 06/05/2018    HCT 40.3 06/05/2018     (H) 06/05/2018    MCH 34.6 (H) 06/05/2018    MCHC 33.3 06/05/2018    RDW 14.5 06/05/2018     06/05/2018       BMP RESULTS:  Lab Results   Component Value Date     (L) 11/05/2018    POTASSIUM 4.6 11/05/2018    CHLORIDE 100 11/05/2018    CO2 28 11/05/2018    ANIONGAP 10.6 11/05/2018     (H) 11/05/2018    BUN 28 11/05/2018    CR 1.51 (H) 11/05/2018    GFRESTIMATED 32 (L) 11/05/2018    GFRESTBLACK 39 (L) 11/05/2018    PAWEL 9.6 11/05/2018        A1C RESULTS:  No results found for: A1C    INR RESULTS:  No results found for: INR        CC  Lolis Davenport PA-C  [address]  [place] [address]      "

## 2018-11-05 NOTE — IP AVS SNAPSHOT
MRN:[id number]                      After Visit Summary   11/5/2018    Theresa Alves    MRN: [id number]           Thank you!     Thank you for choosing [facility] for your care. Our goal is always to provide you with excellent care. Hearing back from our patients is one way we can continue to improve our services. Please take a few minutes to complete the written survey that you may receive in the mail after you visit with us. Thank you!        Patient Information     Date Of Birth          6/22/1924        Designated Caregiver       Most Recent Value    Caregiver    Will someone help with your care after discharge? yes    Name of designated caregiver [facility]    Phone number of caregiver [phone]    Caregiver address [address] [address]      About your hospital stay     You were admitted on:  November 6, 2018 You last received care in the:  [facility]    You were discharged on:  November 7, 2018        Reason for your hospital stay       1.elevated alcohol level  2. Bradycardia/slow heart rate related to sinus node disease, atrial fibrillation and metoprolol and digoxin                  Who to Call     For medical emergencies, please call 911.  For non-urgent questions about your medical care, please call your primary care provider or clinic, [phone]          Attending Provider     Provider Specialty    Vita Booth MD Emergency Medicine    [facility]    [facility], Cole Verdin MD Internal Medicine       Primary Care Provider Office Phone # Fax #    Marbin Rouse [phone]      After Care Instructions     Activity       Your activity upon discharge: activity as tolerated            Diet       Follow this diet upon discharge: Orders Placed This Encounter      Snacks/Supplements Adult: Other; 4oz chocolate Boost at 10am and 2pm  (RD); Between Meals      Advance Diet as  Tolerated: Regular Diet Adult            Discharge Instructions       Have a Boost at least 2 times per day  Try to avoid skipping lunch  Stop all alcohol for now  Start taking protonix 40mg daily in morning on empty stomach  Take Tylenol ES 500mg tablet at about noon and bedtime  Lidocaine patch to back every morning, take off at night  Stop your metoprolol and digoxin  Start daily multivitamin  Complete course of thiamine and folic acid  Decrease your lasix dose from 60mg daily (three 20mg tablets) to 20mg daily (one 20mg tablet), if you develop swelling or increase in shortness of breath increase to two 20mg tablets (40mg) per day                  Follow-up Appointments     Follow-up and recommended labs and tests        1. See primary care doctor in about one week as arranged to discuss hospitalization, back pain  2. Follow up with cardiology clinic - they will arrange the holter monitor                  Your next 10 appointments already scheduled     Nov 12, 2018 11:10 AM CST   Office Visit with Bre Phan, DO   [facility]    [address]  [facility] [id number] [phone]           Bring a current list of meds and any records pertaining to this visit. For Physicals, please bring immunization records and any forms needing to be filled out. Please arrive 10 minutes early to complete paperwork.            Nov 15, 2018  1:30 PM CST   LAB with [facility]   [facility] [place] ([facility])    [address]  [facility] [id number] [phone]           Please do not eat 10-12 hours before your appointment if you are coming in fasting for labs on lipids, cholesterol, or glucose (sugar). This does not apply to pregnant women. Water, hot tea and black coffee (with nothing added) are okay. Do not drink other fluids, diet soda or chew gum.            Nov  15, 2018  2:30 PM CST   Return Visit with Lolis Davenport PA-C   [facility] ([facility])    [address]  [place] [address] [phone] OPT 2            Jan 14, 2019 10:00 AM CST   LAB with [facility]   [facility] [place] ([facility])    [address]  [place]  [place] [address] [phone]           Please do not eat 10-12 hours before your appointment if you are coming in fasting for labs on lipids, cholesterol, or glucose (sugar). This does not apply to pregnant women. Water, hot tea and black coffee (with nothing added) are okay. Do not drink other fluids, diet soda or chew gum.            Jan 14, 2019 10:50 AM CST   Core Return with Lolis Davenport PA-C   [facility] ([facility])    [address]  [place] [address] [phone] OPT 2              Additional Services     Follow-Up with cardiac sub-specialty clinic           Home Care OT Referral for Hospital Discharge       OT to eval and treat assess safety evaluation    Your provider has ordered home care - occupational therapy. If you have not been contacted within 2 days of your discharge please call the department phone number listed on the top of this document.            Home Care PT Referral for Hospital Discharge       PT to eval and treat chronic low back pain    Your provider has ordered home care - physical therapy. If you have not been contacted within 2 days of your discharge please call the department phone number listed on the top of this document.            Home care nursing referral       RN skilled nursing visit. RN to assess vital signs and weight, respiratory and cardiac status and home safety.    Discharge to home with [facility] services. The staff will call to schedule the first visit. Their phone number is [phone].    "         Follow-Up with Cardiac Advanced Practice Provider                 Future tests that were ordered for you     Basic metabolic panel                 Pending Results     Date and Time Order Name Status Description    11/6/2018 0914 EKG 12-lead, tracing only Preliminary             Statement of Approval     Ordered          11/07/18 1238  I have reviewed and agree with all the recommendations and orders detailed in this document.  EFFECTIVE NOW     Approved and electronically signed by:  Cole Guy MD             Admission Information     Date & Time Provider Department Dept. Phone    11/5/2018 Cole Guy MD [facility] [phone]      Your Vitals Were     Blood Pressure Pulse Temperature Respirations Height Weight    115/65 (BP Location: Right arm) 55 97.4  F (36.3  C) (Oral) 16 1.651 m (5' 5\") 51.8 kg (114 lb 4.8 oz)    Last Period Pulse Oximetry BMI (Body Mass Index)             (LMP Unknown) 95% 19.02 kg/m2         Care EveryWhere ID     This is your Care EveryWhere ID. This could be used by other organizations to access your [facility] medical records  [id number]        Equal Access to Services     BG SALINAS : Hadii lata Lobo, waaxda luqadaha, qaybta kaalmaseth lynn, renee emery. So [facility] [phone].    ATENCIÓN: Si habla español, tiene a salmon disposición servicios gratuitos de asistencia lingüística. Llame al [phone].    We comply with applicable federal civil rights laws and [place] laws. We do not discriminate on the basis of race, color, national origin, age, disability, sex, sexual orientation, or gender identity.               Review of your medicines      START taking        Dose / Directions    folic acid 1 MG tablet   Commonly known as:  FOLVITE   Used for:  Alcohol intoxication, uncomplicated (H)        Dose:  1 mg   Take 1 tablet (1 mg) by mouth daily   Quantity:  30 tablet   Refills:  " 0       lidocaine 5 % Patch   Commonly known as:  LIDODERM   Used for:  Chronic pain syndrome        Apply 1-2  patches to painful area at area of low back at once for up to 12 h within a 24 h period.  Remove after 12 hours.   Quantity:  30 patch   Refills:  0       multivitamin, therapeutic Tabs tablet   Used for:  Alcohol intoxication, uncomplicated (H)        Dose:  1 tablet   Start taking on:  11/8/2018   Take 1 tablet by mouth daily   Quantity:  60 tablet   Refills:  0       pantoprazole 40 MG EC tablet   Commonly known as:  PROTONIX   Used for:  Alcohol intoxication, uncomplicated (H)        Dose:  40 mg   Start taking on:  11/8/2018   Take 1 tablet (40 mg) by mouth every morning (before breakfast)   Quantity:  30 tablet   Refills:  0       thiamine 100 MG tablet   Used for:  Alcohol intoxication, uncomplicated (H)        Dose:  100 mg   Start taking on:  11/8/2018   Take 1 tablet (100 mg) by mouth daily   Quantity:  10 tablet   Refills:  0         CONTINUE these medicines which may have CHANGED, or have new prescriptions. If we are uncertain of the size of tablets/capsules you have at home, strength may be listed as something that might have changed.        Dose / Directions    acetaminophen 500 MG tablet   Commonly known as:  TYLENOL   This may have changed:    - how much to take  - how to take this  - when to take this  - additional instructions   Used for:  Chronic pain syndrome        Take one tablet 8 hours after morning percocet and one tablet at bedtime   Refills:  0       furosemide 20 MG tablet   Commonly known as:  LASIX   This may have changed:  how much to take   Used for:  (HFpEF) heart failure with preserved ejection fraction (H)        Dose:  20 mg   Take 1 tablet (20 mg) by mouth daily   Quantity:  90 tablet   Refills:  0       simvastatin 20 MG tablet   Commonly known as:  ZOCOR   This may have changed:  additional instructions   Used for:  Hyperlipidemia with target LDL less than 130         TAKE HALF TABLET BY MOUTH AT BEDTIME   Quantity:  45 tablet   Refills:  3         CONTINUE these medicines which have NOT CHANGED        Dose / Directions    oxyCODONE-acetaminophen 5-325 MG per tablet   Commonly known as:  PERCOCET   Used for:  Chronic midline low back pain without sciatica        Take 0.5 tab every morning.  Max of 0.5 tab daily.   Quantity:  30 tablet   Refills:  0       sodium chloride 0.65 % nasal spray   Commonly known as:  OCEAN   Used for:  Nasal dryness        Dose:  1 spray   Spray 1 spray into both nostrils 3 times daily as needed for congestion   Refills:  0       VITAMIN B 12 PO        Dose:  1 tablet   Take 1 tablet by mouth daily   Refills:  0       VITAMIN D (CHOLECALCIFEROL) PO        Dose:  1000 Units   Take 1,000 Units by mouth daily.   Refills:  0         STOP taking     digoxin 125 MCG tablet   Commonly known as:  LANOXIN           metoprolol succinate 200 MG 24 hr tablet   Commonly known as:  TOPROL-XL                Where to get your medicines      These medications were sent to [facility] - [place] - [address]  [address], [facility] [address]     Phone:  [phone]     folic acid 1 MG tablet    furosemide 20 MG tablet    lidocaine 5 % Patch    multivitamin, therapeutic Tabs tablet    pantoprazole 40 MG EC tablet    thiamine 100 MG tablet         Some of these will need a paper prescription and others can be bought over the counter. Ask your nurse if you have questions.     You don't need a prescription for these medications     acetaminophen 500 MG tablet                Protect others around you: Learn how to safely use, store and throw away your medicines at www.disposemymeds.org.             Medication List: This is a list of all your medications and when to take them. Check marks below indicate your daily home schedule. Keep this list as a reference.      Medications           Morning Afternoon Evening Bedtime As Needed     acetaminophen 500 MG tablet   Commonly known as:  TYLENOL   Take one tablet 8 hours after morning percocet and one tablet at bedtime   Last time this was given:  650 mg on 11/7/2018  9:28 AM   Next Dose Due:  11/7 tonight at bedtime                                      folic acid 1 MG tablet   Commonly known as:  FOLVITE   Take 1 tablet (1 mg) by mouth daily   Next Dose Due:  11/8 Thursday morning                                   furosemide 20 MG tablet   Commonly known as:  LASIX   Take 1 tablet (20 mg) by mouth daily   Last time this was given:  20 mg on 11/7/2018 10:42 AM   Next Dose Due:  11/8 Thursday morning                                   lidocaine 5 % Patch   Commonly known as:  LIDODERM   Apply 1-2  patches to painful area at area of low back at once for up to 12 h within a 24 h period.  Remove after 12 hours.                                   multivitamin, therapeutic Tabs tablet   Take 1 tablet by mouth daily   Start taking on:  11/8/2018   Last time this was given:  1 tablet on 11/7/2018  9:28 AM   Next Dose Due:  11/8 Thursday morning                                   oxyCODONE-acetaminophen 5-325 MG per tablet   Commonly known as:  PERCOCET   Take 0.5 tab every morning.  Max of 0.5 tab daily.   Next Dose Due:  11/8 Thursday morning                                   pantoprazole 40 MG EC tablet   Commonly known as:  PROTONIX   Take 1 tablet (40 mg) by mouth every morning (before breakfast)   Start taking on:  11/8/2018   Last time this was given:  40 mg on 11/7/2018  6:42 AM   Next Dose Due:  11/8 Thursday morning                                   simvastatin 20 MG tablet   Commonly known as:  ZOCOR   TAKE HALF TABLET BY MOUTH AT BEDTIME   Next Dose Due:  11/7 tonight at bedtime                                   sodium chloride 0.65 % nasal spray   Commonly known as:  OCEAN   Spray 1 spray into both nostrils 3 times daily as needed for congestion                                   thiamine 100 MG  tablet   Take 1 tablet (100 mg) by mouth daily   Start taking on:  11/8/2018   Last time this was given:  100 mg on 11/7/2018  9:28 AM   Next Dose Due:  11/8 Thursday morning                                   VITAMIN B 12 PO   Take 1 tablet by mouth daily   Next Dose Due:  11/8 Thursday morning                                   VITAMIN D (CHOLECALCIFEROL) PO   Take 1,000 Units by mouth daily.   Next Dose Due:  11/8 Thursday morning                                             More Information        Patient Education    Folic Acid (Vitamin B9) Oral tablet    Folic Acid (Vitamin B9) Solution for injection  Folic Acid (Vitamin B9) Oral tablet  What is this medicine?  FOLIC ACID (FOE lik AS id) is a water-soluble, B complex vitamin. It is in many foods like liver, kidneys, yeast, and leafy, green vegetables. It is used to treat megaloblastic anemia and anemia from poor diet in pregnant women, babies, and children.  This medicine may be used for other purposes; ask your health care provider or pharmacist if you have questions.  What should I tell my health care provider before I take this medicine?  They need to know if you have any of these conditions:    alcoholism or alcohol cirrhosis    pernicious anemia    vitamin B12 deficient anemia    an unusual or allergic reaction to folic acid, other B vitamins, other medicines, foods, dyes, or preservatives    pregnant or trying to get pregnant    breast-feeding  How should I use this medicine?  Take this medicine by mouth with a glass of water. Follow the directions on your prescription label. Take your doses at regular intervals. Do not stop taking your medicine unless your doctor tells you to.  Talk to your pediatrician regarding the use of this medicine in children. While this drug may be prescribed for selected conditions, precautions do apply.  Overdosage: If you think you have taken too much of this medicine contact a poison control center or emergency room at  once.  NOTE: This medicine is only for you. Do not share this medicine with others.  What if I miss a dose?  If you miss a dose, take it as soon as you can. If it is almost time for your next dose, take only that dose. Do not take double or extra doses.  What may interact with this medicine?    chloramphenicol    cholestyramine    medicines for seizures    methotrexate    nitrofurantoin    pyrimethamine  This list may not describe all possible interactions. Give your health care provider a list of all the medicines, herbs, non-prescription drugs, or dietary supplements you use. Also tell them if you smoke, drink alcohol, or use illegal drugs. Some items may interact with your medicine.  What should I watch for while using this medicine?  Visit your doctor or health care professional for regular check ups. Your doctor may order blood tests.  You need to eat a proper diet even while you are taking this vitamin. Taking vitamin supplements is not a substitute for a healthy diet. Ask your doctor or health care provider for good nutrition advice.  What side effects may I notice from receiving this medicine?  Side effects that you should report to your doctor or health care professional as soon as possible:    allergic reactions such as skin rash or itching, hives, swelling of the lips, mouth, tongue, or throat    chest tightness or pain    wheezing or shortness of breath  Side effects that usually do not require medical attention (report to your doctor or health care professional if they continue or are bothersome):    bitter or bad taste    confusion    irritable    loss of appetite    nausea    stomach gas  This list may not describe all possible side effects. Call your doctor for medical advice about side effects. You may report side effects to FDA at 9-711-YCU-1707.  Where should I keep my medicine?  Keep out of the reach of children.  Store at room temperature between 15 and 30 degrees C (59 and 86 degrees F).  Protect from light. This medicine is quickly broken down and made inactive when exposed to heat or light. Throw away any unused medicine after the expiration date.  NOTE:This sheet is a summary. It may not cover all possible information. If you have questions about this medicine, talk to your doctor, pharmacist, or health care provider. Copyright  2016 [employer]                Furosemide tablets  Brand Names: Active-Medicated Specimen Kit, Delone, Diuscreen, Lasix, RX Specimen Collection Kit, Specimen Collection Kit  What is this medicine?  FUROSEMIDE (fyoor OH se mide) is a diuretic. It helps you make more urine and to lose salt and excess water from your body. This medicine is used to treat high blood pressure, and edema or swelling from heart, kidney, or liver disease.  How should I use this medicine?  Take this medicine by mouth with a glass of water. Follow the directions on the prescription label. You may take this medicine with or without food. If it upsets your stomach, take it with food or milk. Do not take your medicine more often than directed. Remember that you will need to pass more urine after taking this medicine. Do not take your medicine at a time of day that will cause you problems. Do not take at bedtime.  Talk to your pediatrician regarding the use of this medicine in children. While this drug may be prescribed for selected conditions, precautions do apply.  What side effects may I notice from receiving this medicine?  Side effects that you should report to your doctor or health care professional as soon as possible:    blood in urine or stools    dry mouth    fever or chills    hearing loss or ringing in the ears    irregular heartbeat    muscle pain or weakness, cramps    skin rash    stomach upset, pain, or nausea    tingling or numbness in the hands or feet    unusually weak or tired    vomiting or diarrhea    yellowing of the eyes or skin  Side effects that usually do not require medical  attention (report to your doctor or health care professional if they continue or are bothersome):    headache    loss of appetite    unusual bleeding or bruising  What may interact with this medicine?      aspirin and aspirin-like medicines    certain antibiotics    chloral hydrate    cisplatin    cyclosporine    digoxin    diuretics    laxatives    lithium    medicines for blood pressure    medicines that relax muscles for surgery    methotrexate    NSAIDs, medicines for pain and inflammation like ibuprofen, naproxen, or indomethacin    phenytoin    steroid medicines like prednisone or cortisone    sucralfate    thyroid hormones  What if I miss a dose?  If you miss a dose, take it as soon as you can. If it is almost time for your next dose, take only that dose. Do not take double or extra doses.  Where should I keep my medicine?  Keep out of the reach of children.  Store at room temperature between 15 and 30 degrees C (59 and 86 degrees F). Protect from light. Throw away any unused medicine after the expiration date.  What should I tell my health care provider before I take this medicine?  They need to know if you have any of these conditions:    abnormal blood electrolytes    diarrhea or vomiting    gout    heart disease    kidney disease, small amounts of urine, or difficulty passing urine    liver disease    thyroid disease    an unusual or allergic reaction to furosemide, sulfa drugs, other medicines, foods, dyes, or preservatives    pregnant or trying to get pregnant    breast-feeding  What should I watch for while using this medicine?  Visit your doctor or health care professional for regular checks on your progress. Check your blood pressure regularly. Ask your doctor or health care professional what your blood pressure should be, and when you should contact him or her. If you are a diabetic, check your blood sugar as directed.  You may need to be on a special diet while taking this medicine. Check with your  doctor. Also, ask how many glasses of fluid you need to drink a day. You must not get dehydrated.  You may get drowsy or dizzy. Do not drive, use machinery, or do anything that needs mental alertness until you know how this drug affects you. Do not stand or sit up quickly, especially if you are an older patient. This reduces the risk of dizzy or fainting spells. Alcohol can make you more drowsy and dizzy. Avoid alcoholic drinks.  This medicine can make you more sensitive to the sun. Keep out of the sun. If you cannot avoid being in the sun, wear protective clothing and use sunscreen. Do not use sun lamps or tanning beds/booths.  NOTE:This sheet is a summary. It may not cover all possible information. If you have questions about this medicine, talk to your doctor, pharmacist, or health care provider. Copyright  2018 [employer]                Lidocaine dermal patch  Brand Names: Lidocare, Lidoderm  What is this medicine?  LIDOCAINE (LYE angela goins) causes loss of feeling in the skin and surrounding area. The medicine helps treat pain, including nerve pain.  How should I use this medicine?  This medicine is for external use only. Follow the directions on the prescription label or package.  Talk to your pediatrician regarding the use of this medicine in children. While this drug may be prescribed for children as young as 12 years for selected conditions, precautions do apply.  What side effects may I notice from receiving this medicine?  Side effects that you should report to your doctor or health care professional as soon as possible:    allergic reactions like skin rash, itching or hives, swelling of the face, lips, or tongue    breathing problems    chest pain or chest tightness    dizzines  Side effects that usually do not require medical attention (report to your doctor or health care professional if they continue or are bothersome):    tingling, numbness at site where applied  What may interact with this medicine?  Do  not take this medicine with any of the following medications:    certain medicines for irregular heart beat    MAOIs like Carbex, Eldepryl, Marplan, Nardil, and Parnate  This medicine may also interact with the following medications:    other local anesthetics like pramoxine, tetracaine  Do not use any other skin products on the affected area without asking your doctor or health care professional.  What if I miss a dose?  Apply the patches as needed for pain.  Where should I keep my medicine?  Keep out of the reach of children.  See product for storage instructions. Each product may have different instructions.  What should I tell my health care provider before I take this medicine?  They need to know if you have any of these conditions:    heart disease    history of irregular heart beat    liver disease    skin conditions or sensitivity    skin infection    an unusual or allergic reaction to lidocaine, parabens, other medicines, foods, dyes, or preservatives    pregnant or trying to get pregnant    breast-feeding  What should I watch for while using this medicine?  Tell your doctor or healthcare professional if your symptoms do not start to get better or if they get worse.  Be careful to avoid injury while the area is numb from the medicine, and you are not aware of pain.  If you are going to need surgery, a MRI, CT scan, or other procedure, tell your doctor that you are using this medicine. You may need to remove this patch before the procedure.  Do not get this medicine in your eyes. If you do, rinse out with plenty of cool tap water.  This medicine can make certain skin conditions worse. Only use it for conditions for which your doctor or health care professional has prescribed.  NOTE:This sheet is a summary. It may not cover all possible information. If you have questions about this medicine, talk to your doctor, pharmacist, or health care provider. Copyright  2018 Elsevier                Pantoprazole  tablets  Brand Name: Protonix  What is this medicine?  PANTOPRAZOLE (pan TOE pra zole) prevents the production of acid in the stomach. It is used to treat gastroesophageal reflux disease (GERD), inflammation of the esophagus, and Zollinger-Dyer syndrome.  How should I use this medicine?  Take this medicine by mouth. Swallow the tablets whole with a drink of water. Follow the directions on the prescription label. Do not crush, break, or chew. Take your medicine at regular intervals. Do not take your medicine more often than directed.  Talk to your pediatrician regarding the use of this medicine in children. While this drug may be prescribed for children as young as 5 years for selected conditions, precautions do apply.  What side effects may I notice from receiving this medicine?  Side effects that you should report to your doctor or health care professional as soon as possible:    allergic reactions like skin rash, itching or hives, swelling of the face, lips, or tongue    bone, muscle or joint pain    breathing problems    chest pain or chest tightness    dark yellow or brown urine    dizziness    fast, irregular heartbeat    feeling faint or lightheaded    fever or sore throat    muscle spasm    palpitations    rash on cheeks or arms that gets worse in the sun    redness, blistering, peeling or loosening of the skin, including inside the mouth    seizures    stomach polyps    tremors    unusual bleeding or bruising    unusually weak or tired    yellowing of the eyes or skin  Side effects that usually do not require medical attention (report to your doctor or health care professional if they continue or are bothersome):    constipation    diarrhea    dry mouth    headache    nausea  What may interact with this medicine?  Do not take this medicine with any of the following medications:    atazanavir    nelfinavir  This medicine may also interact with the following  medications:    ampicillin    delavirdine    erlotinib    iron salts    medicines for fungal infections like ketoconazole, itraconazole and voriconazole    methotrexate    mycophenolate mofetil    warfarin  What if I miss a dose?  If you miss a dose, take it as soon as you can. If it is almost time for your next dose, take only that dose. Do not take double or extra doses.  Where should I keep my medicine?  Keep out of the reach of children.  Store at room temperature between 15 and 30 degrees C (59 and 86 degrees F). Protect from light and moisture. Throw away any unused medicine after the expiration date.  What should I tell my health care provider before I take this medicine?  They need to know if you have any of these conditions:    liver disease    low levels of magnesium in the blood    lupus    an unusual or allergic reaction to omeprazole, lansoprazole, pantoprazole, rabeprazole, other medicines, foods, dyes, or preservatives    pregnant or trying to get pregnant    breast-feeding  What should I watch for while using this medicine?  It can take several days before your stomach pain gets better. Check with your doctor or health care professional if your condition does not start to get better, or if it gets worse.  You may need blood work done while you are taking this medicine.  NOTE:This sheet is a summary. It may not cover all possible information. If you have questions about this medicine, talk to your doctor, pharmacist, or health care provider. Copyright  2018 Elsevier                Thiamine, Vitamin B1 tablets  What is this medicine?  THIAMINE (THAHY uh min) is a vitamin B1. It is added to a healthy diet to prevent or to treat low vitamin B1 levels.  This vitamin may be used for other purposes; ask your health care provider or pharmacist if you have questions.  How should I use this medicine?  Take this medicine by mouth with a glass of water. Follow the directions on the package or prescription label.  For best results take this medicine with food. Take your medicine at regular intervals. Do not take your medicine more often than directed.  Talk to your pediatrician regarding the use of this medicine in children. While this medicine may be prescribed for selected conditions, precautions do apply.  What side effects may I notice from receiving this medicine?  Side effects that you should report to your doctor or health care professional as soon as possible:    allergic reactions like skin rash, itching or hives, swelling of the face, lips, or tongue    chest tightness    fast, irregular heartbeat    irritable, restless    nausea, vomiting    sweating    unusually bleeding or bruising  Side effects that usually do not require medical attention (report to your doctor or health care professional if they continue or are bothersome):    sneezing  What may interact with this medicine?  Interactions are not expected.  What if I miss a dose?  If you miss a dose, take it as soon as you can. If it is almost time for your next dose, take only that dose. Do not take double or extra doses.  Where should I keep my medicine?  Keep out of the reach of children.  Store at room temperature between 15 and 30 degrees C (59 and 85 degrees F). Protect from heat and light. Throw away any unused medicine after the expiration date.  What should I tell my health care provider before I take this medicine?  They need to know if you have any of the following conditions:    Wernicke's disease    an unusual or allergic reaction to B vitamins, other medicines, foods, dyes, or preservatives    pregnant or trying to get pregnant    breast-feeding  What should I watch for while using this medicine?  Follow a healthy diet. Taking a vitamin supplement does not replace the need for a balanced diet. Some foods that have this vitamin naturally are yeast, beans, peas, nuts, pork, and beef. Limit alcohol, smoking and stress.  Too much of this vitamin can  be unsafe. Talk to your doctor or health care provider about how much is right for you.  NOTE:This sheet is a summary. It may not cover all possible information. If you have questions about this medicine, talk to your doctor, pharmacist, or health care provider. Copyright  2018 Elsevier

## 2018-11-05 NOTE — IP AVS SNAPSHOT
[facility]    [address] [place], [place] [address]., [address]    [place] [address]    Phone:  [phone]                                       After Visit Summary   11/5/2018    Theresa Alves    MRN: [id number]           After Visit Summary Signature Page     I have received my discharge instructions, and my questions have been answered. I have discussed any challenges I see with this plan with the nurse or doctor.    ..........................................................................................................................................  Patient/Patient Representative Signature      ..........................................................................................................................................  Patient Representative Print Name and Relationship to Patient    ..................................................               ................................................  Date                                   Time    ..........................................................................................................................................  Reviewed by Signature/Title    ...................................................              ..............................................  Date                                               Time          22EPIC Rev 08/18

## 2018-11-05 NOTE — NURSING NOTE
The After Visit Summary was reviewed with the patient following their office visit with Guerrero. Patient was educated about any medication changes, the importance of following a low sodium diet, importance of recording daily weights, and when to call C[facility] clinic. Patient verbalized understanding of the information and agrees to call with any questions or concerns.     Labs: Lab results from today were reviewed with the patient during the office visit. A copy of the results were provided on the After Visit Summary.     Return appointment: Patient was scheduled for a f/u w/Guerrero as below:  Future Appointments  Date Time Provider Department Center   11/7/2018 11:30 AM Marbin Rouse MD [facility]   1/14/2019 10:00 AM CORNELIUS LAB SUL[facility]   1/14/2019 10:50 [facility]     Medication changes: lasix increased to 60mg every day. I reviewed w/patient and her niece the general action/indications for the lasix. Patient added that she has not been weighing herself daily -the importance of this was also reviewed. I added that a CORE RN will be calling her in 1 week to assess the response to the increased lasix dose.    Saadia Mays, RN  [facility] Clinic RN Care Coordinator  [facility]  [phone]

## 2018-11-05 NOTE — MR AVS SNAPSHOT
After Visit Summary   11/5/2018    Theresa Alves    MRN: [id number]           Patient Information     Date Of Birth          6/22/1924        Visit Information        Provider Department      11/5/2018 10:10 AM Ethel, Lolis Gonzalez PA-C [facility]   [place]        Today's Diagnoses     (HFpEF) heart failure with preserved ejection fraction (H)    -  1    Hypertension goal BP (blood pressure) < 140/80        Hyperlipidemia with target LDL less than 130        SOB (shortness of breath)        Chronic congestive heart failure (H)        Atrial fibrillation, unspecified type (H)          Care Instructions    Call CORE nurse for any questions or concerns:  [phone]   *If you have concerns after hours, please call [phone], option 2 to speak with on call Cardiologist.    1. Medication changes from today:  Increase lasix/ furosemide 60 mg once a day.     Stop taking aspirin- hopefully this will help prevent nosebleeds.  It probably isn't helping protect from a stroke by taking it every other day.      You could try Senna or Colace for constipation- they are both over the counter medications.      2. Weigh yourself daily and write it down.     3. Call CORE nurse if your weight is up more than 2 pounds in one day or 5 pounds in one week.     4. Call CORE nurse if you feel more short of breath, have more abdominal bloating, or leg swelling.     5. Continue low sodium diet (less than 2000 mg daily). If you eat less salt, you will retain less fluid.     6. Alcohol can weaken your heart further. You should avoid alcohol or limit its use to special times, such as a holiday or birthday.      7. Do NOT take Aleve or ibuprofen without talking to your doctor first.      8. Lab Results: labs look good overall.       Component      Latest Ref Rng & Units 7/25/2018 11/5/2018   Sodium      136 - 145 mmol/L 135 (L) 134 (L)   Potassium      3.5 - 5.1 mmol/L 4.0 4.6   Chloride       98 - 107 mmol/L 99 100   Carbon Dioxide      23 - 29 mmol/L 27 28   Anion Gap      6 - 17 mmol/L 13 10.6   Glucose      70 - 105 mg/dL 122 (H) 112 (H)   Urea Nitrogen      7 - 30 mg/dL 31 (H) 28   Creatinine      0.70 - 1.30 mg/dL 1.60 (H) 1.51 (H)   GFR Estimate      >60 mL/min/1.7m2 30 (L) 32 (L)   GFR Estimate If Black      >60 mL/min/1.7m2 36 (L) 39 (L)   Calcium      8.5 - 10.5 mg/dL 9.6 9.6     [facility]: Cardiomyopathy, Optimization, Rehabilitation, Education  The [facility] Clinic is a heart failure specialty clinic within the [facility] where you will work with specialized nurse practitioners, physician assistants, doctors, and registered nurses. They are dedicated to helping patients with heart failure to carefully adjust medications, receive education, and learn who and when to call if symptoms develop. They specialize in helping you better understand your condition, slow the progression of your disease, improve the length and quality of your life, help you detect future heart problems before they become life threatening, and avoid hospitalizations.              Follow-ups after your visit        Additional Services     Follow-Up with [facility] Clinic - SARA visit                 Your next 10 appointments already scheduled     Nov 07, 2018 11:30 AM CST   Office Visit with Marbin Rouse MD   [facility]    [address] [place] [id number]   [phone]           Bring a current list of meds and any records pertaining to this visit. For Physicals, please bring immunization records and any forms needing to be filled out. Please arrive 10 minutes early to complete paperwork.              Future tests that were ordered for you today     Open Future Orders        Priority Expected Expires Ordered    Follow-Up with [facility] Clinic - SARA visit Routine 2/3/2019 11/5/2019 11/5/2018    Basic metabolic panel Routine  "2/3/2019 11/5/2019 11/5/2018    N terminal pro BNP outpatient Routine 2/3/2019 11/5/2019 11/5/2018            Who to contact     If you have questions or need follow up information about today's clinic visit or your schedule please contact [facility]   [place] directly at [phone].  Normal or non-critical lab and imaging results will be communicated to you by MyChart, letter or phone within 4 business days after the clinic has received the results. If you do not hear from us within 7 days, please contact the clinic through MyChart or phone. If you have a critical or abnormal lab result, we will notify you by phone as soon as possible.  Submit refill requests through [employer] or call your pharmacy and they will forward the refill request to us. Please allow 3 business days for your refill to be completed.          Additional Information About Your Visit        Care EveryWhere ID     This is your Care EveryWhere ID. This could be used by other organizations to access your [place] medical records  [id number]        Your Vitals Were     Pulse Height Last Period Pulse Oximetry BMI (Body Mass Index)       68 1.562 m (5' 1.5\") (LMP Unknown) 96% 20.67 kg/m2        Blood Pressure from Last 3 Encounters:   11/05/18 140/80   09/11/18 118/66   09/10/18 148/80    Weight from Last 3 Encounters:   11/05/18 50.4 kg (111 lb 3.2 oz)   09/11/18 52.6 kg (116 lb)   09/10/18 52.6 kg (116 lb)              We Performed the Following     Follow-Up with [facility] Clinic - SARA visit          Today's Medication Changes          These changes are accurate as of 11/5/18 11:02 AM.  If you have any questions, ask your nurse or doctor.               These medicines have changed or have updated prescriptions.        Dose/Directions    furosemide 20 MG tablet   Commonly known as:  LASIX   This may have changed:    - medication strength  - how much to take   Used for:  (HFpEF) heart failure with preserved ejection " fraction (H)   Changed by:  Lolis Davenport PA-C        Dose:  60 mg   Take 3 tablets (60 mg) by mouth daily   Quantity:  90 tablet   Refills:  3       simvastatin 20 MG tablet   Commonly known as:  ZOCOR   This may have changed:  additional instructions   Used for:  Hyperlipidemia with target LDL less than 130        TAKE HALF TABLET BY MOUTH AT BEDTIME   Quantity:  45 tablet   Refills:  3         Stop taking these medicines if you haven't already. Please contact your care team if you have questions.     ASPIRIN PO   Stopped by:  Lolis Davenport PA-C                Where to get your medicines      These medications were sent to [id number] - [place], [place] - [address]  [address], [facility] [address]     Phone:  [phone]     furosemide 20 MG tablet                Primary Care Provider Office Phone # Fax #    Marbin Rouse [phone] [address] [facility] [address]        Goals        General    1. Being Active (pt-stated)     Notes - Note created  10/8/2018  3:18 PM by Minesh Minor, RN    Goal Statement: I will prevent physical deconditioning by continuing to walk to the dining cantu each day for the next 3 months.  Measure of Success: The patient will report maintained physical strength and ability to ambulate to the dining cantu.  Supportive Steps to Achieve: CCC RN will continue patient outreaches to offer support.  The patient will continue using her walker for ambulation to promote stability and avoid falls.  Barriers: The patient has chronic back pain which makes it more difficult to walk long distances.  Strengths: The patient is motivated to prevent further physical deconditioning and understands the importance of daily physical activity.  Date to Achieve By: 1/8/19  Patient expressed understanding of goal: Yes        Equal Access to Services     KITTY SALINAS AH: pancho Ramirez qaybta  renee gonsalezann marie de luna ah. [facility] [phone].    ATENCIÓN: Si kristin wells, tiene a salmon disposición servicios gratuitos de asistencia lingüística. Leo al [phone].    We comply with applicable federal civil rights laws and [place] laws. We do not discriminate on the basis of race, color, national origin, age, disability, sex, sexual orientation, or gender identity.            Thank you!     Thank you for choosing [facility]  for your care. Our goal is always to provide you with excellent care. Hearing back from our patients is one way we can continue to improve our services. Please take a few minutes to complete the written survey that you may receive in the mail after your visit with us. Thank you!             Your Updated Medication List - Protect others around you: Learn how to safely use, store and throw away your medicines at www.disposemymeds.org.          This list is accurate as of 11/5/18 11:02 AM.  Always use your most recent med list.                   Brand Name Dispense Instructions for use Diagnosis    diclofenac 1 % Gel topical gel    VOLTAREN    100 g    Apply 2 grams to painful areas up to twice a day    Chronic midline low back pain without sciatica       digoxin 125 MCG tablet    LANOXIN    45 tablet    Take 1 tablet (125 mcg) by mouth every other day As of 4/10/18    Atrial fibrillation, unspecified type (H)       furosemide 20 MG tablet    LASIX    90 tablet    Take 3 tablets (60 mg) by mouth daily    (HFpEF) heart failure with preserved ejection fraction (H)       metoprolol succinate 200 MG 24 hr tablet    TOPROL-XL    90 tablet    Take 1 tablet (200 mg) by mouth daily    Atrial fibrillation, unspecified type (H)       oxyCODONE-acetaminophen 5-325 MG per tablet    PERCOCET    30 tablet    Take 0.5 tab every morning.  Max of 0.5 tab daily.    Chronic midline low back pain without sciatica       simvastatin 20  MG tablet    ZOCOR    45 tablet    TAKE HALF TABLET BY MOUTH AT BEDTIME    Hyperlipidemia with target LDL less than 130       sodium chloride 0.65 % nasal spray    OCEAN     Spray 1 spray into both nostrils 3 times daily as needed for congestion    Nasal dryness       TYLENOL 500 MG tablet   Generic drug:  acetaminophen      Take 500 mg by mouth daily        VITAMIN B 12 PO      Take 1 tablet by mouth daily        VITAMIN D (CHOLECALCIFEROL) PO      Take 1,000 Units by mouth daily.

## 2018-11-05 NOTE — PATIENT INSTRUCTIONS
Call CORE nurse for any questions or concerns:  [phone]   *If you have concerns after hours, please call [phone], option 2 to speak with on call Cardiologist.    1. Medication changes from today:  Increase lasix/ furosemide 60 mg once a day.     Stop taking aspirin- hopefully this will help prevent nosebleeds.  It probably isn't helping protect from a stroke by taking it every other day.      You could try Senna or Colace for constipation- they are both over the counter medications.      2. Weigh yourself daily and write it down.     3. Call CORE nurse if your weight is up more than 2 pounds in one day or 5 pounds in one week.     4. Call CORE nurse if you feel more short of breath, have more abdominal bloating, or leg swelling.     5. Continue low sodium diet (less than 2000 mg daily). If you eat less salt, you will retain less fluid.     6. Alcohol can weaken your heart further. You should avoid alcohol or limit its use to special times, such as a holiday or birthday.      7. Do NOT take Aleve or ibuprofen without talking to your doctor first.      8. Lab Results: labs look good overall.       Component      Latest Ref Rng & Units 7/25/2018 11/5/2018   Sodium      136 - 145 mmol/L 135 (L) 134 (L)   Potassium      3.5 - 5.1 mmol/L 4.0 4.6   Chloride      98 - 107 mmol/L 99 100   Carbon Dioxide      23 - 29 mmol/L 27 28   Anion Gap      6 - 17 mmol/L 13 10.6   Glucose      70 - 105 mg/dL 122 (H) 112 (H)   Urea Nitrogen      7 - 30 mg/dL 31 (H) 28   Creatinine      0.70 - 1.30 mg/dL 1.60 (H) 1.51 (H)   GFR Estimate      >60 mL/min/1.7m2 30 (L) 32 (L)   GFR Estimate If Black      >60 mL/min/1.7m2 36 (L) 39 (L)   Calcium      8.5 - 10.5 mg/dL 9.6 9.6     [facility]: Cardiomyopathy, Optimization, Rehabilitation, Education  The [facility] is a heart failure specialty clinic within the [facility] where you will work with specialized nurse practitioners, physician assistants, doctors, and registered  nurses. They are dedicated to helping patients with heart failure to carefully adjust medications, receive education, and learn who and when to call if symptoms develop. They specialize in helping you better understand your condition, slow the progression of your disease, improve the length and quality of your life, help you detect future heart problems before they become life threatening, and avoid hospitalizations.

## 2018-11-05 NOTE — PROGRESS NOTES
Service Date: 2018      CLINIC VISIT      PRIMARY CARDIOLOGIST:  Has been Dr. Walker       REASON FOR VISIT:  AFib, HFpEF followup.        HISTORY OF PRESENT ILLNESS:  Ms. Alves is a pleasant 89-year-old woman with past medical history significant for the followin.  Pulmonary fibrosis.    2.  Hypertension.   3.  CKD.   4.  Kyphoscoliosis.   5.  Persistent atrial fibrillation.   6.  HFpEF.        We became involved in her care in 2018 when she was admitted for difficult-to-control AFib and heart failure secondary to that.  Rates were controlled on Toprol and digoxin.  She was started on Eliquis for her AFib but had significant nosebleeds and was profoundly upset about this.  She then eventually went down to an 81 mg aspirin daily.  Even on this, unfortunately, she has had multiple nosebleeds and has been in the ER.  Most recently, she has been 81 mg of aspirin every other day.      Today, she comes in still feeling poorly.  She is short of breath doing any activity, getting dressed or having someone help her get dressed.  When she gets a bath once a week, this is even exhausting her even though she has an aide who is basically bathing her.  She denies nhung orthopnea.  Her appetite is poor but she is sleeping okay.  She is up twice at night to go to the bathroom and occasionally sleeps well after that and sometimes does not.  She has not had peripheral edema.  She tried taking less Lasix and her peripheral edema got worse.  She is not sure what her breathing did.        SOCIAL HISTORY:  She has recently moved into [facility] which is an assisted living in [place].  She drinks 1 vodka tonic nightly.  There, she is charged for any assistance.  For example, when she needs a ride back to her room after dinner, she is charged $8 for this.  She is charged $2 extra to have someone bring her meal to her room.  She sets up her own pills and knows them inside and out.  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  She comes in with her niece, Ericka.  She does not have not any children.      PHYSICAL EXAMINATION:     GENERAL:  Elderly, frail-appearing woman in no acute distress.   HEENT:  Normocephalic, atraumatic.     CARDIAC:  Heart is irregularly irregular with 2/6 systolic murmur heard best at lower left sternal border.   LUNGS:  Diminished but I do not appreciate wheezes, rales or rhonchi.   EXTREMITIES:  With just 1+ edema in her feet.     NECK:  Veins are flat at 90 degrees.      LABORATORIES:  Creatinine 1.5, BUN 28, potassium 4.6, sodium 134.      ASSESSMENT AND PLAN:     1.  Persistent atrial fibrillation with a CHADS-VASc of 4 for age, gender, hypertension.  Her rates are now controlled and she has been educated on the risk of stroke and the benefits of stroke protection versus bleeding.  This has been an extensive discussion with us, palliative doctor and herself.  At this point, she is taking 81 mg of aspirin every other day.  There is no evidence at all that this will protect her from a stroke.  As such, I recommend just discontinuing it completely and hopefully we can eliminate the nosebleeds knowing though she has about a 6% chance per year of stroke.   2.  Hypertension, elevated today but has not taken meds.  Will follow.   3.  Heart failure with preserved EF, associated with her atrial fibrillation with RVR.  I suspect her shortness of breath is secondary to her pulmonary fibrosis.  That being said, we have not tried maximizing diuretics or pushing them too far.  Given the fact that she is taking more of a palliative-type approach, we will try having her take Lasix 60 mg daily and see if this improves her breathing.  If it does, she will continue on that and we will just keep a bit of an eye on her creatinine.  I realize it is markedly elevated given her weight but again, at 89, she wants to pursue symptom management and not a cure.  If she remains on 60, we will repeat a BMP in about a week or so.   Otherwise, if it is not helpful, would go back to 40 mg daily.      Thank you for allowing me to participate in this patient's care.       LENO Medrano PA-C             D: 2018   T: 2018   MT: MARITA      Name:     YANELI CHUA   MRN:      [id number]        Account:      [id number]   :      1924           Service Date: 2018      Document: [id number]

## 2018-11-06 PROBLEM — R00.1 BRADYCARDIA: Status: ACTIVE | Noted: 2018-11-06

## 2018-11-06 PROBLEM — G31.2 ALCOHOLIC ENCEPHALOPATHY (H): Status: ACTIVE | Noted: 2018-11-06

## 2018-11-06 LAB
ALBUMIN UR-MCNC: NEGATIVE MG/DL
ANION GAP SERPL CALCULATED.3IONS-SCNC: 7 MMOL/L (ref 3–14)
APPEARANCE UR: CLEAR
BILIRUB UR QL STRIP: NEGATIVE
BUN SERPL-MCNC: 25 MG/DL (ref 7–30)
CALCIUM SERPL-MCNC: 8.3 MG/DL (ref 8.5–10.1)
CHLORIDE SERPL-SCNC: 105 MMOL/L (ref 94–109)
CO2 SERPL-SCNC: 25 MMOL/L (ref 20–32)
COLOR UR AUTO: NORMAL
CREAT SERPL-MCNC: 1.16 MG/DL (ref 0.52–1.04)
DIGOXIN SERPL-MCNC: 0.9 UG/L (ref 0.5–2)
FOLATE SERPL-MCNC: 4.3 NG/ML
GFR SERPL CREATININE-BSD FRML MDRD: 43 ML/MIN/1.7M2
GLUCOSE SERPL-MCNC: 72 MG/DL (ref 70–99)
GLUCOSE UR STRIP-MCNC: NEGATIVE MG/DL
HCT VFR BLD AUTO: 33.8 % (ref 35–47)
HGB UR QL STRIP: NEGATIVE
INTERPRETATION ECG - MUSE: NORMAL
KETONES UR STRIP-MCNC: NEGATIVE MG/DL
LEUKOCYTE ESTERASE UR QL STRIP: NEGATIVE
NITRATE UR QL: NEGATIVE
PH UR STRIP: 6 PH (ref 5–7)
POTASSIUM SERPL-SCNC: 4.1 MMOL/L (ref 3.4–5.3)
SODIUM SERPL-SCNC: 137 MMOL/L (ref 133–144)
SOURCE: NORMAL
SP GR UR STRIP: 1 (ref 1–1.03)
UROBILINOGEN UR STRIP-MCNC: NORMAL MG/DL (ref 0–2)

## 2018-11-06 PROCEDURE — 99207 ZZC CDG-CODE CATEGORY CHANGED: CPT | Performed by: HOSPITALIST

## 2018-11-06 PROCEDURE — G0378 HOSPITAL OBSERVATION PER HR: HCPCS

## 2018-11-06 PROCEDURE — 85014 HEMATOCRIT: CPT | Performed by: INTERNAL MEDICINE

## 2018-11-06 PROCEDURE — 80048 BASIC METABOLIC PNL TOTAL CA: CPT | Performed by: INTERNAL MEDICINE

## 2018-11-06 PROCEDURE — 25000132 ZZH RX MED GY IP 250 OP 250 PS 637: Performed by: INTERNAL MEDICINE

## 2018-11-06 PROCEDURE — 82607 VITAMIN B-12: CPT | Performed by: INTERNAL MEDICINE

## 2018-11-06 PROCEDURE — 36415 COLL VENOUS BLD VENIPUNCTURE: CPT | Performed by: INTERNAL MEDICINE

## 2018-11-06 PROCEDURE — 99223 1ST HOSP IP/OBS HIGH 75: CPT | Mod: AI | Performed by: HOSPITALIST

## 2018-11-06 PROCEDURE — 25000128 H RX IP 250 OP 636: Performed by: HOSPITALIST

## 2018-11-06 PROCEDURE — 93005 ELECTROCARDIOGRAM TRACING: CPT

## 2018-11-06 PROCEDURE — 21000001 ZZH R&B HEART CARE

## 2018-11-06 PROCEDURE — 25000132 ZZH RX MED GY IP 250 OP 250 PS 637: Performed by: HOSPITALIST

## 2018-11-06 PROCEDURE — 99222 1ST HOSP IP/OBS MODERATE 55: CPT | Performed by: INTERNAL MEDICINE

## 2018-11-06 PROCEDURE — 93010 ELECTROCARDIOGRAM REPORT: CPT | Performed by: INTERNAL MEDICINE

## 2018-11-06 PROCEDURE — 82746 ASSAY OF FOLIC ACID SERUM: CPT | Performed by: INTERNAL MEDICINE

## 2018-11-06 PROCEDURE — 81003 URINALYSIS AUTO W/O SCOPE: CPT | Performed by: EMERGENCY MEDICINE

## 2018-11-06 RX ORDER — LIDOCAINE 40 MG/G
CREAM TOPICAL
Status: DISCONTINUED | OUTPATIENT
Start: 2018-11-06 | End: 2018-11-07 | Stop reason: HOSPADM

## 2018-11-06 RX ORDER — AMOXICILLIN 250 MG
2 CAPSULE ORAL 2 TIMES DAILY PRN
Status: DISCONTINUED | OUTPATIENT
Start: 2018-11-06 | End: 2018-11-07 | Stop reason: HOSPADM

## 2018-11-06 RX ORDER — SODIUM CHLORIDE 9 MG/ML
INJECTION, SOLUTION INTRAVENOUS CONTINUOUS
Status: ACTIVE | OUTPATIENT
Start: 2018-11-06 | End: 2018-11-06

## 2018-11-06 RX ORDER — PANTOPRAZOLE SODIUM 40 MG/1
40 TABLET, DELAYED RELEASE ORAL
Status: DISCONTINUED | OUTPATIENT
Start: 2018-11-06 | End: 2018-11-07 | Stop reason: HOSPADM

## 2018-11-06 RX ORDER — ACETAMINOPHEN 325 MG/1
650 TABLET ORAL EVERY 4 HOURS PRN
Status: DISCONTINUED | OUTPATIENT
Start: 2018-11-06 | End: 2018-11-07 | Stop reason: HOSPADM

## 2018-11-06 RX ORDER — ONDANSETRON 4 MG/1
4 TABLET, ORALLY DISINTEGRATING ORAL EVERY 6 HOURS PRN
Status: DISCONTINUED | OUTPATIENT
Start: 2018-11-06 | End: 2018-11-07 | Stop reason: HOSPADM

## 2018-11-06 RX ORDER — LANOLIN ALCOHOL/MO/W.PET/CERES
100 CREAM (GRAM) TOPICAL DAILY
Status: DISCONTINUED | OUTPATIENT
Start: 2018-11-06 | End: 2018-11-07 | Stop reason: HOSPADM

## 2018-11-06 RX ORDER — BISACODYL 10 MG
10 SUPPOSITORY, RECTAL RECTAL DAILY PRN
Status: DISCONTINUED | OUTPATIENT
Start: 2018-11-06 | End: 2018-11-07 | Stop reason: HOSPADM

## 2018-11-06 RX ORDER — NALOXONE HYDROCHLORIDE 0.4 MG/ML
.1-.4 INJECTION, SOLUTION INTRAMUSCULAR; INTRAVENOUS; SUBCUTANEOUS
Status: DISCONTINUED | OUTPATIENT
Start: 2018-11-06 | End: 2018-11-07 | Stop reason: HOSPADM

## 2018-11-06 RX ORDER — POLYETHYLENE GLYCOL 3350 17 G/17G
17 POWDER, FOR SOLUTION ORAL DAILY PRN
Status: DISCONTINUED | OUTPATIENT
Start: 2018-11-06 | End: 2018-11-07 | Stop reason: HOSPADM

## 2018-11-06 RX ORDER — ACETAMINOPHEN 325 MG/1
650 TABLET ORAL 3 TIMES DAILY
Status: DISCONTINUED | OUTPATIENT
Start: 2018-11-06 | End: 2018-11-07 | Stop reason: HOSPADM

## 2018-11-06 RX ORDER — ACETAMINOPHEN 650 MG/1
650 SUPPOSITORY RECTAL EVERY 4 HOURS PRN
Status: DISCONTINUED | OUTPATIENT
Start: 2018-11-06 | End: 2018-11-07 | Stop reason: HOSPADM

## 2018-11-06 RX ORDER — AMOXICILLIN 250 MG
1 CAPSULE ORAL 2 TIMES DAILY PRN
Status: DISCONTINUED | OUTPATIENT
Start: 2018-11-06 | End: 2018-11-07 | Stop reason: HOSPADM

## 2018-11-06 RX ORDER — MULTIVITAMIN,THERAPEUTIC
1 TABLET ORAL DAILY
Status: DISCONTINUED | OUTPATIENT
Start: 2018-11-06 | End: 2018-11-07 | Stop reason: HOSPADM

## 2018-11-06 RX ORDER — ONDANSETRON 2 MG/ML
4 INJECTION INTRAMUSCULAR; INTRAVENOUS EVERY 6 HOURS PRN
Status: DISCONTINUED | OUTPATIENT
Start: 2018-11-06 | End: 2018-11-07 | Stop reason: HOSPADM

## 2018-11-06 RX ADMIN — Medication 100 MG: at 11:26

## 2018-11-06 RX ADMIN — THERA TABS 1 TABLET: TAB at 11:26

## 2018-11-06 RX ADMIN — ACETAMINOPHEN 650 MG: 325 TABLET, FILM COATED ORAL at 15:58

## 2018-11-06 RX ADMIN — PANTOPRAZOLE SODIUM 40 MG: 40 TABLET, DELAYED RELEASE ORAL at 11:27

## 2018-11-06 RX ADMIN — ACETAMINOPHEN 650 MG: 325 TABLET, FILM COATED ORAL at 08:07

## 2018-11-06 RX ADMIN — SODIUM CHLORIDE: 9 INJECTION, SOLUTION INTRAVENOUS at 02:40

## 2018-11-06 RX ADMIN — ACETAMINOPHEN 650 MG: 325 TABLET, FILM COATED ORAL at 21:06

## 2018-11-06 ASSESSMENT — ACTIVITIES OF DAILY LIVING (ADL)
ADLS_ACUITY_SCORE: 16

## 2018-11-06 ASSESSMENT — ENCOUNTER SYMPTOMS
SHORTNESS OF BREATH: 0
BACK PAIN: 1
FEVER: 0
DIZZINESS: 0
HEADACHES: 0
VOMITING: 0
ABDOMINAL PAIN: 0

## 2018-11-06 ASSESSMENT — PAIN DESCRIPTION - DESCRIPTORS: DESCRIPTORS: ACHING

## 2018-11-06 NOTE — PLAN OF CARE
Problem: Patient Care Overview  Goal: Plan of Care/Patient Progress Review  OT: orders rec'd and chart reviewed. Pt transferred to heart center due to low HR, will hold OT today until Cards consult, noted pt now inpatient.

## 2018-11-06 NOTE — CONSULTS
Care Transition Initial Assessment - RN  Met with: Patient.  DATA   Active Problems:    Alcoholic encephalopathy (H)       Cognitive Status: alert and oriented.  Contact information and PCP information verified: Yes  Insurance concerns: No Insurance issues identified  ASSESSMENT  Patient currently receives the following services:  None   Identified issues/concerns regarding health management: Discussed observation status with patient. She verbalizes understanding.  States she is from [facility]. Uses their services for meals and bath once /week.Patient is independent with walker at baseline. States she does not remember the events of hospitalization. Feels she does not consume much alcohol. Patient is able to manage her own medications,adamant on discharging today.Discussed need for further medical care. Discussed return to [facility] is pending therapy recommendation.  PLAN  Financial costs for the patient include none .  Patient given options and choices for discharge yes.  Patient/family is agreeable to the plan?  Yes:   Patient anticipates discharging to pending therapy recommendations .   Patient anticipates needs for home equipment: Yes  Care  (CTS) will continue to follow as needed.

## 2018-11-06 NOTE — PROGRESS NOTES
[facility]    Hospitalist Progress Note    Assessment & Plan   Theresa Alves is a pleasant 89 year old woman with permanent atrial fibrillation not currently on anticoagulation, chronic pulmonary fibrosis, pulmonary hypertension, hypertension and CKD Stage 3 who presents with acute alcoholic encephalopathy.     PLAN      Acute alcoholic encephalopathy:  Daily etoh use      -- Observation with fall precautions ordered. Overnight careful IV fluid 50 ml/hour for 500 ml total and volume status reassessment. Social work and case management consulted in AM for disposition.   -day following admission, pt alert, coherent and appears at baseline MS  -drinks etoh/vodka nearly daily, 0.2 etoh level last night. Pt initially denies that it could be that high  - states she drinks etoh in part to treat her chronic back pain  - also takes daily 1/2 pill of percocet  - no recent falls  - also on digoxin and bber  - pt down playing etoh use  - + dyspepsia, wt loss, anorexia lately- could certainly be 2/2 etoh use.  No nsaids but has been on asa. ? Gastrititis. Benign abd exam   Not anemia  Plan:   -MV, thiamine, folate  - check folate and B12 levels  -PT, OT  - long discussion regarding etoh use, interaction with other meds, alternatives to treating back pain  - advised to stop all etoh for now narendra given gi symptoms and intoxication last night- pt seemed amenable to this at end of discussion  - close PCP follow up      -- Hold Lasix for now, consider resumption once blood pressure has stabilized, prior to or soon after discharge        -- Melatonin ordered prn       -- Monitor for alcohol withdrawal   -PT, OT  - pt gave me permission to speak with her niece Ericka regarding her medical issues.      Atrial fibrillation with bradycardia:   -Ms. Alves is followed by [facility]. She was hospitalized 3/2018 for afib with RVR and epistaxis. TTE showed preserved LVEF with mild RV dysfunction, severe SDAAF,  moderate TR, moderate pulmonary hypertension and mild AR.  Her DOAC was stopped. Rates controlled with Toprol and Digoxin and eventually she was discharged. Since then DOAC could not be resumed due to ongoing intermittent epistaxis.  - She saw her Cardiology provider yesterday and aspirin stopped as well, noting increased stroke risk but patient preference for palliative measures overall in her health care at this point in her life. Dyspnea was noted at this visit and Lasix was increased.    Now in the setting of alcohol intoxication she has bradycardia.  Bradycardia could account for her angela, fatigue  ? Digoxin contributing to her dyspepsia, ? nausea  AFib with slow VR on ekg this  2 sec pause on tele this am- asymptomatic  Nl bp and mentation  Digoxin level 0.9 but in extreme elderly this may be high.     Plan:   -cardiology consult - evaluate need for digoxin  - transfer to tele floor  -- Holding Toprol and Digoxin for now   -- Close outpatient follow up will be necessary at discharge  -consider outpatient cardiac monitoring     3) Pulmonary fibrosis: Full details of this diagnosis are not known to me at present. Spirometry was done in 2016 but I can not see the results.     4) CKD Stage 3: Monitor closely while hospitalized     5) Chronic lower back pain: thought due to kyphoscoliosis.   Holding Percocets for now.   - she clearly states that she drinks etoh in part to treat her back pain  - aqua k pad  - start scheduled TID Tylenol  - no nsaids  -PT eval  - close pcp follow up  - stop etoh    Dyspepsia/anorexia/wt loss  In setting of likely daily etoh use.   No dysphagia or odynophagia  ? Digoxin effect  Benign abd exam  Nl Hb  Check lipase and LfTs  Plan  PPI  ASA has been stopped  Close PCP follow up  Follow for need of EGD         Cole Guy MD  Text Page  (7am to 6pm)  Interval History   States she drinks etoh several times per week. Drinks etoh to help treat her back pain  Back pain typically 8/10  in am, takes 1/2 percocet and heating pad then 5/10 rest of day  Had vodka with ice last night  Has had decreased appetite and dyspepsia for several weeks with some wt loss  + fatigue lately  No acute changes in fatigue, change in chronic angela, syncope, falls, palpitations,cp, fever, chills, n/v, uti sxs    Remembers being in ambulance. No remember coming up to room or ED    -Data reviewed today: I reviewed all new labs and imaging results over the last 24 hours. I personally reviewed admission labs and ekg  Ekg: today, Afib with slow VR. No acute ischemic changes. ? Flutter waves    Physical Exam   Temp: 97.4  F (36.3  C) Temp src: Oral BP: 107/69 Pulse: 55 Heart Rate: 63 Resp: 18 SpO2: 97 % O2 Device: None (Room air)    Vitals:    11/05/18 2313   Weight: 50.4 kg (111 lb 3.2 oz)     Vital Signs with Ranges  Temp:  [96.9  F (36.1  C)-98  F (36.7  C)] 97.4  F (36.3  C)  Pulse:  [55] 55  Heart Rate:  [40-63] 63  Resp:  [13-19] 18  BP: ()/() 107/69  SpO2:  [97 %-99 %] 97 %       Constitutional: Sitting up in bed, appears comfortable, nontoxic, nad  Neck: nl jvp  Respiratory: Breathing easily, trace crackles L base  Cardiovascular: Irreg Irreg rhythm, no r/g/m  GI: soft, nt, nd. Nl BS  Skin/Integumen: no rash or edema  Psych:  nl affect, calm, cooperative, conversational, good attention  Neuro: grossly nonfocal, fully oriented, alert,     Medications     sodium chloride 50 mL/hr at 11/06/18 0240       sodium chloride (PF)  3 mL Intracatheter Q8H       Data     Recent Labs  Lab 11/06/18  0932 11/05/18  2323 11/05/18  0915   WBC  --  8.2  --    HGB  --  13.2  --    MCV  --  104*  --    PLT  --  197  --     134 134*   POTASSIUM 4.1 4.2 4.6   CHLORIDE 105 101 100   CO2 25 25 28   BUN 25 28 28   CR 1.16* 1.27* 1.51*   ANIONGAP 7 8 10.6   PAWEL 8.3* 8.6 9.6   GLC 72 95 112*   TROPI  --  0.034  --        Imaging:   No results found for this or any previous visit (from the past 24 hour(s)).

## 2018-11-06 NOTE — PROGRESS NOTES
Admission    Patient arrives to room 617 via cart from ED.  Care plan note: yes    Inpatient nursing criteria listed below were met:    PCD's Documented: NA  Skin issues/needs documented :Yes  Isolation education started/completed Yes  Patient allergies verified with patient: Yes  Verified completion of [facility] Risk Assessment Tool:  Yes  Verified completion of Guardianship screening tool: Yes  Fall Prevention: Care plan updated, Education given and documented Yes  Care Plan initiated: Yes  Home medications documented in belongings flowsheet: NA  Patient belongings documented in belongings flowsheet: Yes  Reminder note (belongings/ medications) placed in discharge instructions:Yes  Admission profile/ required documentation complete: Yes

## 2018-11-06 NOTE — PROGRESS NOTES
RECEIVING UNIT ED HANDOFF REVIEW    ED Nurse Handoff Report was reviewed by: Maddie Sebastian on November 6, 2018 at 1:51 AM

## 2018-11-06 NOTE — PLAN OF CARE
Problem: Patient Care Overview  Goal: Plan of Care/Patient Progress Review  Outcome: No Change  Admitted to OBS. A&O x4. VSS on RA. A1 to BSC. IVF NS running at 50, will run for 500ml. No withdrawal symptoms noted. UA sent. Tolerating clears. No c/o pain, sob, n/v. SW and CC consulted for discharge.

## 2018-11-06 NOTE — ED PROVIDER NOTES
History     Chief Complaint:  Not feeling well    HPI   Theresa Alves is a 89 year old female who presents via EMS with concern for not feeling well. EMS reports that they were called by the patient 45 minutes ago after she states that she has not been feeling well. They note that en route, she denies dizziness, emesis, stomach pain, shortness of breath, headache, and all other concerns. They state that her blood pressure is slightly up from 90 systolic to 114 systolic en route. They note that her O2 saturations were high, and that she appears to be in Afib, from 130s to 170s, although the patient notes she is always in Afib. The patient denies chest pain, shortness of breath, back pain above baseline, foot pain above baseline, fever, and all other concerns. She states that she has not eaten today, and had a vodka sprite just before she began feeling unwell. She is unable to describe the sensation of feeling unwell. She states that she typically ambulates with a walker, and manages her pain with heating pads and Percocet, though she states she has not taken any percocet this evening. She denies history of heart attack, As well as all other concerns here in the ER today.     Allergies:  NKDA    Medications:    Lasix  Vitamin D  Ocean  Zocor  Percocet   Metoprolol  Voltaren  Vitamin B    Past Medical History:    Chronic back pain  Pulmonary fibrosis  Afib with RVR  Osteoporosis  HTN    Past Surgical History:    Cataract  Carpal tunnel release  Hernia repair    Family History:    MI    Social History:  Negative for tobacco use.  Alc: daily vodka tonic    Review of Systems   Constitutional: Negative for fever.   Respiratory: Negative for shortness of breath.    Cardiovascular: Negative for chest pain.   Gastrointestinal: Negative for abdominal pain and vomiting.   Musculoskeletal: Positive for back pain.   Neurological: Negative for dizziness and headaches.   All other systems reviewed and are  "negative.      Physical Exam     Patient Vitals for the past 24 hrs:   BP Temp Temp src Pulse Heart Rate Resp SpO2 Height Weight   11/06/18 0059 - 96.9  F (36.1  C) Tympanic - - - - - -   11/06/18 0015 112/61 - - - (!) 40 - - - -   11/06/18 0000 (!) 115/106 - - - 52 17 - - -   11/05/18 2345 92/63 - - - (!) 42 15 - - -   11/05/18 2330 94/67 - - - 55 19 - - -   11/05/18 2322 114/64 - - - (!) 48 13 - - -   11/05/18 2313 105/76 - Oral 55 - 16 99 % 1.651 m (5' 5\") 50.4 kg (111 lb 3.2 oz)         Physical Exam  General: Patient is alert and normal appearing.  HEENT: Head atraumatic    Eyes: pupils equal and reactive. Conjunctiva clear   Nares: patent   Oropharynx: dry mucous membranes no lesions, uvula midline, no palatal draping, normal voice, no trismus  Neck: Supple without lymphadenopathy, no meningismus  Chest: irregularly irrgeular  Lungs: Equal clear to auscultation with no wheeze or rales  Abdomen: Soft, non tender, nondistended, normal bowel sounds  Back: No costovertebral angle tenderness, no midline C, T or L spine tenderness  Neuro: Grossly nonfocal, normal speech, strength equal bilaterally, CN 2-12 intact  Extremities: No deformities, equal radial and DP pulses. No clubbing, cyanosis. Minimal pedal edema  Skin: Warm and dry with no rash.       Emergency Department Course   ECG:    Indication: not feeling well  Time: 2317  Vent. Rate 53 bpm. GA interval *. QRS duration 80. QT/QTc 448/420. P-R-T axis * 45 229.  Atrial fibrillation with slow ventricular response. Septal infarct, age undetermined. ST and T wave abnormality, consider inferior ischemia. ST and T wave abnormality, consider anterolateral ischemia. Abnormal ECG. Compared to EKG dated 3/16/2018: Septal infarct newly noted, anterolateral infarct no longer notesdx. Read time: 2317    Laboratory:    CBC: WBC: 8.2, HGB: 13.2, PLT: 197  BMP: GFR estimate: 39 (L), o/w WNL (Creatinine: 1.27 (H))    2323 Troponin: 0.034  0001 Digoxin: 0.9    2345 ETOH: 0.2 " "(H)    UA with Microscopic:pending    Interventions:    2325 NS 1L IV      Emergency Department Course:    Nursing notes and vitals reviewed. (2308) I performed an exam of the patient as documented above.     EKG obtained in the ED, see results above.     (2316) Niece and family arrive in ER    IV inserted. Medicine administered as documented above. Blood drawn. This was sent to the lab for further testing, results above.    (2335) I rechecked the patient and discussed the results of her workup thus far.     (2346) I rechecked the patient and discussed the results of her workup thus far. She does not believe that her LEIGH is 0.2 .     (0045)  I consulted with Dr. Cueva of the hospitalist services. They are in agreement to accept the patient for admission.    Findings and plan explained to the Patient and Niece who consents to admission. Discussed the patient with Dr. Cueva, who will admit the patient to a observation bed for further monitoring, evaluation, and treatment.    Impression & Plan      Medical Decision Making:  Patient is a 89-year-old female presents the emergency department with generalized weakness and a feeling of \"she is going to die\".  Patient states this began after having her usual cocktail of vodka and Sprite tonight.  She states that she does not have much appetite for food and did not eat much this evening.  Patient denies chest pain, shortness of breath, fever, cough, cold, congestion, new back or neck pain, headache, falls, dysuria, urgency, frequency.  Patient was initially noted to be hypotensive but responded well to IV fluids.  She has a history of persistent atrial fibrillation and is noted to have slow ventricular response into the 40s occasionally but responds on its own back into the 50s generally.  Digoxin level was within normal limits.  Patient is started to feel somewhat improved but is noted to have an alcohol level of 0.2.  Patient has a niece who helps her but she lives " alone added assisted living without much regular assistance.  Given that it is 1:00 in the morning and the level of her alcohol and her intermittent slow ventricular response I felt that it was prudent to watch her in the observation unit to metabolize alcohol and ensure that she is stable for independent walking following this.  Her nieces wish to go home and therefore we will continue to monitor her in the observation unit with telemetry.  Her troponin is detectable but not elevated and there is no new acute ischemic changes that would suggest acute MI at this time on her EKG.  Given the lack of chest pain I feel is unlikely to represent acute coronary syndrome.  Patient expressed agreement understanding of the plan for observation stay.  All questions and concerns addressed.      Diagnosis:    ICD-10-CM    1. Generalized muscle weakness M62.81    2. Alcohol intoxication, uncomplicated (H) F10.920    3. Atrial fibrillation with slow ventricular response (H) I48.91    4. Orthostatic hypotension I95.1        Disposition:  Admitted to observation bed under care of Dr. Cueva.    Scribe Disclosure:  IPio, am serving as a scribe on 11/5/2018 at 11:12 PM to personally document services performed by Vita Booth MD, based on my observations and the provider's statements to me.       Pio Cm  11/5/2018    EMERGENCY DEPARTMENT       Vita Booth MD  11/06/18 0112

## 2018-11-06 NOTE — PROGRESS NOTES
Patient was A/O x4. HR garrett- 50's, MD paged at 0800 and order was placed to put patient on tele. Tele: afib slow rate with a couple pauses noted. MD notified- STAT EKG completed and patient to tx to heart center. Other VSS on RA. Up w/1 GB/walker. C/o chronic back pain, given tylenol and aqua k pad ordered; MD did not want to resume PTA percocet. C/o SOB/HORTON, per patient this is baseline for her, LS diminished. No s/s of alcohol withdrawal. No nausea. Advanced to full liquid diet, tolerating. Voided. Blanchable redness noted on coccyx, spot on spine and right great toe. Niece updated with patient permission, hand off report given to ALISHA Leonard and patient was transferred to heart center around 1130.

## 2018-11-06 NOTE — CONSULTS
[facility]    Cardiology Consultation     Date of Admission:  11/5/2018  Date of Consult (11-6-2018)    Assessment & Plan   Theresa Alves is a 89 year old female who was admitted on 11/5/2018 with acute alcohol intoxication/encephalopathy. We were asked to see the patient for chronic atrial fibrillation with bradycardia    This plan has been reviewed with Dr. Maurisio Walton who will see the patient as well.    1. Chronic atrial fibrillation  -History of tachycardia with atrial fibrillation in the past precipitating heart failure  -Likely her digoxin level, given her renal dysfunction, is contributing to her bradycardia on this admission.  -Since heart rates continue to improve with holding her AV isaías blockade we would recommend continuing to hold these medications for now  -no current indication for pacemaker  -restart Metoprolol at lower dose when able and heart rates stablize  - Perhaps an outpatient Holter monitor on discharge would be helpful with gentle reinitiation of metoprolol depending her heart rates in the next 24-48 hours.  -She will likely need earlier follow-up at our clinic which I will attempt to arrange  -She had multiple episodes of epistaxis prompting discontinuation of Eliquis in the past and recently aspirin.      2.  Heart failure with preserved ejection fraction with moderate tricuspid insufficiency and significant pulmonary hypertension likely in part related to her pulmonary fibrosis  Weight stable at 111 pounds  Normally on Lasix 40mg daily-restart as appropriate  -be cautious with hydration with history of HFpEF as may have narrow therapeutic window      3.  Acute alcohol intoxication with encephalopathy  ETOH level 0.2  She denies drinking more than one drink 4 times per week  MCV elevated    4.  Chronic kidney disease with baseline creatinines that appear to be 1.5-1.6  -Lasix was increased to 60 mg/day at her last office visit yesterday, usual dose is 40  mg/day.  -She is hydrating this admission   -consider restarting her usual 40 mg of furosemide prior to discharge    5.  Pulmonary fibrosis    You very much for allowing us to be involved in the care of this patient.  We will follow her with you.    Liana Phan, MSN, APRN, CNP    Code Status    DNR/DNI    Reason for Consult   Reason for consult: Chronic atrial fibrillation with bradycardia    Primary Care Physician   Marbin Rouse        History of Present Illness   Theresa Alves is a 89 year old female who is followed in our clinic by Dr. Walker and Lolis Davenport, physician assistant.  She has a past medical history significant for pulmonary fibrosis, hypertension, history of 3 chronic kidney disease, kyphoscoliosis, recent diagnosis in the past few months of atrial fibrillation with rapid ventricular response causing heart failure exacerbation and heart failure with preserved ejection fraction.  She was hospitalized in March of this year for difficult to control atrial fibrillation and fluid retention.  Eventually she was put on metoprolol 200 mg a day with digoxin which did offer her rate control.    She was placed on Eliquis and after discharge had multiple nosebleeds.  This made her quite upset and the Eliquis was discontinued.  She is been seen by palliative care who reduced the aspirin 81 mg/day and then subsequently every other day at the last office visit yesterday.  Given the fact that this offered no protection from stroke and ongoing nosebleeds the aspirin was discontinued.    Her usual providers in the clinic have felt that much of her shortness of breath is likely related to pulmonary fibrosis.  Historically, she has not had diuretics increased as she is taking more of a palliative care approach.  She was trialed on 60 mg a day of furosemide to see if her breathing improved but was subsequently admitted last evening with acute alcohol intoxication and encephalopathy.  She drinks  vodka  daily and alcohol level on presentation was 0.2.  She states she drinks alcohol in part to treat her chronic pain and she uses half a pill of Percocet a day.  She has had falls over the last year but none recently.  MCV is also high at 104.    In the setting of her alcohol intoxication her heart rate was quite bradycardic into the 40s with chronic atrial fibrillation.  Digoxin level 0.9.  Metoprolol and digoxin have been on hold as she had a 2-second pause on telemetry this am which was asymptomatic.  Digoxin level 0.9 in this elderly patient with GFR of 39-43 certainly could be contributing here.  She was admitted with generalized weakness and a feeling of impending doom.  She was intoxicated in the emergency department and agitated on initial presentation.    She denies any chest discomfort or shortness of breath.  Denies any syncope presyncope or lightheadedness.  She has had no falls in the last 48 hours.    Lab data: Troponins are negative at 0.034; alcohol level 0.2; digoxin level 0.9; creatinine 1.16 with a baseline of 1.5-1.6 potassium 4.1 sodium 137 MCV high at 104 with a hemoglobin of 13.2.  Twelve-lead EKG shows chronic atrial fibrillation current with heart rate in the high 50s and low 60s.  Periodically at the bedside up into the 70s.  There is a telemetry strip mounted from earlier today with pulse rates briefly of 49 however the patient was asymptomatic.    Past Medical History   I have reviewed this patient's medical history and updated it with pertinent information if needed.   Past Medical History:   Diagnosis Date     Atrial fibrillation (H)      Chronic back pain      Chronic diastolic CHF (congestive heart failure) (H)      Chronic kidney disease, stage III (moderate) (H)      Pulmonary fibrosis (H)      Pulmonary hypertension (H)      Unspecified essential hypertension     Essential hypertension         Past Surgical History   I have reviewed this patient's surgical history and updated it with  pertinent information if needed.  Past Surgical History:   Procedure Laterality Date     CARPAL TUNNEL RELEASE RT/LT  1990's    R     CATARACT IOL, RT/LT  5/03    R     HERNIA REPAIR, INGUINAL RT/LT  1954    R     hiatal hernia repair  6/10    large paraesophageal hiatal hernia; Nissen fundoplication          Prior to Admission Medications   Prior to Admission Medications   Prescriptions Last Dose Informant Patient Reported? Taking?   Cyanocobalamin (VITAMIN B 12 PO) 11/5/2018 at Unknown time Self Yes Yes   Sig: Take 1 tablet by mouth daily    VITAMIN D, CHOLECALCIFEROL, PO 11/5/2018 at Unknown time Self Yes Yes   Sig: Take 1,000 Units by mouth daily.     acetaminophen (TYLENOL) 500 MG tablet 11/5/2018 at Unknown time Self Yes Yes   Sig: Take 500 mg by mouth daily    digoxin (LANOXIN) 125 MCG tablet 11/5/2018 at Unknown time Pharmacy No Yes   Sig: Take 1 tablet (125 mcg) by mouth every other day As of 4/10/18   furosemide (LASIX) 20 MG tablet 11/5/2018 at Unknown time Self No Yes   Sig: Take 3 tablets (60 mg) by mouth daily   metoprolol succinate (TOPROL-XL) 200 MG 24 hr tablet 11/5/2018 at Unknown time Self No Yes   Sig: Take 1 tablet (200 mg) by mouth daily   oxyCODONE-acetaminophen (PERCOCET) 5-325 MG per tablet 11/5/2018 at Unknown time Self No Yes   Sig: Take 0.5 tab every morning.  Max of 0.5 tab daily.   simvastatin (ZOCOR) 20 MG tablet  Self No Yes   Sig: TAKE HALF TABLET BY MOUTH AT BEDTIME   Patient taking differently: Pt take one tab every other day.   sodium chloride (OCEAN) 0.65 % nasal spray prn at prn Self No Yes   Sig: Spray 1 spray into both nostrils 3 times daily as needed for congestion      Facility-Administered Medications: None     Allergies   No Known Allergies    Social History    She has recently moved into [facility] which is an assisted living in [place].  She drinks 1 vodka tonic nightly.  There, she is charged for any assistance.  For example, when she needs a ride back to her room  after dinner, she is charged $8 for this.  She is charged $2 extra to have someone bring her meal to her room.  She sets up her own pills and knows them inside and out.  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  She comes in with her niece, Ericka.  She does not have not any children.     Family History   I have reviewed this patient's family history and updated it with pertinent information if needed.   Family History   Problem Relation Age of Onset     Unknown/Adopted Mother      Unknown/Adopted Father      Diabetes No family hx of      Coronary Artery Disease No family hx of      Hypertension No family hx of      Hyperlipidemia No family hx of      Cerebrovascular Disease No family hx of      Breast Cancer No family hx of      Colon Cancer No family hx of      Prostate Cancer No family hx of      Other Cancer No family hx of      Depression No family hx of      Anxiety Disorder No family hx of      Mental Illness No family hx of      Substance Abuse No family hx of      Anesthesia Reaction No family hx of      Asthma No family hx of      Osteoporosis No family hx of      Genetic Disorder No family hx of      Thyroid Disease No family hx of      Obesity No family hx of      Review of Systems   The 10 point Review of Systems is negative other than noted in the HPI     Physical Exam   Temp: 97.4  F (36.3  C) Temp src: Oral BP: 107/69 Pulse: 55 Heart Rate: 63 Resp: 18 SpO2: 97 % O2 Device: None (Room air)    Vital Signs with Ranges  Temp:  [96.9  F (36.1  C)-98  F (36.7  C)] 97.4  F (36.3  C)  Pulse:  [55] 55  Heart Rate:  [40-63] 63  Resp:  [13-19] 18  BP: ()/() 107/69  SpO2:  [97 %-99 %] 97 %  111 lbs 3.2 oz    Constitutional     alert and oriented, in no acute distress.      Skin     warm and dry     ENT     no pallor or cyanosis     Neck    Supple, JVP mildly elevated-v wave, no carotid bruit     Chest     no tenderness to palpation      Lungs  Coarse crackles 1/3 up bilaterally more pronounced  on left than right     Cardiac  Irregularly irregular, garrett rhythm, S1 normal, S2 normal, No S3 or S4, Ii/VI systolic murmur at LLSB murmurs, no rubs     Abdomen     abdomen soft, bowel sounds normoactive, no hepatosplenomegaly     Extremities and Back     no clubbing, cyanosis. No edema observed.  kyphoscoliosis      Neurological     no gross motor deficits noted, affect appropriate, oriented to time, person and place.     Data   Recent Labs   Lab Test  11/05/18 2323  06/05/18   1142   WBC  8.2  10.2   HGB  13.2  13.4   MCV  104*  104*   PLT  197  221      Recent Labs   Lab Test  11/06/18   0932  11/05/18   2323   NA  137  134   POTASSIUM  4.1  4.2   CHLORIDE  105  101   BUN  25  28   CR  1.16*  1.27*       Recent Labs  Lab 11/06/18  0932 11/05/18  2323 11/05/18  0915   GLC 72 95 112*     Recent Labs   Lab Test  03/18/18   0540  03/16/18   2304   ALT  27  45   AST  24  43     Troponin I ES   Date Value Ref Range Status   11/05/2018 0.034 0.000 - 0.045 ug/L Final     Comment:     The 99th percentile for upper reference range is 0.045 ug/L.  Troponin values   in the range of 0.045 - 0.120 ug/L may be associated with risks of adverse   clinical events.     03/16/2018 0.033 0.000 - 0.045 ug/L Final     Comment:     The 99th percentile for upper reference range is 0.045 ug/L.  Troponin values   in the range of 0.045 - 0.120 ug/L may be associated with risks of adverse   clinical events.     09/03/2012 0.049 (H) 0.000 - 0.034 ug/L Final     Comment:     The 99th percentile for upper reference range is 0.034 ug/L. Troponin values   in   the range of 0.035 - 0.120 ug/L may be associated with risks of adverse   clinical events.   09/03/2012 0.093 (H) 0.000 - 0.034 ug/L Final     Comment:     The 99th percentile for upper reference range is 0.034 ug/L. Troponin values   in   the range of 0.035 - 0.120 ug/L may be associated with risks of adverse   clinical events.   09/02/2012 0.098 (H) 0.000 - 0.034 ug/L Final      Comment:     The 99th percentile for upper reference range is 0.034 ug/L. Troponin values   in   the range of 0.035 - 0.120 ug/L may be associated with risks of adverse   clinical events.       No lab results found.    Lab Results   Component Value Date    CHOL 142 11/28/2017     Lab Results   Component Value Date    HDL 69 11/28/2017     Lab Results   Component Value Date    LDL 52 11/28/2017     Lab Results   Component Value Date    TRIG 107 11/28/2017     Lab Results   Component Value Date    CHOLHDLRATIO 2.7 01/10/2014          TSH   Date Value Ref Range Status   03/16/2018 4.13 (H) 0.40 - 4.00 mU/L Final       No results found for this or any previous visit (from the past 24 hour(s)).

## 2018-11-06 NOTE — PHARMACY-ADMISSION MEDICATION HISTORY
Admission medication history interview status for the 11/5/2018  admission is complete. See EPIC admission navigator for prior to admission medications     Medication history source reliability:Moderate    Actions taken by pharmacist (provider contacted, etc): Called and verified doses with pharmacy      Additional medication history information not noted on PTA med list :None    Medication reconciliation/reorder completed by provider prior to medication history? No    Time spent in this activity: 10 minutes     Prior to Admission medications    Medication Sig Last Dose Taking? Auth Provider   acetaminophen (TYLENOL) 500 MG tablet Take 500 mg by mouth daily  11/5/2018 at Unknown time Yes Reported, Patient   Cyanocobalamin (VITAMIN B 12 PO) Take 1 tablet by mouth daily  11/5/2018 at Unknown time Yes Reported, Patient   digoxin (LANOXIN) 125 MCG tablet Take 1 tablet (125 mcg) by mouth every other day As of 4/10/18 11/5/2018 at Unknown time Yes Marbin Rouse MD   furosemide (LASIX) 20 MG tablet Take 3 tablets (60 mg) by mouth daily 11/5/2018 at Unknown time Yes More, Lolis Gonzalez PA-C   metoprolol succinate (TOPROL-XL) 200 MG 24 hr tablet Take 1 tablet (200 mg) by mouth daily 11/5/2018 at Unknown time Yes Marbin Rouse MD   oxyCODONE-acetaminophen (PERCOCET) 5-325 MG per tablet Take 0.5 tab every morning.  Max of 0.5 tab daily. 11/5/2018 at Unknown time Yes Marbin Rouse MD   simvastatin (ZOCOR) 20 MG tablet TAKE HALF TABLET BY MOUTH AT BEDTIME  Patient taking differently: Pt take one tab every other day.  Yes Marbin Rouse MD   sodium chloride (OCEAN) 0.65 % nasal spray Spray 1 spray into both nostrils 3 times daily as needed for congestion prn at prn Yes Edis Granados MD   VITAMIN D, CHOLECALCIFEROL, PO Take 1,000 Units by mouth daily.   11/5/2018 at Unknown time Yes Unknown, Entered By History

## 2018-11-06 NOTE — CONSULTS
"CLINICAL NUTRITION SERVICES  -  ASSESSMENT NOTE      Recommendations Ordered by Registered Dietitian (RD):     Nutrition education ---> Reviewed importance of adequate po intake.  Made several suggestions (pt had an excuse for why she couldn't do anything I suggested - don't have energy to put [place] Breakfast powder into a glass of milk,  Boost bottle is too hard to open everyday, can't put PB and jelly on toast because I don't like to mix them, can't eat small, frequent meals because I can't taste anything...\").  Niece rolling her eyes as pt made excuses.  I did tell pt that I would like her to drink 1 Boost supplement everyday.  I recommended she ask a neighbor/staff/family member to open several bottles at a time and keep them in the frig (so she doesn't have to open them).  She said she would try.  I also provided her with some coupons for Boost (told the niece about the Boost Very High Calorie variety to look for - 530 cals, 22 gm pro).  Will send a 4oz chocolate Boost at 10am and 2pm  (went down to the kitchen and got a glass for her - brought the cup to pt and she said, \"I can't get the cover off the cup\" - pt didn't even try to lift the lid, which was just resting on the cup).  She will need MUCH encouragement.     Malnutrition: (11/6)  % Weight Loss:  Up to 5% in 1 month (non-severe malnutrition)  % Intake:  No decreased intake noted  Subcutaneous Fat Loss:  None observed  Muscle Loss:  Clavicle bone region  - mild depletion  Fluid Retention:  None noted    Malnutrition Diagnosis: Non-Severe malnutrition  In Context of:  Environmental or social circumstances          REASON FOR ASSESSMENT  Theresa Alves is a 89 year old female seen by Registered Dietitian for Admission Nutrition Risk Screen - Unintentional weight loss of 10# or more in past 2 months and RN Consult - \"pt states she doesn't like to eat and thinks shes lost some more weight\"      NUTRITION HISTORY  Visited with pt this morning (niece " "also present)  Pt lives at an [facility] evening meal in the dining room  Family and staff will help her with groceries - \"when I get my hair done, the lady will run to the store for me\"  \"They charge me $2 to deliver it to my room.  And they charge me $8 if I need a ride back to my room after dinner - if I am too tired to walk back.\"  She tells me that she likes to eat fruit and fresh veggies  Will have toast with PB or jam and coffee for breakfast  Has Boost at home, but only drinks 1 every few days  She will drink skim milk  Likes crackers - won't eat cheese with them, \"don't have the strength to cut up cheese\" (discussed buying pre-cup cheese - \"Oh, I wouldn't do that\")  Notes that she doesn't have any energy to  the kitchen and make a meal - \"sometimes just using the microwave is too much\"  Also complains that opening the Boost drinks is too hard    Pt states that she has not had an appetite for a while and that her stomach \"has not felt well\" for the past few weeks  \"When you don't have your taste, smell or use of your hands, it is hard to eat\"    Pt did note that she has never been a big eater - \"meals have always been small\"    Niece states that she will bring food over and sometimes the pt will eat everything - \"remember that I just brought you some pork chops and potatoes - you were ravenous and ate it all\"  Niece notes that intake varies - \"sometimes I think she just wants a home cooked meal\"    Per chart review (11/5/18) - \"She drinks 1 vodka tonic nightly\"         CURRENT NUTRITION ORDERS  Diet Order:     Full liquid    Pt asking why she is on a liquid diet, and when she can have solids  Notes that she ate cream of wheat and juice for lunch - \"the Greek yogurt was awful!\"    She is agreeable to a 4oz Boost BID  \"You can send it, but I am going home tomorrow\"      PHYSICAL FINDINGS  Observed  Muscle Wasting  - clavicle  Obtained from Chart/Interdisciplinary " "Team  Frail    ANTHROPOMETRICS  Height: 5' 5\"  Weight:(11/5) 50.4 kg / 111 lbs 3.2 oz  Body mass index is 18.5 kg/(m^2).  Weight Status:  Underweight BMI <18.5  IBW: 56.8 kg  % IBW: 89%  Weight History: Wt is down ~5# (4%) past 2 months  Wt Readings from Last 10 Encounters:   11/05/18 50.4 kg (111 lb 3.2 oz)   11/05/18 50.4 kg (111 lb 3.2 oz)   09/11/18 52.6 kg (116 lb)   09/10/18 52.6 kg (116 lb)   08/12/18 52.6 kg (116 lb)   07/30/18 52.6 kg (116 lb)   07/25/18 52.6 kg (116 lb)   07/20/18 52.6 kg (116 lb)   06/13/18 53.3 kg (117 lb 9.6 oz)   06/05/18 52.6 kg (116 lb)         LABS  Labs reviewed    MEDICATIONS  Medications reviewed        MALNUTRITION:  % Weight Loss:  Up to 5% in 1 month (non-severe malnutrition)  % Intake:  No decreased intake noted  Subcutaneous Fat Loss:  None observed  Muscle Loss:  Clavicle bone region  - mild depletion  Fluid Retention:  None noted    Malnutrition Diagnosis: Non-Severe malnutrition  In Context of:  Environmental or social circumstances    NUTRITION DIAGNOSIS:  Inadequate oral intake related to decreased appetite with not feeling well as evidenced by pt note she has been eating small amounts      NUTRITION INTERVENTIONS  Recommendations / Nutrition Prescription  Full liquid diet (advance per MD)    Nutrition supplement  .      Implementation  Nutrition education ---> Reviewed importance of adequate po intake.  Made several suggestions (pt had an excuse for why she couldn't do anything I suggested - don't have energy to put [place] Breakfast powder into a glass of milk,  Boost bottle is too hard to open everyday, can't put PB and jelly on toast because I don't like to mix them, can't eat small, frequent meals because I can't taste anything...\").  Niece rolling her eyes as pt made excuses.  I did tell pt that I would like her to drink 1 Boost supplement everyday.  I recommended she ask a neighbor/staff/family member to open several bottles at a time and keep them in the frig " "(so she doesn't have to open them).  She said she would try.  I also provided her with some coupons for Boost (told the niece about the Boost Very High Calorie variety to look for - 530 cals, 22 gm pro).  Will send a 4oz chocolate Boost at 10am and 2pm  (went down to the kitchen and got a glass for her - brought the cup to pt and she said, \"I can't get the cover off the cup\" - pt didn't even try to lift the lid, which was just resting on the cup).  She will need MUCH encouragement.  .      Nutrition Goals  Pt to drink 1 bottle of Boost (or equivalent supplement) per day  .      MONITORING AND EVALUATION:  Progress towards goals will be monitored and evaluated per protocol and Practice Guidelines                "

## 2018-11-06 NOTE — ED NOTES
[facility]  ED Nurse Handoff Report    ED Chief complaint: Hypotension (pt called EMS after feeling dizzy, nauseated, and light headed.  Hypotensive upon arrival)      ED Diagnosis:   Final diagnoses:   None       Code Status: DNR / DNI    Allergies: No Known Allergies    Activity level - Baseline/Home:  Independent    Activity Level - Current:   Stand with Assist     Needed?: No    Isolation: yes  Infection: MRSA  Bariatric?: No    Vital Signs:   Vitals:    11/05/18 2330 11/05/18 2345 11/06/18 0000 11/06/18 0015   BP: 94/67 92/63 (!) 115/106 112/61   Pulse:       Resp: 19 15 17    TempSrc:       SpO2:       Weight:       Height:           Cardiac Rhythm: ,   Cardiac  Cardiac Rhythm: Atrial fibrillation    Pain level:      Is this patient confused?: No   [place] - Suicide Severity Rating Scale Completed?  Yes  If yes, what color did the patient score?  White    Patient Report: Initial Complaint: hypotension  Focused Assessment: 89 year old female who presents via EMS with concern for not feeling well. EMS reports that they were called by the patient 45 minutes ago after she states that she has not been feeling well. They note that en route, she denies dizziness, emesis, stomach pain, shortness of breath, headache, and all other concerns. They state that her blood pressure is slightly up from 90 systolic to 114 systolic en route. They note that her O2 saturations were high, and that she appears to be in Afib, from 130s to 170s, although the patient notes she is always in Afib. The patient denies chest pain, shortness of breath, back pain above baseline, foot pain above baseline, fever, and all other concerns. She states that she has not eaten today, and had a vodka sprite just before she began feeling unwell. She is unable to describe the sensation of feeling unwell. She states that she typically ambulates with a walker, and manages her pain with heating pads and Percocet, though she  states she has not taken any percocet this evening. A&O.  Tests Performed: labs  Abnormal Results:   Results for orders placed or performed during the hospital encounter of 11/05/18 (from the past 24 hour(s))   EKG 12-lead, tracing only   Result Value Ref Range    Interpretation ECG Click View Image link to view waveform and result    CBC with platelets differential   Result Value Ref Range    WBC 8.2 4.0 - 11.0 10e9/L    RBC Count 3.78 (L) 3.8 - 5.2 10e12/L    Hemoglobin 13.2 11.7 - 15.7 g/dL    Hematocrit 39.2 35.0 - 47.0 %     (H) 78 - 100 fl    MCH 34.9 (H) 26.5 - 33.0 pg    MCHC 33.7 31.5 - 36.5 g/dL    RDW 13.3 10.0 - 15.0 %    Platelet Count 197 150 - 450 10e9/L    Diff Method Automated Method     % Neutrophils 40.6 %    % Lymphocytes 42.7 %    % Monocytes 12.6 %    % Eosinophils 3.1 %    % Basophils 0.4 %    % Immature Granulocytes 0.6 %    Nucleated RBCs 1 (H) 0 /100    Absolute Neutrophil 3.3 1.6 - 8.3 10e9/L    Absolute Lymphocytes 3.5 0.8 - 5.3 10e9/L    Absolute Monocytes 1.0 0.0 - 1.3 10e9/L    Absolute Eosinophils 0.3 0.0 - 0.7 10e9/L    Absolute Basophils 0.0 0.0 - 0.2 10e9/L    Abs Immature Granulocytes 0.1 0 - 0.4 10e9/L    Absolute Nucleated RBC 0.1    Basic metabolic panel   Result Value Ref Range    Sodium 134 133 - 144 mmol/L    Potassium 4.2 3.4 - 5.3 mmol/L    Chloride 101 94 - 109 mmol/L    Carbon Dioxide 25 20 - 32 mmol/L    Anion Gap 8 3 - 14 mmol/L    Glucose 95 70 - 99 mg/dL    Urea Nitrogen 28 7 - 30 mg/dL    Creatinine 1.27 (H) 0.52 - 1.04 mg/dL    GFR Estimate 39 (L) >60 mL/min/1.7m2    GFR Estimate If Black 47 (L) >60 mL/min/1.7m2    Calcium 8.6 8.5 - 10.1 mg/dL   Alcohol ethyl   Result Value Ref Range    Ethanol g/dL 0.20 (H) <0.01 g/dL   Troponin I   Result Value Ref Range    Troponin I ES 0.034 0.000 - 0.045 ug/L   Digoxin level   Result Value Ref Range    Digoxin Level 0.9 0.5 - 2.0 ug/L       Treatments provided: bolus, monitor    Family Comments: deo    OBS  brochure/video discussed/provided to patient/family: Yes              Name of person given brochure if not patient: xx              Relationship to patient: xx    ED Medications:   Medications   0.9% sodium chloride BOLUS (1,000 mLs Intravenous New Bag 11/5/18 6278)       Drips infusing?:  No    For the majority of the shift this patient was Green.   Interventions performed were xx.    Severe Sepsis OR Septic Shock Diagnosis Present: No    To be done/followed up on inpatient unit:  xx    ED NURSE PHONE NUMBER: [id number]

## 2018-11-06 NOTE — H&P
[facility]    History and Physical  Hospitalist       Date of Admission:  11/5/2018  Date of Service (when I saw the patient): 11/06/18    ASSESSMENT  Theresa Alves is a pleasant 89 year old woman with permanent atrial fibrillation not currently on anticoagulation, chronic pulmonary fibrosis, pulmonary hypertension, hypertension and CKD Stage 3 who presents with acute alcoholic encephalopathy.    PLAN    1) Acute alcoholic encephalopathy:    -- Observation with fall precautions ordered. Careful IV fluid 50 ml/hour for 500 ml total and volume status reassessment. Social work and case management consulted in AM for disposition.      -- Hold Lasix for now, consider resumption once blood pressure has stabilized, prior to or soon after discharge        -- Melatonin ordered prn       -- Monitor for alcohol withdrawal    2) Atrial fibrillation with bradycardia: Ms. Alves is followed by [facility]. She was hospitalized 3/2018 for afib with RVR and epistaxis. TTE showed preserved LVEF with mild RV dysfunction, severe SADAF, moderate TR, moderate pulmonary hypertension and mild AR.  Her DOAC was stopped. Rates controlled with Toprol and Digoxin and eventually she was discharged. Since then DOAC could not be resumed due to ongoing intermittent epistaxis. She saw her Cardiology provider yesterday and aspirin stopped as well, noting increased stroke risk but patient preference for palliative measures overall in her health care at this point in her life. Dyspnea was noted at this visit and Lasix was increased.       Now in the setting of alcohol intoxication she has bradycardia.    -- Bradycardia seems to be improving with supportive care; however if it persists in AM she may need Cardiology consultation for assessment of medication regimen especially ongoing use of Digoxin.        -- Holding Toprol and Digoxin for now        -- Close outpatient follow up will be necessary at discharge    3) Pulmonary  fibrosis: Full details of this diagnosis are not known to me at present. Spirometry was done in 2016 but I can not see the results.    4) CKD Stage 3: Monitor closely while hospitalized    5) Chronic lower back pain: thought due to kyphoscoliosis. Holding Percocets for now.    Chief Complaint   Weakness    History is obtained from the patient, two nieces at the bedside, and the ED physician whom I have spoken with    History of Present Illness   Theresa Alves is a pleasant 89 year old woman who called EMS from her assisted living facility after feeling increased generalized weakness this evening, associated with feelings of impending doom. She was intoxicated in the ED and remains agitated on my interview though she seems oriented. She says she has chronic lower back pain and leg numbness and takes one Percocet pill in the morning, and drinks one vodka cocktail for the pain at night. She denies drinking more than that tonight. She had no food today. She ambulates with a walker. She denies nausea, abdominal pain, dizziness, dyspnea, cough or any other acute complaints to me. She adds that she does not want to be here and is adamant to go home in AM.    In the ED, pulse ranged from 40-55, BP 92/63 improved with IV fluid to 112/61. She was not hypoxic.    CBC and BMP were unremarkable (Cr 1.3 with baseline 1.5-1.6). Troponin 0.034. Digoxin level 0.9. EKG confirmed atrial fibrillation with bradycardia without ST segment changes. Q waves were seen in V1 and V2. Ethanol level 0.2. She was given 1 liter IV fluid in total.    Data   Labs Ordered and Resulted from Time of ED Arrival Up to the Time of Departure from the ED   CBC WITH PLATELETS DIFFERENTIAL - Abnormal; Notable for the following:        Result Value    RBC Count 3.78 (*)      (*)     MCH 34.9 (*)     Nucleated RBCs 1 (*)     All other components within normal limits   BASIC METABOLIC PANEL - Abnormal; Notable for the following:     Creatinine 1.27  "(*)     GFR Estimate 39 (*)     GFR Estimate If Black 47 (*)     All other components within normal limits   ALCOHOL ETHYL - Abnormal; Notable for the following:     Ethanol g/dL 0.20 (*)     All other components within normal limits   TROPONIN I   DIGOXIN LEVEL   UA MACROSCOPIC WITH REFLEX TO MICRO AND CULTURE   PERIPHERAL IV CATHETER   CARDIAC CONTINUOUS MONITORING       PHYSICAL EXAM  Blood pressure 112/61, pulse 55, resp. rate 17, height 1.651 m (5' 5\"), weight 50.4 kg (111 lb 3.2 oz), SpO2 99 %, not currently breastfeeding.  Constitutional: Alert and oriented to person, place and time; slightly agitated; thin and frail  HEENT: normocephalic dry mucus membranes  Respiratory: lungs clear to auscultation bilaterally  Cardiovascular: Irregular S1 S2   GI: abdomen soft non tender non distended bowel sounds positive  Lymph/Hematologic: no pallor, no cervical lymphadenopathy  Skin: no rash, good turgor  Musculoskeletal: no clubbing, cyanosis or edema  Neurologic: extra-ocular muscles intact; moves all four extremities    Data reviewed today:  I personally reviewed the EKG tracing showing atrial fibrillation with bradycardia without ST segment changes.    No results found for this or any previous visit (from the past 24 hour(s)).    DVT Prophylaxis: Pneumatic Compression Devices  Code Status: DNR / DNI; POLST forms reviewed as well as Palliative Care consult note with Dr. Leiva from 5/2018    Disposition: Expected discharge in 0-2 days    Jay Cueva MD    Primary Care Physician   *Marbin Rouse    Past Medical History    I have reviewed this patient's medical history and updated it with pertinent information if needed.   Past Medical History:   Diagnosis Date     Atrial fibrillation (H)      Chronic back pain      Chronic diastolic CHF (congestive heart failure) (H)      Chronic kidney disease, stage III (moderate) (H)      Pulmonary fibrosis (H)      Pulmonary hypertension (H)      Unspecified " essential hypertension     Essential hypertension       Past Surgical History   I have reviewed this patient's surgical history and updated it with pertinent information if needed.  Past Surgical History:   Procedure Laterality Date     CARPAL TUNNEL RELEASE RT/LT  1990's    R     CATARACT IOL, RT/LT  5/03    R     HERNIA REPAIR, INGUINAL RT/LT  1954    R     hiatal hernia repair  6/10    large paraesophageal hiatal hernia; Nissen fundoplication        Prior to Admission Medications   Prior to Admission Medications   Prescriptions Last Dose Informant Patient Reported? Taking?   Cyanocobalamin (VITAMIN B 12 PO)  Self Yes No   Sig: Take 1 tablet by mouth daily    VITAMIN D, CHOLECALCIFEROL, PO  Self Yes No   Sig: Take 1,000 Units by mouth daily.     acetaminophen (TYLENOL) 500 MG tablet   Yes No   Sig: Take 500 mg by mouth daily    diclofenac (VOLTAREN) 1 % GEL topical gel   No No   Sig: Apply 2 grams to painful areas up to twice a day   digoxin (LANOXIN) 125 MCG tablet   No No   Sig: Take 1 tablet (125 mcg) by mouth every other day As of 4/10/18   furosemide (LASIX) 20 MG tablet   No No   Sig: Take 3 tablets (60 mg) by mouth daily   metoprolol succinate (TOPROL-XL) 200 MG 24 hr tablet   No No   Sig: Take 1 tablet (200 mg) by mouth daily   oxyCODONE-acetaminophen (PERCOCET) 5-325 MG per tablet   No No   Sig: Take 0.5 tab every morning.  Max of 0.5 tab daily.   simvastatin (ZOCOR) 20 MG tablet  Self No No   Sig: TAKE HALF TABLET BY MOUTH AT BEDTIME   Patient taking differently: Pt take one tab every other day.   sodium chloride (OCEAN) 0.65 % nasal spray   No No   Sig: Spray 1 spray into both nostrils 3 times daily as needed for congestion      Facility-Administered Medications: None     Allergies   No Known Allergies    Social History   I have reviewed this patient's social history and updated it with pertinent information if needed. Theresa Alves  reports that she has never smoked. She has never used smokeless  tobacco. She reports that she drinks alcohol. She reports that she does not use illicit drugs.    Family History   Family history assessed and, except as above, is non-contributory.    Family History   Problem Relation Age of Onset     Unknown/Adopted Mother      Unknown/Adopted Father      Diabetes No family hx of      Coronary Artery Disease No family hx of      Hypertension No family hx of      Hyperlipidemia No family hx of      Cerebrovascular Disease No family hx of      Breast Cancer No family hx of      Colon Cancer No family hx of      Prostate Cancer No family hx of      Other Cancer No family hx of      Depression No family hx of      Anxiety Disorder No family hx of      Mental Illness No family hx of      Substance Abuse No family hx of      Anesthesia Reaction No family hx of      Asthma No family hx of      Osteoporosis No family hx of      Genetic Disorder No family hx of      Thyroid Disease No family hx of      Obesity No family hx of        Review of Systems   The 10 point Review of Systems is negative other than noted in the HPI or here.

## 2018-11-06 NOTE — PLAN OF CARE
Problem: Patient Care Overview  Goal: Plan of Care/Patient Progress Review  PT: order received; Patient under Observation cares; Per chart review and conversation with nurse, patient not appropriate for evaluation currently as she is having bradycardia and is awaiting a Cardiology consult.

## 2018-11-07 ENCOUNTER — APPOINTMENT (OUTPATIENT)
Dept: PHYSICAL THERAPY | Facility: CLINIC | Age: 83
DRG: 309 | End: 2018-11-07
Attending: INTERNAL MEDICINE
Payer: COMMERCIAL

## 2018-11-07 ENCOUNTER — APPOINTMENT (OUTPATIENT)
Dept: OCCUPATIONAL THERAPY | Facility: CLINIC | Age: 83
DRG: 309 | End: 2018-11-07
Attending: INTERNAL MEDICINE
Payer: COMMERCIAL

## 2018-11-07 VITALS
TEMPERATURE: 97.4 F | SYSTOLIC BLOOD PRESSURE: 115 MMHG | BODY MASS INDEX: 19.04 KG/M2 | HEART RATE: 55 BPM | WEIGHT: 114.3 LBS | RESPIRATION RATE: 16 BRPM | HEIGHT: 65 IN | DIASTOLIC BLOOD PRESSURE: 65 MMHG | OXYGEN SATURATION: 95 %

## 2018-11-07 LAB
ANION GAP SERPL CALCULATED.3IONS-SCNC: 7 MMOL/L (ref 3–14)
BUN SERPL-MCNC: 28 MG/DL (ref 7–30)
CALCIUM SERPL-MCNC: 8.6 MG/DL (ref 8.5–10.1)
CHLORIDE SERPL-SCNC: 105 MMOL/L (ref 94–109)
CO2 SERPL-SCNC: 24 MMOL/L (ref 20–32)
CREAT SERPL-MCNC: 1.07 MG/DL (ref 0.52–1.04)
GFR SERPL CREATININE-BSD FRML MDRD: 48 ML/MIN/1.7M2
GLUCOSE SERPL-MCNC: 86 MG/DL (ref 70–99)
POTASSIUM SERPL-SCNC: 4.5 MMOL/L (ref 3.4–5.3)
SODIUM SERPL-SCNC: 136 MMOL/L (ref 133–144)
VIT B12 SERPL-MCNC: 1364 PG/ML (ref 193–986)

## 2018-11-07 PROCEDURE — 99239 HOSP IP/OBS DSCHRG MGMT >30: CPT | Performed by: INTERNAL MEDICINE

## 2018-11-07 PROCEDURE — 40000193 ZZH STATISTIC PT WARD VISIT

## 2018-11-07 PROCEDURE — 25000132 ZZH RX MED GY IP 250 OP 250 PS 637: Performed by: NURSE PRACTITIONER

## 2018-11-07 PROCEDURE — 99232 SBSQ HOSP IP/OBS MODERATE 35: CPT | Performed by: INTERNAL MEDICINE

## 2018-11-07 PROCEDURE — 25000132 ZZH RX MED GY IP 250 OP 250 PS 637: Performed by: HOSPITALIST

## 2018-11-07 PROCEDURE — 93226 XTRNL ECG REC<48 HR SCAN A/R: CPT | Performed by: NURSE PRACTITIONER

## 2018-11-07 PROCEDURE — 97161 PT EVAL LOW COMPLEX 20 MIN: CPT | Mod: GP

## 2018-11-07 PROCEDURE — 82607 VITAMIN B-12: CPT | Performed by: INTERNAL MEDICINE

## 2018-11-07 PROCEDURE — 97165 OT EVAL LOW COMPLEX 30 MIN: CPT | Mod: GO

## 2018-11-07 PROCEDURE — 25000132 ZZH RX MED GY IP 250 OP 250 PS 637: Performed by: INTERNAL MEDICINE

## 2018-11-07 PROCEDURE — 40000133 ZZH STATISTIC OT WARD VISIT

## 2018-11-07 PROCEDURE — 80048 BASIC METABOLIC PNL TOTAL CA: CPT | Performed by: INTERNAL MEDICINE

## 2018-11-07 PROCEDURE — 36415 COLL VENOUS BLD VENIPUNCTURE: CPT | Performed by: INTERNAL MEDICINE

## 2018-11-07 PROCEDURE — 93227 XTRNL ECG REC<48 HR R&I: CPT | Performed by: INTERNAL MEDICINE

## 2018-11-07 RX ORDER — MULTIVITAMIN,THERAPEUTIC
1 TABLET ORAL DAILY
Qty: 60 TABLET | Refills: 0 | Status: SHIPPED | OUTPATIENT
Start: 2018-11-08 | End: 2019-01-01

## 2018-11-07 RX ORDER — LANOLIN ALCOHOL/MO/W.PET/CERES
100 CREAM (GRAM) TOPICAL DAILY
Qty: 10 TABLET | Refills: 0 | Status: SHIPPED | OUTPATIENT
Start: 2018-11-08 | End: 2018-12-04

## 2018-11-07 RX ORDER — FUROSEMIDE 20 MG
20 TABLET ORAL DAILY
Qty: 90 TABLET | Refills: 0 | Status: SHIPPED | OUTPATIENT
Start: 2018-11-07 | End: 2019-01-01 | Stop reason: DRUGHIGH

## 2018-11-07 RX ORDER — FUROSEMIDE 20 MG
20 TABLET ORAL DAILY
Status: DISCONTINUED | OUTPATIENT
Start: 2018-11-07 | End: 2018-11-07 | Stop reason: HOSPADM

## 2018-11-07 RX ORDER — ACETAMINOPHEN 500 MG
TABLET ORAL
Refills: 0
Start: 2018-11-07 | End: 2019-01-01 | Stop reason: DRUGHIGH

## 2018-11-07 RX ORDER — FOLIC ACID 1 MG/1
1 TABLET ORAL DAILY
Qty: 30 TABLET | Refills: 0 | Status: SHIPPED | OUTPATIENT
Start: 2018-11-07 | End: 2018-11-12

## 2018-11-07 RX ORDER — PANTOPRAZOLE SODIUM 40 MG/1
40 TABLET, DELAYED RELEASE ORAL
Qty: 30 TABLET | Refills: 0 | Status: SHIPPED | OUTPATIENT
Start: 2018-11-08 | End: 2018-12-04

## 2018-11-07 RX ORDER — LIDOCAINE 50 MG/G
PATCH TOPICAL
Qty: 30 PATCH | Refills: 0 | Status: SHIPPED | OUTPATIENT
Start: 2018-11-07 | End: 2018-12-04

## 2018-11-07 RX ADMIN — ACETAMINOPHEN 650 MG: 325 TABLET, FILM COATED ORAL at 02:50

## 2018-11-07 RX ADMIN — PANTOPRAZOLE SODIUM 40 MG: 40 TABLET, DELAYED RELEASE ORAL at 06:42

## 2018-11-07 RX ADMIN — Medication 100 MG: at 09:28

## 2018-11-07 RX ADMIN — FUROSEMIDE 20 MG: 20 TABLET ORAL at 10:42

## 2018-11-07 RX ADMIN — SENNOSIDES AND DOCUSATE SODIUM 1 TABLET: 8.6; 5 TABLET ORAL at 10:42

## 2018-11-07 RX ADMIN — THERA TABS 1 TABLET: TAB at 09:28

## 2018-11-07 RX ADMIN — ACETAMINOPHEN 650 MG: 325 TABLET, FILM COATED ORAL at 09:28

## 2018-11-07 ASSESSMENT — PAIN DESCRIPTION - DESCRIPTORS
DESCRIPTORS: ACHING
DESCRIPTORS: ACHING

## 2018-11-07 ASSESSMENT — ACTIVITIES OF DAILY LIVING (ADL)
ADLS_ACUITY_SCORE: 16
ADLS_ACUITY_SCORE: 16
ADLS_ACUITY_SCORE: 17
ADLS_ACUITY_SCORE: 17
ADLS_ACUITY_SCORE: 16

## 2018-11-07 NOTE — PLAN OF CARE
Problem: Patient Care Overview  Goal: Plan of Care/Patient Progress Review  Discharge Planner OT   Patient plan for discharge: Home.  Current status: Eval complete. Patient is I to MOD I with ADL in her room with walker. Cognition appears to have returned to baseline.   Barriers to return to prior living situation: None.  Recommendations for discharge: Return to [facility] with previous level of assist.  Rationale for recommendations: No further OT indicated.        Entered by: Stephani Blevins 11/07/2018 9:20 AM

## 2018-11-07 NOTE — PLAN OF CARE
Problem: Patient Care Overview  Goal: Plan of Care/Patient Progress Review  Outcome: No Change  VSS.  Pt c/o of back pain 8/10, given tylenol and heat pack with improvement.  Up with assist of 1 and walker.  Tele is afib 50-60's.  Reddened coccyx, but blanchable.  Blanchable spot on back also, repositioned frequently.  500 ml fluid bolus completed.  Poor appetite, nutrition added supplements x2 per day.  Will continue to monitor.

## 2018-11-07 NOTE — PLAN OF CARE
Problem: Patient Care Overview  Goal: Plan of Care/Patient Progress Review  Outcome: Improving  A/O, VSS, O2sats 95% on RA. Tele afib SVR with occasional 1.0-2.9 second pauses, asymptomatic. Given scheduled tylenol, hot pack for back pain. Given stool softener for constipation, pt states she is aware percocet may contribute to this. SBA with walker, steady. Lasix restarted. Plan for discharge this afternoon with holter monitor, follow-up with cardiology and PCP.

## 2018-11-07 NOTE — PROGRESS NOTES
11/07/18 1101   Quick Adds   Type of Visit Initial PT Evaluation   Living Environment   Lives With alone   Living Arrangements assisted living   Home Accessibility no concerns   Number of Stairs to Enter Home 0   Number of Stairs Within Home 0   Self-Care   Usual Activity Tolerance moderate   Current Activity Tolerance fair   Regular Exercise no   Equipment Currently Used at Home walker, rolling  (4WW for in hallways, FWW in apt)   Activity/Exercise/Self-Care Comment Gets one meal/day in the DR. Microwaves other meals or people bring her meals. Manages own meds. Has bathing A 1x/week, housecleaning A. Pt reports gets assist with walking back from dining room due to fatigue    Functional Level Prior   Ambulation 1-->assistive equipment   Transferring 1-->assistive equipment   Toileting 1-->assistive equipment   Bathing 3-->assistive equipment and person   Dressing 0-->independent   Eating 0-->independent   Fall history within last six months yes   Number of times patient has fallen within last six months 1  (reports when she did not use her walker)   Which of the above functional risks had a recent onset or change? none   Prior Functional Level Comment pt reports staff at [facility] can assist as needed at d/c    General Information   Onset of Illness/Injury or Date of Surgery - Date 11/05/18   Referring Physician Cole Guy MD   Patient/Family Goals Statement to return to [facility] today   Pertinent History of Current Problem (include personal factors and/or comorbidities that impact the POC) 89 year old female who was admitted on 11/5/2018 with acute alcohol intoxication/encephalopathy with chronic atrial fibrillation with bradycardia. ETOH on admit was 0.2   Precautions/Limitations fall precautions   General Observations pt sidelying in bed, NAD   General Info Comments Activity: Amb with assist   Cognitive Status Examination   Orientation orientation to person, place and time   Level of Consciousness alert  "  Follows Commands and Answers Questions 100% of the time   Personal Safety and Judgment intact   Pain Assessment   Patient Currently in Pain Yes, see Vital Sign flowsheet  (reports chronic back pain, does not rate)   Posture    Posture Kyphosis;Protracted shoulders;Forward head position   Range of Motion (ROM)   ROM Comment WNL bilateral LE    Strength   Strength Comments 4+/5 grossly bilateral LE with supine MMT   Bed Mobility   Bed Mobility Comments IND from flat HOB without railings   Transfer Skills   Transfer Comments Mod IND with FWW   Gait   Gait Comments amb 50ft with intermittent foot shuffling (backless slippers on), kyphotic posture, no LOB, pt reports fatigued from distance, mod IND   Balance   Balance Comments IND with static standing with bilateral UE support on walker, IND with static sitting balance   Sensory Examination   Sensory Perception Comments impaired to light touch bilateral feet, pt reports bilateral foot numbness (chronic)   Clinical Impression   Criteria for Skilled Therapeutic Intervention evaluation only   PT Diagnosis impaired gait   Clinical Presentation Stable/Uncomplicated   Clinical Presentation Rationale based on clinical presentation   Clinical Decision Making (Complexity) Low complexity   Therapy Frequency` other (see comments)   Predicted Duration of Therapy Intervention (days/wks) evaluation only   Anticipated Discharge Disposition Home with Assist   Risk & Benefits of therapy have been explained Yes   Patient, Family & other staff in agreement with plan of care Yes   Clinical Impression Comments Discussed with pt, recommend slippers/shoewear with back support to help prevent foot shuffling as this could lead to increased fall risk at home. Pt verbalized understanding   [facility] AM-PAC TM \"6 Clicks\"   2016, Trustees of [facility], under license to [employer].  All rights reserved.   6 Clicks Short Forms Basic Mobility Inpatient Short Form   [facility] " "AM-PAC  \"6 Clicks\" V.2 Basic Mobility Inpatient Short Form   1. Turning from your back to your side while in a flat bed without using bedrails? 4 - None   2. Moving from lying on your back to sitting on the side of a flat bed without using bedrails? 4 - None   3. Moving to and from a bed to a chair (including a wheelchair)? 4 - None   4. Standing up from a chair using your arms (e.g., wheelchair, or bedside chair)? 4 - None   5. To walk in hospital room? 4 - None   6. Climbing 3-5 steps with a railing? 3 - A Little   Basic Mobility Raw Score (Score out of 24.Lower scores equate to lower levels of function) 23   Total Evaluation Time   Total Evaluation Time (Minutes) 19     "

## 2018-11-07 NOTE — DISCHARGE SUMMARY
[facility]    Discharge Summary  Hospitalist    Date of Admission:  11/5/2018  Date of Discharge:  11/7/2018  Discharging Provider: Cole Guy MD    Discharge Diagnoses   Acute Alcoholic encephalopathy    History of Present Illness   Theresa Alves is a pleasant 89 year old woman who called EMS from her assisted living facility after feeling increased generalized weakness this evening, associated with feelings of impending doom. She was intoxicated in the ED and remains agitated on my interview though she seems oriented. She says she has chronic lower back pain and leg numbness and takes one Percocet pill in the morning, and drinks one vodka cocktail for the pain at night. She denies drinking more than that tonight. She had no food today. She ambulates with a walker. She denies nausea, abdominal pain, dizziness, dyspnea, cough or any other acute complaints to me. She adds that she does not want to be here and is adamant to go home in AM.     In the ED, pulse ranged from 40-55, BP 92/63 improved with IV fluid to 112/61. She was not hypoxic.     CBC and BMP were unremarkable (Cr 1.3 with baseline 1.5-1.6). Troponin 0.034. Digoxin level 0.9. EKG confirmed atrial fibrillation with bradycardia without ST segment changes. Q waves were seen in V1 and V2. Ethanol level 0.2. She was given 1 liter IV fluid in total.    Hospital Course   Theresa Alves was admitted on 11/5/2018.  The following problems were addressed during her hospitalization:    Active Problems:    Alcoholic encephalopathy (H)    Bradycardia  Theresa Alves is a pleasant 89 year old woman with permanent atrial fibrillation not currently on anticoagulation, chronic pulmonary fibrosis, pulmonary hypertension, hypertension and CKD Stage 3 who presents with acute alcoholic encephalopathy.      PLAN      Acute alcoholic encephalopathy:  Daily etoh use    -- on admission: Observation with fall precautions ordered. Overnight  careful IV fluid 50 ml/hour for 500 ml total and volume status reassessment.   -Social work and case management consulted in AM for disposition.   -on admission pt was agitated. day following admission, pt alert, coherent and appears at baseline MS- no signs etoh w/d or delirium during her stay  -drinks etoh/vodka nearly daily, 0.2 etoh level last night. Pt initially denies that it could be that high  - states she drinks etoh in part to treat her chronic back pain  - also takes daily 1/2 pill of percocet in morning  - no recent falls  - also on digoxin and bber  - pt down playing etoh use  - + dyspepsia, wt loss, anorexia lately- could certainly be 2/2 etoh use.  No nsaids but has been on asa. ? Gastrititis. Benign abd exam   Not anemia  Found to have low folate level. B12 normal  Seen by PT and OT and deemed appropriate for discharge to prior living situation.     Plan at discharge:   -start daily MV  -5 day course of thiamine  - 30 day course of folic acid  -home PT, OT and RN given etoh abuse, bradycardia and multiple medications  - long discussion regarding etoh use, interaction with other meds, alternatives to treating back pain. Pt's niece was present  - advised to stop all etoh for now narendra given gi symptoms and intoxication on admission,  pt seemed amenable to this at end of discussion but does down play etoh use  - close PCP follow up scheduled.    -consider chem dep eval in future  -work with patient to find alternatives to pain control for chronic low back pain      Atrial fibrillation with bradycardia  Sick Sinus Syndrome  -Ms. Alves is followed by [facility]. She was hospitalized 3/2018 for afib with RVR and epistaxis. TTE showed preserved LVEF with mild RV dysfunction, severe SADAF, moderate TR, moderate pulmonary hypertension and mild AR.  Her DOAC was stopped. Rates controlled with Toprol and Digoxin and eventually she was discharged. Since then DOAC could not be resumed due to ongoing  intermittent epistaxis.  - She saw her Cardiology provider day PTA and aspirin stopped as well, noting increased stroke risk but patient preference for palliative measures overall in her health care at this point in her life. Dyspnea was noted at this visit and Lasix was increased.    Now in the setting of alcohol intoxication she has bradycardia.  Bradycardia could account for her angela, fatigue.   ? Digoxin contributing to her dyspepsia, ? nausea  AFib with slow VR on ekg  2 sec pause on tele this am- asymptomatic  During hospital stay had several 1-2 second pauses asymptomatic off digoxin, metoprolol  Nl bp and mentation  Digoxin level 0.9 but in extreme elderly this may be high.    followed by cardiology.   She was deemed appropriate for discharge home with close PCP and cardiology follow up with Holter monitor to be arranged.   Discharge off of digoxin and metoprolol        Pulmonary fibrosis: Full details of this diagnosis are not known to me at present. Spirometry was done in 2016 but I can not see the results.       CKD Stage 3: Monitor closely while hospitalized      Chronic lower back pain: thought due to kyphoscoliosis.   Holding Percocets for now.   - she clearly states that she drinks etoh in part to treat her back pain  - aqua k pad  -continue low dose percocet in am per PCP. May need to reconsider given etoh us  - schedule Tylenol ES 500mg noon and evening  - start lidocaine patch q Am  -home PT ordered  - heating pad in am as she is doing  - no nsaids  - close pcp follow up  - stop etoh     Dyspepsia/anorexia/wt loss  In setting of likely daily etoh use.   No dysphagia or odynophagia  ? Digoxin effect  Benign abd exam  Nl Hb  Check lipase and LfTs if persists outpatient  Plan  PPI for now  Close follow up with PCP  Stop all etoh  Digoxin has been held for now for bradycardia  ASA has been stopped  Close PCP follow up  Follow for need of EGD  Follow weight outpatient      Non Severe malnutrition in  context of environmental or social circumstances  -see nutrition note for details    Disposition: discharge home with home care           Cole Guy MD    Significant Results and Procedures   See hospital course    Pending Results   none  Unresulted Labs Ordered in the Past 30 Days of this Admission     No orders found from 9/6/2018 to 11/6/2018.          Code Status   prior       Primary Care Physician   Marbin De Leonnd    Physical Exam   Temp: 97.4  F (36.3  C) Temp src: Oral BP: 115/65   Heart Rate: 76 Resp: 16 SpO2: 95 % O2 Device: None (Room air)    Vitals:    11/05/18 2313 11/07/18 0500   Weight: 50.4 kg (111 lb 3.2 oz) 51.8 kg (114 lb 4.8 oz)     Vital Signs with Ranges  Temp:  [97.4  F (36.3  C)-98.5  F (36.9  C)] 97.4  F (36.3  C)  Heart Rate:  [51-76] 76  Resp:  [16-18] 16  BP: (111-152)/(65-92) 115/65  SpO2:  [95 %-99 %] 95 %  I/O last 3 completed shifts:  In: 1040 [P.O.:1040]  Out: 1150 [Urine:1150]    Constitutional: alert, frail, nad  Eyes: nl sclera  Respiratory: CTAB  Cardiovascular: RRR  GI: soft, nt, nd  Skin: no rash or edema  Musculoskeletal: no focal swelling or redness  Neurologic: fully oriented and appropriate, nl speech and mentation  Grossly nonfocal.  No tremors  Neuropsychiatric: cooperative, very limited insight into etoh use, slightly irritable at times.     Discharge Disposition   Discharged to home  Condition at discharge: Good    Consultations This Hospital Stay   CARE COORDINATOR IP CONSULT  SOCIAL WORK IP CONSULT  CARDIOLOGY IP CONSULT  PHYSICAL THERAPY ADULT IP CONSULT  OCCUPATIONAL THERAPY ADULT IP CONSULT  SOCIAL WORK IP CONSULT  CARE TRANSITION RN/SW IP CONSULT  NUTRITION SERVICES ADULT IP CONSULT    Time Spent on this Encounter   I, Cole Guy, personally saw the patient today and spent greater than 30 minutes discharging this patient.    Discharge Orders     Basic metabolic panel     Follow-Up with cardiac sub-specialty clinic     Follow-Up with Cardiac  Advanced Practice Provider     Home care nursing referral     Home Care PT Referral for Hospital Discharge     Home Care OT Referral for Hospital Discharge     Reason for your hospital stay   1.elevated alcohol level  2. Bradycardia/slow heart rate related to sinus node disease, atrial fibrillation and metoprolol and digoxin     Follow-up and recommended labs and tests    1. See primary care doctor in about one week as arranged to discuss hospitalization, back pain  2. Follow up with cardiology clinic - they will arrange the holter monitor     Activity   Your activity upon discharge: activity as tolerated     MD face to face encounter   Documentation of Face to Face and Certification for Home Health Services    I certify that patient: Theresa Alves is under my care and that I, or a nurse practitioner or physician's assistant working with me, had a face-to-face encounter that meets the physician face-to-face encounter requirements with this patient on: 11/7/2018.    This encounter with the patient was in whole, or in part, for the following medical condition, which is the primary reason for home health care: atrial fibrillation with bradycardia, chronic low back pain.    I certify that, based on my findings, the following services are medically necessary home health services: Nursing, Occupational Therapy and Physical Therapy.    My clinical findings support the need for the above services because: Nurse is needed: To assess alcohol use, back pain, nutrition status, medication changes.      Further, I certify that my clinical findings support that this patient is homebound (i.e. absences from home require considerable and taxing effort and are for medical reasons or [employer]ondo[employer] services or infrequently or of short duration when for other reasons) because: Patient is bedbound due to: severe low back pain and HORTON from atrial fibrillation..    Based on the above findings. I certify that this patient is confined to  the home and needs intermittent skilled nursing care, physical therapy and/or speech therapy.  The patient is under my care, and I have initiated the establishment of the plan of care.  This patient will be followed by a physician who will periodically review the plan of care.  Physician/Provider to provide follow up care: Marbin Rouse    Attending hospital physician (the Medicare certified [place] provider): Cole Guy  Physician Signature: See electronic signature associated with these discharge orders.  Date: 11/7/2018     Discharge Instructions   Have a Boost at least 2 times per day  Try to avoid skipping lunch  Stop all alcohol for now  Start taking protonix 40mg daily in morning on empty stomach  Take Tylenol ES 500mg tablet at about noon and bedtime  Lidocaine patch to back every morning, take off at night  Stop your metoprolol and digoxin  Start daily multivitamin  Complete course of thiamine and folic acid  Decrease your lasix dose from 60mg daily (three 20mg tablets) to 20mg daily (one 20mg tablet), if you develop swelling or increase in shortness of breath increase to two 20mg tablets (40mg) per day     DNR/DNI     Diet   Follow this diet upon discharge: Orders Placed This Encounter     Snacks/Supplements Adult: Other; 4oz chocolate Boost at 10am and 2pm  (RD); Between Meals     Advance Diet as Tolerated: Regular Diet Adult       Discharge Medications   Current Discharge Medication List      START taking these medications    Details   folic acid (FOLVITE) 1 MG tablet Take 1 tablet (1 mg) by mouth daily  Qty: 30 tablet, Refills: 0    Comments: Future refills by PCP Dr. Marbin Rouse with phone number [phone].  Associated Diagnoses: Alcohol intoxication, uncomplicated (H)      lidocaine (LIDODERM) 5 % Patch Apply 1-2  patches to painful area at area of low back at once for up to 12 h within a 24 h period.  Remove after 12 hours.  Qty: 30 patch, Refills: 0    Comments: Future refills by PCP  Dr. Marbin Rouse with phone number [phone].  Associated Diagnoses: Chronic pain syndrome      multivitamin, therapeutic (THERA-VIT) TABS tablet Take 1 tablet by mouth daily  Qty: 60 tablet, Refills: 0    Comments: Future refills by PCP Dr. Marbin Rouse with phone number [phone].  Associated Diagnoses: Alcohol intoxication, uncomplicated (H)      pantoprazole (PROTONIX) 40 MG EC tablet Take 1 tablet (40 mg) by mouth every morning (before breakfast)  Qty: 30 tablet, Refills: 0    Comments: Future refills by PCP Dr. Marbin Rouse with phone number [phone].  Associated Diagnoses: Alcohol intoxication, uncomplicated (H)      thiamine 100 MG tablet Take 1 tablet (100 mg) by mouth daily  Qty: 10 tablet, Refills: 0    Comments: Future refills by PCP Dr. Marbin Rouse with phone number [phone].  Associated Diagnoses: Alcohol intoxication, uncomplicated (H)         CONTINUE these medications which have CHANGED    Details   acetaminophen (TYLENOL) 500 MG tablet Take one tablet 8 hours after morning percocet and one tablet at bedtime  Refills: 0    Associated Diagnoses: Chronic pain syndrome      furosemide (LASIX) 20 MG tablet Take 1 tablet (20 mg) by mouth daily  Qty: 90 tablet, Refills: 0    Comments: Future refills by PCP Dr. Marbin Rouse with phone number [phone].  Associated Diagnoses: (HFpEF) heart failure with preserved ejection fraction (H)         CONTINUE these medications which have NOT CHANGED    Details   Cyanocobalamin (VITAMIN B 12 PO) Take 1 tablet by mouth daily       oxyCODONE-acetaminophen (PERCOCET) 5-325 MG per tablet Take 0.5 tab every morning.  Max of 0.5 tab daily.  Qty: 30 tablet, Refills: 0    Associated Diagnoses: Chronic midline low back pain without sciatica      simvastatin (ZOCOR) 20 MG tablet TAKE HALF TABLET BY MOUTH AT BEDTIME  Qty: 45 tablet, Refills: 3    Associated Diagnoses: Hyperlipidemia with target LDL less than 130      sodium chloride (OCEAN) 0.65  % nasal spray Spray 1 spray into both nostrils 3 times daily as needed for congestion  Refills: 0    Associated Diagnoses: Nasal dryness      VITAMIN D, CHOLECALCIFEROL, PO Take 1,000 Units by mouth daily.           STOP taking these medications       digoxin (LANOXIN) 125 MCG tablet Comments:   Reason for Stopping:         metoprolol succinate (TOPROL-XL) 200 MG 24 hr tablet Comments:   Reason for Stopping:             Allergies   No Known Allergies  Data   Most Recent 3 CBC's:  Recent Labs   Lab Test  11/05/18   2323  06/05/18   1142  05/08/18   1020   03/18/18   0540   WBC  8.2  10.2   --    --   7.7   HGB  13.2  13.4  12.0   < >  11.4*   MCV  104*  104*   --    --   101*   PLT  197  221   --    --   187    < > = values in this interval not displayed.      Most Recent 3 BMP's:  Recent Labs   Lab Test  11/07/18   0607  11/06/18   0932  11/05/18   2323   NA  136  137  134   POTASSIUM  4.5  4.1  4.2   CHLORIDE  105  105  101   CO2  24  25  25   BUN  28  25  28   CR  1.07*  1.16*  1.27*   ANIONGAP  7  7  8   PAWEL  8.6  8.3*  8.6   GLC  86  72  95     Most Recent 2 LFT's:  Recent Labs   Lab Test  03/18/18   0540  03/16/18   2304   AST  24  43   ALT  27  45   ALKPHOS  67  92   BILITOTAL  1.8*  1.2     Most Recent INR's and Anticoagulation Dosing History:  Anticoagulation Dose History     There is no flowsheet data to display.        Most Recent 3 Troponin's:  Recent Labs   Lab Test  11/05/18   2323  03/16/18   2304  09/03/12   1535   TROPI  0.034  0.033  0.049*     Most Recent Cholesterol Panel:  Recent Labs   Lab Test  11/28/17   1454   CHOL  142   LDL  52   HDL  69   TRIG  107     Most Recent 6 Bacteria Isolates From Any Culture (See EPIC Reports for Culture Details):  Recent Labs   Lab Test  09/28/12   1029  09/03/12   1645  09/02/12   1840  09/02/12   1825  09/02/12   1820   CULT  >100,000 colonies/mL Methicillin resistant Staphylococcus aureus (MRSA) 10 to 50,000 colonies/mL Coagulase negative Staphylococcus  No  growth  Cultured on the 1st day of incubation: Escherichia coli Critical Value, preliminary result only, called to and read back by Carlos Dennis ( patient's nurse @ 1636 on 9/3/2012, cn.  Cultured on the 1st day of incubation: Escherichia coli Critical Value, preliminary result only, called to and read back by Carlos Dennis 9/3/12 @1636 cn Susceptibility testing done on previous specimen  >100,000 colonies/mL Escherichia coli     Most Recent TSH, T4 and A1c Labs:  Recent Labs   Lab Test  03/16/18   2304   TSH  4.13*   T4  1.38     Results for orders placed or performed in visit on 07/30/18   XR Tibia & Fibula Left 2 Views    Narrative    TIBIA FIBULA LEFT  TWO-THREE VIEWS 7/30/2018 3:53 PM     HISTORY: trauma 10 days ago to left lower leg and left knee; Contusion  of left leg, subsequent encounter    COMPARISON: None.      Impression    IMPRESSION : There is extensive vascular calcification involving the  leg. Moderate medial compartment narrowing at the knee. No acute  appearing fracture or dislocation.    ARIES BROWN MD

## 2018-11-07 NOTE — PLAN OF CARE
Problem: Patient Care Overview  Goal: Plan of Care/Patient Progress Review  Outcome: No Change  AOx4, AF w/bradycardia down to 40's w/up to 2.4 sec pauses, up w/1 and walker, LS diminished on RA, pt states she has a poor appetite, red blanchable spot on back covered w/mepilex, red blanchable site on coccyx covered w/barrier cream and repositioned throughout night which seemed to help, pt c/o aching back pain which she normally takes percocet for but is being controlled w/tylenol, pending discharge today w/possible holter

## 2018-11-07 NOTE — PROGRESS NOTES
11/07/18 0851   Quick Adds   Type of Visit Initial Occupational Therapy Evaluation   Living Environment   Lives With alone   Living Arrangements assisted living   Self-Care   Activity/Exercise/Self-Care Comment Gets one meal/day in the DR. Microwaves other meals or people bring her meals. Manages own meds. Has bathing A 1x/week, housecleaning A.   Functional Level Prior   Ambulation 1-->assistive equipment   Transferring 1-->assistive equipment   Toileting 1-->assistive equipment   Bathing 3-->assistive equipment and person   Dressing 0-->independent   General Information   Onset of Illness/Injury or Date of Surgery - Date 11/05/18   Referring Physician Brooks   Patient/Family Goals Statement Go home.   Additional Occupational Profile Info/Pertinent History of Current Problem Admitted with acute ETOH encephalopathy, afib with garrett. ETOH .2 on admit.   Precautions/Limitations fall precautions   Cognitive Status Examination   Orientation orientation to person, place and time   Level of Consciousness alert   Able to Follow Commands WNL/WFL   Cognitive Comment Appears to recall events earlier in the week and surrounding admission. Reports the pain was so bad she needed a drink. Doesn't believe her ETOH level was .2 on admit.    Visual Perception   Visual Perception Wears glasses   Pain Assessment   Patient Currently in Pain Yes, see Vital Sign flowsheet   Range of Motion (ROM)   ROM Comment WFL for ADL   Mobility   Bed Mobility Comments INDEP   Transfer Skill: Sit to Stand   Level of [place]: Sit/Stand stand-by assist  (pulls from walker to stand)   Transfer Skill: Toilet Transfer   Level of [place]: Toilet (MOD I)   Balance   Balance Comments SB A to ambulate in room. No LOB. Wearing her slippers.    Lower Body Dressing   Level of [place]: Dress Lower Body independent   Toileting   Level of [place]: Toilet independent   Eating/Self Feeding   Level of [place]: Eating independent   Clinical  "Impression   Criteria for Skilled Therapeutic Interventions Met no;evaluation only   Clinical Decision Making (Complexity) Low complexity   Anticipated Discharge Disposition Home   Risks and Benefits of Treatment have been explained. Yes   Patient, Family & other staff in agreement with plan of care Yes   [facility] TM \"6 Clicks\"   2016, Trustees of [facility], under license to [employer].  All rights reserved.   6 Clicks Short Forms Daily Activity Inpatient Short Form   [facility]  \"6 Clicks\" Daily Activity Inpatient Short Form   1. Putting on and taking off regular lower body clothing? 4 - None   2. Bathing (including washing, rinsing, drying)? 4 - None   3. Toileting, which includes using toilet, bedpan or urinal? 4 - None   4. Putting on and taking off regular upper body clothing? 4 - None   5. Taking care of personal grooming such as brushing teeth? 4 - None   6. Eating meals? 4 - None   Daily Activity Raw Score (Score out of 24.Lower scores equate to lower levels of function) 24   Total Evaluation Time   Total Evaluation Time (Minutes) 30     "

## 2018-11-07 NOTE — PROGRESS NOTES
Referral made to [facility] for RN/PT/OT services.  Spoke with Ana BRITO at [facility] to notify them of patient's discharge orders.  Discharge orders and H/P faxed to [facility] [phone]. AVS updated with [facility] contact number.

## 2018-11-07 NOTE — PLAN OF CARE
Problem: Patient Care Overview  Goal: Plan of Care/Patient Progress Review  Discharge Planner PT   Patient plan for discharge: return to [facility]   Current status: PT consult received. Chart reviewed. PT evaluation completed. 89 year old female who was admitted on 11/5/2018 with acute alcohol intoxication/encephalopathy with chronic atrial fibrillation with bradycardia. ETOH on admit was 0.2. PLOF: Pt mod I with use of FWW/4WW.  Reports able to walk to the dining room, however always requires assist back to room as she gets to tired. Pt reports fall at home when she was not using her walker, otherwise denies any falls when using her walker. Pt reports custodial is able to assist as needed.   Currently: Pt IND with bed mobility, mod I with sit to stand with FWW, tolerated amb 50ft with FWW mod IND, no episodes of LOB, reports fatigued from distance, however pt reports baseline.   Barriers to return to prior living situation: none from a mobility standpoint  Recommendations for discharge: return to [facility] with previous level of assist  Rationale for recommendations: Pt appears at baseline with current mobility level. Pt denies any current concerns with her mobility. No further acute skilled IP needs identified. Will sign off.        Entered by: Lana Davila 11/07/2018 11:20 AM

## 2018-11-07 NOTE — PROGRESS NOTES
[facility]    Cardiology Progress Note    Date of Service: 11/07/2018     Assessment & Plan   Theresa Alves is a 89 year old female who was admitted on 11/5/2018 with acute alcohol intoxication/encephalopathy. We were asked to see the patient for chronic atrial fibrillation with bradycardia          1. Chronic atrial fibrillation  -History of tachycardia with atrial fibrillation in the past precipitating heart failure  -Likely her digoxin level, given her renal dysfunction, is contributing to her bradycardia  -still having up to 2.9 second pauses that are asymptomatic  -no current indication for pacemaker  -no Metoprolol for now  -no digoxin  -outpatient Holter monitor for 48 hours on discharage  -She had multiple episodes of epistaxis prompting discontinuation of Eliquis in the past and recently aspirin.        2.  Heart failure with preserved ejection fraction with moderate tricuspid insufficiency and significant pulmonary hypertension likely in part related to her pulmonary fibrosis  Weight up 3 pounds (was on IV hydration)  Normally on Lasix 40mg daily PTA  -with history of HFpEF as may have narrow therapeutic window  -restart Lasix 20mg daily          3.  Acute alcohol intoxication with encephalopathy  ETOH level 0.2  She denies drinking more than one drink 4 times per week  MCV elevated     4.  Chronic kidney disease with baseline creatinines that appear to be 1.5-1.6  -creat improved to 1.07--GFR better at 48  -suspect some of her SOB is pulmonary fibrosis    5.  Pulmonary fibrosis    She can be discharged from our perspective on 48 hour holter with cards follow up    Liana Phan, MSN, APRN, CNP  [facility]    Interval History   Alert, no sob, no lightheadedness, syncope or presyncope  Review of Systems:  The Review of Systems is negative other than noted in the HPI    Physical Exam   Temp: 97.8  F (36.6  C) Temp src: Oral BP: 128/65   Heart Rate: 72 Resp: 16 SpO2: 96 % O2  Device: None (Room air)    Vitals:    11/05/18 2313 11/07/18 0500   Weight: 50.4 kg (111 lb 3.2 oz) 51.8 kg (114 lb 4.8 oz)     Vital Signs with Ranges  Temp:  [97.4  F (36.3  C)-98.5  F (36.9  C)] 97.8  F (36.6  C)  Heart Rate:  [62-72] 72  Resp:  [16-18] 16  BP: (107-152)/(58-92) 128/65  SpO2:  [95 %-99 %] 96 %  I/O last 3 completed shifts:  In: 1040 [P.O.:1040]  Out: 1150 [Urine:1150]    Constitutional     alert and oriented, in no acute distress.     Skin     warm and dry to touch    ENT     no pallor or cyanosis    Neck    Supple, JVP normal, no carotid bruit    Chest     no tenderness to palpation     Lungs  clear to auscultation     Cardiac  Irregularly-irregular rhythm, S1 normal, S2 normal, No S3 or S4, II/VI systolic murmurs, no rubs    Abdomen     abdomen soft, bowel sounds normoactive, no hepatosplenomegaly    Extremities and Back     no clubbing, cyanosis. No edema observed.        Neurological     no gross motor deficits noted, affect appropriate, oriented to time, person and place.        Medications       acetaminophen  650 mg Oral TID     influenza Vac Split High-Dose  0.5 mL Intramuscular Prior to discharge     multivitamin, therapeutic  1 tablet Oral Daily     pantoprazole  40 mg Oral QAM AC     sodium chloride (PF)  3 mL Intracatheter Q8H     vitamin  B-1  100 mg Oral Daily          Data:     ROUTINE IP LABS (Last four results)  BMP  Recent Labs  Lab 11/07/18  0607 11/06/18  0932 11/05/18 2323 11/05/18 0915    137 134 134*   POTASSIUM 4.5 4.1 4.2 4.6   CHLORIDE 105 105 101 100   PAWEL 8.6 8.3* 8.6 9.6   CO2 24 25 25 28   BUN 28 25 28 28   CR 1.07* 1.16* 1.27* 1.51*   GLC 86 72 95 112*     CHOLESTEROL/HEPATICNo lab results found in last 7 days.  CBC  Recent Labs  Lab 11/06/18  0932 11/05/18 2323   WBC  --  8.2   RBC  --  3.78*   HGB  --  13.2   HCT 33.8* 39.2   MCV  --  104*   MCH  --  34.9*   MCHC  --  33.7   RDW  --  13.3   PLT  --  197     TROP:   Recent Labs  Lab 11/05/18  0029   TROPI  0.034      BNP:  No results for input(s): NTBNPI in the last 168 hours.  INRNo lab results found in last 7 days.  TSH   Date Value Ref Range Status   03/16/2018 4.13 (H) 0.40 - 4.00 mU/L Final       EKG results:  Reviewed if available     Imaging:  No results found for this or any previous visit (from the past 24 hour(s)).  Telemetry:

## 2018-11-07 NOTE — PROGRESS NOTES
Call from Liana Phan re:scheduling f/u appt. Patient has been scheduled to see Lolis Davenport as below for post hospital f/u:  Future Appointments  Date Time Provider Department Center   11/7/2018 11:00 AM Lana Davila, PT [facility]   11/7/2018 11:30 AM Marbin Rouse MD [facility]   11/15/2018 1:30 PM [facility]   11/15/2018 2:30 [facility]   1/14/2019 10:00 AM [facility]   1/14/2019 10:50 AM [facility]     Saadia Mays RN on 11/7/2018 at 10:11 AM

## 2018-11-08 ENCOUNTER — TELEPHONE (OUTPATIENT)
Dept: FAMILY MEDICINE | Facility: CLINIC | Age: 83
End: 2018-11-08

## 2018-11-08 ENCOUNTER — PATIENT OUTREACH (OUTPATIENT)
Dept: CARE COORDINATION | Facility: CLINIC | Age: 83
End: 2018-11-08

## 2018-11-08 LAB — INTERPRETATION ECG - MUSE: NORMAL

## 2018-11-08 ASSESSMENT — ACTIVITIES OF DAILY LIVING (ADL): DEPENDENT_IADLS:: COOKING;TRANSPORTATION

## 2018-11-08 NOTE — DISCHARGE SUMMARY
VS stable. Tele monitor shows patient is in Afib CVR, HR of 60. Medications discussed, information reviewed, questions and concerns addressed, follow-up instructions reviewed. Pt belongings accounted for and sent home with them. Pt left via wheel chair and driven by her daughter.

## 2018-11-08 NOTE — TELEPHONE ENCOUNTER
Call made to the patient, she will have the disability form filled out during her hospital follow up appointment with dr. sandoval on Monday.

## 2018-11-08 NOTE — PROGRESS NOTES
Clinic Care Coordination Contact  [facility]/Voicemail    Referral Source: IP Report  Admitted 11/5/18 to 11/7/18 for generalized weakness, alcohol intoxication  Discharged back to assisted living with home care    Clinical Data: Care Coordinator Outreach  Outreach attempted x 1.  Left message on voicemail with call back information and requested return call.  Plan: Care Coordinator mailed out care coordination introduction letter on 8/16/18. Care Coordinator will try to reach patient again in 3-5 business days.    Minesh Minor RN  Clinic Care Coordinator  [facility]  Ph: [phone]

## 2018-11-08 NOTE — TELEPHONE ENCOUNTER
Reason for Call:  Other call back    Detailed comments: Patient states her disability sticker has  and she needs a new one. Patient was informed there is a form that needs to be filled out. She states she cannot make it to the clinic. Would like the nurse or someone to call her. Please call to advise. Patient states she is in assisted living.    Phone Number Patient can be reached at: Home number on file [phone] (home)    Best Time: any    Can we leave a detailed message on this number? YES    Call taken on 2018 at 9:22 AM by MILVIA MATTHEW

## 2018-11-08 NOTE — TELEPHONE ENCOUNTER
"Hospital/TCU/ED for chronic condition Discharge Protocol    \"Hi, my name is Gladys Ortiz, a registered nurse, and I am calling from [facility] ([facility]  I am calling to follow up and see how things are going for you after your recent emergency visit/hospital/TCU stay.\"    Tell me how you are doing now that you are home?\" nothing wrong with  Me, I am fine.       Discharge Instructions    \"Let's review your discharge instructions.  What is/are the follow-up recommendations?  Pt. Response: took away my heart medications.     \"Has an appointment with your primary care provider been scheduled?\"   Yes. (confirm)    \"When you see the provider, I would recommend that you bring your medications with you.\"    Medications    \"Tell me what changed about your medicines when you discharged?\"    Changes to chronic meds?    Yes  simvastatin (ZOCOR) 20 MG tablet 45 tablet 3 11/28/2017  No      Sig: TAKE HALF TABLET BY MOUTH AT BEDTIME     Patient taking differently: Pt take one tab every other day.          Class: E-Prescribe     Order: [id number]     E-Prescribing Status: Receipt confirmed by pharmacy (11/28/2017  2:45 PM CST)       Printout Tracking          \"What questions do you have about your medications?\"    None     New diagnoses of heart failure, COPD, diabetes, or MI?    No              Medication reconciliation completed? Yes  Was MTM referral placed (*Make sure to put transitions as reason for referral)?   No    Call Summary    \"What questions or concerns do you have about your recent visit and your follow-up care?\"     none    \"If you have questions or things don't continue to improve, we encourage you contact us through the main clinic number (give number).  Even if the clinic is not open, triage nurses are available 24/7 to help you.     We would like you to know that our clinic has extended hours (provide information).  We also have urgent care (provide details on closest location and hours/contact " "info)\"      \"Thank you for your time and take care!\"             "

## 2018-11-09 ENCOUNTER — TELEPHONE (OUTPATIENT)
Dept: FAMILY MEDICINE | Facility: CLINIC | Age: 83
End: 2018-11-09

## 2018-11-09 NOTE — TELEPHONE ENCOUNTER
Reason for Call:  Home Health Care    Emiliana with [facility] called regarding (reason for call): Verbal Orders    Orders are needed for this patient. PT, OT, Skilled Nursing    PT: Eval and Treat    OT: Eval and Treat     Skilled Nursing:  1x 1 2x for 2 3 prn    Pt Provider:     Phone Number Homecare Nurse can be reached at: [phone]    Can we leave a detailed message on this number? YES    Phone number patient can be reached at: Home number on file [phone] (home)    Best Time: anytime    Call taken on 11/9/2018 at 3:38 PM by Josette Lamas

## 2018-11-11 LAB — INTERPRETATION MONITOR -MUSE: NORMAL

## 2018-11-12 ENCOUNTER — OFFICE VISIT (OUTPATIENT)
Dept: FAMILY MEDICINE | Facility: CLINIC | Age: 83
End: 2018-11-12
Payer: COMMERCIAL

## 2018-11-12 VITALS
DIASTOLIC BLOOD PRESSURE: 72 MMHG | BODY MASS INDEX: 19.24 KG/M2 | RESPIRATION RATE: 18 BRPM | HEIGHT: 65 IN | TEMPERATURE: 99 F | SYSTOLIC BLOOD PRESSURE: 130 MMHG | OXYGEN SATURATION: 98 % | HEART RATE: 83 BPM | WEIGHT: 115.5 LBS

## 2018-11-12 DIAGNOSIS — R53.81 PHYSICAL DECONDITIONING: ICD-10-CM

## 2018-11-12 DIAGNOSIS — I10 HYPERTENSION GOAL BP (BLOOD PRESSURE) < 140/80: ICD-10-CM

## 2018-11-12 DIAGNOSIS — G31.2 ALCOHOLIC ENCEPHALOPATHY (H): Primary | ICD-10-CM

## 2018-11-12 DIAGNOSIS — G89.4 CHRONIC PAIN SYNDROME: ICD-10-CM

## 2018-11-12 DIAGNOSIS — R41.3 MEMORY LOSS: ICD-10-CM

## 2018-11-12 DIAGNOSIS — J84.10 PULMONARY FIBROSIS (H): Chronic | ICD-10-CM

## 2018-11-12 DIAGNOSIS — F10.920 ALCOHOL INTOXICATION, UNCOMPLICATED (H): ICD-10-CM

## 2018-11-12 DIAGNOSIS — N18.30 CHRONIC KIDNEY DISEASE, STAGE III (MODERATE) (H): ICD-10-CM

## 2018-11-12 PROCEDURE — 99496 TRANSJ CARE MGMT HIGH F2F 7D: CPT | Performed by: FAMILY MEDICINE

## 2018-11-12 RX ORDER — FOLIC ACID 1 MG/1
1 TABLET ORAL DAILY
Qty: 30 TABLET | Refills: 2 | Status: SHIPPED | OUTPATIENT
Start: 2018-11-12 | End: 2018-12-04

## 2018-11-12 NOTE — MR AVS SNAPSHOT
After Visit Summary   11/12/2018    Theresa Alves    MRN: [id number]           Patient Information     Date Of Birth          6/22/1924        Visit Information        Provider Department      11/12/2018 11:10 AM Bre Phan DO [facility]        Today's Diagnoses     Alcoholic encephalopathy (H)    -  1    Chronic pain syndrome        Hypertension goal BP (blood pressure) < 140/80        Chronic kidney disease, stage III (moderate) (H)        Physical deconditioning        Memory loss           Follow-ups after your visit        Follow-up notes from your care team     Return in about 4 weeks (around 12/10/2018) for Routine Visit.      Your next 10 appointments already scheduled     Nov 15, 2018  1:30 PM CST   LAB with [facility]   [facility]    [address] [place] [address]   [phone]           Please do not eat 10-12 hours before your appointment if you are coming in fasting for labs on lipids, cholesterol, or glucose (sugar). This does not apply to pregnant women. Water, hot tea and black coffee (with nothing added) are okay. Do not drink other fluids, diet soda or chew gum.            Nov 15, 2018  2:30 PM CST   Return Visit with Lolis Davenport PA-C   [facility] ([facility]    [address] [place] [address]   [phone] OPT 2            Jan 14, 2019 10:00 AM CST   LAB with [facility]   [facility]    [address] [place] [address]   [phone]           Please do not eat 10-12 hours before your appointment if you are coming in fasting for labs on lipids, cholesterol, or glucose (sugar). This does not apply to pregnant women. Water, hot tea and black coffee (with nothing added) are okay. Do not drink other  "fluids, diet soda or chew gum.            Jan 14, 2019 10:50 AM CST   Core Return with Lolis Davenport PA-C   [facility] ([facility])    [id number]  [facility] [address] [phone] OPT 2              Who to contact     If you have questions or need follow up information about today's clinic visit or your schedule please contact [facility] directly at [phone].  Normal or non-critical lab and imaging results will be communicated to you by MyChart, letter or phone within 4 business days after the clinic has received the results. If you do not hear from us within 7 days, please contact the clinic through MyChart or phone. If you have a critical or abnormal lab result, we will notify you by phone as soon as possible.  Submit refill requests through [employer] or call your pharmacy and they will forward the refill request to us. Please allow 3 business days for your refill to be completed.          Additional Information About Your Visit        Care EveryWhere ID     This is your Care EveryWhere ID. This could be used by other organizations to access your [place] medical records  [id number]        Your Vitals Were     Pulse Temperature Respirations Height Last Period Pulse Oximetry    83 109  F (42.8  C) (Tympanic) 18 5' 5\" (1.651 m) (LMP Unknown) 98%    Breastfeeding? BMI (Body Mass Index)                No 19.22 kg/m2           Blood Pressure from Last 3 Encounters:   11/12/18 130/72   11/07/18 115/65   11/05/18 140/80    Weight from Last 3 Encounters:   11/12/18 115 lb 8 oz (52.4 kg)   11/07/18 114 lb 4.8 oz (51.8 kg)   11/05/18 111 lb 3.2 oz (50.4 kg)              Today, you had the following     No orders found for display         Today's Medication Changes          These changes are accurate as of 11/12/18 11:49 AM.  If you have any questions, ask your nurse or doctor.               These medicines have " changed or have updated prescriptions.        Dose/Directions    simvastatin 20 MG tablet   Commonly known as:  ZOCOR   This may have changed:  additional instructions   Used for:  Hyperlipidemia with target LDL less than 130        TAKE HALF TABLET BY MOUTH AT BEDTIME   Quantity:  45 tablet   Refills:  3                Primary Care Provider Office Phone # Fax #    Marbin Rouse [phone] [address] [facility] [address]        Goals        General    1. Being Active (pt-stated)     Notes - Note created  10/8/2018  3:18 PM by Minesh Minor, RN    Goal Statement: I will prevent physical deconditioning by continuing to walk to the dining cantu each day for the next 3 months.  Measure of Success: The patient will report maintained physical strength and ability to ambulate to the dining cantu.  Supportive Steps to Achieve: CCC RN will continue patient outreaches to offer support.  The patient will continue using her walker for ambulation to promote stability and avoid falls.  Barriers: The patient has chronic back pain which makes it more difficult to walk long distances.  Strengths: The patient is motivated to prevent further physical deconditioning and understands the importance of daily physical activity.  Date to Achieve By: 1/8/19  Patient expressed understanding of goal: Yes        Equal Access to Services     KITTY SALINAS AH: Valdemar Lobo, watobi delgado, qaybta kaalmada leah, renee emery. So [facility] [phone].    ATENCIÓN: Si habla español, tiene a salmon disposición servicios gratuitos de asistencia lingüística. Llame al [phone].    We comply with applicable federal civil rights laws and [place] laws. We do not discriminate on the basis of race, color, national origin, age, disability, sex, sexual orientation, or gender identity.            Thank you!     Thank you for choosing [facility]   for your care. Our goal is always to provide you with excellent care. Hearing back from our patients is one way we can continue to improve our services. Please take a few minutes to complete the written survey that you may receive in the mail after your visit with us. Thank you!             Your Updated Medication List - Protect others around you: Learn how to safely use, store and throw away your medicines at www.disposemymeds.org.          This list is accurate as of 11/12/18 11:49 AM.  Always use your most recent med list.                   Brand Name Dispense Instructions for use Diagnosis    acetaminophen 500 MG tablet    TYLENOL     Take one tablet 8 hours after morning percocet and one tablet at bedtime    Chronic pain syndrome       folic acid 1 MG tablet    FOLVITE    30 tablet    Take 1 tablet (1 mg) by mouth daily    Alcohol intoxication, uncomplicated (H)       furosemide 20 MG tablet    LASIX    90 tablet    Take 1 tablet (20 mg) by mouth daily    (HFpEF) heart failure with preserved ejection fraction (H)       lidocaine 5 % Patch    LIDODERM    30 patch    Apply 1-2  patches to painful area at area of low back at once for up to 12 h within a 24 h period.  Remove after 12 hours.    Chronic pain syndrome       multivitamin, therapeutic Tabs tablet     60 tablet    Take 1 tablet by mouth daily    Alcohol intoxication, uncomplicated (H)       oxyCODONE-acetaminophen 5-325 MG per tablet    PERCOCET    30 tablet    Take 0.5 tab every morning.  Max of 0.5 tab daily.    Chronic midline low back pain without sciatica       pantoprazole 40 MG EC tablet    PROTONIX    30 tablet    Take 1 tablet (40 mg) by mouth every morning (before breakfast)    Alcohol intoxication, uncomplicated (H)       simvastatin 20 MG tablet    ZOCOR    45 tablet    TAKE HALF TABLET BY MOUTH AT BEDTIME    Hyperlipidemia with target LDL less than 130       sodium chloride 0.65 % nasal spray    OCEAN     Spray 1 spray into both nostrils 3  times daily as needed for congestion    Nasal dryness       thiamine 100 MG tablet     10 tablet    Take 1 tablet (100 mg) by mouth daily    Alcohol intoxication, uncomplicated (H)       VITAMIN B 12 PO      Take 1 tablet by mouth daily        VITAMIN D (CHOLECALCIFEROL) PO      Take 1,000 Units by mouth daily.

## 2018-11-12 NOTE — PROGRESS NOTES
SUBJECTIVE:   Theresa Alves is a 89 year old female who presents to clinic today for the following health issues:            Hospital Follow-up Visit:    Hospital/Nursing Home/IP Rehab Facility: [facility]  Date of Admission: 11/05/2018  Date of Discharge: 11/07/2018  Reason(s) for Admission: Alcoholic encephalopathy, Bradycardia            Problems taking medications regularly:  None       Medication changes since discharge: None       Problems adhering to non-medication therapy:  None    Summary of hospitalization:  [facility] discharge summary reviewed  Diagnostic Tests/Treatments reviewed.  Follow up needed: PCP  Other Healthcare Providers Involved in Patient s Care:         Homecare, Care Coordination and Physical Therapy  Update since discharge: improved.     Post Discharge Medication Reconciliation: discharge medications reconciled and changed, per note/orders (see AVS).  Plan of care communicated with patient and family     Coding guidelines for this visit:  Type of Medical   Decision Making Face-to-Face Visit       within 7 Days of discharge Face-to-Face Visit        within 14 days of discharge   Moderate Complexity [phone]   High Complexity [id number] [phone]              Has PT coming out to her at Assisted Living this Wednesday.  Also has a nurse that comes by as well; pt tells me that she manages her own meds.  Ruiz snot drive a car but needs Handicap parking permit renewed so she does not have to walk far when going to doctor appts, etc.  Daughter present today and is concerned that only 2 weeks of PT will not be enough and that pt will need more frequent checks since she is currently off of a lot of her BP meds.   Has been sober form Etoh use since the hospitalization.  Now only using extra strength Tylenol for pain at night (not alcohol).    Problem list and histories reviewed & adjusted, as indicated.  Additional history: as documented    Labs reviewed in EPIC    Reviewed  "and updated as needed this visit by clinical staff  Tobacco  Allergies  Meds  Problems  Med Hx  Surg Hx  Fam Hx  Soc Hx        Reviewed and updated as needed this visit by Provider  Allergies  Meds  Problems         ROS:  CONSTITUTIONAL: NEGATIVE for fever, chills, change in weight  INTEGUMENTARY/SKIN: NEGATIVE for worrisome rashes, moles or lesions  EYES: NEGATIVE for vision changes or irritation  ENT/MOUTH: NEGATIVE for ear, mouth and throat problems  RESP: NEGATIVE for significant cough or SOB  CV: NEGATIVE for chest pain, palpitations or peripheral edema  GI: NEGATIVE for nausea, abdominal pain, heartburn, or change in bowel habits  : NEGATIVE for frequency, dysuria, or hematuria  MUSCULOSKELETAL: POSITIVE for chronic low back pain  NEURO:POSITIVE for Generalized weakness  HEME: NEGATIVE for bleeding problems  PSYCHIATRIC: NEGATIVE for changes in mood or affect    OBJECTIVE:     /72 (Cuff Size: Adult Small)  Pulse 83  Temp 99  F (37.2  C) (Tympanic)  Resp 18  Ht 5' 5\" (1.651 m)  Wt 115 lb 8 oz (52.4 kg)  LMP  (LMP Unknown)  SpO2 98%  Breastfeeding? No  BMI 19.22 kg/m2  Body mass index is 19.22 kg/(m^2).   GENERAL: Elderly, frail.  Alert and no acute distress.  Very pleasant and cooperative.  EYES: Eyes grossly normal to inspection, PERRL and conjunctivae and sclerae normal  HENT: ear canals and TM's normal, nose and mouth without ulcers or lesions  NECK: no adenopathy, no asymmetry, masses, or scars and thyroid normal to palpation  RESP: lungs clear to auscultation - no rales, rhonchi or wheezes  CV: regular rate and rhythm, and peripheral pulses strong.  +pitting edema in b/l feet and ankles.  ABDOMEN: soft, nontender, no hepatosplenomegaly, no masses and bowel sounds normal  MS: +generalized physical deconditioning.  Ambulation slow and hesitant, uses walker for ambulation.  SKIN: no suspicious lesions or rashes  NEURO: oriented times 3 (person/place/time).  +generalized " weakness.  PSYCH: mentation appears normal, affect normal/bright    Diagnostic Test Results:  none     ASSESSMENT/PLAN:     Problem List Items Addressed This Visit     Pulmonary fibrosis (Chronic)    Hypertension goal BP (blood pressure) < 140/80    Chronic kidney disease, stage III (moderate) (H)    Physical deconditioning    Chronic pain syndrome    Memory loss    Alcoholic encephalopathy (H) - Primary      Other Visit Diagnoses     Alcohol intoxication, uncomplicated (H)        Relevant Medications    folic acid (FOLVITE) 1 MG tablet           Acute alcoholic encephalopathy with history of Daily etoh use and chronic low back pain (on daily opioid):  -Recommended to continue to avoid Etoh use.  Has avoided Etoh use since hospitalization.  -Now using ES Tylenol at night to help cope with pain (not alcohol)  -Will need to discuss Percocet use further with PCP.  For now, only using 1/2 tab of her Percocet in AM as needed; no refills needed today.  ED recommended to consider Chem dep in the future if needed.  -Continue to work with patient to find alternatives to pain control for chronic low back pain  -Continue/Finish Thiamine x 5 days, Folate supplement x 3 months, and Pantoprazole x 1 month  -Continue home PT, OT and RN given etoh abuse, hx of bradycardia and multiple medications (polypharmacy)  -Continue heating pads and Lidoderm patches for pain relief.  Avoid NSAIDs due to CKD    Atrial fibrillation with bradycardia  Sick Sinus Syndrome  -Continue follow [facility] with [facility].   -Aspirin, metoprolol and Digoxin were recently d/c'd.  Has f/u scheduled later this week.  Will be at an increased stroke risk but patient prefers palliative measures overall in her health care at this point in her life.   Lasix at 20 mg daily is helping with her SOB and LE's edema.  -Just finished her heart monitor study last Friday, will follow the results up with her Cardiologist this week.    Pulmonary fibrosis:   -Spirometry was  done in 2016 (unable to see results)      CKD Stage 3:   -Continue low dose Lasix at 20 mg daily.  Monitoring BMP closely with Cardiology and PCP.  -Last renal labs done on 11/7/18: GFR 48 and creatinine 1.07        Bre Phan, [facility]

## 2018-11-13 ASSESSMENT — ACTIVITIES OF DAILY LIVING (ADL): DEPENDENT_IADLS:: COOKING;TRANSPORTATION

## 2018-11-13 NOTE — PROGRESS NOTES
Clinic Care Coordination Contact  Care Coordination Communication    Referral Source: IP Report    Clinical Data: Patient was hospitalized at [facility] from 11/5/18 to 11/7/18 with diagnosis of generalized weakness, alcohol intoxication, acute alcohol encephalopathy.     Home Care Contact:              Home Care Agency: [facility]              Contact name () and phone number: Onel Ayala, [phone]              Care Coordination contacted home care: No - per chart review in [facility], patient active with home care services              Anticipated start of care date: 11/9/18    Patient Contact:               Introduced self and role of care coordination.               Discharge instructions were reviewed with patient/caregiver.               Do you have any questions about your medications? No              Follow up appointment: Patient was seen at PCP clinic by Dr. Phan yesterday 11/12/18.              Provided 24 Hour Nurse Line and/or 24 Hour Appointment Scheduling: patient declined needing number              Home care has contacted patient: Yes - PT had just arrived to patient home at time of CCC RN call.              Patient questions/concerns: No    Plan: RN/SW Care Coordinator will await notification from home care staff informing RN/SW Care Coordinator of patients discharge plans/needs. RN/SW Care Coordinator will review chart and outreach to home care every 4 weeks and as needed.      Minesh Minor, RN  Clinic Care Coordinator  [facility]  Ph: [phone]

## 2018-11-15 ENCOUNTER — TELEPHONE (OUTPATIENT)
Dept: FAMILY MEDICINE | Facility: CLINIC | Age: 83
End: 2018-11-15

## 2018-11-15 ENCOUNTER — NURSE TRIAGE (OUTPATIENT)
Dept: NURSING | Facility: CLINIC | Age: 83
End: 2018-11-15

## 2018-11-15 ENCOUNTER — TELEPHONE (OUTPATIENT)
Dept: CARDIOLOGY | Facility: CLINIC | Age: 83
End: 2018-11-15

## 2018-11-15 NOTE — TELEPHONE ENCOUNTER
Ericka Lopes, called. Appointment for today was rescheduled due to Lolis Ethel, needing to be out of the office this afternoon.   Had a couple of concerns about the medications that were stopped while in hospital, digoxin and metoprolol due to atrial fib pausing. Thought the metoprolol was for BP. Explained that it has some BP lowering properties but was more likely being used for heart rate and contractility. BP when seen at [facility]'s on 11/12/18 was 130/72 with HR of 83. Reassured her that these were reasonable readings and nothing to worry about at this time. Holter done on 11/5/18 showed 25 pauses lasting longer that 2 seconds  with the longest lasting 2.39 seconds during the night.     Marianne was reassured. States she feels that her Aunt seems better than she had been, she is just concerned and tries to see her 2-3 times per week.   F/u is now scheduled for 11/28/18. Will update clinic should she have any further questions or concerns.

## 2018-11-15 NOTE — TELEPHONE ENCOUNTER
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  FNA gave verbal ok and advised Candace to fax in request to clinic.  Candace can be reached at [phone].

## 2018-11-15 NOTE — TELEPHONE ENCOUNTER
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  FNA gave verbal ok and advised Candace to fax in request to clinic.  Candace can be reached at [phone].    Routed to:  RN Pool  Please be sure to close this encounter once this patient's issue/question has been addressed.    Elvira Romero RN/[facility]

## 2018-11-23 ENCOUNTER — TELEPHONE (OUTPATIENT)
Dept: FAMILY MEDICINE | Facility: CLINIC | Age: 83
End: 2018-11-23

## 2018-11-23 NOTE — TELEPHONE ENCOUNTER
Our goal is to have forms completed within 72 hours, however some forms may require a visit or additional information.    What clinic location was the form placed at [facility] or [place].?     Who is the form from?   Where did the form come from? Faxed to clinic   The form was placed in the inbox of Marbin Rouse MD      Please fax to [phone]  Phone number:      Additional comments: [facility] PT 2 w 2    Call take on 11/23/2018 at 10:15 AM by Rachana Ontiveros

## 2018-11-27 ENCOUNTER — TELEPHONE (OUTPATIENT)
Dept: FAMILY MEDICINE | Facility: CLINIC | Age: 83
End: 2018-11-27

## 2018-11-27 NOTE — TELEPHONE ENCOUNTER
Our goal is to have forms completed within 72 hours, however some forms may require a visit or additional information.    What clinic location was the form placed at [facility] or [place].?     Who is the form from?   Where did the form come from? Faxed to clinic   The form was placed in the inbox of Marbin Rouse MD      Please fax to [phone]  Phone number:      Additional comments: [facility] OT 1 w 1    Call take on 11/27/2018 at 1:24 PM by Rachana Ontiveros

## 2018-11-28 ENCOUNTER — OFFICE VISIT (OUTPATIENT)
Dept: CARDIOLOGY | Facility: CLINIC | Age: 83
End: 2018-11-28
Attending: NURSE PRACTITIONER
Payer: COMMERCIAL

## 2018-11-28 VITALS
HEART RATE: 124 BPM | OXYGEN SATURATION: 95 % | DIASTOLIC BLOOD PRESSURE: 64 MMHG | BODY MASS INDEX: 18.66 KG/M2 | SYSTOLIC BLOOD PRESSURE: 126 MMHG | WEIGHT: 112 LBS | HEIGHT: 65 IN

## 2018-11-28 DIAGNOSIS — I48.20 CHRONIC ATRIAL FIBRILLATION (H): Primary | ICD-10-CM

## 2018-11-28 DIAGNOSIS — R00.1 BRADYCARDIA: ICD-10-CM

## 2018-11-28 DIAGNOSIS — I48.91 ATRIAL FIBRILLATION WITH SLOW VENTRICULAR RESPONSE (H): ICD-10-CM

## 2018-11-28 LAB
ANION GAP SERPL CALCULATED.3IONS-SCNC: 12.1 MMOL/L (ref 6–17)
BUN SERPL-MCNC: 30 MG/DL (ref 7–30)
CALCIUM SERPL-MCNC: 9.7 MG/DL (ref 8.5–10.5)
CHLORIDE SERPL-SCNC: 103 MMOL/L (ref 98–107)
CO2 SERPL-SCNC: 24 MMOL/L (ref 23–29)
CREAT SERPL-MCNC: 1.54 MG/DL (ref 0.7–1.3)
GFR SERPL CREATININE-BSD FRML MDRD: 31 ML/MIN/1.7M2
GLUCOSE SERPL-MCNC: 132 MG/DL (ref 70–105)
POTASSIUM SERPL-SCNC: 4.1 MMOL/L (ref 3.5–5.1)
SODIUM SERPL-SCNC: 135 MMOL/L (ref 136–145)

## 2018-11-28 PROCEDURE — 93000 ELECTROCARDIOGRAM COMPLETE: CPT | Performed by: PHYSICIAN ASSISTANT

## 2018-11-28 PROCEDURE — 36415 COLL VENOUS BLD VENIPUNCTURE: CPT | Performed by: PHYSICIAN ASSISTANT

## 2018-11-28 PROCEDURE — 80048 BASIC METABOLIC PNL TOTAL CA: CPT | Performed by: PHYSICIAN ASSISTANT

## 2018-11-28 PROCEDURE — 99215 OFFICE O/P EST HI 40 MIN: CPT | Mod: 25 | Performed by: PHYSICIAN ASSISTANT

## 2018-11-28 RX ORDER — METOPROLOL SUCCINATE 50 MG/1
50 TABLET, EXTENDED RELEASE ORAL DAILY
Qty: 30 TABLET | Refills: 11 | Status: SHIPPED | OUTPATIENT
Start: 2018-11-28 | End: 2019-01-01

## 2018-11-28 NOTE — PROGRESS NOTES
Reviewed during clinic visit.  Please see progress note for plan.  Lolis Davenport PA-C 11/28/2018 3:48 PM

## 2018-11-28 NOTE — LETTER
11/28/2018    Marbin Rouse MD  [address]  [facility] [address]    RE: Theresa Alves       Dear Colleague,    I had the pleasure of seeing Theresa Alves in the [facility].    [id number]  HPI and Plan:   See dictation    Orders this Visit:  Orders Placed This Encounter   Procedures     Follow-Up with Electrophysiologist     EKG 12-lead complete w/read - Clinics (performed today)     Holter Monitor 24 hour - Adult     Orders Placed This Encounter   Medications     metoprolol succinate (TOPROL-XL) 50 MG 24 hr tablet     Sig: Take 1 tablet (50 mg) by mouth daily     Dispense:  30 tablet     Refill:  11     There are no discontinued medications.      Encounter Diagnoses   Name Primary?     Bradycardia      Chronic atrial fibrillation (H) Yes       CURRENT MEDICATIONS:  Current Outpatient Prescriptions   Medication Sig Dispense Refill     acetaminophen (TYLENOL) 500 MG tablet Take one tablet 8 hours after morning percocet and one tablet at bedtime  0     Cyanocobalamin (VITAMIN B 12 PO) Take 1 tablet by mouth daily        folic acid (FOLVITE) 1 MG tablet Take 1 tablet (1 mg) by mouth daily 30 tablet 2     furosemide (LASIX) 20 MG tablet Take 1 tablet (20 mg) by mouth daily 90 tablet 0     metoprolol succinate (TOPROL-XL) 50 MG 24 hr tablet Take 1 tablet (50 mg) by mouth daily 30 tablet 11     multivitamin, therapeutic (THERA-VIT) TABS tablet Take 1 tablet by mouth daily 60 tablet 0     oxyCODONE-acetaminophen (PERCOCET) 5-325 MG per tablet Take 0.5 tab every morning.  Max of 0.5 tab daily. 30 tablet 0     pantoprazole (PROTONIX) 40 MG EC tablet Take 1 tablet (40 mg) by mouth every morning (before breakfast) 30 tablet 0     simvastatin (ZOCOR) 20 MG tablet TAKE HALF TABLET BY MOUTH AT BEDTIME (Patient taking differently: Pt take one tab every other day.) 45 tablet 3     sodium chloride (OCEAN) 0.65 % nasal spray Spray 1 spray into both nostrils 3 times daily as needed  for congestion  0     thiamine 100 MG tablet Take 1 tablet (100 mg) by mouth daily 10 tablet 0     VITAMIN D, CHOLECALCIFEROL, PO Take 1,000 Units by mouth daily.         lidocaine (LIDODERM) 5 % Patch Apply 1-2  patches to painful area at area of low back at once for up to 12 h within a 24 h period.  Remove after 12 hours. (Patient not taking: Reported on 11/28/2018) 30 patch 0       ALLERGIES   No Known Allergies    PAST MEDICAL HISTORY:  Past Medical History:   Diagnosis Date     Atrial fibrillation (H)      Chronic back pain      Chronic diastolic CHF (congestive heart failure) (H)      Chronic kidney disease, stage III (moderate) (H)      Pulmonary fibrosis (H)      Pulmonary hypertension (H)      Unspecified essential hypertension     Essential hypertension       PAST SURGICAL HISTORY:  Past Surgical History:   Procedure Laterality Date     CARPAL TUNNEL RELEASE RT/LT  1990's    R     CATARACT IOL, RT/LT  5/03    R     HERNIA REPAIR, INGUINAL RT/LT  1954    R     hiatal hernia repair  6/10    large paraesophageal hiatal hernia; Nissen fundoplication        FAMILY HISTORY:  Family History   Problem Relation Age of Onset     Unknown/Adopted Mother      Unknown/Adopted Father      Diabetes No family hx of      Coronary Artery Disease No family hx of      Hypertension No family hx of      Hyperlipidemia No family hx of      Cerebrovascular Disease No family hx of      Breast Cancer No family hx of      Colon Cancer No family hx of      Prostate Cancer No family hx of      Other Cancer No family hx of      Depression No family hx of      Anxiety Disorder No family hx of      Mental Illness No family hx of      Substance Abuse No family hx of      Anesthesia Reaction No family hx of      Asthma No family hx of      Osteoporosis No family hx of      Genetic Disorder No family hx of      Thyroid Disease No family hx of      Obesity No family hx of        SOCIAL HISTORY:  Social History     Social History     Marital  "status:      Spouse name: N/A     Number of children: 0     Years of education: N/A     Occupational History     worked at [employer] for 40 yrs Retired     Social History Main Topics     Smoking status: Never Smoker     Smokeless tobacco: Never Used     Alcohol use Yes      Comment: daily-vodka tonic x1 or more     Drug use: No     Sexual activity: No     Other Topics Concern     None     Social History Narrative       Review of Systems:  Skin:  Positive for     Eyes:  Positive for glasses  ENT:  Negative    Respiratory:  Positive for dyspnea on exertion  Cardiovascular:  Negative Positive for;palpitations;edema  Gastroenterology: Positive for    Genitourinary:  Positive for nocturia  Musculoskeletal:  Positive for back pain;arthritis  Neurologic:       Psychiatric:       Heme/Lymph/Imm:       Endocrine:         Physical Exam:  Vitals: /64  Pulse 124  Ht 1.651 m (5' 5\")  Wt 50.8 kg (112 lb)  LMP  (LMP Unknown)  SpO2 95%  BMI 18.64 kg/m2   Please refer to dictation for physical exam    Recent Lab Results:  LIPID RESULTS:  Lab Results   Component Value Date    CHOL 142 11/28/2017    HDL 69 11/28/2017    LDL 52 11/28/2017    TRIG 107 11/28/2017    CHOLHDLRATIO 2.7 01/10/2014       LIVER ENZYME RESULTS:  Lab Results   Component Value Date    AST 24 03/18/2018    ALT 27 03/18/2018       CBC RESULTS:  Lab Results   Component Value Date    WBC 8.2 11/05/2018    RBC 3.78 (L) 11/05/2018    HGB 13.2 11/05/2018    HCT 33.8 (L) 11/06/2018     (H) 11/05/2018    MCH 34.9 (H) 11/05/2018    MCHC 33.7 11/05/2018    RDW 13.3 11/05/2018     11/05/2018       BMP RESULTS:  Lab Results   Component Value Date     (L) 11/28/2018    POTASSIUM 4.1 11/28/2018    CHLORIDE 103 11/28/2018    CO2 24 11/28/2018    ANIONGAP 12.1 11/28/2018     (H) 11/28/2018    BUN 30 11/28/2018    CR 1.54 (H) 11/28/2018    GFRESTIMATED 31 (L) 11/28/2018    GFRESTBLACK 38 (L) 11/28/2018    PAWEL 9.7 11/28/2018        A1C " RESULTS:  No results found for: A1C    INR RESULTS:  No results found for: INR        CC  JUSTIN Carpenter CNP  [address]  [place] [address]        Thank you for allowing me to participate in the care of your patient.      Sincerely,     FRANCISCA FullerC     [facility]    cc:   JUSTIN Carpenter CNP  [address]  [place] [id number]

## 2018-11-28 NOTE — PROGRESS NOTES
Service Date: 2018      PRIMARY CARDIOLOGIST:  Dr. Walker      REASON FOR VISIT:  Hospital followup, AFib, ETOH intoxication.      HISTORY OF PRESENT ILLNESS:  Ms. Alves is a 89-year-old woman with past medical history significant for the followin.  Pulmonary fibrosis.   2.  Hypertension.   3.  CKD.   4.  Kyphoscoliosis.   5.  Persistent AFib.   6.  HFpEF.      We became involved in her care in 2018 when she was admitted for difficult to control AFib and heart failure was secondary to that.  Rates were controlled on Toprol and digoxin.  She was started on anticoagulation, but had severe nosebleeds with Eliquis.  She then decided to go to 81 mg aspirin, and then we recently discontinued that as these severely affected her quality of life.  I saw her last month.  Unfortunately, just the next day, she was admitted to the hospital after not feeling well.  She had called the ambulance and she just felt off.  Her heart rates were high and she was in AFib and had not eaten well, but had had an alcoholic drink.  Her alcohol level was found to be 0.20 with a normal digoxin level.  She was found to be tachybrady in the hospital with more garrett than tachy.  At this point, they held her AV isaías agents and both her digoxin and metoprolol was stopped on discharge.  She did get a Holter monitor and she is here today for followup of those events.      She feels about the same.  She is still short of breath doing any activity, but she is trying to walk more.  She now walks back from the dining room where previously she only walked down to the dining room.  She is getting home PT and OT.  She does feel her heart racing and pounding.  She said she has some increase in pedal edema but no real other edema above her ankles.  She denies orthopnea or PND.  She is having 1 small drink a night.  Again, she tells me it is just a little bit in the bottom of her glass.  She would like to continue doing this.  She eats her  meals in the dining room.      PHYSICAL EXAMINATION:   GENERAL:  Elderly, frail woman in no acute distress.   HEENT:  Normocephalic, atraumatic.   HEART:  Irregularly irregular, with 2/6 systolic murmur heard best at the left sternal border.   LUNGS:  With faint rales bilaterally that sound dry.  These are mostly in the bases.   EXTREMITIES:  She has just 1+ edema in her feet.   SKIN:  Warm and dry.  No JVP at 90 degrees.      Holter monitor shows atrial fibrillation.  With average heart rate of 84, a slow of 44 and a fast of 190.  She was tachycardic over 20% of the time.  She also had 25 pauses greater than 2.0 seconds.      ASSESSMENT AND PLAN:   1.  Persistent atrial fibrillation with likely some component of tachybrady syndrome as she had heart rates as low as the 40s with pauses and highs in the 190s.  She previously was maintained on Toprol 200 mg daily +125 mcg of digoxin and did well on this without syncope, but there were significant pauses noted in the hospital.  This was discontinued.  Given she is fairly tachy, we will restart at 50 mg daily.  I will have her wear a Holter monitor and then be seen by our EP doctors.  We did discuss the possibility of pacemaker and/or pacemaker and AV node ablation.  We also discussed the alternative of going hospice and she prefers to continue with restorative care.   2.  Heart failure with preserved EF, likely secondary to tachycardia, now doing well on 20 mg of Lasix daily.  She can take an additional 20 if she needs to with worsening shortness of breath or edema.   3.  Pulmonary fibrosis, stable.   4.  CKD with labs today showing a creatinine of 1.54, BUN 30, potassium 4.1, sodium 135, which is within her normal range.   5.  Recent hospitalization for alcohol intoxication.  I did state that my recommendation is she does not drink at all.  Again, the other option was hospice and she can drink and eat whatever she wants.  She will consider my recommendations, but does  not want hospice at this time.      Thanks for allowing me to participate in her care.  We will see her back after she is seen by EP.      LENO Medrano PA-C             D: 2018   T: 2018   MT: al      Name:     YANELI CHUA   MRN:      [id number]        Account:      [id number]   :      1924           Service Date: 2018      Document: [id number]

## 2018-11-28 NOTE — PROGRESS NOTES
[id number]  HPI and Plan:   See dictation    Orders this Visit:  Orders Placed This Encounter   Procedures     Follow-Up with Electrophysiologist     EKG 12-lead complete w/read - Clinics (performed today)     Holter Monitor 24 hour - Adult     Orders Placed This Encounter   Medications     metoprolol succinate (TOPROL-XL) 50 MG 24 hr tablet     Sig: Take 1 tablet (50 mg) by mouth daily     Dispense:  30 tablet     Refill:  11     There are no discontinued medications.      Encounter Diagnoses   Name Primary?     Bradycardia      Chronic atrial fibrillation (H) Yes       CURRENT MEDICATIONS:  Current Outpatient Prescriptions   Medication Sig Dispense Refill     acetaminophen (TYLENOL) 500 MG tablet Take one tablet 8 hours after morning percocet and one tablet at bedtime  0     Cyanocobalamin (VITAMIN B 12 PO) Take 1 tablet by mouth daily        folic acid (FOLVITE) 1 MG tablet Take 1 tablet (1 mg) by mouth daily 30 tablet 2     furosemide (LASIX) 20 MG tablet Take 1 tablet (20 mg) by mouth daily 90 tablet 0     metoprolol succinate (TOPROL-XL) 50 MG 24 hr tablet Take 1 tablet (50 mg) by mouth daily 30 tablet 11     multivitamin, therapeutic (THERA-VIT) TABS tablet Take 1 tablet by mouth daily 60 tablet 0     oxyCODONE-acetaminophen (PERCOCET) 5-325 MG per tablet Take 0.5 tab every morning.  Max of 0.5 tab daily. 30 tablet 0     pantoprazole (PROTONIX) 40 MG EC tablet Take 1 tablet (40 mg) by mouth every morning (before breakfast) 30 tablet 0     simvastatin (ZOCOR) 20 MG tablet TAKE HALF TABLET BY MOUTH AT BEDTIME (Patient taking differently: Pt take one tab every other day.) 45 tablet 3     sodium chloride (OCEAN) 0.65 % nasal spray Spray 1 spray into both nostrils 3 times daily as needed for congestion  0     thiamine 100 MG tablet Take 1 tablet (100 mg) by mouth daily 10 tablet 0     VITAMIN D, CHOLECALCIFEROL, PO Take 1,000 Units by mouth daily.         lidocaine (LIDODERM) 5 % Patch Apply 1-2  patches to painful  area at area of low back at once for up to 12 h within a 24 h period.  Remove after 12 hours. (Patient not taking: Reported on 11/28/2018) 30 patch 0       ALLERGIES   No Known Allergies    PAST MEDICAL HISTORY:  Past Medical History:   Diagnosis Date     Atrial fibrillation (H)      Chronic back pain      Chronic diastolic CHF (congestive heart failure) (H)      Chronic kidney disease, stage III (moderate) (H)      Pulmonary fibrosis (H)      Pulmonary hypertension (H)      Unspecified essential hypertension     Essential hypertension       PAST SURGICAL HISTORY:  Past Surgical History:   Procedure Laterality Date     CARPAL TUNNEL RELEASE RT/LT  1990's    R     CATARACT IOL, RT/LT  5/03    R     HERNIA REPAIR, INGUINAL RT/LT  1954    R     hiatal hernia repair  6/10    large paraesophageal hiatal hernia; Nissen fundoplication        FAMILY HISTORY:  Family History   Problem Relation Age of Onset     Unknown/Adopted Mother      Unknown/Adopted Father      Diabetes No family hx of      Coronary Artery Disease No family hx of      Hypertension No family hx of      Hyperlipidemia No family hx of      Cerebrovascular Disease No family hx of      Breast Cancer No family hx of      Colon Cancer No family hx of      Prostate Cancer No family hx of      Other Cancer No family hx of      Depression No family hx of      Anxiety Disorder No family hx of      Mental Illness No family hx of      Substance Abuse No family hx of      Anesthesia Reaction No family hx of      Asthma No family hx of      Osteoporosis No family hx of      Genetic Disorder No family hx of      Thyroid Disease No family hx of      Obesity No family hx of        SOCIAL HISTORY:  Social History     Social History     Marital status:      Spouse name: N/A     Number of children: 0     Years of education: N/A     Occupational History     worked at [employer] for 40 yrs Retired     Social History Main Topics     Smoking status: Never Smoker     Smokeless  "tobacco: Never Used     Alcohol use Yes      Comment: daily-vodka tonic x1 or more     Drug use: No     Sexual activity: No     Other Topics Concern     None     Social History Narrative       Review of Systems:  Skin:  Positive for     Eyes:  Positive for glasses  ENT:  Negative    Respiratory:  Positive for dyspnea on exertion  Cardiovascular:  Negative Positive for;palpitations;edema  Gastroenterology: Positive for    Genitourinary:  Positive for nocturia  Musculoskeletal:  Positive for back pain;arthritis  Neurologic:       Psychiatric:       Heme/Lymph/Imm:       Endocrine:         Physical Exam:  Vitals: /64  Pulse 124  Ht 1.651 m (5' 5\")  Wt 50.8 kg (112 lb)  LMP  (LMP Unknown)  SpO2 95%  BMI 18.64 kg/m2   Please refer to dictation for physical exam    Recent Lab Results:  LIPID RESULTS:  Lab Results   Component Value Date    CHOL 142 11/28/2017    HDL 69 11/28/2017    LDL 52 11/28/2017    TRIG 107 11/28/2017    CHOLHDLRATIO 2.7 01/10/2014       LIVER ENZYME RESULTS:  Lab Results   Component Value Date    AST 24 03/18/2018    ALT 27 03/18/2018       CBC RESULTS:  Lab Results   Component Value Date    WBC 8.2 11/05/2018    RBC 3.78 (L) 11/05/2018    HGB 13.2 11/05/2018    HCT 33.8 (L) 11/06/2018     (H) 11/05/2018    MCH 34.9 (H) 11/05/2018    MCHC 33.7 11/05/2018    RDW 13.3 11/05/2018     11/05/2018       BMP RESULTS:  Lab Results   Component Value Date     (L) 11/28/2018    POTASSIUM 4.1 11/28/2018    CHLORIDE 103 11/28/2018    CO2 24 11/28/2018    ANIONGAP 12.1 11/28/2018     (H) 11/28/2018    BUN 30 11/28/2018    CR 1.54 (H) 11/28/2018    GFRESTIMATED 31 (L) 11/28/2018    GFRESTBLACK 38 (L) 11/28/2018    PAWEL 9.7 11/28/2018        A1C RESULTS:  No results found for: A1C    INR RESULTS:  No results found for: INR        CC  Liana Phan, APRN CNP  [address]  [place] [address]      "

## 2018-11-28 NOTE — PATIENT INSTRUCTIONS
Thanks for coming into [facility] today.    We discussed: please do not drink alcohol.    Keep up with the walking!    Medication changes:  Start Toprol XL/ metoprolol succinate 50 mg once a day.  Take this in the morning.      Results: on your monitor your heart went as fast 190 beats per minute, the slowest was 44, the average heart rate was 84.     Follow up: with a 24 hour monitor and a visit with one of our pacemaker doctors Dr. Izquierdo or Charisse.      Please call my nurse at [phone] with any questions or concerns.    Scheduling phone number: [phone]  Reminder: Please bring in all current medications, over the counter supplements and vitamin bottles to your next appointment.

## 2018-11-28 NOTE — LETTER
2018      Marbin Rouse MD  [address]  [facility] [address]      RE: Theresa Alves       Dear Colleague,    I had the pleasure of seeing Theresa Alves in the [facility].    Service Date: 2018      PRIMARY CARDIOLOGIST:  Dr. Walker      REASON FOR VISIT:  Hospital followup, AFib, ETOH intoxication.      HISTORY OF PRESENT ILLNESS:  Ms. Alves is a 89-year-old woman with past medical history significant for the followin.  Pulmonary fibrosis.   2.  Hypertension.   3.  CKD.   4.  Kyphoscoliosis.   5.  Persistent AFib.   6.  HFpEF.      We became involved in her care in 2018 when she was admitted for difficult to control AFib and heart failure was secondary to that.  Rates were controlled on Toprol and digoxin.  She was started on anticoagulation, but had severe nosebleeds with Eliquis.  She then decided to go to 81 mg aspirin, and then we recently discontinued that as these severely affected her quality of life.  I saw her last month.  Unfortunately, just the next day, she was admitted to the hospital after not feeling well.  She had called the ambulance and she just felt off.  Her heart rates were high and she was in AFib and had not eaten well, but had had an alcoholic drink.  Her alcohol level was found to be 0.20 with a normal digoxin level.  She was found to be tachybrady in the hospital with more garrett than tachy.  At this point, they held her AV isaías agents and both her digoxin and metoprolol was stopped on discharge.  She did get a Holter monitor and she is here today for followup of those events.      She feels about the same.  She is still short of breath doing any activity, but she is trying to walk more.  She now walks back from the dining room where previously she only walked down to the dining room.  She is getting home PT and OT.  She does feel her heart racing and pounding.  She said she has some increase in pedal edema but  no real other edema above her ankles.  She denies orthopnea or PND.  She is having 1 small drink a night.  Again, she tells me it is just a little bit in the bottom of her glass.  She would like to continue doing this.  She eats her meals in the dining room.      PHYSICAL EXAMINATION:   GENERAL:  Elderly, frail woman in no acute distress.   HEENT:  Normocephalic, atraumatic.   HEART:  Irregularly irregular, with 2/6 systolic murmur heard best at the left sternal border.   LUNGS:  With faint rales bilaterally that sound dry.  These are mostly in the bases.   EXTREMITIES:  She has just 1+ edema in her feet.   SKIN:  Warm and dry.  No JVP at 90 degrees.      Holter monitor shows atrial fibrillation.  With average heart rate of 84, a slow of 44 and a fast of 190.  She was tachycardic over 20% of the time.  She also had 25 pauses greater than 2.0 seconds.      ASSESSMENT AND PLAN:   1.  Persistent atrial fibrillation with likely some component of tachybrady syndrome as she had heart rates as low as the 40s with pauses and highs in the 190s.  She previously was maintained on Toprol 200 mg daily +125 mcg of digoxin and did well on this without syncope, but there were significant pauses noted in the hospital.  This was discontinued.  Given she is fairly tachy, we will restart at 50 mg daily.  I will have her wear a Holter monitor and then be seen by our EP doctors.  We did discuss the possibility of pacemaker and/or pacemaker and AV node ablation.  We also discussed the alternative of going hospice and she prefers to continue with restorative care.   2.  Heart failure with preserved EF, likely secondary to tachycardia, now doing well on 20 mg of Lasix daily.  She can take an additional 20 if she needs to with worsening shortness of breath or edema.   3.  Pulmonary fibrosis, stable.   4.  CKD with labs today showing a creatinine of 1.54, BUN 30, potassium 4.1, sodium 135, which is within her normal range.   5.  Recent  hospitalization for alcohol intoxication.  I did state that my recommendation is she does not drink at all.  Again, the other option was hospice and she can drink and eat whatever she wants.  She will consider my recommendations, but does not want hospice at this time.      Thanks for allowing me to participate in her care.  We will see her back after she is seen by EP.      LENO Medrano PA-C             D: 2018   T: 2018   MT: al      Name:     YANELI CHUA   MRN:      [id number]        Account:      [id number]   :      1924           Service Date: 2018      Document: [id number]         Outpatient Encounter Prescriptions as of 2018   Medication Sig Dispense Refill     acetaminophen (TYLENOL) 500 MG tablet Take one tablet 8 hours after morning percocet and one tablet at bedtime  0     Cyanocobalamin (VITAMIN B 12 PO) Take 1 tablet by mouth daily        folic acid (FOLVITE) 1 MG tablet Take 1 tablet (1 mg) by mouth daily 30 tablet 2     furosemide (LASIX) 20 MG tablet Take 1 tablet (20 mg) by mouth daily 90 tablet 0     metoprolol succinate (TOPROL-XL) 50 MG 24 hr tablet Take 1 tablet (50 mg) by mouth daily 30 tablet 11     multivitamin, therapeutic (THERA-VIT) TABS tablet Take 1 tablet by mouth daily 60 tablet 0     oxyCODONE-acetaminophen (PERCOCET) 5-325 MG per tablet Take 0.5 tab every morning.  Max of 0.5 tab daily. 30 tablet 0     pantoprazole (PROTONIX) 40 MG EC tablet Take 1 tablet (40 mg) by mouth every morning (before breakfast) 30 tablet 0     simvastatin (ZOCOR) 20 MG tablet TAKE HALF TABLET BY MOUTH AT BEDTIME (Patient taking differently: Pt take one tab every other day.) 45 tablet 3     sodium chloride (OCEAN) 0.65 % nasal spray Spray 1 spray into both nostrils 3 times daily as needed for congestion  0     thiamine 100 MG tablet Take 1 tablet (100 mg) by mouth daily 10 tablet 0     VITAMIN D, CHOLECALCIFEROL, PO Take 1,000 Units  by mouth daily.         lidocaine (LIDODERM) 5 % Patch Apply 1-2  patches to painful area at area of low back at once for up to 12 h within a 24 h period.  Remove after 12 hours. (Patient not taking: Reported on 11/28/2018) 30 patch 0     No facility-administered encounter medications on file as of 11/28/2018.        Again, thank you for allowing me to participate in the care of your patient.      Sincerely,    Lolis Davenport PA-C     [facility]

## 2018-11-28 NOTE — MR AVS SNAPSHOT
After Visit Summary   11/28/2018    Theresa Alves    MRN: [id number]           Patient Information     Date Of Birth          6/22/1924        Visit Information        Provider Department      11/28/2018 12:30 PM Ethel, Lolis Gonzalez PA-C [facility]   [place]        Today's Diagnoses     Chronic atrial fibrillation (H)    -  1    Bradycardia          Care Instructions    Thanks for coming into [facility] today.    We discussed: please do not drink alcohol.    Keep up with the walking!    Medication changes:  Start Toprol XL/ metoprolol succinate 50 mg once a day.  Take this in the morning.      Results: on your monitor your heart went as fast 190 beats per minute, the slowest was 44, the average heart rate was 84.     Follow up: with a 24 hour monitor and a visit with one of our pacemaker doctors Dr. Izquierdo or Charisse.      Please call my nurse at [phone] with any questions or concerns.    Scheduling phone number: [phone]  Reminder: Please bring in all current medications, over the counter supplements and vitamin bottles to your next appointment.                Follow-ups after your visit        Additional Services     Follow-Up with Electrophysiologist                 Your next 10 appointments already scheduled     Dec 04, 2018 11:00 AM CST   [facility]    [address] [place] [address] [phone]           Bring a current list of meds and any records pertaining to this visit. For Physicals, please bring immunization records and any forms needing to be filled out. Please arrive 10 minutes early to complete paperwork.            Jan 14, 2019 10:00 AM CST   LAB with [facility]   [facility] [place] ([facility])    [address] [address]  [place]   "[place] [address]   [phone]           Please do not eat 10-12 hours before your appointment if you are coming in fasting for labs on lipids, cholesterol, or glucose (sugar). This does not apply to pregnant women. Water, hot tea and black coffee (with nothing added) are okay. Do not drink other fluids, diet soda or chew gum.            Jan 14, 2019 10:50 AM CST   Core Return with Lolis Davenport PA-C   [facility] ([facility])    [address] [place] [address] [address]  [facility] [address]   [phone] OPT 2              Future tests that were ordered for you today     Open Future Orders        Priority Expected Expires Ordered    Holter Monitor 24 hour - Adult Routine 12/12/2018 11/28/2019 11/28/2018    Follow-Up with Electrophysiologist Routine 12/28/2018 11/28/2019 11/28/2018            Who to contact     If you have questions or need follow up information about today's clinic visit or your schedule please contact [facility] directly at [phone].  Normal or non-critical lab and imaging results will be communicated to you by MyChart, letter or phone within 4 business days after the clinic has received the results. If you do not hear from us within 7 days, please contact the clinic through MyChart or phone. If you have a critical or abnormal lab result, we will notify you by phone as soon as possible.  Submit refill requests through [employer] or call your pharmacy and they will forward the refill request to us. Please allow 3 business days for your refill to be completed.          Additional Information About Your Visit        Care EveryWhere ID     This is your Care EveryWhere ID. This could be used by other organizations to access your [place] medical records  [id number]        Your Vitals Were     Pulse Height Last Period Pulse Oximetry BMI (Body Mass Index)       124 1.651 m (5' 5\") (LMP Unknown) 95% 18.64 kg/m2        " Blood Pressure from Last 3 Encounters:   11/28/18 126/64   11/12/18 130/72   11/07/18 115/65    Weight from Last 3 Encounters:   11/28/18 50.8 kg (112 lb)   11/12/18 52.4 kg (115 lb 8 oz)   11/07/18 51.8 kg (114 lb 4.8 oz)              We Performed the Following     EKG 12-lead complete w/read - Clinics (performed today)     Follow-Up with Cardiac Advanced Practice Provider          Today's Medication Changes          These changes are accurate as of 11/28/18  1:06 PM.  If you have any questions, ask your nurse or doctor.               Start taking these medicines.        Dose/Directions    metoprolol succinate 50 MG 24 hr tablet   Commonly known as:  TOPROL-XL   Used for:  Chronic atrial fibrillation (H)   Started by:  Lolis Davenport PA-C        Dose:  50 mg   Take 1 tablet (50 mg) by mouth daily   Quantity:  30 tablet   Refills:  11         These medicines have changed or have updated prescriptions.        Dose/Directions    simvastatin 20 MG tablet   Commonly known as:  ZOCOR   This may have changed:  additional instructions   Used for:  Hyperlipidemia with target LDL less than 130        TAKE HALF TABLET BY MOUTH AT BEDTIME   Quantity:  45 tablet   Refills:  3            Where to get your medicines      These medications were sent to [id number] - [place], [place] - [address]  [address] [facility] [address]     Phone:  [phone]     metoprolol succinate 50 MG 24 hr tablet                Primary Care Provider Office Phone # Fax #    Marbin Rouse [phone] [address] [facility] [address]        Goals        General    1. Being Active (pt-stated)     Notes - Note edited  11/13/2018  1:17 PM by Minesh Minor RN    Goal Statement: I will prevent physical deconditioning by continuing to walk to the dining cantu each day for the next 3 months.  Measure of Success: The patient will report maintained physical strength and ability to  ambulate to the dining cantu.  Supportive Steps to Achieve: CCC RN will continue patient outreaches to offer support.  The patient will continue using her walker for ambulation to promote stability and avoid falls.  Barriers: The patient has chronic back pain which makes it more difficult to walk long distances.  Strengths: The patient is motivated to prevent further physical deconditioning and understands the importance of daily physical activity.  Date to Achieve By: 1/8/19  Patient expressed understanding of goal: Yes    As of today's date 11/13/2018 goal is met at 26 - 50%.   Goal Status:  Ongoing -- the patient is working with PT/OT through home care.          Equal Access to Services     KRISTOPHERCasa Colina Hospital For Rehab MedicineREGINO : Valdemar Lobo, pancho delgado, ronny lynn, renee de luna . So [facility] [phone].    ATENCIÓN: Si habla español, tiene a salmon disposición servicios gratuitos de asistencia lingüística. Llame al [phone].    We comply with applicable federal civil rights laws and [place] laws. We do not discriminate on the basis of race, color, national origin, age, disability, sex, sexual orientation, or gender identity.            Thank you!     Thank you for choosing [facility]  for your care. Our goal is always to provide you with excellent care. Hearing back from our patients is one way we can continue to improve our services. Please take a few minutes to complete the written survey that you may receive in the mail after your visit with us. Thank you!             Your Updated Medication List - Protect others around you: Learn how to safely use, store and throw away your medicines at www.disposemymeds.org.          This list is accurate as of 11/28/18  1:06 PM.  Always use your most recent med list.                   Brand Name Dispense Instructions for use Diagnosis    acetaminophen 500 MG tablet    TYLENOL     Take one tablet  8 hours after morning percocet and one tablet at bedtime    Chronic pain syndrome       folic acid 1 MG tablet    FOLVITE    30 tablet    Take 1 tablet (1 mg) by mouth daily    Alcohol intoxication, uncomplicated (H)       furosemide 20 MG tablet    LASIX    90 tablet    Take 1 tablet (20 mg) by mouth daily    (HFpEF) heart failure with preserved ejection fraction (H)       lidocaine 5 % patch    LIDODERM    30 patch    Apply 1-2  patches to painful area at area of low back at once for up to 12 h within a 24 h period.  Remove after 12 hours.    Chronic pain syndrome       metoprolol succinate 50 MG 24 hr tablet    TOPROL-XL    30 tablet    Take 1 tablet (50 mg) by mouth daily    Chronic atrial fibrillation (H)       multivitamin, therapeutic Tabs tablet     60 tablet    Take 1 tablet by mouth daily    Alcohol intoxication, uncomplicated (H)       oxyCODONE-acetaminophen 5-325 MG tablet    PERCOCET    30 tablet    Take 0.5 tab every morning.  Max of 0.5 tab daily.    Chronic midline low back pain without sciatica       pantoprazole 40 MG EC tablet    PROTONIX    30 tablet    Take 1 tablet (40 mg) by mouth every morning (before breakfast)    Alcohol intoxication, uncomplicated (H)       simvastatin 20 MG tablet    ZOCOR    45 tablet    TAKE HALF TABLET BY MOUTH AT BEDTIME    Hyperlipidemia with target LDL less than 130       sodium chloride 0.65 % nasal spray    OCEAN     Spray 1 spray into both nostrils 3 times daily as needed for congestion    Nasal dryness       VITAMIN B 12 PO      Take 1 tablet by mouth daily        vitamin B1 100 MG tablet    THIAMINE    10 tablet    Take 1 tablet (100 mg) by mouth daily    Alcohol intoxication, uncomplicated (H)       VITAMIN D (CHOLECALCIFEROL) PO      Take 1,000 Units by mouth daily.

## 2018-12-04 ENCOUNTER — OFFICE VISIT (OUTPATIENT)
Dept: FAMILY MEDICINE | Facility: CLINIC | Age: 83
End: 2018-12-04
Payer: COMMERCIAL

## 2018-12-04 VITALS
TEMPERATURE: 97.8 F | HEART RATE: 80 BPM | OXYGEN SATURATION: 98 % | RESPIRATION RATE: 20 BRPM | HEIGHT: 65 IN | WEIGHT: 114 LBS | BODY MASS INDEX: 18.99 KG/M2 | DIASTOLIC BLOOD PRESSURE: 66 MMHG | SYSTOLIC BLOOD PRESSURE: 120 MMHG

## 2018-12-04 DIAGNOSIS — M25.531 RIGHT WRIST PAIN: Primary | ICD-10-CM

## 2018-12-04 DIAGNOSIS — M54.50 CHRONIC MIDLINE LOW BACK PAIN WITHOUT SCIATICA: ICD-10-CM

## 2018-12-04 DIAGNOSIS — G89.29 CHRONIC MIDLINE LOW BACK PAIN WITHOUT SCIATICA: ICD-10-CM

## 2018-12-04 DIAGNOSIS — R53.82 CHRONIC FATIGUE: ICD-10-CM

## 2018-12-04 DIAGNOSIS — M25.511 CHRONIC RIGHT SHOULDER PAIN: ICD-10-CM

## 2018-12-04 DIAGNOSIS — G89.4 CHRONIC PAIN SYNDROME: ICD-10-CM

## 2018-12-04 DIAGNOSIS — G89.29 CHRONIC RIGHT SHOULDER PAIN: ICD-10-CM

## 2018-12-04 DIAGNOSIS — I48.20 CHRONIC ATRIAL FIBRILLATION (H): ICD-10-CM

## 2018-12-04 PROCEDURE — 99214 OFFICE O/P EST MOD 30 MIN: CPT | Performed by: INTERNAL MEDICINE

## 2018-12-04 RX ORDER — OXYCODONE AND ACETAMINOPHEN 5; 325 MG/1; MG/1
TABLET ORAL
Qty: 30 TABLET | Refills: 0 | Status: SHIPPED | OUTPATIENT
Start: 2018-01-01 | End: 2019-01-01

## 2018-12-04 NOTE — PATIENT INSTRUCTIONS
I suggest that you keep taking the multivitamin daily.                                                                                                                 Call for orthopedic appointments for your wrist and right shoulder.

## 2018-12-04 NOTE — MR AVS SNAPSHOT
After Visit Summary   12/4/2018    Theresa Alves    MRN: [id number]           Patient Information     Date Of Birth          6/22/1924        Visit Information        Provider Department      12/4/2018 11:00 AM Marbin Rouse MD [facility]        Today's Diagnoses     Right wrist pain    -  1    Chronic midline low back pain without sciatica        Chronic right shoulder pain          Care Instructions    I suggest that you keep taking the multivitamin daily.                                                                                                                 Call for orthopedic appointments for your wrist and right shoulder.                           Follow-ups after your visit        Additional Services     ORTHOPEDICS ADULT REFERRAL       Your provider has referred you to: [facility] [place] [phone]   [email or web address]      THIS PATIENT HAS BOTH RIGHT WRIST AND RIGHT SHOULDER PAIN.                     I SUGGESTED THAT SHE SEE DR CORDOVA OR DR Love REGARDING HER WRIST, AND DR SOUTH REGARDING HER SHOULDER.                  Follow-up notes from your care team     Return in about 4 months (around 4/4/2019).      Your next 10 appointments already scheduled     Jan 07, 2019 11:00 AM CST   Holter Monitor with GENOVEVA   [facility] [place] [facility] CV Echocardiography (Cardiovascular [facility] at [facility])    [address] [place] [address]  [address]  [facility] [address]   [phone]            Jan 14, 2019 10:15 AM CST   EP NEW with Horace Bowser MD   [facility] Heart [facility] ([facility])    [address] [place], [place] [id number]  [facility] [address] [phone] OPT 2            Jan 14, 2019 11:00 AM CST   LAB with [facility]   [facility] AT [place] ([facility])    [address] [place] [id number] [phone]           Please do  "not eat 10-12 hours before your appointment if you are coming in fasting for labs on lipids, cholesterol, or glucose (sugar). This does not apply to pregnant women. Water, hot tea and black coffee (with nothing added) are okay. Do not drink other fluids, diet soda or chew gum.            Jan 14, 2019 12:30 PM CST   Core Return with Lolis Davenport PA-C   [facility] ([facility])    [address] [place], [place] [address]  [facility] [address] [phone] OPT 2              Who to contact     If you have questions or need follow up information about today's clinic visit or your schedule please contact [facility] directly at [phone].  Normal or non-critical lab and imaging results will be communicated to you by MyChart, letter or phone within 4 business days after the clinic has received the results. If you do not hear from us within 7 days, please contact the clinic through MyChart or phone. If you have a critical or abnormal lab result, we will notify you by phone as soon as possible.  Submit refill requests through [employer] or call your pharmacy and they will forward the refill request to us. Please allow 3 business days for your refill to be completed.          Additional Information About Your Visit        Care EveryWhere ID     This is your Care EveryWhere ID. This could be used by other organizations to access your [facility] medical records  [id number]        Your Vitals Were     Pulse Temperature Respirations Height Last Period Pulse Oximetry    100 97.8  F (36.6  C) 20 5' 5\" (1.651 m) (LMP Unknown) 98%    Breastfeeding? BMI (Body Mass Index)                No 18.97 kg/m2           Blood Pressure from Last 3 Encounters:   12/04/18 120/66   11/28/18 126/64   11/12/18 130/72    Weight from Last 3 Encounters:   12/04/18 114 lb (51.7 kg)   11/28/18 112 lb (50.8 kg)   11/12/18 115 lb 8 oz (52.4 kg)              We Performed the " Following     ORTHOPEDICS ADULT REFERRAL          Today's Medication Changes          These changes are accurate as of 12/4/18 11:40 AM.  If you have any questions, ask your nurse or doctor.               These medicines have changed or have updated prescriptions.        Dose/Directions    oxyCODONE-acetaminophen 5-325 MG tablet   Commonly known as:  PERCOCET   This may have changed:  These instructions start on 12/18/2018. If you are unsure what to do until then, ask your doctor or other care provider.   Used for:  Chronic midline low back pain without sciatica   Changed by:  Marbin Rouse MD        Start taking on:  12/18/2018   Take 0.5 tab every morning.  Max of 0.5 tab daily.   Quantity:  30 tablet   Refills:  0       simvastatin 20 MG tablet   Commonly known as:  ZOCOR   This may have changed:  additional instructions   Used for:  Hyperlipidemia with target LDL less than 130        TAKE HALF TABLET BY MOUTH AT BEDTIME   Quantity:  45 tablet   Refills:  3         Stop taking these medicines if you haven't already. Please contact your care team if you have questions.     folic acid 1 MG tablet   Commonly known as:  FOLVITE   Stopped by:  Marbin Rouse MD           lidocaine 5 % patch   Commonly known as:  LIDODERM   Stopped by:  Marbin Rouse MD           pantoprazole 40 MG EC tablet   Commonly known as:  PROTONIX   Stopped by:  Marbin Rouse MD           vitamin B1 100 MG tablet   Commonly known as:  THIAMINE   Stopped by:  Marbin Rouse MD                Where to get your medicines      Some of these will need a paper prescription and others can be bought over the counter.  Ask your nurse if you have questions.     Bring a paper prescription for each of these medications     oxyCODONE-acetaminophen 5-325 MG tablet               Information about OPIOIDS     PRESCRIPTION OPIOIDS: WHAT YOU NEED TO KNOW   We gave you an opioid (narcotic) pain medicine. It is important to manage your pain, but  opioids are not always the best choice. You should first try all the other options your care team gave you. Take this medicine for as short a time (and as few doses) as possible.    Some activities can increase your pain, such as bandage changes or therapy sessions. It may help to take your pain medicine 30 to 60 minutes before these activities. Reduce your stress by getting enough sleep, working on hobbies you enjoy and practicing relaxation or meditation. Talk to your care team about ways to manage your pain beyond prescription opioids.    These medicines have risks:    DO NOT drive when on new or higher doses of pain medicine. These medicines can affect your alertness and reaction times, and you could be arrested for driving under the influence (DUI). If you need to use opioids long-term, talk to your care team about driving.    DO NOT operate heavy machinery    DO NOT do any other dangerous activities while taking these medicines.    DO NOT drink any alcohol while taking these medicines.     If the opioid prescribed includes acetaminophen, DO NOT take with any other medicines that contain acetaminophen. Read all labels carefully. Look for the word  acetaminophen  or  Tylenol.  Ask your pharmacist if you have questions or are unsure.    You can get addicted to pain medicines, especially if you have a history of addiction (chemical, alcohol or substance dependence). Talk to your care team about ways to reduce this risk.    All opioids tend to cause constipation. Drink plenty of water and eat foods that have a lot of fiber, such as fruits, vegetables, prune juice, apple juice and high-fiber cereal. Take a laxative (Miralax, milk of magnesia, Colace, Senna) if you don t move your bowels at least every other day. Other side effects include upset stomach, sleepiness, dizziness, throwing up, tolerance (needing more of the medicine to have the same effect), physical dependence and slowed breathing.    Store your pills  in a secure place, locked if possible. We will not replace any lost or stolen medicine. If you don t finish your medicine, please throw away (dispose) as directed by your pharmacist. The [place] Pollution [employer] has more information about safe disposal: https://www.pca.state.[place]/living-green/managing-unwanted-medications         Primary Care Provider Office Phone # Fax #    Marbin Rouse [phone]       [address]  [facility] [address]        Goals        General    1. Being Active (pt-stated)     Notes - Note edited  11/13/2018  1:17 PM by Minesh Minor RN    Goal Statement: I will prevent physical deconditioning by continuing to walk to the dining cantu each day for the next 3 months.  Measure of Success: The patient will report maintained physical strength and ability to ambulate to the dining cantu.  Supportive Steps to Achieve: CCC RN will continue patient outreaches to offer support.  The patient will continue using her walker for ambulation to promote stability and avoid falls.  Barriers: The patient has chronic back pain which makes it more difficult to walk long distances.  Strengths: The patient is motivated to prevent further physical deconditioning and understands the importance of daily physical activity.  Date to Achieve By: 1/8/19  Patient expressed understanding of goal: Yes    As of today's date 11/13/2018 goal is met at 26 - 50%.   Goal Status:  Ongoing -- the patient is working with PT/OT through home care.          Equal Access to Services     KITTY SALINAS : Hadant Lobo, waaxda luqadaha, qaybta kaalmada leah, renee emery. So [facility] [phone].    ATENCIÓN: Si habla español, tiene a salmon disposición servicios gratuitos de asistencia lingüística. Llame al [phone].    We comply with applicable federal civil rights laws and [place] laws. We do not discriminate on the basis of race, color, national  origin, age, disability, sex, sexual orientation, or gender identity.            Thank you!     Thank you for choosing [facility]  for your care. Our goal is always to provide you with excellent care. Hearing back from our patients is one way we can continue to improve our services. Please take a few minutes to complete the written survey that you may receive in the mail after your visit with us. Thank you!             Your Updated Medication List - Protect others around you: Learn how to safely use, store and throw away your medicines at www.disposemymeds.org.          This list is accurate as of 12/4/18 11:40 AM.  Always use your most recent med list.                   Brand Name Dispense Instructions for use Diagnosis    acetaminophen 500 MG tablet    TYLENOL     Take one tablet 8 hours after morning percocet and one tablet at bedtime    Chronic pain syndrome       furosemide 20 MG tablet    LASIX    90 tablet    Take 1 tablet (20 mg) by mouth daily    (HFpEF) heart failure with preserved ejection fraction (H)       metoprolol succinate ER 50 MG 24 hr tablet    TOPROL-XL    30 tablet    Take 1 tablet (50 mg) by mouth daily    Chronic atrial fibrillation (H)       multivitamin, therapeutic Tabs tablet     60 tablet    Take 1 tablet by mouth daily    Alcohol intoxication, uncomplicated (H)       oxyCODONE-acetaminophen 5-325 MG tablet   Start taking on:  12/18/2018    PERCOCET    30 tablet    Take 0.5 tab every morning.  Max of 0.5 tab daily.    Chronic midline low back pain without sciatica       simvastatin 20 MG tablet    ZOCOR    45 tablet    TAKE HALF TABLET BY MOUTH AT BEDTIME    Hyperlipidemia with target LDL less than 130       sodium chloride 0.65 % nasal spray    OCEAN     Spray 1 spray into both nostrils 3 times daily as needed for congestion    Nasal dryness       VITAMIN B 12 PO      Take 1 tablet by mouth daily        VITAMIN D (CHOLECALCIFEROL) PO      Take 1,000 Units  by mouth daily.

## 2018-12-04 NOTE — PROGRESS NOTES
SUBJECTIVE:   Theresa Alves is a 89 year old female who presents to clinic today for the following health issues:      Follow from last office visit, saw Dr. Phan 11/12/2018.    Hyperlipidemia Follow-Up      Rate your low fat/cholesterol diet?: good    Taking statin?  Yes, no muscle aches from statin    Other lipid medications/supplements?:  none    Hypertension Follow-up      Outpatient blood pressures are not being checked.    Low Salt Diet: no added salt           Here with her niece.                          No ETOH since the ER stay,one month ago.                 Had short term Rx with thiamine,folate,and a PPI.          Back on low dose metoprolol; has a Holter monitor appt upcoming.                       Ongoing pain; worsening R shoulder and R wrist.          Takes 1/2 tab percocet in the AM; APAP later.             Wants a refill.                                       Current Outpatient Prescriptions   Medication Sig Dispense Refill     acetaminophen (TYLENOL) 500 MG tablet Take one tablet 8 hours after morning percocet and one tablet at bedtime  0     Cyanocobalamin (VITAMIN B 12 PO) Take 1 tablet by mouth daily        furosemide (LASIX) 20 MG tablet Take 1 tablet (20 mg) by mouth daily 90 tablet 0     metoprolol succinate (TOPROL-XL) 50 MG 24 hr tablet Take 1 tablet (50 mg) by mouth daily 30 tablet 11     multivitamin, therapeutic (THERA-VIT) TABS tablet Take 1 tablet by mouth daily 60 tablet 0     oxyCODONE-acetaminophen (PERCOCET) 5-325 MG per tablet Take 0.5 tab every morning.  Max of 0.5 tab daily. 30 tablet 0     pantoprazole (PROTONIX) 40 MG EC tablet Take 1 tablet (40 mg) by mouth every morning (before breakfast) 30 tablet 0     simvastatin (ZOCOR) 20 MG tablet TAKE HALF TABLET BY MOUTH AT BEDTIME (Patient taking differently: Pt take one tab every other day.) 45 tablet 3     sodium chloride (OCEAN) 0.65 % nasal spray Spray 1 spray into both nostrils 3 times daily as needed for congestion   "0     thiamine 100 MG tablet Take 1 tablet (100 mg) by mouth daily 10 tablet 0     VITAMIN D, CHOLECALCIFEROL, PO Take 1,000 Units by mouth daily.         folic acid (FOLVITE) 1 MG tablet Take 1 tablet (1 mg) by mouth daily (Patient not taking: Reported on 12/4/2018) 30 tablet 2     lidocaine (LIDODERM) 5 % Patch Apply 1-2  patches to painful area at area of low back at once for up to 12 h within a 24 h period.  Remove after 12 hours. (Patient not taking: Reported on 11/28/2018) 30 patch 0     No Known Allergies  BP Readings from Last 3 Encounters:   12/04/18 120/66   11/28/18 126/64   11/12/18 130/72    Wt Readings from Last 3 Encounters:   12/04/18 114 lb (51.7 kg)   11/28/18 112 lb (50.8 kg)   11/12/18 115 lb 8 oz (52.4 kg)                    Reviewed and updated as needed this visit by clinical staff       Reviewed and updated as needed this visit by Provider         ROS:  CONSTITUTIONAL:NEGATIVE for fever, chills, change in weight and POSITIVE  for fatigue  RESP:POSITIVE for dyspnea on exertion  CV: POSITIVE for dyspnea on exertion and lower extremity edema and NEGATIVE for chest pain/chest pressure    OBJECTIVE:                                                    /66 (BP Location: Left arm, Patient Position: Chair, Cuff Size: Adult Regular)  Pulse 100  Temp 97.8  F (36.6  C)  Resp 20  Ht 5' 5\" (1.651 m)  Wt 114 lb (51.7 kg)  LMP  (LMP Unknown)  SpO2 98%  Breastfeeding? No  BMI 18.97 kg/m2  Body mass index is 18.97 kg/(m^2).  GENERAL APPEARANCE: alert and fatigued  RESP: rales bibasilar  CV: irregular rhythm; rate controlled at rest and pitting B/L LE edema to 1+  MS: decreased range of motion R wrist and shoulder; kyphosis  PSYCH: fatigued and well dressed and groomed    Diagnostic test results:  none      ASSESSMENT/PLAN:                                                        ICD-10-CM    1. Right wrist pain M25.531 ORTHOPEDICS ADULT REFERRAL   2. Chronic right shoulder pain M25.511 ORTHOPEDICS " ADULT REFERRAL    G89.29    3. Chronic midline low back pain without sciatica M54.5 oxyCODONE-acetaminophen (PERCOCET) 5-325 MG tablet    G89.29    4. Chronic pain syndrome G89.4    5. Chronic atrial fibrillation (H) I48.2    6. Chronic fatigue R53.82        Summary and implications:  Multiple issues.              Perhaps injection therapy could help with her R wrist and shoulder pain.                                  Ok to continue low dose percocet, as long as she avoids ETOH.           Await further Cardiology  assessment of her a fib control.   Follow up with Provider - 4 months or prn   Patient Instructions   I suggest that you keep taking the multivitamin daily.                                                                                                                 Call for orthopedic appointments for your wrist and right shoulder.                       Marbin Rouse MD  [facility]

## 2018-12-12 RX ORDER — OXYCODONE AND ACETAMINOPHEN 5; 325 MG/1; MG/1
0.5 TABLET ORAL EVERY MORNING
Qty: 30 TABLET | Refills: 0 | Status: CANCELLED | OUTPATIENT
Start: 2018-12-12

## 2018-12-19 NOTE — TELEPHONE ENCOUNTER
Our goal is to have forms completed within 72 hours, however some forms may require a visit or additional information.    What clinic location was the form placed at [facility] or [facility]     Who is the form from?   Where did the form come from? Faxed to clinic   The form was placed in the inbox of [facility]      Please fax to [phone]  Phone number:      Additional comments: [facility] 11/9/18 to 1/7/19    Call take on 12/19/2018 at 10:01 AM by Rachana Ontiveros

## 2019-01-01 ENCOUNTER — OFFICE VISIT (OUTPATIENT)
Dept: CARDIOLOGY | Facility: CLINIC | Age: 84
End: 2019-01-01
Attending: INTERNAL MEDICINE
Payer: COMMERCIAL

## 2019-01-01 ENCOUNTER — TELEPHONE (OUTPATIENT)
Dept: FAMILY MEDICINE | Facility: CLINIC | Age: 84
End: 2019-01-01

## 2019-01-01 ENCOUNTER — APPOINTMENT (OUTPATIENT)
Dept: GENERAL RADIOLOGY | Facility: CLINIC | Age: 84
DRG: 871 | End: 2019-01-01
Attending: HOSPITALIST
Payer: COMMERCIAL

## 2019-01-01 ENCOUNTER — NURSING HOME VISIT (OUTPATIENT)
Dept: GERIATRICS | Facility: CLINIC | Age: 84
End: 2019-01-01
Payer: COMMERCIAL

## 2019-01-01 ENCOUNTER — PATIENT OUTREACH (OUTPATIENT)
Dept: CARE COORDINATION | Facility: CLINIC | Age: 84
End: 2019-01-01

## 2019-01-01 ENCOUNTER — SURGERY (OUTPATIENT)
Age: 84
End: 2019-01-01
Payer: COMMERCIAL

## 2019-01-01 ENCOUNTER — ANCILLARY PROCEDURE (OUTPATIENT)
Dept: CARDIOLOGY | Facility: CLINIC | Age: 84
End: 2019-01-01
Payer: COMMERCIAL

## 2019-01-01 ENCOUNTER — HOSPITAL ENCOUNTER (INPATIENT)
Facility: CLINIC | Age: 84
LOS: 8 days | Discharge: HOSPICE | DRG: 871 | End: 2019-09-07
Attending: EMERGENCY MEDICINE | Admitting: HOSPITALIST
Payer: COMMERCIAL

## 2019-01-01 ENCOUNTER — TELEPHONE (OUTPATIENT)
Dept: CARDIOLOGY | Facility: CLINIC | Age: 84
End: 2019-01-01

## 2019-01-01 ENCOUNTER — OFFICE VISIT (OUTPATIENT)
Dept: FAMILY MEDICINE | Facility: CLINIC | Age: 84
End: 2019-01-01
Payer: COMMERCIAL

## 2019-01-01 ENCOUNTER — APPOINTMENT (OUTPATIENT)
Dept: CT IMAGING | Facility: CLINIC | Age: 84
DRG: 280 | End: 2019-01-01
Attending: INTERNAL MEDICINE
Payer: COMMERCIAL

## 2019-01-01 ENCOUNTER — OFFICE VISIT (OUTPATIENT)
Dept: CARDIOLOGY | Facility: CLINIC | Age: 84
End: 2019-01-01
Attending: PHYSICIAN ASSISTANT
Payer: COMMERCIAL

## 2019-01-01 ENCOUNTER — APPOINTMENT (OUTPATIENT)
Dept: CT IMAGING | Facility: CLINIC | Age: 84
DRG: 871 | End: 2019-01-01
Attending: INTERNAL MEDICINE
Payer: COMMERCIAL

## 2019-01-01 ENCOUNTER — TRANSFERRED RECORDS (OUTPATIENT)
Dept: HEALTH INFORMATION MANAGEMENT | Facility: CLINIC | Age: 84
End: 2019-01-01

## 2019-01-01 ENCOUNTER — NURSE TRIAGE (OUTPATIENT)
Dept: FAMILY MEDICINE | Facility: CLINIC | Age: 84
End: 2019-01-01

## 2019-01-01 ENCOUNTER — APPOINTMENT (OUTPATIENT)
Dept: GENERAL RADIOLOGY | Facility: CLINIC | Age: 84
DRG: 280 | End: 2019-01-01
Attending: EMERGENCY MEDICINE
Payer: COMMERCIAL

## 2019-01-01 ENCOUNTER — DOCUMENTATION ONLY (OUTPATIENT)
Dept: FAMILY MEDICINE | Facility: CLINIC | Age: 84
End: 2019-01-01

## 2019-01-01 ENCOUNTER — DOCUMENTATION ONLY (OUTPATIENT)
Dept: CARDIOLOGY | Facility: CLINIC | Age: 84
End: 2019-01-01

## 2019-01-01 ENCOUNTER — APPOINTMENT (OUTPATIENT)
Dept: PHYSICAL THERAPY | Facility: CLINIC | Age: 84
DRG: 280 | End: 2019-01-01
Payer: COMMERCIAL

## 2019-01-01 ENCOUNTER — HOSPITAL ENCOUNTER (OUTPATIENT)
Dept: CARDIOLOGY | Facility: CLINIC | Age: 84
Discharge: HOME | End: 2019-01-07
Attending: PHYSICIAN ASSISTANT | Admitting: PHYSICIAN ASSISTANT
Payer: COMMERCIAL

## 2019-01-01 ENCOUNTER — APPOINTMENT (OUTPATIENT)
Dept: GENERAL RADIOLOGY | Facility: CLINIC | Age: 84
End: 2019-01-01
Payer: COMMERCIAL

## 2019-01-01 ENCOUNTER — APPOINTMENT (OUTPATIENT)
Dept: CARDIOLOGY | Facility: CLINIC | Age: 84
DRG: 280 | End: 2019-01-01
Attending: INTERNAL MEDICINE
Payer: COMMERCIAL

## 2019-01-01 ENCOUNTER — APPOINTMENT (OUTPATIENT)
Dept: OCCUPATIONAL THERAPY | Facility: CLINIC | Age: 84
End: 2019-01-01
Payer: COMMERCIAL

## 2019-01-01 ENCOUNTER — APPOINTMENT (OUTPATIENT)
Dept: ULTRASOUND IMAGING | Facility: CLINIC | Age: 84
DRG: 280 | End: 2019-01-01
Attending: PHYSICIAN ASSISTANT
Payer: COMMERCIAL

## 2019-01-01 ENCOUNTER — OFFICE VISIT (OUTPATIENT)
Dept: CARDIOLOGY | Facility: CLINIC | Age: 84
End: 2019-01-01
Payer: COMMERCIAL

## 2019-01-01 ENCOUNTER — APPOINTMENT (OUTPATIENT)
Dept: GENERAL RADIOLOGY | Facility: CLINIC | Age: 84
DRG: 871 | End: 2019-01-01
Attending: EMERGENCY MEDICINE
Payer: COMMERCIAL

## 2019-01-01 ENCOUNTER — ANCILLARY PROCEDURE (OUTPATIENT)
Dept: CARDIOLOGY | Facility: CLINIC | Age: 84
End: 2019-01-01
Attending: INTERNAL MEDICINE
Payer: MEDICARE

## 2019-01-01 ENCOUNTER — APPOINTMENT (OUTPATIENT)
Dept: ULTRASOUND IMAGING | Facility: CLINIC | Age: 84
DRG: 871 | End: 2019-01-01
Attending: INTERNAL MEDICINE
Payer: COMMERCIAL

## 2019-01-01 ENCOUNTER — APPOINTMENT (OUTPATIENT)
Dept: PHYSICAL THERAPY | Facility: CLINIC | Age: 84
DRG: 871 | End: 2019-01-01
Payer: COMMERCIAL

## 2019-01-01 ENCOUNTER — DOCUMENTATION ONLY (OUTPATIENT)
Dept: OTHER | Facility: CLINIC | Age: 84
End: 2019-01-01

## 2019-01-01 ENCOUNTER — PATIENT OUTREACH (OUTPATIENT)
Dept: INTERNAL MEDICINE | Facility: CLINIC | Age: 84
End: 2019-01-01

## 2019-01-01 ENCOUNTER — HOSPITAL ENCOUNTER (INPATIENT)
Facility: CLINIC | Age: 84
LOS: 4 days | Discharge: NURSING, REHAB OR LONG-TERM CARE | DRG: 280 | End: 2019-08-15
Attending: EMERGENCY MEDICINE | Admitting: INTERNAL MEDICINE
Payer: COMMERCIAL

## 2019-01-01 ENCOUNTER — APPOINTMENT (OUTPATIENT)
Dept: ULTRASOUND IMAGING | Facility: CLINIC | Age: 84
DRG: 871 | End: 2019-01-01
Attending: HOSPITALIST
Payer: COMMERCIAL

## 2019-01-01 ENCOUNTER — DISCHARGE SUMMARY NURSING HOME (OUTPATIENT)
Dept: GERIATRICS | Facility: CLINIC | Age: 84
End: 2019-01-01
Payer: COMMERCIAL

## 2019-01-01 ENCOUNTER — CARE COORDINATION (OUTPATIENT)
Dept: CARDIOLOGY | Facility: CLINIC | Age: 84
End: 2019-01-01

## 2019-01-01 ENCOUNTER — APPOINTMENT (OUTPATIENT)
Dept: PHYSICAL THERAPY | Facility: CLINIC | Age: 84
DRG: 280 | End: 2019-01-01
Attending: INTERNAL MEDICINE
Payer: COMMERCIAL

## 2019-01-01 ENCOUNTER — HOSPITAL ENCOUNTER (OUTPATIENT)
Facility: CLINIC | Age: 84
Discharge: HOME HEALTH | End: 2019-01-19
Admitting: INTERNAL MEDICINE
Payer: COMMERCIAL

## 2019-01-01 ENCOUNTER — APPOINTMENT (OUTPATIENT)
Dept: PHYSICAL THERAPY | Facility: CLINIC | Age: 84
DRG: 871 | End: 2019-01-01
Attending: HOSPITALIST
Payer: COMMERCIAL

## 2019-01-01 VITALS
RESPIRATION RATE: 20 BRPM | DIASTOLIC BLOOD PRESSURE: 71 MMHG | TEMPERATURE: 97.8 F | HEART RATE: 62 BPM | WEIGHT: 105.1 LBS | SYSTOLIC BLOOD PRESSURE: 122 MMHG | OXYGEN SATURATION: 98 % | BODY MASS INDEX: 16.96 KG/M2

## 2019-01-01 VITALS
BODY MASS INDEX: 16.62 KG/M2 | HEART RATE: 60 BPM | WEIGHT: 103 LBS | RESPIRATION RATE: 18 BRPM | DIASTOLIC BLOOD PRESSURE: 45 MMHG | SYSTOLIC BLOOD PRESSURE: 90 MMHG | OXYGEN SATURATION: 96 % | TEMPERATURE: 96.8 F

## 2019-01-01 VITALS
TEMPERATURE: 97.2 F | OXYGEN SATURATION: 96 % | SYSTOLIC BLOOD PRESSURE: 111 MMHG | DIASTOLIC BLOOD PRESSURE: 55 MMHG | BODY MASS INDEX: 21.79 KG/M2 | HEART RATE: 63 BPM | WEIGHT: 135.58 LBS | HEIGHT: 66 IN | RESPIRATION RATE: 18 BRPM

## 2019-01-01 VITALS
HEART RATE: 70 BPM | BODY MASS INDEX: 17.99 KG/M2 | DIASTOLIC BLOOD PRESSURE: 76 MMHG | SYSTOLIC BLOOD PRESSURE: 144 MMHG | WEIGHT: 108 LBS | HEIGHT: 65 IN

## 2019-01-01 VITALS
TEMPERATURE: 99.3 F | RESPIRATION RATE: 16 BRPM | OXYGEN SATURATION: 92 % | WEIGHT: 111.7 LBS | BODY MASS INDEX: 18.61 KG/M2 | HEIGHT: 65 IN | SYSTOLIC BLOOD PRESSURE: 125 MMHG | DIASTOLIC BLOOD PRESSURE: 65 MMHG | HEART RATE: 121 BPM

## 2019-01-01 VITALS
OXYGEN SATURATION: 96 % | HEART RATE: 60 BPM | WEIGHT: 103.9 LBS | TEMPERATURE: 98 F | RESPIRATION RATE: 16 BRPM | HEIGHT: 66 IN | BODY MASS INDEX: 16.7 KG/M2 | SYSTOLIC BLOOD PRESSURE: 92 MMHG | DIASTOLIC BLOOD PRESSURE: 50 MMHG

## 2019-01-01 VITALS
SYSTOLIC BLOOD PRESSURE: 117 MMHG | DIASTOLIC BLOOD PRESSURE: 67 MMHG | HEIGHT: 65 IN | WEIGHT: 114 LBS | BODY MASS INDEX: 18.99 KG/M2 | HEART RATE: 110 BPM

## 2019-01-01 VITALS
HEIGHT: 65 IN | BODY MASS INDEX: 18.33 KG/M2 | HEART RATE: 60 BPM | TEMPERATURE: 97.4 F | RESPIRATION RATE: 22 BRPM | SYSTOLIC BLOOD PRESSURE: 120 MMHG | WEIGHT: 110 LBS | DIASTOLIC BLOOD PRESSURE: 68 MMHG | OXYGEN SATURATION: 93 %

## 2019-01-01 VITALS
TEMPERATURE: 97.5 F | DIASTOLIC BLOOD PRESSURE: 76 MMHG | OXYGEN SATURATION: 97 % | WEIGHT: 108 LBS | RESPIRATION RATE: 22 BRPM | BODY MASS INDEX: 17.99 KG/M2 | SYSTOLIC BLOOD PRESSURE: 152 MMHG | HEART RATE: 65 BPM | HEIGHT: 65 IN

## 2019-01-01 VITALS
DIASTOLIC BLOOD PRESSURE: 50 MMHG | BODY MASS INDEX: 16.95 KG/M2 | TEMPERATURE: 98 F | WEIGHT: 105 LBS | OXYGEN SATURATION: 95 % | HEART RATE: 60 BPM | SYSTOLIC BLOOD PRESSURE: 90 MMHG | RESPIRATION RATE: 16 BRPM

## 2019-01-01 VITALS
SYSTOLIC BLOOD PRESSURE: 135 MMHG | DIASTOLIC BLOOD PRESSURE: 53 MMHG | BODY MASS INDEX: 17.97 KG/M2 | HEART RATE: 59 BPM | WEIGHT: 108 LBS

## 2019-01-01 VITALS
BODY MASS INDEX: 18.33 KG/M2 | SYSTOLIC BLOOD PRESSURE: 138 MMHG | HEART RATE: 60 BPM | WEIGHT: 110 LBS | DIASTOLIC BLOOD PRESSURE: 74 MMHG | HEIGHT: 65 IN

## 2019-01-01 VITALS
TEMPERATURE: 97.9 F | RESPIRATION RATE: 18 BRPM | WEIGHT: 100.2 LBS | DIASTOLIC BLOOD PRESSURE: 68 MMHG | BODY MASS INDEX: 16.67 KG/M2 | SYSTOLIC BLOOD PRESSURE: 124 MMHG | HEART RATE: 60 BPM | OXYGEN SATURATION: 98 %

## 2019-01-01 VITALS
HEIGHT: 65 IN | HEART RATE: 60 BPM | SYSTOLIC BLOOD PRESSURE: 132 MMHG | BODY MASS INDEX: 17.99 KG/M2 | WEIGHT: 108 LBS | TEMPERATURE: 97.5 F | DIASTOLIC BLOOD PRESSURE: 72 MMHG | OXYGEN SATURATION: 94 % | RESPIRATION RATE: 20 BRPM

## 2019-01-01 VITALS
SYSTOLIC BLOOD PRESSURE: 100 MMHG | WEIGHT: 103.6 LBS | OXYGEN SATURATION: 98 % | HEART RATE: 60 BPM | BODY MASS INDEX: 17.26 KG/M2 | HEIGHT: 65 IN | DIASTOLIC BLOOD PRESSURE: 56 MMHG

## 2019-01-01 VITALS
HEART RATE: 110 BPM | SYSTOLIC BLOOD PRESSURE: 117 MMHG | DIASTOLIC BLOOD PRESSURE: 67 MMHG | BODY MASS INDEX: 18.99 KG/M2 | HEIGHT: 65 IN | WEIGHT: 114 LBS

## 2019-01-01 DIAGNOSIS — Z53.9 DIAGNOSIS NOT YET DEFINED: Primary | ICD-10-CM

## 2019-01-01 DIAGNOSIS — R06.02 SOB (SHORTNESS OF BREATH): ICD-10-CM

## 2019-01-01 DIAGNOSIS — J84.10 PULMONARY FIBROSIS (H): ICD-10-CM

## 2019-01-01 DIAGNOSIS — R06.09 DOE (DYSPNEA ON EXERTION): ICD-10-CM

## 2019-01-01 DIAGNOSIS — G89.29 CHRONIC MIDLINE LOW BACK PAIN WITHOUT SCIATICA: ICD-10-CM

## 2019-01-01 DIAGNOSIS — I34.0 NON-RHEUMATIC MITRAL REGURGITATION: ICD-10-CM

## 2019-01-01 DIAGNOSIS — I50.9 CHRONIC CONGESTIVE HEART FAILURE, UNSPECIFIED HEART FAILURE TYPE (H): ICD-10-CM

## 2019-01-01 DIAGNOSIS — G89.4 CHRONIC PAIN SYNDROME: ICD-10-CM

## 2019-01-01 DIAGNOSIS — I50.30 (HFPEF) HEART FAILURE WITH PRESERVED EJECTION FRACTION (H): Primary | ICD-10-CM

## 2019-01-01 DIAGNOSIS — I50.9 ACUTE ON CHRONIC CONGESTIVE HEART FAILURE, UNSPECIFIED HEART FAILURE TYPE (H): Primary | ICD-10-CM

## 2019-01-01 DIAGNOSIS — I27.20 PULMONARY HYPERTENSION (H): ICD-10-CM

## 2019-01-01 DIAGNOSIS — I50.43 ACUTE ON CHRONIC COMBINED SYSTOLIC AND DIASTOLIC HEART FAILURE WITH PRESERVED EJECTION FRACTION (H): Primary | ICD-10-CM

## 2019-01-01 DIAGNOSIS — I48.20 CHRONIC ATRIAL FIBRILLATION (H): ICD-10-CM

## 2019-01-01 DIAGNOSIS — R00.1 BRADYCARDIA: ICD-10-CM

## 2019-01-01 DIAGNOSIS — F32.0 MILD MAJOR DEPRESSION (H): ICD-10-CM

## 2019-01-01 DIAGNOSIS — I50.32 CHRONIC HEART FAILURE WITH PRESERVED EJECTION FRACTION (H): ICD-10-CM

## 2019-01-01 DIAGNOSIS — M54.50 CHRONIC MIDLINE LOW BACK PAIN WITHOUT SCIATICA: ICD-10-CM

## 2019-01-01 DIAGNOSIS — R30.0 DYSURIA: ICD-10-CM

## 2019-01-01 DIAGNOSIS — N18.30 CHRONIC KIDNEY DISEASE, STAGE III (MODERATE) (H): Primary | ICD-10-CM

## 2019-01-01 DIAGNOSIS — T14.8XXA BRUISING: ICD-10-CM

## 2019-01-01 DIAGNOSIS — I50.30 (HFPEF) HEART FAILURE WITH PRESERVED EJECTION FRACTION (H): ICD-10-CM

## 2019-01-01 DIAGNOSIS — I50.23 ACUTE ON CHRONIC SYSTOLIC CONGESTIVE HEART FAILURE (H): ICD-10-CM

## 2019-01-01 DIAGNOSIS — I10 HYPERTENSION GOAL BP (BLOOD PRESSURE) < 140/80: ICD-10-CM

## 2019-01-01 DIAGNOSIS — N30.01 ACUTE CYSTITIS WITH HEMATURIA: ICD-10-CM

## 2019-01-01 DIAGNOSIS — R53.81 PHYSICAL DECONDITIONING: ICD-10-CM

## 2019-01-01 DIAGNOSIS — I10 ESSENTIAL HYPERTENSION: ICD-10-CM

## 2019-01-01 DIAGNOSIS — Z95.0 CARDIAC PACEMAKER IN SITU: ICD-10-CM

## 2019-01-01 DIAGNOSIS — K59.00 CONSTIPATION, UNSPECIFIED CONSTIPATION TYPE: ICD-10-CM

## 2019-01-01 DIAGNOSIS — J84.10 PULMONARY FIBROSIS (H): Primary | ICD-10-CM

## 2019-01-01 DIAGNOSIS — N18.30 CHRONIC KIDNEY DISEASE, STAGE III (MODERATE) (H): ICD-10-CM

## 2019-01-01 DIAGNOSIS — R91.1 PULMONARY NODULE: ICD-10-CM

## 2019-01-01 DIAGNOSIS — I49.5 SINUS NODE DYSFUNCTION (H): ICD-10-CM

## 2019-01-01 DIAGNOSIS — R06.02 SOB (SHORTNESS OF BREATH): Primary | ICD-10-CM

## 2019-01-01 DIAGNOSIS — I24.89 DEMAND ISCHEMIA (H): ICD-10-CM

## 2019-01-01 DIAGNOSIS — R07.9 CHEST PAIN, UNSPECIFIED TYPE: Primary | ICD-10-CM

## 2019-01-01 DIAGNOSIS — R63.4 WEIGHT LOSS: ICD-10-CM

## 2019-01-01 DIAGNOSIS — R09.02 HYPOXEMIA: ICD-10-CM

## 2019-01-01 DIAGNOSIS — I49.9 SINUS NODE ARRHYTHMIA: ICD-10-CM

## 2019-01-01 DIAGNOSIS — Z95.0 CARDIAC PACEMAKER IN SITU: Primary | ICD-10-CM

## 2019-01-01 DIAGNOSIS — I50.32 CHRONIC HEART FAILURE WITH PRESERVED EJECTION FRACTION (H): Primary | ICD-10-CM

## 2019-01-01 DIAGNOSIS — E86.0 DEHYDRATION: ICD-10-CM

## 2019-01-01 DIAGNOSIS — R53.82 CHRONIC FATIGUE: ICD-10-CM

## 2019-01-01 DIAGNOSIS — E78.5 HYPERLIPIDEMIA WITH TARGET LDL LESS THAN 130: Chronic | ICD-10-CM

## 2019-01-01 DIAGNOSIS — N39.0 URINARY TRACT INFECTION ASSOCIATED WITH CATHETERIZATION OF URINARY TRACT, UNSPECIFIED INDWELLING URINARY CATHETER TYPE, SUBSEQUENT ENCOUNTER: ICD-10-CM

## 2019-01-01 DIAGNOSIS — J84.10 PULMONARY FIBROSIS (H): Chronic | ICD-10-CM

## 2019-01-01 DIAGNOSIS — T83.511D URINARY TRACT INFECTION ASSOCIATED WITH CATHETERIZATION OF URINARY TRACT, UNSPECIFIED INDWELLING URINARY CATHETER TYPE, SUBSEQUENT ENCOUNTER: ICD-10-CM

## 2019-01-01 DIAGNOSIS — Z95.0 PACEMAKER: ICD-10-CM

## 2019-01-01 DIAGNOSIS — R53.81 PHYSICAL DECONDITIONING: Primary | ICD-10-CM

## 2019-01-01 DIAGNOSIS — N39.0 ACUTE UTI: ICD-10-CM

## 2019-01-01 DIAGNOSIS — A41.9 SEPSIS, DUE TO UNSPECIFIED ORGANISM: ICD-10-CM

## 2019-01-01 DIAGNOSIS — I48.20 CHRONIC ATRIAL FIBRILLATION (H): Primary | ICD-10-CM

## 2019-01-01 DIAGNOSIS — I15.9 SECONDARY HYPERTENSION: Primary | ICD-10-CM

## 2019-01-01 DIAGNOSIS — N28.9 RENAL INSUFFICIENCY: ICD-10-CM

## 2019-01-01 DIAGNOSIS — I50.9 ACUTE ON CHRONIC CONGESTIVE HEART FAILURE, UNSPECIFIED HEART FAILURE TYPE (H): ICD-10-CM

## 2019-01-01 DIAGNOSIS — N30.00 ACUTE CYSTITIS WITHOUT HEMATURIA: ICD-10-CM

## 2019-01-01 DIAGNOSIS — K21.9 GASTROESOPHAGEAL REFLUX DISEASE WITHOUT ESOPHAGITIS: ICD-10-CM

## 2019-01-01 LAB
ALBUMIN SERPL-MCNC: 2.5 G/DL (ref 3.4–5)
ALBUMIN SERPL-MCNC: 3.1 G/DL (ref 3.4–5)
ALBUMIN SERPL-MCNC: 3.4 G/DL (ref 3.4–5)
ALBUMIN UR-MCNC: 100 MG/DL
ALBUMIN UR-MCNC: 100 MG/DL
ALBUMIN UR-MCNC: 30 MG/DL
ALP SERPL-CCNC: 68 U/L (ref 40–150)
ALP SERPL-CCNC: 68 U/L (ref 40–150)
ALP SERPL-CCNC: 71 U/L (ref 40–150)
ALT SERPL W P-5'-P-CCNC: 13 U/L (ref 0–50)
ALT SERPL W P-5'-P-CCNC: 19 U/L (ref 0–50)
ALT SERPL W P-5'-P-CCNC: 20 U/L (ref 0–50)
ANION GAP SERPL CALCULATED.3IONS-SCNC: 10 MMOL/L (ref 3–14)
ANION GAP SERPL CALCULATED.3IONS-SCNC: 10.9 MMOL/L (ref 6–17)
ANION GAP SERPL CALCULATED.3IONS-SCNC: 11 MMOL/L (ref 7–16)
ANION GAP SERPL CALCULATED.3IONS-SCNC: 11.2 MMOL/L (ref 6–17)
ANION GAP SERPL CALCULATED.3IONS-SCNC: 11.8 MMOL/L (ref 6–17)
ANION GAP SERPL CALCULATED.3IONS-SCNC: 14 MMOL/L (ref 3–14)
ANION GAP SERPL CALCULATED.3IONS-SCNC: 14.9 MMOL/L (ref 6–17)
ANION GAP SERPL CALCULATED.3IONS-SCNC: 4 MMOL/L (ref 3–14)
ANION GAP SERPL CALCULATED.3IONS-SCNC: 4 MMOL/L (ref 3–14)
ANION GAP SERPL CALCULATED.3IONS-SCNC: 6 MMOL/L (ref 3–14)
ANION GAP SERPL CALCULATED.3IONS-SCNC: 6 MMOL/L (ref 3–14)
ANION GAP SERPL CALCULATED.3IONS-SCNC: 7 MMOL/L (ref 3–14)
ANION GAP SERPL CALCULATED.3IONS-SCNC: 7.5 MMOL/L (ref 6–17)
ANION GAP SERPL CALCULATED.3IONS-SCNC: 8 MMOL/L (ref 3–14)
ANION GAP SERPL CALCULATED.3IONS-SCNC: 8 MMOL/L (ref 3–14)
ANION GAP SERPL CALCULATED.3IONS-SCNC: 8 MMOL/L (ref 7–16)
ANION GAP SERPL CALCULATED.3IONS-SCNC: 9 MMOL/L (ref 3–14)
APPEARANCE UR: ABNORMAL
APPEARANCE UR: ABNORMAL
APPEARANCE UR: CLEAR
AST SERPL W P-5'-P-CCNC: 16 U/L (ref 0–45)
AST SERPL W P-5'-P-CCNC: 27 U/L (ref 0–45)
AST SERPL W P-5'-P-CCNC: 33 U/L (ref 0–45)
BACTERIA #/AREA URNS HPF: ABNORMAL /HPF
BACTERIA SPEC CULT: ABNORMAL
BACTERIA SPEC CULT: NO GROWTH
BACTERIA SPEC CULT: NO GROWTH
BASOPHILS # BLD AUTO: 0 10E9/L (ref 0–0.2)
BASOPHILS # BLD AUTO: 0 10E9/L (ref 0–0.2)
BASOPHILS NFR BLD AUTO: 0.1 %
BASOPHILS NFR BLD AUTO: 0.1 %
BILIRUB DIRECT SERPL-MCNC: 0.7 MG/DL (ref 0–0.2)
BILIRUB DIRECT SERPL-MCNC: 0.9 MG/DL (ref 0–0.2)
BILIRUB SERPL-MCNC: 1.2 MG/DL (ref 0.2–1.3)
BILIRUB SERPL-MCNC: 1.6 MG/DL (ref 0.2–1.3)
BILIRUB SERPL-MCNC: 2.3 MG/DL (ref 0.2–1.3)
BILIRUB UR QL STRIP: NEGATIVE
BUN SERPL-MCNC: 27 MG/DL (ref 7–30)
BUN SERPL-MCNC: 27 MG/DL (ref 7–30)
BUN SERPL-MCNC: 28 MG/DL (ref 7–30)
BUN SERPL-MCNC: 31 MG/DL (ref 7–30)
BUN SERPL-MCNC: 36 MG/DL (ref 7–30)
BUN SERPL-MCNC: 41 MG/DL (ref 7–30)
BUN SERPL-MCNC: 42 MG/DL (ref 7–30)
BUN SERPL-MCNC: 42 MG/DL (ref 7–30)
BUN SERPL-MCNC: 46 MG/DL (ref 7–30)
BUN SERPL-MCNC: 47 MG/DL (ref 7–30)
BUN SERPL-MCNC: 51 MG/DL (ref 7–30)
BUN SERPL-MCNC: 52 MG/DL (ref 7–30)
BUN SERPL-MCNC: 53 MG/DL (ref 7–26)
BUN SERPL-MCNC: 53 MG/DL (ref 7–30)
BUN SERPL-MCNC: 54 MG/DL (ref 7–26)
BUN SERPL-MCNC: 54 MG/DL (ref 7–30)
BUN SERPL-MCNC: 60 MG/DL (ref 7–30)
BUN SERPL-MCNC: 60 MG/DL (ref 7–30)
BUN SERPL-MCNC: 70 MG/DL (ref 7–30)
BUN SERPL-MCNC: 76 MG/DL (ref 7–30)
BUN SERPL-MCNC: 83 MG/DL (ref 7–30)
BUN SERPL-MCNC: 88 MG/DL (ref 7–30)
BUN SERPL-MCNC: 90 MG/DL (ref 7–30)
BUN SERPL-MCNC: 91 MG/DL (ref 7–30)
BUN SERPL-MCNC: 94 MG/DL (ref 7–30)
CALCIUM SERPL-MCNC: 7.7 MG/DL (ref 8.5–10.1)
CALCIUM SERPL-MCNC: 7.7 MG/DL (ref 8.5–10.1)
CALCIUM SERPL-MCNC: 7.8 MG/DL (ref 8.5–10.1)
CALCIUM SERPL-MCNC: 7.9 MG/DL (ref 8.5–10.1)
CALCIUM SERPL-MCNC: 8 MG/DL (ref 8.5–10.1)
CALCIUM SERPL-MCNC: 8.3 MG/DL (ref 8.5–10.1)
CALCIUM SERPL-MCNC: 8.3 MG/DL (ref 8.5–10.1)
CALCIUM SERPL-MCNC: 8.4 MG/DL (ref 8.5–10.1)
CALCIUM SERPL-MCNC: 8.4 MG/DL (ref 8.5–10.1)
CALCIUM SERPL-MCNC: 8.5 MG/DL (ref 8.5–10.1)
CALCIUM SERPL-MCNC: 8.5 MG/DL (ref 8.5–10.1)
CALCIUM SERPL-MCNC: 8.7 MG/DL (ref 8.5–10.1)
CALCIUM SERPL-MCNC: 8.7 MG/DL (ref 8.5–10.1)
CALCIUM SERPL-MCNC: 9.1 MG/DL (ref 8.4–10.4)
CALCIUM SERPL-MCNC: 9.1 MG/DL (ref 8.4–10.4)
CALCIUM SERPL-MCNC: 9.1 MG/DL (ref 8.5–10.1)
CALCIUM SERPL-MCNC: 9.2 MG/DL (ref 8.5–10.1)
CALCIUM SERPL-MCNC: 9.4 MG/DL (ref 8.5–10.5)
CALCIUM SERPL-MCNC: 9.5 MG/DL (ref 8.5–10.5)
CALCIUM SERPL-MCNC: 9.5 MG/DL (ref 8.5–10.5)
CALCIUM SERPL-MCNC: 9.6 MG/DL (ref 8.5–10.5)
CALCIUM SERPL-MCNC: 9.9 MG/DL (ref 8.5–10.5)
CHLORIDE SERPL-SCNC: 100 MMOL/L (ref 94–109)
CHLORIDE SERPL-SCNC: 100 MMOL/L (ref 94–109)
CHLORIDE SERPL-SCNC: 101 MMOL/L (ref 94–109)
CHLORIDE SERPL-SCNC: 101 MMOL/L (ref 98–107)
CHLORIDE SERPL-SCNC: 101 MMOL/L (ref 98–107)
CHLORIDE SERPL-SCNC: 102 MMOL/L (ref 94–109)
CHLORIDE SERPL-SCNC: 103 MMOL/L (ref 98–107)
CHLORIDE SERPL-SCNC: 104 MMOL/L (ref 94–109)
CHLORIDE SERPL-SCNC: 104 MMOL/L (ref 98–107)
CHLORIDE SERPL-SCNC: 105 MMOL/L (ref 94–109)
CHLORIDE SERPL-SCNC: 107 MMOL/L (ref 94–109)
CHLORIDE SERPL-SCNC: 107 MMOL/L (ref 98–107)
CHLORIDE SERPL-SCNC: 108 MMOL/L (ref 94–109)
CHLORIDE SERPL-SCNC: 109 MMOL/L (ref 94–109)
CHLORIDE SERPL-SCNC: 112 MMOL/L (ref 94–109)
CHLORIDE SERPL-SCNC: 114 MMOL/L (ref 94–109)
CHLORIDE SERPL-SCNC: 115 MMOL/L (ref 94–109)
CHLORIDE SERPL-SCNC: 116 MMOL/L (ref 94–109)
CHLORIDE SERPL-SCNC: 98 MMOL/L (ref 94–109)
CHLORIDE SERPLBLD-SCNC: 104 MMOL/L (ref 98–109)
CHLORIDE SERPLBLD-SCNC: 104 MMOL/L (ref 98–109)
CO2 BLDCOV-SCNC: 24 MMOL/L (ref 21–28)
CO2 SERPL-SCNC: 16 MMOL/L (ref 20–32)
CO2 SERPL-SCNC: 16 MMOL/L (ref 20–32)
CO2 SERPL-SCNC: 17 MMOL/L (ref 20–32)
CO2 SERPL-SCNC: 17 MMOL/L (ref 20–32)
CO2 SERPL-SCNC: 18 MMOL/L (ref 20–32)
CO2 SERPL-SCNC: 18 MMOL/L (ref 20–32)
CO2 SERPL-SCNC: 20 MMOL/L (ref 20–32)
CO2 SERPL-SCNC: 22 MMOL/L (ref 20–32)
CO2 SERPL-SCNC: 22 MMOL/L (ref 23–29)
CO2 SERPL-SCNC: 23 MMOL/L (ref 20–32)
CO2 SERPL-SCNC: 24 MMOL/L (ref 20–32)
CO2 SERPL-SCNC: 25 MMOL/L (ref 20–29)
CO2 SERPL-SCNC: 26 MMOL/L (ref 20–29)
CO2 SERPL-SCNC: 26 MMOL/L (ref 23–29)
CO2 SERPL-SCNC: 27 MMOL/L (ref 20–32)
CO2 SERPL-SCNC: 28 MMOL/L (ref 20–32)
CO2 SERPL-SCNC: 28 MMOL/L (ref 20–32)
CO2 SERPL-SCNC: 28 MMOL/L (ref 23–29)
CO2 SERPL-SCNC: 29 MMOL/L (ref 20–32)
CO2 SERPL-SCNC: 30 MMOL/L (ref 20–32)
CO2 SERPL-SCNC: 30 MMOL/L (ref 20–32)
COLOR UR AUTO: ABNORMAL
COLOR UR AUTO: YELLOW
COLOR UR AUTO: YELLOW
CREAT SERPL-MCNC: 1.12 MG/DL (ref 0.52–1.04)
CREAT SERPL-MCNC: 1.18 MG/DL (ref 0.52–1.04)
CREAT SERPL-MCNC: 1.26 MG/DL (ref 0.52–1.04)
CREAT SERPL-MCNC: 1.31 MG/DL (ref 0.55–1.02)
CREAT SERPL-MCNC: 1.32 MG/DL (ref 0.7–1.3)
CREAT SERPL-MCNC: 1.35 MG/DL (ref 0.7–1.3)
CREAT SERPL-MCNC: 1.37 MG/DL (ref 0.52–1.04)
CREAT SERPL-MCNC: 1.37 MG/DL (ref 0.52–1.04)
CREAT SERPL-MCNC: 1.41 MG/DL (ref 0.7–1.3)
CREAT SERPL-MCNC: 1.43 MG/DL (ref 0.52–1.04)
CREAT SERPL-MCNC: 1.44 MG/DL (ref 0.55–1.02)
CREAT SERPL-MCNC: 1.44 MG/DL (ref 0.7–1.3)
CREAT SERPL-MCNC: 1.48 MG/DL (ref 0.52–1.04)
CREAT SERPL-MCNC: 1.49 MG/DL (ref 0.52–1.04)
CREAT SERPL-MCNC: 1.53 MG/DL (ref 0.52–1.04)
CREAT SERPL-MCNC: 1.66 MG/DL (ref 0.52–1.04)
CREAT SERPL-MCNC: 1.77 MG/DL (ref 0.7–1.3)
CREAT SERPL-MCNC: 1.82 MG/DL (ref 0.52–1.04)
CREAT SERPL-MCNC: 1.87 MG/DL (ref 0.52–1.04)
CREAT SERPL-MCNC: 2.3 MG/DL (ref 0.52–1.04)
CREAT SERPL-MCNC: 2.33 MG/DL (ref 0.52–1.04)
CREAT SERPL-MCNC: 2.59 MG/DL (ref 0.52–1.04)
CREAT SERPL-MCNC: 2.61 MG/DL (ref 0.52–1.04)
CREAT SERPL-MCNC: 2.75 MG/DL (ref 0.52–1.04)
CREAT SERPL-MCNC: 2.8 MG/DL (ref 0.52–1.04)
CREAT SERPL-MCNC: 2.8 MG/DL (ref 0.52–1.04)
CREAT UR-MCNC: 78 MG/DL
DIFFERENTIAL METHOD BLD: ABNORMAL
DIFFERENTIAL METHOD BLD: ABNORMAL
EOSINOPHIL # BLD AUTO: 0 10E9/L (ref 0–0.7)
EOSINOPHIL # BLD AUTO: 0.2 10E9/L (ref 0–0.7)
EOSINOPHIL NFR BLD AUTO: 0 %
EOSINOPHIL NFR BLD AUTO: 2.6 %
ERYTHROCYTE [DISTWIDTH] IN BLOOD BY AUTOMATED COUNT: 14.1 % (ref 10–15)
ERYTHROCYTE [DISTWIDTH] IN BLOOD BY AUTOMATED COUNT: 15.2 % (ref 10–15)
ERYTHROCYTE [DISTWIDTH] IN BLOOD BY AUTOMATED COUNT: 15.3 % (ref 10–15)
ERYTHROCYTE [DISTWIDTH] IN BLOOD BY AUTOMATED COUNT: 15.3 % (ref 10–15)
ERYTHROCYTE [DISTWIDTH] IN BLOOD BY AUTOMATED COUNT: 15.5 % (ref 10–15)
ERYTHROCYTE [DISTWIDTH] IN BLOOD BY AUTOMATED COUNT: 15.5 % (ref 10–15)
ERYTHROCYTE [DISTWIDTH] IN BLOOD BY AUTOMATED COUNT: 15.6 % (ref 10–15)
ERYTHROCYTE [DISTWIDTH] IN BLOOD BY AUTOMATED COUNT: 15.8 % (ref 10–15)
ERYTHROCYTE [DISTWIDTH] IN BLOOD BY AUTOMATED COUNT: 16 % (ref 10–15)
ERYTHROCYTE [DISTWIDTH] IN BLOOD BY AUTOMATED COUNT: 16.1 % (ref 10–15)
ERYTHROCYTE [DISTWIDTH] IN BLOOD BY AUTOMATED COUNT: 16.3 % (ref 10–15)
FRACT EXCRET NA UR+SERPL-RTO: 0.6 %
GFR SERPL CREATININE-BSD FRML MDRD: 14 ML/MIN/{1.73_M2}
GFR SERPL CREATININE-BSD FRML MDRD: 15 ML/MIN/{1.73_M2}
GFR SERPL CREATININE-BSD FRML MDRD: 15 ML/MIN/{1.73_M2}
GFR SERPL CREATININE-BSD FRML MDRD: 17 ML/MIN/{1.73_M2}
GFR SERPL CREATININE-BSD FRML MDRD: 17 ML/MIN/{1.73_M2}
GFR SERPL CREATININE-BSD FRML MDRD: 22 ML/MIN/{1.73_M2}
GFR SERPL CREATININE-BSD FRML MDRD: 23 ML/MIN/{1.73_M2}
GFR SERPL CREATININE-BSD FRML MDRD: 26 ML/MIN/{1.73_M2}
GFR SERPL CREATININE-BSD FRML MDRD: 27 ML/MIN/{1.73_M2}
GFR SERPL CREATININE-BSD FRML MDRD: 29 ML/MIN/{1.73_M2}
GFR SERPL CREATININE-BSD FRML MDRD: 30 ML/MIN/{1.73_M2}
GFR SERPL CREATININE-BSD FRML MDRD: 30 ML/MIN/{1.73_M2}
GFR SERPL CREATININE-BSD FRML MDRD: 31 ML/MIN/1.73M2
GFR SERPL CREATININE-BSD FRML MDRD: 31 ML/MIN/{1.73_M2}
GFR SERPL CREATININE-BSD FRML MDRD: 33 ML/MIN/{1.73_M2}
GFR SERPL CREATININE-BSD FRML MDRD: 33 ML/MIN/{1.73_M2}
GFR SERPL CREATININE-BSD FRML MDRD: 34 ML/MIN/{1.73_M2}
GFR SERPL CREATININE-BSD FRML MDRD: 35 ML/MIN/1.73M2
GFR SERPL CREATININE-BSD FRML MDRD: 35 ML/MIN/{1.73_M2}
GFR SERPL CREATININE-BSD FRML MDRD: 36 ML/MIN/{1.73_M2}
GFR SERPL CREATININE-BSD FRML MDRD: 37 ML/MIN/{1.73_M2}
GFR SERPL CREATININE-BSD FRML MDRD: 37 ML/MIN/{1.73_M2}
GFR SERPL CREATININE-BSD FRML MDRD: 39 ML/MIN/{1.73_M2}
GFR SERPL CREATININE-BSD FRML MDRD: 42 ML/MIN/{1.73_M2}
GLUCOSE BLDC GLUCOMTR-MCNC: 101 MG/DL (ref 70–99)
GLUCOSE BLDC GLUCOMTR-MCNC: 113 MG/DL (ref 70–99)
GLUCOSE BLDC GLUCOMTR-MCNC: 117 MG/DL (ref 70–99)
GLUCOSE BLDC GLUCOMTR-MCNC: 118 MG/DL (ref 70–99)
GLUCOSE BLDC GLUCOMTR-MCNC: 123 MG/DL (ref 70–99)
GLUCOSE BLDC GLUCOMTR-MCNC: 134 MG/DL (ref 70–99)
GLUCOSE BLDC GLUCOMTR-MCNC: 191 MG/DL (ref 70–99)
GLUCOSE BLDC GLUCOMTR-MCNC: 220 MG/DL (ref 70–99)
GLUCOSE BLDC GLUCOMTR-MCNC: 84 MG/DL (ref 70–99)
GLUCOSE BLDC GLUCOMTR-MCNC: 85 MG/DL (ref 70–99)
GLUCOSE BLDC GLUCOMTR-MCNC: 87 MG/DL (ref 70–99)
GLUCOSE BLDC GLUCOMTR-MCNC: 94 MG/DL (ref 70–99)
GLUCOSE BLDC GLUCOMTR-MCNC: 98 MG/DL (ref 70–99)
GLUCOSE SERPL-MCNC: 102 MG/DL (ref 70–99)
GLUCOSE SERPL-MCNC: 105 MG/DL (ref 70–99)
GLUCOSE SERPL-MCNC: 106 MG/DL (ref 70–99)
GLUCOSE SERPL-MCNC: 106 MG/DL (ref 70–99)
GLUCOSE SERPL-MCNC: 107 MG/DL (ref 70–99)
GLUCOSE SERPL-MCNC: 108 MG/DL (ref 70–100)
GLUCOSE SERPL-MCNC: 109 MG/DL (ref 70–105)
GLUCOSE SERPL-MCNC: 117 MG/DL (ref 70–99)
GLUCOSE SERPL-MCNC: 118 MG/DL (ref 70–105)
GLUCOSE SERPL-MCNC: 118 MG/DL (ref 70–105)
GLUCOSE SERPL-MCNC: 126 MG/DL (ref 70–105)
GLUCOSE SERPL-MCNC: 135 MG/DL (ref 70–105)
GLUCOSE SERPL-MCNC: 149 MG/DL (ref 70–99)
GLUCOSE SERPL-MCNC: 242 MG/DL (ref 70–99)
GLUCOSE SERPL-MCNC: 85 MG/DL (ref 70–99)
GLUCOSE SERPL-MCNC: 85 MG/DL (ref 70–99)
GLUCOSE SERPL-MCNC: 87 MG/DL (ref 70–99)
GLUCOSE SERPL-MCNC: 88 MG/DL (ref 70–99)
GLUCOSE SERPL-MCNC: 88 MG/DL (ref 70–99)
GLUCOSE SERPL-MCNC: 90 MG/DL (ref 70–99)
GLUCOSE SERPL-MCNC: 91 MG/DL (ref 70–100)
GLUCOSE SERPL-MCNC: 91 MG/DL (ref 70–99)
GLUCOSE SERPL-MCNC: 99 MG/DL (ref 70–99)
GLUCOSE UR STRIP-MCNC: NEGATIVE MG/DL
HCT VFR BLD AUTO: 30.1 % (ref 35–47)
HCT VFR BLD AUTO: 31.7 % (ref 35–47)
HCT VFR BLD AUTO: 33.6 % (ref 35–47)
HCT VFR BLD AUTO: 33.6 % (ref 35–47)
HCT VFR BLD AUTO: 34.7 % (ref 35–47)
HCT VFR BLD AUTO: 35.4 % (ref 35–47)
HCT VFR BLD AUTO: 35.4 % (ref 35–47)
HCT VFR BLD AUTO: 35.9 % (ref 35–47)
HCT VFR BLD AUTO: 36.7 % (ref 35–47)
HCT VFR BLD AUTO: 37.5 % (ref 35–47)
HCT VFR BLD AUTO: 39.1 % (ref 35–47)
HEMOGLOBIN: 11.9 G/DL (ref 12–15.5)
HGB BLD-MCNC: 10.1 G/DL (ref 11.7–15.7)
HGB BLD-MCNC: 10.8 G/DL (ref 11.7–15.7)
HGB BLD-MCNC: 11.3 G/DL (ref 11.7–15.7)
HGB BLD-MCNC: 11.3 G/DL (ref 11.7–15.7)
HGB BLD-MCNC: 11.4 G/DL (ref 11.7–15.7)
HGB BLD-MCNC: 11.7 G/DL (ref 11.7–15.7)
HGB BLD-MCNC: 11.8 G/DL (ref 11.7–15.7)
HGB BLD-MCNC: 12 G/DL (ref 11.7–15.7)
HGB BLD-MCNC: 12.1 G/DL (ref 11.7–15.7)
HGB BLD-MCNC: 12.5 G/DL (ref 11.7–15.7)
HGB BLD-MCNC: 12.6 G/DL (ref 11.7–15.7)
HGB BLD-MCNC: 12.9 G/DL (ref 11.7–15.7)
HGB UR QL STRIP: ABNORMAL
HGB UR QL STRIP: ABNORMAL
HGB UR QL STRIP: NEGATIVE
IMM GRANULOCYTES # BLD: 0.1 10E9/L (ref 0–0.4)
IMM GRANULOCYTES # BLD: 0.1 10E9/L (ref 0–0.4)
IMM GRANULOCYTES NFR BLD: 0.6 %
IMM GRANULOCYTES NFR BLD: 0.6 %
INR PPP: 1.29 (ref 0.86–1.14)
INR PPP: 1.35 (ref 0.86–1.14)
INTERPRETATION ECG - MUSE: NORMAL
KETONES UR STRIP-MCNC: NEGATIVE MG/DL
LACTATE BLD-SCNC: 1.3 MMOL/L (ref 0.7–2)
LACTATE BLD-SCNC: 1.5 MMOL/L (ref 0.7–2)
LACTATE BLD-SCNC: 2.3 MMOL/L (ref 0.7–2)
LACTATE BLD-SCNC: 2.4 MMOL/L (ref 0.7–2.1)
LACTATE BLD-SCNC: 3.3 MMOL/L (ref 0.7–2)
LEUKOCYTE ESTERASE UR QL STRIP: ABNORMAL
LMWH PPP CHRO-ACNC: 0.21 IU/ML
LMWH PPP CHRO-ACNC: 0.21 IU/ML
LMWH PPP CHRO-ACNC: 0.27 IU/ML
LYMPHOCYTES # BLD AUTO: 0.8 10E9/L (ref 0.8–5.3)
LYMPHOCYTES # BLD AUTO: 1.8 10E9/L (ref 0.8–5.3)
LYMPHOCYTES NFR BLD AUTO: 22.6 %
LYMPHOCYTES NFR BLD AUTO: 3.5 %
Lab: ABNORMAL
Lab: ABNORMAL
Lab: NORMAL
Lab: NORMAL
MCH RBC QN AUTO: 32.7 PG (ref 26.5–33)
MCH RBC QN AUTO: 32.8 PG (ref 26.5–33)
MCH RBC QN AUTO: 33 PG (ref 26.5–33)
MCH RBC QN AUTO: 33.3 PG (ref 26.5–33)
MCH RBC QN AUTO: 33.5 PG (ref 26.5–33)
MCH RBC QN AUTO: 33.5 PG (ref 26.5–33)
MCH RBC QN AUTO: 33.6 PG (ref 26.5–33)
MCH RBC QN AUTO: 33.9 PG (ref 26.5–33)
MCH RBC QN AUTO: 34 PG (ref 26.5–33)
MCHC RBC AUTO-ENTMCNC: 32.6 G/DL (ref 31.5–36.5)
MCHC RBC AUTO-ENTMCNC: 32.9 G/DL (ref 31.5–36.5)
MCHC RBC AUTO-ENTMCNC: 33 G/DL (ref 31.5–36.5)
MCHC RBC AUTO-ENTMCNC: 33 G/DL (ref 31.5–36.5)
MCHC RBC AUTO-ENTMCNC: 33.3 G/DL (ref 31.5–36.5)
MCHC RBC AUTO-ENTMCNC: 33.6 G/DL (ref 31.5–36.5)
MCHC RBC AUTO-ENTMCNC: 33.9 G/DL (ref 31.5–36.5)
MCHC RBC AUTO-ENTMCNC: 34.1 G/DL (ref 31.5–36.5)
MCV RBC AUTO: 100 FL (ref 78–100)
MCV RBC AUTO: 100 FL (ref 78–100)
MCV RBC AUTO: 101 FL (ref 78–100)
MCV RBC AUTO: 101 FL (ref 78–100)
MCV RBC AUTO: 102 FL (ref 78–100)
MCV RBC AUTO: 102 FL (ref 78–100)
MCV RBC AUTO: 103 FL (ref 78–100)
MCV RBC AUTO: 98 FL (ref 78–100)
MCV RBC AUTO: 98 FL (ref 78–100)
MCV RBC AUTO: 99 FL (ref 78–100)
MCV RBC AUTO: 99 FL (ref 78–100)
MDC_IDC_EPISODE_DTM: NORMAL
MDC_IDC_EPISODE_ID: NORMAL
MDC_IDC_EPISODE_TYPE: NORMAL
MDC_IDC_LEAD_IMPLANT_DT: NORMAL
MDC_IDC_LEAD_LOCATION: NORMAL
MDC_IDC_LEAD_LOCATION_DETAIL_1: NORMAL
MDC_IDC_LEAD_MFG: NORMAL
MDC_IDC_LEAD_MODEL: NORMAL
MDC_IDC_LEAD_POLARITY_TYPE: NORMAL
MDC_IDC_LEAD_SERIAL: NORMAL
MDC_IDC_MSMT_BATTERY_DTM: NORMAL
MDC_IDC_MSMT_BATTERY_REMAINING_LONGEVITY: 102 MO
MDC_IDC_MSMT_BATTERY_REMAINING_LONGEVITY: 108 MO
MDC_IDC_MSMT_BATTERY_REMAINING_LONGEVITY: 120 MO
MDC_IDC_MSMT_BATTERY_REMAINING_PERCENTAGE: 100 %
MDC_IDC_MSMT_BATTERY_REMAINING_PERCENTAGE: 100 %
MDC_IDC_MSMT_BATTERY_STATUS: NORMAL
MDC_IDC_MSMT_LEADCHNL_RV_IMPEDANCE_VALUE: 842 OHM
MDC_IDC_MSMT_LEADCHNL_RV_IMPEDANCE_VALUE: 894 OHM
MDC_IDC_MSMT_LEADCHNL_RV_IMPEDANCE_VALUE: 944 OHM
MDC_IDC_MSMT_LEADCHNL_RV_PACING_THRESHOLD_AMPLITUDE: 0.7 V
MDC_IDC_MSMT_LEADCHNL_RV_PACING_THRESHOLD_AMPLITUDE: 0.7 V
MDC_IDC_MSMT_LEADCHNL_RV_PACING_THRESHOLD_AMPLITUDE: 0.8 V
MDC_IDC_MSMT_LEADCHNL_RV_PACING_THRESHOLD_PULSEWIDTH: 0.4 MS
MDC_IDC_MSMT_LEADCHNL_RV_SENSING_INTR_AMPL: NORMAL
MDC_IDC_PG_IMPLANT_DTM: NORMAL
MDC_IDC_PG_MFG: NORMAL
MDC_IDC_PG_MODEL: NORMAL
MDC_IDC_PG_SERIAL: NORMAL
MDC_IDC_PG_TYPE: NORMAL
MDC_IDC_SESS_CLINIC_NAME: NORMAL
MDC_IDC_SESS_DTM: NORMAL
MDC_IDC_SESS_TYPE: NORMAL
MDC_IDC_SET_BRADY_LOWRATE: 60 {BEATS}/MIN
MDC_IDC_SET_BRADY_LOWRATE: 70 {BEATS}/MIN
MDC_IDC_SET_BRADY_MAX_SENSOR_RATE: 120 {BEATS}/MIN
MDC_IDC_SET_BRADY_MODE: NORMAL
MDC_IDC_SET_LEADCHNL_RA_SENSING_ADAPTATION_MODE: NORMAL
MDC_IDC_SET_LEADCHNL_RA_SENSING_ADAPTATION_MODE: NORMAL
MDC_IDC_SET_LEADCHNL_RA_SENSING_ANODE_ELECTRODE_1: NORMAL
MDC_IDC_SET_LEADCHNL_RA_SENSING_ANODE_ELECTRODE_1: NORMAL
MDC_IDC_SET_LEADCHNL_RA_SENSING_ANODE_LOCATION_1: NORMAL
MDC_IDC_SET_LEADCHNL_RA_SENSING_ANODE_LOCATION_1: NORMAL
MDC_IDC_SET_LEADCHNL_RA_SENSING_CATHODE_ELECTRODE_1: NORMAL
MDC_IDC_SET_LEADCHNL_RA_SENSING_CATHODE_ELECTRODE_1: NORMAL
MDC_IDC_SET_LEADCHNL_RA_SENSING_CATHODE_LOCATION_1: NORMAL
MDC_IDC_SET_LEADCHNL_RA_SENSING_CATHODE_LOCATION_1: NORMAL
MDC_IDC_SET_LEADCHNL_RA_SENSING_POLARITY: NORMAL
MDC_IDC_SET_LEADCHNL_RA_SENSING_POLARITY: NORMAL
MDC_IDC_SET_LEADCHNL_RA_SENSING_SENSITIVITY: 0.75 MV
MDC_IDC_SET_LEADCHNL_RA_SENSING_SENSITIVITY: 0.75 MV
MDC_IDC_SET_LEADCHNL_RV_PACING_AMPLITUDE: 1.2 V
MDC_IDC_SET_LEADCHNL_RV_PACING_AMPLITUDE: 1.3 V
MDC_IDC_SET_LEADCHNL_RV_PACING_ANODE_ELECTRODE_1: NORMAL
MDC_IDC_SET_LEADCHNL_RV_PACING_ANODE_ELECTRODE_1: NORMAL
MDC_IDC_SET_LEADCHNL_RV_PACING_ANODE_LOCATION_1: NORMAL
MDC_IDC_SET_LEADCHNL_RV_PACING_ANODE_LOCATION_1: NORMAL
MDC_IDC_SET_LEADCHNL_RV_PACING_CAPTURE_MODE: NORMAL
MDC_IDC_SET_LEADCHNL_RV_PACING_CATHODE_ELECTRODE_1: NORMAL
MDC_IDC_SET_LEADCHNL_RV_PACING_CATHODE_ELECTRODE_1: NORMAL
MDC_IDC_SET_LEADCHNL_RV_PACING_CATHODE_LOCATION_1: NORMAL
MDC_IDC_SET_LEADCHNL_RV_PACING_CATHODE_LOCATION_1: NORMAL
MDC_IDC_SET_LEADCHNL_RV_PACING_POLARITY: NORMAL
MDC_IDC_SET_LEADCHNL_RV_PACING_PULSEWIDTH: 0.4 MS
MDC_IDC_SET_LEADCHNL_RV_SENSING_ADAPTATION_MODE: NORMAL
MDC_IDC_SET_LEADCHNL_RV_SENSING_ANODE_ELECTRODE_1: NORMAL
MDC_IDC_SET_LEADCHNL_RV_SENSING_ANODE_ELECTRODE_1: NORMAL
MDC_IDC_SET_LEADCHNL_RV_SENSING_ANODE_LOCATION_1: NORMAL
MDC_IDC_SET_LEADCHNL_RV_SENSING_ANODE_LOCATION_1: NORMAL
MDC_IDC_SET_LEADCHNL_RV_SENSING_CATHODE_ELECTRODE_1: NORMAL
MDC_IDC_SET_LEADCHNL_RV_SENSING_CATHODE_ELECTRODE_1: NORMAL
MDC_IDC_SET_LEADCHNL_RV_SENSING_CATHODE_LOCATION_1: NORMAL
MDC_IDC_SET_LEADCHNL_RV_SENSING_CATHODE_LOCATION_1: NORMAL
MDC_IDC_SET_LEADCHNL_RV_SENSING_POLARITY: NORMAL
MDC_IDC_SET_LEADCHNL_RV_SENSING_SENSITIVITY: 2.5 MV
MDC_IDC_SET_ZONE_DETECTION_INTERVAL: 375 MS
MDC_IDC_SET_ZONE_TYPE: NORMAL
MDC_IDC_SET_ZONE_VENDOR_TYPE: NORMAL
MDC_IDC_STAT_BRADY_DTM_END: NORMAL
MDC_IDC_STAT_BRADY_DTM_END: NORMAL
MDC_IDC_STAT_BRADY_DTM_START: NORMAL
MDC_IDC_STAT_BRADY_DTM_START: NORMAL
MDC_IDC_STAT_BRADY_RV_PERCENT_PACED: 100 %
MDC_IDC_STAT_BRADY_RV_PERCENT_PACED: 97 %
MDC_IDC_STAT_BRADY_RV_PERCENT_PACED: 97 %
MDC_IDC_STAT_EPISODE_RECENT_COUNT: 0
MDC_IDC_STAT_EPISODE_RECENT_COUNT_DTM_END: NORMAL
MDC_IDC_STAT_EPISODE_RECENT_COUNT_DTM_START: NORMAL
MDC_IDC_STAT_EPISODE_TOTAL_COUNT: 0
MDC_IDC_STAT_EPISODE_TOTAL_COUNT: 0
MDC_IDC_STAT_EPISODE_TOTAL_COUNT_DTM_END: NORMAL
MDC_IDC_STAT_EPISODE_TOTAL_COUNT_DTM_END: NORMAL
MDC_IDC_STAT_EPISODE_TYPE: NORMAL
MDC_IDC_STAT_EPISODE_VENDOR_TYPE: NORMAL
MONOCYTES # BLD AUTO: 1 10E9/L (ref 0–1.3)
MONOCYTES # BLD AUTO: 1.7 10E9/L (ref 0–1.3)
MONOCYTES NFR BLD AUTO: 12 %
MONOCYTES NFR BLD AUTO: 7.4 %
NEUTROPHILS # BLD AUTO: 20.2 10E9/L (ref 1.6–8.3)
NEUTROPHILS # BLD AUTO: 5 10E9/L (ref 1.6–8.3)
NEUTROPHILS NFR BLD AUTO: 62.1 %
NEUTROPHILS NFR BLD AUTO: 88.4 %
NITRATE UR QL: NEGATIVE
NITRATE UR QL: NEGATIVE
NITRATE UR QL: POSITIVE
NON-SQ EPI CELLS #/AREA URNS LPF: ABNORMAL /LPF
NRBC # BLD AUTO: 0 10*3/UL
NRBC # BLD AUTO: 0 10*3/UL
NRBC BLD AUTO-RTO: 0 /100
NRBC BLD AUTO-RTO: 0 /100
NT-PROBNP SERPL-MCNC: ABNORMAL PG/ML (ref 0–1800)
NT-PROBNP SERPL-MCNC: ABNORMAL PG/ML (ref 0–450)
PCO2 BLDV: 37 MM HG (ref 40–50)
PH BLDV: 7.41 PH (ref 7.32–7.43)
PH UR STRIP: 5.5 PH (ref 5–7)
PH UR STRIP: 6.5 PH (ref 5–7)
PH UR STRIP: 8 PH (ref 5–7)
PLATELET # BLD AUTO: 176 10E9/L (ref 150–450)
PLATELET # BLD AUTO: 189 10E9/L (ref 150–450)
PLATELET # BLD AUTO: 190 10E9/L (ref 150–450)
PLATELET # BLD AUTO: 194 10E9/L (ref 150–450)
PLATELET # BLD AUTO: 198 10E9/L (ref 150–450)
PLATELET # BLD AUTO: 201 10E9/L (ref 150–450)
PLATELET # BLD AUTO: 212 10E9/L (ref 150–450)
PLATELET # BLD AUTO: 213 10E9/L (ref 150–450)
PLATELET # BLD AUTO: 220 10E9/L (ref 150–450)
PLATELET # BLD AUTO: 228 10E9/L (ref 150–450)
PLATELET # BLD AUTO: 234 10E9/L (ref 150–450)
PLATELET # BLD AUTO: 242 10E9/L (ref 150–450)
PO2 BLDV: 33 MM HG (ref 25–47)
POTASSIUM SERPL-SCNC: 4 MMOL/L (ref 3.4–5.3)
POTASSIUM SERPL-SCNC: 4.1 MMOL/L (ref 3.4–5.3)
POTASSIUM SERPL-SCNC: 4.2 MMOL/L (ref 3.4–5.3)
POTASSIUM SERPL-SCNC: 4.2 MMOL/L (ref 3.4–5.3)
POTASSIUM SERPL-SCNC: 4.4 MMOL/L (ref 3.4–5.3)
POTASSIUM SERPL-SCNC: 4.5 MMOL/L (ref 3.4–5.3)
POTASSIUM SERPL-SCNC: 4.5 MMOL/L (ref 3.5–5.1)
POTASSIUM SERPL-SCNC: 4.7 MMOL/L (ref 3.4–5.3)
POTASSIUM SERPL-SCNC: 4.8 MMOL/L (ref 3.4–5.3)
POTASSIUM SERPL-SCNC: 4.8 MMOL/L (ref 3.5–5.1)
POTASSIUM SERPL-SCNC: 4.8 MMOL/L (ref 3.5–5.1)
POTASSIUM SERPL-SCNC: 4.9 MMOL/L (ref 3.4–5.3)
POTASSIUM SERPL-SCNC: 4.9 MMOL/L (ref 3.5–5.1)
POTASSIUM SERPL-SCNC: 4.9 MMOL/L (ref 3.5–5.1)
POTASSIUM SERPL-SCNC: 5 MMOL/L (ref 3.4–5.3)
POTASSIUM SERPL-SCNC: 5 MMOL/L (ref 3.5–5.1)
POTASSIUM SERPL-SCNC: 5.1 MMOL/L (ref 3.4–5.3)
POTASSIUM SERPL-SCNC: 5.2 MMOL/L (ref 3.4–5.3)
POTASSIUM SERPL-SCNC: 5.2 MMOL/L (ref 3.5–5.1)
POTASSIUM SERPL-SCNC: 5.3 MMOL/L (ref 3.4–5.3)
POTASSIUM SERPL-SCNC: 5.3 MMOL/L (ref 3.4–5.3)
POTASSIUM SERPL-SCNC: 5.4 MMOL/L (ref 3.4–5.3)
POTASSIUM SERPL-SCNC: 5.6 MMOL/L (ref 3.4–5.3)
POTASSIUM SERPL-SCNC: 5.8 MMOL/L (ref 3.4–5.3)
POTASSIUM SERPL-SCNC: 5.9 MMOL/L (ref 3.4–5.3)
PROCALCITONIN SERPL-MCNC: 2.31 NG/ML
PROT SERPL-MCNC: 6.4 G/DL (ref 6.8–8.8)
PROT SERPL-MCNC: 7.2 G/DL (ref 6.8–8.8)
PROT SERPL-MCNC: 7.5 G/DL (ref 6.8–8.8)
RBC # BLD AUTO: 3.06 10E12/L (ref 3.8–5.2)
RBC # BLD AUTO: 3.22 10E12/L (ref 3.8–5.2)
RBC # BLD AUTO: 3.36 10E12/L (ref 3.8–5.2)
RBC # BLD AUTO: 3.39 10E12/L (ref 3.8–5.2)
RBC # BLD AUTO: 3.48 10E12/L (ref 3.8–5.2)
RBC # BLD AUTO: 3.49 10E12/L (ref 3.8–5.2)
RBC # BLD AUTO: 3.54 10E12/L (ref 3.8–5.2)
RBC # BLD AUTO: 3.57 10E12/L (ref 3.8–5.2)
RBC # BLD AUTO: 3.61 10E12/L (ref 3.8–5.2)
RBC # BLD AUTO: 3.71 10E12/L (ref 3.8–5.2)
RBC # BLD AUTO: 3.8 10E12/L (ref 3.8–5.2)
RBC #/AREA URNS AUTO: 138 /HPF (ref 0–2)
RBC #/AREA URNS AUTO: 5 /HPF (ref 0–2)
RBC #/AREA URNS AUTO: ABNORMAL /HPF
SAO2 % BLDV FROM PO2: 65 %
SODIUM SERPL-SCNC: 132 MMOL/L (ref 133–144)
SODIUM SERPL-SCNC: 132 MMOL/L (ref 133–144)
SODIUM SERPL-SCNC: 133 MMOL/L (ref 136–145)
SODIUM SERPL-SCNC: 134 MMOL/L (ref 133–144)
SODIUM SERPL-SCNC: 135 MMOL/L (ref 133–144)
SODIUM SERPL-SCNC: 135 MMOL/L (ref 133–144)
SODIUM SERPL-SCNC: 135 MMOL/L (ref 136–145)
SODIUM SERPL-SCNC: 135 MMOL/L (ref 136–145)
SODIUM SERPL-SCNC: 136 MMOL/L (ref 133–144)
SODIUM SERPL-SCNC: 136 MMOL/L (ref 136–145)
SODIUM SERPL-SCNC: 137 MMOL/L (ref 133–144)
SODIUM SERPL-SCNC: 137 MMOL/L (ref 136–145)
SODIUM SERPL-SCNC: 138 MMOL/L (ref 133–144)
SODIUM SERPL-SCNC: 138 MMOL/L (ref 133–144)
SODIUM SERPL-SCNC: 138 MMOL/L (ref 136–145)
SODIUM SERPL-SCNC: 139 MMOL/L (ref 133–144)
SODIUM SERPL-SCNC: 140 MMOL/L (ref 133–144)
SODIUM SERPL-SCNC: 140 MMOL/L (ref 136–145)
SODIUM UR-SCNC: 21 MMOL/L
SOURCE: ABNORMAL
SP GR UR STRIP: 1.01 (ref 1–1.03)
SP GR UR STRIP: 1.01 (ref 1–1.03)
SP GR UR STRIP: 1.02 (ref 1–1.03)
SPECIMEN SOURCE: ABNORMAL
SPECIMEN SOURCE: NORMAL
SPECIMEN SOURCE: NORMAL
SQUAMOUS #/AREA URNS AUTO: 3 /HPF (ref 0–1)
TROPONIN I SERPL-MCNC: 0.24 UG/L (ref 0–0.04)
TROPONIN I SERPL-MCNC: 0.24 UG/L (ref 0–0.04)
TROPONIN I SERPL-MCNC: 0.26 UG/L (ref 0–0.04)
TROPONIN I SERPL-MCNC: 0.93 UG/L (ref 0–0.04)
TROPONIN I SERPL-MCNC: 1.01 UG/L (ref 0–0.04)
TROPONIN I SERPL-MCNC: 1.57 UG/L (ref 0–0.04)
UROBILINOGEN UR STRIP-ACNC: 1 EU/DL (ref 0.2–1)
UROBILINOGEN UR STRIP-MCNC: 2 MG/DL (ref 0–2)
UROBILINOGEN UR STRIP-MCNC: NORMAL MG/DL (ref 0–2)
VANCOMYCIN SERPL-MCNC: 20.5 MG/L
WBC # BLD AUTO: 13.1 10E9/L (ref 4–11)
WBC # BLD AUTO: 13.4 10E9/L (ref 4–11)
WBC # BLD AUTO: 13.6 10E9/L (ref 4–11)
WBC # BLD AUTO: 15.6 10E9/L (ref 4–11)
WBC # BLD AUTO: 22.9 10E9/L (ref 4–11)
WBC # BLD AUTO: 24 10E9/L (ref 4–11)
WBC # BLD AUTO: 7.1 10E9/L (ref 4–11)
WBC # BLD AUTO: 7.4 10E9/L (ref 4–11)
WBC # BLD AUTO: 7.8 10E9/L (ref 4–11)
WBC # BLD AUTO: 8.1 10E9/L (ref 4–11)
WBC # BLD AUTO: 8.1 10E9/L (ref 4–11)
WBC # BLD AUTO: 8.6 10E9/L (ref 4–11)
WBC #/AREA URNS AUTO: 120 /HPF (ref 0–5)
WBC #/AREA URNS AUTO: >182 /HPF (ref 0–5)
WBC #/AREA URNS AUTO: ABNORMAL /HPF
WBC CLUMPS #/AREA URNS HPF: PRESENT /HPF
WBC CLUMPS #/AREA URNS HPF: PRESENT /HPF

## 2019-01-01 PROCEDURE — 80048 BASIC METABOLIC PNL TOTAL CA: CPT | Performed by: INTERNAL MEDICINE

## 2019-01-01 PROCEDURE — 36415 COLL VENOUS BLD VENIPUNCTURE: CPT | Performed by: NURSE PRACTITIONER

## 2019-01-01 PROCEDURE — 83880 ASSAY OF NATRIURETIC PEPTIDE: CPT | Performed by: PHYSICIAN ASSISTANT

## 2019-01-01 PROCEDURE — 40000986 XR CHEST 2 VW

## 2019-01-01 PROCEDURE — 87088 URINE BACTERIA CULTURE: CPT | Performed by: PHYSICIAN ASSISTANT

## 2019-01-01 PROCEDURE — 80048 BASIC METABOLIC PNL TOTAL CA: CPT | Performed by: PHYSICIAN ASSISTANT

## 2019-01-01 PROCEDURE — 99203 OFFICE O/P NEW LOW 30 MIN: CPT | Mod: 25 | Performed by: INTERNAL MEDICINE

## 2019-01-01 PROCEDURE — 25000131 ZZH RX MED GY IP 250 OP 636 PS 637: Performed by: INTERNAL MEDICINE

## 2019-01-01 PROCEDURE — 99232 SBSQ HOSP IP/OBS MODERATE 35: CPT | Performed by: INTERNAL MEDICINE

## 2019-01-01 PROCEDURE — 93010 ELECTROCARDIOGRAM REPORT: CPT | Performed by: INTERNAL MEDICINE

## 2019-01-01 PROCEDURE — 25000132 ZZH RX MED GY IP 250 OP 250 PS 637: Performed by: INTERNAL MEDICINE

## 2019-01-01 PROCEDURE — 93979 VASCULAR STUDY: CPT | Mod: TC

## 2019-01-01 PROCEDURE — 99214 OFFICE O/P EST MOD 30 MIN: CPT | Mod: 24 | Performed by: NURSE PRACTITIONER

## 2019-01-01 PROCEDURE — 99233 SBSQ HOSP IP/OBS HIGH 50: CPT | Performed by: PHYSICIAN ASSISTANT

## 2019-01-01 PROCEDURE — 93650 ICAR CATH ABLTJ AV NODE FUNC: CPT | Performed by: INTERNAL MEDICINE

## 2019-01-01 PROCEDURE — 25000128 H RX IP 250 OP 636: Performed by: INTERNAL MEDICINE

## 2019-01-01 PROCEDURE — 84484 ASSAY OF TROPONIN QUANT: CPT | Performed by: EMERGENCY MEDICINE

## 2019-01-01 PROCEDURE — 85049 AUTOMATED PLATELET COUNT: CPT | Performed by: PHYSICIAN ASSISTANT

## 2019-01-01 PROCEDURE — 81001 URINALYSIS AUTO W/SCOPE: CPT | Performed by: PHYSICIAN ASSISTANT

## 2019-01-01 PROCEDURE — 12000000 ZZH R&B MED SURG/OB

## 2019-01-01 PROCEDURE — 87186 SC STD MICRODIL/AGAR DIL: CPT | Performed by: EMERGENCY MEDICINE

## 2019-01-01 PROCEDURE — 25000128 H RX IP 250 OP 636

## 2019-01-01 PROCEDURE — 93306 TTE W/DOPPLER COMPLETE: CPT

## 2019-01-01 PROCEDURE — 96375 TX/PRO/DX INJ NEW DRUG ADDON: CPT

## 2019-01-01 PROCEDURE — 25000132 ZZH RX MED GY IP 250 OP 250 PS 637: Performed by: STUDENT IN AN ORGANIZED HEALTH CARE EDUCATION/TRAINING PROGRAM

## 2019-01-01 PROCEDURE — 27210795 ZZH PAD DEFIB QUICK CR4: Performed by: INTERNAL MEDICINE

## 2019-01-01 PROCEDURE — 93294 REM INTERROG EVL PM/LDLS PM: CPT | Mod: GW | Performed by: INTERNAL MEDICINE

## 2019-01-01 PROCEDURE — 25000132 ZZH RX MED GY IP 250 OP 250 PS 637: Performed by: HOSPITALIST

## 2019-01-01 PROCEDURE — G0179 MD RECERTIFICATION HHA PT: HCPCS | Performed by: INTERNAL MEDICINE

## 2019-01-01 PROCEDURE — 40000275 ZZH STATISTIC RCP TIME EA 10 MIN

## 2019-01-01 PROCEDURE — 85027 COMPLETE CBC AUTOMATED: CPT | Performed by: PHYSICIAN ASSISTANT

## 2019-01-01 PROCEDURE — 84484 ASSAY OF TROPONIN QUANT: CPT | Performed by: HOSPITALIST

## 2019-01-01 PROCEDURE — 97116 GAIT TRAINING THERAPY: CPT | Mod: GP

## 2019-01-01 PROCEDURE — 93279 PRGRMG DEV EVAL PM/LDLS PM: CPT | Performed by: INTERNAL MEDICINE

## 2019-01-01 PROCEDURE — 99221 1ST HOSP IP/OBS SF/LOW 40: CPT | Mod: AI | Performed by: INTERNAL MEDICINE

## 2019-01-01 PROCEDURE — 25800025 ZZH RX 258: Performed by: HOSPITALIST

## 2019-01-01 PROCEDURE — 85025 COMPLETE CBC W/AUTO DIFF WBC: CPT | Performed by: EMERGENCY MEDICINE

## 2019-01-01 PROCEDURE — 25000132 ZZH RX MED GY IP 250 OP 250 PS 637: Performed by: EMERGENCY MEDICINE

## 2019-01-01 PROCEDURE — 40000065 ZZH STATISTIC EKG NON-CHARGEABLE

## 2019-01-01 PROCEDURE — 21000001 ZZH R&B HEART CARE

## 2019-01-01 PROCEDURE — C1898 LEAD, PMKR, OTHER THAN TRANS: HCPCS | Performed by: INTERNAL MEDICINE

## 2019-01-01 PROCEDURE — 85027 COMPLETE CBC AUTOMATED: CPT | Performed by: INTERNAL MEDICINE

## 2019-01-01 PROCEDURE — 93227 XTRNL ECG REC<48 HR R&I: CPT | Performed by: INTERNAL MEDICINE

## 2019-01-01 PROCEDURE — 25000128 H RX IP 250 OP 636: Performed by: HOSPITALIST

## 2019-01-01 PROCEDURE — 25800030 ZZH RX IP 258 OP 636: Performed by: INTERNAL MEDICINE

## 2019-01-01 PROCEDURE — 93225 XTRNL ECG REC<48 HRS REC: CPT

## 2019-01-01 PROCEDURE — 99153 MOD SED SAME PHYS/QHP EA: CPT | Performed by: INTERNAL MEDICINE

## 2019-01-01 PROCEDURE — 83880 ASSAY OF NATRIURETIC PEPTIDE: CPT | Performed by: EMERGENCY MEDICINE

## 2019-01-01 PROCEDURE — C1888 ENDOVAS NON-CARDIAC ABL CATH: HCPCS | Performed by: INTERNAL MEDICINE

## 2019-01-01 PROCEDURE — 97530 THERAPEUTIC ACTIVITIES: CPT | Mod: GP

## 2019-01-01 PROCEDURE — 85027 COMPLETE CBC AUTOMATED: CPT | Performed by: HOSPITALIST

## 2019-01-01 PROCEDURE — 36415 COLL VENOUS BLD VENIPUNCTURE: CPT | Performed by: INTERNAL MEDICINE

## 2019-01-01 PROCEDURE — 93306 TTE W/DOPPLER COMPLETE: CPT | Mod: 26 | Performed by: INTERNAL MEDICINE

## 2019-01-01 PROCEDURE — 25000131 ZZH RX MED GY IP 250 OP 636 PS 637: Performed by: HOSPITALIST

## 2019-01-01 PROCEDURE — G0463 HOSPITAL OUTPT CLINIC VISIT: HCPCS

## 2019-01-01 PROCEDURE — 93005 ELECTROCARDIOGRAM TRACING: CPT

## 2019-01-01 PROCEDURE — 40000133 ZZH STATISTIC OT WARD VISIT

## 2019-01-01 PROCEDURE — 25800030 ZZH RX IP 258 OP 636: Performed by: HOSPITALIST

## 2019-01-01 PROCEDURE — 85048 AUTOMATED LEUKOCYTE COUNT: CPT | Performed by: PHYSICIAN ASSISTANT

## 2019-01-01 PROCEDURE — 84132 ASSAY OF SERUM POTASSIUM: CPT | Performed by: HOSPITALIST

## 2019-01-01 PROCEDURE — 80202 ASSAY OF VANCOMYCIN: CPT

## 2019-01-01 PROCEDURE — 40000936 ZZH STATISTIC OUTPATIENT (NON-OBS) NIGHT

## 2019-01-01 PROCEDURE — 84300 ASSAY OF URINE SODIUM: CPT | Performed by: HOSPITALIST

## 2019-01-01 PROCEDURE — 40000935 ZZH STATISTIC OUTPATIENT (NON-OBS) EVE

## 2019-01-01 PROCEDURE — 99285 EMERGENCY DEPT VISIT HI MDM: CPT | Mod: 25

## 2019-01-01 PROCEDURE — 83605 ASSAY OF LACTIC ACID: CPT | Performed by: HOSPITALIST

## 2019-01-01 PROCEDURE — 40000893 ZZH STATISTIC PT IP EVAL DEFER

## 2019-01-01 PROCEDURE — 97530 THERAPEUTIC ACTIVITIES: CPT | Mod: GO

## 2019-01-01 PROCEDURE — 87040 BLOOD CULTURE FOR BACTERIA: CPT | Performed by: INTERNAL MEDICINE

## 2019-01-01 PROCEDURE — 99310 SBSQ NF CARE HIGH MDM 45: CPT | Performed by: NURSE PRACTITIONER

## 2019-01-01 PROCEDURE — 80048 BASIC METABOLIC PNL TOTAL CA: CPT | Performed by: STUDENT IN AN ORGANIZED HEALTH CARE EDUCATION/TRAINING PROGRAM

## 2019-01-01 PROCEDURE — C1887 CATHETER, GUIDING: HCPCS | Performed by: INTERNAL MEDICINE

## 2019-01-01 PROCEDURE — 99152 MOD SED SAME PHYS/QHP 5/>YRS: CPT | Performed by: INTERNAL MEDICINE

## 2019-01-01 PROCEDURE — 85610 PROTHROMBIN TIME: CPT | Performed by: HOSPITALIST

## 2019-01-01 PROCEDURE — 27210995 ZZH RX 272: Performed by: INTERNAL MEDICINE

## 2019-01-01 PROCEDURE — 00000146 ZZHCL STATISTIC GLUCOSE BY METER IP

## 2019-01-01 PROCEDURE — 85520 HEPARIN ASSAY: CPT | Performed by: INTERNAL MEDICINE

## 2019-01-01 PROCEDURE — 25000128 H RX IP 250 OP 636: Performed by: EMERGENCY MEDICINE

## 2019-01-01 PROCEDURE — 87086 URINE CULTURE/COLONY COUNT: CPT | Performed by: PHYSICIAN ASSISTANT

## 2019-01-01 PROCEDURE — 93296 REM INTERROG EVL PM/IDS: CPT | Performed by: INTERNAL MEDICINE

## 2019-01-01 PROCEDURE — 99214 OFFICE O/P EST MOD 30 MIN: CPT | Performed by: PHYSICIAN ASSISTANT

## 2019-01-01 PROCEDURE — 36415 COLL VENOUS BLD VENIPUNCTURE: CPT | Performed by: PHYSICIAN ASSISTANT

## 2019-01-01 PROCEDURE — 25800030 ZZH RX IP 258 OP 636: Performed by: EMERGENCY MEDICINE

## 2019-01-01 PROCEDURE — 36415 COLL VENOUS BLD VENIPUNCTURE: CPT | Performed by: STUDENT IN AN ORGANIZED HEALTH CARE EDUCATION/TRAINING PROGRAM

## 2019-01-01 PROCEDURE — 99207 ZZC CDG-CODE INCORRECT PER BILLING BASED ON TIME: CPT | Performed by: INTERNAL MEDICINE

## 2019-01-01 PROCEDURE — 82248 BILIRUBIN DIRECT: CPT | Performed by: HOSPITALIST

## 2019-01-01 PROCEDURE — 99239 HOSP IP/OBS DSCHRG MGMT >30: CPT | Performed by: STUDENT IN AN ORGANIZED HEALTH CARE EDUCATION/TRAINING PROGRAM

## 2019-01-01 PROCEDURE — 99214 OFFICE O/P EST MOD 30 MIN: CPT | Performed by: INTERNAL MEDICINE

## 2019-01-01 PROCEDURE — 76700 US EXAM ABDOM COMPLETE: CPT

## 2019-01-01 PROCEDURE — 36415 COLL VENOUS BLD VENIPUNCTURE: CPT | Performed by: HOSPITALIST

## 2019-01-01 PROCEDURE — 25000132 ZZH RX MED GY IP 250 OP 250 PS 637: Performed by: PHYSICIAN ASSISTANT

## 2019-01-01 PROCEDURE — 25000125 ZZHC RX 250: Performed by: INTERNAL MEDICINE

## 2019-01-01 PROCEDURE — 71250 CT THORAX DX C-: CPT

## 2019-01-01 PROCEDURE — 99223 1ST HOSP IP/OBS HIGH 75: CPT | Mod: 25 | Performed by: INTERNAL MEDICINE

## 2019-01-01 PROCEDURE — 96374 THER/PROPH/DIAG INJ IV PUSH: CPT

## 2019-01-01 PROCEDURE — 71046 X-RAY EXAM CHEST 2 VIEWS: CPT

## 2019-01-01 PROCEDURE — 99217 ZZC OBSERVATION CARE DISCHARGE: CPT | Mod: 24 | Performed by: INTERNAL MEDICINE

## 2019-01-01 PROCEDURE — 87040 BLOOD CULTURE FOR BACTERIA: CPT | Performed by: EMERGENCY MEDICINE

## 2019-01-01 PROCEDURE — 99207 ZZC CDG-MDM COMPONENT: MEETS LOW - DOWN CODED: CPT | Performed by: INTERNAL MEDICINE

## 2019-01-01 PROCEDURE — 74176 CT ABD & PELVIS W/O CONTRAST: CPT

## 2019-01-01 PROCEDURE — 93279 PRGRMG DEV EVAL PM/LDLS PM: CPT | Mod: 26 | Performed by: INTERNAL MEDICINE

## 2019-01-01 PROCEDURE — 83605 ASSAY OF LACTIC ACID: CPT

## 2019-01-01 PROCEDURE — 93296 REM INTERROG EVL PM/IDS: CPT | Mod: GW | Performed by: INTERNAL MEDICINE

## 2019-01-01 PROCEDURE — 99305 1ST NF CARE MODERATE MDM 35: CPT | Performed by: INTERNAL MEDICINE

## 2019-01-01 PROCEDURE — 76770 US EXAM ABDO BACK WALL COMP: CPT

## 2019-01-01 PROCEDURE — 99232 SBSQ HOSP IP/OBS MODERATE 35: CPT | Performed by: STUDENT IN AN ORGANIZED HEALTH CARE EDUCATION/TRAINING PROGRAM

## 2019-01-01 PROCEDURE — 85027 COMPLETE CBC AUTOMATED: CPT | Performed by: STUDENT IN AN ORGANIZED HEALTH CARE EDUCATION/TRAINING PROGRAM

## 2019-01-01 PROCEDURE — 83605 ASSAY OF LACTIC ACID: CPT | Performed by: PHYSICIAN ASSISTANT

## 2019-01-01 PROCEDURE — 36415 COLL VENOUS BLD VENIPUNCTURE: CPT

## 2019-01-01 PROCEDURE — 81001 URINALYSIS AUTO W/SCOPE: CPT | Performed by: INTERNAL MEDICINE

## 2019-01-01 PROCEDURE — 85018 HEMOGLOBIN: CPT | Performed by: PHYSICIAN ASSISTANT

## 2019-01-01 PROCEDURE — 97161 PT EVAL LOW COMPLEX 20 MIN: CPT | Mod: GP

## 2019-01-01 PROCEDURE — 99215 OFFICE O/P EST HI 40 MIN: CPT | Performed by: PHYSICIAN ASSISTANT

## 2019-01-01 PROCEDURE — 99207 ZZC CDG-HISTORY COMP: MEETS EXP. PROBLEM FOCUSED-DOWN CODED LACK OF ROS: CPT | Performed by: INTERNAL MEDICINE

## 2019-01-01 PROCEDURE — 96361 HYDRATE IV INFUSION ADD-ON: CPT

## 2019-01-01 PROCEDURE — 80048 BASIC METABOLIC PNL TOTAL CA: CPT | Performed by: NURSE PRACTITIONER

## 2019-01-01 PROCEDURE — 83880 ASSAY OF NATRIURETIC PEPTIDE: CPT | Performed by: STUDENT IN AN ORGANIZED HEALTH CARE EDUCATION/TRAINING PROGRAM

## 2019-01-01 PROCEDURE — 74018 RADEX ABDOMEN 1 VIEW: CPT

## 2019-01-01 PROCEDURE — 87077 CULTURE AEROBIC IDENTIFY: CPT | Performed by: EMERGENCY MEDICINE

## 2019-01-01 PROCEDURE — C1786 PMKR, SINGLE, RATE-RESP: HCPCS | Performed by: INTERNAL MEDICINE

## 2019-01-01 PROCEDURE — 96365 THER/PROPH/DIAG IV INF INIT: CPT

## 2019-01-01 PROCEDURE — 99223 1ST HOSP IP/OBS HIGH 75: CPT | Mod: AI | Performed by: HOSPITALIST

## 2019-01-01 PROCEDURE — G0180 MD CERTIFICATION HHA PATIENT: HCPCS | Performed by: INTERNAL MEDICINE

## 2019-01-01 PROCEDURE — 99207 ZZC CDG-MDM COMPONENT: MEETS MODERATE - UP CODED: CPT | Performed by: PHYSICIAN ASSISTANT

## 2019-01-01 PROCEDURE — 80053 COMPREHEN METABOLIC PANEL: CPT | Performed by: EMERGENCY MEDICINE

## 2019-01-01 PROCEDURE — 99291 CRITICAL CARE FIRST HOUR: CPT | Performed by: INTERNAL MEDICINE

## 2019-01-01 PROCEDURE — 97165 OT EVAL LOW COMPLEX 30 MIN: CPT | Mod: GO

## 2019-01-01 PROCEDURE — 82803 BLOOD GASES ANY COMBINATION: CPT

## 2019-01-01 PROCEDURE — 99239 HOSP IP/OBS DSCHRG MGMT >30: CPT | Performed by: PHYSICIAN ASSISTANT

## 2019-01-01 PROCEDURE — 80048 BASIC METABOLIC PNL TOTAL CA: CPT | Performed by: HOSPITALIST

## 2019-01-01 PROCEDURE — 87800 DETECT AGNT MULT DNA DIREC: CPT | Performed by: EMERGENCY MEDICINE

## 2019-01-01 PROCEDURE — 99232 SBSQ HOSP IP/OBS MODERATE 35: CPT | Performed by: PHYSICIAN ASSISTANT

## 2019-01-01 PROCEDURE — 36415 COLL VENOUS BLD VENIPUNCTURE: CPT | Performed by: EMERGENCY MEDICINE

## 2019-01-01 PROCEDURE — 33207 INSERT HEART PM VENTRICULAR: CPT | Mod: KX | Performed by: INTERNAL MEDICINE

## 2019-01-01 PROCEDURE — C1894 INTRO/SHEATH, NON-LASER: HCPCS | Performed by: INTERNAL MEDICINE

## 2019-01-01 PROCEDURE — 82565 ASSAY OF CREATININE: CPT

## 2019-01-01 PROCEDURE — 83880 ASSAY OF NATRIURETIC PEPTIDE: CPT | Performed by: INTERNAL MEDICINE

## 2019-01-01 PROCEDURE — 81001 URINALYSIS AUTO W/SCOPE: CPT | Performed by: EMERGENCY MEDICINE

## 2019-01-01 PROCEDURE — 82570 ASSAY OF URINE CREATININE: CPT | Performed by: HOSPITALIST

## 2019-01-01 PROCEDURE — 25000125 ZZHC RX 250: Performed by: STUDENT IN AN ORGANIZED HEALTH CARE EDUCATION/TRAINING PROGRAM

## 2019-01-01 PROCEDURE — 84145 PROCALCITONIN (PCT): CPT | Performed by: HOSPITALIST

## 2019-01-01 PROCEDURE — 93294 REM INTERROG EVL PM/LDLS PM: CPT | Performed by: INTERNAL MEDICINE

## 2019-01-01 PROCEDURE — 85610 PROTHROMBIN TIME: CPT | Performed by: EMERGENCY MEDICINE

## 2019-01-01 PROCEDURE — 40000852 ZZH STATISTIC HEART CATH LAB OR EP LAB

## 2019-01-01 PROCEDURE — 84484 ASSAY OF TROPONIN QUANT: CPT | Performed by: INTERNAL MEDICINE

## 2019-01-01 PROCEDURE — 99316 NF DSCHRG MGMT 30 MIN+: CPT | Performed by: NURSE PRACTITIONER

## 2019-01-01 PROCEDURE — 87186 SC STD MICRODIL/AGAR DIL: CPT | Performed by: PHYSICIAN ASSISTANT

## 2019-01-01 PROCEDURE — 80076 HEPATIC FUNCTION PANEL: CPT | Performed by: HOSPITALIST

## 2019-01-01 PROCEDURE — 97110 THERAPEUTIC EXERCISES: CPT | Mod: GP

## 2019-01-01 PROCEDURE — 99214 OFFICE O/P EST MOD 30 MIN: CPT | Mod: 24 | Performed by: PHYSICIAN ASSISTANT

## 2019-01-01 PROCEDURE — 40000934 ZZH STATISTIC OUTPATIENT (NON-OBS) DAY

## 2019-01-01 PROCEDURE — 83880 ASSAY OF NATRIURETIC PEPTIDE: CPT | Performed by: NURSE PRACTITIONER

## 2019-01-01 PROCEDURE — 27210794 ZZH OR GENERAL SUPPLY STERILE: Performed by: INTERNAL MEDICINE

## 2019-01-01 PROCEDURE — 97535 SELF CARE MNGMENT TRAINING: CPT | Mod: GO

## 2019-01-01 PROCEDURE — 97530 THERAPEUTIC ACTIVITIES: CPT | Mod: GP | Performed by: PHYSICAL THERAPIST

## 2019-01-01 PROCEDURE — 25000128 H RX IP 250 OP 636: Performed by: STUDENT IN AN ORGANIZED HEALTH CARE EDUCATION/TRAINING PROGRAM

## 2019-01-01 DEVICE — PACEMAKER ACCOLADE SR MRI: Type: IMPLANTABLE DEVICE | Status: FUNCTIONAL

## 2019-01-01 DEVICE — IMPLANTABLE DEVICE: Type: IMPLANTABLE DEVICE | Status: FUNCTIONAL

## 2019-01-01 RX ORDER — FUROSEMIDE 40 MG
40 TABLET ORAL DAILY
Qty: 90 TABLET | Refills: 3 | Status: SHIPPED | OUTPATIENT
Start: 2019-01-01 | End: 2019-01-01

## 2019-01-01 RX ORDER — FUROSEMIDE 10 MG/ML
40 INJECTION INTRAMUSCULAR; INTRAVENOUS ONCE
Status: COMPLETED | OUTPATIENT
Start: 2019-01-01 | End: 2019-01-01

## 2019-01-01 RX ORDER — CEFUROXIME AXETIL 250 MG/1
250 TABLET ORAL DAILY
Status: DISCONTINUED | OUTPATIENT
Start: 2019-01-01 | End: 2019-01-01 | Stop reason: HOSPADM

## 2019-01-01 RX ORDER — SODIUM CHLORIDE 450 MG/100ML
INJECTION, SOLUTION INTRAVENOUS CONTINUOUS
Status: DISCONTINUED | OUTPATIENT
Start: 2019-01-01 | End: 2019-01-01 | Stop reason: HOSPADM

## 2019-01-01 RX ORDER — CEFUROXIME AXETIL 250 MG/1
250 TABLET ORAL DAILY
Qty: 4 TABLET | Refills: 0 | DISCHARGE
Start: 2019-01-01 | End: 2019-01-01

## 2019-01-01 RX ORDER — BUPIVACAINE HYDROCHLORIDE 2.5 MG/ML
10-30 INJECTION, SOLUTION EPIDURAL; INFILTRATION; INTRACAUDAL
Status: DISCONTINUED | OUTPATIENT
Start: 2019-01-01 | End: 2019-01-01 | Stop reason: HOSPADM

## 2019-01-01 RX ORDER — ACETAMINOPHEN 325 MG/1
650 TABLET ORAL EVERY 6 HOURS PRN
Status: DISCONTINUED | OUTPATIENT
Start: 2019-01-01 | End: 2019-01-01 | Stop reason: HOSPADM

## 2019-01-01 RX ORDER — LIDOCAINE 40 MG/G
CREAM TOPICAL
Status: DISCONTINUED | OUTPATIENT
Start: 2019-01-01 | End: 2019-01-01 | Stop reason: HOSPADM

## 2019-01-01 RX ORDER — OXYCODONE AND ACETAMINOPHEN 5; 325 MG/1; MG/1
0.5 TABLET ORAL DAILY PRN
Status: DISCONTINUED | OUTPATIENT
Start: 2019-01-01 | End: 2019-01-01 | Stop reason: DRUGHIGH

## 2019-01-01 RX ORDER — METOPROLOL SUCCINATE 25 MG/1
25 TABLET, EXTENDED RELEASE ORAL DAILY
Status: DISCONTINUED | OUTPATIENT
Start: 2019-01-01 | End: 2019-01-01 | Stop reason: HOSPADM

## 2019-01-01 RX ORDER — FUROSEMIDE 10 MG/ML
40 INJECTION INTRAMUSCULAR; INTRAVENOUS
Status: DISCONTINUED | OUTPATIENT
Start: 2019-01-01 | End: 2019-01-01

## 2019-01-01 RX ORDER — ACETAMINOPHEN 325 MG/1
650 TABLET ORAL EVERY 4 HOURS PRN
Status: DISCONTINUED | OUTPATIENT
Start: 2019-01-01 | End: 2019-01-01 | Stop reason: HOSPADM

## 2019-01-01 RX ORDER — ESCITALOPRAM OXALATE 5 MG/1
5 TABLET ORAL DAILY
Status: DISCONTINUED | OUTPATIENT
Start: 2019-01-01 | End: 2019-01-01 | Stop reason: HOSPADM

## 2019-01-01 RX ORDER — MULTIVIT-MIN/IRON/FOLIC ACID/K 18-600-40
1000 CAPSULE ORAL DAILY
COMMUNITY

## 2019-01-01 RX ORDER — SODIUM CHLORIDE 450 MG/100ML
INJECTION, SOLUTION INTRAVENOUS CONTINUOUS
Status: CANCELLED | OUTPATIENT
Start: 2019-01-01

## 2019-01-01 RX ORDER — TORSEMIDE 20 MG/1
20 TABLET ORAL
Status: DISCONTINUED | OUTPATIENT
Start: 2019-01-01 | End: 2019-01-01

## 2019-01-01 RX ORDER — CEFAZOLIN SODIUM 2 G/100ML
2 INJECTION, SOLUTION INTRAVENOUS
Status: DISCONTINUED | OUTPATIENT
Start: 2019-01-01 | End: 2019-01-01 | Stop reason: HOSPADM

## 2019-01-01 RX ORDER — METOPROLOL SUCCINATE 50 MG/1
50 TABLET, EXTENDED RELEASE ORAL DAILY
Qty: 90 TABLET | Refills: 3 | Status: SHIPPED | OUTPATIENT
Start: 2019-01-01 | End: 2019-01-01

## 2019-01-01 RX ORDER — KETOROLAC TROMETHAMINE 15 MG/ML
15 INJECTION, SOLUTION INTRAMUSCULAR; INTRAVENOUS
Status: DISCONTINUED | OUTPATIENT
Start: 2019-01-01 | End: 2019-01-01 | Stop reason: HOSPADM

## 2019-01-01 RX ORDER — CIPROFLOXACIN 500 MG/1
500 TABLET, FILM COATED ORAL
Status: DISCONTINUED | OUTPATIENT
Start: 2019-01-01 | End: 2019-01-01 | Stop reason: HOSPADM

## 2019-01-01 RX ORDER — BISACODYL 10 MG
10 SUPPOSITORY, RECTAL RECTAL DAILY PRN
Status: DISCONTINUED | OUTPATIENT
Start: 2019-01-01 | End: 2019-01-01 | Stop reason: HOSPADM

## 2019-01-01 RX ORDER — AMLODIPINE BESYLATE 5 MG/1
5 TABLET ORAL DAILY
Qty: 90 TABLET | Refills: 3 | Status: SHIPPED | OUTPATIENT
Start: 2019-01-01 | End: 2019-01-01

## 2019-01-01 RX ORDER — CEFTRIAXONE 1 G/1
1 INJECTION, POWDER, FOR SOLUTION INTRAMUSCULAR; INTRAVENOUS ONCE
Status: COMPLETED | OUTPATIENT
Start: 2019-01-01 | End: 2019-01-01

## 2019-01-01 RX ORDER — ASPIRIN 81 MG/1
324 TABLET, CHEWABLE ORAL ONCE
Status: COMPLETED | OUTPATIENT
Start: 2019-01-01 | End: 2019-01-01

## 2019-01-01 RX ORDER — MEROPENEM 500 MG/1
500 INJECTION, POWDER, FOR SOLUTION INTRAVENOUS EVERY 12 HOURS
Status: DISCONTINUED | OUTPATIENT
Start: 2019-01-01 | End: 2019-01-01

## 2019-01-01 RX ORDER — ACETAMINOPHEN 325 MG/1
650 TABLET ORAL EVERY 4 HOURS PRN
DISCHARGE
Start: 2019-01-01 | End: 2019-01-01 | Stop reason: DRUGHIGH

## 2019-01-01 RX ORDER — METOPROLOL SUCCINATE 50 MG/1
50 TABLET, EXTENDED RELEASE ORAL DAILY
Qty: 90 TABLET | Refills: 3 | Status: ON HOLD | OUTPATIENT
Start: 2019-01-01 | End: 2019-01-01

## 2019-01-01 RX ORDER — NITROGLYCERIN 0.4 MG/1
0.4 TABLET SUBLINGUAL EVERY 5 MIN PRN
Status: DISCONTINUED | OUTPATIENT
Start: 2019-01-01 | End: 2019-01-01 | Stop reason: HOSPADM

## 2019-01-01 RX ORDER — PANTOPRAZOLE SODIUM 40 MG/1
40 TABLET, DELAYED RELEASE ORAL
Status: DISCONTINUED | OUTPATIENT
Start: 2019-01-01 | End: 2019-01-01 | Stop reason: HOSPADM

## 2019-01-01 RX ORDER — METOPROLOL SUCCINATE 50 MG/1
50 TABLET, EXTENDED RELEASE ORAL DAILY
Status: DISCONTINUED | OUTPATIENT
Start: 2019-01-01 | End: 2019-01-01 | Stop reason: HOSPADM

## 2019-01-01 RX ORDER — POLYETHYLENE GLYCOL 3350 17 G/17G
17 POWDER, FOR SOLUTION ORAL DAILY PRN
Status: DISCONTINUED | OUTPATIENT
Start: 2019-01-01 | End: 2019-01-01 | Stop reason: HOSPADM

## 2019-01-01 RX ORDER — CIPROFLOXACIN 500 MG/1
500 TABLET, FILM COATED ORAL EVERY 24 HOURS
Qty: 5 TABLET | Refills: 0 | Status: SHIPPED | OUTPATIENT
Start: 2019-01-01 | End: 2019-01-01

## 2019-01-01 RX ORDER — ONDANSETRON 2 MG/ML
4 INJECTION INTRAMUSCULAR; INTRAVENOUS EVERY 4 HOURS PRN
Status: DISCONTINUED | OUTPATIENT
Start: 2019-01-01 | End: 2019-01-01 | Stop reason: HOSPADM

## 2019-01-01 RX ORDER — SIMVASTATIN 20 MG
TABLET ORAL
Qty: 45 TABLET | Refills: 2 | Status: SHIPPED | OUTPATIENT
Start: 2019-01-01 | End: 2019-01-01

## 2019-01-01 RX ORDER — MEROPENEM 500 MG/1
500 INJECTION, POWDER, FOR SOLUTION INTRAVENOUS EVERY 24 HOURS
Status: DISCONTINUED | OUTPATIENT
Start: 2019-01-01 | End: 2019-01-01 | Stop reason: DRUGHIGH

## 2019-01-01 RX ORDER — ACETAMINOPHEN 500 MG
1000 TABLET ORAL ONCE
Status: DISCONTINUED | OUTPATIENT
Start: 2019-01-01 | End: 2019-01-01 | Stop reason: HOSPADM

## 2019-01-01 RX ORDER — OXYCODONE HCL 5 MG/5 ML
2.5-5 SOLUTION, ORAL ORAL EVERY 4 HOURS PRN
Status: DISCONTINUED | OUTPATIENT
Start: 2019-01-01 | End: 2019-01-01 | Stop reason: HOSPADM

## 2019-01-01 RX ORDER — CEFTRIAXONE 1 G/1
1 INJECTION, POWDER, FOR SOLUTION INTRAMUSCULAR; INTRAVENOUS EVERY 24 HOURS
Status: DISCONTINUED | OUTPATIENT
Start: 2019-01-01 | End: 2019-01-01

## 2019-01-01 RX ORDER — FUROSEMIDE 20 MG
20 TABLET ORAL DAILY
Status: DISCONTINUED | OUTPATIENT
Start: 2019-01-01 | End: 2019-01-01 | Stop reason: HOSPADM

## 2019-01-01 RX ORDER — TORSEMIDE 20 MG/1
20 TABLET ORAL
Qty: 60 TABLET | Refills: 1 | Status: SHIPPED | OUTPATIENT
Start: 2019-01-01 | End: 2019-01-01

## 2019-01-01 RX ORDER — AMOXICILLIN 250 MG
1 CAPSULE ORAL 2 TIMES DAILY PRN
Status: DISCONTINUED | OUTPATIENT
Start: 2019-01-01 | End: 2019-01-01 | Stop reason: HOSPADM

## 2019-01-01 RX ORDER — ONDANSETRON 4 MG/1
4 TABLET, ORALLY DISINTEGRATING ORAL EVERY 6 HOURS PRN
Status: DISCONTINUED | OUTPATIENT
Start: 2019-01-01 | End: 2019-01-01 | Stop reason: HOSPADM

## 2019-01-01 RX ORDER — LIDOCAINE 40 MG/G
CREAM TOPICAL
Status: CANCELLED | OUTPATIENT
Start: 2019-01-01

## 2019-01-01 RX ORDER — IBUTILIDE FUMARATE 1 MG/10ML
0.01 INJECTION, SOLUTION INTRAVENOUS
Status: DISCONTINUED | OUTPATIENT
Start: 2019-01-01 | End: 2019-01-01 | Stop reason: HOSPADM

## 2019-01-01 RX ORDER — OXYCODONE AND ACETAMINOPHEN 5; 325 MG/1; MG/1
TABLET ORAL
Qty: 30 TABLET | Refills: 0 | Status: SHIPPED | OUTPATIENT
Start: 2019-01-01 | End: 2019-01-01

## 2019-01-01 RX ORDER — OXYCODONE AND ACETAMINOPHEN 5; 325 MG/1; MG/1
TABLET ORAL
Qty: 3 TABLET | Refills: 0 | Status: ON HOLD | OUTPATIENT
Start: 2019-01-01 | End: 2019-01-01

## 2019-01-01 RX ORDER — TORSEMIDE 10 MG/1
20 TABLET ORAL DAILY
Status: CANCELLED | COMMUNITY
Start: 2019-01-01

## 2019-01-01 RX ORDER — MULTIVITAMIN,THERAPEUTIC
1 TABLET ORAL DAILY
Status: DISCONTINUED | OUTPATIENT
Start: 2019-01-01 | End: 2019-01-01 | Stop reason: HOSPADM

## 2019-01-01 RX ORDER — LISINOPRIL 5 MG/1
5 TABLET ORAL DAILY
Qty: 30 TABLET | Refills: 0 | DISCHARGE
Start: 2019-01-01 | End: 2019-01-01

## 2019-01-01 RX ORDER — DEXTROSE MONOHYDRATE 25 G/50ML
50 INJECTION, SOLUTION INTRAVENOUS ONCE
Status: COMPLETED | OUTPATIENT
Start: 2019-01-01 | End: 2019-01-01

## 2019-01-01 RX ORDER — ATROPINE SULFATE 0.1 MG/ML
.5-1 INJECTION INTRAVENOUS
Status: DISCONTINUED | OUTPATIENT
Start: 2019-01-01 | End: 2019-01-01 | Stop reason: HOSPADM

## 2019-01-01 RX ORDER — PROTAMINE SULFATE 10 MG/ML
5-40 INJECTION, SOLUTION INTRAVENOUS EVERY 10 MIN PRN
Status: DISCONTINUED | OUTPATIENT
Start: 2019-01-01 | End: 2019-01-01 | Stop reason: HOSPADM

## 2019-01-01 RX ORDER — LORAZEPAM 2 MG/ML
.5-2 INJECTION INTRAMUSCULAR EVERY 10 MIN PRN
Status: DISCONTINUED | OUTPATIENT
Start: 2019-01-01 | End: 2019-01-01 | Stop reason: HOSPADM

## 2019-01-01 RX ORDER — LIDOCAINE HYDROCHLORIDE 10 MG/ML
10-30 INJECTION, SOLUTION EPIDURAL; INFILTRATION; INTRACAUDAL; PERINEURAL
Status: DISCONTINUED | OUTPATIENT
Start: 2019-01-01 | End: 2019-01-01 | Stop reason: HOSPADM

## 2019-01-01 RX ORDER — DEXTROSE MONOHYDRATE 25 G/50ML
25-50 INJECTION, SOLUTION INTRAVENOUS
Status: DISCONTINUED | OUTPATIENT
Start: 2019-01-01 | End: 2019-01-01 | Stop reason: HOSPADM

## 2019-01-01 RX ORDER — ACETAMINOPHEN 325 MG/1
650 TABLET ORAL EVERY 4 HOURS PRN
Status: DISCONTINUED | OUTPATIENT
Start: 2019-01-01 | End: 2019-01-01

## 2019-01-01 RX ORDER — ONDANSETRON 2 MG/ML
4 INJECTION INTRAMUSCULAR; INTRAVENOUS EVERY 30 MIN PRN
Status: DISCONTINUED | OUTPATIENT
Start: 2019-01-01 | End: 2019-01-01

## 2019-01-01 RX ORDER — MORPHINE SULFATE 2 MG/ML
1-2 INJECTION, SOLUTION INTRAMUSCULAR; INTRAVENOUS EVERY 5 MIN PRN
Status: DISCONTINUED | OUTPATIENT
Start: 2019-01-01 | End: 2019-01-01 | Stop reason: HOSPADM

## 2019-01-01 RX ORDER — OXYCODONE AND ACETAMINOPHEN 5; 325 MG/1; MG/1
TABLET ORAL
Qty: 30 TABLET | Refills: 0 | Status: ON HOLD | OUTPATIENT
Start: 2019-01-01 | End: 2019-01-01

## 2019-01-01 RX ORDER — SODIUM CHLORIDE 9 MG/ML
INJECTION, SOLUTION INTRAVENOUS CONTINUOUS
Status: DISCONTINUED | OUTPATIENT
Start: 2019-01-01 | End: 2019-01-01

## 2019-01-01 RX ORDER — AMOXICILLIN 250 MG
2 CAPSULE ORAL 2 TIMES DAILY PRN
Status: DISCONTINUED | OUTPATIENT
Start: 2019-01-01 | End: 2019-01-01 | Stop reason: HOSPADM

## 2019-01-01 RX ORDER — HEPARIN SODIUM 1000 [USP'U]/ML
1000-10000 INJECTION, SOLUTION INTRAVENOUS; SUBCUTANEOUS EVERY 5 MIN PRN
Status: DISCONTINUED | OUTPATIENT
Start: 2019-01-01 | End: 2019-01-01 | Stop reason: HOSPADM

## 2019-01-01 RX ORDER — NICOTINE POLACRILEX 4 MG
15-30 LOZENGE BUCCAL
Status: DISCONTINUED | OUTPATIENT
Start: 2019-01-01 | End: 2019-01-01 | Stop reason: HOSPADM

## 2019-01-01 RX ORDER — ONDANSETRON 2 MG/ML
4 INJECTION INTRAMUSCULAR; INTRAVENOUS EVERY 6 HOURS PRN
Status: DISCONTINUED | OUTPATIENT
Start: 2019-01-01 | End: 2019-01-01 | Stop reason: HOSPADM

## 2019-01-01 RX ORDER — ASPIRIN 81 MG/1
81 TABLET ORAL DAILY
Status: DISCONTINUED | OUTPATIENT
Start: 2019-01-01 | End: 2019-01-01 | Stop reason: HOSPADM

## 2019-01-01 RX ORDER — MORPHINE SULFATE 10 MG/5ML
2.5 SOLUTION ORAL EVERY 4 HOURS PRN
Status: DISCONTINUED | OUTPATIENT
Start: 2019-01-01 | End: 2019-01-01 | Stop reason: HOSPADM

## 2019-01-01 RX ORDER — MEROPENEM 1 G/1
1 INJECTION, POWDER, FOR SOLUTION INTRAVENOUS EVERY 24 HOURS
Status: DISCONTINUED | OUTPATIENT
Start: 2019-01-01 | End: 2019-01-01

## 2019-01-01 RX ORDER — ESCITALOPRAM OXALATE 5 MG/1
5 TABLET ORAL DAILY
Qty: 30 TABLET | Refills: 5 | Status: SHIPPED | OUTPATIENT
Start: 2019-01-01

## 2019-01-01 RX ORDER — FLUMAZENIL 0.1 MG/ML
0.2 INJECTION, SOLUTION INTRAVENOUS
Status: DISCONTINUED | OUTPATIENT
Start: 2019-01-01 | End: 2019-01-01 | Stop reason: HOSPADM

## 2019-01-01 RX ORDER — DOCUSATE SODIUM 100 MG/1
100 CAPSULE, LIQUID FILLED ORAL AT BEDTIME
COMMUNITY

## 2019-01-01 RX ORDER — ACETAMINOPHEN 650 MG/1
650 SUPPOSITORY RECTAL EVERY 4 HOURS PRN
Status: DISCONTINUED | OUTPATIENT
Start: 2019-01-01 | End: 2019-01-01 | Stop reason: HOSPADM

## 2019-01-01 RX ORDER — MULTIPLE VITAMINS W/ MINERALS TAB 9MG-400MCG
1 TAB ORAL DAILY
Status: DISCONTINUED | OUTPATIENT
Start: 2019-01-01 | End: 2019-01-01 | Stop reason: HOSPADM

## 2019-01-01 RX ORDER — SODIUM POLYSTYRENE SULFONATE 15 G/60ML
30 SUSPENSION ORAL; RECTAL ONCE
Status: COMPLETED | OUTPATIENT
Start: 2019-01-01 | End: 2019-01-01

## 2019-01-01 RX ORDER — CEFAZOLIN SODIUM 2 G/100ML
2 INJECTION, SOLUTION INTRAVENOUS
Status: CANCELLED | OUTPATIENT
Start: 2019-01-01

## 2019-01-01 RX ORDER — NALOXONE HYDROCHLORIDE 0.4 MG/ML
.1-.4 INJECTION, SOLUTION INTRAMUSCULAR; INTRAVENOUS; SUBCUTANEOUS
Status: DISCONTINUED | OUTPATIENT
Start: 2019-01-01 | End: 2019-01-01 | Stop reason: HOSPADM

## 2019-01-01 RX ORDER — DIPHENHYDRAMINE HYDROCHLORIDE 50 MG/ML
25-50 INJECTION INTRAMUSCULAR; INTRAVENOUS
Status: DISCONTINUED | OUTPATIENT
Start: 2019-01-01 | End: 2019-01-01 | Stop reason: HOSPADM

## 2019-01-01 RX ORDER — FENTANYL CITRATE 50 UG/ML
25-50 INJECTION, SOLUTION INTRAMUSCULAR; INTRAVENOUS
Status: DISCONTINUED | OUTPATIENT
Start: 2019-01-01 | End: 2019-01-01 | Stop reason: HOSPADM

## 2019-01-01 RX ORDER — LIDOCAINE HYDROCHLORIDE AND EPINEPHRINE 10; 10 MG/ML; UG/ML
10-30 INJECTION, SOLUTION INFILTRATION; PERINEURAL
Status: DISCONTINUED | OUTPATIENT
Start: 2019-01-01 | End: 2019-01-01 | Stop reason: HOSPADM

## 2019-01-01 RX ORDER — SODIUM BICARBONATE 325 MG/1
325 TABLET ORAL DAILY
Qty: 7 TABLET | Refills: 0 | Status: SHIPPED | OUTPATIENT
Start: 2019-01-01 | End: 2019-01-01

## 2019-01-01 RX ORDER — ACETAMINOPHEN 325 MG/1
975 TABLET ORAL EVERY 8 HOURS
Status: DISCONTINUED | OUTPATIENT
Start: 2019-01-01 | End: 2019-01-01

## 2019-01-01 RX ORDER — IBUTILIDE FUMARATE 1 MG/10ML
1 INJECTION, SOLUTION INTRAVENOUS
Status: DISCONTINUED | OUTPATIENT
Start: 2019-01-01 | End: 2019-01-01 | Stop reason: HOSPADM

## 2019-01-01 RX ORDER — METOPROLOL SUCCINATE 50 MG/1
50 TABLET, EXTENDED RELEASE ORAL DAILY
Refills: 0 | Status: ON HOLD | DISCHARGE
Start: 2019-01-01 | End: 2019-01-01

## 2019-01-01 RX ORDER — LIDOCAINE HYDROCHLORIDE 10 MG/ML
10-30 INJECTION, SOLUTION EPIDURAL; INFILTRATION; INTRACAUDAL; PERINEURAL
Status: COMPLETED | OUTPATIENT
Start: 2019-01-01 | End: 2019-01-01

## 2019-01-01 RX ORDER — OXYCODONE AND ACETAMINOPHEN 5; 325 MG/1; MG/1
1 TABLET ORAL EVERY 4 HOURS PRN
Status: DISCONTINUED | OUTPATIENT
Start: 2019-01-01 | End: 2019-01-01 | Stop reason: DRUGHIGH

## 2019-01-01 RX ORDER — PIPERACILLIN SODIUM, TAZOBACTAM SODIUM 2; .25 G/10ML; G/10ML
2.25 INJECTION, POWDER, LYOPHILIZED, FOR SOLUTION INTRAVENOUS EVERY 6 HOURS
Status: DISCONTINUED | OUTPATIENT
Start: 2019-01-01 | End: 2019-01-01

## 2019-01-01 RX ORDER — ACETAMINOPHEN 500 MG
500 TABLET ORAL 2 TIMES DAILY
COMMUNITY

## 2019-01-01 RX ORDER — VANCOMYCIN HYDROCHLORIDE 1 G/200ML
1000 INJECTION, SOLUTION INTRAVENOUS EVERY 12 HOURS
Status: DISCONTINUED | OUTPATIENT
Start: 2019-01-01 | End: 2019-01-01

## 2019-01-01 RX ORDER — DOBUTAMINE HYDROCHLORIDE 200 MG/100ML
5-40 INJECTION INTRAVENOUS CONTINUOUS PRN
Status: DISCONTINUED | OUTPATIENT
Start: 2019-01-01 | End: 2019-01-01 | Stop reason: HOSPADM

## 2019-01-01 RX ORDER — SIMETHICONE 80 MG
80 TABLET,CHEWABLE ORAL EVERY 6 HOURS PRN
Status: DISCONTINUED | OUTPATIENT
Start: 2019-01-01 | End: 2019-01-01 | Stop reason: HOSPADM

## 2019-01-01 RX ORDER — TORSEMIDE 10 MG/1
10 TABLET ORAL DAILY
Qty: 30 TABLET | Refills: 0 | Status: SHIPPED | OUTPATIENT
Start: 2019-01-01

## 2019-01-01 RX ORDER — PROCHLORPERAZINE 25 MG
12.5 SUPPOSITORY, RECTAL RECTAL EVERY 12 HOURS PRN
Status: DISCONTINUED | OUTPATIENT
Start: 2019-01-01 | End: 2019-01-01 | Stop reason: HOSPADM

## 2019-01-01 RX ORDER — FUROSEMIDE 10 MG/ML
20-100 INJECTION INTRAMUSCULAR; INTRAVENOUS
Status: DISCONTINUED | OUTPATIENT
Start: 2019-01-01 | End: 2019-01-01 | Stop reason: HOSPADM

## 2019-01-01 RX ORDER — LISINOPRIL 5 MG/1
5 TABLET ORAL DAILY
Status: DISCONTINUED | OUTPATIENT
Start: 2019-01-01 | End: 2019-01-01 | Stop reason: HOSPADM

## 2019-01-01 RX ORDER — LISINOPRIL 5 MG/1
5 TABLET ORAL DAILY
Qty: 30 TABLET | Refills: 0 | Status: SHIPPED | OUTPATIENT
Start: 2019-01-01 | End: 2019-01-01

## 2019-01-01 RX ORDER — RANOLAZINE 500 MG/1
500 TABLET, EXTENDED RELEASE ORAL 2 TIMES DAILY
Qty: 60 TABLET | Refills: 1 | Status: SHIPPED | OUTPATIENT
Start: 2019-01-01 | End: 2019-01-01

## 2019-01-01 RX ORDER — ACETAMINOPHEN 325 MG/1
650 TABLET ORAL EVERY 8 HOURS
Status: DISCONTINUED | OUTPATIENT
Start: 2019-01-01 | End: 2019-01-01

## 2019-01-01 RX ORDER — ACETAMINOPHEN 500 MG
500 TABLET ORAL EVERY 8 HOURS PRN
COMMUNITY
End: 2019-01-01

## 2019-01-01 RX ORDER — PROCHLORPERAZINE MALEATE 5 MG
5 TABLET ORAL EVERY 6 HOURS PRN
Status: DISCONTINUED | OUTPATIENT
Start: 2019-01-01 | End: 2019-01-01 | Stop reason: HOSPADM

## 2019-01-01 RX ORDER — ADENOSINE 3 MG/ML
6-12 INJECTION, SOLUTION INTRAVENOUS EVERY 5 MIN PRN
Status: DISCONTINUED | OUTPATIENT
Start: 2019-01-01 | End: 2019-01-01 | Stop reason: HOSPADM

## 2019-01-01 RX ORDER — ACETAMINOPHEN 500 MG
1000 TABLET ORAL ONCE
Status: DISCONTINUED | OUTPATIENT
Start: 2019-01-01 | End: 2019-01-01 | Stop reason: CLARIF

## 2019-01-01 RX ORDER — METOPROLOL SUCCINATE 25 MG/1
25 TABLET, EXTENDED RELEASE ORAL DAILY
Qty: 30 TABLET | Refills: 0 | Status: SHIPPED | OUTPATIENT
Start: 2019-01-01

## 2019-01-01 RX ORDER — PANTOPRAZOLE SODIUM 40 MG/1
40 TABLET, DELAYED RELEASE ORAL
Qty: 30 TABLET | Refills: 0 | Status: SHIPPED | OUTPATIENT
Start: 2019-01-01

## 2019-01-01 RX ORDER — POLYETHYLENE GLYCOL 3350 17 G/17G
17 POWDER, FOR SOLUTION ORAL 2 TIMES DAILY PRN
Status: DISCONTINUED | OUTPATIENT
Start: 2019-01-01 | End: 2019-01-01 | Stop reason: HOSPADM

## 2019-01-01 RX ORDER — ONDANSETRON 8 MG/1
8 TABLET, FILM COATED ORAL
Status: DISCONTINUED | OUTPATIENT
Start: 2019-01-01 | End: 2019-01-01

## 2019-01-01 RX ORDER — ACETAMINOPHEN 500 MG
500 TABLET ORAL ONCE
Status: COMPLETED | OUTPATIENT
Start: 2019-01-01 | End: 2019-01-01

## 2019-01-01 RX ORDER — NALOXONE HYDROCHLORIDE 0.4 MG/ML
0.4 INJECTION, SOLUTION INTRAMUSCULAR; INTRAVENOUS; SUBCUTANEOUS EVERY 5 MIN PRN
Status: DISCONTINUED | OUTPATIENT
Start: 2019-01-01 | End: 2019-01-01 | Stop reason: HOSPADM

## 2019-01-01 RX ORDER — FUROSEMIDE 40 MG
TABLET ORAL
Qty: 135 TABLET | Refills: 3 | Status: ON HOLD | OUTPATIENT
Start: 2019-01-01 | End: 2019-01-01

## 2019-01-01 RX ORDER — ONDANSETRON 4 MG/1
4 TABLET, FILM COATED ORAL
Status: DISCONTINUED | OUTPATIENT
Start: 2019-01-01 | End: 2019-01-01 | Stop reason: HOSPADM

## 2019-01-01 RX ORDER — TORSEMIDE 10 MG/1
10 TABLET ORAL
Status: ON HOLD | DISCHARGE
Start: 2019-01-01 | End: 2019-01-01

## 2019-01-01 RX ORDER — SODIUM CHLORIDE 9 MG/ML
INJECTION, SOLUTION INTRAVENOUS ONCE
Status: COMPLETED | OUTPATIENT
Start: 2019-01-01 | End: 2019-01-01

## 2019-01-01 RX ORDER — TORSEMIDE 10 MG/1
10 TABLET ORAL
Status: DISCONTINUED | OUTPATIENT
Start: 2019-01-01 | End: 2019-01-01 | Stop reason: HOSPADM

## 2019-01-01 RX ORDER — BUPIVACAINE HYDROCHLORIDE 2.5 MG/ML
10-30 INJECTION, SOLUTION EPIDURAL; INFILTRATION; INTRACAUDAL
Status: COMPLETED | OUTPATIENT
Start: 2019-01-01 | End: 2019-01-01

## 2019-01-01 RX ORDER — METOPROLOL SUCCINATE 50 MG/1
50 TABLET, EXTENDED RELEASE ORAL DAILY
Status: DISCONTINUED | OUTPATIENT
Start: 2019-01-01 | End: 2019-01-01

## 2019-01-01 RX ORDER — PROTAMINE SULFATE 10 MG/ML
1-5 INJECTION, SOLUTION INTRAVENOUS
Status: DISCONTINUED | OUTPATIENT
Start: 2019-01-01 | End: 2019-01-01 | Stop reason: HOSPADM

## 2019-01-01 RX ORDER — VANCOMYCIN HYDROCHLORIDE 1 G/200ML
1000 INJECTION, SOLUTION INTRAVENOUS
Status: COMPLETED | OUTPATIENT
Start: 2019-01-01 | End: 2019-01-01

## 2019-01-01 RX ADMIN — MELATONIN 1000 UNITS: at 08:49

## 2019-01-01 RX ADMIN — OXYCODONE HYDROCHLORIDE 5 MG: 5 SOLUTION ORAL at 16:49

## 2019-01-01 RX ADMIN — MEROPENEM 500 MG: 500 INJECTION, POWDER, FOR SOLUTION INTRAVENOUS at 16:54

## 2019-01-01 RX ADMIN — Medication 3000 UNITS: at 17:00

## 2019-01-01 RX ADMIN — METOPROLOL SUCCINATE 50 MG: 50 TABLET, EXTENDED RELEASE ORAL at 09:21

## 2019-01-01 RX ADMIN — ONDANSETRON HYDROCHLORIDE 4 MG: 4 TABLET, FILM COATED ORAL at 06:47

## 2019-01-01 RX ADMIN — ASPIRIN 81 MG: 81 TABLET, DELAYED RELEASE ORAL at 09:21

## 2019-01-01 RX ADMIN — OXYCODONE HYDROCHLORIDE 2.5 MG: 5 SOLUTION ORAL at 10:07

## 2019-01-01 RX ADMIN — SODIUM CHLORIDE 250 ML: 9 INJECTION, SOLUTION INTRAVENOUS at 22:34

## 2019-01-01 RX ADMIN — ONDANSETRON HYDROCHLORIDE 4 MG: 4 TABLET, FILM COATED ORAL at 16:54

## 2019-01-01 RX ADMIN — ASPIRIN 81 MG: 81 TABLET, DELAYED RELEASE ORAL at 08:17

## 2019-01-01 RX ADMIN — MULTIPLE VITAMINS W/ MINERALS TAB 1 TABLET: TAB at 09:21

## 2019-01-01 RX ADMIN — ACETAMINOPHEN 650 MG: 325 TABLET, FILM COATED ORAL at 22:30

## 2019-01-01 RX ADMIN — FUROSEMIDE 40 MG: 10 INJECTION, SOLUTION INTRAVENOUS at 11:09

## 2019-01-01 RX ADMIN — ASPIRIN 81 MG: 81 TABLET, DELAYED RELEASE ORAL at 09:43

## 2019-01-01 RX ADMIN — ACETAMINOPHEN 325 MG: 325 TABLET ORAL at 20:06

## 2019-01-01 RX ADMIN — ONDANSETRON HYDROCHLORIDE 4 MG: 4 TABLET, FILM COATED ORAL at 06:19

## 2019-01-01 RX ADMIN — Medication 2.5 MG: at 09:25

## 2019-01-01 RX ADMIN — ACETAMINOPHEN 975 MG: 325 TABLET ORAL at 17:03

## 2019-01-01 RX ADMIN — OXYCODONE HYDROCHLORIDE AND ACETAMINOPHEN 1 HALF-TAB: 5; 325 TABLET ORAL at 11:22

## 2019-01-01 RX ADMIN — OXYCODONE HYDROCHLORIDE 5 MG: 5 SOLUTION ORAL at 17:39

## 2019-01-01 RX ADMIN — ESCITALOPRAM 5 MG: 5 TABLET, FILM COATED ORAL at 08:50

## 2019-01-01 RX ADMIN — LISINOPRIL 5 MG: 5 TABLET ORAL at 09:25

## 2019-01-01 RX ADMIN — ACETAMINOPHEN 650 MG: 325 TABLET, FILM COATED ORAL at 16:27

## 2019-01-01 RX ADMIN — PANTOPRAZOLE SODIUM 40 MG: 40 TABLET, DELAYED RELEASE ORAL at 06:47

## 2019-01-01 RX ADMIN — ACETAMINOPHEN 650 MG: 325 TABLET ORAL at 21:30

## 2019-01-01 RX ADMIN — ONDANSETRON HYDROCHLORIDE 4 MG: 4 TABLET, FILM COATED ORAL at 12:11

## 2019-01-01 RX ADMIN — ONDANSETRON HYDROCHLORIDE 4 MG: 4 TABLET, FILM COATED ORAL at 16:56

## 2019-01-01 RX ADMIN — MULTIPLE VITAMINS W/ MINERALS TAB 1 TABLET: TAB at 11:17

## 2019-01-01 RX ADMIN — ACETAMINOPHEN 975 MG: 325 TABLET ORAL at 00:12

## 2019-01-01 RX ADMIN — OXYCODONE HYDROCHLORIDE 2.5 MG: 5 SOLUTION ORAL at 16:53

## 2019-01-01 RX ADMIN — PIPERACILLIN SODIUM,TAZOBACTAM SODIUM 2.25 G: 2; .25 INJECTION, POWDER, FOR SOLUTION INTRAVENOUS at 20:02

## 2019-01-01 RX ADMIN — CEFUROXIME AXETIL 250 MG: 250 TABLET ORAL at 14:12

## 2019-01-01 RX ADMIN — SODIUM CHLORIDE: 9 INJECTION, SOLUTION INTRAVENOUS at 18:18

## 2019-01-01 RX ADMIN — SODIUM CHLORIDE 250 ML: 9 INJECTION, SOLUTION INTRAVENOUS at 10:27

## 2019-01-01 RX ADMIN — PANTOPRAZOLE SODIUM 40 MG: 40 TABLET, DELAYED RELEASE ORAL at 08:35

## 2019-01-01 RX ADMIN — CEFUROXIME AXETIL 250 MG: 250 TABLET ORAL at 09:27

## 2019-01-01 RX ADMIN — METOPROLOL SUCCINATE 25 MG: 25 TABLET, EXTENDED RELEASE ORAL at 08:18

## 2019-01-01 RX ADMIN — ACETAMINOPHEN 975 MG: 325 TABLET ORAL at 17:40

## 2019-01-01 RX ADMIN — ESCITALOPRAM 5 MG: 5 TABLET, FILM COATED ORAL at 09:43

## 2019-01-01 RX ADMIN — THERA TABS 1 TABLET: TAB at 09:00

## 2019-01-01 RX ADMIN — OXYCODONE HYDROCHLORIDE AND ACETAMINOPHEN 1 HALF-TAB: 5; 325 TABLET ORAL at 17:53

## 2019-01-01 RX ADMIN — METOPROLOL SUCCINATE 50 MG: 50 TABLET, EXTENDED RELEASE ORAL at 09:00

## 2019-01-01 RX ADMIN — HEPARIN SODIUM 600 UNITS/HR: 10000 INJECTION, SOLUTION INTRAVENOUS at 17:00

## 2019-01-01 RX ADMIN — ASPIRIN 81 MG: 81 TABLET, DELAYED RELEASE ORAL at 08:18

## 2019-01-01 RX ADMIN — METOPROLOL SUCCINATE 25 MG: 25 TABLET, EXTENDED RELEASE ORAL at 08:46

## 2019-01-01 RX ADMIN — ASPIRIN 81 MG: 81 TABLET, DELAYED RELEASE ORAL at 09:25

## 2019-01-01 RX ADMIN — ACETAMINOPHEN 975 MG: 325 TABLET ORAL at 02:13

## 2019-01-01 RX ADMIN — ACETAMINOPHEN 975 MG: 325 TABLET ORAL at 09:43

## 2019-01-01 RX ADMIN — ONDANSETRON HYDROCHLORIDE 4 MG: 4 TABLET, FILM COATED ORAL at 17:40

## 2019-01-01 RX ADMIN — ACETAMINOPHEN 975 MG: 325 TABLET ORAL at 16:50

## 2019-01-01 RX ADMIN — ESCITALOPRAM 5 MG: 5 TABLET, FILM COATED ORAL at 09:22

## 2019-01-01 RX ADMIN — ACETAMINOPHEN 650 MG: 325 TABLET, FILM COATED ORAL at 09:27

## 2019-01-01 RX ADMIN — ASPIRIN 81 MG: 81 TABLET, DELAYED RELEASE ORAL at 08:35

## 2019-01-01 RX ADMIN — SODIUM CHLORIDE: 9 INJECTION, SOLUTION INTRAVENOUS at 14:31

## 2019-01-01 RX ADMIN — ESCITALOPRAM 5 MG: 5 TABLET, FILM COATED ORAL at 09:25

## 2019-01-01 RX ADMIN — PIPERACILLIN SODIUM,TAZOBACTAM SODIUM 2.25 G: 2; .25 INJECTION, POWDER, FOR SOLUTION INTRAVENOUS at 01:29

## 2019-01-01 RX ADMIN — METOPROLOL SUCCINATE 25 MG: 25 TABLET, EXTENDED RELEASE ORAL at 08:48

## 2019-01-01 RX ADMIN — ACETAMINOPHEN 650 MG: 325 TABLET, FILM COATED ORAL at 22:07

## 2019-01-01 RX ADMIN — SENNOSIDES AND DOCUSATE SODIUM 2 TABLET: 8.6; 5 TABLET ORAL at 18:19

## 2019-01-01 RX ADMIN — ONDANSETRON HYDROCHLORIDE 4 MG: 4 TABLET, FILM COATED ORAL at 16:50

## 2019-01-01 RX ADMIN — MEROPENEM 1 G: 1 INJECTION, POWDER, FOR SOLUTION INTRAVENOUS at 04:38

## 2019-01-01 RX ADMIN — TORSEMIDE 10 MG: 10 TABLET ORAL at 09:27

## 2019-01-01 RX ADMIN — OXYCODONE HYDROCHLORIDE 2.5 MG: 5 SOLUTION ORAL at 08:09

## 2019-01-01 RX ADMIN — ASPIRIN 81 MG 324 MG: 81 TABLET ORAL at 04:16

## 2019-01-01 RX ADMIN — SODIUM CHLORIDE 250 ML: 9 INJECTION, SOLUTION INTRAVENOUS at 09:30

## 2019-01-01 RX ADMIN — HEPARIN SODIUM 600 UNITS/HR: 10000 INJECTION, SOLUTION INTRAVENOUS at 01:19

## 2019-01-01 RX ADMIN — ACETAMINOPHEN 975 MG: 325 TABLET ORAL at 16:56

## 2019-01-01 RX ADMIN — Medication 2.5 MG: at 09:57

## 2019-01-01 RX ADMIN — SODIUM CHLORIDE: 9 INJECTION, SOLUTION INTRAVENOUS at 15:21

## 2019-01-01 RX ADMIN — OXYCODONE HYDROCHLORIDE 5 MG: 5 SOLUTION ORAL at 22:21

## 2019-01-01 RX ADMIN — Medication 2.5 MG: at 09:26

## 2019-01-01 RX ADMIN — CEFTRIAXONE SODIUM 1 G: 1 INJECTION, POWDER, FOR SOLUTION INTRAMUSCULAR; INTRAVENOUS at 11:09

## 2019-01-01 RX ADMIN — ASPIRIN 81 MG: 81 TABLET, DELAYED RELEASE ORAL at 13:26

## 2019-01-01 RX ADMIN — ACETAMINOPHEN 650 MG: 325 TABLET ORAL at 09:14

## 2019-01-01 RX ADMIN — ONDANSETRON 4 MG: 2 INJECTION INTRAMUSCULAR; INTRAVENOUS at 08:14

## 2019-01-01 RX ADMIN — ACETAMINOPHEN 975 MG: 325 TABLET ORAL at 08:46

## 2019-01-01 RX ADMIN — PANTOPRAZOLE SODIUM 40 MG: 40 TABLET, DELAYED RELEASE ORAL at 08:16

## 2019-01-01 RX ADMIN — SODIUM CHLORIDE: 4.5 INJECTION, SOLUTION INTRAVENOUS at 14:11

## 2019-01-01 RX ADMIN — FUROSEMIDE 40 MG: 10 INJECTION, SOLUTION INTRAVENOUS at 00:06

## 2019-01-01 RX ADMIN — Medication 2.5 MG: at 12:11

## 2019-01-01 RX ADMIN — PANTOPRAZOLE SODIUM 40 MG: 40 TABLET, DELAYED RELEASE ORAL at 06:49

## 2019-01-01 RX ADMIN — MELATONIN 1000 UNITS: at 09:26

## 2019-01-01 RX ADMIN — METOPROLOL SUCCINATE 50 MG: 50 TABLET, EXTENDED RELEASE ORAL at 09:27

## 2019-01-01 RX ADMIN — ONDANSETRON HYDROCHLORIDE 4 MG: 4 TABLET, FILM COATED ORAL at 12:16

## 2019-01-01 RX ADMIN — METOPROLOL SUCCINATE 25 MG: 25 TABLET, EXTENDED RELEASE ORAL at 08:36

## 2019-01-01 RX ADMIN — PANTOPRAZOLE SODIUM 40 MG: 40 TABLET, DELAYED RELEASE ORAL at 09:43

## 2019-01-01 RX ADMIN — METOPROLOL SUCCINATE 50 MG: 50 TABLET, EXTENDED RELEASE ORAL at 08:50

## 2019-01-01 RX ADMIN — SODIUM CHLORIDE: 9 INJECTION, SOLUTION INTRAVENOUS at 06:07

## 2019-01-01 RX ADMIN — ONDANSETRON HYDROCHLORIDE 4 MG: 4 TABLET, FILM COATED ORAL at 14:54

## 2019-01-01 RX ADMIN — ESCITALOPRAM 5 MG: 5 TABLET, FILM COATED ORAL at 08:17

## 2019-01-01 RX ADMIN — ESCITALOPRAM 5 MG: 5 TABLET, FILM COATED ORAL at 08:18

## 2019-01-01 RX ADMIN — LISINOPRIL 5 MG: 5 TABLET ORAL at 09:26

## 2019-01-01 RX ADMIN — MEROPENEM 500 MG: 500 INJECTION, POWDER, FOR SOLUTION INTRAVENOUS at 05:06

## 2019-01-01 RX ADMIN — SODIUM CHLORIDE 500 ML: 9 INJECTION, SOLUTION INTRAVENOUS at 15:49

## 2019-01-01 RX ADMIN — OXYCODONE HYDROCHLORIDE 2.5 MG: 5 SOLUTION ORAL at 11:31

## 2019-01-01 RX ADMIN — LISINOPRIL 5 MG: 5 TABLET ORAL at 11:54

## 2019-01-01 RX ADMIN — OXYCODONE HYDROCHLORIDE 2.5 MG: 5 SOLUTION ORAL at 10:03

## 2019-01-01 RX ADMIN — SODIUM CHLORIDE: 9 INJECTION, SOLUTION INTRAVENOUS at 19:22

## 2019-01-01 RX ADMIN — PANTOPRAZOLE SODIUM 40 MG: 40 TABLET, DELAYED RELEASE ORAL at 06:19

## 2019-01-01 RX ADMIN — ASPIRIN 81 MG: 81 TABLET, DELAYED RELEASE ORAL at 08:38

## 2019-01-01 RX ADMIN — ASPIRIN 81 MG: 81 TABLET, DELAYED RELEASE ORAL at 09:27

## 2019-01-01 RX ADMIN — ONDANSETRON HYDROCHLORIDE 4 MG: 4 TABLET, FILM COATED ORAL at 11:59

## 2019-01-01 RX ADMIN — ACETAMINOPHEN 975 MG: 325 TABLET ORAL at 02:02

## 2019-01-01 RX ADMIN — VANCOMYCIN HYDROCHLORIDE 1000 MG: 1 INJECTION, SOLUTION INTRAVENOUS at 14:52

## 2019-01-01 RX ADMIN — ONDANSETRON 4 MG: 2 INJECTION INTRAMUSCULAR; INTRAVENOUS at 18:09

## 2019-01-01 RX ADMIN — MEROPENEM 500 MG: 500 INJECTION, POWDER, FOR SOLUTION INTRAVENOUS at 05:48

## 2019-01-01 RX ADMIN — ESCITALOPRAM 5 MG: 5 TABLET, FILM COATED ORAL at 10:24

## 2019-01-01 RX ADMIN — MEROPENEM 500 MG: 500 INJECTION, POWDER, FOR SOLUTION INTRAVENOUS at 04:57

## 2019-01-01 RX ADMIN — ACETAMINOPHEN 975 MG: 325 TABLET ORAL at 16:42

## 2019-01-01 RX ADMIN — PANTOPRAZOLE SODIUM 40 MG: 40 TABLET, DELAYED RELEASE ORAL at 08:46

## 2019-01-01 RX ADMIN — ONDANSETRON HYDROCHLORIDE 4 MG: 4 TABLET, FILM COATED ORAL at 17:03

## 2019-01-01 RX ADMIN — ESCITALOPRAM 5 MG: 5 TABLET, FILM COATED ORAL at 09:04

## 2019-01-01 RX ADMIN — ONDANSETRON HYDROCHLORIDE 4 MG: 4 TABLET, FILM COATED ORAL at 06:49

## 2019-01-01 RX ADMIN — SODIUM BICARBONATE 50 MEQ: 84 INJECTION, SOLUTION INTRAVENOUS at 11:15

## 2019-01-01 RX ADMIN — INSULIN ASPART 1 UNITS: 100 INJECTION, SOLUTION INTRAVENOUS; SUBCUTANEOUS at 23:07

## 2019-01-01 RX ADMIN — ACETAMINOPHEN 650 MG: 325 TABLET, FILM COATED ORAL at 21:28

## 2019-01-01 RX ADMIN — ACETAMINOPHEN 975 MG: 325 TABLET ORAL at 00:11

## 2019-01-01 RX ADMIN — ACETAMINOPHEN 975 MG: 325 TABLET ORAL at 04:17

## 2019-01-01 RX ADMIN — METOPROLOL SUCCINATE 50 MG: 50 TABLET, EXTENDED RELEASE ORAL at 14:30

## 2019-01-01 RX ADMIN — SODIUM CHLORIDE 10 UNITS: 9 INJECTION, SOLUTION INTRAVENOUS at 02:05

## 2019-01-01 RX ADMIN — ESCITALOPRAM 5 MG: 5 TABLET, FILM COATED ORAL at 08:35

## 2019-01-01 RX ADMIN — ONDANSETRON HYDROCHLORIDE 4 MG: 4 TABLET, FILM COATED ORAL at 08:35

## 2019-01-01 RX ADMIN — FUROSEMIDE 5 MG/HR: 10 INJECTION, SOLUTION INTRAVENOUS at 18:47

## 2019-01-01 RX ADMIN — SODIUM CHLORIDE 500 ML: 9 INJECTION, SOLUTION INTRAVENOUS at 09:41

## 2019-01-01 RX ADMIN — MELATONIN 1000 UNITS: at 10:24

## 2019-01-01 RX ADMIN — ONDANSETRON HYDROCHLORIDE 4 MG: 4 TABLET, FILM COATED ORAL at 11:41

## 2019-01-01 RX ADMIN — FUROSEMIDE 40 MG: 10 INJECTION, SOLUTION INTRAVENOUS at 08:49

## 2019-01-01 RX ADMIN — MELATONIN 1000 UNITS: at 09:25

## 2019-01-01 RX ADMIN — ACETAMINOPHEN 500 MG: 500 TABLET, FILM COATED ORAL at 13:33

## 2019-01-01 RX ADMIN — MULTIPLE VITAMINS W/ MINERALS TAB 1 TABLET: TAB at 10:24

## 2019-01-01 RX ADMIN — MEROPENEM 500 MG: 500 INJECTION, POWDER, FOR SOLUTION INTRAVENOUS at 04:16

## 2019-01-01 RX ADMIN — ONDANSETRON HYDROCHLORIDE 4 MG: 4 TABLET, FILM COATED ORAL at 12:22

## 2019-01-01 RX ADMIN — CIPROFLOXACIN HYDROCHLORIDE 500 MG: 500 TABLET, FILM COATED ORAL at 11:41

## 2019-01-01 RX ADMIN — ACETAMINOPHEN 975 MG: 325 TABLET ORAL at 08:38

## 2019-01-01 RX ADMIN — METOPROLOL SUCCINATE 25 MG: 25 TABLET, EXTENDED RELEASE ORAL at 09:43

## 2019-01-01 RX ADMIN — ONDANSETRON HYDROCHLORIDE 4 MG: 4 TABLET, FILM COATED ORAL at 08:17

## 2019-01-01 RX ADMIN — ACETAMINOPHEN 975 MG: 325 TABLET ORAL at 01:07

## 2019-01-01 RX ADMIN — TORSEMIDE 20 MG: 20 TABLET ORAL at 09:22

## 2019-01-01 RX ADMIN — Medication 2.5 MG: at 11:18

## 2019-01-01 RX ADMIN — ASPIRIN 81 MG: 81 TABLET, DELAYED RELEASE ORAL at 08:46

## 2019-01-01 RX ADMIN — FUROSEMIDE 5 MG/HR: 10 INJECTION, SOLUTION INTRAVENOUS at 11:58

## 2019-01-01 RX ADMIN — OXYCODONE HYDROCHLORIDE 5 MG: 5 SOLUTION ORAL at 10:20

## 2019-01-01 RX ADMIN — CIPROFLOXACIN HYDROCHLORIDE 500 MG: 500 TABLET, FILM COATED ORAL at 08:18

## 2019-01-01 RX ADMIN — LISINOPRIL 5 MG: 5 TABLET ORAL at 09:22

## 2019-01-01 RX ADMIN — ACETAMINOPHEN 975 MG: 325 TABLET ORAL at 08:17

## 2019-01-01 RX ADMIN — SODIUM POLYSTYRENE SULFONATE 30 G: 15 SUSPENSION ORAL; RECTAL at 11:18

## 2019-01-01 RX ADMIN — Medication 2.5 MG: at 13:35

## 2019-01-01 RX ADMIN — ASPIRIN 81 MG: 81 TABLET, DELAYED RELEASE ORAL at 09:22

## 2019-01-01 RX ADMIN — ASPIRIN 81 MG: 81 TABLET, DELAYED RELEASE ORAL at 09:04

## 2019-01-01 RX ADMIN — METOPROLOL SUCCINATE 25 MG: 25 TABLET, EXTENDED RELEASE ORAL at 08:16

## 2019-01-01 RX ADMIN — METOPROLOL SUCCINATE 50 MG: 50 TABLET, EXTENDED RELEASE ORAL at 10:24

## 2019-01-01 RX ADMIN — ACETAMINOPHEN 650 MG: 325 TABLET, FILM COATED ORAL at 20:13

## 2019-01-01 RX ADMIN — ESCITALOPRAM 5 MG: 5 TABLET, FILM COATED ORAL at 08:46

## 2019-01-01 RX ADMIN — CIPROFLOXACIN HYDROCHLORIDE 500 MG: 500 TABLET, FILM COATED ORAL at 08:16

## 2019-01-01 RX ADMIN — METOPROLOL SUCCINATE 50 MG: 50 TABLET, EXTENDED RELEASE ORAL at 09:25

## 2019-01-01 RX ADMIN — ACETAMINOPHEN 975 MG: 325 TABLET ORAL at 09:22

## 2019-01-01 RX ADMIN — ONDANSETRON HYDROCHLORIDE 4 MG: 4 TABLET, FILM COATED ORAL at 12:55

## 2019-01-01 RX ADMIN — TORSEMIDE 20 MG: 20 TABLET ORAL at 13:46

## 2019-01-01 RX ADMIN — SODIUM CHLORIDE: 9 INJECTION, SOLUTION INTRAVENOUS at 12:30

## 2019-01-01 RX ADMIN — MULTIPLE VITAMINS W/ MINERALS TAB 1 TABLET: TAB at 09:27

## 2019-01-01 RX ADMIN — ESCITALOPRAM 5 MG: 5 TABLET, FILM COATED ORAL at 09:27

## 2019-01-01 RX ADMIN — ACETAMINOPHEN 650 MG: 325 TABLET ORAL at 00:38

## 2019-01-01 RX ADMIN — ACETAMINOPHEN 975 MG: 325 TABLET ORAL at 08:35

## 2019-01-01 RX ADMIN — VANCOMYCIN HYDROCHLORIDE 1000 MG: 1 INJECTION, SOLUTION INTRAVENOUS at 03:12

## 2019-01-01 RX ADMIN — DEXTROSE MONOHYDRATE 50 ML: 25 INJECTION, SOLUTION INTRAVENOUS at 02:11

## 2019-01-01 RX ADMIN — CIPROFLOXACIN HYDROCHLORIDE 500 MG: 500 TABLET, FILM COATED ORAL at 09:22

## 2019-01-01 RX ADMIN — ACETAMINOPHEN 325 MG: 325 TABLET ORAL at 19:47

## 2019-01-01 RX ADMIN — SODIUM CHLORIDE: 9 INJECTION, SOLUTION INTRAVENOUS at 06:19

## 2019-01-01 RX ADMIN — MULTIPLE VITAMINS W/ MINERALS TAB 1 TABLET: TAB at 09:25

## 2019-01-01 RX ADMIN — SODIUM CHLORIDE: 9 INJECTION, SOLUTION INTRAVENOUS at 09:53

## 2019-01-01 RX ADMIN — FUROSEMIDE 40 MG: 10 INJECTION, SOLUTION INTRAVENOUS at 16:27

## 2019-01-01 RX ADMIN — ESCITALOPRAM 5 MG: 5 TABLET, FILM COATED ORAL at 08:38

## 2019-01-01 RX ADMIN — SODIUM CHLORIDE: 9 INJECTION, SOLUTION INTRAVENOUS at 16:10

## 2019-01-01 RX ADMIN — MELATONIN 1000 UNITS: at 09:21

## 2019-01-01 RX ADMIN — ONDANSETRON HYDROCHLORIDE 4 MG: 4 TABLET, FILM COATED ORAL at 08:46

## 2019-01-01 RX ADMIN — METOPROLOL SUCCINATE 25 MG: 25 TABLET, EXTENDED RELEASE ORAL at 09:22

## 2019-01-01 RX ADMIN — METOPROLOL SUCCINATE 50 MG: 50 TABLET, EXTENDED RELEASE ORAL at 09:01

## 2019-01-01 RX ADMIN — OXYCODONE HYDROCHLORIDE 5 MG: 5 SOLUTION ORAL at 17:43

## 2019-01-01 ASSESSMENT — ACTIVITIES OF DAILY LIVING (ADL)
ADLS_ACUITY_SCORE: 20
ADLS_ACUITY_SCORE: 27
ADLS_ACUITY_SCORE: 28
ADLS_ACUITY_SCORE: 25
SWALLOWING: 0-->SWALLOWS FOODS/LIQUIDS WITHOUT DIFFICULTY
BATHING: 3-->ASSISTIVE EQUIPMENT AND PERSON
ADLS_ACUITY_SCORE: 27
ADLS_ACUITY_SCORE: 20
ADLS_ACUITY_SCORE: 20
ADLS_ACUITY_SCORE: 28
ADLS_ACUITY_SCORE: 20
ADLS_ACUITY_SCORE: 22
ADLS_ACUITY_SCORE: 27
WHICH_OF_THE_ABOVE_FUNCTIONAL_RISKS_HAD_A_RECENT_ONSET_OR_CHANGE?: AMBULATION;EATING
ADLS_ACUITY_SCORE: 26
ADLS_ACUITY_SCORE: 18
ADLS_ACUITY_SCORE: 25
ADLS_ACUITY_SCORE: 26
ADLS_ACUITY_SCORE: 18
SWALLOWING: 0-->SWALLOWS FOODS/LIQUIDS WITHOUT DIFFICULTY
ADLS_ACUITY_SCORE: 25
ADLS_ACUITY_SCORE: 22
ADLS_ACUITY_SCORE: 25
ADLS_ACUITY_SCORE: 26
ADLS_ACUITY_SCORE: 24
ADLS_ACUITY_SCORE: 18
DRESS: 0-->INDEPENDENT
ADLS_ACUITY_SCORE: 18
BATHING: 3-->ASSISTIVE EQUIPMENT AND PERSON
ADLS_ACUITY_SCORE: 26
ADLS_ACUITY_SCORE: 25
ADLS_ACUITY_SCORE: 22
ADLS_ACUITY_SCORE: 27
DEPENDENT_IADLS:: CLEANING;COOKING;TRANSPORTATION
ADLS_ACUITY_SCORE: 22
ADLS_ACUITY_SCORE: 27
ADLS_ACUITY_SCORE: 28
ADLS_ACUITY_SCORE: 27
ADLS_ACUITY_SCORE: 27
DEPENDENT_IADLS:: CLEANING;COOKING;TRANSPORTATION
ADLS_ACUITY_SCORE: 28
ADLS_ACUITY_SCORE: 28
ADLS_ACUITY_SCORE: 26
ADLS_ACUITY_SCORE: 27
ADLS_ACUITY_SCORE: 25
TOILETING: 1-->ASSISTIVE EQUIPMENT
DEPENDENT_IADLS:: CLEANING;COOKING;TRANSPORTATION
ADLS_ACUITY_SCORE: 18
ADLS_ACUITY_SCORE: 26
TRANSFERRING: 1-->ASSISTIVE EQUIPMENT
ADLS_ACUITY_SCORE: 20
ADLS_ACUITY_SCORE: 20
ADLS_ACUITY_SCORE: 25
ADLS_ACUITY_SCORE: 26
RETIRED_COMMUNICATION: 0-->UNDERSTANDS/COMMUNICATES WITHOUT DIFFICULTY
ADLS_ACUITY_SCORE: 25
ADLS_ACUITY_SCORE: 24
ADLS_ACUITY_SCORE: 27
ADLS_ACUITY_SCORE: 18
ADLS_ACUITY_SCORE: 25
AMBULATION: 1-->ASSISTIVE EQUIPMENT
DEPENDENT_IADLS:: CLEANING;COOKING;TRANSPORTATION
FALL_HISTORY_WITHIN_LAST_SIX_MONTHS: NO
ADLS_ACUITY_SCORE: 21
ADLS_ACUITY_SCORE: 26
TOILETING: 1-->ASSISTIVE EQUIPMENT
RETIRED_COMMUNICATION: 0-->UNDERSTANDS/COMMUNICATES WITHOUT DIFFICULTY
ADLS_ACUITY_SCORE: 25
ADLS_ACUITY_SCORE: 28
ADLS_ACUITY_SCORE: 26
ADLS_ACUITY_SCORE: 28
ADLS_ACUITY_SCORE: 26
ADLS_ACUITY_SCORE: 26
RETIRED_EATING: 0-->INDEPENDENT
ADLS_ACUITY_SCORE: 20
AMBULATION: 1-->ASSISTIVE EQUIPMENT
DRESS: 0-->INDEPENDENT
ADLS_ACUITY_SCORE: 20
DEPENDENT_IADLS:: CLEANING;COOKING;TRANSPORTATION
COGNITION: 0 - NO COGNITION ISSUES REPORTED
ADLS_ACUITY_SCORE: 22
FALL_HISTORY_WITHIN_LAST_SIX_MONTHS: YES
ADLS_ACUITY_SCORE: 27
DEPENDENT_IADLS:: CLEANING;COOKING;TRANSPORTATION
TRANSFERRING: 1-->ASSISTIVE EQUIPMENT
ADLS_ACUITY_SCORE: 26
ADLS_ACUITY_SCORE: 20
ADLS_ACUITY_SCORE: 20
ADLS_ACUITY_SCORE: 27
COGNITION: 0 - NO COGNITION ISSUES REPORTED
ADLS_ACUITY_SCORE: 23
ADLS_ACUITY_SCORE: 21
ADLS_ACUITY_SCORE: 26
DEPENDENT_IADLS:: CLEANING;COOKING;TRANSPORTATION
RETIRED_EATING: 0-->INDEPENDENT
ADLS_ACUITY_SCORE: 18
ADLS_ACUITY_SCORE: 18
ADLS_ACUITY_SCORE: 20
ADLS_ACUITY_SCORE: 26
ADLS_ACUITY_SCORE: 18
ADLS_ACUITY_SCORE: 20

## 2019-01-01 ASSESSMENT — MIFFLIN-ST. JEOR
SCORE: 890.76
SCORE: 886.21
SCORE: 906.17
SCORE: 885.76
SCORE: 866.75
SCORE: 917.98
SCORE: 899.84
SCORE: 865.81
SCORE: 906.75
SCORE: 1026.75
SCORE: 927.75
SCORE: 907.08
SCORE: 917.98
SCORE: 915.75
SCORE: 876.69
SCORE: 907.55
SCORE: 917.98
SCORE: 883.04
SCORE: 884.05
SCORE: 890.76
SCORE: 899.84
SCORE: 851.74

## 2019-01-01 ASSESSMENT — ENCOUNTER SYMPTOMS
CARDIOVASCULAR NEGATIVE: 1
SHORTNESS OF BREATH: 1
FEVER: 0
FEVER: 0
COUGH: 1
WEAKNESS: 1
WEAKNESS: 1
APPETITE CHANGE: 1
DYSURIA: 1
FATIGUE: 1
FATIGUE: 1
MUSCULOSKELETAL NEGATIVE: 1
ABDOMINAL PAIN: 1
PSYCHIATRIC NEGATIVE: 1
SHORTNESS OF BREATH: 1
GASTROINTESTINAL NEGATIVE: 1
EYES NEGATIVE: 1

## 2019-01-01 ASSESSMENT — ANXIETY QUESTIONNAIRES
3. WORRYING TOO MUCH ABOUT DIFFERENT THINGS: NOT AT ALL
5. BEING SO RESTLESS THAT IT IS HARD TO SIT STILL: NOT AT ALL
IF YOU CHECKED OFF ANY PROBLEMS ON THIS QUESTIONNAIRE, HOW DIFFICULT HAVE THESE PROBLEMS MADE IT FOR YOU TO DO YOUR WORK, TAKE CARE OF THINGS AT HOME, OR GET ALONG WITH OTHER PEOPLE: NOT DIFFICULT AT ALL
7. FEELING AFRAID AS IF SOMETHING AWFUL MIGHT HAPPEN: NOT AT ALL
GAD7 TOTAL SCORE: 0
1. FEELING NERVOUS, ANXIOUS, OR ON EDGE: NOT AT ALL
2. NOT BEING ABLE TO STOP OR CONTROL WORRYING: NOT AT ALL
6. BECOMING EASILY ANNOYED OR IRRITABLE: NOT AT ALL
GAD7 TOTAL SCORE: 0

## 2019-01-01 ASSESSMENT — PATIENT HEALTH QUESTIONNAIRE - PHQ9
SUM OF ALL RESPONSES TO PHQ QUESTIONS 1-9: 0
5. POOR APPETITE OR OVEREATING: NOT AT ALL

## 2019-01-02 NOTE — TELEPHONE ENCOUNTER
Our goal is to have forms completed within 72 hours, however some forms may require a visit or additional information.    What clinic location was the form placed at [facility] or [place].?     Who is the form from?   Where did the form come from? Faxed to clinic   The form was placed in the inbox of Marbin Rouse MD      Please fax to [phone]   Phone number:      Additional comments: [facility] OT 1 w 1    Call take on 1/2/2019 at 11:08 AM by Rachana Ontiveros

## 2019-01-02 NOTE — TELEPHONE ENCOUNTER
"Requested Prescriptions   Pending Prescriptions Disp Refills     simvastatin (ZOCOR) 20 MG tablet [Pharmacy Med Name: Simvastatin Oral Tablet 20 MG]  Last Written Prescription Date:  11/28/2017  Last Fill Quantity: 45 tablet,  # refills: 3   Last office visit: 12/4/2018 with prescribing provider:  Nasim   Future Office Visit:     45 tablet 2     Sig: take 1/2 tablet at bedtime    Statins Protocol Failed - 1/2/2019 10:42 AM       Failed - LDL on file in past 12 months    Recent Labs   Lab Test 11/28/17  1454   LDL 52            Passed - No abnormal creatine kinase in past 12 months    No lab results found.            Passed - Recent (12 mo) or future (30 days) visit within the authorizing provider's specialty    Patient had office visit in the last 12 months or has a visit in the next 30 days with authorizing provider or within the authorizing provider's specialty.  See \"Patient Info\" tab in inbasket, or \"Choose Columns\" in Meds & Orders section of the refill encounter.             Passed - Patient is age 18 or older       Passed - No active pregnancy on record       Passed - No positive pregnancy test in past 12 months           "

## 2019-01-14 PROBLEM — I48.20 CHRONIC ATRIAL FIBRILLATION (H): Status: ACTIVE | Noted: 2018-12-04

## 2019-01-14 NOTE — LETTER
1/14/2019    Marbin Rouse MD  [address]  [facility] [address]    RE: Theresa Alves       Dear Colleague,    I had the pleasure of seeing Theresa Alves in the [facility].    HPI and Plan:   See dictation  [id number]  No orders of the defined types were placed in this encounter.      No orders of the defined types were placed in this encounter.      There are no discontinued medications.      Encounter Diagnoses   Name Primary?     Bradycardia      Chronic atrial fibrillation (H)        CURRENT MEDICATIONS:  Current Outpatient Medications   Medication Sig Dispense Refill     acetaminophen (TYLENOL) 500 MG tablet Take one tablet 8 hours after morning percocet and one tablet at bedtime  0     Cyanocobalamin (VITAMIN B 12 PO) Take 1 tablet by mouth daily        furosemide (LASIX) 20 MG tablet Take 1 tablet (20 mg) by mouth daily 90 tablet 0     metoprolol succinate (TOPROL-XL) 50 MG 24 hr tablet Take 1 tablet (50 mg) by mouth daily 30 tablet 11     multivitamin, therapeutic (THERA-VIT) TABS tablet Take 1 tablet by mouth daily 60 tablet 0     oxyCODONE-acetaminophen (PERCOCET) 5-325 MG tablet Take 0.5 tab every morning.  Max of 0.5 tab daily. 30 tablet 0     simvastatin (ZOCOR) 20 MG tablet take 1/2 tablet at bedtime 45 tablet 2     sodium chloride (OCEAN) 0.65 % nasal spray Spray 1 spray into both nostrils 3 times daily as needed for congestion  0     VITAMIN D, CHOLECALCIFEROL, PO Take 1,000 Units by mouth daily.           ALLERGIES   No Known Allergies    PAST MEDICAL HISTORY:  Past Medical History:   Diagnosis Date     Atrial fibrillation (H)      Chronic back pain      Chronic diastolic CHF (congestive heart failure) (H)      Chronic kidney disease, stage III (moderate) (H)      Pulmonary fibrosis (H)      Pulmonary hypertension (H)      Unspecified essential hypertension     Essential hypertension       PAST SURGICAL HISTORY:  Past Surgical History:   Procedure  Laterality Date     CARPAL TUNNEL RELEASE RT/LT  1990's    R     CATARACT IOL, RT/LT  5/03    R     HERNIA REPAIR, INGUINAL RT/LT  1954    R     hiatal hernia repair  6/10    large paraesophageal hiatal hernia; Nissen fundoplication        FAMILY HISTORY:  Family History   Problem Relation Age of Onset     Unknown/Adopted Mother      Unknown/Adopted Father      Diabetes No family hx of      Coronary Artery Disease No family hx of      Hypertension No family hx of      Hyperlipidemia No family hx of      Cerebrovascular Disease No family hx of      Breast Cancer No family hx of      Colon Cancer No family hx of      Prostate Cancer No family hx of      Other Cancer No family hx of      Depression No family hx of      Anxiety Disorder No family hx of      Mental Illness No family hx of      Substance Abuse No family hx of      Anesthesia Reaction No family hx of      Asthma No family hx of      Osteoporosis No family hx of      Genetic Disorder No family hx of      Thyroid Disease No family hx of      Obesity No family hx of        SOCIAL HISTORY:  Social History     Socioeconomic History     Marital status:      Spouse name: None     Number of children: 0     Years of education: None     Highest education level: None   Social Needs     Financial resource strain: None     Food insecurity - worry: None     Food insecurity - inability: None     Transportation needs - medical: None     Transportation needs - non-medical: None   Occupational History     Occupation: worked at [employer] for 40 yrs     Employer: RETIRED   Tobacco Use     Smoking status: Never Smoker     Smokeless tobacco: Never Used   Substance and Sexual Activity     Alcohol use: Yes     Comment: daily-vodka tonic x1 or more a week     Drug use: No     Sexual activity: No   Other Topics Concern     Parent/sibling w/ CABG, MI or angioplasty before 65F 55M? Not Asked   Social History Narrative     None       Review of Systems:  Skin:  Positive for   wounds  "from fall 2 weeks ago and a spot on her left arm she will see Derm for.    Eyes:  Positive for glasses    ENT:  Negative      Respiratory:  Positive for dyspnea on exertion;cough     Cardiovascular:  Negative Positive for;palpitations;edema on exertion  Gastroenterology: Positive for poor appetite occ drinks boost   Genitourinary:  Positive for nocturia    Musculoskeletal:  Positive for back pain;arthritis pt reports at baseline.  Neurologic:  Positive for numbness or tingling of feet due to the back pain  Psychiatric:  Negative      Heme/Lymph/Imm:  Negative      Endocrine:  Negative        Physical Exam:  Vitals: /67   Pulse 110   Ht 1.651 m (5' 5\")   Wt 51.7 kg (114 lb)   LMP  (LMP Unknown)   BMI 18.97 kg/m       Constitutional:  cooperative, alert and oriented, well developed, well nourished, in no acute distress thin      Skin:  warm and dry to the touch, no apparent skin lesions or masses noted          Head:  normocephalic, no masses or lesions        Eyes:  pupils equal and round, conjunctivae and lids unremarkable, sclera white, no xanthalasma, EOMS intact, no nystagmus        Lymph:No Cervical lymphadenopathy present     ENT:  no pallor or cyanosis, dentition good        Neck:  carotid pulses are full and equal bilaterally, JVP normal, no carotid bruit        Respiratory:  normal breath sounds, clear to auscultation, normal A-P diameter, normal symmetry, normal respiratory excursion, no use of accessory muscles         Cardiac:   irregularly irregular rhythm   no presence of murmur                                                   GI:  abdomen soft, non-tender, BS normoactive, no mass, no HSM, no bruits        Extremities and Muscular Skeletal:  no deformities, clubbing, cyanosis, erythema observed              Neurological:  no gross motor deficits        Psych:           FEROZ Davenport, PA-C  [address]  [place] [address]                Thank you for allowing me to " participate in the care of your patient.      Sincerely,     Horace Bowser MD     [facility]    cc:   Lolis Davenport PA-C  [address]  [place] [address]

## 2019-01-14 NOTE — LETTER
1/14/2019      Marbin Rouse MD  [address] [facility] [address]      RE: Theresa Alves       Dear Colleague,    I had the pleasure of seeing Theresa Alves in the [facility].    Service Date: 01/14/2019      HISTORY OF PRESENT ILLNESS:  Thank you for allowing me to participate in the care of your very delightful patient.  As you know, Theresa is a 89-year-old female with a history of long-lasting persistent atrial fibrillation in the background of preserved LV systolic function but does have CHF associated with this condition.  Her ventricular rate has been difficult to control and required high dose of AV isaías blocking agents but had to be reduced because of significant pauses noted.  I was asked to see her by one of our advanced practice providers, Lolis Davenport, for consideration of an AV isaías ablation and pacemaker.      Theresa mentioned that she feels quite short of breath with minimal activities, especially when her rate is fast now.  She does have pulmonary fibrosis and hypertension as well.  I would agree that she would be a good candidate for an AV node ablation and pacemaker with a single-chamber only, given the extremely dilated left atrium size.  It would be very hard to restore and maintain sinus rhythm.  Moreover, her symptoms are mostly derived from the rapid ventricular response.  I went over the procedure in detail with risks including but not limited to vascular injury and cardiac perforation.  She is not on any anticoagulation, even an aspirin, due to recurrent severe epistaxis when she was on them.  Given her advanced age, I did not bring up the option of left atrial appendage occluder device.        Thank you for allowing me to participate in the care of your very delightful patient.      cc:   Marbin Rouse MD    [facility]    [address] [place] [address]         KIM ROLAND MD             D:  2019   T: 2019   MT: MARITA      Name:     YANELI CHUA   MRN:      [id number]        Account:      [id number]   :      1924           Service Date: 2019      Document: [id number]           Outpatient Encounter Medications as of 2019   Medication Sig Dispense Refill     acetaminophen (TYLENOL) 500 MG tablet Take one tablet 8 hours after morning percocet and one tablet at bedtime  0     Cyanocobalamin (VITAMIN B 12 PO) Take 1 tablet by mouth daily        furosemide (LASIX) 20 MG tablet Take 1 tablet (20 mg) by mouth daily 90 tablet 0     metoprolol succinate (TOPROL-XL) 50 MG 24 hr tablet Take 1 tablet (50 mg) by mouth daily 30 tablet 11     multivitamin, therapeutic (THERA-VIT) TABS tablet Take 1 tablet by mouth daily 60 tablet 0     oxyCODONE-acetaminophen (PERCOCET) 5-325 MG tablet Take 0.5 tab every morning.  Max of 0.5 tab daily. 30 tablet 0     simvastatin (ZOCOR) 20 MG tablet take 1/2 tablet at bedtime 45 tablet 2     sodium chloride (OCEAN) 0.65 % nasal spray Spray 1 spray into both nostrils 3 times daily as needed for congestion  0     VITAMIN D, CHOLECALCIFEROL, PO Take 1,000 Units by mouth daily.         No facility-administered encounter medications on file as of 2019.      Again, thank you for allowing me to participate in the care of your patient.      Sincerely,    Horace Bowser MD     [facility]

## 2019-01-14 NOTE — PROGRESS NOTES
HPI and Plan:   See dictation  [id number]  No orders of the defined types were placed in this encounter.      No orders of the defined types were placed in this encounter.      There are no discontinued medications.      Encounter Diagnoses   Name Primary?     Bradycardia      Chronic atrial fibrillation (H)        CURRENT MEDICATIONS:  Current Outpatient Medications   Medication Sig Dispense Refill     acetaminophen (TYLENOL) 500 MG tablet Take one tablet 8 hours after morning percocet and one tablet at bedtime  0     Cyanocobalamin (VITAMIN B 12 PO) Take 1 tablet by mouth daily        furosemide (LASIX) 20 MG tablet Take 1 tablet (20 mg) by mouth daily 90 tablet 0     metoprolol succinate (TOPROL-XL) 50 MG 24 hr tablet Take 1 tablet (50 mg) by mouth daily 30 tablet 11     multivitamin, therapeutic (THERA-VIT) TABS tablet Take 1 tablet by mouth daily 60 tablet 0     oxyCODONE-acetaminophen (PERCOCET) 5-325 MG tablet Take 0.5 tab every morning.  Max of 0.5 tab daily. 30 tablet 0     simvastatin (ZOCOR) 20 MG tablet take 1/2 tablet at bedtime 45 tablet 2     sodium chloride (OCEAN) 0.65 % nasal spray Spray 1 spray into both nostrils 3 times daily as needed for congestion  0     VITAMIN D, CHOLECALCIFEROL, PO Take 1,000 Units by mouth daily.           ALLERGIES   No Known Allergies    PAST MEDICAL HISTORY:  Past Medical History:   Diagnosis Date     Atrial fibrillation (H)      Chronic back pain      Chronic diastolic CHF (congestive heart failure) (H)      Chronic kidney disease, stage III (moderate) (H)      Pulmonary fibrosis (H)      Pulmonary hypertension (H)      Unspecified essential hypertension     Essential hypertension       PAST SURGICAL HISTORY:  Past Surgical History:   Procedure Laterality Date     CARPAL TUNNEL RELEASE RT/LT  1990's    R     CATARACT IOL, RT/LT  5/03    R     HERNIA REPAIR, INGUINAL RT/LT  1954    R     hiatal hernia repair  6/10    large paraesophageal hiatal hernia; Nissen fundoplication         FAMILY HISTORY:  Family History   Problem Relation Age of Onset     Unknown/Adopted Mother      Unknown/Adopted Father      Diabetes No family hx of      Coronary Artery Disease No family hx of      Hypertension No family hx of      Hyperlipidemia No family hx of      Cerebrovascular Disease No family hx of      Breast Cancer No family hx of      Colon Cancer No family hx of      Prostate Cancer No family hx of      Other Cancer No family hx of      Depression No family hx of      Anxiety Disorder No family hx of      Mental Illness No family hx of      Substance Abuse No family hx of      Anesthesia Reaction No family hx of      Asthma No family hx of      Osteoporosis No family hx of      Genetic Disorder No family hx of      Thyroid Disease No family hx of      Obesity No family hx of        SOCIAL HISTORY:  Social History     Socioeconomic History     Marital status:      Spouse name: None     Number of children: 0     Years of education: None     Highest education level: None   Social Needs     Financial resource strain: None     Food insecurity - worry: None     Food insecurity - inability: None     Transportation needs - medical: None     Transportation needs - non-medical: None   Occupational History     Occupation: worked at [email or web address] for 40 yrs     Employer: RETIRED   Tobacco Use     Smoking status: Never Smoker     Smokeless tobacco: Never Used   Substance and Sexual Activity     Alcohol use: Yes     Comment: daily-vodka tonic x1 or more a week     Drug use: No     Sexual activity: No   Other Topics Concern     Parent/sibling w/ CABG, MI or angioplasty before 65F 55M? Not Asked   Social History Narrative     None       Review of Systems:  Skin:  Positive for   wounds from fall 2 weeks ago and a spot on her left arm she will see Derm for.    Eyes:  Positive for glasses    ENT:  Negative      Respiratory:  Positive for dyspnea on exertion;cough     Cardiovascular:  Negative Positive  "for;palpitations;edema on exertion  Gastroenterology: Positive for poor appetite occ drinks boost   Genitourinary:  Positive for nocturia    Musculoskeletal:  Positive for back pain;arthritis pt reports at baseline.  Neurologic:  Positive for numbness or tingling of feet due to the back pain  Psychiatric:  Negative      Heme/Lymph/Imm:  Negative      Endocrine:  Negative        Physical Exam:  Vitals: /67   Pulse 110   Ht 1.651 m (5' 5\")   Wt 51.7 kg (114 lb)   LMP  (LMP Unknown)   BMI 18.97 kg/m      Constitutional:  cooperative, alert and oriented, well developed, well nourished, in no acute distress thin      Skin:  warm and dry to the touch, no apparent skin lesions or masses noted          Head:  normocephalic, no masses or lesions        Eyes:  pupils equal and round, conjunctivae and lids unremarkable, sclera white, no xanthalasma, EOMS intact, no nystagmus        Lymph:No Cervical lymphadenopathy present     ENT:  no pallor or cyanosis, dentition good        Neck:  carotid pulses are full and equal bilaterally, JVP normal, no carotid bruit        Respiratory:  normal breath sounds, clear to auscultation, normal A-P diameter, normal symmetry, normal respiratory excursion, no use of accessory muscles         Cardiac:   irregularly irregular rhythm   no presence of murmur                                                   GI:  abdomen soft, non-tender, BS normoactive, no mass, no HSM, no bruits        Extremities and Muscular Skeletal:  no deformities, clubbing, cyanosis, erythema observed              Neurological:  no gross motor deficits        Psych:           FEROZ Davenport, PA-C  [address]  [place] [address]              "

## 2019-01-14 NOTE — PROGRESS NOTES
Service Date: 2019      CLINIC VISIT      PRIMARY CARDIOLOGIST:  Dr. Walker      REASON FOR VISIT:  AFib, HFpEF followup.      HISTORY OF PRESENT ILLNESS:  Ms. Alves is a 89-year-old woman with past medical history significant for the followin.  Likely pulmonary fibrosis.   2.  Hypertension.    3.  CKD.   4.  Kyphoscoliosis.   5.  Persistent AFib with tachybrady.    6.  HFpEF.      I became involved in her care in 2018.  She was admitted for difficult-to-control AFib and heart failure that was secondary to that.  Rates were initially controlled on Toprol and digoxin.  She was started on anticoagulation, had severe nosebleeds with Eliquis and then in addition on 81 mg of aspirin and she has since decided to just be on no anticoagulation at all.  Unfortunately, this fall, she was found to be not feeling well in her home and just felt off and she was admitted and found to be with an alcohol level of 0.2 but still a normal digoxin level.  She was found to be tachybrady in the hospital with more garrett at that point than tachy.  They held all her rate controlling meds including digoxin and metoprolol and on repeat Holter monitor, she was still tachycardic.  I saw her back and she remained short of breath and we restarted some degree of metoprolol at 50 and even with this, repeat Holter monitor done last week showed an average heart rate of 110, a high of 190 and a low of 63.  We had arranged for her to see Dr. Izquierdo and she saw him this morning.  The plan is for AV node ablation and permanent pacemaker on Friday.      She tells me she still feels horrible overall.  She takes Lasix every day except for once a week she takes 40 mg.  On those days, she feels like she has less abdominal fullness and less pedal edema.  Does not necessarily change her breathing.  She is short of breath walking from the family room to the kitchen or the kitchen to the bathroom and gets winded even getting dressed.  It is  difficult for her to walk back from the lobby to the exam room.  She also notes that if she eats any sizable meal, she gets abdominal cramping and discomfort.  She switches between diarrhea and constipation in general.  She denies nhung orthopnea.      SOCIAL HISTORY:  She comes in today with her niece, Ericka, who brings her to appointments.  She states she is now down to 1 alcoholic drink a week and is now drinking some nonalcoholic beverage nightly.  She does say she is only drinking a small drink about 1 time a week.  She is a lifelong nonsmoker.      PHYSICAL EXAMINATION:   GENERAL:  Elderly, frail-appearing woman in no acute distress.   HEENT:  Normocephalic, atraumatic.   HEART:  Irregularly irregular and tachycardic.   LUNGS:  With dry crackles throughout the lower and middle lobes.  This is slightly worse than her baseline.  They are not particularly diminished.     EXTREMITIES:  She has 1+ edema in her ankles.   ABDOMEN:  Soft and nontender.   SKIN:  Warm and dry but pale.   NECK:  She has no JVP at 90 degrees.      LABORATORIES:  Today, creatinine 1.44, BUN 27, potassium 4.5, sodium 133.      ASSESSMENT AND PLAN:   1.  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  I am hopeful this will help with her volume retention.  I was very honest with her that I am not sure how much it will help her symptoms of shortness of breath and dyspnea on exertion.  We will have to wait and see.  That being said, her rate control is fairly poor and we cannot increase beta blockers due to pauses.  It is possible she will feel much better.   2.  Heart failure with preserved EF secondary to tachycardia.  We will assess how she does post ablation.  At this point, she takes 40 mg of Lasix once a week.  We will have her do that tomorrow and then she will remain on 20 the remainder of the week except for the day of her pacemaker which she will not take any.  Her  N-terminal proBNP is markedly elevated today at 10,000.  I do think she is likely retaining some volume and as well, she is tachycardic with her AFib which can also increase N-terminal proBNP.  At this point, I do not want to dehydrate her before her procedure.   3.  Pulmonary fibrosis, stable.   4.  CKD, stable with her baseline creatinine around 1.4.   5.  History of hospitalization for alcohol intoxication, now 1 a week.  I do prefer she drinks none.  This is a woman with strong opinions.      She has declined anticoagulation multiple times after multiple very, very long discussions about this.  She absolutely abhors getting nosebleeds and is understanding of the risk of stroke.  At this time, she will remain off anticoagulation.      Thank you for allowing me to participate in her care.  I will see her back in about 3 weeks from now.  BMP and BNP at that time.      LENO Medrano PA-C             D: 2019   T: 2019   MT: MARITA      Name:     YANELI CHUA   MRN:      [id number]        Account:      [id number]   :      1924           Service Date: 2019      Document: [id number]

## 2019-01-14 NOTE — PROGRESS NOTES
[id number]  HPI and Plan:   See dictation    Orders this Visit:  Orders Placed This Encounter   Procedures     Basic metabolic panel     N terminal pro BNP outpatient     Follow-Up with [facility] - [facility] visit     No orders of the defined types were placed in this encounter.    There are no discontinued medications.      Encounter Diagnosis   Name Primary?     (HFpEF) heart failure with preserved ejection fraction (H) Yes       CURRENT MEDICATIONS:  Current Outpatient Medications   Medication Sig Dispense Refill     acetaminophen (TYLENOL) 500 MG tablet Take one tablet 8 hours after morning percocet and one tablet at bedtime  0     Cyanocobalamin (VITAMIN B 12 PO) Take 1 tablet by mouth daily        furosemide (LASIX) 20 MG tablet Take 1 tablet (20 mg) by mouth daily 90 tablet 0     metoprolol succinate (TOPROL-XL) 50 MG 24 hr tablet Take 1 tablet (50 mg) by mouth daily 30 tablet 11     multivitamin, therapeutic (THERA-VIT) TABS tablet Take 1 tablet by mouth daily 60 tablet 0     oxyCODONE-acetaminophen (PERCOCET) 5-325 MG tablet Take 0.5 tab every morning.  Max of 0.5 tab daily. 30 tablet 0     simvastatin (ZOCOR) 20 MG tablet take 1/2 tablet at bedtime 45 tablet 2     sodium chloride (OCEAN) 0.65 % nasal spray Spray 1 spray into both nostrils 3 times daily as needed for congestion  0     VITAMIN D, CHOLECALCIFEROL, PO Take 1,000 Units by mouth daily.           ALLERGIES   No Known Allergies    PAST MEDICAL HISTORY:  Past Medical History:   Diagnosis Date     Atrial fibrillation (H)      Chronic back pain      Chronic diastolic CHF (congestive heart failure) (H)      Chronic kidney disease, stage III (moderate) (H)      Pulmonary fibrosis (H)      Pulmonary hypertension (H)      Unspecified essential hypertension     Essential hypertension       PAST SURGICAL HISTORY:  Past Surgical History:   Procedure Laterality Date     CARPAL TUNNEL RELEASE RT/LT  1990's    R     CATARACT IOL, RT/LT  5/03    R     HERNIA REPAIR,  INGUINAL RT/LT  1954    R     hiatal hernia repair  6/10    large paraesophageal hiatal hernia; Nissen fundoplication        FAMILY HISTORY:  Family History   Problem Relation Age of Onset     Unknown/Adopted Mother      Unknown/Adopted Father      Diabetes No family hx of      Coronary Artery Disease No family hx of      Hypertension No family hx of      Hyperlipidemia No family hx of      Cerebrovascular Disease No family hx of      Breast Cancer No family hx of      Colon Cancer No family hx of      Prostate Cancer No family hx of      Other Cancer No family hx of      Depression No family hx of      Anxiety Disorder No family hx of      Mental Illness No family hx of      Substance Abuse No family hx of      Anesthesia Reaction No family hx of      Asthma No family hx of      Osteoporosis No family hx of      Genetic Disorder No family hx of      Thyroid Disease No family hx of      Obesity No family hx of        SOCIAL HISTORY:  Social History     Socioeconomic History     Marital status:      Spouse name: Not on file     Number of children: 0     Years of education: Not on file     Highest education level: Not on file   Social Needs     Financial resource strain: Not on file     Food insecurity - worry: Not on file     Food insecurity - inability: Not on file     Transportation needs - medical: Not on file     Transportation needs - non-medical: Not on file   Occupational History     Occupation: worked at [employer] for 40 yrs     Employer: RETIRED   Tobacco Use     Smoking status: Never Smoker     Smokeless tobacco: Never Used   Substance and Sexual Activity     Alcohol use: Yes     Comment: daily-vodka tonic x1 or more a week     Drug use: No     Sexual activity: No   Other Topics Concern     Parent/sibling w/ CABG, MI or angioplasty before 65F 55M? Not Asked   Social History Narrative     Not on file       Review of Systems:  Skin:  Positive for     Eyes:  Positive for glasses  ENT:  Negative   "  Respiratory:  Positive for dyspnea on exertion;cough  Cardiovascular:  Negative Positive for;palpitations;edema  Gastroenterology: Positive for poor appetite  Genitourinary:  Positive for nocturia  Musculoskeletal:  Positive for back pain;arthritis  Neurologic:  Positive for numbness or tingling of feet  Psychiatric:  Negative    Heme/Lymph/Imm:  Negative    Endocrine:  Negative      Physical Exam:  Vitals: /67   Pulse 110   Ht 1.651 m (5' 5\")   Wt 51.7 kg (114 lb)   LMP  (LMP Unknown)   BMI 18.97 kg/m     Please refer to dictation for physical exam    Recent Lab Results:  LIPID RESULTS:  Lab Results   Component Value Date    CHOL 142 11/28/2017    HDL 69 11/28/2017    LDL 52 11/28/2017    TRIG 107 11/28/2017    CHOLHDLRATIO 2.7 01/10/2014       LIVER ENZYME RESULTS:  Lab Results   Component Value Date    AST 24 03/18/2018    ALT 27 03/18/2018       CBC RESULTS:  Lab Results   Component Value Date    WBC 8.2 11/05/2018    RBC 3.78 (L) 11/05/2018    HGB 13.2 11/05/2018    HCT 33.8 (L) 11/06/2018     (H) 11/05/2018    MCH 34.9 (H) 11/05/2018    MCHC 33.7 11/05/2018    RDW 13.3 11/05/2018     11/05/2018       BMP RESULTS:  Lab Results   Component Value Date     (L) 01/14/2019    POTASSIUM 4.5 01/14/2019    CHLORIDE 104 01/14/2019    CO2 26 01/14/2019    ANIONGAP 7.5 01/14/2019     (H) 01/14/2019    BUN 27 01/14/2019    CR 1.44 (H) 01/14/2019    GFRESTIMATED 34 (L) 01/14/2019    GFRESTBLACK 41 (L) 01/14/2019    PAWEL 9.5 01/14/2019        A1C RESULTS:  No results found for: A1C    INR RESULTS:  No results found for: INR        CC  No referring provider defined for this encounter.      "

## 2019-01-14 NOTE — LETTER
1/14/2019    Marbin Rouse MD  [address]  [facility] [address]    RE: Theresa Alves       Dear Colleague,    I had the pleasure of seeing Theresa Alves in the [facility].    [id number]  HPI and Plan:   See dictation    Orders this Visit:  Orders Placed This Encounter   Procedures     Basic metabolic panel     N terminal pro BNP outpatient     [facility] with [facility] - [facility] visit     No orders of the defined types were placed in this encounter.    There are no discontinued medications.      Encounter Diagnosis   Name Primary?     (HFpEF) heart failure with preserved ejection fraction (H) Yes       CURRENT MEDICATIONS:  Current Outpatient Medications   Medication Sig Dispense Refill     acetaminophen (TYLENOL) 500 MG tablet Take one tablet 8 hours after morning percocet and one tablet at bedtime  0     Cyanocobalamin (VITAMIN B 12 PO) Take 1 tablet by mouth daily        furosemide (LASIX) 20 MG tablet Take 1 tablet (20 mg) by mouth daily 90 tablet 0     metoprolol succinate (TOPROL-XL) 50 MG 24 hr tablet Take 1 tablet (50 mg) by mouth daily 30 tablet 11     multivitamin, therapeutic (THERA-VIT) TABS tablet Take 1 tablet by mouth daily 60 tablet 0     oxyCODONE-acetaminophen (PERCOCET) 5-325 MG tablet Take 0.5 tab every morning.  Max of 0.5 tab daily. 30 tablet 0     simvastatin (ZOCOR) 20 MG tablet take 1/2 tablet at bedtime 45 tablet 2     sodium chloride (OCEAN) 0.65 % nasal spray Spray 1 spray into both nostrils 3 times daily as needed for congestion  0     VITAMIN D, CHOLECALCIFEROL, PO Take 1,000 Units by mouth daily.           ALLERGIES   No Known Allergies    PAST MEDICAL HISTORY:  Past Medical History:   Diagnosis Date     Atrial fibrillation (H)      Chronic back pain      Chronic diastolic CHF (congestive heart failure) (H)      Chronic kidney disease, stage III (moderate) (H)      Pulmonary fibrosis (H)      Pulmonary hypertension (H)      Unspecified  essential hypertension     Essential hypertension       PAST SURGICAL HISTORY:  Past Surgical History:   Procedure Laterality Date     CARPAL TUNNEL RELEASE RT/LT  1990's    R     CATARACT IOL, RT/LT  5/03    R     HERNIA REPAIR, INGUINAL RT/LT  1954    R     hiatal hernia repair  6/10    large paraesophageal hiatal hernia; Nissen fundoplication        FAMILY HISTORY:  Family History   Problem Relation Age of Onset     Unknown/Adopted Mother      Unknown/Adopted Father      Diabetes No family hx of      Coronary Artery Disease No family hx of      Hypertension No family hx of      Hyperlipidemia No family hx of      Cerebrovascular Disease No family hx of      Breast Cancer No family hx of      Colon Cancer No family hx of      Prostate Cancer No family hx of      Other Cancer No family hx of      Depression No family hx of      Anxiety Disorder No family hx of      Mental Illness No family hx of      Substance Abuse No family hx of      Anesthesia Reaction No family hx of      Asthma No family hx of      Osteoporosis No family hx of      Genetic Disorder No family hx of      Thyroid Disease No family hx of      Obesity No family hx of        SOCIAL HISTORY:  Social History     Socioeconomic History     Marital status:      Spouse name: Not on file     Number of children: 0     Years of education: Not on file     Highest education level: Not on file   Social Needs     Financial resource strain: Not on file     Food insecurity - worry: Not on file     Food insecurity - inability: Not on file     Transportation needs - medical: Not on file     Transportation needs - non-medical: Not on file   Occupational History     Occupation: worked at [employer] for 40 yrs     Employer: RETIRED   Tobacco Use     Smoking status: Never Smoker     Smokeless tobacco: Never Used   Substance and Sexual Activity     Alcohol use: Yes     Comment: daily-vodka tonic x1 or more a week     Drug use: No     Sexual activity: No   Other Topics  "Concern     Parent/sibling w/ CABG, MI or angioplasty before 65F 55M? Not Asked   Social History Narrative     Not on file       Review of Systems:  Skin:  Positive for     Eyes:  Positive for glasses  ENT:  Negative    Respiratory:  Positive for dyspnea on exertion;cough  Cardiovascular:  Negative Positive for;palpitations;edema  Gastroenterology: Positive for poor appetite  Genitourinary:  Positive for nocturia  Musculoskeletal:  Positive for back pain;arthritis  Neurologic:  Positive for numbness or tingling of feet  Psychiatric:  Negative    Heme/Lymph/Imm:  Negative    Endocrine:  Negative      Physical Exam:  Vitals: /67   Pulse 110   Ht 1.651 m (5' 5\")   Wt 51.7 kg (114 lb)   LMP  (LMP Unknown)   BMI 18.97 kg/m      Please refer to dictation for physical exam    Recent Lab Results:  LIPID RESULTS:  Lab Results   Component Value Date    CHOL 142 11/28/2017    HDL 69 11/28/2017    LDL 52 11/28/2017    TRIG 107 11/28/2017    CHOLHDLRATIO 2.7 01/10/2014       LIVER ENZYME RESULTS:  Lab Results   Component Value Date    AST 24 03/18/2018    ALT 27 03/18/2018       CBC RESULTS:  Lab Results   Component Value Date    WBC 8.2 11/05/2018    RBC 3.78 (L) 11/05/2018    HGB 13.2 11/05/2018    HCT 33.8 (L) 11/06/2018     (H) 11/05/2018    MCH 34.9 (H) 11/05/2018    MCHC 33.7 11/05/2018    RDW 13.3 11/05/2018     11/05/2018       BMP RESULTS:  Lab Results   Component Value Date     (L) 01/14/2019    POTASSIUM 4.5 01/14/2019    CHLORIDE 104 01/14/2019    CO2 26 01/14/2019    ANIONGAP 7.5 01/14/2019     (H) 01/14/2019    BUN 27 01/14/2019    CR 1.44 (H) 01/14/2019    GFRESTIMATED 34 (L) 01/14/2019    GFRESTBLACK 41 (L) 01/14/2019    PAWEL 9.5 01/14/2019        A1C RESULTS:  No results found for: A1C    INR RESULTS:  No results found for: INR        CC  No referring provider defined for this encounter.        Service Date: 01/14/2019      CLINIC VISIT      PRIMARY CARDIOLOGIST:  Dr. Walker    "   REASON FOR VISIT:  AFib, HFpEF followup.      HISTORY OF PRESENT ILLNESS:  Ms. Alves is a 89-year-old woman with past medical history significant for the followin.  Likely pulmonary fibrosis.   2.  Hypertension.    3.  CKD.   4.  Kyphoscoliosis.   5.  Persistent AFib with tachybrady.    6.  HFpEF.      I became involved in her care in 2018.  She was admitted for difficult-to-control AFib and heart failure that was secondary to that.  Rates were initially controlled on Toprol and digoxin.  She was started on anticoagulation, had severe nosebleeds with Eliquis and then in addition on 81 mg of aspirin and she has since decided to just be on no anticoagulation at all.  Unfortunately, this fall, she was found to be not feeling well in her home and just felt off and she was admitted and found to be with an alcohol level of 0.2 but still a normal digoxin level.  She was found to be tachybrady in the hospital with more garrett at that point than tachy.  They held all her rate controlling meds including digoxin and metoprolol and on repeat Holter monitor, she was still tachycardic.  I saw her back and she remained short of breath and we restarted some degree of metoprolol at 50 and even with this, repeat Holter monitor done last week showed an average heart rate of 110, a high of 190 and a low of 63.  We had arranged for her to see Dr. Izquierdo and she saw him this morning.  The plan is for AV node ablation and permanent pacemaker on Friday.      She tells me she still feels horrible overall.  She takes Lasix every day except for once a week she takes 40 mg.  On those days, she feels like she has less abdominal fullness and less pedal edema.  Does not necessarily change her breathing.  She is short of breath walking from the family room to the kitchen or the kitchen to the bathroom and gets winded even getting dressed.  It is difficult for her to walk back from the lobby to the exam room.  She also notes that if she  eats any sizable meal, she gets abdominal cramping and discomfort.  She switches between diarrhea and constipation in general.  She denies nhung orthopnea.      SOCIAL HISTORY:  She comes in today with her niece, Ericka, who brings her to appointments.  She states she is now down to 1 alcoholic drink a week and is now drinking some nonalcoholic beverage nightly.  She does say she is only drinking a small drink about 1 time a week.  She is a lifelong nonsmoker.      PHYSICAL EXAMINATION:   GENERAL:  Elderly, frail-appearing woman in no acute distress.   HEENT:  Normocephalic, atraumatic.   HEART:  Irregularly irregular and tachycardic.   LUNGS:  With dry crackles throughout the lower and middle lobes.  This is slightly worse than her baseline.  They are not particularly diminished.     EXTREMITIES:  She has 1+ edema in her ankles.   ABDOMEN:  Soft and nontender.   SKIN:  Warm and dry but pale.   NECK:  She has no JVP at 90 degrees.      LABORATORIES:  Today, creatinine 1.44, BUN 27, potassium 4.5, sodium 133.      ASSESSMENT AND PLAN:   1.  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  I am hopeful this will help with her volume retention.  I was very honest with her that I am not sure how much it will help her symptoms of shortness of breath and dyspnea on exertion.  We will have to wait and see.  That being said, her rate control is fairly poor and we cannot increase beta blockers due to pauses.  It is possible she will feel much better.   2.  Heart failure with preserved EF secondary to tachycardia.  We will assess how she does post ablation.  At this point, she takes 40 mg of Lasix once a week.  We will have her do that tomorrow and then she will remain on 20 the remainder of the week except for the day of her pacemaker which she will not take any.  Her N-terminal proBNP is markedly elevated today at 10,000.  I do think she is likely retaining  some volume and as well, she is tachycardic with her AFib which can also increase N-terminal proBNP.  At this point, I do not want to dehydrate her before her procedure.   3.  Pulmonary fibrosis, stable.   4.  CKD, stable with her baseline creatinine around 1.4.   5.  History of hospitalization for alcohol intoxication, now 1 a week.  I do prefer she drinks none.  This is a woman with strong opinions.      She has declined anticoagulation multiple times after multiple very, very long discussions about this.  She absolutely abhors getting nosebleeds and is understanding of the risk of stroke.  At this time, she will remain off anticoagulation.      Thank you for allowing me to participate in her care.  I will see her back in about 3 weeks from now.  BMP and BNP at that time.      LENO Medrano PA-C             D: 2019   T: 2019   MT: MARITA      Name:     YANELI CHUA   MRN:      [id number]        Account:      [id number]   :      1924           Service Date: 2019      Document: [id number]         Thank you for allowing me to participate in the care of your patient.      Sincerely,     Lolis Davenport PA-C     [facility]    cc:   No referring provider defined for this encounter.

## 2019-01-14 NOTE — PATIENT INSTRUCTIONS
Please call [facility] nurse for any questions or concerns:       [phone]    1. Medication changes:  Today take 20 mg of lasix when you get home.    Tomorrow take 40 mg of lasix.  Wed and Thursday take 20 mg a day.    Continue other medications as you are.      2.  Weigh yourself daily and write it down.     3. Call [facility] nurse if your weight is up more than 2 pounds in one day, or 5 pounds in one week.    4. Call [facility] nurse if you feel more short of breath, have more abdominal bloating or leg swelling.    5. Eat a low sodium diet (less than 2,000mg daily). If you eat less salt, you will retain less fluid.     6. Results:   Component      Latest Ref Rng & Units 1/14/2019   Sodium      136 - 145 mmol/L 133 (L)   Potassium      3.5 - 5.1 mmol/L 4.5   Chloride      98 - 107 mmol/L 104   Carbon Dioxide      23 - 29 mmol/L 26   Anion Gap      6 - 17 mmol/L 7.5   Glucose      70 - 105 mg/dL 118 (H)   Urea Nitrogen      7 - 30 mg/dL 27   Creatinine      0.70 - 1.30 mg/dL 1.44 (H)   GFR Estimate      >60 mL/min/1.73:m2 34 (L)   GFR Estimate If Black      >60 mL/min/1.73:m2 41 (L)   Calcium      8.5 - 10.5 mg/dL 9.5   N-Terminal Pro Bnp      0 - 450 pg/mL 10,382 (H)       7.  Follow up: with me in about 3 weeks with labs at that visit.        Scheduling phone number: [phone]  Reminder: Please bring in all current medications, over the counter supplements and vitamin bottles to your next appointment.

## 2019-01-14 NOTE — PROGRESS NOTES
Service Date: 2019      HISTORY OF PRESENT ILLNESS:  Thank you for allowing me to participate in the care of your very delightful patient.  As you know, Theresa is a 89-year-old female with a history of long-lasting persistent atrial fibrillation in the background of preserved LV systolic function but does have CHF associated with this condition.  Her ventricular rate has been difficult to control and required high dose of AV isaías blocking agents but had to be reduced because of significant pauses noted.  I was asked to see her by one of our advanced practice providers, Lolis Davenport, for consideration of an AV isaías ablation and pacemaker.      Theresa mentioned that she feels quite short of breath with minimal activities, especially when her rate is fast now.  She does have pulmonary fibrosis and hypertension as well.  I would agree that she would be a good candidate for an AV node ablation and pacemaker with a single-chamber only, given the extremely dilated left atrium size.  It would be very hard to restore and maintain sinus rhythm.  Moreover, her symptoms are mostly derived from the rapid ventricular response.  I went over the procedure in detail with risks including but not limited to vascular injury and cardiac perforation.  She is not on any anticoagulation, even an aspirin, due to recurrent severe epistaxis when she was on them.  Given her advanced age, I did not bring up the option of left atrial appendage occluder device.        Thank you for allowing me to participate in the care of your very delightful patient.      cc:   Marbin Rouse MD    [facility]    [address] [place] [address]         KIM ROLAND MD             D: 2019   T: 2019   MT: MARITA      Name:     THERESA CHUA   MRN:      [id number]        Account:      [id number]   :      1924           Service Date: 2019      Document: [id number]

## 2019-01-14 NOTE — LETTER
2019      Marbin Rouse MD  [address]  [facility] [address]      RE: Theresa Alves       Dear Colleague,    I had the pleasure of seeing Theresa Alves in the [facility].    Service Date: 2019      CLINIC VISIT      PRIMARY CARDIOLOGIST:  Dr. Walker      REASON FOR VISIT:  AFib, HFpEF followup.      HISTORY OF PRESENT ILLNESS:  Ms. Alves is a 89-year-old woman with past medical history significant for the followin.  Likely pulmonary fibrosis.   2.  Hypertension.    3.  CKD.   4.  Kyphoscoliosis.   5.  Persistent AFib with tachybrady.    6.  HFpEF.      I became involved in her care in 2018.  She was admitted for difficult-to-control AFib and heart failure that was secondary to that.  Rates were initially controlled on Toprol and digoxin.  She was started on anticoagulation, had severe nosebleeds with Eliquis and then in addition on 81 mg of aspirin and she has since decided to just be on no anticoagulation at all.  Unfortunately, this fall, she was found to be not feeling well in her home and just felt off and she was admitted and found to be with an alcohol level of 0.2 but still a normal digoxin level.  She was found to be tachybrady in the hospital with more garrett at that point than tachy.  They held all her rate controlling meds including digoxin and metoprolol and on repeat Holter monitor, she was still tachycardic.  I saw her back and she remained short of breath and we restarted some degree of metoprolol at 50 and even with this, repeat Holter monitor done last week showed an average heart rate of 110, a high of 190 and a low of 63.  We had arranged for her to see Dr. Izquierdo and she saw him this morning.  The plan is for AV node ablation and permanent pacemaker on Friday.      She tells me she still feels horrible overall.  She takes Lasix every day except for once a week she takes 40 mg.  On those days, she feels like she has less  abdominal fullness and less pedal edema.  Does not necessarily change her breathing.  She is short of breath walking from the family room to the kitchen or the kitchen to the bathroom and gets winded even getting dressed.  It is difficult for her to walk back from the lobby to the exam room.  She also notes that if she eats any sizable meal, she gets abdominal cramping and discomfort.  She switches between diarrhea and constipation in general.  She denies nhung orthopnea.      SOCIAL HISTORY:  She comes in today with her niece, Ericka, who brings her to appointments.  She states she is now down to 1 alcoholic drink a week and is now drinking some nonalcoholic beverage nightly.  She does say she is only drinking a small drink about 1 time a week.  She is a lifelong nonsmoker.      PHYSICAL EXAMINATION:   GENERAL:  Elderly, frail-appearing woman in no acute distress.   HEENT:  Normocephalic, atraumatic.   HEART:  Irregularly irregular and tachycardic.   LUNGS:  With dry crackles throughout the lower and middle lobes.  This is slightly worse than her baseline.  They are not particularly diminished.     EXTREMITIES:  She has 1+ edema in her ankles.   ABDOMEN:  Soft and nontender.   SKIN:  Warm and dry but pale.   NECK:  She has no JVP at 90 degrees.      LABORATORIES:  Today, creatinine 1.44, BUN 27, potassium 4.5, sodium 133.      ASSESSMENT AND PLAN:   1.  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  I am hopeful this will help with her volume retention.  I was very honest with her that I am not sure how much it will help her symptoms of shortness of breath and dyspnea on exertion.  We will have to wait and see.  That being said, her rate control is fairly poor and we cannot increase beta blockers due to pauses.  It is possible she will feel much better.   2.  Heart failure with preserved EF secondary to tachycardia.  We will assess how she does post  ablation.  At this point, she takes 40 mg of Lasix once a week.  We will have her do that tomorrow and then she will remain on 20 the remainder of the week except for the day of her pacemaker which she will not take any.  Her N-terminal proBNP is markedly elevated today at 10,000.  I do think she is likely retaining some volume and as well, she is tachycardic with her AFib which can also increase N-terminal proBNP.  At this point, I do not want to dehydrate her before her procedure.   3.  Pulmonary fibrosis, stable.   4.  CKD, stable with her baseline creatinine around 1.4.   5.  History of hospitalization for alcohol intoxication, now 1 a week.  I do prefer she drinks none.  This is a woman with strong opinions.      She has declined anticoagulation multiple times after multiple very, very long discussions about this.  She absolutely abhors getting nosebleeds and is understanding of the risk of stroke.  At this time, she will remain off anticoagulation.      Thank you for allowing me to participate in her care.  I will see her back in about 3 weeks from now.  BMP and BNP at that time.      LENO Medrano PA-C         D: 2019   T: 2019   MT: MARITA      Name:     YANELI CHUA   MRN:      [id number]        Account:      [id number]   :      1924           Service Date: 2019      Document: [id number]      Outpatient Encounter Medications as of 2019   Medication Sig Dispense Refill     acetaminophen (TYLENOL) 500 MG tablet Take one tablet 8 hours after morning percocet and one tablet at bedtime  0     Cyanocobalamin (VITAMIN B 12 PO) Take 1 tablet by mouth daily        furosemide (LASIX) 20 MG tablet Take 1 tablet (20 mg) by mouth daily 90 tablet 0     metoprolol succinate (TOPROL-XL) 50 MG 24 hr tablet Take 1 tablet (50 mg) by mouth daily 30 tablet 11     multivitamin, therapeutic (THERA-VIT) TABS tablet Take 1 tablet by mouth daily 60 tablet 0      oxyCODONE-acetaminophen (PERCOCET) 5-325 MG tablet Take 0.5 tab every morning.  Max of 0.5 tab daily. 30 tablet 0     simvastatin (ZOCOR) 20 MG tablet take 1/2 tablet at bedtime 45 tablet 2     sodium chloride (OCEAN) 0.65 % nasal spray Spray 1 spray into both nostrils 3 times daily as needed for congestion  0     VITAMIN D, CHOLECALCIFEROL, PO Take 1,000 Units by mouth daily.         No facility-administered encounter medications on file as of 1/14/2019.      Again, thank you for allowing me to participate in the care of your patient.      Sincerely,    Lolis Davenport PA-C     [facility]

## 2019-01-15 NOTE — RESULT ENCOUNTER NOTE
Reviewed during clinic visit.  Please see progress note for plan.  Lolis Davenport PA-C 1/15/2019 10:04 AM

## 2019-01-15 NOTE — PROGRESS NOTES
Called patient with pre-procedure instructions for device implant: 1/15/19    Anticoagulation: 0   Oral diabetes meds: 0  Insulin: 0  Diuretic: 0  Contrast allergy: 0  Pt informed to be NPO at midnight  (if procedure scheduled 1pm or later, may have clear liquid breakfast before 8am)    Pt has post-procedure transportation and 24 hours monitoring set up.   Pt aware of no driving for 24 hours post procedure due to sedation.     Pt aware of arrival time and location. Pt verbalized understanding of instructions.

## 2019-01-18 NOTE — PROGRESS NOTES
Care Suites Arrival at 1340    Reason for Visit: AVNA/PPM    A/O. Denies pain. Labs drawn. IV flds infusing. Pre-procedure POC reviewed. All questions & concerns addressed.      Skin assess issues -Excoriation antonio area.  Wears attends for urgency.     Pt resting in bed, denies additional needs at this time, call light within reach. Family at bedside.  Plan to stay overnight.      1415 Dr Walker at bedside to speak with pt & Ericka cornelius. Consent signed at this time.  Dr. Walker reviewed lab results.    1445 Pt to CV-4 at this time.

## 2019-01-18 NOTE — PROGRESS NOTES
1520 Report received from Sarah Doherty RN.    1620 Pt returned from EP Lab. A/O. Gauze drsg intact to right groin puncture site - scant bloody drng noted to drsg. No oozing or hematoma noted. Area soft & flat. Pt instructed on activity restrictions while on bedrest. Verbal understanding received from pt.  Drsg CDI to left chest. No oozing or hematoma noted. Area soft & flat.  Pt instructed on activity restrictions with left arm. Verbal understanding received. Pt denies pain. Pt's niece at bedside. Detailed update given.  ICD/PPM booklet & ID card given to pt's niece.  1630 Device rep at bedside to speak with pt's niece. Bedside monitor given to niece.  1737 Detailed report called to ALISHA Fallon in OBS.  1800  Pt taking diet & flds well. No complaints.  1850 Pt transferred to OBS rm 3. All personal belongings sent w/ pt. Niece at bedside.

## 2019-01-18 NOTE — DISCHARGE INSTRUCTIONS
AV Node Ablation & Pacemaker Implant Discharge Instructions    After you go home:      Have an adult stay with you until tomorrow.    You may resume your normal diet.       For 24 hours - due to the sedation you received:    Relax and take it easy.    Do NOT make any important or legal decisions.    Do NOT drive or operate machines at home or at work.    Do NOT drink alcohol.    Care of Chest Incision:      Keep the bandage on at least 3 days. You may remove the dressing on Tuesday morning. Change it only if it gets loose or soaked. If you need to change it, use 4x4-inch gauze and a large clear bandage.     If there is a pressure dressing (gauze & tape) - 24 hours after your procedure you may remove ONLY the top dressing. Leave the bottom dressing on.    Leave the strips of tape on. They will fall off on their own, or we will remove them at your first check-up.    Check your wound daily for signs of infection, such as increased redness, severe swelling or draining. Fever may also be a sign of infection. Call us if you see any of these signs.    If there are no signs of infection, you may shower after the bandage comes off in 3 days. If you take a tub bath, keep the wound dry.    No soaking the incision (swimming pool, bathtub, hot tub) for 2 weeks.    You may have mild to medium pain for 3 to 5 days. Take Acetaminophen (Tylenol) or Ibuprofen (Advil) for the pain. If the pain persists or is severe, call us.    Care of Groin Puncture Site:      For the first 24 hrs - check the puncture site every 1-2 hours while awake.    For the first 2 days, when you cough, sneeze, laugh or move your bowels, hold your hand over the puncture site and press firmly.    Remove the bandaid after 24 hours. If there is minor oozing, apply another bandaid and remove it after 12 hours.    It is normal to have a small bruise or pea size lump at the site.    Do NOT take a bath, or use a hot tub or pool for at least 3 days. Do NOT scrub the  site. Do not use lotion or powder near the puncture site.    Activity    Chest:    For at least 2 weeks: Do not raise your elbow above your shoulder. You can begin to use your arm as it feels comfortable to you.    Do not use arm on implant side to lift more than 10 pounds for 2 weeks.    In 6 to 8 weeks: You may begin to golf, play tennis, swim and do similar activities.    No driving for one day & limit to necessary driving for one week. Please talk to your doctor for specific recommendations.    Groin:     For 2 days:    No stooping or squatting    Do NOT do any heavy activity such as exercise, lifting, or straining.     No housework, yard work or any activity that make you sweat    Do NOT lift more than 10 pounds    Bleeding:    Chest:    If you start bleeding from the incision site, sit down and press firmly on the site for 10 minutes.     Once bleeding stops, call [facility] as soon as you can.    Groin:     If you start bleeding from the site in your groin, lie down flat and press firmly on the site for 10 minutes.     Once bleeding stops, lay flat for 2 hours.     Call [facility] as soon as you can.       Call 911 right away if you have heavy bleeding or bleeding that does not stop.      Medicines:      Take your medications, including blood thinners, unless your provider tells you not to.    If you have stopped any medicines, check with your provider about when to restart them.    If you have pain or shortness of breath, you may take Advil (ibuprofen) or Tylenol (acetaminophen).    Follow Up Appointments:      Follow up with Device Clinic at [facility] of patient preference in 7-10 days.    Call the clinic if:      You have increased pain or a large or growing hard lump around the site.    The site is red, swollen, hot or tender.    Blood or fluid is draining from the site.    You have chills or a fever greater than 101 F (38 C).    Your leg feels numb, cool or changes color.    Increased  pain in the chest and/or groin.    Increased shortness of breath    Chest pain not relieved by Tylenol or Advil    New pain in the back or belly that you cannot control with Tylenol.    Recurrent irregular or fast heart rate lasting over 2 hours.    Any questions or concerns.    Heart rhythms:    You may have some irregular heartbeats. These feel very strong. They may make you feel that the fast heart rhythm is going to start again.  Give it time. The irregular beats should occur less often.      Telling others about your device:      Before you leave the hospital, you will receive a temporary ID card. A permanent card will be mailed to you about 6 to 8 weeks later. Always carry the ID card with you. It has important details about your device.    You may also get a Medical Alert bracelet or tag that says you have a pacemaker or a defibrillator (ICD). Go to [email or web address].     Always tell doctors, dentists and other care providers that you have a device implanted in you.    Let us know before you plan any surgeries. Your care team must take special steps to keep you safe during certain procedures. These steps will depend on the type of device you have. Your provider will need to see your ID card. They may need to call us for instructions.    Device Safety:      Please refer to device  s booklet for further information.    [facility] [place]:    [phone] UMP (7 days a week)

## 2019-01-19 NOTE — PROGRESS NOTES
A&O, VSS, chronic back pain improved with percocet and repositioning. Off bedrest 2030, no change to groin site CDI, scant drainage pea sized. Pacer site CDI. Up with SBA to BR, voiding. Frank diet. Tele NSR.

## 2019-01-19 NOTE — PROGRESS NOTES
A&O, VSS, chronic back pain-declined percocet, repositioning/heat. Groin site CDI, scant drainage pea sized. Pacer site CDI. CMS intact-baseline numbness tingling in LE. Up Ax1 and walker to BR, voiding. Frank reg diet. Tele NSR. Will continue to monitor.

## 2019-01-19 NOTE — PLAN OF CARE
Discharge Planner OT   Patient plan for discharge: Return to half-way  Current status: OT consult received and chart reviewed. Pt is a 89 yr old female s/p AV node ablation and single chamber pacemaker insertion d/t A fib w/ RVR, causing CHF symptoms.     Pt lives in half-way. Pt uses 2WW for functional mobility w/in apt and 4WW outside apt. PTA, pt mod I w/ all BADL's excluding bathing. Pt gets dinners from the dining room but completes simple breakfasts indly. Pt has assist w/ cleaning and laundry. Pt has stools placed in her restroom and kitchen for e/c during ADL's.     Pt completed bed mobility w/ HOB elevated w/ spv, min VC required initially to remind pt not to push w/ LUE. Pt refused completion w/ HOB flat to simulate home environment d/t fatigue despite encouragement. Pt completed functional transfers throughout session w/ mod I. Pt able to utilize 2WW w/in room safely to complete functional mobility. Pt able to only use LUE lightly for balance on walker and did not bear weight into walker.     Pt trained in modified techniques to complete ADL's while maintaining pacemaker precautions. Pt was able to don sports bra and pants EOB indly after initial precaution training. Pt was also able to don/doff socks in elevated supine utilizing figure-four position to complete. Noted pt w/ SOB after completion of all activities, VC's for PLB and for e/c strategies, VSS.  Pt provided w/ written handouts on pacemaker instructions/precautions and e/c strategies for ADL's.     Discussed recommendation for HH therapy, pt in agreement as she has had it before.     Pt is discharging home today, plan to complete OT order as pt has no further acute care OT needs.     Barriers to return to prior living situation: Decreased act tolerance  Recommendations for discharge: Return to half-way w/ prior services, home health PT and OT  Rationale for recommendations: Anticipate pt to safely return to [facility] w/ prior services. Pt would benefit from HH  therapy to improve functional activity tolerance and safe indep w/ ADL's and IADL's w/in her apartment.        Entered by: Candace Bland 01/19/2019 12:38 PM    Occupational Therapy Discharge Summary    Reason for therapy discharge:    Goals met and pt is discharging today    Progress towards therapy goal(s). See goals on Care Plan in [facility] electronic health record for goal details.  Goals met    Therapy recommendation(s):    Continued therapy is recommended.  Rationale/Recommendations:  home health therapy.

## 2019-01-19 NOTE — PROVIDER NOTIFICATION
MD Notification    Notified Person: MD Bean    Notified Person Name:    Notification Date/Time: 1/19/19 102    Notification Interaction: text page    Purpose of Notification: Hi, just wanted to see your thoughts on if patient is needed additional services as she lives independently at her assisted living facility and needs an OT/PT consult for dressing and mobility and home care needs. Please advise. Thank you!    Orders Received:    Comments:

## 2019-01-19 NOTE — PLAN OF CARE
Discharge Planner PT   Patient plan for discharge: Back to [facility] with increased services as needed  Current status: Orders received and chart reviewed. Per chart review and discussion with OT. Patient mobilizing at or near baseline with FWW with no balance deficits noted; most limited by fatigue. Patient lives in [facility] and receives assistance with bathing, cleaning, laundry, and 2 meals per day; states she is able to receive increased services if needed; open to receiving further skilled home OT/PT. Will defer PT evaluation to next level of care and complete orders at this time.   Barriers to return to prior living situation: None indicated from mobility stand point   Recommendations for discharge: Back to [facility] with home OT/PT and increased services as needed   Rationale for recommendations: Per chart review and discussion with OT, patient mobilizing at or near baseline with no balance deficits noted. Patient safe to discharge back to [facility] with continued services for assist with bathing, cleaning, laundry, and 2 meals per day. Patient would further benefit from skilled home PT to address functional limitations.        Entered by: Irene Jameson 01/19/2019 12:24 PM

## 2019-01-19 NOTE — PROGRESS NOTES
"   01/19/19 1100   Quick Adds   Type of Visit Initial Occupational Therapy Evaluation   Living Environment   Lives With alone   Living Arrangements assisted living   Home Accessibility no concerns   Living Environment Comment Pt has a walk-in shower w/ shower chair and grab bar.    Self-Care   Usual Activity Tolerance fair   Current Activity Tolerance poor   Equipment Currently Used at Home walker, rolling   Activity/Exercise/Self-Care Comment Pt walks for exercise   Functional Level   Ambulation 1-->assistive equipment   Transferring 1-->assistive equipment   Toileting 1-->assistive equipment   Bathing 3-->assistive equipment and person   Dressing 0-->independent   Eating 0-->independent   Communication 0-->understands/communicates without difficulty   Swallowing 0-->swallows foods/liquids without difficulty   Cognition 0 - no cognition issues reported   Fall history within last six months yes   Number of times patient has fallen within last six months 1   Prior Functional Level Comment Pt uses 2WW w/tray in apt and 4WW outside of apt. Pt makes her own simple breakfast but gets dinner from facility. Pt has A w/ laundry and cleaning. Pt sets up meds indly. Pt gets A for bathing and completes dressing indly.   General Information   Onset of Illness/Injury or Date of Surgery - Date 01/18/19   Referring Physician Radha Bean MD   Patient/Family Goals Statement \"I want to go home\"   Additional Occupational Profile Info/Pertinent History of Current Problem Pt s/p AV note ablation and single chamber pacemaker placement   Cognitive Status Examination   Orientation orientation to person, place and time   Cognitive Comment No cognitive impairments noted   Visual Perception   Visual Perception Comments WFL w/ glasses   Sensory Examination   Sensory Comments numbness/tingling in feet and legs (pt reports this is normal)   Pain Assessment   Patient Currently in Pain (back pain, this is normal)   Strength "   Strength Comments RUE biceps/triceps 4/5, shoulder flex/ext: 3+/5, LUE n/t d/t contraindicated   Hand Strength   Hand Strength Comments WFL for age   Muscle Tone Assessment   Muscle Tone Comments no abnormal tone noted   Coordination   Coordination Comments slightly decreased, WFL for age   Bed Mobility Skill: Sit to Supine   Physical Assist/Nonphysical Assist: Sit/Supine verbal cues   Bed Mobility Skill: Supine to Sit   Physical Assist/Nonphysical Assist: Supine/Sit verbal cues   Transfer Skill: Sit to Stand   Level of [place]: Sit/Stand independent   Assistive Device for Transfer: Sit/Stand rolling walker   Bathing   Level of [place] minimum assist (75% patients effort)   Upper Body Dressing   Physical Assist/Nonphysical Assist: Dress Upper Body verbal cues   Lower Body Dressing   Physical Assist/Nonphysical Assist: Dress Lower Body verbal cues   Toileting   Physical Assist/Nonphysical Assist: Toilet verbal cues   Grooming   Level of [place]: Grooming independent   Eating/Self Feeding   Level of [place]: Eating independent   Clinical Impression   Criteria for Skilled Therapeutic Interventions Met yes, treatment indicated   OT Diagnosis Decreased ADL's and IADL's   Influenced by the following impairments Decreased act tolerance and use of LUE s/p pacemaker placement    Assessment of Occupational Performance 1-3 Performance Deficits   Identified Performance Deficits Decreased ADL's and IADL's   Clinical Decision Making (Complexity) Low complexity   Therapy Frequency (eval and 1 treatment)   Predicted Duration of Therapy Intervention (days/wks) 1 day   Anticipated Discharge Disposition Home  ([facility])   Risks and Benefits of Treatment have been explained. Yes   Patient, Family & other staff in agreement with plan of care Yes   Total Evaluation Time   Total Evaluation Time (Minutes) 10

## 2019-01-19 NOTE — PLAN OF CARE
A&Ox4. CMS numbness and tingling baseline in lower extremities. Bowel sounds audible, voiding adequately in bathroom. VSS. Tele=NSR. Dressing: upper left chest- CDI, right groin site, dried drainage unchange-changed dressing before discharge. Up with assist x1 with walker. C/o chronic lower back pain, decreased with oxy/acetaminophen x1 . Plan discharge home this afternoon with home care. Patient discharge with all personal belongings at time of discharge.

## 2019-01-19 NOTE — PROGRESS NOTES
"EP Cardiology Discharge note  Yosef Bean MD         Assessment and Plan:      1.  Permanent atrial fibrillation with RVR leading to symptoms and CHF.  Because of failure of medical therapy the patient underwent AV node ablation and single-chamber pacemaker yesterday (Dr. Walker).    Did well overnight.  Incision is dry.  Pacemaker function is normal.  Chest x-ray shows good lead position, no pneumothorax.    Plan  Discharge back to assisted living  Continue PTA medications  Keep incision dry for 3-4 days  Follow-up in device clinic in 1 week TBA      Total Time: 20 min;  .               Interval History:   doing well; mild incisional tenderness          Review of Systems:   CONSTITUTIONAL: NEGATIVE for fever, chills, change in weight  ENT/MOUTH: NEGATIVE for ear, mouth and throat problems  RESP: NEGATIVE for significant cough or SOB  CV: NEGATIVE for chest pain, palpitations or peripheral edema          Physical Exam:        Blood pressure 125/65, pulse 121, temperature 99.3  F (37.4  C), temperature source Oral, resp. rate 16, height 1.651 m (5' 5\"), weight 50.7 kg (111 lb 11.2 oz), SpO2 92 %, not currently breastfeeding.  Vitals:    01/18/19 1344 01/18/19 1900 01/19/19 0646   Weight: 52.1 kg (114 lb 12.8 oz) 51.7 kg (114 lb) 50.7 kg (111 lb 11.2 oz)     Vital Signs with Ranges  Temp:  [96.8  F (36  C)-99.3  F (37.4  C)] 99.3  F (37.4  C)  Pulse:  [121] 121  Heart Rate:  [79-86] 84  Resp:  [12-20] 16  BP: (100-127)/(64-93) 125/65  SpO2:  [92 %-98 %] 92 %  I/O's Last 24 hours  No intake/output data recorded.    EXAM:  Alert and oriented  Lungs with decreased breath sounds.  She has significant kyphosis.  Skin no hematoma at the pacemaker incision  Cardiovascular regular rhythm no gallop or murmur  Extremities no edema         Medications:          furosemide  20 mg Oral Daily     metoprolol succinate ER  50 mg Oral Daily     multivitamin, therapeutic  1 tablet Oral Daily     sodium chloride (PF)  3 mL Intracatheter " Q8H            Data:      All new lab and imaging data was reviewed.   Recent Labs   Lab Test 01/18/19  1410 11/05/18  2323 06/05/18  1142   WBC 8.1 8.2 10.2   HGB 12.6 13.2 13.4   * 104* 104*    197 221      Recent Labs   Lab Test 01/19/19  0831 01/18/19  1410 01/14/19  1102 11/28/18  1142   NA  --  139 133* 135*   POTASSIUM  --  4.4 4.5 4.1   CHLORIDE  --  107 104 103   CO2  --  24 26 24   BUN  --  31* 27 30   CR 1.12* 1.26* 1.44* 1.54*   ANIONGAP  --  8 7.5 12.1   PAWEL  --  9.2 9.5 9.7   GLC  --  87 118* 132*     Recent Labs   Lab Test 11/05/18  2323 03/16/18  2304 09/03/12  1535   TROPI 0.034 0.033 0.049*         EKG results:  Reviewed       Imaging:   Recent Results (from the past 24 hour(s))   X-ray Chest 2 vws*    Narrative    CHEST TWO VIEWS  1/19/2019 7:53 AM     HISTORY: Pacemaker placement.    COMPARISON: 3/16/2018      Impression    IMPRESSION: No pneumothorax. Rotated to the left. The tip of the  pacemaker is probably in the right ventricular outflow tract.  Hyperinflated lungs with bullous emphysema. No pleural effusion.

## 2019-01-19 NOTE — PROVIDER NOTIFICATION
MD Notification    Notified Person: MD Bean    Notified Person Name:    Notification Date/Time: 1/19/19 1029    Notification Interaction: text page    Purpose of Notification: Hi, just wanted to see your thoughts on if patient is needed additional services as she lives independently at her assisted living facility and needs an OT/PT consult for dressing and mobility and home care needs. Please advise. Thank you!    Orders Received: Update: PT/OT orders received for safe discharge planning    Comments:

## 2019-01-21 NOTE — PROGRESS NOTES
[facility] now requests orders and shares plan of care/discharge summaries for some patients through [employer].  Please REPLY TO THIS MESSAGE OR ROUTE BACK TO THE AUTHOR in order to give authorization for orders when needed.  This is considered a verbal order, you will still receive a faxed copy of orders for signature.  Thank you for your assistance in improving collaboration for our patients.    ORDER    SN 1 week 4, 3 prn. Recommended 2 week 2, 1 week 3 but pt refused.   PT eval and treat for strength/endurance   OT eval and treat for adl's.     MD SUMMARY/PLAN OF CARE      SUMMARY TO MD  SITUATION/BACKGROUND...Pt was admitted to [facility] from 1/18 to 1/19 for an AV node ablation and single chamber pacemaker placement. She has a hsitory of chronic afib with rvr, feels weak when heart rate is elevated. She has an additional medical history that includes physical deconditioning, CKD stage 3, edema, pulmonary fibrosis, SOB, CHF, chronic fatigue, dizziness, degenerative arthritis of spine, osteoporosis, weight loss, right shoulder pain, memory loss, bradycardia, alcoholic encephalopathy. Upon discharge she was weak, short of breath with minimal exertion, and recommended to homecare with OT, PT, and SN.     Pt alert, oriented today. Reports numbness to both feet. States she needs new rx for glasses, vision not clear with them. Pt denies chest pain, pressure, palpitations since pacemaker placement. Has +1 edema to ble. No other s/s chf exacerbation. Not weighing herself daily, states it is too much work. Reports she has chronic cough, yellow sputum when she wakes up and then it turns clear throughout the day. Dyspnic with minimal exertion. Very poor appetite, states she has never liked to eat. She has breakfast and then maybe some fruit the rest of the day. Used to take boost supplements, but stopped because they gave her diarrhea. Denies nausea/heartburn/constipation/diarrhea at this time. Incontinent of  urine. Continent of stool. Last bm 1/21. Ambulates with slow, unsteady gait using walker.     WOUND DESCRIPTION AND MEASUREMENTS  Incision to left chest covered with dressing. To be removed 1/22 per her discharge instructions. Unable to visualize wound due to dressing.   PHYSICAL PSYCHOSOCIAL IMPAIRMENT OR LIMITATIONS hx memory loss. Strong support network from friends and family.   NUTRITION CONCERNS...States she has been losing weight and she does not like to eat. Did not tolerate nutrition supplements.   HOME ENVIRONMENT AFFECTING CARE... lives in assisted living facility, long corridors to get around the building and it is difficult for her to navigate. Does not want meals delivered to her when she can't go to dining room because they charge for this.   CAREGIVER SUPPORT...Has help from  for cleaning, laundry, and errands on Wednesdays. Help from niece for caregiving.     ANALYSIS...Pt at risk for rehospitalization related to weakness, poor nutrition, high risk for falls, knowledge deficit regarding condition.   RECOMMENDATION...SN for cv assessment/education, nutrition/hydration assessment/education. PT for strength/mobility. OT for adl's.

## 2019-01-21 NOTE — TELEPHONE ENCOUNTER
Post device implant discharge phone call.    Reviewed the following:  No raising arm above shoulder on the side of implant for 3 weeks  Remove outer dressing 3 days after implant. May shower after outer dressing removed. Leave steri-strips in place, will be removed at 1 week device check  No driving for: NA  Watch for redness, drainage, warmth, or fever. Call device clinic if any signs of infection.     1 week device check scheduled: Monday January 28th @ 1pm in [place]    Pt states understanding of all instructions. SueLangenbrunnerRN

## 2019-01-22 NOTE — TELEPHONE ENCOUNTER
Reason for Call: Request for an order or referral:    Order or referral being requested: Pt orders: Home PT 2 x week for 3 weeks for exercise and gate.    Date needed: as soon as possible    Has the patient been seen by the PCP for this problem? YES    Additional comments: none    Phone number Patient can be reached at:  [place]: [phone]     Best Time: any    Can we leave a detailed message on this number?  YES    Call taken on 1/22/2019 at 1:50 PM by Andree Alvarez

## 2019-01-22 NOTE — LETTER
[facility]  Complex Care Plan  About Me  Patient Name:  Theresa Alves    YOB: 1924  Age:     89 year old   Julio Cesar MRN:   [id number] Telephone Information:  Home Phone [phone]   Mobile [phone]       Address:    [address]  [place] [facility] [address] Email address:  No e-mail address on record      Emergency Contact(s)  Name Relationship Lgl Grd Work Phone Home Phone Mobile Phone   1. GILBERTO JOYA Relative No  [phone]   2. BREANN OLIVER* Relative No  [phone]    3. OLGA BLISS* Friend No   [phone]           Primary language:  English     needed? No   [employer]:  [phone] op. 1  Other communication barriers:    Preferred Method of Communication:  Mail  Current living arrangement: I live in assisted living  Mobility Status/ Medical Equipment: Independent w/Device    Health Maintenance  Health Maintenance Reviewed: Due/Overdue   Health Maintenance Due   Topic Date Due     MICROALBUMIN Q1 YEAR  06/22/1925     URINE DRUG SCREEN Q1 YR  06/22/1939     ZOSTER IMMUNIZATION (1 of 2) 06/22/1974     INFLUENZA VACCINE (1) 09/01/2018     LIPID MONITORING Q1 YEAR  11/28/2018     My Access Plan  Medical Emergency 911   Primary Clinic Line [facility] - [phone]   24 Hour Appointment Line [phone] or  [phone]) (toll-free)   24 Hour Nurse Line [phone] (toll-free)   Preferred Urgent Care [facility], [phone]   Preferred Hospital [facility]  [phone]   Preferred Pharmacy [id number] - [place], [place] - [address]     Behavioral Health Crisis Line The National Suicide Prevention Lifeline at 1-265.943.3063 or 911     My Care Team Members    Patient Care Team       Relationship Specialty Notifications Start End    Marbin Rouse MD PCP - General Internal Medicine  9/2/12      Phone: [phone] Fax: [phone]         [address] [facility] [address]    Marbin Rouse MD PCP - Assigned PCP   11/16/12     Phone: [phone] Fax: [phone]         [address] [facility] [address]    Minesh Minor, RN Lead Care Coordinator Primary Care - CC  8/13/18     Phone: [phone]         [facility] ([place])  1/19/19     Phone: [phone]                 My Care Plans  Self Management and Treatment Plan  Goals and (Comments)  Goals        General    1. Being Active (pt-stated)     Notes - Note edited  1/22/2019 11:03 AM by Minesh Minor, RN    Goal Statement: I will prevent physical deconditioning by continuing to walk to the dining cantu each day for the next 3 months.  Measure of Success: The patient will report maintained physical strength and ability to ambulate to the dining cantu.  Supportive Steps to Achieve: CCC RN will continue patient outreaches to offer support.  The patient will continue using her walker for ambulation to promote stability and avoid falls.  Barriers: The patient has chronic back pain which makes it more difficult to walk long distances.  Strengths: The patient is motivated to prevent further physical deconditioning and understands the importance of daily physical activity.  Date to Achieve By: 1/8/19  Patient expressed understanding of goal: Yes    As of today's date 11/13/2018 goal is met at 26 - 50%.   Goal Status:  Ongoing -- the patient is working with PT/OT through home care.    As of today's date 1/22/2019 goal is met at 26 - 50%.   Goal Status:  Ongoing -- the patient continues working with PT/OT through home care.               Advance Care Plans/Directives Type:   Type Advanced Care Plans/Directives: POLST    My Medical and Care Information  Problem List   Patient Active Problem List   Diagnosis     Health Care Home     Chronic kidney disease, stage III (moderate) (H)     Hyperlipidemia  with target LDL less than 130     Edema     Pulmonary fibrosis     Preventive measure     Hypertension goal BP (blood pressure) < 140/80     SOB (shortness of breath)     Congestive heart failure (H)     Chronic fatigue     Dizziness - light-headed     Degenerative arthritis of spine     Osteoporosis     Weight loss     Right shoulder pain     Low back pain     Physical deconditioning     Chronic pain syndrome     Memory loss     Numbness of right foot     Chronic congestive heart failure, unspecified congestive heart failure type     Atrial fibrillation, unspecified type (H)     Atrial fibrillation w RVR     Anemia due to blood loss, acute     Alcoholic encephalopathy (H)     Bradycardia     Chronic atrial fibrillation (H)     Right wrist pain     Chronic right shoulder pain      Current Medications:  Current Outpatient Medications   Medication     acetaminophen (TYLENOL) 500 MG tablet     Cyanocobalamin (VITAMIN B 12 PO)     furosemide (LASIX) 20 MG tablet     metoprolol succinate (TOPROL-XL) 50 MG 24 hr tablet     multivitamin, therapeutic (THERA-VIT) TABS tablet     oxyCODONE-acetaminophen (PERCOCET) 5-325 MG tablet     simvastatin (ZOCOR) 20 MG tablet     sodium chloride (OCEAN) 0.65 % nasal spray     VITAMIN D, CHOLECALCIFEROL, PO     No current facility-administered medications for this visit.      Care Coordination Start Date: No linked episodes   Frequency of Care Coordination: monthly   Form Last Updated: 01/22/2019

## 2019-01-22 NOTE — TELEPHONE ENCOUNTER
Call was placed to [facility] private voice mail with an okay to start the orders that were noted from Pt orders: Home PT 2 a week for 3 weeks for exercise and gate.    Home Care will call back if there are any further questions/concerns.

## 2019-02-05 NOTE — TELEPHONE ENCOUNTER
Our goal is to have forms completed within 72 hours, however some forms may require a visit or additional information.    What clinic location was the form placed at [facility] or [place].?     Who is the form from?   Where did the form come from? Faxed to clinic   The form was placed in the inbox of Marbin Rouse MD      Please fax to [phone]  Phone number:      Additional comments: [facility] Sn 1 w 4, 3 as needed/ PT 1 every  10 days 1/ OT 1 every 10 days 1    Call take on 2/5/2019 at 10:21 AM by Rachaan Ontiveros

## 2019-02-06 NOTE — TELEPHONE ENCOUNTER
Our goal is to have forms completed within 72 hours, however some forms may require a visit or additional information.    What clinic location was the form placed at [facility] or [place].?     Who is the form from?   Where did the form come from? Faxed to clinic   The form was placed in the inbox of Marbin Rouse MD      Please fax to [phone]   Phone number:      Additional comments: [facility] OT 1 w 1    Call take on 2/6/2019 at 3:07 PM by Rachana Ontiveros

## 2019-02-06 NOTE — TELEPHONE ENCOUNTER
Our goal is to have forms completed within 72 hours, however some forms may require a visit or additional information.    What clinic location was the form placed at [facility] or [place].?     Who is the form from?   Where did the form come from? Faxed to clinic   The form was placed in the inbox of Marbin Rouse MD      Please fax to [phone]   Phone number:      Additional comments: [facility] PT 2 w 3    Call take on 2/6/2019 at 2:30 PM by Rachana Ontiveros

## 2019-02-08 NOTE — TELEPHONE ENCOUNTER
Our goal is to have forms completed within 72 hours, however some forms may require a visit or additional information.    What clinic location was the form placed at [facility] or [place].?     Who is the form from?   Where did the form come from? Faxed to clinic   The form was placed in the inbox of Marbin Rouse MD      Please fax to [phone]   Phone number:      Additional comments: [facility] PT 2 w3    Call take on 2/8/2019 at 10:49 AM by Rachana Ontiveros

## 2019-02-12 NOTE — TELEPHONE ENCOUNTER
Our goal is to have forms completed within 72 hours, however some forms may require a visit or additional information.    What clinic location was the form placed at [facility] or [place].?     Who is the form from?   Where did the form come from? Faxed to clinic   The form was placed in the inbox of Marbin Rouse MD      Please fax to [phone]   Phone number:      Additional comments: [facility] OT 1 w 1    Call take on 2/12/2019 at 10:39 AM by Rachana Ontiveros

## 2019-02-12 NOTE — TELEPHONE ENCOUNTER
Our goal is to have forms completed within 72 hours, however some forms may require a visit or additional information.    What clinic location was the form placed at [facility] or [place].?     Who is the form from?   Where did the form come from? Faxed to clinic   The form was placed in the inbox of Marbin Rouse MD      Please fax to [phone]   Phone number:      Additional comments: [facility] 1/21/19 to 3/21/19    Call take on 2/12/2019 at 4:09 PM by Rachana Ontiveros

## 2019-02-13 NOTE — LETTER
2/13/2019      Marbin Rouse MD  [address]  [facility] [address]      RE: Theresa Alves       Dear Colleague,    I had the pleasure of seeing Theresa Alves in the [facility].    Service Date: 02/13/2019      HISTORY OF PRESENT ILLNESS:  Ms. Alves is a delightful 89-year-old woman that I am having the pleasure of meeting today.  She is an established patient of Dr. Walker and Lolis Davenport PA-C.  Briefly, she has a past medical history that includes:   1.  Questionable pulmonary fibrosis.   2.  Atrial fibrillation with tachybrady syndrome.   3.  Hypertension.   4.  Chronic kidney disease.   5.  Kyphoscoliosis.   6.  HFpEF.      Most recently, she was referred to Dr. Izquierdo by Lolis when the patient had AFib with RVR refractory to AV isaías agents.  She underwent an AV node ablation and single chamber pacemaker implantation receiving a [employer] device on 01/18/2019.  Her left pectoral pocket has healed nicely.  Her most recent interrogation on 01/21 showed that she was 100% V paced.  Her lower rate was changed to VVIR at 80 beats per minute.  She had no junctional escape at VVI of 30 beats per minute.      Despite having a diagnosis of atrial fibrillation, Theresa is not on anticoagulation.  She has had recurrent epistaxis and has declined both oral anticoagulation and aspirin therapy.  Her niece states that she continues to have occasional nosebleeds out of the right naris.      At this time, she denies chest pain, lightheadedness, dizziness or peripheral edema.  She does get dyspnea, especially with exertion.  This is not new for her.  The patient states that she does have a productive cough, mostly in the morning, starting with clear sputum that turns into green sputum.  She denies any fevers.      Chest x-ray completed on 01/19 post procedure showed no pneumothorax.  She had no pleural effusion.  She did have hyperinflated lungs with bullous emphysema  noted.      BMP today shows a BUN of 46, creatinine of 1.35, GFR of 37.  ProBNP completed was not available at the time of visit; however, is available at the time of dictation and was elevated at 14,730 (compared to 10,382).  At today's visit,  the patient's weight is down 6 pounds at 108 pounds.  She did take an additional dose of Lasix last week for ankle edema.  She states that the ankle edema has resolved.  She has not yet taken any medications today.  The patient reports that one of her favorite cousins passed away and she had to attend the  Monday.  She admits to having an alcoholic drink and is uncertain whether or not this had perhaps affected her lab work.      Last echocardiogram 2018 showed an ejection fraction of 50-55% with 2+ tricuspid regurgitation.  She has moderate pulmonary hypertension with RVSP at 46-55 mmHg plus RAP.  Right ventricular function is mildly reduced.  All other review of systems and past medical history are noted below.      ASSESSMENT AND PLAN:  Ms. Alves is a delightful 89-year-old woman here today for followup.  I am seeing her in lieu of Lolis Davenport PA-C:   1.  Persistent atrial fibrillation with tachybrady syndrome.  She is status post AV isaías ablation and single-chamber pacemaker implantation.  Her weight has trended down about 6 pounds from her last visit.  She still does have some dyspnea with exertion which I am sure is multifactorial secondary to her lung disease.  Her proBNP, however, has slightly trended up.      Her device is functioning normally.  Her pocket has healed nicely.  She has another device turndown in about 4 weeks.      2.  HFpEF.  Thought to be secondary to the tachycardia.  I will leave it to Lolis's discretion when a repeat echo should be considered.  I have asked that she follow up with her again in two months with a repeat BMP and BNP.  She does have dyspnea with exertion, which is not new for her.  She also complains of PND at night and  a productive cough in the morning.  I did recommend that she consider starting perhaps Allegra or Zyrtec.  She should also discuss with Dr. Rouse her increase in phlegm throughout the day.  This most likely is secondary to her pulmonary fibrosis and possible emphysema.  The patient does not have a smoking history, however, was exposed to secondhand smoke for many years by her late .      3.  Chronic kidney disease, baseline creatinine was last documented about 1.4.  She is 1.35 today.  BUN is up slightly at 46.      4.  CHADS-VASc score 5 (age, sex, hypertension, and heart failure).  The patient is not on anticoagulation.  Again, she has had issues with epistaxis.  Consideration could be made for a Watchman.  Although she is 89 years of age, she is very feisty and does have an elevated stroke risk.  I did not discuss this with the patient today.  I will have Lolis review the information to see if the patient is an appropriate candidate.      5.  As always, thank you for including us in her care.  I have encouraged her to perhaps put a humidifier in her room to help with the epistaxis.  This continues to persist despite being off anticoagulation.  Also, regarding the increased phlegm and nasal drainage, she will discuss this further with Dr. Rouse.  She will follow up with us in 2 months.  If there are questions or concerns, I have asked that she please contact us.      Thank you for including us in her care.         BEVERLY POND NP             D: 2019   T: 2019   MT: ALVARO      Name:     YANELI CHUA   MRN:      [id number]        Account:      [id number]   :      1924           Service Date: 2019      Document: [id number]         Outpatient Encounter Medications as of 2019   Medication Sig Dispense Refill     acetaminophen (TYLENOL) 500 MG tablet Take one tablet 8 hours after morning percocet and one tablet at bedtime  0     Cyanocobalamin (VITAMIN B 12 PO) Take 1  tablet by mouth daily        furosemide (LASIX) 20 MG tablet Take 1 tablet (20 mg) by mouth daily 90 tablet 0     metoprolol succinate (TOPROL-XL) 50 MG 24 hr tablet Take 1 tablet (50 mg) by mouth daily 30 tablet 11     oxyCODONE-acetaminophen (PERCOCET) 5-325 MG tablet Take 0.5 tab every morning.  Max of 0.5 tab daily. 30 tablet 0     simvastatin (ZOCOR) 20 MG tablet take 1/2 tablet at bedtime 45 tablet 2     VITAMIN D, CHOLECALCIFEROL, PO Take 1,000 Units by mouth daily.         multivitamin, therapeutic (THERA-VIT) TABS tablet Take 1 tablet by mouth daily (Patient not taking: Reported on 2/13/2019) 60 tablet 0     sodium chloride (OCEAN) 0.65 % nasal spray Spray 1 spray into both nostrils 3 times daily as needed for congestion  0     No facility-administered encounter medications on file as of 2/13/2019.        Again, thank you for allowing me to participate in the care of your patient.      Sincerely,    Marsha Chavarria NP, APRN CNP     [facility]

## 2019-02-13 NOTE — LETTER
2/13/2019    Marbin Rouse MD  [address]  [facility] [address]    RE: Theresa Alves       Dear Colleague,    I had the pleasure of seeing Theresa Alves in the [facility].    HPI and Plan: #[id number]  See dictation    Orders Placed This Encounter   Procedures     Basic metabolic panel     N terminal pro BNP outpatient     [facility]       No orders of the defined types were placed in this encounter.      There are no discontinued medications.      Encounter Diagnosis   Name Primary?     (HFpEF) heart failure with preserved ejection fraction (H) Yes       CURRENT MEDICATIONS:  Current Outpatient Medications   Medication Sig Dispense Refill     acetaminophen (TYLENOL) 500 MG tablet Take one tablet 8 hours after morning percocet and one tablet at bedtime  0     Cyanocobalamin (VITAMIN B 12 PO) Take 1 tablet by mouth daily        furosemide (LASIX) 20 MG tablet Take 1 tablet (20 mg) by mouth daily 90 tablet 0     metoprolol succinate (TOPROL-XL) 50 MG 24 hr tablet Take 1 tablet (50 mg) by mouth daily 30 tablet 11     oxyCODONE-acetaminophen (PERCOCET) 5-325 MG tablet Take 0.5 tab every morning.  Max of 0.5 tab daily. 30 tablet 0     simvastatin (ZOCOR) 20 MG tablet take 1/2 tablet at bedtime 45 tablet 2     VITAMIN D, CHOLECALCIFEROL, PO Take 1,000 Units by mouth daily.         multivitamin, therapeutic (THERA-VIT) TABS tablet Take 1 tablet by mouth daily (Patient not taking: Reported on 2/13/2019) 60 tablet 0     sodium chloride (OCEAN) 0.65 % nasal spray Spray 1 spray into both nostrils 3 times daily as needed for congestion  0       ALLERGIES   No Known Allergies    PAST MEDICAL HISTORY:  Past Medical History:   Diagnosis Date     Atrial fibrillation (H)      Chronic back pain      Chronic diastolic CHF (congestive heart failure) (H)      Chronic kidney disease, stage III (moderate) (H)      Pulmonary fibrosis (H)      Pulmonary hypertension (H)      Unspecified  essential hypertension     Essential hypertension       PAST SURGICAL HISTORY:  Past Surgical History:   Procedure Laterality Date     CARPAL TUNNEL RELEASE RT/LT  1990's    R     CATARACT IOL, RT/LT  5/03    R     EP ABLATION AV NODE N/A 1/18/2019    Procedure: EP Ablation AV Node;  Surgeon: Johny Walker MD;  Location:  HEART CARDIAC CATH LAB     HERNIA REPAIR, INGUINAL RT/LT  1954    R     hiatal hernia repair  6/10    large paraesophageal hiatal hernia; Nissen fundoplication        FAMILY HISTORY:  Family History   Problem Relation Age of Onset     Unknown/Adopted Mother      Unknown/Adopted Father      Diabetes No family hx of      Coronary Artery Disease No family hx of      Hypertension No family hx of      Hyperlipidemia No family hx of      Cerebrovascular Disease No family hx of      Breast Cancer No family hx of      Colon Cancer No family hx of      Prostate Cancer No family hx of      Other Cancer No family hx of      Depression No family hx of      Anxiety Disorder No family hx of      Mental Illness No family hx of      Substance Abuse No family hx of      Anesthesia Reaction No family hx of      Asthma No family hx of      Osteoporosis No family hx of      Genetic Disorder No family hx of      Thyroid Disease No family hx of      Obesity No family hx of        SOCIAL HISTORY:  Social History     Socioeconomic History     Marital status:      Spouse name: None     Number of children: 0     Years of education: None     Highest education level: None   Social Needs     Financial resource strain: None     Food insecurity - worry: None     Food insecurity - inability: None     Transportation needs - medical: None     Transportation needs - non-medical: None   Occupational History     Occupation: worked at [employer] for 40 yrs     Employer: RETIRED   Tobacco Use     Smoking status: Never Smoker     Smokeless tobacco: Never Used   Substance and Sexual Activity     Alcohol use: Yes     Comment:  "daily-vodka tonic x1 or more a week     Drug use: No     Sexual activity: No   Other Topics Concern     Parent/sibling w/ CABG, MI or angioplasty before 65F 55M? Not Asked   Social History Narrative     None       Review of Systems:  Skin:  Positive for   wounds from fall 2 weeks ago and a spot on her left arm she will see Derm for.    Eyes:  Positive for glasses    ENT:  Negative      Respiratory:  Positive for dyspnea on exertion;cough;shortness of breath;mucoid expectorant;purulent expectorant     Cardiovascular:  Negative Positive for;palpitations;edema on exertion  Gastroenterology: Positive for poor appetite occ drinks boost   Genitourinary:  Positive for nocturia    Musculoskeletal:  Positive for back pain;arthritis pt reports at baseline.  Neurologic:  Positive for numbness or tingling of feet due to the back pain  Psychiatric:  Negative      Heme/Lymph/Imm:  Negative      Endocrine:  Negative        Physical Exam:  Vitals: /76   Pulse 70   Ht 1.651 m (5' 5\")   Wt 49 kg (108 lb)   LMP  (LMP Unknown)   BMI 17.97 kg/m       Constitutional:  cooperative, alert and oriented, well developed, well nourished, in no acute distress thin      Skin:  warm and dry to the touch, no apparent skin lesions or masses noted   pacemaker incision in the left infraclavicular area was well-healed      Head:  normocephalic, no masses or lesions        Eyes:  pupils equal and round;conjunctivae and lids unremarkable        Lymph:No Cervical lymphadenopathy present     ENT:  no pallor or cyanosis, dentition good        Neck:  JVP normal        Respiratory:  normal breath sounds, clear to auscultation, normal A-P diameter, normal symmetry, normal respiratory excursion, no use of accessory muscles         Cardiac: regular rhythm     no presence of murmur                                                   GI:  abdomen soft;no HSM        Extremities and Muscular Skeletal:  no deformities, clubbing, cyanosis, erythema " observed;no edema              Neurological:  no gross motor deficits        Psych:  Alert and Oriented x 3;affect appropriate, oriented to time, person and place;oriented to time, person and place        CC  Lolis Davenport PA-C  [address]  [place] [id number]                Thank you for allowing me to participate in the care of your patient.      Sincerely,     Marsha Chavarria, NP, APRN [facility]    cc:   Lolis Davenport PA-C  [address]  [place] [id number]

## 2019-02-13 NOTE — PROGRESS NOTES
Service Date: 02/13/2019      HISTORY OF PRESENT ILLNESS:  Ms. Alves is a delightful 89-year-old woman that I am having the pleasure of meeting today.  She is an established patient of Dr. Walker and Lolis Davenport PA-C.  Briefly, she has a past medical history that includes:   1.  Questionable pulmonary fibrosis.   2.  Atrial fibrillation with tachybrady syndrome.   3.  Hypertension.   4.  Chronic kidney disease.   5.  Kyphoscoliosis.   6.  HFpEF.      Most recently, she was referred to Dr. Izquierdo by Lolis when the patient had AFib with RVR refractory to AV isaías agents.  She underwent an AV node ablation and single chamber pacemaker implantation receiving a IGAWorks device on 01/18/2019.  Her left pectoral pocket has healed nicely.  Her most recent interrogation on 01/21 showed that she was 100% V paced.  Her lower rate was changed to VVIR at 80 beats per minute.  She had no junctional escape at VVI of 30 beats per minute.      Despite having a diagnosis of atrial fibrillation, Theresa is not on anticoagulation.  She has had recurrent epistaxis and has declined both oral anticoagulation and aspirin therapy.  Her niece states that she continues to have occasional nosebleeds out of the right naris.      At this time, she denies chest pain, lightheadedness, dizziness or peripheral edema.  She does get dyspnea, especially with exertion.  This is not new for her.  The patient states that she does have a productive cough, mostly in the morning, starting with clear sputum that turns into green sputum.  She denies any fevers.      Chest x-ray completed on 01/19 post procedure showed no pneumothorax.  She had no pleural effusion.  She did have hyperinflated lungs with bullous emphysema noted.      BMP today shows a BUN of 46, creatinine of 1.35, GFR of 37.  ProBNP completed was not available at the time of visit; however, is available at the time of dictation and was elevated at 14,730 (compared to 10,382).  At today's  visit,  the patient's weight is down 6 pounds at 108 pounds.  She did take an additional dose of Lasix last week for ankle edema.  She states that the ankle edema has resolved.  She has not yet taken any medications today.  The patient reports that one of her favorite cousins passed away and she had to attend the  Monday.  She admits to having an alcoholic drink and is uncertain whether or not this had perhaps affected her lab work.      Last echocardiogram 2018 showed an ejection fraction of 50-55% with 2+ tricuspid regurgitation.  She has moderate pulmonary hypertension with RVSP at 46-55 mmHg plus RAP.  Right ventricular function is mildly reduced.  All other review of systems and past medical history are noted below.      ASSESSMENT AND PLAN:  Ms. Alves is a delightful 89-year-old woman here today for followup.  I am seeing her in lieu of Lolis Davenport PA-C:   1.  Persistent atrial fibrillation with tachybrady syndrome.  She is status post AV isaías ablation and single-chamber pacemaker implantation.  Her weight has trended down about 6 pounds from her last visit.  She still does have some dyspnea with exertion which I am sure is multifactorial secondary to her lung disease.  Her proBNP, however, has slightly trended up.      Her device is functioning normally.  Her pocket has healed nicely.  She has another device turndown in about 4 weeks.      2.  HFpEF.  Thought to be secondary to the tachycardia.  I will leave it to Lolis's discretion when a repeat echo should be considered.  I have asked that she follow up with her again in two months with a repeat BMP and BNP.  She does have dyspnea with exertion, which is not new for her.  She also complains of PND at night and a productive cough in the morning.  I did recommend that she consider starting perhaps Allegra or Zyrtec.  She should also discuss with Dr. Rouse her increase in phlegm throughout the day.  This most likely is secondary to her pulmonary  fibrosis and possible emphysema.  The patient does not have a smoking history, however, was exposed to secondhand smoke for many years by her late .      3.  Chronic kidney disease, baseline creatinine was last documented about 1.4.  She is 1.35 today.  BUN is up slightly at 46.      4.  CHADS-VASc score 5 (age, sex, hypertension, and heart failure).  The patient is not on anticoagulation.  Again, she has had issues with epistaxis.  Consideration could be made for a Watchman.  Although she is 89 years of age, she is very feisty and does have an elevated stroke risk.  I did not discuss this with the patient today.  I will have Lolis review the information to see if the patient is an appropriate candidate.      5.  As always, thank you for including us in her care.  I have encouraged her to perhaps put a humidifier in her room to help with the epistaxis.  This continues to persist despite being off anticoagulation.  Also, regarding the increased phlegm and nasal drainage, she will discuss this further with Dr. Rouse.  She will follow up with us in 2 months.  If there are questions or concerns, I have asked that she please contact us.      Thank you for including us in her care.         BEVERLY POND NP             D: 2019   T: 2019   MT: ALVARO      Name:     YANELI CHUA   MRN:      [id number]        Account:      [id number]   :      1924           Service Date: 2019      Document: [id number]

## 2019-02-13 NOTE — PROGRESS NOTES
HPI and Plan: #[id number]  See dictation    Orders Placed This Encounter   Procedures     Basic metabolic panel     N terminal pro BNP outpatient     [facility]       No orders of the defined types were placed in this encounter.      There are no discontinued medications.      Encounter Diagnosis   Name Primary?     (HFpEF) heart failure with preserved ejection fraction (H) Yes       CURRENT MEDICATIONS:  Current Outpatient Medications   Medication Sig Dispense Refill     acetaminophen (TYLENOL) 500 MG tablet Take one tablet 8 hours after morning percocet and one tablet at bedtime  0     Cyanocobalamin (VITAMIN B 12 PO) Take 1 tablet by mouth daily        furosemide (LASIX) 20 MG tablet Take 1 tablet (20 mg) by mouth daily 90 tablet 0     metoprolol succinate (TOPROL-XL) 50 MG 24 hr tablet Take 1 tablet (50 mg) by mouth daily 30 tablet 11     oxyCODONE-acetaminophen (PERCOCET) 5-325 MG tablet Take 0.5 tab every morning.  Max of 0.5 tab daily. 30 tablet 0     simvastatin (ZOCOR) 20 MG tablet take 1/2 tablet at bedtime 45 tablet 2     VITAMIN D, CHOLECALCIFEROL, PO Take 1,000 Units by mouth daily.         multivitamin, therapeutic (THERA-VIT) TABS tablet Take 1 tablet by mouth daily (Patient not taking: Reported on 2/13/2019) 60 tablet 0     sodium chloride (OCEAN) 0.65 % nasal spray Spray 1 spray into both nostrils 3 times daily as needed for congestion  0       ALLERGIES   No Known Allergies    PAST MEDICAL HISTORY:  Past Medical History:   Diagnosis Date     Atrial fibrillation (H)      Chronic back pain      Chronic diastolic CHF (congestive heart failure) (H)      Chronic kidney disease, stage III (moderate) (H)      Pulmonary fibrosis (H)      Pulmonary hypertension (H)      Unspecified essential hypertension     Essential hypertension       PAST SURGICAL HISTORY:  Past Surgical History:   Procedure Laterality Date     CARPAL TUNNEL RELEASE RT/LT  1990's    R     CATARACT IOL, RT/LT  5/03    R     EP ABLATION AV  NODE N/A 1/18/2019    Procedure: EP Ablation AV Node;  Surgeon: Johny Walker MD;  Location:  HEART CARDIAC CATH LAB     HERNIA REPAIR, INGUINAL RT/LT  1954    R     hiatal hernia repair  6/10    large paraesophageal hiatal hernia; Nissen fundoplication        FAMILY HISTORY:  Family History   Problem Relation Age of Onset     Unknown/Adopted Mother      Unknown/Adopted Father      Diabetes No family hx of      Coronary Artery Disease No family hx of      Hypertension No family hx of      Hyperlipidemia No family hx of      Cerebrovascular Disease No family hx of      Breast Cancer No family hx of      Colon Cancer No family hx of      Prostate Cancer No family hx of      Other Cancer No family hx of      Depression No family hx of      Anxiety Disorder No family hx of      Mental Illness No family hx of      Substance Abuse No family hx of      Anesthesia Reaction No family hx of      Asthma No family hx of      Osteoporosis No family hx of      Genetic Disorder No family hx of      Thyroid Disease No family hx of      Obesity No family hx of        SOCIAL HISTORY:  Social History     Socioeconomic History     Marital status:      Spouse name: None     Number of children: 0     Years of education: None     Highest education level: None   Social Needs     Financial resource strain: None     Food insecurity - worry: None     Food insecurity - inability: None     Transportation needs - medical: None     Transportation needs - non-medical: None   Occupational History     Occupation: worked at [employer] for 40 yrs     Employer: RETIRED   Tobacco Use     Smoking status: Never Smoker     Smokeless tobacco: Never Used   Substance and Sexual Activity     Alcohol use: Yes     Comment: daily-vodka tonic x1 or more a week     Drug use: No     Sexual activity: No   Other Topics Concern     Parent/sibling w/ CABG, MI or angioplasty before 65F 55M? Not Asked   Social History Narrative     None       Review of  "Systems:  Skin:  Positive for   wounds from fall 2 weeks ago and a spot on her left arm she will see Derm for.    Eyes:  Positive for glasses    ENT:  Negative      Respiratory:  Positive for dyspnea on exertion;cough;shortness of breath;mucoid expectorant;purulent expectorant     Cardiovascular:  Negative Positive for;palpitations;edema on exertion  Gastroenterology: Positive for poor appetite occ drinks boost   Genitourinary:  Positive for nocturia    Musculoskeletal:  Positive for back pain;arthritis pt reports at baseline.  Neurologic:  Positive for numbness or tingling of feet due to the back pain  Psychiatric:  Negative      Heme/Lymph/Imm:  Negative      Endocrine:  Negative        Physical Exam:  Vitals: /76   Pulse 70   Ht 1.651 m (5' 5\")   Wt 49 kg (108 lb)   LMP  (LMP Unknown)   BMI 17.97 kg/m      Constitutional:  cooperative, alert and oriented, well developed, well nourished, in no acute distress thin      Skin:  warm and dry to the touch, no apparent skin lesions or masses noted   pacemaker incision in the left infraclavicular area was well-healed      Head:  normocephalic, no masses or lesions        Eyes:  pupils equal and round;conjunctivae and lids unremarkable        Lymph:No Cervical lymphadenopathy present     ENT:  no pallor or cyanosis, dentition good        Neck:  JVP normal        Respiratory:  normal breath sounds, clear to auscultation, normal A-P diameter, normal symmetry, normal respiratory excursion, no use of accessory muscles         Cardiac: regular rhythm     no presence of murmur                                                   GI:  abdomen soft;no HSM        Extremities and Muscular Skeletal:  no deformities, clubbing, cyanosis, erythema observed;no edema              Neurological:  no gross motor deficits        Psych:  Alert and Oriented x 3;affect appropriate, oriented to time, person and place;oriented to time, person and place        FEROZ Davenport, " LENO  [address]  [place] [id number]

## 2019-02-13 NOTE — PATIENT INSTRUCTIONS
Call [facility] nurse for any questions or concerns:  [phone]   *If you have concerns after hours, please call [phone], option 2 to speak with on call Cardiologist.    1. Medication changes and/or recommendations from today:      2. Follow up plan: See Lolis in 2 months with a repeat lab     3. Weigh yourself daily and write it down.     4. Call [facility] nurse if your weight is up more than 2 pounds in one day or 5 pounds in one week.     5. Call CORE nurse if you feel more short of breath, have more abdominal bloating, or leg swelling.     6. Continue low sodium diet (less than 2000 mg daily). If you eat less salt, you will retain less fluid.     7. Alcohol can weaken your heart further. You should avoid alcohol or limit its use to special times, such as a holiday or birthday.      8. Do NOT take Aleve or ibuprofen without talking to your doctor first.      9. Lab Results:  Component      Latest Ref Rng & Units 1/18/2019 2/13/2019   Sodium      136 - 145 mmol/L 139 136   Potassium      3.5 - 5.1 mmol/L 4.4 4.8   Chloride      98 - 107 mmol/L 107 107   Carbon Dioxide      23 - 29 mmol/L 24 22 (L)   Anion Gap      6 - 17 mmol/L 8 11.8   Glucose      70 - 105 mg/dL 87 118 (H)   Urea Nitrogen      7 - 30 mg/dL 31 (H) 46 (H)   Creatinine      0.70 - 1.30 mg/dL 1.26 (H) 1.35 (H)   GFR Estimate      >60 mL/min/1.73:m2 36 (L) 37 (L)   GFR Estimate If Black      >60 mL/min/1.73:m2 42 (L) 44 (L)   Calcium      8.5 - 10.5 mg/dL 9.2 9.9          [facility]: Cardiomyopathy, Optimization, Rehabilitation, Education  The [facility] is a heart failure specialty clinic within the [facility] where you will work with specialized nurse practitioners, physician assistants, doctors, and registered nurses. They are dedicated to helping patients with heart failure to carefully adjust medications, receive education, and learn who and when to call if symptoms develop. They specialize in helping you better understand your  condition, slow the progression of your disease, improve the length and quality of your life, help you detect future heart problems before they become life threatening, and avoid hospitalizations.

## 2019-02-18 NOTE — TELEPHONE ENCOUNTER
Left voice for Doris with verbal approval of SN order as requested below.       Skilled nurse 1 time a month for 1 month and 1 as needed.

## 2019-02-25 NOTE — PROGRESS NOTES
Clinic Care Coordination Contact    Clinic Care Coordination Contact  OUTREACH    Referral Information:  Referral Source: IP Report  Primary Diagnosis: Cardiovascular - other  Chief Complaint   Patient presents with     Clinic Care Coordination - Follow-up     discharge from home care     Clinical Concerns:  Follow-up call to the patient to see how she is doing since being discharged from home care.    The patient reports she is doing well since home care discharge.  She continues walking down to the dining cantu daily in efforts to maintain her strength.    At this time, she denies outstanding concerns or need for additional assistance from Care Coordination but was agreeable to continued check-ins from CCC RN.    Pain  Chronic back pain due to scoliosis    Functional Status:  Ambulates with walker    Living Situation:  Assisted living     Psychosocial:  Informal Support system:: Family, Friends    Goals:   Goals        General    1. Being Active (pt-stated)     Notes - Note edited  2/25/2019  1:23 PM by Minesh Minor RN    Goal Statement: I will prevent physical deconditioning by continuing to walk to the dining cantu each day for the next 3 months.  Measure of Success: The patient will report maintained physical strength and ability to ambulate to the dining cantu.  Supportive Steps to Achieve: CCC RN will continue patient outreaches to offer support.  The patient will continue using her walker for ambulation to promote stability and avoid falls.  Barriers: The patient has chronic back pain which makes it more difficult to walk long distances.  Strengths: The patient is motivated to prevent further physical deconditioning and understands the importance of daily physical activity.  Date to Achieve By: 3/31/19  Patient expressed understanding of goal: Yes    As of today's date 11/13/2018 goal is met at 26 - 50%.   Goal Status:  Ongoing -- the patient is working with PT/OT through home care.    As of today's date  1/22/2019 goal is met at 26 - 50%.   Goal Status:  Ongoing -- the patient continues working with PT/OT through home care.    As of today's date 2/25/2019 goal is met at 51 - 75%.   Goal Status:  Active -- the patient was discharged from home care 2/21/19 and reports she continues to walk down to the dining cantu each day to help maintain her strength.  Date to Achieve By extended as patient would like to continue working on this.            Patient/Caregiver understanding: Yes    Outreach Frequency: monthly  Future Appointments              In 1 week CORNELIUS DCR2 [facility], [facility]    In 1 month More, Lolis Gonzalez PA-C [facility]    In 1 month [facility]        Plan: The patient will continue routine follow-up with her care teams as scheduled and will continue walking daily to maintain her strength.  The patient will contact CCC RN with additional questions or concerns.  CCC RN will outreach to patient in approximately 1 month to get updates on patient status, assess goal progress, and offer additional support and resources as indicated.    Minesh Minor, RN  Clinic Care Coordinator  [facility] [place]  Ph: [phone]

## 2019-03-05 NOTE — TELEPHONE ENCOUNTER
Our goal is to have forms completed within 72 hours, however some forms may require a visit or additional information.    What clinic location was the form placed at [facility] or [facility]     Who is the form from?   Where did the form come from? Faxed to clinic   The form was placed in the inbox of Marbin Rouse MD      Please fax to [phone]  Phone number:      Additional comments:  [facility] SN 1 month 1, 1 as needed    Call take on 3/5/2019 at 11:31 AM by Rachana Ontiveros

## 2019-03-08 NOTE — PROGRESS NOTES
[employer] Accolade (S) Pacemaker Device Check  : *** %  Mode: ***        Underlying Rhythm: ***  Heart Rate: ***  Sensing: ***    Pacing Threshold: ***   Impedance: ***  Battery Status: ***  Device Site: ***  Atrial Arrhythmia: ***  Ventricular Arrhythmia: ***  Setting Change: ***    Care Plan: ***     I have reviewed and interpreted the device interrogation, settings, programming and nurse's summary. The device is functioning within normal device parameters. I agree with the current findings, assessment and plan.

## 2019-03-27 NOTE — PROGRESS NOTES
Clinic Care Coordination Contact    Clinic Care Coordination Contact  OUTREACH    Referral Information:  Referral Source: IP Report  Primary Diagnosis: Cardiovascular - other  Chief Complaint   Patient presents with     Clinic Care Coordination - Follow-up   Clinical Concerns:  CCC RN follow-up call to the patient to get updates on status, assess goal progress, and offer additional support and resources as needed.    The patient reported to be doing well.  She stated she continues getting exercise by walking down to the dining cantu daily with use of her walker.  She did report that the way back from the dining cantu is more challenging as she usually feels more tired by that time, but she plans to continue doing it with goal of maintaining and improving her strength.     At this time, the patient denied any outstanding questions or concerns but agreed to contact CCC RN should any arise.  She was agreeable to continued CCC RN outreaches to keep collaborating on her goal.    Pain  Chronic back pain r/t scoliosis which limits her mobility tolerance    Functional Status:  Ambulates with rolling walker    Living Situation:  Lives in assisted living    Diet/Exercise/Sleep:  Continues walking down to the dining cantu daily with use of her walker in efforts to maintain and improve her strength.     Psychosocial:  Good support from family and friends     Goals:   Goals        General    1. Being Active (pt-stated)     Notes - Note edited  3/27/2019  2:55 PM by Minesh Minor RN    Goal Statement: I will prevent physical deconditioning by continuing to walk to the dining cantu each day for the next 3 months.  Measure of Success: The patient will report maintained physical strength and ability to ambulate to the dining cantu.  Supportive Steps to Achieve: CCC RN will continue patient outreaches to offer support.  The patient will continue using her walker for ambulation to promote stability and avoid falls.  Barriers: The  patient has chronic back pain which makes it more difficult to walk long distances.  Strengths: The patient is motivated to prevent further physical deconditioning and understands the importance of daily physical activity.  Date to Achieve By: 3/31/19  Patient expressed understanding of goal: Yes    As of today's date 11/13/2018 goal is met at 26 - 50%.   Goal Status:  Ongoing -- the patient is working with PT/OT through home care.    As of today's date 1/22/2019 goal is met at 26 - 50%.   Goal Status:  Ongoing -- the patient continues working with PT/OT through home care.    As of today's date 2/25/2019 goal is met at 51 - 75%.   Goal Status:  Active -- the patient was discharged from home care 2/21/19 and reports she continues to walk down to the dining cantu each day to help maintain her strength.  Date to Achieve By extended as patient would like to continue working on this.    As of today's date 3/27/2019 goal is met at 51 - 75%.   Goal Status:  Active -- the patient continues to walk to and from the dining cantu each day with assistance of her walker.  She reports having some difficulty on the way back as she is more tired at that time.  She plans to continue with this goal and hopes to slowly feel stronger.            Patient/Caregiver understanding: Yes  Outreach Frequency: monthly  Future Appointments              In 5 days More, Lolis Gonzalez PA-C [facility]    In 1 week Marbin Rouse MD [facility]    In 2 months CORNELIUS TECH1 [facility]        Plan: The patient will continue walking to the dining cantu in her assisted living daily with goal of improving her strength as discussed.  The patient agreed to contact CCC RN with any questions or concerns.  CCC RN will outreach to patient in approximately 1 month to get updates on patient status, assess goal progress, and  offer additional support and resources as indicated.    Minesh Minor, RN  Clinic Care Coordinator  [facility]  Ph: [phone]

## 2019-04-01 PROBLEM — Z95.0 PACEMAKER: Status: ACTIVE | Noted: 2019-01-01

## 2019-04-01 NOTE — LETTER
2019      Marbin Rouse MD  [address]  [facility] [address]      RE: Theresa Alves       Dear Colleague,    I had the pleasure of seeing Theresa Alves in the [facility].    Service Date: 2019      PRIMARY CARDIOLOGIST:  Dr. Walker.      REASON FOR VISIT:  Hospital followup.      HISTORY OF PRESENT ILLNESS:  Ms. Alves is a 89-year-old woman with past medical history significant for the followin.  Likely pulmonary fibrosis.   2.  Hypertension.   3.  Chronic kidney disease.   4.  Kyphoscoliosis.   5.  Persistent AFib with tachybrady now status post AV node ablation and permanent pacemaker implant.   6.  HFpEF.      I met Theresa in 2013 and she has since been admitted for difficult-to-control AFib as well as alcohol intoxication at one point.  We were unable to get her heart rates controlled without bradycardia and she was seen by Dr. Izquierdo and underwent AV node ablation and permanent pacemaker in January.  She was seen by Marsha Chavarria after that and was doing okay and is now back for routine followup.      Today, she says she feels the same, which is poor.  Pacemaker did not make any bit of difference.  She is still very tired and she is still short of breath.  She has 90 steps to walk from her room to the elevator and after that she has to sit down and rest.  She occasionally has slight pain around the pacemaker area.  She does note though that her feet are less swollen, but the big issue is that it has not improved her breathing.  She denies orthopnea or PND.  She is still tired.  She is not motivated to do anything.  She is not really interested in doing anything.  She used to play cards and play bingo and she is not even interested in all of that.  She does acknowledge that there may be some depression issues.      SOCIAL HISTORY:  She is eating once a day in the dining room, otherwise eats in her apartment.  She has a Boost or fruit or  yogurt.  She comes in today with her niece, Ericka, who brings her into her appointments.  She tells me she is doing one alcoholic drink a week.  Lifelong nonsmoker.      PHYSICAL EXAMINATION:   VITAL SIGNS:  Blood pressure on recheck 138/74, pulse is 70, weight is 110 pounds and stable.   GENERAL:  Elderly, frail-appearing woman in no acute distress.  Somewhat frustrated, which is her baseline.   HEENT:  Normocephalic, atraumatic.   HEART:  Regular.  I do not appreciate murmur, rub or gallop.   LUNGS:  Her lungs have bibasilar rales, but not her upper and middle lung fields.   EXTREMITIES:  She has trace edema at this point which is much improved.   ABDOMEN:  Soft, nontender.  No JVP at 90 degrees.      LABORATORY STUDIES:  Labs are not yet completed today.      ASSESSMENT AND PLAN:   1.  Persistent atrial fibrillation with tachybrady syndrome with AV node ablation and permanent pacemaker.  I was very honest with her that we are not sure how much would help her shortness of breath and she tells me today it has not at all.  This suggests that perhaps her symptoms are more from her lungs versus heart failure.  We have discussed at length anticoagulation and she has declined.  She has problems with nosebleeds and she is well aware of her stroke risk.  We reviewed today that even though she has had an AV node ablation that AFib is not gone and she still is at risk for stroke.  We will keep her not on any anticoagulation, not even an aspirin as she had profound bleeding on this and she would rather die and have a stroke than be on these.   2.  Heart failure with preserved EF secondary to tachycardia.  She is down to 20 mg a day and she appears euvolemic to me on exam and her peripheral edema is as good as it has been.  She has not had a BMP and we will recheck that today.   3.  Hypertension, markedly elevated initially, but now improved.   4.  Fatigue, ongoing.  Suspect this is age-related, but cannot guarantee that.  She  now can be off metoprolol as this can have a side effect of fatigue and we will try her on 5 of amlodipine in its place.  I did warn her of the side effect of peripheral edema, although it is less likely to happen at this dose.   5.  CKD.  Repeat today.   6.  Continue to recommend no drinking.      We will see her back in about 3 months, sooner with concerns.  Thank you for allowing me to participate in her care.         ADRIENNE MAIER PA-C             D: 2019   T: 2019   MT: ALVARO      Name:     YANELI CHUA   MRN:      [id number]        Account:      [id number]   :      1924           Service Date: 2019      Document: [id number]         Outpatient Encounter Medications as of 2019   Medication Sig Dispense Refill     acetaminophen (TYLENOL) 500 MG tablet Take one tablet 8 hours after morning percocet and one tablet at bedtime  0     amLODIPine (NORVASC) 5 MG tablet Take 1 tablet (5 mg) by mouth daily 90 tablet 3     Cyanocobalamin (VITAMIN B 12 PO) Take 1 tablet by mouth daily        furosemide (LASIX) 20 MG tablet Take 1 tablet (20 mg) by mouth daily 90 tablet 0     simvastatin (ZOCOR) 20 MG tablet take 1/2 tablet at bedtime (Patient taking differently: Take 20 mg by mouth every other day ) 45 tablet 2     sodium chloride (OCEAN) 0.65 % nasal spray Spray 1 spray into both nostrils 3 times daily as needed for congestion  0     VITAMIN D, CHOLECALCIFEROL, PO Take 1,000 Units by mouth daily.         [DISCONTINUED] oxyCODONE-acetaminophen (PERCOCET) 5-325 MG tablet Take 0.5 tab every morning.  Max of 0.5 tab daily. 30 tablet 0     [DISCONTINUED] metoprolol succinate (TOPROL-XL) 50 MG 24 hr tablet Take 1 tablet (50 mg) by mouth daily 30 tablet 11     [DISCONTINUED] multivitamin, therapeutic (THERA-VIT) TABS tablet Take 1 tablet by mouth daily (Patient not taking: Reported on 2019) 60 tablet 0     No facility-administered encounter medications on file as of 2019.         Again, thank you for allowing me to participate in the care of your patient.      Sincerely,    Lolis Davenport PA-C     [facility]

## 2019-04-01 NOTE — PROGRESS NOTES
Service Date: 2019      PRIMARY CARDIOLOGIST:  Dr. Walker.      REASON FOR VISIT:  Hospital followup.      HISTORY OF PRESENT ILLNESS:  Ms. Alves is a 89-year-old woman with past medical history significant for the followin.  Likely pulmonary fibrosis.   2.  Hypertension.   3.  Chronic kidney disease.   4.  Kyphoscoliosis.   5.  Persistent AFib with tachybrady now status post AV node ablation and permanent pacemaker implant.   6.  HFpEF.      I met Theresa in 2013 and she has since been admitted for difficult-to-control AFib as well as alcohol intoxication at one point.  We were unable to get her heart rates controlled without bradycardia and she was seen by Dr. Izquierdo and underwent AV node ablation and permanent pacemaker in January.  She was seen by Marsha Chavarria after that and was doing okay and is now back for routine followup.      Today, she says she feels the same, which is poor.  Pacemaker did not make any bit of difference.  She is still very tired and she is still short of breath.  She has 90 steps to walk from her room to the elevator and after that she has to sit down and rest.  She occasionally has slight pain around the pacemaker area.  She does note though that her feet are less swollen, but the big issue is that it has not improved her breathing.  She denies orthopnea or PND.  She is still tired.  She is not motivated to do anything.  She is not really interested in doing anything.  She used to play cards and play bingo and she is not even interested in all of that.  She does acknowledge that there may be some depression issues.      SOCIAL HISTORY:  She is eating once a day in the dining room, otherwise eats in her apartment.  She has a Boost or fruit or yogurt.  She comes in today with her niece, Ericka, who brings her into her appointments.  She tells me she is doing one alcoholic drink a week.  Lifelong nonsmoker.      PHYSICAL EXAMINATION:   VITAL SIGNS:  Blood pressure on recheck  138/74, pulse is 70, weight is 110 pounds and stable.   GENERAL:  Elderly, frail-appearing woman in no acute distress.  Somewhat frustrated, which is her baseline.   HEENT:  Normocephalic, atraumatic.   HEART:  Regular.  I do not appreciate murmur, rub or gallop.   LUNGS:  Her lungs have bibasilar rales, but not her upper and middle lung fields.   EXTREMITIES:  She has trace edema at this point which is much improved.   ABDOMEN:  Soft, nontender.  No JVP at 90 degrees.      LABORATORY STUDIES:  Labs are not yet completed today.      ASSESSMENT AND PLAN:   1.  Persistent atrial fibrillation with tachybrady syndrome with AV node ablation and permanent pacemaker.  I was very honest with her that we are not sure how much would help her shortness of breath and she tells me today it has not at all.  This suggests that perhaps her symptoms are more from her lungs versus heart failure.  We have discussed at length anticoagulation and she has declined.  She has problems with nosebleeds and she is well aware of her stroke risk.  We reviewed today that even though she has had an AV node ablation that AFib is not gone and she still is at risk for stroke.  We will keep her not on any anticoagulation, not even an aspirin as she had profound bleeding on this and she would rather die and have a stroke than be on these.   2.  Heart failure with preserved EF secondary to tachycardia.  She is down to 20 mg a day and she appears euvolemic to me on exam and her peripheral edema is as good as it has been.  She has not had a BMP and we will recheck that today.   3.  Hypertension, markedly elevated initially, but now improved.   4.  Fatigue, ongoing.  Suspect this is age-related, but cannot guarantee that.  She now can be off metoprolol as this can have a side effect of fatigue and we will try her on 5 of amlodipine in its place.  I did warn her of the side effect of peripheral edema, although it is less likely to happen at this dose.   5.   CKD.  Repeat today.   6.  Continue to recommend no drinking.      We will see her back in about 3 months, sooner with concerns.  Thank you for allowing me to participate in her care.         ADRIENNE MAIER PA-C             D: 2019   T: 2019   MT: ALVARO      Name:     YANELI CHUA   MRN:      [id number]        Account:      [id number]   :      1924           Service Date: 2019      Document: [id number]

## 2019-04-01 NOTE — LETTER
4/1/2019    Marbin Rouse MD  [address]  [facility] [address]    RE: Theresa Alves       Dear Colleague,    I had the pleasure of seeing Theresa Alves in the [facility].    [id number]  HPI and Plan:   See dictation    Orders this Visit:  Orders Placed This Encounter   Procedures     Basic metabolic panel     Follow-Up with Cardiac Advanced Practice Provider     Orders Placed This Encounter   Medications     amLODIPine (NORVASC) 5 MG tablet     Sig: Take 1 tablet (5 mg) by mouth daily     Dispense:  90 tablet     Refill:  3     Medications Discontinued During This Encounter   Medication Reason     multivitamin, therapeutic (THERA-VIT) TABS tablet Medication Reconciliation Clean Up     metoprolol succinate (TOPROL-XL) 50 MG 24 hr tablet          Encounter Diagnoses   Name Primary?     (HFpEF) heart failure with preserved ejection fraction (H)      Pacemaker      Secondary hypertension Yes       CURRENT MEDICATIONS:  Current Outpatient Medications   Medication Sig Dispense Refill     acetaminophen (TYLENOL) 500 MG tablet Take one tablet 8 hours after morning percocet and one tablet at bedtime  0     amLODIPine (NORVASC) 5 MG tablet Take 1 tablet (5 mg) by mouth daily 90 tablet 3     Cyanocobalamin (VITAMIN B 12 PO) Take 1 tablet by mouth daily        furosemide (LASIX) 20 MG tablet Take 1 tablet (20 mg) by mouth daily 90 tablet 0     oxyCODONE-acetaminophen (PERCOCET) 5-325 MG tablet Take 0.5 tab every morning.  Max of 0.5 tab daily. 30 tablet 0     simvastatin (ZOCOR) 20 MG tablet take 1/2 tablet at bedtime (Patient taking differently: Take 20 mg by mouth every other day ) 45 tablet 2     sodium chloride (OCEAN) 0.65 % nasal spray Spray 1 spray into both nostrils 3 times daily as needed for congestion  0     VITAMIN D, CHOLECALCIFEROL, PO Take 1,000 Units by mouth daily.           ALLERGIES   No Known Allergies    PAST MEDICAL HISTORY:  Past Medical History:    Diagnosis Date     Atrial fibrillation (H)      Chronic back pain      Chronic diastolic CHF (congestive heart failure) (H)      Chronic kidney disease, stage III (moderate) (H)      Pulmonary fibrosis (H)      Pulmonary hypertension (H)      Unspecified essential hypertension     Essential hypertension       PAST SURGICAL HISTORY:  Past Surgical History:   Procedure Laterality Date     CARPAL TUNNEL RELEASE RT/LT  1990's    R     CATARACT IOL, RT/LT  5/03    R     EP ABLATION AV NODE N/A 1/18/2019    Procedure: EP Ablation AV Node;  Surgeon: Johny Walker MD;  Location:  HEART CARDIAC CATH LAB     HERNIA REPAIR, INGUINAL RT/LT  1954    R     hiatal hernia repair  6/10    large paraesophageal hiatal hernia; Nissen fundoplication        FAMILY HISTORY:  Family History   Problem Relation Age of Onset     Unknown/Adopted Mother      Unknown/Adopted Father      Diabetes No family hx of      Coronary Artery Disease No family hx of      Hypertension No family hx of      Hyperlipidemia No family hx of      Cerebrovascular Disease No family hx of      Breast Cancer No family hx of      Colon Cancer No family hx of      Prostate Cancer No family hx of      Other Cancer No family hx of      Depression No family hx of      Anxiety Disorder No family hx of      Mental Illness No family hx of      Substance Abuse No family hx of      Anesthesia Reaction No family hx of      Asthma No family hx of      Osteoporosis No family hx of      Genetic Disorder No family hx of      Thyroid Disease No family hx of      Obesity No family hx of        SOCIAL HISTORY:  Social History     Socioeconomic History     Marital status:      Spouse name: None     Number of children: 0     Years of education: None     Highest education level: None   Occupational History     Occupation: worked at [employer] for 40 yrs     Employer: RETIRED   Social Needs     Financial resource strain: None     Food insecurity:     Worry: None      "Inability: None     Transportation needs:     Medical: None     Non-medical: None   Tobacco Use     Smoking status: Never Smoker     Smokeless tobacco: Never Used   Substance and Sexual Activity     Alcohol use: Yes     Comment: 1 drink every other week     Drug use: No     Sexual activity: No   Lifestyle     Physical activity:     Days per week: None     Minutes per session: None     Stress: None   Relationships     Social connections:     Talks on phone: None     Gets together: None     Attends [employer] service: None     Active member of club or organization: None     Attends meetings of clubs or organizations: None     Relationship status: None     Intimate partner violence:     Fear of current or ex partner: None     Emotionally abused: None     Physically abused: None     Forced sexual activity: None   Other Topics Concern     Parent/sibling w/ CABG, MI or angioplasty before 65F 55M? Not Asked   Social History Narrative     None       Review of Systems:  Skin:  Negative     Eyes:  Positive for glasses  ENT:  Negative    Respiratory:  Positive for dyspnea on exertion;cough;shortness of breath;mucoid expectorant;purulent expectorant  Cardiovascular:  Negative Positive for;palpitations;fatigue  Gastroenterology: Positive for poor appetite  Genitourinary:  Positive for nocturia  Musculoskeletal:  Positive for back pain;arthritis  Neurologic:  Positive for numbness or tingling of feet  Psychiatric:  Negative    Heme/Lymph/Imm:  Negative    Endocrine:  Negative      Physical Exam:  Vitals: /74   Pulse 60   Ht 1.651 m (5' 5\")   Wt 49.9 kg (110 lb)   LMP  (LMP Unknown)   BMI 18.30 kg/m      Please refer to dictation for physical exam    Recent Lab Results:  LIPID RESULTS:  Lab Results   Component Value Date    CHOL 142 11/28/2017    HDL 69 11/28/2017    LDL 52 11/28/2017    TRIG 107 11/28/2017    CHOLHDLRATIO 2.7 01/10/2014       LIVER ENZYME RESULTS:  Lab Results   Component Value Date    AST 24 03/18/2018 "    ALT 27 03/18/2018       CBC RESULTS:  Lab Results   Component Value Date    WBC 8.1 01/18/2019    RBC 3.71 (L) 01/18/2019    HGB 12.6 01/18/2019    HCT 37.5 01/18/2019     (H) 01/18/2019    MCH 34.0 (H) 01/18/2019    MCHC 33.6 01/18/2019    RDW 14.1 01/18/2019     01/18/2019       BMP RESULTS:  Lab Results   Component Value Date     02/13/2019    POTASSIUM 4.8 02/13/2019    CHLORIDE 107 02/13/2019    CO2 22 (L) 02/13/2019    ANIONGAP 11.8 02/13/2019     (H) 02/13/2019    BUN 46 (H) 02/13/2019    CR 1.35 (H) 02/13/2019    GFRESTIMATED 37 (L) 02/13/2019    GFRESTBLACK 44 (L) 02/13/2019    PAWEL 9.9 02/13/2019        A1C RESULTS:  No results found for: A1C    INR RESULTS:  No results found for: INR        CC  No referring provider defined for this encounter.        Thank you for allowing me to participate in the care of your patient.      Sincerely,     FRANCISCA FullerC     [facility]    cc:   No referring provider defined for this encounter.

## 2019-04-01 NOTE — PATIENT INSTRUCTIONS
Call [facility] nurse for any questions or concerns Mon-Fri 8am-4pm:                                                #[phone]                                       For concerns after hours:                                               #[phone]     1: Medication changes: Stop taking metoprolol.    Start taking amlodipine 5 mg once a day in the morning.      2: Plan from today: labs today.    Talk to Dr. Rouse about possible depression and meds that could help with that.    Let me know if you would like to see a lung doctor.    Follow up with me in about 3 months with labs at that visit.

## 2019-04-01 NOTE — PROGRESS NOTES
[id number]  HPI and Plan:   See dictation    Orders this Visit:  Orders Placed This Encounter   Procedures     Basic metabolic panel     Follow-Up with Cardiac Advanced Practice Provider     Orders Placed This Encounter   Medications     amLODIPine (NORVASC) 5 MG tablet     Sig: Take 1 tablet (5 mg) by mouth daily     Dispense:  90 tablet     Refill:  3     Medications Discontinued During This Encounter   Medication Reason     multivitamin, therapeutic (THERA-VIT) TABS tablet Medication Reconciliation Clean Up     metoprolol succinate (TOPROL-XL) 50 MG 24 hr tablet          Encounter Diagnoses   Name Primary?     (HFpEF) heart failure with preserved ejection fraction (H)      Pacemaker      Secondary hypertension Yes       CURRENT MEDICATIONS:  Current Outpatient Medications   Medication Sig Dispense Refill     acetaminophen (TYLENOL) 500 MG tablet Take one tablet 8 hours after morning percocet and one tablet at bedtime  0     amLODIPine (NORVASC) 5 MG tablet Take 1 tablet (5 mg) by mouth daily 90 tablet 3     Cyanocobalamin (VITAMIN B 12 PO) Take 1 tablet by mouth daily        furosemide (LASIX) 20 MG tablet Take 1 tablet (20 mg) by mouth daily 90 tablet 0     oxyCODONE-acetaminophen (PERCOCET) 5-325 MG tablet Take 0.5 tab every morning.  Max of 0.5 tab daily. 30 tablet 0     simvastatin (ZOCOR) 20 MG tablet take 1/2 tablet at bedtime (Patient taking differently: Take 20 mg by mouth every other day ) 45 tablet 2     sodium chloride (OCEAN) 0.65 % nasal spray Spray 1 spray into both nostrils 3 times daily as needed for congestion  0     VITAMIN D, CHOLECALCIFEROL, PO Take 1,000 Units by mouth daily.           ALLERGIES   No Known Allergies    PAST MEDICAL HISTORY:  Past Medical History:   Diagnosis Date     Atrial fibrillation (H)      Chronic back pain      Chronic diastolic CHF (congestive heart failure) (H)      Chronic kidney disease, stage III (moderate) (H)      Pulmonary fibrosis (H)      Pulmonary hypertension  (H)      Unspecified essential hypertension     Essential hypertension       PAST SURGICAL HISTORY:  Past Surgical History:   Procedure Laterality Date     CARPAL TUNNEL RELEASE RT/LT  1990's    R     CATARACT IOL, RT/LT  5/03    R     EP ABLATION AV NODE N/A 1/18/2019    Procedure: EP Ablation AV Node;  Surgeon: Johny Walker MD;  Location:  [facility]     HERNIA REPAIR, INGUINAL RT/LT  1954    R     hiatal hernia repair  6/10    large paraesophageal hiatal hernia; Nissen fundoplication        FAMILY HISTORY:  Family History   Problem Relation Age of Onset     Unknown/Adopted Mother      Unknown/Adopted Father      Diabetes No family hx of      Coronary Artery Disease No family hx of      Hypertension No family hx of      Hyperlipidemia No family hx of      Cerebrovascular Disease No family hx of      Breast Cancer No family hx of      Colon Cancer No family hx of      Prostate Cancer No family hx of      Other Cancer No family hx of      Depression No family hx of      Anxiety Disorder No family hx of      Mental Illness No family hx of      Substance Abuse No family hx of      Anesthesia Reaction No family hx of      Asthma No family hx of      Osteoporosis No family hx of      Genetic Disorder No family hx of      Thyroid Disease No family hx of      Obesity No family hx of        SOCIAL HISTORY:  Social History     Socioeconomic History     Marital status:      Spouse name: None     Number of children: 0     Years of education: None     Highest education level: None   Occupational History     Occupation: worked at [employer] for 40 yrs     Employer: RETIRED   Social Needs     Financial resource strain: None     Food insecurity:     Worry: None     Inability: None     Transportation needs:     Medical: None     Non-medical: None   Tobacco Use     Smoking status: Never Smoker     Smokeless tobacco: Never Used   Substance and Sexual Activity     Alcohol use: Yes     Comment: 1 drink every  "other week     Drug use: No     Sexual activity: No   Lifestyle     Physical activity:     Days per week: None     Minutes per session: None     Stress: None   Relationships     Social connections:     Talks on phone: None     Gets together: None     Attends [employer] service: None     Active member of club or organization: None     Attends meetings of clubs or organizations: None     Relationship status: None     Intimate partner violence:     Fear of current or ex partner: None     Emotionally abused: None     Physically abused: None     Forced sexual activity: None   Other Topics Concern     Parent/sibling w/ CABG, MI or angioplasty before 65F 55M? Not Asked   Social History Narrative     None       Review of Systems:  Skin:  Negative     Eyes:  Positive for glasses  ENT:  Negative    Respiratory:  Positive for dyspnea on exertion;cough;shortness of breath;mucoid expectorant;purulent expectorant  Cardiovascular:  Negative Positive for;palpitations;fatigue  Gastroenterology: Positive for poor appetite  Genitourinary:  Positive for nocturia  Musculoskeletal:  Positive for back pain;arthritis  Neurologic:  Positive for numbness or tingling of feet  Psychiatric:  Negative    Heme/Lymph/Imm:  Negative    Endocrine:  Negative      Physical Exam:  Vitals: /74   Pulse 60   Ht 1.651 m (5' 5\")   Wt 49.9 kg (110 lb)   LMP  (LMP Unknown)   BMI 18.30 kg/m     Please refer to dictation for physical exam    Recent Lab Results:  LIPID RESULTS:  Lab Results   Component Value Date    CHOL 142 11/28/2017    HDL 69 11/28/2017    LDL 52 11/28/2017    TRIG 107 11/28/2017    CHOLHDLRATIO 2.7 01/10/2014       LIVER ENZYME RESULTS:  Lab Results   Component Value Date    AST 24 03/18/2018    ALT 27 03/18/2018       CBC RESULTS:  Lab Results   Component Value Date    WBC 8.1 01/18/2019    RBC 3.71 (L) 01/18/2019    HGB 12.6 01/18/2019    HCT 37.5 01/18/2019     (H) 01/18/2019    MCH 34.0 (H) 01/18/2019    MCHC 33.6 " 01/18/2019    RDW 14.1 01/18/2019     01/18/2019       BMP RESULTS:  Lab Results   Component Value Date     02/13/2019    POTASSIUM 4.8 02/13/2019    CHLORIDE 107 02/13/2019    CO2 22 (L) 02/13/2019    ANIONGAP 11.8 02/13/2019     (H) 02/13/2019    BUN 46 (H) 02/13/2019    CR 1.35 (H) 02/13/2019    GFRESTIMATED 37 (L) 02/13/2019    GFRESTBLACK 44 (L) 02/13/2019    PAWEL 9.9 02/13/2019        A1C RESULTS:  No results found for: A1C    INR RESULTS:  No results found for: INR        CC  No referring provider defined for this encounter.

## 2019-04-03 PROBLEM — F32.0 MILD MAJOR DEPRESSION (H): Status: ACTIVE | Noted: 2019-01-01

## 2019-04-03 PROBLEM — K59.00 CONSTIPATION, UNSPECIFIED CONSTIPATION TYPE: Status: ACTIVE | Noted: 2019-01-01

## 2019-04-03 PROBLEM — G89.29 CHRONIC MIDLINE LOW BACK PAIN WITHOUT SCIATICA: Status: ACTIVE | Noted: 2019-01-01

## 2019-04-03 PROBLEM — R06.09 DOE (DYSPNEA ON EXERTION): Status: ACTIVE | Noted: 2019-01-01

## 2019-04-03 PROBLEM — M54.50 CHRONIC MIDLINE LOW BACK PAIN WITHOUT SCIATICA: Status: ACTIVE | Noted: 2019-01-01

## 2019-04-03 NOTE — PROGRESS NOTES
SUBJECTIVE:   Theresa Alves is a 89 year old female who presents to clinic today for the following health issues:      Hyperlipidemia Follow-Up      Rate your low fat/cholesterol diet?: good    Taking statin?  Yes, no muscle aches from statin    Other lipid medications/supplements?:  none    Hypertension Follow-up      Outpatient blood pressures are  being checked at assisted living(where she lives) one time a month.    Low Salt Diet: no added salt      Amount of exercise or physical activity: None    Problems taking medications regularly: No    Medication side effects: none    Diet: low salt and low fat/cholesterol      Medication Followup of CPS    Taking Medication as prescribed: yes    Side Effects:  None    Medication Helping Symptoms:  yes                       Here with her niece.                   They add one complaint and problem after another.                 She has most of her usual complaints, of chronic fatigue, chronic shortness of breath, chronic cough, etc.                    2 days ago cardiology stopped her metoprolol and started amlodipine.     She lost weight last year, but her weight has been relatively stable over the last few months.           Her appetite is poor.        The food at her assisted living facility is subpar.           She does drink some boost.               She did not appreciate that the cardiologist suggested hospice.                        For the first time today, she is requesting an antidepressant medication.                       She takes one half of a Percocet tablet for her back pain daily. She never takes more than that.                              Problem list and histories reviewed & adjusted, as indicated.      Current Outpatient Medications   Medication Sig Dispense Refill     acetaminophen (TYLENOL) 500 MG tablet Take one tablet 8 hours after morning percocet and one tablet at bedtime  0     amLODIPine (NORVASC) 5 MG tablet Take 1 tablet (5 mg) by  "mouth daily 90 tablet 3     Cyanocobalamin (VITAMIN B 12 PO) Take 1 tablet by mouth daily        furosemide (LASIX) 20 MG tablet Take 1 tablet (20 mg) by mouth daily 90 tablet 0     oxyCODONE-acetaminophen (PERCOCET) 5-325 MG tablet Take 0.5 tab every morning.  Max of 0.5 tab daily. 30 tablet 0     simvastatin (ZOCOR) 20 MG tablet take 1/2 tablet at bedtime (Patient taking differently: Take 20 mg by mouth every other day ) 45 tablet 2     sodium chloride (OCEAN) 0.65 % nasal spray Spray 1 spray into both nostrils 3 times daily as needed for congestion  0     UNABLE TO FIND MEDICATION NAME: OTC Stool Softner 1 pill every evening.       VITAMIN D, CHOLECALCIFEROL, PO Take 1,000 Units by mouth daily.         No Known Allergies  BP Readings from Last 3 Encounters:   04/03/19 152/76   04/01/19 138/74   02/13/19 144/76    Wt Readings from Last 3 Encounters:   04/03/19 49 kg (108 lb)   04/01/19 49.9 kg (110 lb)   02/13/19 49 kg (108 lb)                    Reviewed and updated as needed this visit by clinical staff       Reviewed and updated as needed this visit by Provider         ROS:  CONSTITUTIONAL:NEGATIVE  for chills and fever   RESP:POSITIVE for cough-non productive, cough-productive and dyspnea on exertion  CV: NEGATIVE for chest pain/chest pressure and palpitations  PSYCHIATRIC: POSITIVE fordepressed mood and fatigue    OBJECTIVE:                                                    /76 (BP Location: Left arm, Patient Position: Chair, Cuff Size: Adult Small)   Pulse 65   Temp 97.5  F (36.4  C)   Resp 22   Ht 1.651 m (5' 5\")   Wt 49 kg (108 lb)   LMP  (LMP Unknown)   SpO2 97%   Breastfeeding? No   BMI 17.97 kg/m    Body mass index is 17.97 kg/m .  GENERAL APPEARANCE: alert and fatigued  RESP: rales bibasilar  CV: regular rates and rhythm and no edema  MS: arthritic changes of the low back  NEURO: speech normal and gait abnormal ;uses a walker  PSYCH: fatigued    Diagnostic test results:  Results for " orders placed or performed in visit on 04/01/19   N terminal pro BNP outpatient   Result Value Ref Range    N-Terminal Pro Bnp 14,191 (H) 0 - 450 pg/mL   Basic metabolic panel   Result Value Ref Range    Sodium 135 (L) 136 - 145 mmol/L    Potassium 4.9 3.5 - 5.1 mmol/L    Chloride 103 98 - 107 mmol/L    Carbon Dioxide 26 23 - 29 mmol/L    Anion Gap 10.9 6 - 17 mmol/L    Glucose 109 (H) 70 - 105 mg/dL    Urea Nitrogen 28 7 - 30 mg/dL    Creatinine 1.32 (H) 0.70 - 1.30 mg/dL    GFR Estimate 37 (L) >60 mL/min/[1.73_m2]    GFR Estimate If Black 45 (L) >60 mL/min/[1.73_m2]    Calcium 9.5 8.5 - 10.5 mg/dL        ASSESSMENT/PLAN:                                                        ICD-10-CM    1. Pulmonary fibrosis J84.10    2. Mild major depression (H) F32.0 escitalopram (LEXAPRO) 5 MG tablet   3. Chronic pain syndrome G89.4    4. HORTON (dyspnea on exertion) R06.09    5. Chronic midline low back pain without sciatica M54.5 oxyCODONE-acetaminophen (PERCOCET) 5-325 MG tablet    G89.29 DISCONTINUED: oxyCODONE-acetaminophen (PERCOCET) 5-325 MG tablet   6. Chronic fatigue R53.82    7. Weight loss R63.4    8. Constipation, unspecified constipation type K59.00           Summary and implications:  We reviewed multiple issues.           We reviewed all of the issues on the diagnoses list.                           Most of her shortness of breath is due to her pulmonary fibrosis.                    So far, her resting oximetry is normal.               She does not want us to check for exercise-induced desaturation. She does not want to use portable oxygen.                     She is agreeable to adding a low-dose antidepressant.                Ok for analgesic refill.           Patient Instructions   Keep taking the same medications.  Keep taking Boost.                             Today we added escitalopram 5 mg per day.            Take this every day.        If you do notice improvement in your mood in a month or so, you can  increase the dose to 10 mg per day.                       Take this in the morning.                                                    Return in about 4 months (around 8/3/2019) for chronic pain follow up, depression, recheck weight loss, cardiac issues.      Marbin Rouse MD  [facility]

## 2019-04-03 NOTE — LETTER
[facility]  04/03/19    Patient: Theresa Alves  YOB: 1924  Medical Record Number: [id number]                                                                  Opioid / Opioid Plus Controlled Substance Agreement    I understand that my care provider has prescribed an opioid (narcotic) controlled substance to help manage my condition(s). I am taking this medicine to help me function or work. I know this is strong medicine, and that it can cause serious side effects. Opioid medicine can be sedating, addicting and may cause a dependency on the drug. They can affect my ability to drive or think, and cause depression. They need to be taken exactly as prescribed. Combining opioids with certain medicines or chemicals (such as cocaine, sedatives and tranquilizers, sleeping pills, meth) can be dangerous or even fatal. Also, if I stop opioids suddenly, I may have severe withdrawal symptoms. Last, I understand that opioids do not work for all types of pain nor for all patients. If not helpful, I may be asked to stop them.        The risks, benefits, and side effects of these medicine(s) were explained to me. I agree that:    1. I will take part in other treatments as advised by my care team. This may be psychiatry or counseling, physical therapy, behavioral therapy, group treatment or a referral to a pain clinic. I will reduce or stop my medicine when my care team tells me to do so.  2. I will take my medicines as prescribed. I will not change the dose or schedule unless my care team tells me to. There will be no refills if I  run out early.   I may be contactedwithout warning and asked to complete a urine drug test or pill count at any time.   3. I will keep all my appointments, and understand this is part of the monitoring of opioids. My care team may require an office visit for EVERY opioid/controlled substance refill. If I miss appointments or don t follow instructions, my  care team may stop my medicine.  4. I will not ask other providers to prescribe controlled substances, and I will not accept controlled substances from other people. If I need another prescribed controlled substance for a new reason, I will tell my care team within 1 business day.  5. I will use one pharmacy to fill all of my controlled substance prescriptions, and it is up to me to make sure that I do not run out of my medicines on weekends or holidays. If my care team is willing to refill my opioid prescription without a visit, I must request refills only during office hours, refills may take up to 3 days to process, and it may take up to 5 to 7 days for my medicine to be mailed and ready at my pharmacy. Prescriptions will not be mailed anywhere except my pharmacy.        [id number]  Rev 12/18         Registration to scan to EHR                             Page 1 of 2               Controlled Substance Agreement Opioid        [facility]  04/03/19  Patient: Theresa Alves  YOB: 1924  Medical Record Number: [id number]                                                                  6. I am responsible for my prescriptions. If the medicine/prescription is lost or stolen, it will not be replaced. I also agree not to share controlled substance medicines with anyone.  7. I agree to not use ANY illegal or recreational drugs. This includes marijuana, cocaine, bath salts or other drugs. I agree not to use alcohol unless my care team says I may.          I agree to give urine samples whenever asked. If I don t give a urine sample, the care team may stop my medicine.    8. If I enroll in the [place] Medical Marijuana program, I will tell my care team. I will also sign an agreement to share my medical records with my care team.   9. I will bring in my list of medicines (or my medicine bottles) each time I come to the clinic.   10. I will tell my care team right away if I become  pregnant or have a new medical problem treated outside of my regular clinic.  11. I understand that this medicine can affect my thinking and judgment. It may be unsafe for me to drive, use machinery and do dangerous tasks. I will not do any of these things until I know how the medicine affects me. If my dose changes, I will wait to see how it affects me. I will contact my care team if I have concerns about medicine side effects.    I understand that if I do not follow any of the conditions above, my prescriptions or treatment may be stopped.      I agree that my provider, clinic care team, and pharmacy may work with any city, state or federal law enforcement agency that investigates the misuse, sale, or other diversion of my controlled medicine. I will allow my provider to discuss my care with or share a copy of this agreement with any other treating provider, pharmacy or emergency room where I receive care. I agree to give up (waive) any right of privacy or confidentiality with respect to these consents.     I have read this agreement and have asked questions about anything I did not understand.      ________________________________________________________________________  Patient signature - Date/Time -  Theresa Alves                                      ________________________________________________________________________  Witness signature                                                            ________________________________________________________________________  Provider signature - Marbin Rouse MD      [id number]  Rev 12/18         Registration to scan to EHR                         Page 2 of 2                   Controlled Substance Agreement Opioid           Page 1 of 2  Opioid Pain Medicines (also known as Narcotics)  What You Need to Know    What are opioids?   Opioids are pain medicines that must be prescribed by a doctor.  They are also known as narcotics.    Examples are:     morphine (MS  Contin, Aziza)    oxycodone (Oxycontin)    oxycodone and acetaminophen (Percocet)    hydrocodone and acetaminophen (Vicodin, Norco)     fentanyl patch (Duragesic)     hydromorphone (Dilaudid)     methadone     What do opioids do well?   Opioids are best for short-term pain after a surgery or injury. They also work well for cancer pain. Unlike other pain medicines, they do not cause liver or kidney failure or ulcers. They may help some people with long-lasting (chronic) pain.     What do opioids NOT do well?   Opioids never get rid of pain entirely, and they do not work well for most patients with chronic pain. Opioids do not reduce swelling, one of the causes of pain. They also don t work well for nerve pain.                           For informational purposes only.  Not to replace the advice of your care provider.  Copyright 201 [facility]. All right reserved. [employer] 150492-Jrn 02/18.      Page 2 of 2    Risks and side effects   Talk to your doctor before you start or decide to keep taking one of these medicines. Side effects include:    Lowering your breathing rate enough to cause death    Overdose, including death, especially if taking higher than prescribed doses    Long-term opioid use    Worse depression symptoms; less pleasure in things you usually enjoy    Feeling tired or sluggish    Slower thoughts or cloudy thinking    Being more sensitive to pain over time; pain is harder to control    Trouble sleeping or restless sleep    Changes in hormone levels (for example, less testosterone)    Changes in sex drive or ability to have sex    Constipation    Unsafe driving    Itching and sweating    Feeling dizzy    Nausea, vomiting and dry mouth    What else should I know about opioids?  When someone takes opioids for too long or too often, they become dependent. This means that if you stop or reduce the medicine too quickly, you will have withdrawal symptoms.    Dependence is not the same as  addiction. Addiction is when people keep using a substance that harms their body, their mind or their relations with others. If you have a history of drug or alcohol abuse, taking opioids can cause a relapse.    Over time, opioids don t work as well. Most people will need higher and higher doses. The higher the dose, the more serious the side effects. We don t know the long-term effects of opioids.      Prescribed opioids aren't the best way to manage chronic pain    Other ways to manage pain include:      Ibuprofen or acetaminophen.  You should always try this first.      Treat health problems that may be causing pain.      acupuncture or massage, deep breathing, meditation, visual imagery, aromatherapy.      Use heat or ice at the pain site      Physical therapy and exercise      Stop smoking      See a counselor or therapist                                                  People who have used opioids for a long time may have a lower quality of life, worse depression, higher levels of pain and more visits to doctors.    Never share your opioids with others. Be sure to store opioids in a secure place, locked if possible.Young children can easily swallow them and overdose.     You can overdose on opioids.  Signs of overdose include decrease or loss of consciousness, slowed breathing, trouble waking and blue lips.  If someone is worried about overdose, they should call 911.    If you are at risk for overdose, you may get naloxone (Narcan, a medicine that reverses the effects of opioids.  If you overdose, a friend or family member can give you Narcan while waiting for the ambulance.  They need to know the signs of overdose and how to give Narcan.    While you're taking opioids:    Don't use alcohol or street drugs. Taking them together can cause death.    Don't take any of these medicines unless your doctor says its okay.  Taking these with opioids can cause death.    Benzodiazepines (such as lorazepam         or  diazepam)    Muscle relaxers (such as cyclobenzaprine)    sleeping pills    other opioids    Safe disposal of opioids  Find your area drug take-back program, your pharmacy mail-back program, buy a special disposal bag (such as Deterra) from your pharmacy or flush them down the toilet.  Use the guidelines at:  www.fda.gov/drugs/resourcesforyou

## 2019-04-03 NOTE — PATIENT INSTRUCTIONS
Keep taking the same medications.  Keep taking Boost.                             Today we added escitalopram 5 mg per day.            Take this every day.        If you do notice improvement in your mood in a month or so, you can increase the dose to 10 mg per day.                       Take this in the morning.

## 2019-05-07 NOTE — PROGRESS NOTES
Clinic Care Coordination Contact    Clinic Care Coordination Contact  OUTREACH    Referral Information:  Referral Source: IP Report  Primary Diagnosis: Cardiovascular - other  Chief Complaint   Patient presents with     Clinic Care Coordination - Follow-up     goal check-in     Clinical Concerns:  CCC RN follow-up outreach call to get updates on patient status, assess goal progress, and provide support and additional resources as needed.    The patient reports to be doing about the same.  She reports continued fatigue and shortness of breath with activity, both symptoms of which her PCP and Cardiologist are aware of.  The patient confirmed she knows how to do pursed-lip breathing and uses this when she is walking.  She also states that she often stops to sit down and rest when walking longer distances.    The patient hasn't weighing herself daily as it is hard for her to balance on her scale.  She agreed to try to at least weigh herself weekly with help from a family member when they come to visit.    The patient states she has continued to walk down to the dining cantu one to two times per day for meals.  She wants to continue walking with her walker versus getting an electric scooter because she is worried about losing her mobility.  CCC RN discussed patient doing leg exercises a few times per week in order to maintain her leg strength; patient was agreeable, see Exercise and Goals below.    Pain  Chronic back pain    Functional Status:  Dependent ADLs:: Ambulation-walker  Dependent IADLs:: Cleaning, Cooking, Transportation  Mobility Status: Independent w/Device    Living Situation:  Assisted living    Diet/Exercise/Sleep:  Diet: No added salt, Boost daily  The patient reports a poor appetite.  She states she doesn't have much sense of smell or taste, so food is not enjoyable to her.  She will continue eating because she knows she should and drinking a daily Boost for added protein.  She reports weighing about 105  lbs approximately 1 month ago.    Exercise: Yes  The patient continues to walk down to the dining cantu daily, sometimes twice per day; she counted 90 steps to the dining cantu.  She agreed to additionally strive toward doing leg exercises at least 3 times per week to increase her leg strength; see Goals below.     Resources and Interventions:  Community Resources: Lifeline  Equipment Currently Used at Home: walker, rolling     Goals Addressed                 This Visit's Progress       Patient Stated      COMPLETED: 1. Being Active (pt-stated)   Completed     Goal Statement: I will prevent physical deconditioning by continuing to walk to the dining cantu each day for the next 3 months.  Measure of Success: The patient will report maintained physical strength and ability to ambulate to the dining cantu.  Supportive Steps to Achieve: CCC RN will continue patient outreaches to offer support.  The patient will continue using her walker for ambulation to promote stability and avoid falls.  Barriers: The patient has chronic back pain which makes it more difficult to walk long distances.  Strengths: The patient is motivated to prevent further physical deconditioning and understands the importance of daily physical activity.  Date to Achieve By: 3/31/19  Patient expressed understanding of goal: Yes        1. Being Active (pt-stated)   New     Goal Statement: I will improve my strength and endurance by doing my leg exercises 3 times per week during the next 3 months.  Measure of Success: The patient will report improved mobility.  Supportive Steps to Achieve: The patient will perform the physical therapy leg exercises she was taught in the past.  CCC RN will continue routine outreaches to patient for ongoing support and resources as needed.  Barriers: Age, fatigue, chronic back pain.  Strengths: The patient is motivated to maintain her mobility.  Date to Achieve By: 8/31/19  Patient expressed understanding of goal: Yes           Patient/Caregiver understanding: Yes  Outreach Frequency: monthly  Future Appointments              In 1 month CORNELIUS TECH1 [facility] [place], UMP PSA CLIN    In 3 months Marbin Rouse MD [facility], LUCX        Plan:    The patient will continue walking to the dining cantu daily for meals and will begin doing seated leg exercises at least 3 times per week per Goals.    The patient will continue routine follow-up with her care teams as scheduled.    The patient will contact CCC RN with any additional questions or concerns.    CCC RN will outreach to patient in approximately 1 month to get updates on patient status, assess goal progress, and offer additional support and resources as indicated.    Minesh Minor RN  Clinic Care Coordinator  [facility]  Ph: [phone]

## 2019-05-07 NOTE — LETTER
[place] CARE COORDINATION  [facility], [facility]  [address], [place] [address]    May 7, 2019        Theresa Alves  [address]  [facility] [address]                                                                          Formerly [facility]  Complex Care Plan  About Me:    Patient Name:  Theresa Alves    YOB: 1924  Age:         89 year old   [place] MRN:    [id number] Telephone Information:  Home Phone [phone]   Mobile [phone]       Address:  [address]  [facility] [address] Email address:  No e-mail address on record      Emergency Contact(s)    Name Relationship Lgl Grd Work Phone Home Phone Mobile Phone   1. GILBERTO JOYA Relative No  [phone]   2. BREANN OLIVER* Relative No  [phone]    3. OLGA BLISS* Friend No   [phone]           Primary language:  English     needed? No   [facility]:  [phone] op. 1  Other communication barriers: None  Preferred Method of Communication:  Mail  Current living arrangement: I live in assisted living  Mobility Status/ Medical Equipment: Independent w/Device    Health Maintenance  Health Maintenance Reviewed: Due/Overdue   Health Maintenance Due   Topic Date Due     MEDICARE ANNUAL WELLNESS VISIT  06/22/[birth year]24     MICROALBUMIN Q1 YEAR  06/22/1925     URINE DRUG SCREEN Q1 YR  06/22/1939     ZOSTER IMMUNIZATION (1 of 2) 06/22/1974     LIPID MONITORING Q1 YEAR  11/28/2018     PHQ-9 Q6 MONTHS  12/05/2018     ALT Q1 YR  03/18/2019     FALL RISK ASSESSMENT  04/03/2019     My Access Plan  Medical Emergency 911   Primary Clinic Line [facility] - [phone]   24 Hour Appointment Line [phone] or  [phone]) (toll-free)   24 Hour Nurse Line [phone] (toll-free)   Preferred Urgent Care [facility], [phone]   [facility]  [phone]   Preferred Pharmacy [id number] - [place], [place] - [address]     Behavioral Health Crisis Line The National Suicide Prevention Lifeline at 1-680.293.8121 or 917     My Care Team Members  Patient Care Team       Relationship Specialty Notifications Start End    Marbin Rouse MD PCP - General Internal Medicine  9/2/12     Phone: [phone] Fax: [phone] [address] [facility] [address]    Marbni Rouse MD Assigned PCP   11/16/12     Phone: [phone] Fax: [phone] [address] [facility] [address]    Minesh Minor, RN Lead Care Coordinator Primary Care -   8/13/18     Phone: [phone]                 My Care Plans  Self Management and Treatment Plan  Goals and (Comments)  Goals        General    1. Being Active (pt-stated)     Notes - Note edited  5/7/2019  1:53 PM by Minesh Minor, RN    Goal Statement: I will improve my strength and endurance by doing my leg exercises 3 times per week during the next 3 months.  Measure of Success: The patient will report improved mobility.  Supportive Steps to Achieve: The patient will perform the physical therapy leg exercises she was taught in the past.  [facility] RN will continue routine outreaches to patient for ongoing support and resources as needed.  Barriers: Age, fatigue, chronic back pain.  Strengths: The patient is motivated to maintain her mobility.  Date to Achieve By: 8/31/19  Patient expressed understanding of goal: Yes             Advance Care Plans/Directives Type:   Type Advanced Care Plans/Directives: POLST    My Medical and Care Information  Problem List   Patient Active Problem List   Diagnosis     Health Care Home     Chronic kidney disease, stage III (moderate) (H)     Hyperlipidemia with target LDL less than 130     Edema     Pulmonary fibrosis     Preventive measure     Hypertension goal BP (blood pressure) < 140/80     SOB (shortness of breath)      Congestive heart failure (H)     Chronic fatigue     Dizziness - light-headed     Degenerative arthritis of spine     Osteoporosis     Weight loss     Right shoulder pain     Low back pain     Physical deconditioning     Chronic pain syndrome     Memory loss     Numbness of right foot     Chronic congestive heart failure, unspecified congestive heart failure type     Atrial fibrillation, unspecified type (H)     Atrial fibrillation w RVR     Anemia due to blood loss, acute     Alcoholic encephalopathy (H)     Bradycardia     Chronic atrial fibrillation (H)     Right wrist pain     Chronic right shoulder pain     Pacemaker     Chronic midline low back pain without sciatica     HORTON (dyspnea on exertion)     Constipation, unspecified constipation type     Mild major depression (H)      Current Medications:  Current Outpatient Medications   Medication     acetaminophen (TYLENOL) 500 MG tablet     amLODIPine (NORVASC) 5 MG tablet     Cyanocobalamin (VITAMIN B 12 PO)     escitalopram (LEXAPRO) 5 MG tablet     furosemide (LASIX) 20 MG tablet     [START ON 5/31/2019] oxyCODONE-acetaminophen (PERCOCET) 5-325 MG tablet     simvastatin (ZOCOR) 20 MG tablet     sodium chloride (OCEAN) 0.65 % nasal spray     UNABLE TO FIND     VITAMIN D, CHOLECALCIFEROL, PO     No current facility-administered medications for this visit.      Care Coordination Start Date: 10/8/2018   Frequency of Care Coordination: monthly   Form Last Updated: 05/07/2019

## 2019-06-03 NOTE — TELEPHONE ENCOUNTER
Oxygen will not be paid for unless we document low oxygen levels.           This requires an office visit.       Please let her know.

## 2019-06-03 NOTE — TELEPHONE ENCOUNTER
"Patient calling to report increased shortness of breath. Patient states her O2 sats are okay at rest, but when she was walking in her apartment this week, she was so short of breath she started to cry. She is requesting oxygen at home since she is uncomfortable with exertion. Patient requesting to talk with PCP. Scheduled telephone visit 6/8. Routing to provider to advise. Would you prefer in person visit?    Additional Information    Negative: Breathing stopped and hasn't returned    Negative: Choking on something    Negative: SEVERE difficulty breathing (e.g., struggling for each breath, speaks in single words, pulse > 120)    Negative: Bluish (or gray) lips or face    Negative: Difficult to awaken or acting confused (e.g., disoriented, slurred speech)    Negative: Passed out (i.e., fainted, collapsed and was not responding)    Negative: Wheezing started suddenly after medicine, an allergic food, or bee sting    Negative: Stridor    Negative: Slow, shallow and weak breathing    Negative: Sounds like a life-threatening emergency to the triager    Negative: Chest pain    Negative: Wheezing (high pitched whistling sound) and previous asthma attacks or use of asthma medicines    Negative: Difficulty breathing and only present when coughing    Negative: Difficulty breathing and only from stuffy or runny nose    Negative: Recent illness requiring prolonged bedrest (i.e., immobilization)    Negative: Any history of prior \"blood clot\" in leg or lungs    Negative: MODERATE difficulty breathing (e.g., speaks in phrases, SOB even at rest, pulse 100-120) of new onset or worse than normal    Negative: Wheezing can be heard across the room    Negative: Drooling or spitting out saliva (because can't swallow)    Negative: Hip or leg fracture in past 2 months (e.g., or had cast on leg or ankle)    Negative: Major surgery in the past month    Negative: Recent long-distance travel with prolonged time in car, bus, plane, or train " "(i.e., within past 2 weeks; 6 or  more hours duration)    Negative: Extra heart beats OR irregular heart beating   (i.e., \"palpitations\")    MODERATE longstanding difficulty breathing (e.g., speaks in phrases, SOB even at rest, pulse 100-120) and SAME as normal    Negative: MILD difficulty breathing (e.g., minimal/no SOB at rest, SOB with walking, pulse < 100) of new onset or worse than normal    Negative: Longstanding difficulty breathing (e.g., CHF, COPD, emphysema) and worse than normal    Negative: Longstanding difficulty breathing and not responding to usual therapy    Negative: Continuous (nonstop) coughing    Negative: Patient wants to be seen    Negative: Fever > 103 F (39.4 C)    Negative: Fever > 101 F (38.3 C) and over 60 years of age    Negative: Fever > 100.0 F (37.8 C) and bedridden (e.g., nursing home patient, stroke, chronic illness, recovering from surgery)    Negative: Fever > 100.0 F (37.8 C) and diabetes mellitus or weak immune system (e.g., HIV positive, cancer chemo, splenectomy, organ transplant, chronic steroids)    Negative: Periods where breathing stops and then resumes normally and bedridden (e.g., nursing home patient, CVA)    Negative: Pregnant or postpartum (< 1 month since delivery)    Negative: Patient sounds very sick or weak to the triager    Answer Assessment - Initial Assessment Questions  1. RESPIRATORY STATUS: \"Describe your breathing?\" (e.g., wheezing, shortness of breath, unable to speak, severe coughing)       Short of Breath needing to sit down, waking her up in the middle of the night.  2. ONSET: \"When did this breathing problem begin?\"       Over a year ago, but has gotten significantly worse   3. PATTERN \"Does the difficult breathing come and go, or has it been constant since it started?\"       Resolves when sitting down with intentional breathing  4. SEVERITY: \"How bad is your breathing?\" (e.g., mild, moderate, severe)       Ranges from mild to moderate depending on " "exertion.        5. RECURRENT SYMPTOM: \"Have you had difficulty breathing before?\" If so, ask: \"When was the last time?\" and \"What happened that time?\"       Yes, but becoming more frequent.   6. CARDIAC HISTORY: \"Do you have any history of heart disease?\" (e.g., heart attack, angina, bypass surgery, angioplasty)       No  7. LUNG HISTORY: \"Do you have any history of lung disease?\"  (e.g., pulmonary embolus, asthma, emphysema)      No  8. CAUSE: \"What do you think is causing the breathing problem?\"       Lungs   9. OTHER SYMPTOMS: \"Do you have any other symptoms? (e.g., dizziness, runny nose, cough, chest pain, fever)      Feet still swollen. Sometimes taking extra lasix.     11. TRAVEL: \"Have you traveled out of the country in the last month?\" (e.g., travel history, exposures)        No    Protocols used: BREATHING DIFFICULTY-A-OH      "

## 2019-06-11 PROBLEM — R09.02 HYPOXEMIA: Status: ACTIVE | Noted: 2019-01-01

## 2019-06-11 NOTE — PROGRESS NOTES
"Subjective     Theresa Alves is a 89 year old female who presents to clinic today for the following health issues:    HPI   Form(s) to be filled out for home oxygen.        {HIST REVIEW/ LINKS 2 (Optional):703208}    {Additional problems for the provider to add (optional):671170}  Reviewed and updated as needed this visit by Provider         Review of Systems   {ROS COMP (Optional):287848}      Objective    LMP  (LMP Unknown)   There is no height or weight on file to calculate BMI.  Physical Exam   {Exam List (Optional):047866}    {Diagnostic Test Results (Optional):527117::\"Diagnostic Test Results:\",\"Labs reviewed in Epic\"}        {PROVIDER CHARTING PREFERENCE:400402}      "

## 2019-06-11 NOTE — PROGRESS NOTES
Subjective     Theresa Alves is a 89 year old female who presents to clinic today for the following health issues:    HPI   Medication Followup of multiple health issues-narendra. Lasix    Taking Medication as prescribed: yes    Side Effects:  None    Medication Helping Symptoms:  Has concerns     URINARY TRACT SYMPTOMS  Onset: 1 month    Description:   Painful urination (Dysuria): YES  Blood in urine (Hematuria): no   Delay in urine (Hesitency): YES    Intensity: moderate    Progression of Symptoms:  same    Accompanying Signs & Symptoms:  Fever/chills: no   Flank pain no   Nausea and vomiting: no   Any vaginal symptoms: none  Abdominal/Pelvic Pain: no     History:   History of frequent UTI's: YES- several yrs ago  History of kidney stones: no  Sexually Active: no   Possibility of pregnancy: No    Precipitating factors:   nothing    Therapies Tried and outcome: nothing    This patient is here with her niece.            She is more SOB; HORTON is worse; she can barely walk down the hallway.                   Taking furosemide 40 mg every other day.                 Wants home oxygen.                                                                             Also has vague urinary sx.                             Ongoing back pain; wants an oxycodone refill.       Current Outpatient Medications   Medication Sig Dispense Refill     acetaminophen (TYLENOL) 500 MG tablet Take one tablet 8 hours after morning percocet and one tablet at bedtime  0     amLODIPine (NORVASC) 5 MG tablet Take 1 tablet (5 mg) by mouth daily 90 tablet 3     Cyanocobalamin (VITAMIN B 12 PO) Take 1 tablet by mouth daily        escitalopram (LEXAPRO) 5 MG tablet Take 1 tablet (5 mg) by mouth daily 30 tablet 5     furosemide (LASIX) 20 MG tablet Take 1 tablet (20 mg) by mouth daily (Patient taking differently: Take 40 mg by mouth every other day ) 90 tablet 0     order for DME Equipment being ordered: Oxygen, 2L w activity 1 each 0     [START ON  6/28/2019] oxyCODONE-acetaminophen (PERCOCET) 5-325 MG tablet Take 0.5 tab every morning.  Max of 0.5 tab daily. 30 tablet 0     simvastatin (ZOCOR) 20 MG tablet take 1/2 tablet at bedtime (Patient taking differently: Take 20 mg by mouth every other day ) 45 tablet 2     sodium chloride (OCEAN) 0.65 % nasal spray Spray 1 spray into both nostrils 3 times daily as needed for congestion  0     UNABLE TO FIND MEDICATION NAME: OTC Stool Softner 1 pill every evening.       VITAMIN D, CHOLECALCIFEROL, PO Take 1,000 Units by mouth daily.         BP Readings from Last 3 Encounters:   06/11/19 120/68   04/03/19 152/76   04/01/19 138/74    Wt Readings from Last 3 Encounters:   06/11/19 49.9 kg (110 lb)   04/03/19 49 kg (108 lb)   04/01/19 49.9 kg (110 lb)                I certify that this patient, Theresa Alves has been under my care and that I, or a nurse practitioner or physician's assistant working with me, had a face-to-face encounter that meets face-to-face encounter requirements with this patient on June 11, 2019.    Theresa Alves is now in a chronic stable state and continues to require supplemental oxygen due to continued oxygen desaturation.  This patient has been treated in part, or in whole for the following medical condition(s):  Chronic Heart Failure I50  Pulmonary Fibrosis J84.10  Treatments tried and failed or ruled out to treat hypoxemia include diuretics.  If portability is ordered, is the patient mobile within the home? yes        Reviewed and updated as needed this visit by Provider         Review of Systems   ROS COMP: CONSTITUTIONAL:POSITIVE  for fatigue and NEGATIVE  for chills and fever   RESP:POSITIVE for dyspnea on exertion and NEGATIVE for hemoptysis and wheezing  CV: POSITIVE for lower extremity edema and NEGATIVE for chest pain/chest pressure  : negative for hematuria      Objective    /68 (BP Location: Right arm, Patient Position: Chair, Cuff Size: Adult Small)   Pulse 60    "Temp 97.4  F (36.3  C)   Resp 22   Ht 1.651 m (5' 5\")   Wt 49.9 kg (110 lb)   LMP  (LMP Unknown)   SpO2 93% AT REST ON ROOM AIR  Breastfeeding? No   BMI 18.30 kg/m    Body mass index is 18.3 kg/m .  Physical Exam                                 With walking the length of the hallway,ON ROOM AIR, her O2 sat dropped to 86%, and she became quite dyspneic.                   With oxygen at 2 L WITH WALKING THE LENGTH OF THE HALLWAY , her O2 stayed 93-94%.  GENERAL APPEARANCE: alert, mild distress and fatigued  RESP: rales bibasilar  CV: irregular rhythm and pitting B/L LE edema to 2+  MS: arthritic changes of the spine  NEURO: mentation intact and gait abnormal ; uses a walker    Diagnostic Test Results:  pending        Assessment & Plan     Theresa was seen today for forms, edema and uti.    Diagnoses and all orders for this visit:    SOB (shortness of breath)  -     BNP-N terminal pro  -     Basic metabolic panel  (Ca, Cl, CO2, Creat, Gluc, K, Na, BUN)  -     order for DME; Equipment being ordered: Oxygen, 2L w activity    Pulmonary fibrosis  -     order for DME; Equipment being ordered: Oxygen, 2L w activity    Chronic congestive heart failure, unspecified heart failure type (H)  -     BNP-N terminal pro  -     Basic metabolic panel  (Ca, Cl, CO2, Creat, Gluc, K, Na, BUN)  -     order for DME; Equipment being ordered: Oxygen, 2L w activity    Hypoxemia    Chronic atrial fibrillation (H)    Chronic midline low back pain without sciatica  -     oxyCODONE-acetaminophen (PERCOCET) 5-325 MG tablet; Take 0.5 tab every morning.  Max of 0.5 tab daily.    Dysuria  -     UA with Microscopic reflex to Culture    Summary and implications:  We reviewed multiple issues.           We reviewed all of the issues on the diagnoses list.                    Her HORTON is due to a combination of pulmonary fibrosis, and CHF.               She appears to qualify for ambulatory oxygen.                Patient Instructions   Let's do this: " increase the furosemide to 40 mg per day.                     We will order some home oxygen from [facility].                                               I will let you know your lab results.       Ok for analgesic refill; low dose.           Check urine.              She sees Cardiology in 4 weeks.                 Return in about 8 weeks (around 8/6/2019) for cardiac issues and shortness of breath.    Marbin Rouse MD  [facility]                   Results for orders placed or performed in visit on 06/11/19   UA with Microscopic reflex to Culture   Result Value Ref Range    Color Urine Yellow     Appearance Urine Clear     Glucose Urine Negative NEG^Negative mg/dL    Bilirubin Urine Negative NEG^Negative    Ketones Urine Negative NEG^Negative mg/dL    Specific Gravity Urine 1.015 1.003 - 1.035    pH Urine 6.5 5.0 - 7.0 pH    Protein Albumin Urine 30 (A) NEG^Negative mg/dL    Urobilinogen Urine 1.0 0.2 - 1.0 EU/dL    Nitrite Urine Negative NEG^Negative    Blood Urine Negative NEG^Negative    Leukocyte Esterase Urine Trace (A) NEG^Negative    Source Midstream Urine     WBC Urine 0 - 5 OTO5^0 - 5 /HPF    RBC Urine O - 2 OTO2^O - 2 /HPF    Squamous Epithelial /LPF Urine Many (A) FEW^Few /LPF   BNP-N terminal pro   Result Value Ref Range    N-Terminal Pro Bnp 11,928 (H) 0 - 450 pg/mL   Basic metabolic panel  (Ca, Cl, CO2, Creat, Gluc, K, Na, BUN)   Result Value Ref Range    Sodium 139 133 - 144 mmol/L    Potassium 4.5 3.4 - 5.3 mmol/L    Chloride 107 94 - 109 mmol/L    Carbon Dioxide 23 20 - 32 mmol/L    Anion Gap 9 3 - 14 mmol/L    Glucose 91 70 - 99 mg/dL    Urea Nitrogen 27 7 - 30 mg/dL    Creatinine 1.18 (H) 0.52 - 1.04 mg/dL    GFR Estimate 39 (L) >60 mL/min/[1.73_m2]    GFR Estimate If Black 45 (L) >60 mL/min/[1.73_m2]    Calcium 9.1 8.5 - 10.1 mg/dL     Comment: K+ still normal; renal fxn ok. No UTI.                                 Letter sent.                         The blood tests indicate that you should keep taking at least 40 mg of furosemide every day.            The urine tests show no bladder infection.

## 2019-06-11 NOTE — PATIENT INSTRUCTIONS
Let's do this: increase the furosemide to 40 mg per day.                     We will order some home oxygen from [facility].                                               I will let you know your lab results.

## 2019-06-13 NOTE — TELEPHONE ENCOUNTER
Our goal is to have forms completed within 72 hours, however some forms may require a visit or additional information.    What clinic location was the form placed at [facility] or [place]     Who is the form from?   Where did the form come from? Faxed to clinic   The form was placed in the inbox of Marbin Rouse MD      Please fax to [phone]  Phone number: [phone]    Additional comments: [facility]  [facility] oxygen     Call take on 6/13/2019 at 11:28 AM by Rachana Ontiveros

## 2019-06-24 NOTE — TELEPHONE ENCOUNTER
Pt reports she noticed leg petechiae since Friday. Denies use of Aspirin or any anticoagulants. Denies fever, blood in stool or nose bleeds. Advised she monitor symptoms and follow up with clinic if symptoms persist. OV needed if new symptoms. Pt agreed to follow up with clinic if symptoms fail to resolve. Huddled with INR nurse Greta. She advised pt be seen if symptoms persist.

## 2019-06-24 NOTE — TELEPHONE ENCOUNTER
"Reason for Call:  Other call back    Detailed comments: states has several \"blood marks\" on her legs and wants to know if that is ok or if she has to be seen    Phone Number Patient can be reached at: Home number on file [phone] (home)    Best Time:     Can we leave a detailed message on this number? YES    Call taken on 6/24/2019 at 2:07 PM by HAN PARIS    "

## 2019-07-02 NOTE — PROGRESS NOTES
Clinic Care Coordination Contact    Clinic Care Coordination Contact  OUTREACH    Referral Information:  Referral Source: IP Report  Primary Diagnosis: Psychosocial  Chief Complaint   Patient presents with     Clinic Care Coordination - Follow-up     goal check-in       Clinical Concerns:  CCC RN outreach to get updates on patient status, assess goal progress, and provide support and additional resources as needed.    The patient updated that she has been feeling fair.  She continues to have swelling to her feet toward the end of each day.  Her furosemide has been being adjusted to help her swelling, which she reports has helped some.  She also has been working to keep her legs elevated when she is sitting or lying down.  She stated she has an upcoming Cardiology appointment and agreed to ask her provider about whether or not they feel she would benefit from compression stockings.    The patient updated she also continues to have some bruising/petechiae to her BLE, with some of the spots resolving and some new spots.  She will review these with her Cardiology provider at her appointment, as well.    Diet/Exercise/Sleep:  Exercise: Yes  The patient stated she continues to walk down the the dining cantu twice per day.  She hasn't been consistently doing her leg exercises but was agreeable to continue working toward this.    Resources and Interventions:  Community Resources: [employer]  Equipment Currently Used at Home: walker, rolling     Goals Addressed                 This Visit's Progress       Patient Stated      1. Being Active (pt-stated)   50%     Goal Statement: I will improve my strength and endurance by doing my leg exercises 3 times per week during the next 3 months.  Measure of Success: The patient will report improved mobility.  Supportive Steps to Achieve: The patient will perform the physical therapy leg exercises she was taught in the past.  CCC RN will continue routine outreaches to patient for ongoing  support and resources as needed.  Barriers: Age, fatigue, chronic back pain.  Strengths: The patient is motivated to maintain her mobility.  Date to Achieve By: 8/31/19  Patient expressed understanding of goal: Yes          Patient/Caregiver understanding: Yes  Outreach Frequency: monthly  Future Appointments              In 1 week More, Lolis Gonzalez PA-C [facility]    In 1 month Marbin Rouse MD [facility]    In 2 months CORNELIUS TECH1 [facility] CLIN        Plan: The patient will continue walking to the dining cantu with use of her walker and will work to perform her leg exercises routinely as discussed.  The patient will continue taking her medications daily as prescribed.  The patient will address her ongoing leg/feet swelling and bruising with her Cardiology provider at her upcoming appointment.  She will contact CCC RN/clinic with additional questions or concerns.  CCC RN will outreach to patient in approximately 1 month to get updates on patient status, assess goal progress, and offer additional support and resources as indicated.    Minesh Minor RN  Clinic Care Coordinator  [facility]  Ph: [phone]

## 2019-07-15 NOTE — LETTER
7/15/2019      Marbin Rouse MD  [address]  [facility] [id number]      RE: Theresa Alves       Dear Colleague,    I had the pleasure of seeing Theresa Alves in the [facility].    Service Date: 07/15/2019      CLINIC VISIT      PRIMARY CARDIOLOGIST:  Will be Dr. Dhillon.  She is requesting a new cardiologist.      REASON FOR VISIT:  HFpEF, AFib.       HISTORY OF PRESENT ILLNESS:  Ms. Alves is a pleasant 89-year-old woman with past medical history significant for the followin.  Likely pulmonary fibrosis.   2.  Hypertension.   3.  Chronic kidney disease.   4.  Kyphoscoliosis.   5.  Persistent AFib with tachybrady syndrome status post AV node ablation and permanent pacemaker implant.   6.  HFpEF.      I met Theresa when she was admitted with difficult-to-control AFib as well as alcohol intoxication at one point.  I am unable to get her heart rates controlled without bradycardia and she was seen by Dr. Izquierdo and underwent AV node ablation and permanent pacemaker in 2019.  I am seeing her today for routine followup.  When I last saw her, I switched her metoprolol to amlodipine, as she had been complaining of fatigue.  She has since seen her primary and developed worsening pedal edema.  He has increased her Lasix to 60 mg a day.  She has also been started on home oxygen.      She says that her home oxygen is remarkably helpful.  They have not been really able to figure out for her to operate it herself, to put it on when she walks down for a meal, so she is not wearing it when she walks down for meals but it seems to have helped even if she has had it on before then.  She is short of breath getting dressed and getting ready for bed.  She does not have orthopnea or PND.  She is up about 3 times a night to urinate.  She notices that she does have edema just in her feet and not so much in her legs.  She denies chest pain, chest tightness or chest heaviness.       SOCIAL HISTORY:  She comes in today with her niece, Ericka, who brings her into her appointments.  She says she is doing one alcoholic drink a week.  She is a lifelong nonsmoker.  She eats once a day in the dining room.  Otherwise, eats in her apartment.      PHYSICAL EXAMINATION:   GENERAL:  Elderly woman in no acute distress, appears her stated age.   HEENT:  Normocephalic, atraumatic.   HEART:  Regular in rate and rhythm.  I do not appreciate murmur, rub or gallop.   LUNGS:  Today have just very faint rales which are much improved from previous.  These are just in her lower lung fields, are otherwise clear.   EXTREMITIES:  Have 2+ pitting edema in her feet, minimal in her actual shins or upper legs.   NECK:  She has flat neck veins at 90 degrees.     MUSCULOSKELETAL:  She is in a wheelchair today.  I did not ask her to climb onto exam table.      LABORATORIES:  Not yet completed.      ASSESSMENT AND PLAN:   1.  Peripheral edema and likely heart failure with preserved EF that was presumed to be secondary to tachycardia.  She was down to 20 mg of Lasix a day and she had minimal edema when I saw her in April.  I suspect that might change.  Amlodipine instead of Toprol actually worsened her situation and she is now up to 60 mg of Lasix.  I was hoping that she would have less fatigue on amlodipine but she continues to have ongoing fatigue.  As such, we will go back to metoprolol 50 mg a day and discontinue her amlodipine completely.   2.  Persistent AFib with tachybrady syndrome with AV node ablation and permanent pacemaker in place.  Device checks have been normal.   3.  Hypertension, reasonable control given her age.   4.  CKD.  We will repeat a BMP today.      She has been on a statin for unclear reasons.  I recommended discontinuing today at the age of 89 with no known coronary disease and no stroke.  She would like me to check with Dr. Rouse before doing this and I will do so.      Thank you for allowing me  to participate in this delightful patient's care.  She will follow up with Dr. Dhillon in 4 months.      LENO Medrano PA-C             D: 07/15/2019   T: 07/15/2019   MT: MARITA      Name:     YANELI CHUA   MRN:      [id number]        Account:      [id number]   :      1924           Service Date: 07/15/2019      Document: [id number]         Outpatient Encounter Medications as of 7/15/2019   Medication Sig Dispense Refill     acetaminophen (TYLENOL) 500 MG tablet Take one tablet 8 hours after morning percocet and one tablet at bedtime  0     Cyanocobalamin (VITAMIN B 12 PO) Take 1 tablet by mouth daily        escitalopram (LEXAPRO) 5 MG tablet Take 1 tablet (5 mg) by mouth daily 30 tablet 5     furosemide (LASIX) 20 MG tablet Take 1 tablet (20 mg) by mouth daily (Patient taking differently: Take 60 mg by mouth daily ) 90 tablet 0     metoprolol succinate ER (TOPROL XL) 50 MG 24 hr tablet Take 1 tablet (50 mg) by mouth daily 90 tablet 3     order for DME Equipment being ordered: Oxygen, 2L w activity 1 each 0     oxyCODONE-acetaminophen (PERCOCET) 5-325 MG tablet Take 0.5 tab every morning.  Max of 0.5 tab daily. 30 tablet 0     simvastatin (ZOCOR) 20 MG tablet take 1/2 tablet at bedtime (Patient taking differently: Take 20 mg by mouth every other day ) 45 tablet 2     sodium chloride (OCEAN) 0.65 % nasal spray Spray 1 spray into both nostrils 3 times daily as needed for congestion  0     UNABLE TO FIND MEDICATION NAME: OTC Stool Softner 1 pill every evening.       VITAMIN D, CHOLECALCIFEROL, PO Take 1,000 Units by mouth daily.         [DISCONTINUED] amLODIPine (NORVASC) 5 MG tablet Take 1 tablet (5 mg) by mouth daily 90 tablet 3     [DISCONTINUED] metoprolol succinate ER (TOPROL XL) 50 MG 24 hr tablet Take 1 tablet (50 mg) by mouth daily 90 tablet 3     No facility-administered encounter medications on file as of 7/15/2019.        Again, thank you for allowing me to  participate in the care of your patient.      Sincerely,    Lolis Davenport PA-C     [facility]

## 2019-07-15 NOTE — PROGRESS NOTES
[id number]  HPI and Plan:   See dictation    Orders this Visit:  Orders Placed This Encounter   Procedures     CBC with platelets     Basic metabolic panel     Basic metabolic panel     Follow-Up with Cardiologist     Orders Placed This Encounter   Medications     DISCONTD: metoprolol succinate ER (TOPROL XL) 50 MG 24 hr tablet     Sig: Take 1 tablet (50 mg) by mouth daily     Dispense:  90 tablet     Refill:  3     metoprolol succinate ER (TOPROL XL) 50 MG 24 hr tablet     Sig: Take 1 tablet (50 mg) by mouth daily     Dispense:  90 tablet     Refill:  3     Medications Discontinued During This Encounter   Medication Reason     amLODIPine (NORVASC) 5 MG tablet      metoprolol succinate ER (TOPROL XL) 50 MG 24 hr tablet          Encounter Diagnoses   Name Primary?     (HFpEF) heart failure with preserved ejection fraction (H)      Essential hypertension Yes     Bruising      Chronic heart failure with preserved ejection fraction (H)        CURRENT MEDICATIONS:  Current Outpatient Medications   Medication Sig Dispense Refill     acetaminophen (TYLENOL) 500 MG tablet Take one tablet 8 hours after morning percocet and one tablet at bedtime  0     Cyanocobalamin (VITAMIN B 12 PO) Take 1 tablet by mouth daily        escitalopram (LEXAPRO) 5 MG tablet Take 1 tablet (5 mg) by mouth daily 30 tablet 5     furosemide (LASIX) 20 MG tablet Take 1 tablet (20 mg) by mouth daily (Patient taking differently: Take 60 mg by mouth daily ) 90 tablet 0     metoprolol succinate ER (TOPROL XL) 50 MG 24 hr tablet Take 1 tablet (50 mg) by mouth daily 90 tablet 3     order for DME Equipment being ordered: Oxygen, 2L w activity 1 each 0     oxyCODONE-acetaminophen (PERCOCET) 5-325 MG tablet Take 0.5 tab every morning.  Max of 0.5 tab daily. 30 tablet 0     simvastatin (ZOCOR) 20 MG tablet take 1/2 tablet at bedtime (Patient taking differently: Take 20 mg by mouth every other day ) 45 tablet 2     sodium chloride (OCEAN) 0.65 % nasal spray Spray 1  spray into both nostrils 3 times daily as needed for congestion  0     UNABLE TO FIND MEDICATION NAME: OTC Stool Softner 1 pill every evening.       VITAMIN D, CHOLECALCIFEROL, PO Take 1,000 Units by mouth daily.           ALLERGIES   No Known Allergies    PAST MEDICAL HISTORY:  Past Medical History:   Diagnosis Date     Atrial fibrillation (H)      Chronic back pain      Chronic diastolic CHF (congestive heart failure) (H)      Chronic kidney disease, stage III (moderate) (H)      Pulmonary fibrosis (H)      Pulmonary hypertension (H)      Unspecified essential hypertension     Essential hypertension       PAST SURGICAL HISTORY:  Past Surgical History:   Procedure Laterality Date     CARPAL TUNNEL RELEASE RT/LT  1990's    R     CATARACT IOL, RT/LT  5/03    R     EP ABLATION AV NODE N/A 1/18/2019    Procedure: EP Ablation AV Node;  Surgeon: Johny Walker MD;  Location:  [facility]ATH [facility]     HERNIA REPAIR, INGUINAL RT/LT  1954    R     hiatal hernia repair  6/10    large paraesophageal hiatal hernia; Nissen fundoplication        FAMILY HISTORY:  Family History   Problem Relation Age of Onset     Unknown/Adopted Mother      Unknown/Adopted Father      Diabetes No family hx of      Coronary Artery Disease No family hx of      Hypertension No family hx of      Hyperlipidemia No family hx of      Cerebrovascular Disease No family hx of      Breast Cancer No family hx of      Colon Cancer No family hx of      Prostate Cancer No family hx of      Other Cancer No family hx of      Depression No family hx of      Anxiety Disorder No family hx of      Mental Illness No family hx of      Substance Abuse No family hx of      Anesthesia Reaction No family hx of      Asthma No family hx of      Osteoporosis No family hx of      Genetic Disorder No family hx of      Thyroid Disease No family hx of      Obesity No family hx of        SOCIAL HISTORY:  Social History     Socioeconomic History     Marital status:       Spouse name: None     Number of children: 0     Years of education: None     Highest education level: None   Occupational History     Occupation: worked at [employer] for 40 yrs     Employer: RETIRED   Social Needs     Financial resource strain: None     Food insecurity:     Worry: None     Inability: None     Transportation needs:     Medical: None     Non-medical: None   Tobacco Use     Smoking status: Never Smoker     Smokeless tobacco: Never Used   Substance and Sexual Activity     Alcohol use: Yes     Comment: 1 drink every other week     Drug use: No     Sexual activity: Not Currently   Lifestyle     Physical activity:     Days per week: None     Minutes per session: None     Stress: None   Relationships     Social connections:     Talks on phone: None     Gets together: None     Attends [employer] service: None     Active member of club or organization: None     Attends meetings of clubs or organizations: None     Relationship status: None     Intimate partner violence:     Fear of current or ex partner: None     Emotionally abused: None     Physically abused: None     Forced sexual activity: None   Other Topics Concern     Parent/sibling w/ CABG, MI or angioplasty before 65F 55M? Not Asked   Social History Narrative     None       Review of Systems:  Skin:  Negative     Eyes:  Positive for glasses  ENT:  Negative    Respiratory:  Positive for dyspnea on exertion;shortness of breath  Cardiovascular:    Positive for;palpitations;edema  Gastroenterology: not assessed    Genitourinary:  Positive for nocturia  Musculoskeletal:  Positive for back pain;arthritis  Neurologic:  Positive for numbness or tingling of feet;numbness or tingling of hands  Psychiatric:  Negative    Heme/Lymph/Imm:  Negative    Endocrine:  Negative      Physical Exam:  Vitals: /53   Pulse 59   Wt 49 kg (108 lb)   LMP  (LMP Unknown)   BMI 17.97 kg/m     Please refer to dictation for physical exam    Recent Lab Results:  LIPID  RESULTS:  Lab Results   Component Value Date    CHOL 142 11/28/2017    HDL 69 11/28/2017    LDL 52 11/28/2017    TRIG 107 11/28/2017    CHOLHDLRATIO 2.7 01/10/2014       LIVER ENZYME RESULTS:  Lab Results   Component Value Date    AST 24 03/18/2018    ALT 27 03/18/2018       CBC RESULTS:  Lab Results   Component Value Date    WBC 8.1 01/18/2019    RBC 3.71 (L) 01/18/2019    HGB 12.6 01/18/2019    HCT 37.5 01/18/2019     (H) 01/18/2019    MCH 34.0 (H) 01/18/2019    MCHC 33.6 01/18/2019    RDW 14.1 01/18/2019     01/18/2019       BMP RESULTS:  Lab Results   Component Value Date     06/11/2019    POTASSIUM 4.5 06/11/2019    CHLORIDE 107 06/11/2019    CO2 23 06/11/2019    ANIONGAP 9 06/11/2019    GLC 91 06/11/2019    BUN 27 06/11/2019    CR 1.18 (H) 06/11/2019    GFRESTIMATED 39 (L) 06/11/2019    GFRESTBLACK 45 (L) 06/11/2019    PAWEL 9.1 06/11/2019        A1C RESULTS:  No results found for: A1C    INR RESULTS:  No results found for: INR        CC  Lolis Davenport PA-C  [address]  [place] [id number]

## 2019-07-15 NOTE — PROGRESS NOTES
Service Date: 07/15/2019      CLINIC VISIT      PRIMARY CARDIOLOGIST:  Will be Dr. Dhillon.  She is requesting a new cardiologist.      REASON FOR VISIT:  HFpEF, AFib.       HISTORY OF PRESENT ILLNESS:  Ms. Alves is a pleasant 89-year-old woman with past medical history significant for the followin.  Likely pulmonary fibrosis.   2.  Hypertension.   3.  Chronic kidney disease.   4.  Kyphoscoliosis.   5.  Persistent AFib with tachybrady syndrome status post AV node ablation and permanent pacemaker implant.   6.  HFpEF.      I met Theresa when she was admitted with difficult-to-control AFib as well as alcohol intoxication at one point.  I am unable to get her heart rates controlled without bradycardia and she was seen by Dr. Izquierdo and underwent AV node ablation and permanent pacemaker in 2019.  I am seeing her today for routine followup.  When I last saw her, I switched her metoprolol to amlodipine, as she had been complaining of fatigue.  She has since seen her primary and developed worsening pedal edema.  He has increased her Lasix to 60 mg a day.  She has also been started on home oxygen.      She says that her home oxygen is remarkably helpful.  They have not been really able to figure out for her to operate it herself, to put it on when she walks down for a meal, so she is not wearing it when she walks down for meals but it seems to have helped even if she has had it on before then.  She is short of breath getting dressed and getting ready for bed.  She does not have orthopnea or PND.  She is up about 3 times a night to urinate.  She notices that she does have edema just in her feet and not so much in her legs.  She denies chest pain, chest tightness or chest heaviness.      SOCIAL HISTORY:  She comes in today with her niece, Ericka, who brings her into her appointments.  She says she is doing one alcoholic drink a week.  She is a lifelong nonsmoker.  She eats once a day in the dining room.  Otherwise,  eats in her apartment.      PHYSICAL EXAMINATION:   GENERAL:  Elderly woman in no acute distress, appears her stated age.   HEENT:  Normocephalic, atraumatic.   HEART:  Regular in rate and rhythm.  I do not appreciate murmur, rub or gallop.   LUNGS:  Today have just very faint rales which are much improved from previous.  These are just in her lower lung fields, are otherwise clear.   EXTREMITIES:  Have 2+ pitting edema in her feet, minimal in her actual shins or upper legs.   NECK:  She has flat neck veins at 90 degrees.     MUSCULOSKELETAL:  She is in a wheelchair today.  I did not ask her to climb onto exam table.      LABORATORIES:  Not yet completed.      ASSESSMENT AND PLAN:   1.  Peripheral edema and likely heart failure with preserved EF that was presumed to be secondary to tachycardia.  She was down to 20 mg of Lasix a day and she had minimal edema when I saw her in April.  I suspect that might change.  Amlodipine instead of Toprol actually worsened her situation and she is now up to 60 mg of Lasix.  I was hoping that she would have less fatigue on amlodipine but she continues to have ongoing fatigue.  As such, we will go back to metoprolol 50 mg a day and discontinue her amlodipine completely.   2.  Persistent AFib with tachybrady syndrome with AV node ablation and permanent pacemaker in place.  Device checks have been normal.   3.  Hypertension, reasonable control given her age.   4.  CKD.  We will repeat a BMP today.      She has been on a statin for unclear reasons.  I recommended discontinuing today at the age of 89 with no known coronary disease and no stroke.  She would like me to check with Dr. Rouse before doing this and I will do so.      Thank you for allowing me to participate in this delightful patient's care.  She will follow up with Dr. Dhillon in 4 months.      LENO Medrano PA-C             D: 07/15/2019   T: 07/15/2019   MT: MARITA      Name:     HARSHIL  YANELI   MRN:      [id number]        Account:      [id number]   :      1924           Service Date: 07/15/2019      Document: [id number]

## 2019-07-15 NOTE — LETTER
7/15/2019    Marbin Rouse MD  [address]  [facility] [id number]    RE: Theresa Alves       Dear Colleague,    I had the pleasure of seeing Theresa Alves in the [facility].    [id number]  HPI and Plan:   See dictation    Orders this Visit:  Orders Placed This Encounter   Procedures     CBC with platelets     Basic metabolic panel     Basic metabolic panel     Follow-Up with Cardiologist     Orders Placed This Encounter   Medications     DISCONTD: metoprolol succinate ER (TOPROL XL) 50 MG 24 hr tablet     Sig: Take 1 tablet (50 mg) by mouth daily     Dispense:  90 tablet     Refill:  3     metoprolol succinate ER (TOPROL XL) 50 MG 24 hr tablet     Sig: Take 1 tablet (50 mg) by mouth daily     Dispense:  90 tablet     Refill:  3     Medications Discontinued During This Encounter   Medication Reason     amLODIPine (NORVASC) 5 MG tablet      metoprolol succinate ER (TOPROL XL) 50 MG 24 hr tablet          Encounter Diagnoses   Name Primary?     (HFpEF) heart failure with preserved ejection fraction (H)      Essential hypertension Yes     Bruising      Chronic heart failure with preserved ejection fraction (H)        CURRENT MEDICATIONS:  Current Outpatient Medications   Medication Sig Dispense Refill     acetaminophen (TYLENOL) 500 MG tablet Take one tablet 8 hours after morning percocet and one tablet at bedtime  0     Cyanocobalamin (VITAMIN B 12 PO) Take 1 tablet by mouth daily        escitalopram (LEXAPRO) 5 MG tablet Take 1 tablet (5 mg) by mouth daily 30 tablet 5     furosemide (LASIX) 20 MG tablet Take 1 tablet (20 mg) by mouth daily (Patient taking differently: Take 60 mg by mouth daily ) 90 tablet 0     metoprolol succinate ER (TOPROL XL) 50 MG 24 hr tablet Take 1 tablet (50 mg) by mouth daily 90 tablet 3     order for DME Equipment being ordered: Oxygen, 2L w activity 1 each 0     oxyCODONE-acetaminophen (PERCOCET) 5-325 MG tablet Take 0.5 tab every morning.   Max of 0.5 tab daily. 30 tablet 0     simvastatin (ZOCOR) 20 MG tablet take 1/2 tablet at bedtime (Patient taking differently: Take 20 mg by mouth every other day ) 45 tablet 2     sodium chloride (OCEAN) 0.65 % nasal spray Spray 1 spray into both nostrils 3 times daily as needed for congestion  0     UNABLE TO FIND MEDICATION NAME: OTC Stool Softner 1 pill every evening.       VITAMIN D, CHOLECALCIFEROL, PO Take 1,000 Units by mouth daily.           ALLERGIES   No Known Allergies    PAST MEDICAL HISTORY:  Past Medical History:   Diagnosis Date     Atrial fibrillation (H)      Chronic back pain      Chronic diastolic CHF (congestive heart failure) (H)      Chronic kidney disease, stage III (moderate) (H)      Pulmonary fibrosis (H)      Pulmonary hypertension (H)      Unspecified essential hypertension     Essential hypertension       PAST SURGICAL HISTORY:  Past Surgical History:   Procedure Laterality Date     CARPAL TUNNEL RELEASE RT/LT  1990's    R     CATARACT IOL, RT/LT  5/03    R     EP ABLATION AV NODE N/A 1/18/2019    Procedure: EP Ablation AV Node;  Surgeon: Johny Walker MD;  Location:  [facility]     HERNIA REPAIR, INGUINAL RT/LT  1954    R     hiatal hernia repair  6/10    large paraesophageal hiatal hernia; Nissen fundoplication        FAMILY HISTORY:  Family History   Problem Relation Age of Onset     Unknown/Adopted Mother      Unknown/Adopted Father      Diabetes No family hx of      Coronary Artery Disease No family hx of      Hypertension No family hx of      Hyperlipidemia No family hx of      Cerebrovascular Disease No family hx of      Breast Cancer No family hx of      Colon Cancer No family hx of      Prostate Cancer No family hx of      Other Cancer No family hx of      Depression No family hx of      Anxiety Disorder No family hx of      Mental Illness No family hx of      Substance Abuse No family hx of      Anesthesia Reaction No family hx of      Asthma No family hx  of      Osteoporosis No family hx of      Genetic Disorder No family hx of      Thyroid Disease No family hx of      Obesity No family hx of        SOCIAL HISTORY:  Social History     Socioeconomic History     Marital status:      Spouse name: None     Number of children: 0     Years of education: None     Highest education level: None   Occupational History     Occupation: worked at [employer] for 40 yrs     Employer: RETIRED   Social Needs     Financial resource strain: None     Food insecurity:     Worry: None     Inability: None     Transportation needs:     Medical: None     Non-medical: None   Tobacco Use     Smoking status: Never Smoker     Smokeless tobacco: Never Used   Substance and Sexual Activity     Alcohol use: Yes     Comment: 1 drink every other week     Drug use: No     Sexual activity: Not Currently   Lifestyle     Physical activity:     Days per week: None     Minutes per session: None     Stress: None   Relationships     Social connections:     Talks on phone: None     Gets together: None     Attends Zoroastrian service: None     Active member of club or organization: None     Attends meetings of clubs or organizations: None     Relationship status: None     Intimate partner violence:     Fear of current or ex partner: None     Emotionally abused: None     Physically abused: None     Forced sexual activity: None   Other Topics Concern     Parent/sibling w/ CABG, MI or angioplasty before 65F 55M? Not Asked   Social History Narrative     None       Review of Systems:  Skin:  Negative     Eyes:  Positive for glasses  ENT:  Negative    Respiratory:  Positive for dyspnea on exertion;shortness of breath  Cardiovascular:    Positive for;palpitations;edema  Gastroenterology: not assessed    Genitourinary:  Positive for nocturia  Musculoskeletal:  Positive for back pain;arthritis  Neurologic:  Positive for numbness or tingling of feet;numbness or tingling of hands  Psychiatric:  Negative     Heme/Lymph/Imm:  Negative    Endocrine:  Negative      Physical Exam:  Vitals: /53   Pulse 59   Wt 49 kg (108 lb)   LMP  (LMP Unknown)   BMI 17.97 kg/m      Please refer to dictation for physical exam    Recent Lab Results:  LIPID RESULTS:  Lab Results   Component Value Date    CHOL 142 2017    HDL 69 2017    LDL 52 2017    TRIG 107 2017    CHOLHDLRATIO 2.7 01/10/2014       LIVER ENZYME RESULTS:  Lab Results   Component Value Date    AST 24 2018    ALT 27 2018       CBC RESULTS:  Lab Results   Component Value Date    WBC 8.1 2019    RBC 3.71 (L) 2019    HGB 12.6 2019    HCT 37.5 2019     (H) 2019    MCH 34.0 (H) 2019    MCHC 33.6 2019    RDW 14.1 2019     2019       BMP RESULTS:  Lab Results   Component Value Date     2019    POTASSIUM 4.5 2019    CHLORIDE 107 2019    CO2 23 2019    ANIONGAP 9 2019    GLC 91 2019    BUN 27 2019    CR 1.18 (H) 2019    GFRESTIMATED 39 (L) 2019    GFRESTBLACK 45 (L) 2019    PAWEL 9.1 2019        A1C RESULTS:  No results found for: A1C    INR RESULTS:  No results found for: INR        CC  FRANCISCA WalshC  [address]  [place] [address]        Service Date: 07/15/2019      CLINIC VISIT      PRIMARY CARDIOLOGIST:  Will be Dr. Dhillon.  She is requesting a new cardiologist.      REASON FOR VISIT:  HFpEF, AFib.       HISTORY OF PRESENT ILLNESS:  Ms. Alves is a pleasant 89-year-old woman with past medical history significant for the followin.  Likely pulmonary fibrosis.   2.  Hypertension.   3.  Chronic kidney disease.   4.  Kyphoscoliosis.   5.  Persistent AFib with tachybrady syndrome status post AV node ablation and permanent pacemaker implant.   6.  HFpEF.      I met Theresa when she was admitted with difficult-to-control AFib as well as alcohol intoxication at one point.  I  am unable to get her heart rates controlled without bradycardia and she was seen by Dr. Izquierdo and underwent AV node ablation and permanent pacemaker in 01/2019.  I am seeing her today for routine followup.  When I last saw her, I switched her metoprolol to amlodipine, as she had been complaining of fatigue.  She has since seen her primary and developed worsening pedal edema.  He has increased her Lasix to 60 mg a day.  She has also been started on home oxygen.      She says that her home oxygen is remarkably helpful.  They have not been really able to figure out for her to operate it herself, to put it on when she walks down for a meal, so she is not wearing it when she walks down for meals but it seems to have helped even if she has had it on before then.  She is short of breath getting dressed and getting ready for bed.  She does not have orthopnea or PND.  She is up about 3 times a night to urinate.  She notices that she does have edema just in her feet and not so much in her legs.  She denies chest pain, chest tightness or chest heaviness.      SOCIAL HISTORY:  She comes in today with her niece, Ericka, who brings her into her appointments.  She says she is doing one alcoholic drink a week.  She is a lifelong nonsmoker.  She eats once a day in the dining room.  Otherwise, eats in her apartment.      PHYSICAL EXAMINATION:   GENERAL:  Elderly woman in no acute distress, appears her stated age.   HEENT:  Normocephalic, atraumatic.   HEART:  Regular in rate and rhythm.  I do not appreciate murmur, rub or gallop.   LUNGS:  Today have just very faint rales which are much improved from previous.  These are just in her lower lung fields, are otherwise clear.   EXTREMITIES:  Have 2+ pitting edema in her feet, minimal in her actual shins or upper legs.   NECK:  She has flat neck veins at 90 degrees.     MUSCULOSKELETAL:  She is in a wheelchair today.  I did not ask her to climb onto exam table.      LABORATORIES:  Not yet  completed.      ASSESSMENT AND PLAN:   1.  Peripheral edema and likely heart failure with preserved EF that was presumed to be secondary to tachycardia.  She was down to 20 mg of Lasix a day and she had minimal edema when I saw her in April.  I suspect that might change.  Amlodipine instead of Toprol actually worsened her situation and she is now up to 60 mg of Lasix.  I was hoping that she would have less fatigue on amlodipine but she continues to have ongoing fatigue.  As such, we will go back to metoprolol 50 mg a day and discontinue her amlodipine completely.   2.  Persistent AFib with tachybrady syndrome with AV node ablation and permanent pacemaker in place.  Device checks have been normal.   3.  Hypertension, reasonable control given her age.   4.  CKD.  We will repeat a BMP today.      She has been on a statin for unclear reasons.  I recommended discontinuing today at the age of 89 with no known coronary disease and no stroke.  She would like me to check with Dr. Rouse before doing this and I will do so.      Thank you for allowing me to participate in this delightful patient's care.  She will follow up with Dr. Dhillon in 4 months.      LENO Medrano PA-C             D: 07/15/2019   T: 07/15/2019   MT: MARITA      Name:     YANELI CHUA   MRN:      [id number]        Account:      [id number]   :      1924           Service Date: 07/15/2019      Document: [id number]       Thank you for allowing me to participate in the care of your patient.      Sincerely,     Lolis Davenport PA-C     [facility]    cc:   Lolis Davenport PA-C  [address]  [place] [address]

## 2019-07-18 NOTE — TELEPHONE ENCOUNTER
----- Message -----   From: Marbin Rouse MD   Sent: 7/18/2019  12:50 PM   To: Lolis Davenport PA-C   Subject: RE: stop statin?                                 Good idea to stop simvastatin.   ----- Message -----   From: Lolis Davenport PA-C   Sent: 7/15/2019   2:38 PM   To: Marbin Rouse MD   Subject: stop statin?                                     Dr. Rouse,     I saw Theresa today- things are stable.  The O2 seems to be helpful.  I believe I likely contributed to her pedal edema by starting amlodipine.  Since her fatigue was still the same, I switched her back to Toprol for her bp.  Hopefully when you see her next she can be on a lower dose of lasix.       I noticed we still have her on a statin.  She has no hx of cva or CAD that I'm aware.  She was reluctant to stop it without your permission.     Ok to discontinue?  I'm not sure there's a significant benefit at 89.     Thanks for your opinion,   Lolis Danielle's request, called patient to update. Patient verbalized understanding and agreed to plan of care.   Kimi Lai, RN Care Coordinator

## 2019-07-23 NOTE — TELEPHONE ENCOUNTER
Pls continue the lasix at 40 for now.  If she has recurrence of orthopnea will need to consider a higher dose of lasix again.  I'm glad edema is better.  Continue home O2.

## 2019-07-23 NOTE — PROGRESS NOTES
"Called patient to assess response to recent med change - amlodipine DC'd, metoprolol 50mg/d started. Goal was to eliminate pedal edema and then reduce lasix dose.     Patient today stating that her feet look \"a lot, a lot better.\"  The swelling is not completely resolved, but she sees a significant improvement. She denied any other areas of swelling. She confirmed that she is off the amlodipine and taking the metoprolol.     She then stated that Sunday ~0300 she woke feeling that she couldn't breathe. She sat up and took several deep breaths. Eventually she was able to walk to her LR and put on the 02. She stated this eventually resolved the sx. This has not happened before. She is able to sleep flat - she uses 1 pillow. She is now using the 02 w/sleep. This sx has not recurred.     She stated that she has \"good and bad days\" relative to her breathing. She was not able to tell me if she feels that her breathing is worse/better. She uses the 02 frequently during the day.     She does not weigh herself daily. She has someone come to help her 1-2X/week. On those days she gets weighed. She stated her weight runs 105 - 108#(was 108# yesterday 7/22)    Per Lolis Davenport PA-C instructed pt to decrease lasix to 40mg/d as being off the amlodipine has improved her foot swelling. However, will forward this to her as PND new sx.  Saadia Mays RN on 7/23/2019 at 11:10 AM    "

## 2019-07-24 NOTE — PROGRESS NOTES
Called patient w/recommendations per Lolis Davenport PA-C:  Pls continue the lasix at 40 for now.  If she has recurrence of orthopnea will need to consider a higher dose of lasix again.  I'm glad edema is better.  Continue home O2.    Patient verbalized understanding. Updated Rx sent to [facility] on [address]. Saadia Mays RN on 7/24/2019 at 12:22 PM

## 2019-08-06 NOTE — PROGRESS NOTES
Subjective     Theresa Alves is a 89 year old female who presents to clinic today for the following health issues:    HPI   Hyperlipidemia Follow-Up      Are you having any of the following symptoms? (Select all that apply)  No complaints of shortness of breath, chest pain or pressure.  No increased sweating or nausea with activity.  No left-sided neck or arm pain.  No complaints of pain in calves when walking 1-2 blocks.    Are you regularly taking any medication or supplement to lower your cholesterol?   No    Are you having muscle aches or other side effects that you think could be caused by your cholesterol lowering medication?  No      Hypertension Follow-up      Do you check your blood pressure regularly outside of the clinic? No     Are you following a low salt diet? Yes    Are your blood pressures ever more than 140 on the top number (systolic) OR more   than 90 on the bottom number (diastolic), for example 140/90? No    Amount of exercise or physical activity: None    Problems taking medications regularly: No    Medication side effects: none    Diet: low salt and low fat/cholesterol      Medication Followup of other medical issues    Taking Medication as prescribed: yes    Side Effects:  None    Medication Helping Symptoms:  yes     Here with a niece.                              Ongoing and worsening fatigue and HORTON.                         Using oxygen in her apt.                Unwilling/unable to learn how to use it to take to dinner, etc.       Has more leg edema.                      Due soon for an oxycodone refill.                       Uses this low dose, 1/2 tab per day.           Brought in an advanced care directive.              Current Outpatient Medications   Medication Sig Dispense Refill     acetaminophen (TYLENOL) 500 MG tablet Take one tablet 8 hours after morning percocet and one tablet at bedtime  0     Cyanocobalamin (VITAMIN B 12 PO) Take 1 tablet by mouth daily         "escitalopram (LEXAPRO) 5 MG tablet Take 1 tablet (5 mg) by mouth daily 30 tablet 5     furosemide (LASIX) 40 MG tablet Take 1 tablet (40 mg) by mouth daily 90 tablet 3     metoprolol succinate ER (TOPROL XL) 50 MG 24 hr tablet Take 1 tablet (50 mg) by mouth daily 90 tablet 3     order for DME Equipment being ordered: Oxygen, 2L w activity 1 each 0     oxyCODONE-acetaminophen (PERCOCET) 5-325 MG tablet Take 0.5 tab every morning.  Max of 0.5 tab daily. 30 tablet 0     VITAMIN D, CHOLECALCIFEROL, PO Take 1,000 Units by mouth daily.         No Known Allergies  BP Readings from Last 3 Encounters:   08/06/19 132/72   07/15/19 135/53   06/11/19 120/68    Wt Readings from Last 3 Encounters:   08/06/19 49 kg (108 lb)   07/15/19 49 kg (108 lb)   06/11/19 49.9 kg (110 lb)                      Reviewed and updated as needed this visit by Provider         Review of Systems   ROS COMP: CONSTITUTIONAL:NEGATIVE for fever, chills, change in weight  RESP:POSITIVE for cough-non productive and dyspnea on exertion  CV: POSITIVE for lower extremity edema and NEGATIVE for chest pain/chest pressure  NEURO: POSITIVE for gait disturbance and weakness       Objective    /72 (BP Location: Left arm, Patient Position: Chair, Cuff Size: Adult Small)   Pulse 60   Temp 97.5  F (36.4  C)   Resp 20   Ht 1.651 m (5' 5\")   Wt 49 kg (108 lb)   LMP  (LMP Unknown)   SpO2 94%   Breastfeeding? No   BMI 17.97 kg/m    Body mass index is 17.97 kg/m .  Physical Exam   GENERAL APPEARANCE: alert, mild distress and fatigued  RESP: rales bibasilar  CV: irregularly irregular rhythm and pitting B/L LE edema to 2+  MS: arthritic changes of the spine  NEURO: gait abnormal     Diagnostic Test Results:  Results for orders placed or performed in visit on 07/15/19   Basic metabolic panel   Result Value Ref Range    Sodium 137 136 - 145 mmol/L    Potassium 4.9 3.5 - 5.1 mmol/L    Chloride 101 98 - 107 mmol/L    Carbon Dioxide 26 23 - 29 mmol/L    Anion Gap " "14.9 6 - 17 mmol/L    Glucose 126 (H) 70 - 105 mg/dL    Urea Nitrogen 36 (H) 7 - 30 mg/dL    Creatinine 1.41 (H) 0.70 - 1.30 mg/dL    GFR Estimate 35 (L) >60 mL/min/[1.73_m2]    GFR Estimate If Black 42 (L) >60 mL/min/[1.73_m2]    Calcium 9.6 8.5 - 10.5 mg/dL   CBC with platelets   Result Value Ref Range    WBC 7.1 4.0 - 11.0 10e9/L    RBC Count 3.61 (L) 3.8 - 5.2 10e12/L    Hemoglobin 12.1 11.7 - 15.7 g/dL    Hematocrit 36.7 35.0 - 47.0 %     (H) 78 - 100 fl    MCH 33.5 (H) 26.5 - 33.0 pg    MCHC 33.0 31.5 - 36.5 g/dL    RDW 15.5 (H) 10.0 - 15.0 %    Platelet Count 234 150 - 450 10e9/L           Assessment & Plan     Theresa was seen today for lipids, hypertension and recheck medication.    Diagnoses and all orders for this visit:    SOB (shortness of breath)    Pulmonary fibrosis    Chronic congestive heart failure, unspecified heart failure type (H)    Chronic atrial fibrillation (H)    Chronic pain syndrome    Chronic midline low back pain without sciatica  -     oxyCODONE-acetaminophen (PERCOCET) 5-325 MG tablet; Take 0.5 tab every morning.  Max of 0.5 tab daily.    Other orders  -     furosemide (LASIX) 40 MG tablet; 40 mg AM, 20 mg PM           Summary and implications:  We reviewed multiple issues.           We reviewed all of the issues on the diagnoses list.                        Chronic cardiopulmonary issues.        She is becoming more disabled.       There is additional help available at her assisted living apt, with an extra charge per service.               \"My money is running out\".                             She needs additonal diuresis. Ok for oxycodone refill.   Patient Instructions   Get in the habit of using your oxygen when you go down for supper/dinner.              Take an extra 20 mg or 1/2 tablet in the afternoon.          Return in about 9 weeks (around 10/8/2019) for cardiac issues, chronic pain follow up.    Marbin Rouse MD  [facility]      "

## 2019-08-06 NOTE — PATIENT INSTRUCTIONS
Get in the habit of using your oxygen when you go down for supper/dinner.              Take an extra 20 mg or 1/2 tablet in the afternoon.

## 2019-08-09 NOTE — TELEPHONE ENCOUNTER
"Requested Prescriptions   Pending Prescriptions Disp Refills     metoprolol succinate ER (TOPROL XL) 50 MG 24 hr tablet  Last Written Prescription Date:  7/15/2019  Last Fill Quantity: 90 tablet,  # refills: 3   Last office visit: 8/6/2019 with prescribing provider:  Nasim   Future Office Visit:   Next 5 appointments (look out 90 days)    Oct 15, 2019 11:00 AM CDT  [facility] Visit [facility]bin [facility]ouse [facility] [address] [place] [id number]  [phone]          90 tablet 3     Sig: Take 1 tablet (50 mg) by mouth daily       Beta-Blockers Protocol Passed - 8/9/2019  9:37 AM        Passed - Blood pressure under 140/90 in past 12 months     BP Readings from Last 3 Encounters:   08/06/19 132/72   07/15/19 135/53   06/11/19 120/68                 Passed - Patient is age 6 or older        Passed - Recent (12 mo) or future (30 days) visit within the authorizing provider's specialty     Patient had office visit in the last 12 months or has a visit in the next 30 days with authorizing provider or within the authorizing provider's specialty.  See \"Patient Info\" tab in inbasket, or \"Choose Columns\" in Meds & Orders section of the refill encounter.              Passed - Medication is active on med list           "

## 2019-08-09 NOTE — TELEPHONE ENCOUNTER
Reason for Call:  Medication or medication refill:    Do you use a [facility]?  Name of the pharmacy and phone number for the current request:     [id number] - [place], [place] - [address]    Name of the medication requested: metoprolol succinate ER (TOPROL XL) 50 MG 24 hr tablet       Other request: The patient called to get the prescription refilled and is currently completely out of the medication so she needs it refilled as soon as possible.    Can we leave a detailed message on this number? YES    Phone number patient can be reached at: Home number on file [phone] (home)    Best Time: Any    Call taken on 8/9/2019 at 9:36 AM by Kori Cruz

## 2019-08-11 PROBLEM — I50.9 CHF EXACERBATION (H): Status: ACTIVE | Noted: 2019-01-01

## 2019-08-11 NOTE — PROGRESS NOTES
RECEIVING UNIT ED HANDOFF REVIEW    ED Nurse Handoff Report was reviewed by: Slime Hwang on August 11, 2019 at 12:41 AM

## 2019-08-11 NOTE — CONSULTS
Inpatient Cardiology Consultation   [facility]  Date of Admission:8/10/2019  Date of Consult: 2019    Inpatient cardiology consultation note has been dictated. Dictation number [id number]        Stephy Balderrama MD [facility]  Cardiology          REVIEW OF SYSTEMS:  A comprehensive 10 point review of systems was completed and the pertinent positives are documented in history of present illness.    MEDICATIONS:  Prior to Admission Medications   Prescriptions Last Dose Informant Patient Reported? Taking?   Cyanocobalamin (VITAMIN B 12 PO) 2019 at Unknown time Self Yes Yes   Sig: Take 1 tablet by mouth daily    VITAMIN D, CHOLECALCIFEROL, PO 2019 at Unknown time Self Yes Yes   Sig: Take 1,000 Units by mouth daily.     acetaminophen (TYLENOL) 500 MG tablet 2019 at Unknown time Self No Yes   Sig: Take one tablet 8 hours after morning percocet and one tablet at bedtime   escitalopram (LEXAPRO) 5 MG tablet 2019 at Unknown time Self No Yes   Sig: Take 1 tablet (5 mg) by mouth daily   furosemide (LASIX) 40 MG tablet 2019 at Unknown time Self No Yes   Si mg AM, 20 mg PM   metoprolol succinate ER (TOPROL XL) 50 MG 24 hr tablet 2019 at Unknown time Self No Yes   Sig: Take 1 tablet (50 mg) by mouth daily   order for DME  Self No No   Sig: Equipment being ordered: Oxygen, 2L w activity   oxyCODONE-acetaminophen (PERCOCET) 5-325 MG tablet 2019 at Unknown time Self No Yes   Sig: Take 0.5 tab every morning.  Max of 0.5 tab daily.      Facility-Administered Medications: None       ALLERGIES:  No Known Allergies    PAST MEDICAL HISTORY:  Past Medical History:   Diagnosis Date     Atrial fibrillation (H)      Chronic back pain      Chronic diastolic CHF (congestive heart failure) (H)      Chronic kidney disease, stage III (moderate) (H)      Pacemaker Jnauary 2019     Pulmonary fibrosis (H)      Pulmonary hypertension (H)      Unspecified essential hypertension     Essential  hypertension       PAST SURGICAL HISTORY:  Past Surgical History:   Procedure Laterality Date     CARPAL TUNNEL RELEASE RT/LT  1990's    R     CATARACT IOL, RT/LT  5/03    R     EP ABLATION AV NODE N/A 1/18/2019    Procedure: EP Ablation AV Node;  Surgeon: Johny Walker MD;  Location:  [facility]     HERNIA REPAIR, INGUINAL RT/LT  1954    R     hiatal hernia repair  6/10    large paraesophageal hiatal hernia; Nissen fundoplication        SOCIAL HISTORY:   Theresa Alves  reports that she has never smoked. She has never used smokeless tobacco. She reports that she drinks alcohol. She reports that she does not use drugs.    FAMILY HISTORY:  Family History   Problem Relation Age of Onset     Unknown/Adopted Mother      Unknown/Adopted Father      Diabetes No family hx of      Coronary Artery Disease No family hx of      Hypertension No family hx of      Hyperlipidemia No family hx of      Cerebrovascular Disease No family hx of      Breast Cancer No family hx of      Colon Cancer No family hx of      Prostate Cancer No family hx of      Other Cancer No family hx of      Depression No family hx of      Anxiety Disorder No family hx of      Mental Illness No family hx of      Substance Abuse No family hx of      Anesthesia Reaction No family hx of      Asthma No family hx of      Osteoporosis No family hx of      Genetic Disorder No family hx of      Thyroid Disease No family hx of      Obesity No family hx of        PHYSICAL EXAMINATION:  Temp: 98  F (36.7  C) Temp src: Oral BP: 111/64 Pulse: 60 Heart Rate: 60 Resp: 18 SpO2: 96 % O2 Device: Nasal cannula with humidification Oxygen Delivery: 1 LPM  08/06 1500 - 08/11 1459  In: 360 [P.O.:360]  Out: 1130 [Urine:1130]  Net: -770  Vitals:    08/10/19 2247 08/11/19 0554   Weight: 49.4 kg (109 lb) 49.5 kg (109 lb 3.2 oz)         Stephy Balderrama MD

## 2019-08-11 NOTE — ED NOTES
Bed: ED07  Expected date: 8/10/19  Expected time: 10:30 PM  Means of arrival: Ambulance  Comments:  HEMS [id number] sob

## 2019-08-11 NOTE — ED NOTES
"[facility]  ED Nurse Handoff Report    ED Chief complaint: Shortness of Breath      ED Diagnosis:   Final diagnoses:   None       Code Status: hospitalist to address    Allergies: No Known Allergies    Activity level - Baseline/Home:  Stand with Assist  Activity Level - Current:   Stand with Assist of 2    Patient's Preferred language: english   Needed?: No    Isolation: Yes  Infection: Not Applicable  MRSA  Bariatric?: No    Vital Signs:   Vitals:    08/10/19 2247 08/10/19 2300 08/10/19 2301 08/11/19 0000   BP: (!) 145/74  (!) 150/78 134/73   Pulse: 60  60 60   Resp: 20 10 23 22   Temp: 97.8  F (36.6  C)      TempSrc: Oral      SpO2: 100% 100%  100%   Weight: 49.4 kg (109 lb)      Height: 1.676 m (5' 6\")          Cardiac Rhythm: ,   Cardiac  Cardiac Rhythm: Atrial fibrillation    Pain level:      Is this patient confused?: No   Does this patient have a guardian?  No         If yes, is there guardianship documents in the Epic \"Code/ACP\" activity?  N/A         Guardian Notified?  N/A  [place] - Suicide Severity Rating Scale Completed?  Yes  If yes, what color did the patient score?  White    Patient Report: Initial Complaint: SOB  Focused Assessment: SOB worse on exertion over last few days  Tests Performed:   Results for orders placed or performed during the hospital encounter of 08/10/19   Chest XR,  PA & LAT    Narrative    CHEST 2 VIEWS   8/10/2019 11:30 PM     HISTORY: Dyspnea on exertion.    COMPARISON: 1/19/2019.    FINDINGS: Moderately extensive irregular interstitial opacities  scattered within both lungs, also present on 1/19/2019. 1.1 x 1.1 cm  nodular opacity projecting over the mid right lung, new. Blunting of  bilateral costophrenic angles again noted. This could relate to  scarring or tiny bilateral pleural effusions. Probable cardiomegaly.  Atherosclerotic calcification in the thoracic aorta. Left anterior  chest wall cardiac device with lead tips in the right ventricle.   "    Impression    IMPRESSION:   1. Moderately extensive irregular interstitial opacities scattered  within both lungs, not convincingly changed since 1/19/2019. These are  nonspecific, but are suggestive of fibrosis.  2. 1.1 cm nodular opacity projecting over the mid right lung, new.  This is suspicious for a pulmonary nodule. A CT scan of the chest  would be useful for further evaluation.  3. No other significant interval change.    NUHA ODOM MD   CBC with platelets differential   Result Value Ref Range    WBC 8.1 4.0 - 11.0 10e9/L    RBC Count 3.80 3.8 - 5.2 10e12/L    Hemoglobin 12.9 11.7 - 15.7 g/dL    Hematocrit 39.1 35.0 - 47.0 %     (H) 78 - 100 fl    MCH 33.9 (H) 26.5 - 33.0 pg    MCHC 33.0 31.5 - 36.5 g/dL    RDW 16.1 (H) 10.0 - 15.0 %    Platelet Count 201 150 - 450 10e9/L    Diff Method Automated Method     % Neutrophils 62.1 %    % Lymphocytes 22.6 %    % Monocytes 12.0 %    % Eosinophils 2.6 %    % Basophils 0.1 %    % Immature Granulocytes 0.6 %    Nucleated RBCs 0 0 /100    Absolute Neutrophil 5.0 1.6 - 8.3 10e9/L    Absolute Lymphocytes 1.8 0.8 - 5.3 10e9/L    Absolute Monocytes 1.0 0.0 - 1.3 10e9/L    Absolute Eosinophils 0.2 0.0 - 0.7 10e9/L    Absolute Basophils 0.0 0.0 - 0.2 10e9/L    Abs Immature Granulocytes 0.1 0 - 0.4 10e9/L    Absolute Nucleated RBC 0.0    Comprehensive metabolic panel   Result Value Ref Range    Sodium 139 133 - 144 mmol/L    Potassium 5.1 3.4 - 5.3 mmol/L    Chloride 105 94 - 109 mmol/L    Carbon Dioxide 27 20 - 32 mmol/L    Anion Gap 7 3 - 14 mmol/L    Glucose 117 (H) 70 - 99 mg/dL    Urea Nitrogen 42 (H) 7 - 30 mg/dL    Creatinine 1.48 (H) 0.52 - 1.04 mg/dL    GFR Estimate 30 (L) >60 mL/min/[1.73_m2]    GFR Estimate If Black 34 (L) >60 mL/min/[1.73_m2]    Calcium 8.7 8.5 - 10.1 mg/dL    Bilirubin Total 1.6 (H) 0.2 - 1.3 mg/dL    Albumin 3.4 3.4 - 5.0 g/dL    Protein Total 7.5 6.8 - 8.8 g/dL    Alkaline Phosphatase 71 40 - 150 U/L    ALT 19 0 - 50 U/L    AST  33 0 - 45 U/L   Troponin I   Result Value Ref Range    Troponin I ES 1.568 (HH) 0.000 - 0.045 ug/L   Nt probnp inpatient   Result Value Ref Range    N-Terminal Pro BNP Inpatient 22,614 (H) 0 - 1,800 pg/mL   EKG 12 lead   Result Value Ref Range    Interpretation ECG Click View Image link to view waveform and result      Abnormal Results: see above- elevated troponin and BNP. Patient on 2L oxygen at all times  Treatments provided: see MAR    Family Comments: family at bedside     OBS brochure/video discussed/provided to patient/family: N/A              Name of person given brochure if not patient:               Relationship to patient:     ED Medications:   Medications   furosemide (LASIX) injection 40 mg (40 mg Intravenous Given 8/11/19 0006)       Drips infusing?:  No    For the majority of the shift this patient was Green.   Interventions performed were .    Severe Sepsis OR Septic Shock Diagnosis Present: No    To be done/followed up on inpatient unit:  none at this time     ED NURSE PHONE NUMBER: [phone]

## 2019-08-11 NOTE — ED NOTES
DATE:  8/10/2019   TIME OF RECEIPT FROM LAB:  2939  LAB TEST:  Trop  LAB VALUE:  1.568  RESULTS GIVEN WITH READ-BACK TO (PROVIDER):  [place]  TIME LAB VALUE REPORTED TO PROVIDER:   7214

## 2019-08-11 NOTE — CONSULTS
"CLINICAL NUTRITION SERVICES  -  ASSESSMENT NOTE    Recommendations Ordered by Registered Dietitian (RD):   Thera vit M daily   Plus2 Shake BID between meals (chocolate and strawberry)   Malnutrition:   % Weight Loss:  Weight loss does not meet criteria for malnutrition   % Intake:  <75% for >/= 3 months (non-severe malnutrition) --> baseline   Subcutaneous Fat Loss:  Orbital region moderate depletion, Upper arm region severe depletion and Thoracic region severe depletion  Muscle Loss:  Temporal region moderate depletion, Clavicle bone region severe depletion, Acromion bone region severe depletion and Dorsal hand region severe depletion  Fluid Retention:  Mild edema    Malnutrition Diagnosis: Severe malnutrition  In Context of:  Chronic illness or disease     REASON FOR ASSESSMENT  Theresa Alves is a 89 year old female seen by Registered Dietitian for Admission Nutrition Risk Screen for unintentional loss of 10# or more in the past two months and reduced oral intake over the last month    NUTRITION HISTORY  - Information obtained from patient and EMR.   - Pt with known CHF. Prior hx etoh abuse, afib, chronic back pain, CKD, pulmonary fibrosis, htn, hyperlipidemia, osteoporosis.   - Endorses wt loss, disinterest in food.    - Patient states \"I don't like to eat\". She can't taste or smell anymore so most foods just aren't palatable. She also has some digestion issues - avoids meat. She also notes that her resp status takes a lot out of her --> expect increased energy needs.   - Pt lives in [facility] where she goes down to the dining room for one meal/day during the week, and usually 2 daily on the weekends because they have brunch.   - Eats eggs (only w/ salt and pepper) and frey for brunch on weekends, otherwise has something small in her room: blueberry yogurt, skim milk, toast, etc.   - She gets 1/2 portions at the supper meal as the servings are much too large for her. She does not always like the food served, so " "may not eat much. Does like some of the soups.   - Never eats the dessert, but does have Ice cream in her room.  - Drinks 1 boost every other day or so. Also drinks prune juice for BM (tries to have 1 stool daily)    - Her Niece does her grocery shopping.   - No acute appetite changes, trending down over the past year or so.   - She does have some friends at her facility but they \"can't hear me and they can't see me\" so she has trouble communicating with them. She does not go down for bingo or morning coffee anymore because walking from her room to the elevator takes a lot out of her.   - NKFA    CURRENT NUTRITION ORDERS  Diet Order:     2000 mg Sodium  Room service w/ assistance     Current Intake/Tolerance:  75% intake for one meal since admission - Tea, fruit cup, white toast.     NUTRITION FOCUSED PHYSICAL ASSESSMENT FOR DIAGNOSING MALNUTRITION)  Completed:  Yes Partial assessment - No LEs         Observed:    Muscle wasting (refer to documentation in Malnutrition section), Subcutaneous fat loss (refer to documentation in Malnutrition section) and Dry skin    Obtained from Chart/Interdisciplinary Team:  Edema 1-2+  Bertin - Nutrition: 3; Total 16  No BM this admission. Pt on lasix, reports that she has had to urinate frequently.    ANTHROPOMETRICS  Height: 5' 6\"  Weight: 109 lbs 3.2 oz (49.5 kg)   Body mass index is 17.63 kg/m .  Weight Status:  Underweight BMI <18.5  IBW: 59.1 kg  % IBW: 84%  Weight History: current wt w/ potential to be increased with fluid. Wt hx shows only up to a 5# loss in the past 7 months (4.3%). Pt thinks she was closer to 130# at the end of last year. Wt trends indicate a more stable down-trend.   Wt Readings from Last 20 Encounters:   08/11/19 49.5 kg (109 lb 3.2 oz)   08/06/19 49 kg (108 lb)   07/15/19 49 kg (108 lb)   06/11/19 49.9 kg (110 lb)   04/03/19 49 kg (108 lb)   04/01/19 49.9 kg (110 lb)   02/13/19 49 kg (108 lb)   01/19/19 50.7 kg (111 lb 11.2 oz)   01/14/19 51.7 kg (114 " lb)   01/14/19 51.7 kg (114 lb)   12/04/18 51.7 kg (114 lb)   11/28/18 50.8 kg (112 lb)   11/12/18 52.4 kg (115 lb 8 oz)   11/07/18 51.8 kg (114 lb 4.8 oz)   11/05/18 50.4 kg (111 lb 3.2 oz)   09/11/18 52.6 kg (116 lb)   09/10/18 52.6 kg (116 lb)   08/12/18 52.6 kg (116 lb)   07/30/18 52.6 kg (116 lb)   07/25/18 52.6 kg (116 lb)       LABS  Labs reviewed  Recent Labs   Lab Test 08/11/19  0432 08/10/19  2250 07/15/19  1329 06/11/19  0830 04/01/19  1522   POTASSIUM 4.7 5.1 4.9 4.5 4.9     Recent Labs   Lab Test 08/11/19  0432 08/10/19  2250 07/15/19  1329 06/11/19  0830 04/01/19  1522    139 137 139 135*     Recent Labs   Lab Test 08/11/19  0432 08/10/19  2250 07/15/19  1329 06/11/19  0830 04/01/19  1522   CR 1.37* 1.48* 1.41* 1.18* 1.32*     Recent Labs   Lab 08/11/19  0432 08/10/19  2250   * 117*     Triglycerides   Date Value Ref Range Status   11/28/2017 107 <150 mg/dL Final     Comment:     Non Fasting   01/10/2014 106 0 - 150 mg/dL Final     Comment:     Fasting specimen   09/03/2012 70 0 - 150 mg/dL Final        MEDICATIONS  Medications reviewed  Lasix 40 mg BID  Vit D3 1000 international unit(s) daily     ASSESSED NUTRITION NEEDS PER APPROVED PRACTICE GUIDELINES:  Dosing Weight 49.5 kg   Estimated Energy Needs: 1011-7546+ kcals (25-30+ Kcal/Kg)  Justification: maintenance and underweight, Inc needs w/ resp status  Estimated Protein Needs: 59-74+ grams protein (1.2-1.5+ g pro/Kg)  Justification: preservation of lean body mass  Estimated Fluid Needs: Per MD    MALNUTRITION:  % Weight Loss:  Weight loss does not meet criteria for malnutrition   % Intake:  <75% for >/= 3 months (non-severe malnutrition) --> baseline   Subcutaneous Fat Loss:  Orbital region moderate depletion, Upper arm region severe depletion and Thoracic region severe depletion  Muscle Loss:  Temporal region moderate depletion, Clavicle bone region severe depletion, Acromion bone region severe depletion and Dorsal hand region severe  depletion  Fluid Retention:  Mild edema    Malnutrition Diagnosis: Severe malnutrition  In Context of:  Chronic illness or disease    NUTRITION DIAGNOSIS:  Malnutrition related to chronic increased needs w/ pulmonary fibrosis and inadequate oral intake r/t aging process with taste and smell loss as evidenced by verbalized disinterest in eating, progressive weight loss over the past 6 mos-1 year, with current underwt BMI <18, e/o severe fat and muscle loss, coding for severe malnutrition.       NUTRITION INTERVENTIONS  Recommendations / Nutrition Prescription  Diet per MD -- 2g Na    Add plus2 milkshakes BID between meals     Add thera vit M daily       Implementation  Nutrition education: Provided education on supplements, protein sources, increased needs r/t resp status.   Medical Food Supplement and Multivitamin/Mineral: see above      Nutrition Goals  Patient to tolerate at least 50% meals BID-TID + 1-2 supplements daily.       MONITORING AND EVALUATION:  Progress towards goals will be monitored and evaluated per protocol and Practice Guidelines    Jessi France RD, LD  [facility] [place], [address], [address], [facility]   Pager: [phone]  Weekend Pager: [phone]

## 2019-08-11 NOTE — PLAN OF CARE
VSS on 1L.  Tele: vpaced/ aflutter.  Blanchable redness of coccyx.  Back pain managed with oxy and tylenol.  Wilde catheter placed this shift.  Lasix drip at 5.  NPO at midnight for cath vs stress test tomorrow.  Call light within reach.  Will continue to monitor.

## 2019-08-11 NOTE — ED PROVIDER NOTES
History     Chief Complaint:  Shortness of Breath      HPI   Theresa Alves is a 89 year old female with a complex medical history, including history of CHF, chronic atrial fibrillation, hypertension, hyperlipidemia, and CKD among others, who presents via EMS from [facility] with worsening shortness of breath for the past couple of months, with worsening over the past few days, chest pain once a week for the past month, and worsening fatigue. The patient endorses activity or ambulation worsens her symptoms. She describes that the chest discomfort lasts 5 minutes at a time and that she feels a band of pain that shoots across to her right shoulder. She describes that this week she was able to walk 90 steps and then needed to take a 5 minute break. She reports that wearing oxygen alleviates her sensation of feeling short of breath. To note, she uses oxygen at [facility], but is unsure how to use the oxygen cylinder. She uses a walker at home. She also endorses recently losing weight and new leg swelling and takes 60 mg of Lasix daily. She denies fever,recent illness, or recent falls.     CARDIAC RISK FACTORS:  Sex:    Female   Tobacco:   No   Hypertension:   No   Hyperlipidemia:  Yes   Diabetes:   No   Family History:  Unknown     Allergies:  NKDA     Medications:    Lexapro  Lasix  Toprol    Past Medical History:    Atrial fibrillation  Chronic back pain  CHF  CKD stage 3  Pulmonary fibrosis   HTN  Anemia  Hyperlipidemia  Osteoporosis   Alcoholic encephalopathy    Past Surgical History:    Carpal tunnel release  Cataract  Ablation AV node  Hernia repair RT/LT  Hiatal hernia repair     Family History:    Family history is unknown as the patient is adopted.    Social History:  Smoking status: no  Alcohol use: yes  Drug use: no  PCP: Marbin Rouse  Marital Status:        Review of Systems   Constitutional: Positive for fatigue. Negative for fever.   Respiratory: Positive for shortness of breath.   "  Cardiovascular: Positive for chest pain.   All other systems reviewed and are negative.      Physical Exam     Patient Vitals for the past 24 hrs:   BP Temp Temp src Pulse Heart Rate Resp SpO2 Height Weight   08/11/19 0014 -- -- -- -- 60 8 100 % -- --   08/11/19 0001 134/73 -- -- 60 60 14 100 % -- --   08/11/19 0000 134/73 -- -- 60 60 22 100 % -- --   08/10/19 2301 (!) 150/78 -- -- 60 60 23 -- -- --   08/10/19 2300 -- -- -- -- 60 10 100 % -- --   08/10/19 2247 (!) 145/74 97.8  F (36.6  C) Oral 60 -- 20 100 % 1.676 m (5' 6\") 49.4 kg (109 lb)        Physical Exam  General: Alert and cooperative with exam. Patient in mild distress. Normal mentation. Nasal cannula in place.   Head:  Scalp is NC/AT  Eyes:  No scleral icterus, PERRL  ENT:  The external nose and ears are normal. The oropharynx is normal and without erythema; mucus membranes are moist. Uvula midline, no evidence of deep space infection.  Neck:  Normal range of motion without rigidity.  CV:  Regular rate and rhythm    No pathologic murmur. Pacemaker present.  Resp:  Fine crackles in lung bases bilaterally.     Non-labored, no retractions or accessory muscle use  GI:  Abdomen is soft, no distension, no tenderness. No peritoneal signs  MS:  1+ pitting lower extremity edema bilaterally   Skin:  Warm and dry, No rash or lesions noted.  Neuro: Oriented x 3. No gross motor deficits.    Emergency Department Course   ECG:  ECG (23:48:48):  Rate 62 bpm. ND interval *. QRS duration 164. QT/QTc 488/495. P-R-T axes * 114 -54. Ventricular paced rhythm. Abnormal ECG. Agree with computer interpretation.  Interpreted at 2350 by Ricardo Galvez DO.    Imaging:  Radiographic findings were communicated with the patient who voiced understanding of the findings.    Chest XR, PA, & LAT  1. Moderately extensive irregular interstitial opacities scattered  within both lungs, not convincingly changed since 1/19/2019. These are  nonspecific, but are suggestive of fibrosis.  2. " 1.1 cm nodular opacity projecting over the mid right lung, new.  This is suspicious for a pulmonary nodule. A CT scan of the chest  would be useful for further evaluation.  3. No other significant interval change.  As read by Radiology.    Laboratory:  Nt probnp: 33362 (H)  CBC: WBC 8.1, HGB 12.9,   CMP: Glucose 117 (H), urea nitrogen 42 (H), creatinine 1.48 (H), GFR 30 (L), bilirubin total 1.6 (H), o/w WNL  Troponin 1 (2250): 1.568    Procedures:  None.     Interventions:  0006: Lasix 40 mg IV    Emergency Department Course:  2256: Nursing notes and vitals reviewed. I performed an exam of the patient as documented above.     Medicine administered as documented above. Blood drawn. This was sent to the lab for further testing, results above.    The patient was sent for a chest xray while in the emergency department, findings above.     2345: I rechecked the patient and discussed the results of her workup thus far.     0007:  I consulted with Dr. Paz of the hospitalist services. They are in agreement to accept the patient for admission.    Findings and plan explained to the Patient who consents to admission. Discussed the patient with Dr. Paz, who will admit the patient to a medical bed for further monitoring, evaluation, and treatment.    Impression & Plan      Medical Decision Making:  Theresa Alves is a 89 year old female with a PMH of CHF, HTN, hyperlipidemia, atrial fibrillation, and CKD, who presents for evaluation of shortness of breath.  I considered a broad differential of their dyspnea including CHF exacerbation, PE, dissection, hemothorax, pleural effusion, pneumonia, acute coronary syndrome, reactive airway disease, bronchitis, upper airway obstruction, foreign body, etc.  Given the history and exam plus laboratory findings I feel congestive heart failure is the most likely etiology and would not do extensive workup for other causes as mentioned above at this time.  The patient received  IV diuretics in ED. patient denied chest pain and EKG demonstrated paced rhythm.  Troponin elevation likely secondary to CHF exacerbation; will defer heparin at this time.  Chest x-ray shows pulmonary nodule; patient informed of findings and need for further imaging follow-up in the future.  Admitted to hospitalist team for further evaluation and care.  Patient remained stable throughout my care.    Diagnosis:    ICD-10-CM    1. Acute on chronic congestive heart failure, unspecified heart failure type (H) I50.9    2. SOB (shortness of breath) R06.02    3. Pulmonary nodule R91.1        Disposition:  Admitted to Kimberly Steiner, am serving as a scribe at 10:56 PM on 8/10/2019 to document services personally performed by Ricardo Galvez DO based on my observations and the provider's statements to me.       Kimberly Swann  8/10/2019    EMERGENCY DEPARTMENT       Ricardo Galvez DO  08/11/19 0113

## 2019-08-11 NOTE — ED TRIAGE NOTES
Pt reports baseline SOB that has been worsening over the past 3 days. Got up to use bathroom tonight and felt like she was unable to walk back to her room safely, calling staff for assistance. Pt reports intermittent chest pain. States that she becomes very SOB while ambulating and has been sleeping more than usual.

## 2019-08-11 NOTE — PLAN OF CARE
Patient Name: Theresa Alves Room#: 0253/0253-01      Admit Date: 8/10/2019   Primary Diagnosis:   1. Acute on chronic congestive heart failure, unspecified heart failure type (H)    2. SOB (shortness of breath)    3. Pulmonary nodule       Inpatient Status: Cardiac   Procedures:  XRay done, CT done   Drips:  none   Drains & Devices:  PIV   Rhythm:  A flutter w/ pacing   Activity Level: Ax1 GB/W   Oxygen needs: 2L O2 via nasal cannula   Anticipated D/C Date: TBD     Acuity Ratin   Concerns:  On lasix for diuresis, dribbling incontinence, used purewick overnight. Chronic back pain r/t scoliosis, blanchable redness on coccyx, barrier cream applied, discussed repositioning more often. Recent weight loss r/t loss of taste & smell, disinterested in food.       Nurse: Slime Hwang

## 2019-08-11 NOTE — PROGRESS NOTES
Non billing PN    Patient admitted earlier this AM with SOB.    Her CT was reviewed and it does show extensive pulmonary fibrosis, the nodular opacity mention in cxr is not present and BHAVANA is likely reactive and no pulmonary consolidation.  No evidence for alveolitis.    Will plan for diuresis with close monitoring of her I/O and bun/creatinine.  I suspect the diuresis will only be limited.    Will attempt morphine for her back pain and hopefully help with air hunger.    Her troponin is elevated, will get TTE and obtain cardiology consulation, although I don't imagine too many options given her age and advanced lung disease.      Zenon Paz MD

## 2019-08-11 NOTE — PROGRESS NOTES
"SPIRITUAL HEALTH SERVICES Progress Note  [facility]    Initial visit per pt request. Pt invited reflective conversation with .    Pt said she is feeling more at peace with her situation this morning after a \"kind of scary\" night. Pt said when she can't breathe she starts to panic, and she said \"they tell me to calm down, but that is easier said than done.\" Pt talked about some of her coping methods and said she is trying her best.    Pt talked about a difficult marriage, and pt said she believes her current health condition was caused by the second hand smoke she inhaled from her late 's cigarettes. Pt said \"he'd probably say it was my fault - everything was always my fault.\" Pt said she doesn't really have family left, but she has a few nieces and nephews who care for her. Her [employer] community ([facility] in [place]) is also a source of support.     provided spiritual/emotinoal support.  provided prayer per request.  contacted pt's home [facility] to request pastoral visit per pt request.      remains available upon request.    Marsha Ngo   Intern  Pager:  [phone]  "

## 2019-08-11 NOTE — H&P
Admitted:     08/10/2019      CHIEF COMPLAINT:  Shortness of breath.      HISTORY OF PRESENT ILLNESS:  Theresa Alves is a 89-year-old  female who lives in an assisted living facility, who is DNR/DNI, who has had prior history of alcohol abuse as well as history of atrial fibrillation with bradycardia, sick sinus syndrome, chronic back pain, chronic kidney disease, pulmonary fibrosis, hypertension, anemia, hyperlipidemia, osteoporosis, who presents to [facility] with shortness of breath.  The patient has known CHF.  She is on 60 mg of Lasix daily.  She has had increased dyspnea on exertion for the last few weeks.  She has intermittent chest pain about once a week or so.  This lasted for about 5 minutes or so.  She has used oxygen on a p.r.n. basis but has been using it more regularly for the last couple of weeks continuously.  The patient states that her oxygen does help her breathe better.  She uses a walker at home.  She states that she has been losing some weight and has had some increased leg edema.  The patient denies any fevers, chills, sweats or recent falls.      The patient came to [facility] and was seen by Dr. Ricardo Galvez.  The patient is afebrile.  Vital signs were stable.  Oxygen saturations were 100% on 2 liters.  Blood work revealed normal electrolytes, BUN of 42, creatinine 1.48 with a calculated GFR of 30, which is about her baseline.  ProBNP is elevated at 22,000.  LFTs are significant for total bilirubin of 1.6, otherwise, albumin is 3.4, and other liver function tests were normal, glucose 117.  White count 8.1, hemoglobin 12.9, platelets of 201.  A chest x-ray showed moderate extensive and irregular interstitial opacities scattered within both lungs, no significant change from 01/2019.  These are nonspecific but are suggestive of fibrosis.  There is a 1.1 cm nodular opacity projecting over the mid right lung that is new, suspicious for a  pulmonary nodule.  A CAT scan will be helpful for further evaluation.  There is no other significant interval change noted.  The patient's troponin was elevated at 1.568.  A 12-lead EKG revealed a paced rhythm.  The patient received 40 mg of IV Lasix and is being admitted for shortness of breath and possible decompensated congestive heart failure.      PAST MEDICAL HISTORY:   1.  History of atrial fibrillation with bradycardia and sick sinus syndrome.   2.  History of pulmonary hypertension.   3.  Chronic back pain.   4.  History of CHF, likely diastolic.   5.  Chronic kidney disease, stage 3.   6.  History of pulmonary fibrosis.   7.  Hypertension.   8.  Anemia.   9.  Hyperlipidemia   10.  Osteoporosis.   11.  History of alcoholic encephalopathy.      PAST SURGICAL HISTORY:   1.  Carpal tunnel release.   2.  Cataract surgery.   3.  AV node ablation.   4.  Hiatal hernia repair bilaterally.   5.  Chronic low back pain due to kyphoscoliosis.      ALLERGIES:  NO KNOWN DRUG ALLERGIES.      SOCIAL HISTORY:  No tobacco.  She does drink alcohol, and at times excessively.  She is DNR/DNI and lives in an assisted living facility.      FAMILY HISTORY:  Both mother and father adopted.      ALLERGIES:  NO KNOWN DRUG ALLERGIES.      CURRENT MEDICATIONS:   1.  Tylenol 500 mg every 8 hours.   2.  Cyanocobalamin 1 tablet once daily.   3.  Lexapro 5 mg daily.   4.  Lasix 40 mg in the morning, 20 mg in the evening.   5.  Toprol-XL 50 mg once a day.   6.  Percocet 1/2 tablet daily.   7.  Vitamin D 1000 units once a day.      REVIEW OF SYSTEMS:  Ten-point systems reviewed as dictated in History of Present Illness.      PHYSICAL EXAMINATION:   VITAL SIGNS:  Temperature 97.6, heart rate 60, paced rhythm, respirations 16, blood pressure 135/57, oxygen saturation 100% on 3 liters.   GENERAL:  The patient is a very frail 89-year-old  female who is oriented x 3.   HEENT:  Head is atraumatic.  Pupils are equal.   NECK:  Veins are  elevated.   PULMONARY:  Lungs are clear to auscultation.   CARDIOVASCULAR:  S1, S2, paced regular.  She has a 2/6 systolic murmur.   GASTROINTESTINAL:  Abdomen is soft, nontender.  Normoactive bowel sounds.   MUSCULOSKELETAL:  Shows +1 pitting edema to +2 at the feet.   NEUROLOGIC:  She does move all 4 extremities.  Cranial nerves grossly intact.   DERMATOLOGIC:  Skin is warm, dry, well perfused.   PSYCHIATRIC:  Mood and affect stable.      LABORATORY IMAGING STUDIES:  As dictated in History of Present Illness.      ASSESSMENT:     1. SOB - multifactorial (chf exacerbation, worsening pulmonary fibrosis, possible NSTEMI vs demand ischemia, new right middle lobe pulmonary nodule)  Theresa Alves is admitted with shortness of breath, elevation of pro-BNP and troponin and decreased oxygen saturations, admitted with suspected multifactorial reasons including acute decompensated congestive heart failure as well as possible worsening pulmonary fibrosis and finding of a new right middle lobe pulmonary nodule as well as a troponin elevation that may be demand ischemia.  The patient will be monitored in the cardiac telemetry unit.  The patient will undergo serial troponins, but suspect this is likely demand ischemia.  Even if this is ischemia, given her advanced age and chronic kidney disease, she will be medically managed.  The patient has a component of volume overload, and so we are going to give her IV Lasix.  We are going to hold her oral Lasix.  The patient also has pulmonary fibrosis, so it is unclear how significant they are.  We will obtain a CAT scan for followup of fibrosis as well as for her pulmonary nodule.  The patient may benefit from a brief course of steroids, but we will diurese her first.   2.  Atherosclerotic cardiovascular disease.  The patient's EKG is paced.  Her troponin is elevated.  She has had intermittent chest pain.  We will cycle her troponins.  The patient will need to be medically managed.   The patient may benefit from a statin or nitroglycerin.   3.  Depression and anxiety:  We will continue the patient on Lexapro.   4.  Chronic back pain:  Due to kyphoscoliosis.  The patient will be given Tylenol for pain and low-dose narcotics if necessary.   5.  Deep venous thrombosis prophylaxis:  The patient will get compression boots.   6.  CODE STATUS:  DNR/DNI.         SWATI KIM MD             D: 2019   T: 2019   MT: FELIBERTO      Name:     YANELI CHUA   MRN:      [id number]        Account:      [id number]   :      1924        Admitted:     08/10/2019                   Document: [id number]       cc: Marbin Rouse MD

## 2019-08-12 NOTE — PLAN OF CARE
Neuro- A&Ox4  Most Recent Vitals- Temp: 98.1  F (36.7  C) Temp src: Oral BP: 106/56 Pulse: 60 Heart Rate: 62 Resp: 18 SpO2: 95 % O2 Device: None (Room air) Oxygen Delivery: 1/2 LPM  Tele/Cardiac- 100% V-paced, a fib underlying, denies CP  Resp- weaned off supplemental O2, now on RA, denies SOB  Activity- SBA + walker  Pain- c/o lower back pain given PRN tylenol with some relief  Drips- Heparin @ 600 units/hr, Lasix @ 5ml/hr  Drains/Tubes- Wilde  Skin- bruised, red coccyx-WOC consult ordered  GI/- no concerns  Plan- Possible angiogram today  Misc- Pt has been NPO since midnight

## 2019-08-12 NOTE — CONSULTS
Consult Date:  08/11/2019      REFERRAL SOURCE:  Zenon Paz MD, Hospitalist Service.      REASON FOR REFERRAL:     1.  NSTEMI.   2.  Decompensated heart failure.      [facility]:  Lolis Davenport PA-C.  Last seen on 07/15/2019.      HISTORY OF PRESENT ILLNESS:    Theresa Alves is a very pleasant 89-year-old  lady, non-obese, known to Cardiology.  Her nephew and niece were present in the room.  Theresa is 89 years old, mentally astute (she does not even have any appreciable hearing impairment), lives in assisted living, is self-caring.      Her medical history is significant for:   1.  Heart failure with preserved ejection fraction.  Her last echo showed an LVEF of 50%-55% and normal RV function.   2.  Probable pulmonary fibrosis.  Not on supplemental oxygen.   3.  History of persistent atrial fibrillation, status post AV isaías ablation and pacemaker implantation in 01/2019.   4.  Status post single-lead pacemaker implantation in 01/2019 after ablation.  Pacemaker dependent.   5.  Prior history of alcohol dependency.   6.  Significant kyphoscoliosis.  Despite this, she lives independently and is self-caring.   7.  Chronic kidney disease, stage III.      She has been admitted from her assisted living with episodic chest pain over the last few weeks, worsening exertional dyspnea and lower extremity edema.  In addition, she has orthopnea and worsening fatigue (had temporarily improved after Lolis Davenport discontinued her metoprolol).  Admission /74 mmHg, tachypneic requiring oxygen.  NT-proBNP markedly elevated at 22,600.  Baseline is normal.  ECG shows a ventricular paced rhythm with underlying atrial fibrillation.  Her troponin-I is elevated at 1.5 and subsequently down trending (0.9).  Creatinine 1.37, potassium 4.7.  CT chest showed underlying fibrotic lung disease and interstitial/pleural thickening, trace bilateral pleural effusion and cardiomegaly.  Chest x-ray similar with evidence of  pulmonary fibrosis and a satisfactory pacemaker position.      Her repeat transthoracic echocardiogram done today (I reviewed images) shows severe tricuspid valve regurgitation with a holosystolic flow reversal in the hepatic veins and the mechanism is likely a combination of functional (dilated right ventricle and severe right atrial enlargement) and a pacemaker lead.  There is moderately severe functional mitral regurgitation (2 jets).  LVEF remains preserved at 62 60%-65% with moderate concentric hypertrophy.  RV is mildly dilated with mildly decreased systolic function.  Estimated RVSP is 69 mmHg plus right atrial pressure.  No regional wall motion abnormalities.  There is impressive biatrial enlargement, and the right IVC is dilated.  Compared to previous echocardiogram, the TR is increased from moderate to severe and MR is increased from mild to moderately severe.      PHYSICAL EXAMINATION:    She is alert and oriented, cognitively sharp, is tachypneic, but no cyanosis, no conversational dyspnea.    CARDIOVASCULAR: Jugular venous pressure is elevated to 8 cm in the neck, even in the sitting upright position.  Her apical impulse is undisplaced.  Heart sounds are regular (paced), soft systolic murmur present (challenging to auscultate in the presence of pulmonary fibrotic crackles).   LUNGS:  She has bilateral coarse crackles up to the apices.  No wheeze.   ABDOMEN:  Soft and nontender.  No hepatosplenomegaly, no free fluid.   EXTREMITIES:  She has 3+ pitting edema up to her knees.   NEUROLOGIC:  Mentally alert and oriented, but she has significant kyphoscoliosis and is petite.      DIAGNOSES/ASSESSMENT/PLAN:   1.  Acute decompensated heart failure with preserved ejection fraction.     2.  Non-ST elevation myocardial infarction.   3.  New diagnosis of severe tricuspid valve regurgitation.   4.  New diagnosis of moderately severe mitral valve regurgitation.   5.  Mild right heart failure with moderately decreased  systolic function.   6.  Pulmonary fibrosis.   7.  Stage III chronic kidney disease.   8.  Atrial fibrillation, status post AV isaías ablation and pacemaker implantation in 01/2019.   9.  Pacemaker dependent.   10.  Elderly frailty.      The patient is admitted with 2 issues:  1. She has decompensated heart failure with new severe regurgitant TR and MR.  2. She most likely has an NSTEMI.    I think she may have true obstructive disease rather than demand ischemia because she gives a background history of worsening angina for the last few weeks, even at very low levels of activity.  In other words, she has had CCS class 3-4 angina.  Although she is of advanced age, she is cognitively very intact and is largely self-caring.  I think it would be reasonable to offer her coronary angiography with a view of the PCI after she has been adequately diuresed and is euvolemic.  One issue is that she has had epistaxis in the past, but not causing severe anemia.  I do not think she has seen ENT.  If she does need stents, ENT consult with a view to cautery of her nasal vessels will be an option.     1.  Heart failure management:   -- IV furosemide bolus followed by infusion at 5 mg per hour.   2.  NSTEMI.   -- Start IV heparin.  This will also alert us if she has significant epistaxis.   -- I have offered the patient the option of coronary angiography and PCI if indicated.  She would like to think about it.  Rationale as described above.   -- Start aspirin.     3.  Prior history of epistaxis.   -- If she develops epistaxis with aspirin and heparin, ENT referral.   4.  Sodium restrict 2000 mg, fluid restrict 2 liters.   5.  Cardiology will follow.      Thank you for consulting us.  It was my pleasure to visit with this witty, interesting, and intelligent elderly lady.  Care will be signed over to the incoming Cardiology Consult Service tomorrow.        CANDICE ARNOLD MD             D: 08/11/2019   T: 08/11/2019   MT: DAVE       Name:     YANELI CHUA   MRN:      [id number]        Account:       [id number]   :      1924           Consult Date:  2019      Document: [id number]

## 2019-08-12 NOTE — PROGRESS NOTES
"[facility]  Cardiology Progress Note         Assessment and Plan:     CHF exacerbation (H)  Severe TR (progressed since 2018-likely due to pulm Htn and pacer wire and atrial enlargement stretching annulus and leading to TR  Mod/severe mitral regurgitation progressed since 2018 likely due to stretched annulus  Mild/mod AI  pulm fibrosis   Elevated troponin- ?acs vs demand from chf  RV  sys dysfxn due to pulm htn and tr  HTN  Chronic afib-pt refuses anticoag  AVN-ablation/pacer  CKD    Family discussion about cath that dr perez suggested(pt and 3 kids: re risk of cath/dye/kidney/rescind dnr etc  Family not sure pt fully understands the decision..  I literally was in the room for 1 hour and repeated all the discussion that could be cad (+tp) but that is not the entire issue and to expect chf regardless  Critical care time 1 hr    Will continue to treat the valve/chf medically  Start rehab             Interval History:    no sob at bed, no cp              Medications:   Scheduled Meds:     acetaminophen  1,000 mg Oral Once     escitalopram  5 mg Oral Daily     metoprolol succinate ER  50 mg Oral Daily     multivitamin w/minerals  1 tablet Oral Daily     perflutren diluted 1mL to 2mL with saline  9 mL Intravenous Once     sodium chloride (PF)  10 mL Intravenous Once     sodium chloride (PF)  3 mL Intracatheter Q8H     vitamin D3  1,000 Units Oral Daily     PRN Meds: acetaminophen, acetaminophen, bisacodyl, lidocaine 4%, lidocaine (buffered or not buffered), - MEDICATION INSTRUCTIONS -, morphine, naloxone, nitroGLYcerin, ondansetron **OR** ondansetron, oxyCODONE, polyethylene glycol, prochlorperazine **OR** prochlorperazine **OR** prochlorperazine, senna-docusate **OR** senna-docusate, sodium chloride (PF)         Physical Exam:   Blood pressure 118/64, pulse 60, temperature 97.5  F (36.4  C), temperature source Oral, resp. rate 18, height 1.676 m (5' 6\"), weight 47.4 kg (104 lb 9.6 oz), SpO2 96 %, not " currently breastfeeding.  Vitals:    08/10/19 2247 19 0554 19 0600   Weight: 49.4 kg (109 lb) 49.5 kg (109 lb 3.2 oz) 47.4 kg (104 lb 9.6 oz)       Intake/Output Summary (Last 24 hours) at 2019 1057  Last data filed at 2019 0625  Gross per 24 hour   Intake 120 ml   Output 2050 ml   Net -1930 ml           Vital Sign Ranges  Temperature Temp  Av.9  F (36.6  C)  Min: 97.5  F (36.4  C)  Max: 98.1  F (36.7  C)   Blood pressure Systolic (24hrs), Av , Min:98 , Max:118        Diastolic (24hrs), Av, Min:56, Max:64      Pulse No data recorded   Respirations Resp  Av  Min: 18  Max: 18   Pulse oximetry SpO2  Av.8 %  Min: 94 %  Max: 99 %             Constitutional: Awake, alert, cooperative, no apparent distress       Skin: No rash, petechia, cyanosis       Neck: No thyromegaly or adenopathy       Lungs: Fibrotic coarse crackles       Cardiovasc: RRR (paced) gr 2 syst murmur       Abdomen: Normal bowel sounds, soft, non-distended, non-tender, no hepatomegaly       Neuro: Alert and oriented x3, non focal exam       Extremities: No edema-or minimal       Other:             Data:     Recent Labs   Lab Test 19  0547 19  0432   WBC 7.4 7.8   HGB 12.0 11.8    102*    190      Recent Labs   Lab Test 19  0547 19  0432    138   POTASSIUM 4.2 4.7   CHLORIDE 102 104   BUN 42* 41*   CR 1.37* 1.37*     Recent Labs   Lab 19  0547 19  0432 08/10/19  2250   GLC 85 106* 117*     Recent Labs   Lab Test 08/10/19  2250 18  0540   ALT 19 27   AST 33 24     No components found for: TROPONINIES    Lab Results   Component Value Date    CHOL 142 2017     Lab Results   Component Value Date    HDL 69 2017     Lab Results   Component Value Date    LDL 52 2017     Lab Results   Component Value Date    TRIG 107 2017     Lab Results   Component Value Date    CHOLHDLRATIO 2.7 01/10/2014          TSH   Date Value Ref Range Status    03/16/2018 4.13 (H) 0.40 - 4.00 mU/L Final

## 2019-08-12 NOTE — PROGRESS NOTES
"[facility] Nurse Inpatient Skin Assessment     Initial Assessment of:   Gluteal cleft/ coccyx        Data:   Patient History:      per MD note(s):  89 year old female with persistent atrial fibrillation, CKD-III, and HTN who presented with progressive HORTON, LE edema, and intm chest pain over last several weeks     Bertin Risk Assessment  Sensory Perception: 3-->slightly limited    Moisture: 3-->occasionally moist   Activity: 3-->walks occasionally     Mobility: 3-->slightly limited   Nutrition: 3-->adequate   Bertin Score: 17    Positioning: Pillows,     Mattress:  Standard , Atmos Air mattress    Moisture Management:  Urinary Catheter    Catheter secured? Yes    Current Diet / Nutrition:       Orders Placed This Encounter        Combination Diet 2 gm NA Diet          Labs:   Recent Labs   Lab Test 08/12/19  0547  08/10/19  2250   ALBUMIN  --   --  3.4   HGB 12.0   < > 12.9   WBC 7.4   < > 8.1    < > = values in this interval not displayed.         Skin Assessment (location):   Gluteal cleft and coccyx  History:  Pt lives in an assisted living, gets around her room with a walker and some assistance, light assistance for turning in bed  Gluteal cleft/ coccyx  08/12/19    Large area of pink, easily blanchable pink erythema, intact epidermis with two small, 1cm x 1cm area of partial partial thickness, \"scuffing\" of the epidermis- tip of coccyx and the left just lateral of midline gluteal cleft buttock. Wound ed is normal colored epidermis, no pain or irritation           Intervention:     Patient's chart evaluated.      Assessed: area as noted above with the RN    Orders  Written    Supplies  reviewed, discussed with RN and discussed with patient    Discussed plan of care with Patient and Nurse          All patient / family questions answered:  YES        Assessment:        Large area of blanchable erythema to the buttocks, coccyx area with two small spots of very partial thickness epidermal loss. " " The two spots of \"scuffing\" are due to multiple factors- IAD, friction, shear, pressure vs these being pressure injuries.        Plan is good PIP measures, especially pt positioning side to side in bed q 2 hours and full off loading when up to the chair, every 2 hours, see plan below        Plan:   Nursing to notify the Provider(s) and re-consult the WOC Nurse if skin deteriorate(s).    Skin care plan to buttocks/ perineal cares: TID and prn with incontinence episodes  1. Clean with Erin Cleanse and Protec  - reposition side to side when in bed, and fully off load/ stand up when up to the chair, every 2 hours   -keep heels elevated at all times- one pillow under each leg from knee to heels, assure heels floating      WOC Nurse will return: no need to continue to follow        "

## 2019-08-12 NOTE — PROGRESS NOTES
[facility]  Hospitalist Progress Note    Assessment & Plan   Theresa Alves is a 89 year old female with persistent atrial fibrillation, CKD-III, and HTN who presented with progressive HORTON, LE edema, and intm chest pain over last several weeks.     1. Acute on chronic HFpEF historically presumed tachycardia mediated.  2. HTN, DLD.  BNP 22.6K and elevated trop on adm (see below). Started on home O2 in July. CT with small right and trace left pleural effusions. No identified dietary indiscretions, but lives in long-term. Tachycardia should not be issue since AVN in Jan. New severe TR and MR on TTE as below. Possible ischemic contribution (see below). Statin discontinued in 7/2019 d/t no hx ASCVD or CVA.  [PTA: Lasix 40mg QAM, Lasix 20mg QHS, Toprol XL 50mg daily]  - Tele, I&Os, daily weights.  - TTE shows new severe TR and MR; mild decreased RV systolic function and elevated pressure (69mmHg + RAP), severe bi-atrial enlargement, moderate LVH, and dilated IVC.  - Continues on PTA BB.  - PO Lasix on hold in lieu of IV Lasix drip per cardiology.  - Possible angiogram later today.    1. Severe tricuspid regurg.  2. Moderate-severe mitral regurg.  - Continue diuresis. Appreciate cardiology input.    NSTEMI vs type II MI.  Trop 1.5--1.00--0.928. EKG shows paced rhythm. Evaluated by cardiology and offered angiogram which she has decided to pursue. Hx severe epistaxis on anticoagulation and has had some mild bleeding from right nostril which she attributes to scabs developed from recent O2 use. Not on O2 usually.  - Continues on heparin and lasix drips per cardiology.  - Possible angiogram later today.    CKD-III.  Baseline Cr appears 1.4.    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  - Continues on PTA BB. Anticoagulation discontinued d/t hx severe epistaxis.    Pulmonary fibrosis.  Extensive disease as evidenced by CT chest. No indication acute alveolitis. Prominent  mediastinal LN noted and thought likely reactive.  - Steroid course not indicated with improvement in breathing symptoms with diuresis and no evidence of acute disease.    False positive CXR showing nodular pulmonary nodule, RML. Incidental finding on CXR. CT did not show this or other acute pathology.    Chronic back pain.  - Holding PTA Percocet in event of sedation meds with angio.  - Give APAP 1g now and use aqua k pad.  - Stop Morphine as breathing symptoms improved; resume PTA Percocet following angiogram.    Diet: Room Service  Snacks/Supplements Adult: Other; Plus2 shake; Between Meals  NPO per Anesthesia Guidelines for Procedure/Surgery Except for: Meds, Ice Chips  Wilde Catheter: in place, indication: Strict 1-2 Hour I&O    DVT Prophylaxis: Pneumatic Compression Devices  Code Status: DNR/DNI    This patient was discussed with Dr. Tima Zamora of the Hospitalist Service who agrees with current plans as outlined above.    Disposition: Expected discharge in 2-3 days pending angiogram findings and response to diuresis.  JoAnna K. Barthell, LENO    Interval History   No cp overnight and LE edema improving. Breathing feels much better with IV diuresis. Back on room air. Some mild bleeding from right nostril since started on heparin and with having scabs related to recent O2 at home.    Down 2.2L and 5lbs.    -Data reviewed today: I reviewed all new labs and imaging results over the last 24 hours. I personally reviewed no images or EKG's today.    Physical Exam   Temp: 97.5  F (36.4  C) Temp src: Oral BP: 114/61   Heart Rate: 60 Resp: 18 SpO2: 96 % O2 Device: None (Room air) Oxygen Delivery: 1/2 LPM  Vitals:    08/10/19 2247 08/11/19 0554 08/12/19 0600   Weight: 49.4 kg (109 lb) 49.5 kg (109 lb 3.2 oz) 47.4 kg (104 lb 9.6 oz)   Constitutional: Frail, elderly female appears stated age, no acute distress.  Respiratory: Breath sounds CTA with diffuse crackles. No increased work of breathing on room  air.  Cardiovascular: RRR, no rub or murmur. 1+ pedal edema.  GI: Soft, thin, non-tender, non-distended.  Skin: Warm, dry, no rashes or lesions.    Medications     furosemide (LASIX) infusion ADULT STANDARD 5 mg/hr (08/11/19 2142)     HEParin 600 Units/hr (08/11/19 2142)     - MEDICATION INSTRUCTIONS -         escitalopram  5 mg Oral Daily     metoprolol succinate ER  50 mg Oral Daily     multivitamin w/minerals  1 tablet Oral Daily     perflutren diluted 1mL to 2mL with saline  9 mL Intravenous Once     sodium chloride (PF)  10 mL Intravenous Once     sodium chloride (PF)  3 mL Intracatheter Q8H     vitamin D3  1,000 Units Oral Daily       Data   Recent Labs   Lab 08/12/19  0547 08/11/19  0832 08/11/19  0432 08/10/19  2250   WBC 7.4  --  7.8 8.1   HGB 12.0  --  11.8 12.9     --  102* 103*     --  190 201     --  138 139   POTASSIUM 4.2  --  4.7 5.1   CHLORIDE 102  --  104 105   CO2 28  --  30 27   BUN 42*  --  41* 42*   CR 1.37*  --  1.37* 1.48*   ANIONGAP 7  --  4 7   PAWEL 8.3*  --  8.5 8.7   GLC 85  --  106* 117*   ALBUMIN  --   --   --  3.4   PROTTOTAL  --   --   --  7.5   BILITOTAL  --   --   --  1.6*   ALKPHOS  --   --   --  71   ALT  --   --   --  19   AST  --   --   --  33   TROPI  --  0.928* 1.007* 1.568*       Imaging:  No results found for this or any previous visit (from the past 24 hour(s)).

## 2019-08-12 NOTE — PROVIDER NOTIFICATION
MD Notification    Notified Person: MD    Notified Person Name: Joanna Barthell PA (6:30), Dr. Barraza (7:00)     Notification Date/Time: 8/12 @ 7 pm     Notification Interaction: Text paged and re-paged      Purpose of Notification: Patient urine appearing red, heparin gtt continues today and baby ASA started today     Orders Received: continue to monitor urine closely, if worsens can check hgb overnight     Comments: Continue heparin gtt

## 2019-08-13 NOTE — PROVIDER NOTIFICATION
MD Notification    Notified Person: MD    Notified Person Name: On-call Hospitalist    Notification Date/Time: 8/12/19 @ 2027    Notification Interaction: Answering service     Purpose of Notification: Patient concerned w/ BP 90/49    Orders Received: Hold Lasix gtt for 4hrs and reassess BP. Continue Lasix gtt if BP is improved.     Comments:

## 2019-08-13 NOTE — PROGRESS NOTES
Monitor bloody urine tonight, continue heparin drip, Hgb check in AM.    Addendum: BP 90/49, patient has made 2.7L UOP (charted + what's present in [place]), will hold Lasix drip for 4 hours. Nurse will restart if BP improves, otherwise continue to hold Lasix drip until morning and readdress.

## 2019-08-13 NOTE — PLAN OF CARE
A&Ox4. RA. Tele is V paced/Aflutter. Denies pain. Lasix gtt stopped for 4 hrs for hypotension per MD, restarted at 0100. Wilde in place, urine remains orange/red & clear. Able to weight-shift in bed independently. NPO overnight. Possible angio 8/13? Will continue POC.

## 2019-08-13 NOTE — PROGRESS NOTES
[facility]  Cardiology Progress Note         Assessment and Plan:     CHF exacerbation (H)  Severe TR (progressed since 2018-likely due to pulm Htn and pacer wire and atrial enlargement stretching annulus and leading to TR  Mod/severe mitral regurgitation progressed since 2018 likely due to stretched annulus  Mild/mod AI  pulm fibrosis   Elevated troponin- ?acs vs demand from chf  RV  sys dysfxn due to pulm htn and tr  HTN  Chronic afib-pt refuses anticoag  AVN-ablation/pacer  CKD  New mild hematuria on Wilde, minimal nose bleed     Wt no real change over noc but down from admit, no sob in bed, slight rise in creat over noc  Will stop heparin and keep ASA  Will stop lasix drip and change to demadex 20pobid  (better gi absorption than lasix and with TR likely reduced gi abs)  Family wants cath but with  bleed and mild nose bleed, not clear even if we found lesion for pci that it would be in her best interest in case we would have to stop anti plt drug early-family aware of my concern    May do cath as outpt after the chf clears, creat stabilizes and no more bleeding,. 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 and valve dis (they are aware even if we fix an artery there will still be chf from valvular heart dz)-dr perez is the primary cardiologist that suggested to family to do cath    Will continue to treat the valve/chf medically  Start rehab  30 min visit, all discussed with 2 of the daughters today             Interval History:    no sob at bed, no cp--pt was too tired to go to rehab but I would like to see how she is doing with out of bed to see if she can go home soon              Medications:   Scheduled Meds:     acetaminophen  1,000 mg Oral Once     aspirin  81 mg Oral Daily     escitalopram  5 mg Oral Daily     lisinopril  5 mg Oral Daily     metoprolol succinate ER  50 mg Oral Daily     multivitamin w/minerals  1  "tablet Oral Daily     perflutren diluted 1mL to 2mL with saline  9 mL Intravenous Once     sodium chloride (PF)  10 mL Intravenous Once     sodium chloride (PF)  3 mL Intracatheter Q8H     torsemide  20 mg Oral BID     vitamin D3  1,000 Units Oral Daily     PRN Meds: acetaminophen, acetaminophen, bisacodyl, lidocaine 4%, lidocaine (buffered or not buffered), - MEDICATION INSTRUCTIONS -, morphine, naloxone, nitroGLYcerin, ondansetron **OR** ondansetron, oxyCODONE, polyethylene glycol, prochlorperazine **OR** prochlorperazine **OR** prochlorperazine, senna-docusate **OR** senna-docusate, sodium chloride (PF)         Physical Exam:   Blood pressure 104/52, pulse 60, temperature 98.4  F (36.9  C), temperature source Oral, resp. rate 16, height 1.676 m (5' 6\"), weight 47.2 kg (104 lb 2 oz), SpO2 92 %, not currently breastfeeding.  Vitals:    08/10/19 2247 19 0554 19 0600 19 0555   Weight: 49.4 kg (109 lb) 49.5 kg (109 lb 3.2 oz) 47.4 kg (104 lb 9.6 oz) 47.2 kg (104 lb 2 oz)       Intake/Output Summary (Last 24 hours) at 2019 1057  Last data filed at 2019 0625  Gross per 24 hour   Intake 120 ml   Output 2050 ml   Net -1930 ml           Vital Sign Ranges  Temperature Temp  Av.9  F (36.6  C)  Min: 97.5  F (36.4  C)  Max: 98.1  F (36.7  C)   Blood pressure Systolic (24hrs), Av , Min:98 , Max:118        Diastolic (24hrs), Av, Min:56, Max:64      Pulse No data recorded   Respirations Resp  Av  Min: 18  Max: 18   Pulse oximetry SpO2  Av.8 %  Min: 94 %  Max: 99 %             Constitutional: Awake, alert, cooperative, no apparent distress       Skin: No rash, petechia, cyanosis       Neck: No thyromegaly or adenopathy       Lungs: Fibrotic coarse crackles       Cardiovasc: RRR (paced) gr 2 syst murmur       Abdomen: Normal bowel sounds, soft, non-distended, non-tender, no hepatomegaly       Neuro: Alert and oriented x3, non focal exam       Extremities: No edema-       Other:  "            Data:     Recent Labs   Lab Test 08/13/19  0919 08/12/19  0547 08/11/19  0432   WBC  --  7.4 7.8   HGB 12.5 12.0 11.8   MCV  --  100 102*   PLT  --  194 190      Recent Labs   Lab Test 08/13/19  0543 08/12/19  0547    137   POTASSIUM 4.1 4.2   CHLORIDE 100 102   BUN 47* 42*   CR 1.43* 1.37*     Recent Labs   Lab 08/13/19  0543 08/12/19  0547 08/11/19  0432 08/10/19  2250   GLC 88 85 106* 117*     Recent Labs   Lab Test 08/10/19  2250 03/18/18  0540   ALT 19 27   AST 33 24     No components found for: TROPONINIES    Lab Results   Component Value Date    CHOL 142 11/28/2017     Lab Results   Component Value Date    HDL 69 11/28/2017     Lab Results   Component Value Date    LDL 52 11/28/2017     Lab Results   Component Value Date    TRIG 107 11/28/2017     Lab Results   Component Value Date    CHOLHDLRATIO 2.7 01/10/2014          TSH   Date Value Ref Range Status   03/16/2018 4.13 (H) 0.40 - 4.00 mU/L Final

## 2019-08-13 NOTE — PROGRESS NOTES
[facility]  Hospitalist Progress Note    Assessment & Plan   Theresa Alves is a 89 year old female with persistent atrial fibrillation, CKD-III, and HTN who presented with progressive HORTON, LE edema, and intm chest pain over last several weeks. Admitted 8/11/2019.    1. Acute on chronic HFpEF historically presumed tachycardia mediated.  2. Severe tricuspid regurgitation and mod-severe mitral regurgitation. - new this adm.  2. HTN, DLD.  BNP 22.6K and elevated trop on adm (see below). Started on home O2 in July. CT with small right and trace left pleural effusions. No identified dietary indiscretions, but lives in [place]. Tachycardia should not be issue since AVN in Jan. New severe TR and MR on TTE as below. Possible ischemic contribution (see below). Statin discontinued in 7/2019 d/t no hx ASCVD or CVA.  [PTA: Lasix 40mg QAM, Lasix 20mg QHS, Toprol XL 50mg daily]  - Tele, I&Os, daily weights.  - TTE shows new severe TR and MR; mild decreased RV systolic function and elevated pressure (69mmHg + RAP), severe bi-atrial enlargement, moderate LVH, and dilated IVC.  - Continues on PTA BB.  - PO Lasix on hold in lieu of IV Lasix drip per cardiology.  - Discussed hematuria with cardiology; if patient still electing to proceed with angio could be as soon as tmrw vs outpt setting when hematuria clears.  - Clinically improving. PT/OT/SW appreciated.    Hematuria. - new 8/12.  Suspect related to traumatic cath placed on adm 8/11 for strict I&Os. Heparin started 8/11 and ASA 81mg started 8/12. Cherry colored urine 8/12.  - Continues on heparin and ASA.  - Continues with Wilde.  - Obtain UA and renal u/s.  - Check HGB.    NSTEMI vs type II MI.  Trop 1.5--1.00--0.928. EKG shows paced rhythm. Evaluated by cardiology and offered angiogram which she has decided to pursue. Hx severe epistaxis on anticoagulation and has had some mild bleeding from right nostril which she attributes to scabs developed from recent O2  use. Not on O2 usually.  - Continues on heparin and lasix drips per cardiology.  - Possible angiogram this adm.    CKD-III.  Baseline Cr appears 1.4.    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  - Continues on PTA BB. Not on anticoagulation PTA d/t hx severe epistaxis.    Pulmonary fibrosis.  Extensive disease as evidenced by CT chest. No indication acute alveolitis. Prominent mediastinal LN noted and thought likely reactive.  - Steroid course not indicated with improvement in breathing symptoms with diuresis and no evidence of acute disease.    False positive CXR showing nodular pulmonary nodule, RML. Incidental finding on CXR. CT did not show this or other acute pathology.    Chronic back pain.  - Holding PTA Percocet in event of sedation meds with angio.  - Give APAP 1g now and use aqua k pad.  - Stop Morphine as breathing symptoms improved; resume PTA Percocet following angiogram.    Diet: Room Service  Snacks/Supplements Adult: Other; Plus2 shake; Between Meals  Combination Diet 2 gm NA Diet  Wilde Catheter: in place, indication: Strict 1-2 Hour I&O    DVT Prophylaxis: Pneumatic Compression Devices  Code Status: DNR/DNI     Discussed with patient and 3 nieces present at bedside.    This patient was discussed with Dr. Tima Zamora of the Hospitalist Service who agrees with current plans as outlined above.    Disposition: Expected discharge in 1-2 days back to the [facility] pending clearing of hematuria and whether patient/cardiology pursue angiogram.    JoAnna K. Barthell, PA-C    Interval History    Denies cp and difficulty breathing. Lorenzo bates colored urine output developed in evening. On heparin drip since adm and ASA 81mg initiated yesterday. No hx stones or  cancer. Remains on room air.    Down 3L and 5lbs since adm.    -Data reviewed today: I reviewed all new labs and imaging results over the last 24 hours. I personally reviewed no images or EKG's today.    Physical  Exam   Temp: 98.4  F (36.9  C) Temp src: Oral BP: 104/52   Heart Rate: 61 Resp: 16 SpO2: 92 % O2 Device: None (Room air)    Vitals:    08/11/19 0554 08/12/19 0600 08/13/19 0555   Weight: 49.5 kg (109 lb 3.2 oz) 47.4 kg (104 lb 9.6 oz) 47.2 kg (104 lb 2 oz)   Constitutional: Frail, elderly female appears stated age, no acute distress.  Respiratory: Breath sounds CTA. No increased work of breathing on room air.  Cardiovascular: RRR, no rub or murmur. Trace pedal edema.  GI: Soft, thin, non-tender, non-distended.  : Wilde in place with clear cherry colored urine.  Skin: Warm, dry, no rashes or lesions.    Medications     furosemide (LASIX) infusion ADULT STANDARD 5 mg/hr (08/13/19 0914)     HEParin 600 Units/hr (08/13/19 0914)     - MEDICATION INSTRUCTIONS -         acetaminophen  1,000 mg Oral Once     aspirin  81 mg Oral Daily     escitalopram  5 mg Oral Daily     lisinopril  5 mg Oral Daily     metoprolol succinate ER  50 mg Oral Daily     multivitamin w/minerals  1 tablet Oral Daily     perflutren diluted 1mL to 2mL with saline  9 mL Intravenous Once     sodium chloride (PF)  10 mL Intravenous Once     sodium chloride (PF)  3 mL Intracatheter Q8H     vitamin D3  1,000 Units Oral Daily       Data   Recent Labs   Lab 08/13/19  0543 08/12/19  0547 08/11/19  0832 08/11/19  0432 08/10/19  2250   WBC  --  7.4  --  7.8 8.1   HGB  --  12.0  --  11.8 12.9   MCV  --  100  --  102* 103*   PLT  --  194  --  190 201    137  --  138 139   POTASSIUM 4.1 4.2  --  4.7 5.1   CHLORIDE 100 102  --  104 105   CO2 28 28  --  30 27   BUN 47* 42*  --  41* 42*   CR 1.43* 1.37*  --  1.37* 1.48*   ANIONGAP 9 7  --  4 7   PAWEL 8.5 8.3*  --  8.5 8.7   GLC 88 85  --  106* 117*   ALBUMIN  --   --   --   --  3.4   PROTTOTAL  --   --   --   --  7.5   BILITOTAL  --   --   --   --  1.6*   ALKPHOS  --   --   --   --  71   ALT  --   --   --   --  19   AST  --   --   --   --  33   TROPI  --   --  0.928* 1.007* 1.568*       Imaging:  No results  found for this or any previous visit (from the past 24 hour(s)).

## 2019-08-13 NOTE — PLAN OF CARE
PT/CR: Attempted session, pt declining participation at this time, stating she is too fatigued to participate, agreeable to attempt this afternoon.    PM Session: Attempted session this afternoon, pt stating she just got back to bed and declining participation. Will reschedule PT at request of patient.

## 2019-08-13 NOTE — PLAN OF CARE
Heart Failure Care Pathway  GOALS TO BE MET BEFORE DISCHARGE:    1. Decrease congestion and/or edema with diuretic therapy to achieve near      optimal volume status.            Dyspnea improved:  Yes            Edema improved:    Yes        Net I/O and Weights since admission:          07/14 2300 - 08/13 2259  In: 1080 [P.O.:870; I.V.:210]  Out: 4655 [Urine:4655]  Net: -3575            Vitals:    08/10/19 2247 08/11/19 0554 08/12/19 0600 08/13/19 0555   Weight: 49.4 kg (109 lb) 49.5 kg (109 lb 3.2 oz) 47.4 kg (104 lb 9.6 oz) 47.2 kg (104 lb 2 oz)       2.  O2 sats > 92% on RA or at prior home O2 therapy level.          Current oxygenation status:       SpO2: 96 %         O2 Device: None (Room air),            Able to wean O2 this shift to keep sats > 92%:  Yes, pt not requiring O2        Does patient use Home O2? No    3.  Tolerates ambulation and mobility near baseline: No, please explain: pt feels weak.        How many times did the patient ambulate with nursing staff this shift? 1 - in pt room. PT refused PT/OT     Please review the Heart Failure Care Pathway for additional HF goal outcomes.    Mandy Russell RN  8/13/2019       A&O x4. VSS on room air. Tele A fib CVR. C/o of back pain, PRN oxycodone and heat packs given. Denies CP/SOB. Up with mod assist w/ gait belt and walker. Blanchable redness to coccyx, encouraged repositioning. Pt refused both OT/PT visits today. Wilde catheter patent and draining pink/red urine, Hgb remains stable. Renal ultrasound and UA completed today. Discontinued heparin and lasix drip, transitioned to oral demadex. Continue to diuresis, possible angiogram in the future.

## 2019-08-13 NOTE — PLAN OF CARE
A&O. VSS on room air. Tele: controlled A flutter. C/o back pain, PRN oxycodone given and warm packs applied w/ improvement. Up w/ 1 and walker. WOC nurse signed off on patient, red blanchable coccyx, turn and repo q 2 hours.  Patient ad family has long discussion about angiogram with cardiology today. Patient considering angiogram for one more day, possible angiogram tomorrow. NPO at midnight. Lasix gtt 5 mg/hr and heparin gtt @ 6 ml/hour. Started on ASA and lisinopril today. Monitor closely for signs of bleeding.

## 2019-08-14 NOTE — PLAN OF CARE
PT/CR: Attempted PT session. Pt currently declined participation due to feeling nauseated. Emesis bag provided and RN informed. Unable to see pt currently.

## 2019-08-14 NOTE — PLAN OF CARE
VSS. A bit hypotensive. Asymptomatic. Pt c/o of back pain. Tylenol was given and heat pad applied. Afib. Assist of 1. RA. Continue to monitor.

## 2019-08-14 NOTE — PROGRESS NOTES
"[facility]  Hospitalist Progress Note    Assessment & Plan   Theresa Alves is a 89 year old female with persistent atrial fibrillation, CKD-III, and HTN who presented with progressive HORTON, LE edema, and intm chest pain over last several weeks. Admitted 8/11/2019.    Loose stools and generalized weakness. - new 8/14.  Plan was for discharge 8/14, however developed 4-5 non-bloody loose stool small-moderate volume, one episode emesis. No abd pain. BPs soft and slightly worse in upper 80s following above.  - Monitor for now. Patient attributes to a \"rich\" strawberry Boost. Possibly related to new abx for UTI. If fever or severe abd pain, will consider checking cdiff.  - Hold discharge today and will plan for TCU discharge if improving tmrw. Has been refusing therapies throughout adm and is currently heavy assist of 1.  - PRN anti-emetics available.    Acute cystitis - possibly CAUTI.  UA showing positive nitrites, large blood, large leuks, and . UA obtained d/t hematuria, but patient endorsing burning dysuria.  - Remove beauchamp (placed for strict I&Os).  - Urine culture growing E.coli, sensitivities pending.  - Renal dosed cefuroxime 250mg x 5 days, initiated 8/14.    Hematuria. - improving.   New 8/12.   Suspect related to traumatic cath placed on adm 8/11 for strict I&Os. Heparin started 8/11 and ASA 81mg started 8/12. Cherry colored urine 8/12. UA with large blood, , and 100 protein. Hgb stable in 12 range. 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  - Continues on ASA, initiated this adm.  - Remove beauchamp as above.  - Consider follow-up with urology in OP setting.    1. Acute on chronic HFpEF historically presumed tachycardia mediated. - Improved.  2. Severe tricuspid regurgitation and mod-severe mitral regurgitation. - new this adm.  2. HTN, DLD.  BNP 22.6K and elevated trop on adm (see below). Started on home O2 in July. CT with small right and trace left pleural " effusions. No identified dietary indiscretions, but [facility]. Tachycardia should not be issue since AVN in Jan. New severe TR and MR on TTE as below. Possible ischemic contribution (see below). Statin discontinued in 7/2019 d/t no hx ASCVD or CVA.  [PTA: Lasix 40mg QAM, Lasix 20mg QHS, Toprol XL 50mg daily]  - Tele, I&Os, daily weights.  - TTE shows new severe TR and MR; mild decreased RV systolic function and elevated pressure (69mmHg + RAP), severe bi-atrial enlargement, moderate LVH, and dilated IVC.  - Continues on PTA BB.  - PTA Lasix held and treated with IV Lasix drip 8/11-8/13. Transitioned to PO Demadex (for better GI absorption) prior to discharge.  - Follow up with [facility].  - Clinically improved - no difficulty breathing and no chest pains during adm.    NSTEMI vs type II MI.  Trop 1.5--1.00--0.928. EKG shows paced rhythm. Evaluated by cardiology and offered angiogram which she has decided to pursue. Hx severe epistaxis on anticoagulation and has had some mild bleeding from right nostril which she attributes to scabs developed from recent O2 use. Not on O2 usually. Treated with heparin drip until hematuria developed and decision was made not to pursue coronary angiogram until further out from CHF exacerbation and hematuria resolution.  - Suspect type II MI as no symptoms following improvement in breathing with diuresis.  - Consider cor angio in follow up with primary cardiologist as above. Note, family seems to understand discussion of limitations if proceeds with angio/potential intervention and fact that patient will still have severe valve disease and underlying pulmonary disease; but seems less clear whether patient understands and she makes her medical decisions.    CKD-III. Baseline Cr appears 1.4.    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 Continues on PTA BB. Not on anticoagulation PTA d/t hx severe epistaxis.    Pulmonary fibrosis.  Extensive disease as  evidenced by CT chest. No indication acute alveolitis. Prominent mediastinal LN noted and thought likely reactive.  - Steroid course not indicated with improvement in breathing symptoms with diuresis and no evidence of acute disease.    False positive CXR showing nodular pulmonary nodule, RML. Incidental finding on CXR. CT did not show this or other acute pathology.    Chronic back pain. Continues on PTA Percocet.    Severe malnutrition. With fat and muscle loss as per dietitian documentation.  - Boost supplements.    Diet: Room Service  Snacks/Supplements Adult: Other; Plus2 shake; Between Meals  Combination Diet 2 gm NA Diet  Wilde Catheter: not present    DVT Prophylaxis: Pneumatic Compression Devices  Code Status: DNR/DNI    Discussed with patient and 2 nieces present at bedside.    This patient was discussed with Dr. Tima Zamora of the Hospitalist Service who agrees with current plans as outlined above.    Disposition: Expected discharge 8/15 likely TCU if blood pressure stable and no severe derangements or impact from n/v/d.    JoAnna K. Barthell, LENO    Interval History    Denies cp (none this adm) and difficulty breathing. Hematuria with slight pink tinge (improving from nhung cherry colored urine day prior). On room air. Refusing therapies.    Plan was to discharge, however developed reportedly 4-6 small-moderate loose stools and one episode emesis. Feels generally weak. Attributes to Boost shake. Started Ceftin earlier in day for possible UTI with burning dysuria.    Down 3.7L and 7lbs since adm.    -Data reviewed today: I reviewed all new labs and imaging results over the last 24 hours. I personally reviewed no images or EKG's today.    Physical Exam   Temp: 98  F (36.7  C) Temp src: Oral BP: (!) 89/40   Heart Rate: 61 Resp: 20 SpO2: 94 % O2 Device: None (Room air)    Vitals:    08/12/19 0600 08/13/19 0555 08/14/19 0422   Weight: 47.4 kg (104 lb 9.6 oz) 47.2 kg (104 lb 2 oz) 46.5 kg (102 lb 8 oz)    Constitutional: Frail, elderly female appears stated age, no acute distress.  Respiratory: Breath sounds CTA. No increased work of breathing on room air.  Cardiovascular: RRR, no rub or murmur. Trace pedal edema.  GI: Soft, thin, non-tender, non-distended.  Skin: Warm, dry, no rashes or lesions.    Medications     - MEDICATION INSTRUCTIONS -         acetaminophen  1,000 mg Oral Once     aspirin  81 mg Oral Daily     cefuroxime  250 mg Oral Daily     escitalopram  5 mg Oral Daily     lisinopril  5 mg Oral Daily     metoprolol succinate ER  50 mg Oral Daily     multivitamin w/minerals  1 tablet Oral Daily     perflutren diluted 1mL to 2mL with saline  9 mL Intravenous Once     sodium chloride (PF)  10 mL Intravenous Once     sodium chloride (PF)  3 mL Intracatheter Q8H     torsemide  10 mg Oral BID     vitamin D3  1,000 Units Oral Daily       Data   Recent Labs   Lab 08/14/19  0533 08/13/19  0919 08/13/19  0543 08/12/19  0547 08/11/19  0832 08/11/19  0432 08/10/19  2250   WBC  --   --   --  7.4  --  7.8 8.1   HGB  --  12.5  --  12.0  --  11.8 12.9   MCV  --   --   --  100  --  102* 103*   PLT  --   --   --  194  --  190 201     --  137 137  --  138 139   POTASSIUM 4.0  --  4.1 4.2  --  4.7 5.1   CHLORIDE 100  --  100 102  --  104 105   CO2 29  --  28 28  --  30 27   BUN 54*  --  47* 42*  --  41* 42*   CR 1.53*  --  1.43* 1.37*  --  1.37* 1.48*   ANIONGAP 7  --  9 7  --  4 7   PAWEL 8.4*  --  8.5 8.3*  --  8.5 8.7   GLC 88  --  88 85  --  106* 117*   ALBUMIN  --   --   --   --   --   --  3.4   PROTTOTAL  --   --   --   --   --   --  7.5   BILITOTAL  --   --   --   --   --   --  1.6*   ALKPHOS  --   --   --   --   --   --  71   ALT  --   --   --   --   --   --  19   AST  --   --   --   --   --   --  33   TROPI  --   --   --   --  0.928* 1.007* 1.568*       Imaging:  No results found for this or any previous visit (from the past 24 hour(s)).

## 2019-08-14 NOTE — PROVIDER NOTIFICATION
MD Notification    Notified Person: MD    Notified Person Name: Brandi    Notification Date/Time: 08/13 2245    Notification Interaction: page    Purpose of Notification: Medication parameter    Orders Received: Administer med    Comments: pt has scheduled demadex with a BP of 91/44. Ordered to administer if asymptomatic

## 2019-08-14 NOTE — PROGRESS NOTES
"SPIRITUAL HEALTH SERVICES [place]  [facility] [place]up per pt request. Pt was accompanied by her 3 nieces at the time of visit.     Pt said she's receiving a lot of support from family. Pt expressed disappointment in her \"no salt diet,\" but said she's \"looking for silver linings.\" Family expressed thankfulness for time with their aunt.    SH provided spiritual/emotional support and affirmed pt's use of family for a support system. Pt asked that  follow up with her [facility] to request a pastoral visit.  called [facility] and requested a visit.     remains available upon request.    Marsha Ngo   Intern  Pager:  [phone]  "

## 2019-08-14 NOTE — PROGRESS NOTES
08/14/19 1454   Quick Adds   Type of Visit Initial PT Evaluation   Living Environment   Lives With other (see comments)  ([facility])   Living Arrangements assisted living   Home Accessibility no concerns   Transportation Anticipated family or friend will provide   Living Environment Comment elevator access   Self-Care   Usual Activity Tolerance fair   Current Activity Tolerance poor   Regular Exercise No   Equipment Currently Used at Home walker, rolling   Activity/Exercise/Self-Care Comment walks 90steps to the elevator 1x/day for her dinner to the dining room. after 90 steps required 5 min sitting break due to SOB    Functional Level Prior   Ambulation 1-->assistive equipment   Transferring 1-->assistive equipment   Toileting 1-->assistive equipment  (toilet seat riser)   Bathing 3-->assistive equipment and person   Communication 0-->understands/communicates without difficulty   Swallowing 0-->swallows foods/liquids without difficulty   Cognition 0 - no cognition issues reported   Fall history within last six months yes   Number of times patient has fallen within last six months 1   Which of the above functional risks had a recent onset or change? ambulation;transferring   Prior Functional Level Comment Pt uses 2WW w/tray in apt and 4WW outside of apt. Pt makes her own simple breakfast but gets dinner from facility. Pt has A w/ laundry and cleaning. Pt sets up meds indly. Pt gets A for bathing and completes dressing indly.   General Information   Onset of Illness/Injury or Date of Surgery - Date 08/10/19   Referring Physician Pt uses 2WW w/tray in apt and 4WW outside of apt. Pt makes her own simple breakfast but gets dinner from facility. Pt has A w/ laundry and cleaning. Pt sets up meds indly. Pt gets A for bathing and completes dressing indly.   Patient/Family Goals Statement wants to go home   Pertinent History of Current Problem (include personal factors and/or comorbidities that impact the POC) 89y/o F  admitted with acute on chronic HFpEF, severe tricuspid  regurgitation and mod-severe mitral regurgitation, hematuria.   Precautions/Limitations fall precautions   Cognitive Status Examination   Orientation orientation to person, place and time   Level of Consciousness alert   Follows Commands and Answers Questions 100% of the time   Pain Assessment   Patient Currently in Pain No   Range of Motion (ROM)   ROM Comment WFL   Strength   Strength Comments bilateral hip flex: 4/5, abduction: 4/5, otherwise: 5/5 bilateral LE. UE: able to move against gravity strength    Bed Mobility   Bed Mobility Comments supine to sit SBA with elevated HOB and railing    Transfer Skills   Transfer Comments sit to stand with CGA with FWW   Gait   Gait Comments amb 10ft with FWW and CGA for balance   Balance   Balance Comments requires bilateral UE support for safety with standing balance   Sensory Examination   Sensory Perception Comments reports numbness bilateral plantar aspect of feet-chronic from back pain   General Therapy Interventions   Planned Therapy Interventions balance training;bed mobility training;gait training;transfer training;strengthening;risk factor education;home program guidelines;progressive activity/exercise   Clinical Impression   Criteria for Skilled Therapeutic Intervention yes, treatment indicated   PT Diagnosis impaired gait   Influenced by the following impairments impaired strength, impaired activity tolerance, generalized weakness, impaired balance   Functional limitations due to impairments impaired IND with functional mobility    Clinical Presentation Stable/Uncomplicated   Clinical Presentation Rationale based on clinical judgement   Clinical Decision Making (Complexity) Low complexity   Therapy Frequency Daily   Predicted Duration of Therapy Intervention (days/wks) 4 days   Anticipated Discharge Disposition Transitional Care Facility   Risk & Benefits of therapy have been explained Yes   Patient, Family  "& other staff in agreement with plan of care Yes   [facility] AM-PAC TM \"6 Clicks\"   2016, Trustees of [facility], under license to [employer]  All rights reserved.   6 Clicks Short Forms Basic Mobility Inpatient Short Form   [facility] AM-PAC  \"6 Clicks\" V.2 Basic Mobility Inpatient Short Form   1. Turning from your back to your side while in a flat bed without using bedrails? 3 - A Little   2. Moving from lying on your back to sitting on the side of a flat bed without using bedrails? 3 - A Little   3. Moving to and from a bed to a chair (including a wheelchair)? 3 - A Little   4. Standing up from a chair using your arms (e.g., wheelchair, or bedside chair)? 3 - A Little   5. To walk in hospital room? 3 - A Little   6. Climbing 3-5 steps with a railing? 3 - A Little   Basic Mobility Raw Score (Score out of 24.Lower scores equate to lower levels of function) 18   Total Evaluation Time   Total Evaluation Time (Minutes) 13     "

## 2019-08-14 NOTE — PROGRESS NOTES
MD Notification    Notified Person: MD    Notified Person Name: on-call    Notification Date/Time: 8/14 1845    Notification Interaction: page    Purpose of Notification: only voided once since Wilde removed at 1000, bladder scanned for only 39ml, diuretic held due to low BP, please advise    Orders Received: monitor over next 3 hrs, if no void call MD back    Comments:

## 2019-08-14 NOTE — PLAN OF CARE
"Pt AO (forgetful), VSS on RA ex soft BPs. Oxy x 1 for low back pain, also given hot packs. Tele SR (previously A fib CVR). Wilde removed at 1000 today. Due to void - has only dribbles so far with some dysuria. Loose stool x 3 this am. PA aware. 2g NA diet. Up Heavy A1 with GB and walker. Feeling \"very weak\" today. And had upset stomach but refused Zofran. Plan to discharge tomorrow to TCU and will have angio oupt. Continue to monitor.     Addendum: Wilde out at 1000, only small void since. Bladder scanned for 39 ml this elizabeth. Feeling better this evening. No other complaints.   "

## 2019-08-14 NOTE — PLAN OF CARE
Discharge Planner PT   Patient plan for discharge: ideally wants to return home, however agreeable to TCU  Current status: PT eval and treatment initiated. 89y/o F admitted with acute on chronic HFpEF, severe tricuspid  regurgitation and mod-severe mitral regurgitation, hematuria. PLOF: mod IND with household mobility with her FWW, uses her 4WW to the elevator (90 steps) then rests 5 min prior to using the elevator to the dining room (1x/day). Pt reports only gets assist with bathing.     Pt reports feeling much better this afternoon. Supine<> sit with SBA with railing and elevated HOB. Sit to stand with CGA and FWW. Tolerated amb 30ft with FWW, CGA for safety. Low BP following ambulation: 81/43mmHg, HR=63bpm, fatigued however declines lightheadedness-RN informed. Tolerated sitting up in her recliner x 4 min, then requesting to return to bed due to fatigue. Pt currently demos impaired balance, impaired strength, impaired activity tolerance limiting her tolerance with IND mobility.   Barriers to return to prior living situation: level of assist, decreased activity tolerance, impaired balance. Typically does not get physical assist with mobility at her [facility]  Recommendations for discharge: TCU  Rationale for recommendations: Pt will benefit from continued PT to maximize her level of IND mobility and return to baseline to decrease her risk of falls and level of burden of care.        Entered by: Lana Davila 08/14/2019 3:44 PM

## 2019-08-14 NOTE — PROGRESS NOTES
[facility]  Cardiology Progress Note         Assessment and Plan:     CHF exacerbation (H)  Severe TR (progressed since 2018-likely due to pulm Htn and pacer wire and atrial enlargement stretching annulus and leading to TR  Mod/severe mitral regurgitation progressed since 2018 likely due to stretched annulus  Mild/mod AI  pulm fibrosis   Elevated troponin- ?acs vs demand from chf  RV  sys dysfxn due to pulm htn and tr  HTN  Chronic afib-pt refuses anticoag  AVN-ablation/pacer  CKD  Hematuria, nose bleed-both stopped after beauchamp removed and off heparin  (renal trell no stones etc)    Weight down 7# since admit  keep ASA, no heparin--again I have concern about need/benefit of cath narendra since only looking to see if stent is reqd and stent would require DAPT and she had minor bleeding on heparin/asa  Due to elevated creat I would not do cath this admit (dr perez and family wanted cath)-prefer she go home anytime and follow-up in cardio clinic and reassess chf and creat before moving on to heart cath    Due to rising creat (was on iv continuous lasix then one day of po demadex 20bid) I will dec demadex to 10bid  (better gi absorption than lasix and with TR likely reduced gi abs)      May do cath as outpt after the chf clears, creat stabilizes and no more bleeding,. This will also give family more time to really decide if they still want cath in a 89yo with recalcitrant chf from CMP and valve dis (they are aware even if we fix an artery there will still be chf from valvular heart dz)-dr perez is the primary cardiologist that suggested to family to do cath    Will continue to treat the valve/chf medically  Start rehab-pt says she is tired just moving around in room, suspect may need rehab center after discharge  20 min visit and 1 daughter her today    HOME PER IM AND WE WILL SEE IN OUTPT CLINIC             Interval History:   NO new issue, says she's tired but no sob or cp and beauchamp out with little to no blood  "             Medications:   Scheduled Meds:     acetaminophen  1,000 mg Oral Once     aspirin  81 mg Oral Daily     cefuroxime  250 mg Oral Daily     escitalopram  5 mg Oral Daily     lisinopril  5 mg Oral Daily     metoprolol succinate ER  50 mg Oral Daily     multivitamin w/minerals  1 tablet Oral Daily     perflutren diluted 1mL to 2mL with saline  9 mL Intravenous Once     sodium chloride (PF)  10 mL Intravenous Once     sodium chloride (PF)  3 mL Intracatheter Q8H     torsemide  10 mg Oral BID     vitamin D3  1,000 Units Oral Daily     PRN Meds: acetaminophen, acetaminophen, bisacodyl, lidocaine 4%, lidocaine (buffered or not buffered), - MEDICATION INSTRUCTIONS -, morphine, naloxone, nitroGLYcerin, ondansetron **OR** ondansetron, oxyCODONE, polyethylene glycol, prochlorperazine **OR** prochlorperazine **OR** prochlorperazine, senna-docusate **OR** senna-docusate, sodium chloride (PF)         Physical Exam:   Blood pressure 105/56, pulse 60, temperature 98  F (36.7  C), temperature source Oral, resp. rate 16, height 1.676 m (5' 6\"), weight 46.5 kg (102 lb 8 oz), SpO2 96 %, not currently breastfeeding.  Vitals:    08/10/19 2247 19 0554 19 0600 19 0555   Weight: 49.4 kg (109 lb) 49.5 kg (109 lb 3.2 oz) 47.4 kg (104 lb 9.6 oz) 47.2 kg (104 lb 2 oz)    19 0422   Weight: 46.5 kg (102 lb 8 oz)       Intake/Output Summary (Last 24 hours) at 2019 1057  Last data filed at 2019 0625  Gross per 24 hour   Intake 120 ml   Output 2050 ml   Net -1930 ml           Vital Sign Ranges  Temperature Temp  Av.9  F (36.6  C)  Min: 97.5  F (36.4  C)  Max: 98.1  F (36.7  C)   Blood pressure Systolic (24hrs), Av , Min:98 , Max:118        Diastolic (24hrs), Av, Min:56, Max:64      Pulse No data recorded   Respirations Resp  Av  Min: 18  Max: 18   Pulse oximetry SpO2  Av.8 %  Min: 94 %  Max: 99 %             Constitutional: Awake, alert, cooperative, no apparent distress     "   Skin: No rash, petechia, cyanosis       Neck: No thyromegaly or adenopathy       Lungs: Fibrotic coarse crackles-may be better, I suspect most of this is pulm fibrosis rather than just chf       Cardiovasc: RRR (paced) gr 2 syst murmur       Abdomen: Normal bowel sounds, soft, non-distended, non-tender, no hepatomegaly       Neuro: Alert and oriented x3, non focal exam       Extremities: No edema-       Other:             Data:     Recent Labs   Lab Test 08/13/19  0919 08/12/19  0547 08/11/19  0432   WBC  --  7.4 7.8   HGB 12.5 12.0 11.8   MCV  --  100 102*   PLT  --  194 190      Recent Labs   Lab Test 08/14/19  0533 08/13/19  0543    137   POTASSIUM 4.0 4.1   CHLORIDE 100 100   BUN 54* 47*   CR 1.53* 1.43*     Recent Labs   Lab 08/14/19  0533 08/13/19  0543 08/12/19  0547 08/11/19  0432   GLC 88 88 85 106*     Recent Labs   Lab Test 08/10/19  2250 03/18/18  0540   ALT 19 27   AST 33 24     No components found for: TROPONINIES    Lab Results   Component Value Date    CHOL 142 11/28/2017     Lab Results   Component Value Date    HDL 69 11/28/2017     Lab Results   Component Value Date    LDL 52 11/28/2017     Lab Results   Component Value Date    TRIG 107 11/28/2017     Lab Results   Component Value Date    CHOLHDLRATIO 2.7 01/10/2014          TSH   Date Value Ref Range Status   03/16/2018 4.13 (H) 0.40 - 4.00 mU/L Final

## 2019-08-15 NOTE — PLAN OF CARE
Patient and family given packet prior to discharge. Family was waiting and patient ready to go. Patient stable at time of discharge. Family to transport to [facility] at 1530.

## 2019-08-15 NOTE — PLAN OF CARE
Discharge Planner OT   Patient plan for discharge: TCU  Current status: orders received, chart reviewed and noted patient requiring assist with mobility and has poor activity tolerance. As PT following during stay, OT will defer skilled intervention to next level of care when patient may be better able to tolerate out of bed ADL retraining. OT will sign off and complete order.   Barriers to return to prior living situation: current level of A  Recommendations for discharge: TCU  Rationale for recommendations: pt will benefit from OT at next level of care to advance safety and indep with ADLs prior to return home.       Entered by: Antony Grigsby 08/15/2019 12:57 PM

## 2019-08-15 NOTE — DISCHARGE SUMMARY
[facility]    Discharge Summary  Hospitalist    Date of Admission:  8/10/2019  Date of Discharge:  8/15/2019  3:48 PM  Discharging Provider: JoAnna K. Barthell, PA-C    Discharge Diagnoses   Acute on chronic diastolic HFpEF.  Severe tricuspid regurg.  Moderate-severe mitral regurg.  NSTEMI vs type II MI.  Acute E.coli cystitis with hematuria.  Hematuria.  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  CKD-III.  Pulmonary fibrosis.  GI upset with generalized weakness.  Physical deconditioning.  Severe malnutrition.  HTN.  DLD.  Chronic back pain with narcotic dependence.  False positive CXR for nodular RML pulm nodule.  History of Present Illness   Theresa Alves is a 89 year old female with persistent atrial fibrillation, CKD-III, and HTN who presented with progressive HORTON and LE edema over last several weeks found to have evidence of acute on chronic diastolic HFpEF. She improved with diuresis. Etiology for exacerbation is not entirely clear, though she was found to have new severe TR and mod-severe MR on echo. Was offered an angio after endorsing intm cp over last several weeks/months and had elected to proceed, however this was never obtained as she developed hematuria while on heparin drip and declining kidney function while diuresing. Several discussions held regarding utility of cath as PCI would dedicate patient to DAPT and in actuality has multiple factors that contribute to her baseline mild-mod breathing difficulty. Ultimately decision made to wait until further out from compromised renal function and resolving hematuria. Med changes as below.    The patient was medically ready for discharge on 8/14, however after complaining of burning urinary discomfort despite having a Wilde catheter still in place (for strict I&Os). Her UA supported infection and she was started on abx as below. After several hours she complained of weakness and had some loose stools/n/v which she attributed to a  richly flavored Boost shake. Could have been abx side effect as well or associated with transient low blood pressure. None the less, discharge delayed and symptoms resolved without recurrence the following day. Has 3 doses left of abx and blood pressure parameters entered for appropriate medications.    Please see below for more complete details of hospital stay and H&P by Dr. Paz from 8/10/2019 for complete details of adm.    Hospital Course   Theresa Alves was admitted on 8/10/2019.  The following problems were addressed during her hospitalization:    1. Acute on chronic HFpEF historically presumed tachycardia mediated. - Improved.  2. Severe tricuspid regurgitation and mod-severe mitral regurgitation. - New diagnosis this adm. Suspected progression d/t pulm HTN and pacer wire/atrial enlargement stretching annulus.  2. HTN, DLD.  BNP 22.6K and elevated trop on adm (see below). Started on home O2 in July. CT with small right and trace left pleural effusions. No identified dietary indiscretions, but lives in [facility]. Tachycardia should not be issue since AVN in Jan. New severe TR and MR on TTE as below. Possible ischemic contribution (see below). Statin discontinued in 7/2019 d/t no hx ASCVD or CVA.  [PTA: Lasix 40mg QAM, Lasix 20mg QHS, Toprol XL 50mg daily]  - TTE shows new severe TR and MR; mild decreased RV systolic function and elevated pressure (69mmHg + RAP), severe bi-atrial enlargement, moderate LVH, and dilated IVC.  - Discharging with PTA BB, new ASA, lisinopril, and PTA Lasix discontinued in favor of new Demadex (for better GI absorption) prior to discharge.  - Diuresed with IV Lasix drip 8/11-8/13 with approx 3.5L off and 5lbs (discharge weight 47.1kg). - Follow up with [facility] arranged.  - Clinically improved - no difficulty breathing and no chest pains during adm.     NSTEMI vs type II MI.  Trop 1.5--1.00--0.928. EKG shows paced rhythm. Evaluated by cardiology and offered angiogram which she has  "decided to pursue. Hx severe epistaxis on anticoagulation and has had some mild bleeding from right nostril which she attributes to scabs developed from recent O2 use. Not on O2 usually. Treated with heparin drip until hematuria developed and decision was made not to pursue coronary angiogram until further out from CHF exacerbation and hematuria resolution.  - Suspect type II MI as no symptoms following improvement in breathing with diuresis.  - Consider cor angio in follow up with primary cardiologist as above. Note, family seems to understand discussion of limitations if proceeds with angio/potential intervention and fact that patient will still have severe valve disease and underlying pulmonary disease; but seems less clear whether patient understands and she makes her medical decisions.     Acute cystitis - possibly CAUTI.  UA obtained d/t hematuria, but patient complained of burning dysuria 8/14. UA showing positive nitrites, large blood, large leuks, and . Wilde placed 8/11 for strict I&Os (removed 8/14).  - Urine culture grew >100K E.coli resistant to ampicillin and Unasyn.  - Renal dosed cefuroxime 250mg x 5 days, initiated 8/14.     Hematuria. - New 8/12. Improving.  Suspect related to traumatic cath placed on adm 8/11 for strict I&Os. Heparin started 8/11 and ASA 81mg started 8/12. Cherry colored urine 8/12. UA with large blood, , and 100 protein. Hgb stable in 12 range. 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  - Continues on ASA, initiated this adm.  - Consider follow-up with urology in OP setting.    Loose stools and generalized weakness. - new 8/14, resolved 8/15.  Plan was for discharge 8/14, however developed 4-5 non-bloody loose stool small-moderate volume, one episode emesis. No abd pain. BPs soft and slightly worse in upper 80s following above. Monitor for now. Patient attributes to a \"rich\" strawberry Boost. Possibly related to new abx for UTI.    CKD-III. Baseline Cr " appears 1.4 range. Cr 1.49 on day of discharge.     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 Continues on PTA BB. Not on anticoagulation PTA d/t hx severe epistaxis.     Pulmonary fibrosis. Extensive disease as evidenced by CT chest. No indication acute alveolitis. Prominent mediastinal LN noted and thought likely reactive.    Physical deconditioning, frailty.  Lives at the [facility] in assisted living. Ambulates with walker at baseline.     False positive CXR showing nodular pulmonary nodule, RML. Incidental finding on CXR. CT did not show this or other acute pathology.     Chronic back pain. Continues on PTA Percocet.    Severe malnutrition. With fat and muscle loss as per dietitian documentation.  - Boost supplements.    This patient was discussed with Dr. Tima Zamora of the Hospitalist Service who agrees with current plans as outlined above.    JoAnna K. Barthell, PA-C    Significant Results and Procedures   CXR.  CT chest without contrast.  Renal ultrasound.  TTE.    Code Status   DNR / DNI       Primary Care Physician   Marbin Rouse    Physical Exam   Temp: 98  F (36.7  C) Temp src: Oral BP: 98/48   Heart Rate: 76 Resp: 16 SpO2: 96 % O2 Device: None (Room air)    Vitals:    08/13/19 0555 08/14/19 0422 08/15/19 0500   Weight: 47.2 kg (104 lb 2 oz) 46.5 kg (102 lb 8 oz) 47.1 kg (103 lb 14.4 oz)     Vital Signs with Ranges  Temp:  [97.6  F (36.4  C)-98.2  F (36.8  C)] 98  F (36.7  C)  Heart Rate:  [60-76] 76  Resp:  [14-40] 16  BP: ()/(43-61) 98/48  SpO2:  [94 %-97 %] 96 %  I/O last 3 completed shifts:  In: 240 [P.O.:240]  Out: 100 [Urine:100]  Constitutional: Pleasant female who appears stated age. Looks comfortable upright in chair.  Respiratory: No increased work of breathing on room air.  Skin: Warm, dry, no rashes or lesions on exposed skin.    Discharge Disposition   Discharged to short-term care facility -- [facility]  Condition at discharge: Stable    Consultations  This Hospital Stay   CARDIOLOGY IP CONSULT  WOUND OSTOMY CONTINENCE NURSE  IP CONSULT  PHARMACY TO DOSE HEPARIN  PHARMACY TO DOSE HEPARIN  CARDIAC REHAB IP CONSULT  PHYSICAL THERAPY ADULT IP CONSULT  OCCUPATIONAL THERAPY ADULT IP CONSULT  SOCIAL WORK IP CONSULT  SMOKING CESSATION PROGRAM IP CONSULT    Time Spent on this Encounter   I, JoAnna K. Barthell, personally saw the patient today and spent greater than 30 minutes discharging this patient.    Discharge Orders      Basic metabolic panel    Need ov and bmp within one week     Discharge Order: F/U with Cardiac  [facility]ARA      General info for SNF    Length of Stay Estimate: Short Term Care: Estimated # of Days <30  Condition at Discharge: Stable  Level of care:skilled   Rehabilitation Potential: Fair  Admission H&P remains valid and up-to-date: Yes  Recent Chemotherapy: N/A  Use Nursing Home Standing Orders: Yes     Mantoux instructions    Give two-step Mantoux (PPD) Per Facility Policy Yes     Reason for your hospital stay    Further evaluation of shortness of breath found to have extra fluid on your body that is called a heart failure exacerbation. During your admission you were found to have a benign, small appearing mass in your kidney after developing blood in your urine.     Intake and output    Every shift     Daily weights    Call Provider for weight gain of more than 2 pounds per day or 5 pounds per week.     Follow Up and recommended labs and tests    Follow up with Nursing home physician.    Follow-up with cardiology Ermelinda Yen PA-C on Thursday 8/22/2019 @ 10:50 AM.  Blood draw beforehand at 10 AM.     Activity - Up with assistive device     Wound care (specify)    Site:   Buttocks/ perineal cares: TID and prn with incontinence episodes    Instructions:   Clean with Erin Cleanse and Protec  - reposition side to side when in bed, and fully off load/ stand up when up to the chair, every 2 hours   -keep heels elevated at all times- one pillow under  each leg from knee to heels, assure heels floating     DNR/DNI     Advance Diet as Tolerated    Follow this diet upon discharge:   Plus2 shake between Meals and 2 gm NA diet     Discharge Medications   Current Discharge Medication List      START taking these medications    Details   !! acetaminophen (TYLENOL) 325 MG tablet Take 2 tablets (650 mg) by mouth every 4 hours as needed for mild pain    Associated Diagnoses: Chronic pain syndrome      aspirin (ASA) 81 MG EC tablet Take 1 tablet (81 mg) by mouth daily  Qty: 30 tablet, Refills: 0    Associated Diagnoses: Acute on chronic congestive heart failure, unspecified heart failure type (H)      cefuroxime (CEFTIN) 250 MG tablet Take 1 tablet (250 mg) by mouth daily (next dose 8/16, last dose 8/18)  Qty: 4 tablet, Refills: 0    Associated Diagnoses: Acute cystitis with hematuria      lisinopril (PRINIVIL/ZESTRIL) 5 MG tablet Take 1 tablet (5 mg) by mouth daily (Hold for SBP <110)  Qty: 30 tablet, Refills: 0    Associated Diagnoses: Acute on chronic congestive heart failure, unspecified heart failure type (H)      torsemide (DEMADEX) 10 MG tablet Take 1 tablet (10 mg) by mouth 2 times daily (Hold for SBP <105)    Associated Diagnoses: Acute on chronic congestive heart failure, unspecified heart failure type (H)       !! - Potential duplicate medications found. Please discuss with provider.      CONTINUE these medications which have CHANGED    Details   metoprolol succinate ER (TOPROL XL) 50 MG 24 hr tablet Take 1 tablet (50 mg) by mouth daily (Hold for SBP <100 or HR <60)  Refills: 0    Associated Diagnoses: Essential hypertension      oxyCODONE-acetaminophen (PERCOCET) 5-325 MG tablet Take 0.5 tab every morning.  Max of 0.5 tab daily.  Qty: 3 tablet, Refills: 0    Comments: Future refills by PCP Dr. Marbin Rouse with phone number [phone].  Associated Diagnoses: Chronic midline low back pain without sciatica         CONTINUE these medications which have NOT  CHANGED    Details   !! acetaminophen (TYLENOL) 500 MG tablet Take one tablet 8 hours after morning percocet and one tablet at bedtime  Refills: 0    Associated Diagnoses: Chronic pain syndrome      Cyanocobalamin (VITAMIN B 12 PO) Take 1 tablet by mouth daily       escitalopram (LEXAPRO) 5 MG tablet Take 1 tablet (5 mg) by mouth daily  Qty: 30 tablet, Refills: 5    Associated Diagnoses: Mild major depression (H)      VITAMIN D, CHOLECALCIFEROL, PO Take 1,000 Units by mouth daily.        order for DME Equipment being ordered: Oxygen, 2L w activity  Qty: 1 each, Refills: 0    Associated Diagnoses: SOB (shortness of breath); Pulmonary fibrosis (H); Chronic congestive heart failure, unspecified heart failure type (H)       !! - Potential duplicate medications found. Please discuss with provider.      STOP taking these medications       furosemide (LASIX) 40 MG tablet Comments:   Reason for Stopping:             Allergies   No Known Allergies  Data   Most Recent 3 CBC's:  Recent Labs   Lab Test 08/15/19  0556 08/13/19  0919 08/12/19  0547 08/11/19  0432 08/10/19  2250   WBC 8.6  --  7.4 7.8 8.1   HGB  --  12.5 12.0 11.8 12.9   MCV  --   --  100 102* 103*     --  194 190 201      Most Recent 3 BMP's:  Recent Labs   Lab Test 08/15/19  0556 08/14/19  0533 08/13/19  0543    136 137   POTASSIUM 4.2 4.0 4.1   CHLORIDE 101 100 100   CO2 30 29 28   BUN 60* 54* 47*   CR 1.49* 1.53* 1.43*   ANIONGAP 6 7 9   PAWEL 8.3* 8.4* 8.5   GLC 85 88 88     Most Recent 2 LFT's:  Recent Labs   Lab Test 08/10/19  2250 03/18/18  0540   AST 33 24   ALT 19 27   ALKPHOS 71 67   BILITOTAL 1.6* 1.8*     Most Recent 3 Troponin's:  Recent Labs   Lab Test 08/11/19  0832 08/11/19  0432 08/10/19  2250   TROPI 0.928* 1.007* 1.568*     Most Recent 6 Bacteria Isolates From Any Culture (See EPIC Reports for Culture Details):  Recent Labs   Lab Test 08/13/19  1000 09/28/12  1029 09/03/12  1645 09/02/12  1840 09/02/12  1825 09/02/12  1820   CULT  >100,000 colonies/mL  Escherichia coli  * >100,000 colonies/mL Methicillin resistant Staphylococcus aureus (MRSA) 10 to 50,000 colonies/mL Coagulase negative Staphylococcus No growth Cultured on the 1st day of incubation: Escherichia coli Critical Value, preliminary result only, called to and read back by Carlos Dennis ( patient's nurse @ 1636 on 9/3/2012, cn. Cultured on the 1st day of incubation: Escherichia coli Critical Value, preliminary result only, called to and read back by Carlos Dennis 9/3/12 @1636 cn Susceptibility testing done on previous specimen >100,000 colonies/mL Escherichia coli     Results for orders placed or performed during the hospital encounter of 08/10/19   Chest XR,  PA & LAT    Narrative    CHEST 2 VIEWS   8/10/2019 11:30 PM     HISTORY: Dyspnea on exertion.    COMPARISON: 1/19/2019.    FINDINGS: Moderately extensive irregular interstitial opacities  scattered within both lungs, also present on 1/19/2019. 1.1 x 1.1 cm  nodular opacity projecting over the mid right lung, new. Blunting of  bilateral costophrenic angles again noted. This could relate to  scarring or tiny bilateral pleural effusions. Probable cardiomegaly.  Atherosclerotic calcification in the thoracic aorta. Left anterior  chest wall cardiac device with lead tips in the right ventricle.      Impression    IMPRESSION:   1. Moderately extensive irregular interstitial opacities scattered  within both lungs, not convincingly changed since 1/19/2019. These are  nonspecific, but are suggestive of fibrosis.  2. 1.1 cm nodular opacity projecting over the mid right lung, new.  This is suspicious for a pulmonary nodule. A CT scan of the chest  would be useful for further evaluation.  3. No other significant interval change.    NUHA ODOM MD   CT Chest w/o Contrast    Narrative    CT CHEST WITHOUT CONTRAST 8/11/2019 6:24 AM     HISTORY: Interstitial lung disease. Shortness of breath.    COMPARISON: Chest x-ray  8/10/2019.    TECHNIQUE: Axial images from the thoracic inlet to the lung bases are  performed with additional coronal reformatted images. No contrast is  utilized.  Radiation dose for this scan was reduced using automated  exposure control, adjustment of the mA and/or kV according to patient  size, or iterative reconstruction technique.    FINDINGS:     Chest: No evidence of pulmonary infiltrate or consolidation but there  is extensive interstitial thickening, bronchiectatic changes and  pleural thickening consistent with underlying fibrotic lung disease.  No significant mucoid impactions are appreciated. Small right pleural  effusion is noted in addition to only trace amount of left pleural  fluid. No pulmonary nodularity is appreciated. No underlying pulmonary  mass. Heart is enlarged. Implantable cardiac device with single lead  in the right ventricle is noted. Esophagus is unremarkable. Prominent  mediastinal lymph nodes are probably reactive. No enlarged axillary  lymph nodes. Aorta is calcified without aneurysm. Limited images upper  abdomen are otherwise unremarkable. Bone window examination  demonstrates degenerative spine and degenerative right glenohumeral  joint changes. Old healed posterior right lower rib fractures are  noted.      Impression    IMPRESSION:  1. Findings concerning for underlying fibrotic lung disease with  subpleural fibrotic changes, interstitial thickening and pleural  thickening. No nodularity, infiltrate or consolidation. No significant  associated groundglass opacities are appreciated to indicate ongoing  acute alveolitis.  2. Small right and trace left pleural effusion and cardiomegaly.    JACQUELINE LOVING MD   US Renal Complete    Narrative    ULTRASOUND RETROPERITONEAL COMPLETE 8/13/2019 1:07 PM     HISTORY: Hematuria.    COMPARISON: None.    FINDINGS: The bilateral renal parenchyma are unremarkable in  echogenicity without evidence for shadowing stone. Echogenic focus in  the mid  left kidney measuring 1.1 x 1.0 x 0.7 cm probably a small  angiomyolipoma. Subcentimeter cortical cyst on the left. No  hydronephrosis. Right kidney measures 8.7 x 5.3 x 4.2 cm and the left  measures 8.8 x 5.1 x 3.7 cm. Cortical thickness is 0.9 cm on the right  and 0.8 cm on the left. Bladder is decompressed.      Impression    IMPRESSION: Probable small angiomyolipoma, no definite shadowing  stones demonstrated.    HORTENCIA MYLES MD

## 2019-08-15 NOTE — PLAN OF CARE
Discharge Planner PT   Patient plan for discharge: I want to go home, I don't want rehab  Current status: Repetitive sit to stand with CGA and FWW, intermittent cues for safety for hand placement. Amb 70ft with FWW and min A for balance. RR=40resp/min post ambulation, hypotensive BP (98/48mmHg) post ambulation however no c/o lightheadedness. SPO2=96% on RA. Instructed with standing LE ex for strengthening with rest breaks due to HORTON and fatigue. Reviewed CR education with pt.   Barriers to return to prior living situation: level of assist, impaired balance, decreased activity tolerance, pt reports she typically does not get physcial assist with mobility at her snf  Recommendations for discharge: TCU  Rationale for recommendations: Pt will benefit from continued PT to maximize her level of IND mobility and return to baseline to decrease her risk of falls and level of burden of care. Discussed with pt recommendation for TCU prior to return home, however declining at this time. If discharge home, recommend assist with all mobility for safety and home health PT/OT. Pt currently agreeable to home care.        Entered by: Lana Davila 08/15/2019 9:58 AM

## 2019-08-15 NOTE — PLAN OF CARE
A&Ox4. Up w/ 1 assist, GB, and walker (with far distances). VSS on RA, BPs are soft. Incontinent of bladder. IV now removed. Oxy Q4 given twice for back pain, and reported relief with this. Low sodium diet. Pt. verbalized understanding of AVS. Family to transport. Will CTM.

## 2019-08-15 NOTE — CONSULTS
Care Transition Initial Assessment - SW     Met with: Patient and nilaurent Negrete and Ebony    Active Problems:    CHF exacerbation (H)       DATA  Lives With: other (see comments)(Ray NASSAR)   Living Arrangements: assisted living  Identified issues/concerns regarding health management: Therapy recommendations for TCU.  Transportation Anticipated: family or friend will provide    ASSESSMENT  Cognitive Status:  awake, alert and oriented  Concerns to be addressed: TCU choices  SW consulted to assist with discharge planning, emotional support and transportation arrangements.  SW introduced self and role.  Pt is a 89 yr old female who admitted on 8/10/19 for CHF exacerbation. Pt lives at [facility] where she receives assists prn. Patient agreeable to TCU and prefers [facility] where she has been in the past.    PLAN  Financial costs for the patient includes : TBD .  Patient given options and choices for discharge SNF list offered .  Patient/family is agreeable to the plan?  Yes: Prefers [facility]  Transportation/person available to transport on day of discharge  is guy and have they been notified/set up TBD  Patient Goals and Preferences: TCU at discharge .  Patient anticipates discharging to:  TCU .  SW inquired with [facility] admissions who indicate female bed avail. SW sent referral via [employer] and awaiting response.  SW to continue to follow and assist with discharge planning.    ADDENDUM: Patient accepted at [facility] for admission today. SW updated patient, family and medical team.    DC orders: sent via [employer]  Scripts: faxed  PAS: completed and placed on patient chart  Trans: family to transport at approximately 1430.    PAS-RR    D: Per DHS regulation, FRANCOIS completed and submitted PAS-RR to [place] [employer] via the [email or web address] Line.  PAS-RR confirmation # is : [id number]    I: SW spoke with patient and they are aware a PAS-RR has been submitted.  SW reviewed with patient that they may be  contacted for a follow up appointment within 10 days of hospital discharge if their SNF stay is < 30 days.  Contact information for [employer] was also provided.    A: Patient verbalized understanding.    P: Further questions may be directed to Senior [employer] Line at #[phone], option #4 for PAS-RR staff.

## 2019-08-15 NOTE — PROGRESS NOTES
Hospitalist cross cover note:    Paged by RN regarding minimal urine output.  Wilde catheter was removed at 10 am.  See voided once around 1:30 PM earlier today, incontinent so not known how much.  Bladder scan this evening shows 39 cc.    Patient with acute on chronic heart failure with preserved EF and is being diuresed.  BUN/creatinine has trended up and blood pressure also is very soft, systolic 80s to 90s.  Likely over diuresed already. Diuretic held this evening due to soft BP.    Monitor urine output, if does not void in the next few hours or if her blood pressure drops, will give her 250 cc normal saline slowly over 5 hours.  Discussed with nursing staff, will follow.    Addendum 1030pm  Patient had an incontinent episode with modest amount of urine per RN.   No IVF for now  Advised RN to use pure wick catheter so we can track I&O.    Elizabeth Larry MD  Hospitalist.

## 2019-08-15 NOTE — PROGRESS NOTES
"CLINICAL NUTRITION SERVICES - REASSESSMENT NOTE    Malnutrition:  % Weight Loss:  Weight loss does not meet criteria for malnutrition   % Intake:  <75% for >/= 3 months (non-severe malnutrition) --> baseline   Subcutaneous Fat Loss:  Orbital region moderate depletion, Upper arm region severe depletion and Thoracic region severe depletion  Muscle Loss:  Temporal region moderate depletion, Clavicle bone region severe depletion, Acromion bone region severe depletion and Dorsal hand region severe depletion  Fluid Retention:  Mild edema     Malnutrition Diagnosis: Severe malnutrition  In Context of:  Chronic illness or disease     EVALUATION OF PROGRESS TOWARD GOALS   Diet: 2g Na + Plus2 milkshake BID between meals   Intake/Tolerance: 25, 50% or 100% intakes recorded for small meals this admission. \"I don't like to eat\". \"I'm not a meat and potatoes kind of person\". Has been drinking milk, eating fruit and veg mostly. Also noted mac and cheese, hamburger, and meatloaf have been ordered. Likes the Plus2 shakes, can usually drink a full one \"it takes a long time\". Dislikes the strawberry flavor. Of note, each Plus2 Shake contains 525-625 kcal, 19 g protein.    ASSESSED NUTRITION NEEDS:  Dosing Weight 49.5 kg   Estimated Energy Needs: 9232-8331+ kcals (25-30+ Kcal/Kg)  Justification: maintenance and underweight, Inc needs w/ resp status  Estimated Protein Needs: 59-74+ grams protein (1.2-1.5+ g pro/Kg)  Justification: preservation of lean body mass  Estimated Fluid Needs: Per MD    NEW FINDINGS:   - Disposition --> plan was to TCU. Patient noted during visit that she does not want rehab, she wants to go home (to [facility]). Has been refusing therapies - PT session today recommending TCU.    Previous Goals:   Patient to tolerate at least 50% meals BID-TID + 1-2 supplements daily  Evaluation: Met - though meals are small    Previous Nutrition Diagnosis:   Malnutrition related to chronic increased needs w/ pulmonary fibrosis and " inadequate oral intake r/t aging process with taste and smell loss as evidenced by verbalized disinterest in eating, progressive weight loss over the past 6 mos-1 year, with current underwt BMI <18, e/o severe fat and muscle loss, coding for severe malnutrition.  Evaluation: No change    CURRENT NUTRITION DIAGNOSIS  Malnutrition related to chronic increased needs w/ pulmonary fibrosis and inadequate oral intake r/t aging process with taste and smell loss as evidenced by verbalized disinterest in eating, progressive weight loss over the past 6 mos-1 year, with current underwt BMI <18, e/o severe fat and muscle loss, coding for severe malnutrition.    INTERVENTIONS  Recommendations / Nutrition Prescription  Continue 2g Na diet per MD    Continue high protein supplements (no strawberry!), and thera vit M daily     Continue room service assistance to ensure meals TID ordered    Implementation  None new today. Ongoing encouragement of milk intake, supplements, and other foods that appear to her.     Goals  Patient to tolerate at least 50% meals + 1-2 supplements daily.       MONITORING AND EVALUATION:  Progress towards goals will be monitored and evaluated per protocol and Practice Guidelines    Jessi France RD, LD  [facility] [place], 66, 55, MH   Pager: [phone]  Weekend Pager: [phone]

## 2019-08-15 NOTE — PLAN OF CARE
VSS. A&O. Pt is incontinent. Purwick was unsuccessful as the pt moves a lot during her sleep. Afib CVR. Pt c/o of back pain and tylenol was given. Continue to monitor.

## 2019-08-16 NOTE — LETTER
"    8/16/2019        RE: Theresa Alves  [address]  [facility] [address]        [place] [facility]  PRIMARY CARE PROVIDER AND CLINIC:  Marbin Rouse MD, [address] / [facility] [address]  Chief Complaint   Patient presents with     Hospital F/U     [facility] Medical Record Number:  [id number]  Place of Service where encounter took place:  [facility] [place] ([facility]) [[id number]]    Theresa Alves  is a 89 year old  (6/22/1924), admitted to the above facility from  [facility]. Hospital stay 8/10/19 through 8/15/19..  Admitted to this facility for  rehab, medical management and nursing care.    HPI:    HPI information obtained from: facility chart records, facility staff, patient report and [facility] chart review.   Brief Summary of Hospital Course:   Acute on chronic HFpEF: new diagnosis of severe tricuspid regurgitation and mod/severe mitral regurgitation: suspect progression d/t pulm HTN. BNP 22.6K and elevated trop with Demand ischemia patient started on home O2 in July, diureses IV lasix drip 8/11-8/13 taken off approx 5lbs weight, DC PTA BB and new ASA, lisinopril and demadex (was on lasix PTA)  Demand ischemia: angiogram deferred until CHF exacerbation improved.   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   Pulmonary fibrosis: ongoing with pulm HTN as above  Updates on Status Since Skilled nursing Admission: On exam today patient is sitting up in WC, alert, pleasant, states she is \"tired\" today, denies SOB at rest, states she does get HORTON, has occasional chest pain but that is nothing new. Denies fever, chills, cough, congestion, N/V/D or constipation. States she slept well last night and " appetite is good. Patient is not on O2 at this time.       CODE STATUS/ADVANCE DIRECTIVES DISCUSSION:   DNR / DNI  Patient's living condition: lives in an assisted living facility  ALLERGIES: Patient has no known allergies.  PAST MEDICAL HISTORY:  has a past medical history of Atrial fibrillation (H), Chronic back pain, Chronic diastolic CHF (congestive heart failure) (H), Chronic kidney disease, stage III (moderate) (H), Pacemaker (Jnauary 2019), Pulmonary fibrosis (H), Pulmonary hypertension (H), and Unspecified essential hypertension.  PAST SURGICAL HISTORY:   has a past surgical history that includes hiatal hernia repair (6/10); cataract iol, rt/lt (5/03); hernia repair, inguinal rt/lt (1954); carpal tunnel release rt/lt (1990's); and EP Ablation AV Node (N/A, 1/18/2019).  FAMILY HISTORY: family history includes Unknown/Adopted in her father and mother.  SOCIAL HISTORY:   reports that she has never smoked. She has never used smokeless tobacco. She reports that she drinks alcohol. She reports that she does not use drugs.    Post Discharge Medication Reconciliation Status: discharge medications reconciled, continue medications without change    Current Outpatient Medications   Medication Sig Dispense Refill     acetaminophen (TYLENOL) 325 MG tablet Take 2 tablets (650 mg) by mouth every 4 hours as needed for mild pain       acetaminophen (TYLENOL) 500 MG tablet Take one tablet 8 hours after morning percocet and one tablet at bedtime  0     aspirin (ASA) 81 MG EC tablet Take 1 tablet (81 mg) by mouth daily 30 tablet 0     cefuroxime (CEFTIN) 250 MG tablet Take 1 tablet (250 mg) by mouth daily (next dose 8/16, last dose 8/18) 4 tablet 0     Cyanocobalamin (VITAMIN B 12 PO) Take 1 tablet by mouth daily        escitalopram (LEXAPRO) 5 MG tablet Take 1 tablet (5 mg) by mouth daily 30 tablet 5     lisinopril (PRINIVIL/ZESTRIL) 5 MG tablet Take 1 tablet (5 mg) by mouth daily (Hold for SBP <110) 30 tablet 0     metoprolol  succinate ER (TOPROL XL) 50 MG 24 hr tablet Take 1 tablet (50 mg) by mouth daily (Hold for SBP <100 or HR <60)  0     order for DME Equipment being ordered: Oxygen, 2L w activity 1 each 0     oxyCODONE-acetaminophen (PERCOCET) 5-325 MG tablet Take 0.5 tab every morning.  Max of 0.5 tab daily. 3 tablet 0     torsemide (DEMADEX) 10 MG tablet Take 1 tablet (10 mg) by mouth 2 times daily (Hold for SBP <105)       VITAMIN D, CHOLECALCIFEROL, PO Take 1,000 Units by mouth daily.           ROS:  10 point ROS of systems including Constitutional, Eyes, Respiratory, Cardiovascular, Gastroenterology, Genitourinary, Integumentary, Musculoskeletal, Psychiatric were all negative except for pertinent positives noted in my HPI.    Vitals:  BP 90/45   Pulse 60   Temp 96.8  F (36  C)   Resp 18   Wt 46.7 kg (103 lb)   LMP  (LMP Unknown)   SpO2 96%   BMI 16.62 kg/m     Exam:  GENERAL APPEARANCE:  Alert, in no distress, thin  ENT:  Mouth and posterior oropharynx normal, moist mucous membranes, [place]  EYES:  EOM, conjunctivae, lids, pupils and irises normal, PERRL  RESP:  respiratory effort and palpation of chest normal, lungs clear to auscultation , no respiratory distress  CV:  Palpation and auscultation of heart done , regular rate and rhythm, no murmur, rub, or gallop, peripheral edema trace+ in LE bilaterally  ABDOMEN:  normal bowel sounds, soft, nontender, no hepatosplenomegaly or other masses  M/S:   Examination of:   right upper extremity, left upper extremity, right lower extremity and left lower extremity  Inspection, ROM, stability and muscle strength normal and generalized weakness noted  SKIN:  Inspection of skin and subcutaneous tissue baseline  NEURO:   Cranial nerves 2-12 are normal tested and grossly at patient's baseline, speech WNL  PSYCH:  affect and mood normal    Lab/Diagnostic data:  Recent labs in [facility] reviewed by me today.     ASSESSMENT/PLAN:  Acute on chronic combined systolic and diastolic heart failure  with preserved ejection fraction (H)  Demand ischemia (H)  Non-rheumatic mitral regurgitation New severe MR likely d/t severe PHTN  Pulmonary hypertension (H)  Pulmonary fibrosis  Chronic atrial fibrillation (H)  Hypertension goal BP (blood pressure) < 140/80  Chronic kidney disease, stage III (moderate) (H)  Physical deconditioning  Acute/ongoing: daily weights, vitals daily and prn, BMP twice weekly, PT and OT for strengthening,   Torsemide 10mg BID, toprol xl 50mg QD, lisinopril 5mg QD, ASA 81mg QD  F/u with cardiology for OP angiogram     Chronic low back pain  Ongoing; OK for K-pad prn for low back pain, continue percocet 5/325mg 1/2 tab PO QAM prn for pain and tylenol 500mg q 8 hours prn for pain    Orders written by provider at facility  BMP twice weekly  Hgb on monday  Hold lisinopril for SBP < 90mmhg  Tylenol 500mg q 8 hours prn for pain    Total time spent with patient visit at the skilled nursing facility was 45 min including patient visit and review of past records. Greater than 50% of total time spent with counseling and coordinating care due to coordinating care with nurse on admission orders, coordinating care with follow up labs and appointments and counseling patient/resident for 15 minutes on: the reason for their hospitalization and what the treatment is, the plan of SNF stay and projected length of stay, options of code status and their current code status order, current medications (treatments) reconciled from the hospital and recent past lab and imaging results and subsequent treatment plan.    Electronically signed by:  Tonya Lynn Haase, APRN CNP                         Sincerely,        Tonya Lynn Haase, APRN CNP

## 2019-08-16 NOTE — TELEPHONE ENCOUNTER
Patient was evaluated by cardiology while inpatient for chest pain, BLE edema, HORTON-CHF exacerbation. Diuresed 7 lbs prior to discharge and transitioned to Demadex. Lasix was discontinued. Was unable to do coronary angiogram secondary to rising kidney function tests and hematuria. RN called [facility] TCU to confirm the follow up plan for patient with Care Coordinator. RN confirmed with Care Coordinator that patient has a scheduled F/U appt on 8/22/19 with frents, with labs prior. RN confirmed with Care Coordinator that patient is weighed daily, to call clinic with a weight gain of 2 lbs overnight or 5 lbs in a week and to bring list of daily weights/vitals, last progress note, MAR, active orders, and provider order sheet to appt. KESHAV Mejia RN.

## 2019-08-16 NOTE — PLAN OF CARE
Physical Therapy Discharge Summary    Reason for therapy discharge:    Discharged to transitional care facility.    Progress towards therapy goal(s). See goals on Care Plan in [facility] electronic health record for goal details.  Goals not met.  Barriers to achieving goals:   discharge from facility.    Therapy recommendation(s):    Continued therapy is recommended.  Rationale/Recommendations:  to maximize her level of IND mobility and return to baseline to decrease her risk of falls and level of burden of care. .

## 2019-08-16 NOTE — PROGRESS NOTES
"[place] [facility]  PRIMARY CARE PROVIDER AND CLINIC:  Marbin Rouse MD, [address] / [facility] [address]  Chief Complaint   Patient presents with     Hospital F/U     [facility] Medical Record Number:  [id number]  Place of Service where encounter took place:  [facility] ([facility]) [[id number]]    Theresa Alves  is a 89 year old  (6/22/1924), admitted to the above facility from  [facility]. Hospital stay 8/10/19 through 8/15/19..  Admitted to this facility for  rehab, medical management and nursing care.    HPI:    HPI information obtained from: facility chart records, facility staff, patient report and [facility] chart review.   Brief Summary of Hospital Course:   Acute on chronic HFpEF: new diagnosis of severe tricuspid regurgitation and mod/severe mitral regurgitation: suspect progression d/t pulm HTN. BNP 22.6K and elevated trop with Demand ischemia patient started on home O2 in July, diureses IV lasix drip 8/11-8/13 taken off approx 5lbs weight, DC PTA BB and new ASA, lisinopril and demadex (was on lasix PTA)  Demand ischemia: angiogram deferred until CHF exacerbation improved.   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   Pulmonary fibrosis: ongoing with pulm HTN as above  Updates on Status Since Skilled nursing Admission: On exam today patient is sitting up in WC, alert, pleasant, states she is \"tired\" today, denies SOB at rest, states she does get HORTON, has occasional chest pain but that is nothing new. Denies fever, chills, cough, congestion, N/V/D or constipation. States she slept well last night and appetite is good. Patient is not on O2 at this time.       CODE STATUS/ADVANCE DIRECTIVES DISCUSSION:  "  DNR / DNI  Patient's living condition: lives in an assisted living facility  ALLERGIES: Patient has no known allergies.  PAST MEDICAL HISTORY:  has a past medical history of Atrial fibrillation (H), Chronic back pain, Chronic diastolic CHF (congestive heart failure) (H), Chronic kidney disease, stage III (moderate) (H), Pacemaker (Jnauary 2019), Pulmonary fibrosis (H), Pulmonary hypertension (H), and Unspecified essential hypertension.  PAST SURGICAL HISTORY:   has a past surgical history that includes hiatal hernia repair (6/10); cataract iol, rt/lt (5/03); hernia repair, inguinal rt/lt (1954); carpal tunnel release rt/lt (1990's); and EP Ablation AV Node (N/A, 1/18/2019).  FAMILY HISTORY: family history includes Unknown/Adopted in her father and mother.  SOCIAL HISTORY:   reports that she has never smoked. She has never used smokeless tobacco. She reports that she drinks alcohol. She reports that she does not use drugs.    Post Discharge Medication Reconciliation Status: discharge medications reconciled, continue medications without change    Current Outpatient Medications   Medication Sig Dispense Refill     acetaminophen (TYLENOL) 325 MG tablet Take 2 tablets (650 mg) by mouth every 4 hours as needed for mild pain       acetaminophen (TYLENOL) 500 MG tablet Take one tablet 8 hours after morning percocet and one tablet at bedtime  0     aspirin (ASA) 81 MG EC tablet Take 1 tablet (81 mg) by mouth daily 30 tablet 0     cefuroxime (CEFTIN) 250 MG tablet Take 1 tablet (250 mg) by mouth daily (next dose 8/16, last dose 8/18) 4 tablet 0     Cyanocobalamin (VITAMIN B 12 PO) Take 1 tablet by mouth daily        escitalopram (LEXAPRO) 5 MG tablet Take 1 tablet (5 mg) by mouth daily 30 tablet 5     lisinopril (PRINIVIL/ZESTRIL) 5 MG tablet Take 1 tablet (5 mg) by mouth daily (Hold for SBP <110) 30 tablet 0     metoprolol succinate ER (TOPROL XL) 50 MG 24 hr tablet Take 1 tablet (50 mg) by mouth daily (Hold for SBP <100 or  HR <60)  0     order for DME Equipment being ordered: Oxygen, 2L w activity 1 each 0     oxyCODONE-acetaminophen (PERCOCET) 5-325 MG tablet Take 0.5 tab every morning.  Max of 0.5 tab daily. 3 tablet 0     torsemide (DEMADEX) 10 MG tablet Take 1 tablet (10 mg) by mouth 2 times daily (Hold for SBP <105)       VITAMIN D, CHOLECALCIFEROL, PO Take 1,000 Units by mouth daily.           ROS:  10 point ROS of systems including Constitutional, Eyes, Respiratory, Cardiovascular, Gastroenterology, Genitourinary, Integumentary, Musculoskeletal, Psychiatric were all negative except for pertinent positives noted in my HPI.    Vitals:  BP 90/45   Pulse 60   Temp 96.8  F (36  C)   Resp 18   Wt 46.7 kg (103 lb)   LMP  (LMP Unknown)   SpO2 96%   BMI 16.62 kg/m    Exam:  GENERAL APPEARANCE:  Alert, in no distress, thin  ENT:  Mouth and posterior oropharynx normal, moist mucous membranes, [place]  EYES:  EOM, conjunctivae, lids, pupils and irises normal, PERRL  RESP:  respiratory effort and palpation of chest normal, lungs clear to auscultation , no respiratory distress  CV:  Palpation and auscultation of heart done , regular rate and rhythm, no murmur, rub, or gallop, peripheral edema trace+ in LE bilaterally  ABDOMEN:  normal bowel sounds, soft, nontender, no hepatosplenomegaly or other masses  M/S:   Examination of:   right upper extremity, left upper extremity, right lower extremity and left lower extremity  Inspection, ROM, stability and muscle strength normal and generalized weakness noted  SKIN:  Inspection of skin and subcutaneous tissue baseline  NEURO:   Cranial nerves 2-12 are normal tested and grossly at patient's baseline, speech WNL  PSYCH:  affect and mood normal    Lab/Diagnostic data:  Recent labs in [facility] reviewed by me today.     ASSESSMENT/PLAN:  Acute on chronic combined systolic and diastolic heart failure with preserved ejection fraction (H)  Demand ischemia (H)  Non-rheumatic mitral regurgitation New severe  MR likely d/t severe PHTN  Pulmonary hypertension (H)  Pulmonary fibrosis  Chronic atrial fibrillation (H)  Hypertension goal BP (blood pressure) < 140/80  Chronic kidney disease, stage III (moderate) (H)  Physical deconditioning  Acute/ongoing: daily weights, vitals daily and prn, BMP twice weekly, PT and OT for strengthening,   Torsemide 10mg BID, toprol xl 50mg QD, lisinopril 5mg QD, ASA 81mg QD  F/u with cardiology for OP angiogram     Chronic low back pain  Ongoing; OK for K-pad prn for low back pain, continue percocet 5/325mg 1/2 tab PO QAM prn for pain and tylenol 500mg q 8 hours prn for pain    Orders written by provider at facility  BMP twice weekly  Hgb on monday  Hold lisinopril for SBP < 90mmhg  Tylenol 500mg q 8 hours prn for pain    Total time spent with patient visit at the skilled nursing facility was 45 min including patient visit and review of past records. Greater than 50% of total time spent with counseling and coordinating care due to coordinating care with nurse on admission orders, coordinating care with follow up labs and appointments and counseling patient/resident for 15 minutes on: the reason for their hospitalization and what the treatment is, the plan of SNF stay and projected length of stay, options of code status and their current code status order, current medications (treatments) reconciled from the hospital and recent past lab and imaging results and subsequent treatment plan.    Electronically signed by:  Tonya Lynn Haase, APRN CNP

## 2019-08-19 NOTE — PROGRESS NOTES
Subjective     Theresa Alves is a 89 year old female who presents to clinic today for the following health issues:    HPI       Hospital Follow-up Visit:    Hospital/Nursing Home/IP Rehab Facility: [facility]  Date of Admission: 8/10/19  Date of Discharge: 8/15/19  Reason(s) for Admission: CHF            Problems taking medications regularly:  None       Medication changes since discharge: None       Problems adhering to non-medication therapy:  None      Summary of hospitalization:  [facility] discharge summary reviewed  Diagnostic Tests/Treatments reviewed.  Follow up needed: cardiac labs to be completed on Thursday  Other Healthcare Providers Involved in Patient s Care:         Specialist appointment - cardiology, Physical Therapy and SNF  Update since discharge: stable.     Post Discharge Medication Reconciliation: discharge medications reconciled, continue medications without change.  Plan of care communicated with patient, family and SNF paperwork completed with orders for OT/PT     Coding guidelines for this visit:  Type of Medical   Decision Making Face-to-Face Visit       within 7 Days of discharge Face-to-Face Visit        within 14 days of discharge   Moderate Complexity [id number]   High Complexity [id number]          She still feels very weak after discharge  BP is often low in the afternoon, however her fatigue is constant  Weight has remained stable since hospital discharge  No side effects from torsemide          Patient Active Problem List   Diagnosis     Health Care Home     Chronic kidney disease, stage III (moderate) (H)     Hyperlipidemia with target LDL less than 130     Edema     Pulmonary fibrosis     Preventive measure     Hypertension goal BP (blood pressure) < 140/80     SOB (shortness of breath)     Congestive heart failure (H)     Chronic fatigue     Dizziness - light-headed     Degenerative arthritis of spine     Osteoporosis     Weight loss     Right  shoulder pain     Low back pain     Physical deconditioning     Chronic pain syndrome     Memory loss     Numbness of right foot     Chronic congestive heart failure (H)     Atrial fibrillation, unspecified type (H)     Atrial fibrillation w RVR     Anemia due to blood loss, acute     Alcoholic encephalopathy (H)     Bradycardia     Chronic atrial fibrillation (H)     Right wrist pain     Chronic right shoulder pain     Pacemaker     Chronic midline low back pain without sciatica     HORTON (dyspnea on exertion)     Constipation, unspecified constipation type     Mild major depression (H)     Hypoxemia     CHF exacerbation (H)     Past Surgical History:   Procedure Laterality Date     CARPAL TUNNEL RELEASE RT/LT  1990's    R     CATARACT IOL, RT/LT  5/03    R     EP ABLATION AV NODE N/A 1/18/2019    Procedure: EP Ablation AV Node;  Surgeon: Johny Walker MD;  Location:  [facility]     HERNIA REPAIR, INGUINAL RT/LT  1954    R     hiatal hernia repair  6/10    large paraesophageal hiatal hernia; Nissen fundoplication        Social History     Tobacco Use     Smoking status: Never Smoker     Smokeless tobacco: Never Used   Substance Use Topics     Alcohol use: Yes     Comment: 1 drink every other week     Family History   Problem Relation Age of Onset     Unknown/Adopted Mother      Unknown/Adopted Father      Diabetes No family hx of      Coronary Artery Disease No family hx of      Hypertension No family hx of      Hyperlipidemia No family hx of      Cerebrovascular Disease No family hx of      Breast Cancer No family hx of      Colon Cancer No family hx of      Prostate Cancer No family hx of      Other Cancer No family hx of      Depression No family hx of      Anxiety Disorder No family hx of      Mental Illness No family hx of      Substance Abuse No family hx of      Anesthesia Reaction No family hx of      Asthma No family hx of      Osteoporosis No family hx of      Genetic Disorder No  family hx of      Thyroid Disease No family hx of      Obesity No family hx of          Current Outpatient Medications   Medication Sig Dispense Refill     acetaminophen (TYLENOL) 325 MG tablet Take 2 tablets (650 mg) by mouth every 4 hours as needed for mild pain       acetaminophen (TYLENOL) 500 MG tablet Take one tablet 8 hours after morning percocet and one tablet at bedtime  0     aspirin (ASA) 81 MG EC tablet Take 1 tablet (81 mg) by mouth daily 30 tablet 0     cefuroxime (CEFTIN) 250 MG tablet Take 1 tablet (250 mg) by mouth daily (next dose 8/16, last dose 8/18) 4 tablet 0     Cyanocobalamin (VITAMIN B 12 PO) Take 1 tablet by mouth daily        escitalopram (LEXAPRO) 5 MG tablet Take 1 tablet (5 mg) by mouth daily 30 tablet 5     lisinopril (PRINIVIL/ZESTRIL) 5 MG tablet Take 1 tablet (5 mg) by mouth daily (Hold for SBP <110) 30 tablet 0     metoprolol succinate ER (TOPROL XL) 50 MG 24 hr tablet Take 1 tablet (50 mg) by mouth daily (Hold for SBP <100 or HR <60)  0     order for DME Equipment being ordered: Oxygen, 2L w activity 1 each 0     oxyCODONE-acetaminophen (PERCOCET) 5-325 MG tablet Take 0.5 tab every morning.  Max of 0.5 tab daily. 3 tablet 0     torsemide (DEMADEX) 10 MG tablet Take 1 tablet (10 mg) by mouth 2 times daily (Hold for SBP <105)       VITAMIN D, CHOLECALCIFEROL, PO Take 1,000 Units by mouth daily.         No Known Allergies    Reviewed and updated as needed this visit by Provider       Review of Systems   Constitutional: Positive for fatigue.   HENT: Negative.    Eyes: Negative.    Respiratory: Positive for shortness of breath.    Cardiovascular: Negative.  Negative for leg swelling.   Gastrointestinal: Negative.    Genitourinary: Negative.    Musculoskeletal: Negative.    Neurological: Positive for weakness.   Psychiatric/Behavioral: Negative.          Objective    BP 90/50 (Cuff Size: Adult Small)   Pulse 60   Temp 98  F (36.7  C) (Tympanic)   Resp 16   Wt 47.6 kg (105 lb)   LMP   (LMP Unknown)   SpO2 95%   BMI 16.95 kg/m    Physical Exam   Constitutional: She is oriented to person, place, and time. She appears well-developed and well-nourished. No distress.   HENT:   Head: Normocephalic.   Right Ear: External ear normal.   Left Ear: External ear normal.   Nose: Nose normal.   Eyes: Conjunctivae and EOM are normal.   Neck: Normal range of motion.   Cardiovascular: Normal rate and regular rhythm.   Pulmonary/Chest: Effort normal and breath sounds normal. She has no wheezes. She has no rales.   Neurological: She is alert and oriented to person, place, and time.   Skin: Skin is warm and dry. She is not diaphoretic.   Psychiatric: She has a normal mood and affect. Judgment normal.       Diagnostic Test Results:  Results for orders placed or performed in visit on 08/19/19 (from the past 24 hour(s))   Hemoglobin   Result Value Ref Range    Hemoglobin 11.9 (L) 12.0 - 15.5 g/dL    Narrative    LAB RESULTS [facility]   Basic metabolic panel   Result Value Ref Range    Sodium 140 136 - 145 mmol/L    Potassium 5.0 3.5 - 5.1 mmol/L    Chloride 104 98 - 109 mmol/L    Carbon Dioxide 25 20 - 29 mmol/L    Anion Gap 11 7 - 16 mmol/L    Glucose 108 (H) 70 - 100 mg/dL    Urea Nitrogen 53 (H) 7 - 26 mg/dL    Creatinine 1.31 (H) 0.55 - 1.02 mg/dL    Calcium 9.1 8.4 - 10.4 mg/dL    GFR Estimate 35 (L) >60 ml/min/1.73m2    GFR Estimate If Black 40 (L) >60 ml/min/1.73m2    Narrative    LAB RESULTS [facility]           Assessment & Plan   Problem List Items Addressed This Visit        Circulatory    Chronic atrial fibrillation (H) - Primary    Chronic congestive heart failure (H)       Other    Physical deconditioning      Other Visit Diagnoses     Acute cystitis without hematuria               - plan to discuss with cardiology - decreasing metoprolol dose to see if symptoms improve.  Difficult to assess if fatigue and weakness is due to underlying CHF or something new  - orders for OT and PT with goal of  discharge to assisted living if possible.  Unknown if she will improve enough for independent living  - UTI treatment completed  - continue torsemide      There are no Patient Instructions on file for this visit.    Return in about 3 days (around 8/22/2019) for Specialist Appointment.    Tanja Flower PA-C  [facility]

## 2019-08-19 NOTE — PROGRESS NOTES
[facility]  PRIMARY CARE PROVIDER AND CLINIC:  Marbin Rouse MD, [address] [facility] [id number]    Patient was seen by Dr. Leon at the [facility] on August 17, 2019, for an initial TCU visit.    Patient is a 89 year old  (6/22/1924), admitted to the above facility from  [facility]. Hospital stay 8/10/19 through 8/15/19..  Admitted to this facility for  rehab, medical management and nursing care.      Patient has a history of chronic heart failure with preserved ejection fraction.  She was admitted to the hospital with worsening shortness of breath and lower extremity edema over the previous several weeks.  She was found to have new severe tricuspid regurgitation and moderate to severe mitral regurgitation on echocardiogram.  She was treated with a heparin drip which was discontinued when she developed hematuria.  Cardiac status improved with intravenous diuresis with a Lasix drip with subsequent loss of approximate 5 pounds.  She was started on aspirin, lisinopril and Demadex.  Patient been experiencing chest pain the previous several months.  An angiogram was considered but not performed secondary to hematuria and concerns regarding post catheterization anticoagulation.     Course was complicated by symptomatic UTI with E. coli for which she was treated with cefuroxime.    History of pulmonary fibrosis and atrial fibrillation, status post AV node ablation with pacemaker placement earlier this year      Patient states she feels tired, she has mild shortness of breath.  Appetite has been diminished,   Though she states this is been a chronic issue for many years.  She states she uses oxygen at home most of the time but that she is been using it as needed since admission to the TCU.  She denies chest pain, cough, abdominal pain, nausea, vomiting, dysuria, fevers, chills, rash.    She notes chronic low back pain for which she is receiving low-dose  oxycodone with some benefit    CODE STATUS/ADVANCE DIRECTIVES DISCUSSION:   DNR / DNI  Patient's living condition: lives in an assisted living facility  ALLERGIES: Patient has no known allergies.  PAST MEDICAL HISTORY:  has a past medical history of Atrial fibrillation (H), Chronic back pain, Chronic diastolic CHF (congestive heart failure) (H), Chronic kidney disease, stage III (moderate) (H), Pacemaker (Jnauary 2019), Pulmonary fibrosis (H), Pulmonary hypertension (H), and Unspecified essential hypertension.  PAST SURGICAL HISTORY:   has a past surgical history that includes hiatal hernia repair (6/10); cataract iol, rt/lt (5/03); hernia repair, inguinal rt/lt (1954); carpal tunnel release rt/lt (1990's); and EP Ablation AV Node (N/A, 1/18/2019).  FAMILY HISTORY: family history includes Unknown/Adopted in her father and mother.  SOCIAL HISTORY:   reports that she has never smoked. She has never used smokeless tobacco. She reports that she drinks alcohol. She reports that she does not use drugs.      Current Outpatient Medications   Medication Sig Dispense Refill     acetaminophen (TYLENOL) 325 MG tablet Take 2 tablets (650 mg) by mouth every 4 hours as needed for mild pain       acetaminophen (TYLENOL) 500 MG tablet Take one tablet 8 hours after morning percocet and one tablet at bedtime  0     aspirin (ASA) 81 MG EC tablet Take 1 tablet (81 mg) by mouth daily 30 tablet 0     cefuroxime (CEFTIN) 250 MG tablet Take 1 tablet (250 mg) by mouth daily (next dose 8/16, last dose 8/18) 4 tablet 0     Cyanocobalamin (VITAMIN B 12 PO) Take 1 tablet by mouth daily        escitalopram (LEXAPRO) 5 MG tablet Take 1 tablet (5 mg) by mouth daily 30 tablet 5     lisinopril (PRINIVIL/ZESTRIL) 5 MG tablet Take 1 tablet (5 mg) by mouth daily (Hold for SBP <110) 30 tablet 0     metoprolol succinate ER (TOPROL XL) 50 MG 24 hr tablet Take 1 tablet (50 mg) by mouth daily (Hold for SBP <100 or HR <60)  0     order for DME Equipment being  ordered: Oxygen, 2L w activity 1 each 0     oxyCODONE-acetaminophen (PERCOCET) 5-325 MG tablet Take 0.5 tab every morning.  Max of 0.5 tab daily. 3 tablet 0     torsemide (DEMADEX) 10 MG tablet Take 1 tablet (10 mg) by mouth 2 times daily (Hold for SBP <105)       VITAMIN D, CHOLECALCIFEROL, PO Take 1,000 Units by mouth daily.           ROS:  10 point ROS negative except as noted above.      Exam:  GENERAL APPEARANCE:  Alert, in no distress, thin, lying comfortably on her back.  Very pleasant.  ENT: No oral lesions [place]  EYES: No eye redness or drainage  RESP: Lungs clear  CV: RRR, prominent systolic murmur  ABDOMEN: Soft, nontender, nondistended  M/S: No lower extremity edema.  Generalized weakness.  Gait was not assessed.    SKIN: No rash  NEURO: Alert, fully oriented, very pleasant.  Face symmetric.  No tremor, no focal weakness.  PSYCH:  affect and mood normal.        ASSESSMENT/PLAN:    Acute on chronic combined systolic and diastolic heart failure with preserved ejection fraction (H)  Demand ischemia (H)  Worsening tricuspid and mitral regurgitation   Improved symptoms with intravenous diuresis during hospitalization.  Hopefully this can be sustained with current oral diuretic regimen.  Plan therapies, monitor weight, exam, O2 saturations, basic metabolic panel.  Cardiology follow-up.  Unclear if coronary angiogram to be performed would tolerate low blood pressure.  As long as functional status, renal function stable.      Chronic atrial fibrillation (H)  Status post pacemaker placement.  Not on anticoagulation.    Hematuria during hospitalization while on heparin drip.  No recurrence.  Symptomatic UTI, resolved, following course of antibiotics.  Plan: Monitor  status.      Hypertension goal BP (blood pressure) < 140/80  Chronic kidney disease, stage III (moderate) (H)  Plan monitor blood pressure, BMP    Chronic low back pain  Fair control on low-dose oxycodone  Plan: Continue therapies, monitor for adverse  effects of oxycodone.    .  Esa Leon MD

## 2019-08-19 NOTE — Clinical Note
I saw Theresa in clinic today.  She is very tired and BP was 90/50 today.  What are your thoughts on decreasing the metoprolol dose? Torsemide appears to be working well. Thanks for your input!Pablo Flower PA-C

## 2019-08-21 NOTE — LETTER
8/21/2019        RE: Theresa Alves  [address]  [facility] [address]        [facility]  [place] Medical Record Number:  [id number]  Place of Service where encounter took place:  [facility] ([facility] [[id number]]  Chief Complaint   Patient presents with     RECHECK       HPI:    Theresa Alves  is a 89 year old (6/22/1924), who is being seen today for an episodic care visit.  HPI information obtained from: facility chart records, facility staff and patient report. Today's concern is:  CHF/Atrial fib/HTN: BP stable 122/71, 127/67, 94/55 with HR 60-80 range, admit weight is 103lbs current weight is 105.1lbs patient denies CP, palpitations, SOB at rest, states she does have some HORTON she is walking with therapy using a RW short distances up to 50 feet with SBA patient states she feels she is getting stronger.   Pulmonary fibrosis: patient is not on O2 SaO2 > 90% on room air.   CKD: see labs    Past Medical and Surgical History reviewed in Epic today.    MEDICATIONS:  Current Outpatient Medications   Medication Sig Dispense Refill     acetaminophen (TYLENOL) 500 MG tablet Take 500 mg by mouth every 8 hours as needed for mild pain       aspirin (ASA) 81 MG EC tablet Take 1 tablet (81 mg) by mouth daily 30 tablet 0     Cyanocobalamin (VITAMIN B 12 PO) Take 1 tablet by mouth daily        escitalopram (LEXAPRO) 5 MG tablet Take 1 tablet (5 mg) by mouth daily 30 tablet 5     lisinopril (PRINIVIL/ZESTRIL) 5 MG tablet Take 1 tablet (5 mg) by mouth daily (Hold for SBP <110) 30 tablet 0     metoprolol succinate ER (TOPROL XL) 50 MG 24 hr tablet Take 1 tablet (50 mg) by mouth daily (Hold for SBP <100 or HR <60)  0     order for DME Equipment being ordered: Oxygen, 2L w activity 1 each 0     oxyCODONE-acetaminophen (PERCOCET) 5-325 MG tablet Take 0.5 tab every morning.  Max of 0.5 tab daily. 3 tablet 0     torsemide (DEMADEX) 10 MG tablet Take 1 tablet (10 mg) by mouth 2 times  daily (Hold for SBP <105)       VITAMIN D, CHOLECALCIFEROL, PO Take 1,000 Units by mouth daily.         acetaminophen (TYLENOL) 325 MG tablet Take 2 tablets (650 mg) by mouth every 4 hours as needed for mild pain (Patient not taking: Reported on 8/20/2019)       acetaminophen (TYLENOL) 500 MG tablet Take one tablet 8 hours after morning percocet and one tablet at bedtime (Patient not taking: Reported on 8/20/2019)  0     cefuroxime (CEFTIN) 250 MG tablet Take 1 tablet (250 mg) by mouth daily (next dose 8/16, last dose 8/18) (Patient not taking: Reported on 8/20/2019) 4 tablet 0         REVIEW OF SYSTEMS:  10 point ROS of systems including Constitutional, Eyes, Respiratory, Cardiovascular, Gastroenterology, Genitourinary, Integumentary, Musculoskeletal, Psychiatric were all negative except for pertinent positives noted in my HPI.    Objective:  /71   Pulse 62   Temp 97.8  F (36.6  C)   Resp 20   Wt 47.7 kg (105 lb 1.6 oz)   LMP  (LMP Unknown)   SpO2 98%   BMI 16.96 kg/m     Exam:  GENERAL APPEARANCE:  Alert, in no distress, thin  ENT:  Mouth and posterior oropharynx normal, moist mucous membranes, [place]  EYES:  EOM, conjunctivae, lids, pupils and irises normal, PERRL  RESP:  respiratory effort and palpation of chest normal, lungs clear to auscultation , no respiratory distress  CV:  Palpation and auscultation of heart done , regular rate and rhythm, no murmur, rub, or gallop, no edema noted  ABDOMEN:  normal bowel sounds, soft, nontender, no hepatosplenomegaly or other masses  M/S:   Examination of:   right upper extremity, left upper extremity, right lower extremity and left lower extremity  Inspection, ROM, stability and muscle strength normal and generalized weakness noted  SKIN:  Inspection of skin and subcutaneous tissue baseline  NEURO:   Cranial nerves 2-12 are normal tested and grossly at patient's baseline, speech WNL  PSYCH:  affect and mood normal    Labs:   Recent labs in [facility] reviewed by me  today.     ASSESSMENT/PLAN:  ASSESSMENT/PLAN:  Acute on chronic combined systolic and diastolic heart failure with preserved ejection fraction (H)  Demand ischemia (H)  Non-rheumatic mitral regurgitation New severe MR likely d/t severe PHTN  Pulmonary hypertension (H)  Pulmonary fibrosis  Chronic atrial fibrillation (H)  Hypertension goal BP (blood pressure) < 140/80  Chronic kidney disease, stage III (moderate) (H)  Physical deconditioning  Acute/ongoing: daily weights, vitals daily and prn, BMP twice weekly, PT and OT for strengthening,   Torsemide 10mg BID, toprol xl 50mg QD, lisinopril 5mg QD, ASA 81mg QD  F/u with cardiology for OP angiogram     Chronic low back pain  Ongoing; OK for K-pad prn for low back pain, continue percocet 5/325mg 1/2 tab PO QAM prn for pain and tylenol 500mg q 8 hours prn for pain    Orders written by provider at facility  No new orders today     Total time spent with patient visit at the skilled nursing facility was 45 min including patient visit and review of past records. Greater than 50% of total time spent with counseling and coordinating care due to coordinating care with nurse on admission orders, coordinating care with follow up labs and appointments and counseling patient/resident for 10 minutes on: the plan of SNF stay and projected length of stay, current medications (treatments) reconciled from the hospital and recent past lab and imaging results and subsequent treatment plan.  Electronically signed by:  Tonya Lynn Haase, APRN CNP               Sincerely,        Tonya Lynn Haase, APRN CNP

## 2019-08-21 NOTE — PROGRESS NOTES
[facility]  [place] [facility] Record Number:  [id number]  Place of Service where encounter took place:  [facility] ([facility]) [[id number]]  Chief Complaint   Patient presents with     RECHECK       HPI:    Theresa Alves  is a 89 year old (6/22/1924), who is being seen today for an episodic care visit.  HPI information obtained from: facility chart records, facility staff and patient report. Today's concern is:  CHF/Atrial fib/HTN: BP stable 122/71, 127/67, 94/55 with HR 60-80 range, admit weight is 103lbs current weight is 105.1lbs patient denies CP, palpitations, SOB at rest, states she does have some HORTON she is walking with therapy using a RW short distances up to 50 feet with SBA patient states she feels she is getting stronger.   Pulmonary fibrosis: patient is not on O2 SaO2 > 90% on room air.   CKD: see labs    Past Medical and Surgical History reviewed in Epic today.    MEDICATIONS:  Current Outpatient Medications   Medication Sig Dispense Refill     acetaminophen (TYLENOL) 500 MG tablet Take 500 mg by mouth every 8 hours as needed for mild pain       aspirin (ASA) 81 MG EC tablet Take 1 tablet (81 mg) by mouth daily 30 tablet 0     Cyanocobalamin (VITAMIN B 12 PO) Take 1 tablet by mouth daily        escitalopram (LEXAPRO) 5 MG tablet Take 1 tablet (5 mg) by mouth daily 30 tablet 5     lisinopril (PRINIVIL/ZESTRIL) 5 MG tablet Take 1 tablet (5 mg) by mouth daily (Hold for SBP <110) 30 tablet 0     metoprolol succinate ER (TOPROL XL) 50 MG 24 hr tablet Take 1 tablet (50 mg) by mouth daily (Hold for SBP <100 or HR <60)  0     order for DME Equipment being ordered: Oxygen, 2L w activity 1 each 0     oxyCODONE-acetaminophen (PERCOCET) 5-325 MG tablet Take 0.5 tab every morning.  Max of 0.5 tab daily. 3 tablet 0     torsemide (DEMADEX) 10 MG tablet Take 1 tablet (10 mg) by mouth 2 times daily (Hold for SBP <105)       VITAMIN D, CHOLECALCIFEROL, PO Take 1,000 Units by mouth daily.          acetaminophen (TYLENOL) 325 MG tablet Take 2 tablets (650 mg) by mouth every 4 hours as needed for mild pain (Patient not taking: Reported on 8/20/2019)       acetaminophen (TYLENOL) 500 MG tablet Take one tablet 8 hours after morning percocet and one tablet at bedtime (Patient not taking: Reported on 8/20/2019)  0     cefuroxime (CEFTIN) 250 MG tablet Take 1 tablet (250 mg) by mouth daily (next dose 8/16, last dose 8/18) (Patient not taking: Reported on 8/20/2019) 4 tablet 0         REVIEW OF SYSTEMS:  10 point ROS of systems including Constitutional, Eyes, Respiratory, Cardiovascular, Gastroenterology, Genitourinary, Integumentary, Musculoskeletal, Psychiatric were all negative except for pertinent positives noted in my HPI.    Objective:  /71   Pulse 62   Temp 97.8  F (36.6  C)   Resp 20   Wt 47.7 kg (105 lb 1.6 oz)   LMP  (LMP Unknown)   SpO2 98%   BMI 16.96 kg/m    Exam:  GENERAL APPEARANCE:  Alert, in no distress, thin  ENT:  Mouth and posterior oropharynx normal, moist mucous membranes, [place]  EYES:  EOM, conjunctivae, lids, pupils and irises normal, PERRL  RESP:  respiratory effort and palpation of chest normal, lungs clear to auscultation , no respiratory distress  CV:  Palpation and auscultation of heart done , regular rate and rhythm, no murmur, rub, or gallop, no edema noted  ABDOMEN:  normal bowel sounds, soft, nontender, no hepatosplenomegaly or other masses  M/S:   Examination of:   right upper extremity, left upper extremity, right lower extremity and left lower extremity  Inspection, ROM, stability and muscle strength normal and generalized weakness noted  SKIN:  Inspection of skin and subcutaneous tissue baseline  NEURO:   Cranial nerves 2-12 are normal tested and grossly at patient's baseline, speech WNL  PSYCH:  affect and mood normal    Labs:   Recent labs in [facility] reviewed by me today.     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 (H)  Demand ischemia (H)  Non-rheumatic mitral regurgitation New severe MR likely d/t severe PHTN  Pulmonary hypertension (H)  Pulmonary fibrosis  Chronic atrial fibrillation (H)  Hypertension goal BP (blood pressure) < 140/80  Chronic kidney disease, stage III (moderate) (H)  Physical deconditioning  Acute/ongoing: daily weights, vitals daily and prn, BMP twice weekly, PT and OT for strengthening,   Torsemide 10mg BID, toprol xl 50mg QD, lisinopril 5mg QD, ASA 81mg QD  F/u with cardiology for OP angiogram     Chronic low back pain  Ongoing; OK for K-pad prn for low back pain, continue percocet 5/325mg 1/2 tab PO QAM prn for pain and tylenol 500mg q 8 hours prn for pain    Orders written by provider at facility  No new orders today     Total time spent with patient visit at the skilled nursing facility was 45 min including patient visit and review of past records. Greater than 50% of total time spent with counseling and coordinating care due to coordinating care with nurse on admission orders, coordinating care with follow up labs and appointments and counseling patient/resident for 10 minutes on: the plan of SNF stay and projected length of stay, current medications (treatments) reconciled from the hospital and recent past lab and imaging results and subsequent treatment plan.  Electronically signed by:  Tonya Lynn Haase, APRN CNP

## 2019-08-22 NOTE — TELEPHONE ENCOUNTER
Received a VM on triage line from Rommel at [facility] stating patient ws seen today and was told to hold off on taking Ranexa due to kidney function, but has also received a refill from [facility] pharmacy, so wants to review the plan. Called and spoke to Rommel and  advised that Ranexa should not be given until directed otherwise by a provider. Rommel verbalized understanding.

## 2019-08-22 NOTE — LETTER
8/22/2019    Marbin Rouse MD  [address]  [facility] [address]    RE: Theresa Alves       Dear Colleague,    I had the pleasure of seeing Theresa Alves in the [facility].    Cardiology Clinic Progress Note    Theresa Alves MRN# [id number]   YOB: 1924 Age: 89 year old   Primary cardiologist: Dr. Dhillon / Lolis Davenport ([facility])         Assessment and Plan:     In summary, Theresa Alves presents today for a hospital f/u visit in the [facility] clinic. Her only complaint is of ongoing fatigue. Breathing and foot swelling has improved. Her weight has been stable over the past week. Unfortunately her creatinine has suffered with lisinopril initiation.     1. HFpEF, severe TR from pacemaker lead with severe PHTN, mod-severe MR  2. Recent admission for above, diuresed 5 lbs to 103.5 lbs, PTA Lasix 60 changed to torsemide 10 BID - seems to be effective for now  3. NSTEMI, inpatient cath deferred due to RODRÍGUEZ and prior hematuria - patient declines cath today. On aspirin  4. Intermittent chest pain x months  5. Deconditioning, progressive fatigue  6. Marginal BP  7. RODRÍGUEZ, acute - creat 1.77, K+ 5.2 - worsened with lisinopril initiation  8. Hx of O2 use, now weaned off completely  9. Nosebleeds, minor    Plan:  - Stop lisinopril.   - Daily saline nose spray - monitor bleeds. Worsening may require aspirin discontinuation.  - Given marginal BP and her biggest complaint being fatigue, would keep her BB at current dose and not add meds such as Imdur for now. Ranexa limited by CrCl of 14. We are treating this as angina based off presumption given age and recent NSTEMI. She will monitor her chest discomfort for now and let me know if it worsens.   - Relative sodium restriction encouraged.   - RTC with myself in 2 weeks and Lolis Davenport in 1 month with repeat labs.   - Will consider palliative care consult at next visit.    Her family members have many questions  "about her condition and recommendations and I did try to address these to their satisfaction. Conservative management with limited appointments is strongly preferred by the patient. She is willing to be seen more frequently for now in the interest of avoiding readmissions.         History of Presenting Illness:      Theresa Alves is a pleasant 89 year old patient who presents today for a hospital f/u visit in the [facility] clinic.    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 Eats one meal/day in facility dining room. Gets around in wheelchair.     Theresa last saw Lolis Davenport on July 15th. She'd recently been switched from metoprolol to amlodipine for hypertension due to complaints of fatigue on beta blocker therapy. However this did not help her symptoms and she was placed back on metoprolol. She had recently had her Lasix increased to 60 mg daily.     She was then admitted to [facility] from 8/10 - 8/15 with decompensated diastolic heart failure with unclear trigger. TTE revealed TR which had progressed from one year ago, now severe, probably from pacemaker wire placement. Her RV was mildly dilated and reduced with severe PHTN, RVSP 69. There was moderately severe MR. \"Impressive\" biatrial enlargement was also noted. She was diuresed 5-7 lbs with Lasix gtt and a beauchamp and unfortunately developed E. Coli cystitis with hematuria. She also had an assumed type II NSTEMI in the setting of heart failure. Coronary angiography was considered however this was ultimately deferred due to acute renal failure and ongoing hematuria. PTA  Lasix was discontinued; Torsemide 10 BID, lisinopril 5 and aspirin 81 mg were started. She was discharged at a weight of 103.5 lbs to [facility]. " "    Today, she presents to clinic with her niece Ericka. Her HORTON has improved and she's off oxygen completely. Her weight is stable from discharge. For the past two months she's had intermittent chest pain starting on the left and radiating across her chest. This symptom is unchanged, lasts 5-10 minutes in duration, and isn't too painful but worries her a little. She denies PND, orthopnea, edema, claudication, palpitations, near syncope or syncope. She feels her urine output is more than adequate. She's had some minor nosebleeds which have stopped with holding pressure. She's not excited to be taking aspirin for this reason. She has an old scale at home but isn't interested in weighing herself daily. Her niece states someone will be able to help her with this intermittently but it will probably be in the afternoons - they'll try to keep it consistent. She'll be discharged back home next week. Lives independently at baseline. They have an oximeter at home and will check her O2 along with weights.          Review of Systems:     12-pt ROS is negative except for as noted in the HPI.          Physical Exam:     Vitals: /56   Pulse 60   Ht 1.651 m (5' 5\")   Wt 47 kg (103 lb 9.6 oz)   LMP  (LMP Unknown)   SpO2 98%   BMI 17.24 kg/m     Wt Readings from Last 10 Encounters:   08/22/19 47 kg (103 lb 9.6 oz)   08/20/19 47.7 kg (105 lb 1.6 oz)   08/19/19 47.6 kg (105 lb)   08/15/19 46.7 kg (103 lb)   08/15/19 47.1 kg (103 lb 14.4 oz)   08/06/19 49 kg (108 lb)   07/15/19 49 kg (108 lb)   06/11/19 49.9 kg (110 lb)   04/03/19 49 kg (108 lb)   04/01/19 49.9 kg (110 lb)       Constitutional:  Patient is pleasant, alert, cooperative, and in NAD.  HEENT:  NCAT. PERRLA. EOM's intact.   Neck:  CVP appears mildly elevated.   Pulmonary: Normal respiratory effort. CTAB.   Cardiac: RRR, normal S1/S2, no S3/S4, no murmur or rub.   Abdomen:  Non-tender abdomen, no hepatosplenomegaly appreciated.   Vascular: Pulses in the upper and " lower extremities are 2+ and equal bilaterally.  Extremities: No edema, erythema, cyanosis or tenderness appreciated.  Skin:  No rashes or lesions appreciated.   Neurological:  No gross motor or sensory deficits.   Psych: Appropriate affect.        Data:     Labs reviewed:  Recent Labs   Lab Test 06/11/19  0830 04/01/19  1522 02/13/19  1006  06/05/18  1142 03/16/18  2304  11/28/17  1454 08/31/16  1211 12/22/15  1438 09/29/15  1223  01/10/14  0854  09/03/12  0800   LDL  --   --   --   --   --   --   --  52 79  --  77   < > 95  --  47   HDL  --   --   --   --   --   --   --  69  --   --   --   --  68  --  50   NHDL  --   --   --   --   --   --   --  73  --   --   --   --   --   --   --    CHOL  --   --   --   --   --   --   --  142  --   --   --   --  184  --  111   TRIG  --   --   --   --   --   --   --  107  --   --   --   --  106  --  70   TSH  --   --   --   --   --  4.13*  --  2.77  --  2.36  --    < >  --    < >  --    NTBNP 11,928* 14,191* 14,730*   < >  --   --    < > 2,510*  --  1,963*  --   --   --    < >  --    ARACELI  --   --   --   --  267*  --   --   --   --   --   --   --   --    < >  --     < > = values in this interval not displayed.       Lab Results   Component Value Date    WBC 8.6 08/15/2019    RBC 3.54 (L) 08/12/2019    HGB 11.9 (L) 08/19/2019    HCT 35.4 08/12/2019     08/12/2019    MCH 33.9 (H) 08/12/2019    MCHC 33.9 08/12/2019    RDW 15.6 (H) 08/12/2019     08/15/2019       Lab Results   Component Value Date     (L) 08/22/2019    POTASSIUM 5.2 (H) 08/22/2019    CHLORIDE 101 08/22/2019    CO2 28 08/22/2019    ANIONGAP 11.2 08/22/2019     (H) 08/22/2019    BUN 51 (H) 08/22/2019    CR 1.77 (H) 08/22/2019    GFRESTIMATED 27 (L) 08/22/2019    GFRESTBLACK 32 (L) 08/22/2019    PAWEL 9.4 08/22/2019      Lab Results   Component Value Date    AST 33 08/10/2019    ALT 19 08/10/2019       No results found for: A1C    No results found for: INR        Problem List:     Patient Active  Problem List   Diagnosis     Health Care Home     Chronic kidney disease, stage III (moderate) (H)     Hyperlipidemia with target LDL less than 130     Edema     Pulmonary fibrosis     Preventive measure     Hypertension goal BP (blood pressure) < 140/80     SOB (shortness of breath)     Congestive heart failure (H)     Chronic fatigue     Dizziness - light-headed     Degenerative arthritis of spine     Osteoporosis     Weight loss     Right shoulder pain     Low back pain     Physical deconditioning     Chronic pain syndrome     Memory loss     Numbness of right foot     Chronic congestive heart failure (H)     Atrial fibrillation, unspecified type (H)     Atrial fibrillation w RVR     Anemia due to blood loss, acute     Alcoholic encephalopathy (H)     Bradycardia     Chronic atrial fibrillation (H)     Right wrist pain     Chronic right shoulder pain     Pacemaker     Chronic midline low back pain without sciatica     HORTON (dyspnea on exertion)     Constipation, unspecified constipation type     Mild major depression (H)     Hypoxemia     CHF exacerbation (H)           Medications:     Current Outpatient Medications   Medication Sig Dispense Refill     acetaminophen (TYLENOL) 500 MG tablet Take 500 mg by mouth every 8 hours as needed for mild pain       aspirin (ASA) 81 MG EC tablet Take 1 tablet (81 mg) by mouth daily 30 tablet 0     Cyanocobalamin (VITAMIN B 12 PO) Take 1 tablet by mouth daily        escitalopram (LEXAPRO) 5 MG tablet Take 1 tablet (5 mg) by mouth daily 30 tablet 5     lisinopril (PRINIVIL/ZESTRIL) 5 MG tablet Take 1 tablet (5 mg) by mouth daily (Hold for SBP <110) 30 tablet 0     metoprolol succinate ER (TOPROL XL) 50 MG 24 hr tablet Take 1 tablet (50 mg) by mouth daily (Hold for SBP <100 or HR <60)  0     order for DME Equipment being ordered: Oxygen, 2L w activity 1 each 0     oxyCODONE-acetaminophen (PERCOCET) 5-325 MG tablet Take 0.5 tab every morning.  Max of 0.5 tab daily. 3 tablet 0      torsemide (DEMADEX) 10 MG tablet Take 1 tablet (10 mg) by mouth 2 times daily (Hold for SBP <105)       VITAMIN D, CHOLECALCIFEROL, PO Take 1,000 Units by mouth daily.               Past Medical History:     Past Medical History:   Diagnosis Date     Atrial fibrillation (H)      Chronic back pain      Chronic diastolic CHF (congestive heart failure) (H)      Chronic kidney disease, stage III (moderate) (H)      Pacemaker Jnauary 2019     Pulmonary fibrosis (H)      Pulmonary hypertension (H)      Unspecified essential hypertension     Essential hypertension     Past Surgical History:   Procedure Laterality Date     CARPAL TUNNEL RELEASE RT/LT  1990's    R     CATARACT IOL, RT/LT  5/03    R     EP ABLATION AV NODE N/A 1/18/2019    Procedure: EP Ablation AV Node;  Surgeon: Johny Walker MD;  Location:  HEART CARDIAC CATH LAB     HERNIA REPAIR, INGUINAL RT/LT  1954    R     hiatal hernia repair  6/10    large paraesophageal hiatal hernia; Nissen fundoplication      Family History   Problem Relation Age of Onset     Unknown/Adopted Mother      Unknown/Adopted Father      Diabetes No family hx of      Coronary Artery Disease No family hx of      Hypertension No family hx of      Hyperlipidemia No family hx of      Cerebrovascular Disease No family hx of      Breast Cancer No family hx of      Colon Cancer No family hx of      Prostate Cancer No family hx of      Other Cancer No family hx of      Depression No family hx of      Anxiety Disorder No family hx of      Mental Illness No family hx of      Substance Abuse No family hx of      Anesthesia Reaction No family hx of      Asthma No family hx of      Osteoporosis No family hx of      Genetic Disorder No family hx of      Thyroid Disease No family hx of      Obesity No family hx of      Social History     Socioeconomic History     Marital status:      Spouse name: Not on file     Number of children: 0     Years of education: Not on file     Highest  education level: Not on file   Occupational History     Occupation: worked at [employer] as a  for 40 yrs     Employer: RETIRED   Social Needs     Financial resource strain: Not on file     Food insecurity:     Worry: Not on file     Inability: Not on file     Transportation needs:     Medical: Not on file     Non-medical: Not on file   Tobacco Use     Smoking status: Never Smoker     Smokeless tobacco: Never Used   Substance and Sexual Activity     Alcohol use: Yes     Comment: 1 drink every other week     Drug use: No     Sexual activity: Not Currently   Lifestyle     Physical activity:     Days per week: Not on file     Minutes per session: Not on file     Stress: Not on file   Relationships     Social connections:     Talks on phone: Not on file     Gets together: Not on file     Attends Restorationist service: Not on file     Active member of club or organization: Not on file     Attends meetings of clubs or organizations: Not on file     Relationship status: Not on file     Intimate partner violence:     Fear of current or ex partner: Not on file     Emotionally abused: Not on file     Physically abused: Not on file     Forced sexual activity: Not on file   Other Topics Concern     Parent/sibling w/ CABG, MI or angioplasty before 65F 55M? Not Asked   Social History Narrative     Not on file           Allergies:   Patient has no known allergies.      Ermelinda Yen PA-C, PA-C  [facility]  Pager: [phone]    Thank you for allowing me to participate in the care of your patient.    Sincerely,     Ermelinda Yen PA-C     [facility]

## 2019-08-22 NOTE — PATIENT INSTRUCTIONS
Today, we discussed the following:   - Results: The kidney function has worsened.   - Medication changes:    Stop lisinopril.   Discussed starting Ranexa for chest pain, but due to your kidney function we will wait to start this. Please keep track of your chest pain(frequency, severity) and let me know if it changes.    Use the saline nose spray two spray per nostril every day. A bedside humidifier is a great idea.     Future Appointments   Date Time Provider Department Center   2019 10:00 AM [facility]   2019 10:50 AM Ermelinda Yen PA-C [facility]   2019 11:40 AM [facility]   2019 12:30 PM Ethel, Lolis Gonzalez PA-C [facility]   2019 12:00 AM [facility] TECH1 [facility]   10/15/2019 11:00 AM Marbin Rouse MD [facility]         Once you get home, weigh yourself as often as possible, and call me if your weight starts to go up, you notice worsening breathing or leg swelling.   If you re-experience a nosebleed that does not stop with holding pressure, call me.     Please, remember to continue the followin.  Weigh yourself daily. Call if your weight is up > than 2 pounds in one day, or 5 pounds in one week; if you feel more short of breath or have worsening swelling in your legs or abdomen.  2.  Eat a low sodium diet (less than 2,000mg or 2g daily). If you eat less salt, you will retain less fluid.   3.  Avoid alcohol, as this can worsen heart failure.   4.  Avoid NSAIDs as able (For example, Ibuprofen / aleve / advil / naprosen / diclofenac).    Please call [facility] nurse for any questions or concerns Mon-Fri 8am-4pm:   [phone]  For concerns after hours:   [phone]   Scheduling phone number:   [phone]    Thank you for visiting with us today.   Ermelinda Yen PA-C  ______________________________________________________________

## 2019-08-22 NOTE — NURSING NOTE
The After Visit Summary was reviewed with the patient following their office visit with Ermelinda Yen PA-C. Patient was educated about any medication changes, the importance of following a low sodium diet, importance of recording daily weights, and when to call [facility] clinic. Patient verbalized understanding of the information and agrees to call with any questions or concerns.     Labs: Lab results from today were reviewed with the patient during the office visit. A copy of the results were provided on the After Visit Summary.     Future Appointments   Date Time Provider Department Center   9/5/2019 10:00 AM [facility]   9/5/2019 10:50 AM Ermelinda Yen PA-C [facility]   9/17/2019 11:40 AM [facility]   9/17/2019 12:30 PM Lolis Davenport PA-C [facility]   9/23/2019 12:00 [facility]   10/15/2019 11:00 AM Marbin Rouse MD [facility]     Medication changes:    1.  Stop lisinopril   2.  SD discussed starting Ranexa for chest pain, but d/t poor kidney function, will not be able to do this. Patient/family instructed to monitor the frequency/severity of the CP and call us with any changes      Saadia Mays, RN  [facility] Clinic RN Care Coordinator  [facility]  [phone]

## 2019-08-22 NOTE — PROGRESS NOTES
Cardiology Clinic Progress Note    Theresa Alves MRN# [id number]   YOB: 1924 Age: 89 year old   Primary cardiologist: Dr. Dhillon / [place] [place]         Assessment and Plan:     In summary, Theresa Alves presents today for a hospital f/u visit in the [facility] clinic. Her only complaint is of ongoing fatigue. Breathing and foot swelling has improved. Her weight has been stable over the past week. Unfortunately her creatinine has suffered with lisinopril initiation.     1. HFpEF, severe TR from pacemaker lead with severe PHTN, mod-severe MR  2. Recent admission for above, diuresed 5 lbs to 103.5 lbs, PTA Lasix 60 changed to torsemide 10 BID - seems to be effective for now  3. NSTEMI, inpatient cath deferred due to RODRÍGUEZ and prior hematuria - patient declines cath today. On aspirin  4. Intermittent chest pain x months  5. Deconditioning, progressive fatigue  6. Marginal BP  7. RODRÍGUEZ, acute - creat 1.77, K+ 5.2 - worsened with lisinopril initiation  8. Hx of O2 use, now weaned off completely  9. Nosebleeds, minor    Plan:  - Stop lisinopril.   - Daily saline nose spray - monitor bleeds. Worsening may require aspirin discontinuation.  - Given marginal BP and her biggest complaint being fatigue, would keep her BB at current dose and not add meds such as Imdur for now. Ranexa limited by CrCl of 14. We are treating this as angina based off presumption given age and recent NSTEMI. She will monitor her chest discomfort for now and let me know if it worsens.   - Relative sodium restriction encouraged.   - RTC with myself in 2 weeks and Lolis Davenport in 1 month with repeat labs.   - Will consider palliative care consult at next visit.    Her family members have many questions about her condition and recommendations and I did try to address these to their satisfaction. Conservative management with limited appointments is strongly preferred by the patient. She is willing to be seen more frequently for now in  "the interest of avoiding readmissions.         History of Presenting Illness:      Theresa Alves is a pleasant 89 year old patient who presents today for a hospital f/u visit in the [facility] clinic.    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 Eats one meal/day in facility dining room. Gets around in wheelchair.     Theresa last saw Lolis Davenport on July 15th. She'd recently been switched from metoprolol to amlodipine for hypertension due to complaints of fatigue on beta blocker therapy. However this did not help her symptoms and she was placed back on metoprolol. She had recently had her Lasix increased to 60 mg daily.     She was then admitted to [facility] from 8/10 - 8/15 with decompensated diastolic heart failure with unclear trigger. TTE revealed TR which had progressed from one year ago, now severe, probably from pacemaker wire placement. Her RV was mildly dilated and reduced with severe PHTN, RVSP 69. There was moderately severe MR. \"Impressive\" biatrial enlargement was also noted. She was diuresed 5-7 lbs with Lasix gtt and a beauchamp and unfortunately developed E. Coli cystitis with hematuria. She also had an assumed type II NSTEMI in the setting of heart failure. Coronary angiography was considered however this was ultimately deferred due to acute renal failure and ongoing hematuria. PTA  Lasix was discontinued; Torsemide 10 BID, lisinopril 5 and aspirin 81 mg were started. She was discharged at a weight of 103.5 lbs to [facility].     Today, she presents to clinic with her niece Ericka. Her HORTON has improved and she's off oxygen completely. Her weight is stable from discharge. For the past two months she's had intermittent chest pain starting on the left and " "radiating across her chest. This symptom is unchanged, lasts 5-10 minutes in duration, and isn't too painful but worries her a little. She denies PND, orthopnea, edema, claudication, palpitations, near syncope or syncope. She feels her urine output is more than adequate. She's had some minor nosebleeds which have stopped with holding pressure. She's not excited to be taking aspirin for this reason. She has an old scale at home but isn't interested in weighing herself daily. Her niece states someone will be able to help her with this intermittently but it will probably be in the afternoons - they'll try to keep it consistent. She'll be discharged back home next week. Lives independently at baseline. They have an oximeter at home and will check her O2 along with weights.          Review of Systems:     12-pt ROS is negative except for as noted in the HPI.          Physical Exam:     Vitals: /56   Pulse 60   Ht 1.651 m (5' 5\")   Wt 47 kg (103 lb 9.6 oz)   LMP  (LMP Unknown)   SpO2 98%   BMI 17.24 kg/m    Wt Readings from Last 10 Encounters:   08/22/19 47 kg (103 lb 9.6 oz)   08/20/19 47.7 kg (105 lb 1.6 oz)   08/19/19 47.6 kg (105 lb)   08/15/19 46.7 kg (103 lb)   08/15/19 47.1 kg (103 lb 14.4 oz)   08/06/19 49 kg (108 lb)   07/15/19 49 kg (108 lb)   06/11/19 49.9 kg (110 lb)   04/03/19 49 kg (108 lb)   04/01/19 49.9 kg (110 lb)       Constitutional:  Patient is pleasant, alert, cooperative, and in NAD.  HEENT:  NCAT. PERRLA. EOM's intact.   Neck:  CVP appears mildly elevated.   Pulmonary: Normal respiratory effort. CTAB.   Cardiac: RRR, normal S1/S2, no S3/S4, no murmur or rub.   Abdomen:  Non-tender abdomen, no hepatosplenomegaly appreciated.   Vascular: Pulses in the upper and lower extremities are 2+ and equal bilaterally.  Extremities: No edema, erythema, cyanosis or tenderness appreciated.  Skin:  No rashes or lesions appreciated.   Neurological:  No gross motor or sensory deficits.   Psych: " Appropriate affect.        Data:     Labs reviewed:  Recent Labs   Lab Test 06/11/19  0830 04/01/19  1522 02/13/19  1006  06/05/18  1142 03/16/18  2304  11/28/17  1454 08/31/16  1211 12/22/15  1438 09/29/15  1223  01/10/14  0854  09/03/12  0800   LDL  --   --   --   --   --   --   --  52 79  --  77   < > 95  --  47   HDL  --   --   --   --   --   --   --  69  --   --   --   --  68  --  50   NHDL  --   --   --   --   --   --   --  73  --   --   --   --   --   --   --    CHOL  --   --   --   --   --   --   --  142  --   --   --   --  184  --  111   TRIG  --   --   --   --   --   --   --  107  --   --   --   --  106  --  70   TSH  --   --   --   --   --  4.13*  --  2.77  --  2.36  --    < >  --    < >  --    NTBNP 11,928* 14,191* 14,730*   < >  --   --    < > 2,510*  --  1,963*  --   --   --    < >  --    ARACELI  --   --   --   --  267*  --   --   --   --   --   --   --   --    < >  --     < > = values in this interval not displayed.       Lab Results   Component Value Date    WBC 8.6 08/15/2019    RBC 3.54 (L) 08/12/2019    HGB 11.9 (L) 08/19/2019    HCT 35.4 08/12/2019     08/12/2019    MCH 33.9 (H) 08/12/2019    MCHC 33.9 08/12/2019    RDW 15.6 (H) 08/12/2019     08/15/2019       Lab Results   Component Value Date     (L) 08/22/2019    POTASSIUM 5.2 (H) 08/22/2019    CHLORIDE 101 08/22/2019    CO2 28 08/22/2019    ANIONGAP 11.2 08/22/2019     (H) 08/22/2019    BUN 51 (H) 08/22/2019    CR 1.77 (H) 08/22/2019    GFRESTIMATED 27 (L) 08/22/2019    GFRESTBLACK 32 (L) 08/22/2019    PAWEL 9.4 08/22/2019      Lab Results   Component Value Date    AST 33 08/10/2019    ALT 19 08/10/2019       No results found for: A1C    No results found for: INR        Problem List:     Patient Active Problem List   Diagnosis     Health Care Home     Chronic kidney disease, stage III (moderate) (H)     Hyperlipidemia with target LDL less than 130     Edema     Pulmonary fibrosis     Preventive measure     Hypertension  goal BP (blood pressure) < 140/80     SOB (shortness of breath)     Congestive heart failure (H)     Chronic fatigue     Dizziness - light-headed     Degenerative arthritis of spine     Osteoporosis     Weight loss     Right shoulder pain     Low back pain     Physical deconditioning     Chronic pain syndrome     Memory loss     Numbness of right foot     Chronic congestive heart failure (H)     Atrial fibrillation, unspecified type (H)     Atrial fibrillation w RVR     Anemia due to blood loss, acute     Alcoholic encephalopathy (H)     Bradycardia     Chronic atrial fibrillation (H)     Right wrist pain     Chronic right shoulder pain     Pacemaker     Chronic midline low back pain without sciatica     HORTON (dyspnea on exertion)     Constipation, unspecified constipation type     Mild major depression (H)     Hypoxemia     CHF exacerbation (H)           Medications:     Current Outpatient Medications   Medication Sig Dispense Refill     acetaminophen (TYLENOL) 500 MG tablet Take 500 mg by mouth every 8 hours as needed for mild pain       aspirin (ASA) 81 MG EC tablet Take 1 tablet (81 mg) by mouth daily 30 tablet 0     Cyanocobalamin (VITAMIN B 12 PO) Take 1 tablet by mouth daily        escitalopram (LEXAPRO) 5 MG tablet Take 1 tablet (5 mg) by mouth daily 30 tablet 5     lisinopril (PRINIVIL/ZESTRIL) 5 MG tablet Take 1 tablet (5 mg) by mouth daily (Hold for SBP <110) 30 tablet 0     metoprolol succinate ER (TOPROL XL) 50 MG 24 hr tablet Take 1 tablet (50 mg) by mouth daily (Hold for SBP <100 or HR <60)  0     order for DME Equipment being ordered: Oxygen, 2L w activity 1 each 0     oxyCODONE-acetaminophen (PERCOCET) 5-325 MG tablet Take 0.5 tab every morning.  Max of 0.5 tab daily. 3 tablet 0     torsemide (DEMADEX) 10 MG tablet Take 1 tablet (10 mg) by mouth 2 times daily (Hold for SBP <105)       VITAMIN D, CHOLECALCIFEROL, PO Take 1,000 Units by mouth daily.               Past Medical History:     Past Medical  History:   Diagnosis Date     Atrial fibrillation (H)      Chronic back pain      Chronic diastolic CHF (congestive heart failure) (H)      Chronic kidney disease, stage III (moderate) (H)      Pacemaker Jnauary 2019     Pulmonary fibrosis (H)      Pulmonary hypertension (H)      Unspecified essential hypertension     Essential hypertension     Past Surgical History:   Procedure Laterality Date     CARPAL TUNNEL RELEASE RT/LT  1990's    R     CATARACT IOL, RT/LT  5/03    R     EP ABLATION AV NODE N/A 1/18/2019    Procedure: EP Ablation AV Node;  Surgeon: Johny Walker MD;  Location:  HEART CARDI[facility] CATH LAB     HERNIA REPAIR, INGUINAL RT/LT  1954    R     hiatal hernia repair  6/10    large paraesophageal hiatal hernia; Nissen fundoplication      Family History   Problem Relation Age of Onset     Unknown/Adopted Mother      Unknown/Adopted Father      Diabetes No family hx of      Coronary Artery Disease No family hx of      Hypertension No family hx of      Hyperlipidemia No family hx of      Cerebrovascular Disease No family hx of      Breast Cancer No family hx of      Colon Cancer No family hx of      Prostate Cancer No family hx of      Other Cancer No family hx of      Depression No family hx of      Anxiety Disorder No family hx of      Mental Illness No family hx of      Substance Abuse No family hx of      Anesthesia Reaction No family hx of      Asthma No family hx of      Osteoporosis No family hx of      Genetic Disorder No family hx of      Thyroid Disease No family hx of      Obesity No family hx of      Social History     Socioeconomic History     Marital status:      Spouse name: Not on file     Number of children: 0     Years of education: Not on file     Highest education level: Not on file   Occupational History     Occupation: worked at [employer] as a  for 40 yrs     Employer: RETIRED   Social Needs     Financial resource strain: Not on file     Food insecurity:     Worry:  Not on file     Inability: Not on file     Transportation needs:     Medical: Not on file     Non-medical: Not on file   Tobacco Use     Smoking status: Never Smoker     Smokeless tobacco: Never Used   Substance and Sexual Activity     Alcohol use: Yes     Comment: 1 drink every other week     Drug use: No     Sexual activity: Not Currently   Lifestyle     Physical activity:     Days per week: Not on file     Minutes per session: Not on file     Stress: Not on file   Relationships     Social connections:     Talks on phone: Not on file     Gets together: Not on file     Attends Congregational service: Not on file     Active member of club or organization: Not on file     Attends meetings of clubs or organizations: Not on file     Relationship status: Not on file     Intimate partner violence:     Fear of current or ex partner: Not on file     Emotionally abused: Not on file     Physically abused: Not on file     Forced sexual activity: Not on file   Other Topics Concern     Parent/sibling w/ CABG, MI or angioplasty before 65F 55M? Not Asked   Social History Narrative     Not on file           Allergies:   Patient has no known allergies.      Ermelinda Yen PA-C, [place]  [facility]  Pager: [phone]

## 2019-08-27 NOTE — PROGRESS NOTES
[facility] DISCHARGE SUMMARY  PATIENT'S NAME: Theresa Alves  YOB: 1924  MEDICAL RECORD NUMBER:  [id number]  Place of Service where encounter took place:  [facility] ([facility]) [[id number]]    PRIMARY CARE PROVIDER AND CLINIC RESPONSIBLE AFTER TRANSFER:   Marbin Rouse MD, [address] / [facility] [address]    G Provider     Transferring providers: Tonya Lynn Haase, JUSTIN PORTILLO, Dr. Carolyn MD  Recent Hospitalization/ED:  [facility] stay 8/10/19 to 8/15/19.  Date of SNF Admission: August / 15 / 2019  Date of SNF (anticipated) Discharge: August / 27 / 2019  Discharged to: previous assisted living  Cognitive Scores: BIMS 15/15, SBT 0/28  Physical Function: Ambulating 100 ft with RW inde  DME: Walker    CODE STATUS/ADVANCE DIRECTIVES DISCUSSION:  DNR / DNI   ALLERGIES: Patient has no known allergies.    DISCHARGE DIAGNOSIS/NURSING FACILITY COURSE:   Patient progressed to walking up to 100 feet using a RW indep, indep with ADL's, patient will DC to [facility] with home PT, RN and HHA through [facility].     Past Medical History:  has a past medical history of Atrial fibrillation (H), Chronic back pain, Chronic diastolic CHF (congestive heart failure) (H), Chronic kidney disease, stage III (moderate) (H), Pacemaker (Jnauary 2019), Pulmonary fibrosis (H), Pulmonary hypertension (H), and Unspecified essential hypertension.    Discharge Medications:  Current Outpatient Medications   Medication Sig Dispense Refill     acetaminophen (TYLENOL) 500 MG tablet Take 500 mg by mouth every 8 hours as needed for mild pain       aspirin (ASA) 81 MG EC tablet Take 1 tablet (81 mg) by mouth daily 30 tablet 0     Cyanocobalamin (VITAMIN B 12 PO) Take 1 tablet by mouth daily        escitalopram (LEXAPRO) 5 MG tablet Take 1 tablet (5 mg) by mouth daily 30 tablet 5     metoprolol succinate ER (TOPROL XL) 50 MG 24 hr tablet Take 1 tablet (50 mg) by mouth daily (Hold for  SBP <100 or HR <60)  0     order for DME Equipment being ordered: Oxygen, 2L w activity 1 each 0     oxyCODONE-acetaminophen (PERCOCET) 5-325 MG tablet Take 0.5 tab every morning.  Max of 0.5 tab daily. 3 tablet 0     polyethylene glycol (MIRALAX/GLYCOLAX) packet Take 1 packet by mouth daily       sodium chloride (OCEAN) 0.65 % nasal spray Spray 2 sprays into both nostrils daily       torsemide (DEMADEX) 10 MG tablet Take 1 tablet (10 mg) by mouth 2 times daily (Hold for SBP <105)       VITAMIN D, CHOLECALCIFEROL, PO Take 1,000 Units by mouth daily.           Medication Changes/Rationale:     There were no medication changes made during TCU stay    Controlled medications sent with patient:   not applicable/none     ROS:   10 point ROS of systems including Constitutional, Eyes, Respiratory, Cardiovascular, Gastroenterology, Genitourinary, Integumentary, Musculoskeletal, Psychiatric were all negative except for pertinent positives noted in my HPI.    Physical Exam:   Vitals: /68   Pulse 60   Temp 97.9  F (36.6  C)   Resp 18   Wt 45.5 kg (100 lb 3.2 oz)   LMP  (LMP Unknown)   SpO2 98%   BMI 16.67 kg/m    BMI= Body mass index is 16.67 kg/m .  Exam:  GENERAL APPEARANCE:  Alert, in no distress, thin  ENT:  Mouth and posterior oropharynx normal, moist mucous membranes, [place]  EYES:  EOM, conjunctivae, lids, pupils and irises normal, PERRL  RESP:  respiratory effort and palpation of chest normal, lungs clear to auscultation , no respiratory distress  CV:  Palpation and auscultation of heart done , regular rate and rhythm, no murmur, rub, or gallop, no edema noted  ABDOMEN:  normal bowel sounds, soft, nontender, no hepatosplenomegaly or other masses  M/S:   Examination of:   right upper extremity, left upper extremity, right lower extremity and left lower extremity  Inspection, ROM, stability and muscle strength normal and generalized weakness noted  SKIN:  Inspection of skin and subcutaneous tissue  baseline  NEURO:   Cranial nerves 2-12 are normal tested and grossly at patient's baseline, speech WNL  PSYCH:  affect and mood normal    SNF labs: Recent labs in [facility] reviewed by me today.     ASSESSMENT/PLAN:  Acute on chronic combined systolic and diastolic heart failure with preserved ejection fraction (H)  Demand ischemia (H)  Non-rheumatic mitral regurgitation New severe MR likely d/t severe PHTN  Pulmonary hypertension (H)  Pulmonary fibrosis  Chronic atrial fibrillation (H)  Hypertension goal BP (blood pressure) < 140/80  Chronic kidney disease, stage III (moderate) (H)  Physical deconditioning  Acute/ongoing: continue daily weights, DC back to [facility] with home PT, RN and HHA through [facility]    Torsemide 10mg BID, toprol xl 50mg QD, lisinopril 5mg QD, ASA 81mg QD  F/u with cardiology for OP angiogram  Next appt with [facility] clinic is 9/5/19     Chronic low back pain  Ongoing; continue percocet 5/325mg 1/2 tab PO QAM prn per PTA pain regimen    DISCHARGE PLAN:    Follow up labs: No labs orders/due    Medical Follow Up:      Follow up with primary care provider in 1-2 weeks    MTM referral needed and placed by this provider: No    Current [place] scheduled appointments:  Next 5 appointments (look out 90 days)    Sep 04, 2019  4:00 PM CDT  Office Visit with Marbin Rouse MD  [facility] [address] [place] [id number]  [phone]   Oct 15, 2019 11:00 AM CDT  Office Visit with Marbin Rouse MD  [facility] ([facility]) [address] [place] [id number]  [phone]           Discharge Services: Home Care:  Physical Therapy, Registered Nurse and Home Health Aide    Discharge Instructions Verbalized to Patient at Discharge:     None      TOTAL DISCHARGE TIME:   Greater than 30 minutes  Electronically signed by:  Margarita  Lynn Haase, APRN CNP

## 2019-08-27 NOTE — LETTER
8/27/2019        RE: Theresa Alves  [address]  [facility] [address]        [place] [facility] DISCHARGE SUMMARY  PATIENT'S NAME: Theresa Alves  YOB: 1924  MEDICAL RECORD NUMBER:  [id number]  Place of Service where encounter took place:  [facility] ([facility] [[id number]]    PRIMARY CARE PROVIDER AND CLINIC RESPONSIBLE AFTER TRANSFER:   Marbin Rouse MD, [address] [facility] [address]    [facility] Provider     Transferring providers: Tonya Lynn Haase, JUSTIN PORTILLO, Dr. Carolyn MD  Recent Hospitalization/ED:  [facility] stay 8/10/19 to 8/15/19.  Date of SNF Admission: August / 15 / 2019  Date of SNF (anticipated) Discharge: August / 27 / 2019  Discharged to: previous assisted living  Cognitive Scores: BIMS 15/15, SBT 0/28  Physical Function: Ambulating 100 ft with RW inde  DME: Walker    CODE STATUS/ADVANCE DIRECTIVES DISCUSSION:  DNR / DNI   ALLERGIES: Patient has no known allergies.    DISCHARGE DIAGNOSIS/NURSING FACILITY COURSE:   Patient progressed to walking up to 100 feet using a RW indep, indep with ADL's, patient will DC to [facility] with home PT, RN and HHA through [facility].     Past Medical History:  has a past medical history of Atrial fibrillation (H), Chronic back pain, Chronic diastolic CHF (congestive heart failure) (H), Chronic kidney disease, stage III (moderate) (H), Pacemaker (Jnauary 2019), Pulmonary fibrosis (H), Pulmonary hypertension (H), and Unspecified essential hypertension.    Discharge Medications:  Current Outpatient Medications   Medication Sig Dispense Refill     acetaminophen (TYLENOL) 500 MG tablet Take 500 mg by mouth every 8 hours as needed for mild pain       aspirin (ASA) 81 MG EC tablet Take 1 tablet (81 mg) by mouth daily 30 tablet 0     Cyanocobalamin (VITAMIN B 12 PO) Take 1 tablet by mouth daily        escitalopram (LEXAPRO) 5 MG tablet Take 1 tablet (5 mg) by mouth daily 30 tablet 5      metoprolol succinate ER (TOPROL XL) 50 MG 24 hr tablet Take 1 tablet (50 mg) by mouth daily (Hold for SBP <100 or HR <60)  0     order for DME Equipment being ordered: Oxygen, 2L w activity 1 each 0     oxyCODONE-acetaminophen (PERCOCET) 5-325 MG tablet Take 0.5 tab every morning.  Max of 0.5 tab daily. 3 tablet 0     polyethylene glycol (MIRALAX/GLYCOLAX) packet Take 1 packet by mouth daily       sodium chloride (OCEAN) 0.65 % nasal spray Spray 2 sprays into both nostrils daily       torsemide (DEMADEX) 10 MG tablet Take 1 tablet (10 mg) by mouth 2 times daily (Hold for SBP <105)       VITAMIN D, CHOLECALCIFEROL, PO Take 1,000 Units by mouth daily.           Medication Changes/Rationale:     There were no medication changes made during TCU stay    Controlled medications sent with patient:   not applicable/none     ROS:   10 point ROS of systems including Constitutional, Eyes, Respiratory, Cardiovascular, Gastroenterology, Genitourinary, Integumentary, Musculoskeletal, Psychiatric were all negative except for pertinent positives noted in my HPI.    Physical Exam:   Vitals: /68   Pulse 60   Temp 97.9  F (36.6  C)   Resp 18   Wt 45.5 kg (100 lb 3.2 oz)   LMP  (LMP Unknown)   SpO2 98%   BMI 16.67 kg/m     BMI= Body mass index is 16.67 kg/m .  Exam:  GENERAL APPEARANCE:  Alert, in no distress, thin  ENT:  Mouth and posterior oropharynx normal, moist mucous membranes, [place]  EYES:  EOM, conjunctivae, lids, pupils and irises normal, PERRL  RESP:  respiratory effort and palpation of chest normal, lungs clear to auscultation , no respiratory distress  CV:  Palpation and auscultation of heart done , regular rate and rhythm, no murmur, rub, or gallop, no edema noted  ABDOMEN:  normal bowel sounds, soft, nontender, no hepatosplenomegaly or other masses  M/S:   Examination of:   right upper extremity, left upper extremity, right lower extremity and left lower extremity  Inspection, ROM, stability and muscle strength  normal and generalized weakness noted  SKIN:  Inspection of skin and subcutaneous tissue baseline  NEURO:   Cranial nerves 2-12 are normal tested and grossly at patient's baseline, speech WNL  PSYCH:  affect and mood normal    SNF labs: Recent labs in [facility] reviewed by me today.     ASSESSMENT/PLAN:  Acute on chronic combined systolic and diastolic heart failure with preserved ejection fraction (H)  Demand ischemia (H)  Non-rheumatic mitral regurgitation New severe MR likely d/t severe PHTN  Pulmonary hypertension (H)  Pulmonary fibrosis  Chronic atrial fibrillation (H)  Hypertension goal BP (blood pressure) < 140/80  Chronic kidney disease, stage III (moderate) (H)  Physical deconditioning  Acute/ongoing: continue daily weights, DC back to [facility] with home PT, RN and HHA through [facility]    Torsemide 10mg BID, toprol xl 50mg QD, lisinopril 5mg QD, ASA 81mg QD  F/u with cardiology for OP angiogram  Next appt with [facility] is 9/5/19     Chronic low back pain  Ongoing; continue percocet 5/325mg 1/2 tab PO QAM prn per PTA pain regimen    DISCHARGE PLAN:    Follow up labs: No labs orders/due    Medical Follow Up:      Follow up with primary care provider in 1-2 weeks    MTM referral needed and placed by this provider: No    Current [place] scheduled appointments:  Next 5 appointments (look out 90 days)    Sep 04, 2019  4:00 PM CDT  [facility] [address] [place] [id number]  [phone]   Oct 15, 2019 11:00 AM CDT  Office Visit with Marbin Rouse MD  [facility] [address] [place] [address]  [phone]           Discharge Services: Home Care:  Physical Therapy, Registered Nurse and Home Health Aide    Discharge Instructions Verbalized to Patient at Discharge:      None      TOTAL DISCHARGE TIME:   Greater than 30 minutes  Electronically signed by:  Tonya Lynn Haase, APRN CNP                         Sincerely,        Tonya Lynn Haase, APRN CNP

## 2019-08-28 NOTE — TELEPHONE ENCOUNTER
[facility]bella home care nurse along with [facility] pt's niece called. They have orders to hold metoprolol if systolic BP is below 100 and hold torsemide if BP is below 105. Could they be both systolic of 100?     metoprolol succinate ER (TOPROL XL) 50 MG 24 hr tablet  0 8/14/2019  No   Sig - Route: Take 1 tablet (50 mg) by mouth daily (Hold for SBP <100 or HR <60) - Oral     torsemide (DEMADEX) 10 MG tablet   8/15/2019  No   Sig - Route: Take 1 tablet (10 mg) by mouth 2 times daily (Hold for SBP <105) - Oral     When does the patient need to check her BP and before which medications? BP tend to drop in the afternoon. This will determine how much help patient needs from ALS and how much it will cost. They have to pay for every 15 minutes.     Advised pt just need to come in and see PCP, but OV is in 1 week. They need orders now.    Next 5 appointments (look out 90 days)    Sep 04, 2019  4:00 PM CDT  Office Visit with Marbin Rouse MD  [facility] [address] [place] [id number]  [phone]   Oct 15, 2019 11:00 AM CDT  Office Visit with Marbin Rouse MD  [facility] ([facility]) [address] [place] [id number]  [phone]        Also, requesting continuing SN 2x/wk for 2wks, 1x/wk for 6wks, and 3 prns. HHA 1x/wk for 8wks. PT/OT eval and treat. SW assessment. Verbal okay given.

## 2019-08-28 NOTE — TELEPHONE ENCOUNTER
These are not my orders. They came from the nursing home.                      I suggest giving the meds and not checking her blood pressure so often.

## 2019-08-29 NOTE — TELEPHONE ENCOUNTER
"Pt's niece called to report pt's symptoms (stomach and back pain) improved and pt is back to\" baseline\". They will keep pt's appt with PCP and follow up sooner if needed.   "

## 2019-08-29 NOTE — TELEPHONE ENCOUNTER
"Ericka (niece) calling with concerns about patient. She says the patient was released from TCU on Tuesday and \"not doing very well.\" The patient's pain has been under poor control. She has been complaining of stomach discomfort and does not want to sit up because she says \"it feels better lying down.\" Pt does not have an appetite. She has also complained about her legs feeling weak.     Ericka is on her way to the assisted living facility right now. She will call the clinic with a better assessment once she is there. Triage informed her that the patient may need to be taken back to the hospital due to the multiple concerns and patient's age. Will await Ericka's call to further assess patient.   "

## 2019-08-29 NOTE — PROGRESS NOTES
Clinic Care Coordination Contact  Care Coordination Communication     Recent Hospitalization/ED:  [facility] stay 8/10/19 to 8/15/19.  Date of SNF Admission: August / 15 / 2019- [facility]  Date of SNF (anticipated) Discharge: August / 27 / 2019  Discharged to: previous assisted living  Cognitive Scores: BIMS 15/15, SBT 0/28  Physical Function: Ambulating 100 ft with RW inde  DME: Walker      DISCHARGE PLAN:    Discharge Services: Home Care:  Physical Therapy, Registered Nurse and Home Health Aide    Follow up labs: No labs orders/due    Medical Follow Up:                 Follow up with primary care provider in 1-2 weeks    MTM referral needed and placed by this provider: No    Current [place] scheduled appointments:      Next 5 appointments (look out 90 days)    Sep 04, 2019  4:00 PM CDT  Office Visit with Marbin Rouse MD  [facility] [address] [place] [phone]       Home Care Contact:              Home Care Agency: [facility] RN/PT/OT/SW/HHA   Patient is certified for services until 10/26/19.              Contact name () and phone number: ALISHA George [phone]              Care Coordination contacted home care: Yes- via email.  Requesting to be notified of changes in patient status or plans for discharge from home care.              Start of care date: 8/28/19  * per note, family is considering hospice.  This is not to be discussed with patient yet.    Patient Contact:   Left VM with niece, Ericka.  Patient's line was busy.              Introduced self and role of care coordination.                Plan: RN Care Coordinator will await notification from home care staff informing RN Care Coordinator of patients discharge plans/needs.       Stephy Echols RN  Care Coordination  Phone:  [phone]  Email: [email or web address]  [place], [facility]

## 2019-08-29 NOTE — TELEPHONE ENCOUNTER
Marbella, home care was called back.     Provider message was shared.     Marbella will be contacting [facility] with provider message to share as well.     No further follow up at this time.

## 2019-08-30 PROBLEM — A41.9 SEPSIS (H): Status: ACTIVE | Noted: 2019-01-01

## 2019-08-30 NOTE — PROGRESS NOTES
RECEIVING UNIT ED HANDOFF REVIEW    ED Nurse Handoff Report was reviewed by: Francy Vidal RN on August 30, 2019 at 1:27 PM

## 2019-08-30 NOTE — PLAN OF CARE
PT: Consult received. Chart reviewed. Pt admitted today for sepsis secondary to persistent UTI, weakness. Met with pt at bedside and she is declining participation today due to not feeling well. Pt requesting to wait until tomorrow. Will reschedule.

## 2019-08-30 NOTE — ED PROVIDER NOTES
History     Chief Complaint:  Generalized Weakness     HPI   Theresa Alves is a 89 year old female with a history of hypertension, atrial fibrillation with RVR, congestive heart failure, and chronic kidney disease who presents with generalized weakness. EMS reported that the patient has been feeling ill the last 4 days with decreased PO intake, loose stools, and cloudy urine. While en route, she was hemodynamically stable and her blood sugar was 157. The patient states that she has had dysuria and abdominal pain for the past several weeks. The patient states that she has baseline shortness of breath with exertion and was recently weaned off of home oxygen. She endorses a cough just today and denies a fever. Of note, she lives at [facility] in [place].     Allergies:  The patient has no known drug allergies.    Medications:    Asprin 81   Lexapro   Toprol XL  Torsemide      Past Medical History:    Hypertension  Pulmonary fibrosis   Atrial fibrillation with RVR   Chronic back pain   Depression   Pacemaker   Bradycardia   Congestive heart failure   Chronic pain syndrome   Osteoporosis   Chronic kidney disease   Hyperlipidemia  Pulmonary hypertension     Past Surgical History:    Carpal tunnel release   Cataract   Ablation AV node   Hernia repair     Family History:    Family history unknown     Social History:  Negative for tobacco use.  1 drink every other week   Marital Status:   [5]       Review of Systems   Constitutional: Positive for appetite change. Negative for fever.   Respiratory: Positive for cough.    Gastrointestinal: Positive for abdominal pain.   Genitourinary: Positive for dysuria.   Neurological: Positive for weakness.   All other systems reviewed and are negative.      Physical Exam     Patient Vitals for the past 24 hrs:   BP Temp Temp src Pulse Resp SpO2 Height Weight   08/30/19 1232 -- -- -- 62 -- 97 % -- --   08/30/19 1230 113/54 -- -- -- -- -- -- --   08/30/19  "1100 115/63 -- -- 60 -- 96 % -- --   08/30/19 1025 98/56 -- -- 63 -- 93 % -- --   08/30/19 0922 98/56 -- -- 63 -- -- -- --   08/30/19 0836 -- -- -- -- -- 95 % -- --   08/30/19 0800 -- -- -- -- -- 95 % -- --   08/30/19 0744 105/54 97.8  F (36.6  C) Oral 69 16 96 % 1.676 m (5' 6\") 44 kg (97 lb)         Physical Exam  Nursing note and vitals reviewed.  Constitutional:  Oriented to person, place, and time. Cooperative.   HENT:   Nose:    Nose normal.   Mouth/Throat:   Mucous membranes are normal.   Eyes:    Conjunctivae normal and EOM are normal.      Pupils are equal, round, and reactive to light.   Neck:    Trachea normal.   Cardiovascular:  Normal rate, regular rhythm, normal heart sounds and normal pulses. 2/6 systolic murmur heard.  Pulmonary/Chest:  Effort normal.  Rales in the bases bilaterally.    Abdominal:   Soft. Normal appearance and bowel sounds are normal.      There is no tenderness to palpation at this time.      There is no rebound and no CVA tenderness.   Musculoskeletal:  Extremities atraumatic x 4.   Lymphadenopathy:  No cervical adenopathy.   Neurological:   Alert and oriented to person, place, and time. Normal strength.      No cranial nerve deficit or sensory deficit. GCS eye subscore is 4. GCS verbal subscore is 5. GCS motor subscore is 6.   Skin:    Skin is intact. No rash noted.   Psychiatric:   Normal mood and affect.      Emergency Department Course   ECG:  Indication: Generalized weakness  Time: 0816  Vent. Rate 60 bpm. CO interval *. QRS duration 130. QT/QTc 482/482. P-R-T axis * 103 6.  Ventricular-paced rhythm. Abnormal ECG. No significant change compared to EKG dated 8/10/19. Read time: 0825    Imaging:  Radiographic findings were communicated with the patient who voiced understanding of the findings.    XR Chest 2 views:   Two views of the chest are performed. Interstitial lung  changes appear stable. Underlying COPD is likely. Tiny nodular opacity  right upper lung is stable if not " slightly smaller in appearance  compared to prior chest x-ray. Heart size remains enlarged. Aorta is  tortuous and calcified. 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  No pneumothorax or pleural effusions. Bones are osteopenic. Mild  kyphosis. Implantable cardiac device left upper chest demonstrates a  single lead overlying the right ventricle, As per radiology.     Laboratory:  CBC: WBC: 22.9 (H), HGB: 11.3 (L), PLT: 198  BMP: Glucose 149 (H), Creatinine: 2.30 (H), Potassium: 5.4 (H), BUN: 76 (H), GFR: 17 (L), Bilirubin: 2.3 (H), Albumin: 3.1 (L), o/w WNL   INR: 1.29 (H)  BNP: 66,387 (H)  0757 Troponin: 0.243 (H)  ISTAT Gases:  PCO2: 37 (L). Lactic Acid: 2.4 (H)   Blood cultures (X2): pending    UA with Microscopic: Urine Blood: Moderate, Protein Albumin Urine: 100, Leukocyte Esterase Urine: Large, WBC: >182 (H), RBC: 5 (H), WBC Clumps: Present, Bacteria: Few, HPF: 3 (H), o/w WNL    Interventions:  0814 Zofran 4mg IV   0930 NS 250ml IV  1027 NS 250ml IV  1141 Rocephin 1g IV  1230 NS 1L IV    Emergency Department Course:  Nursing notes and vitals reviewed. (4290) I performed an exam of the patient as documented above.     IV inserted. Medicine administered as documented above. Blood drawn. This was sent to the lab for further testing, results above.  The patient provided a urine sample here in the emergency department. This was sent for laboratory testing, findings above.     The patient was sent for a chest XR while in the emergency department, findings above.   EKG obtained in the ED, see results above.     (1016) I rechecked the patient and discussed the results ofher workup thus far.     (1215)  I consulted with Dr. Gould of the hospitalist services. They are in agreement to accept the patient for admission.  (1219)  I reevaluated the patient and provided an update in regards to her ED course.      Findings and plan explained to the Patient  who consents to admission. Discussed the patient with Dr. Gould, who will admit the patient to a inpatient [facility] bed for further monitoring, evaluation, and treatment.    Impression & Plan         CMS Diagnoses: Elevated Lactate of 2.4 and no sepsis note found - Delete this reminder and enter the sepsis note or '.edcms' before signing chart.>>>  The patient has signs of Severe Sepsis as evidenced by:    1. 2 SIRS criteria, AND  2. Suspected infection, AND   3. Organ dysfunction: Lactic Acid > 1.9    Time severe sepsis diagnosis confirmed = 0855 as this was the time when Lactate resulted, and the level was > 1.9      3 Hour Severe Sepsis Bundle Completion:  1. Initial Lactic Acid Result:   Recent Labs   Lab Test 08/30/19  0855   LACT 2.4*     2. Blood Cultures before Antibiotics: Yes  3. Broad Spectrum Antibiotics Administered: Yes     Anti-infectives (From admission through now)    Start     Dose/Rate Route Frequency Ordered Stop    08/30/19 1028  cefTRIAXone (ROCEPHIN) 1 g vial to attach to  mL bag for ADULTS or NS 50 mL bag for PEDS      1 g  over 15-30 Minutes Intravenous ONCE 08/30/19 1027 08/30/19 1141        4. Full 30mL/kg bolus not administered due to CHF and acute Pulmonary Edema  Female patients must weigh at least 45.5 kg to calculate ideal body weight         Medical Decision Making:  This is a 89-year-old female came in for further evaluation of nausea generalized weakness.  She also has had some abdominal discomfort.  Unfortunately, she was not the best historian, but she does also endorse some urinary symptoms.  It is unclear how long most of these symptoms have been ongoing, but it sounds like the last couple of days things have worsened.  I proceeded with the above work-up, and she does appear quite dehydrated with acute kidney injury.  She also has an elevated WBC and lactic acid, which certainly are worrisome for sepsis in addition to dehydration.  She appears to have a UTI as the source  of her infection, and she was provided IV ceftriaxone for that.  Her blood pressures remained stable and she has not required vasopressors.  She was provided gentle rehydration due to her history of heart disease and CHF, and we will be starting her on 50 cc/h of normal saline.  I subsequently spoke with Dr. Gould, who will be admitting the patient to a telemetry bed.    Diagnosis:    ICD-10-CM    1. Acute UTI N39.0    2. Sepsis, due to unspecified organism (H) A41.9    3. Renal insufficiency N28.9    4. Dehydration E86.0        Disposition:  Admitted to Dr. Gould.     Scribe Disclosure:  Sania YOUNG, am serving as a scribe on 8/30/2019 at 7:38 AM to personally document services performed by Robert Lewis MD based on my observations and the provider's statements to me.       Sania Eugene  8/30/2019    EMERGENCY DEPARTMENT       Robert Lewis MD  08/30/19 [id number]

## 2019-08-30 NOTE — PROVIDER NOTIFICATION
MD Notification    Notified Person: MD    Notified Person Name: Green    Notification Date/Time: 8/30/2019 1640    Notification Interaction: Text    Purpose of Notification: Lactic 3.3    Orders Received: No new orders per MD, will repeat lactic in AM    Comments:

## 2019-08-30 NOTE — ED NOTES
Bed: ED23  Expected date: 8/30/19  Expected time: 7:26 AM  Means of arrival: Ambulance  Comments:  438 89f weak, nausea ETA 7196

## 2019-08-30 NOTE — PLAN OF CARE
DATE & TIME: 8/30/2019 [id number]  Cognitive Concerns/ Orientation : A&O x4, calm and cooperative   BEHAVIOR & AGGRESSION TOOL COLOR: Green  ABNL VS/O2: BP soft 98/49, on RA  MOBILITY: 2 assist/lift, turn and repo Q2H  PAIN MANAGMENT: C/o lower chronic back pain, heat applied and repositioned   DIET: Low fiber, 3gm Na  BOWEL/BLADDER: Incontinent, C/o abdominal cramping and severe abdominal pain, two senna tabs given x1  ABNL LAB/BG: K 5.4, Creat 2.30, Lactic 2.4 repeat Lactic 3.3 (jason BRADY, no new orders), BNP 66,387, Trop 0.243 repeat Trop 2.57 (jason BRADY, no new orders), WBC 22.9, Hgb 11.3  DRAIN/DEVICES: IVF running at 50mL/hr  SKIN: Bruised, pale, redness-blanchable on coccyx. Mepilex placed on coccyx as prevention  TESTS/PROCEDURES: Need urine sample   D/C DAY/GOALS/PLACE: Discharge pending  OTHER IMPORTANT INFO: LS diminished, BS active x4. C/o abdominal cramping, heat applied. Pt reported that she has numbness and tingling in feet which is due to her scoliosis, and arthritis. Contact precautions maintained for MRSA.  SW, nutrition, OT, and PT consulted. Zofran given x1 for nausea.

## 2019-08-30 NOTE — PROVIDER NOTIFICATION
MD Notification    Notified Person: MD    Notified Person Name: Luis Fernando    Notification Date/Time: 8/30/2019 1824    Notification Interaction: Text    Purpose of Notification: Pt complaining of severe abdominal pain and in tears. Gave two tabs of senna. Can we get miralax and suppository ordered?    Orders Received: Pending    Comments:

## 2019-08-30 NOTE — PROVIDER NOTIFICATION
MD Notification    Notified Person: MD    Notified Person Name: Luis Fernando    Notification Date/Time: 8/30/2019 1710    Notification Interaction: Text    Purpose of Notification: Critical Troponin 0.257    Orders Received: No new orders per MD, continue to monitor    Comments:

## 2019-08-30 NOTE — PHARMACY-ADMISSION MEDICATION HISTORY
Admission medication history interview status for the 8/30/2019  admission is complete. See EPIC admission navigator for prior to admission medications     Medication history source reliability:Moderate    Actions taken by pharmacist (provider contacted, etc):reviewed [facility] notes, ashli Barnett had a list, and interviewed pt.      Additional medication history information not noted on PTA med list :None    Medication reconciliation/reorder completed by provider prior to medication history? No    Time spent in this activity: 20 minutes    Prior to Admission medications    Medication Sig Last Dose Taking? Auth Provider   acetaminophen (TYLENOL) 500 MG tablet Take 500 mg by mouth 2 times daily 1400 and hs  Past Week at Unknown time Yes Unknown, Entered By History   aspirin (ASA) 81 MG EC tablet Take 1 tablet (81 mg) by mouth daily Past Week at Unknown time Yes Barthell, Joanna Kersten Ulmen, PA-C   docusate sodium (COLACE) 100 MG capsule Take 100 mg by mouth At Bedtime Past Week at Unknown time Yes Unknown, Entered By History   escitalopram (LEXAPRO) 5 MG tablet Take 1 tablet (5 mg) by mouth daily 8/29/2019 at am Yes Marbin Rouse MD   metoprolol succinate ER (TOPROL XL) 50 MG 24 hr tablet Take 1 tablet (50 mg) by mouth daily (Hold for SBP <100 or HR <60) 8/29/2019 at am Yes Barthell, Joanna Kersten Ulmen, PA-C   oxyCODONE-acetaminophen (PERCOCET) 5-325 MG tablet Take 0.5 tab every morning.  Max of 0.5 tab daily. 8/30/2019 at am Yes Barthell, Joanna Kersten Ulmen, PA-C   sodium chloride (OCEAN) 0.65 % nasal spray Spray 2 sprays into both nostrils daily as needed for congestion   Yes Reported, Patient   torsemide (DEMADEX) 10 MG tablet Take 1 tablet (10 mg) by mouth 2 times daily (Hold for SBP <105) 8/29/2019 at am Yes Barthell, Joanna Kersten Ulmen, PA-C   vitamin B-12 (CYANOCOBALAMIN) 500 MCG tablet Take 500 mcg by mouth daily Past Week at Unknown time Yes Unknown, Entered By History   vitamin D3  (CHOLECALCIFEROL) 1000 units capsule Take 1 capsule by mouth daily Past Week at Unknown time Yes Unknown, Entered By History   order for DME Equipment being ordered: Oxygen, 2L w activity   Marbin Rouse MD

## 2019-08-30 NOTE — CONSULTS
CLINICAL NUTRITION SERVICES  -  ASSESSMENT NOTE      Recommendations Ordered by Registered Dietitian (RD):   Plus2 Shakes BID between meals (chocolate and vanilla)  Magic Cups (chocolate and berry) BID with lunch and dinner   Malnutrition:   % Weight Loss:  > 5% in 1 month (severe malnutrition)  % Intake:  </= 50% for >/= 5 days (severe malnutrition)  Subcutaneous Fat Loss:  Orbital region depletion, Upper arm region depletion and Thoracic region depletion: severe  Muscle Loss:  Temporal region depletion, Clavicle bone region depletion, Acromion bone region depletion and Dorsal hand region depletion: severe  Fluid Retention:  None noted    Malnutrition Diagnosis: Severe malnutrition  In Context of:  Chronic illness or disease     REASON FOR ASSESSMENT  Theresa Alves is a 89 year old female seen by Registered Dietitian for Provider Order - severe malnutrition, states boost supplements give her loose stools. Other options for supplements?      NUTRITION HISTORY  - Information obtained from the pt's family, EMR.   - Pt was recently seen by [facility] RD on 8/11 & 8/15. During this admit she was taking Plus2 shakes BID (jarod and strawberry) and eating minimally   - Pt was recently at [facility] - there she was receiving Boost supplements and reported they were causing diarrhea. Family stated that she refuses to drink strawberry flavored Boost now.   - Pt continues to be disinterested in food and reports that she cannot taste/smell food anymore and eating is intolerable and unenjoyable. At baseline, she eats 1/2 portions at meals.     CURRENT NUTRITION ORDERS  Diet Order: Low Fiber; 3 gm NA Diet    Current Intake/Tolerance:  Pt has not eaten since noon yesterday.   Diet was liberalized this admit to include additional sodium.     NUTRITION FOCUSED PHYSICAL ASSESSMENT FOR DIAGNOSING MALNUTRITION)  Completed:  Yes Full assessment         Observed:    Muscle wasting (refer to documentation in Malnutrition section) and  "Subcutaneous fat loss (refer to documentation in Malnutrition section)    ANTHROPOMETRICS  Height: 5' 6\"  Weight: 44 kg (8/30)  Body mass index is 15.66 kg/m .  Weight Status:  Underweight BMI <18.5  IBW: 59.1 kg  % IBW: 74%  Weight History:   Wt Readings from Last 10 Encounters:   08/30/19 44 kg (97 lb)   08/27/19 45.5 kg (100 lb 3.2 oz)   08/26/19 45.5 kg (100 lb 3.2 oz)   08/22/19 47 kg (103 lb 9.6 oz)   08/20/19 47.7 kg (105 lb 1.6 oz)   08/19/19 47.6 kg (105 lb)   08/15/19 46.7 kg (103 lb)   08/15/19 47.1 kg (103 lb 14.4 oz)   08/06/19 49 kg (108 lb)   07/15/19 49 kg (108 lb)   Pt has lost 10% body weight in the past 1 month.     LABS  Labs reviewed  K+ 5.4 (H)   BUN 76 (H, trending up)  Cr 2.3 (H, trending up)    MEDICATIONS  Medications reviewed  NaCl @ 50 ml/hr     ASSESSED NUTRITION NEEDS PER APPROVED PRACTICE GUIDELINES:    Dosing Weight 44 kg (admit weight)  Estimated Energy Needs: 7729-0793 kcals (30-35 Kcal/Kg)  Justification: maintenance and underweight, increased needs with respiratory status  Estimated Protein Needs: 53-66 grams protein (1.2-1.5 g pro/Kg)  Justification: preservation of lean body mass  Estimated Fluid Needs: 1  ML/kcal   Justification: per provider pending fluid status    MALNUTRITION:  % Weight Loss:  > 5% in 1 month (severe malnutrition)  % Intake:  </= 50% for >/= 5 days (severe malnutrition)  Subcutaneous Fat Loss:  Orbital region depletion, Upper arm region depletion and Thoracic region depletion: severe  Muscle Loss:  Temporal region depletion, Clavicle bone region depletion, Acromion bone region depletion and Dorsal hand region depletion: severe  Fluid Retention:  None noted    Malnutrition Diagnosis: Severe malnutrition  In Context of:  Chronic illness or disease    NUTRITION DIAGNOSIS:  Malnutrition related to chronic increased needs w/ pulmonary fibrosis and inadequate oral intake r/t aging process with taste and smell loss as evidenced by verbalized disinterest in eating, " progressive weight loss over the past 6 months with current underwt BMI <18, e/o severe fat and muscle loss, coding for severe malnutrition.       NUTRITION INTERVENTIONS  Recommendations / Nutrition Prescription  Plus2 Shakes BID between meals (chocolate and vanilla)  Magic Cups (chocolate and berry) BID with lunch and dinner    Implementation  Nutrition education: Provided education on nutritional supplements, increasing oral intake   Medical Food Supplement      Nutrition Goals  Pt will consume >50% of supplements QID  Pt will consume >50% of meals TID    MONITORING AND EVALUATION:  Progress towards goals will be monitored and evaluated per protocol and Practice Guidelines    [facility]  Nutrition Services

## 2019-08-30 NOTE — H&P
[facility]    History and Physical  Hospitalist       Date of Admission:  8/30/2019    Assessment & Plan   Theresa Alves is a 89 year old female who presents with ongoing weakness, found to have sepsis secondary to persistent urinary tract infection    Sepsis secondary to persistent urinary tract infection  Weakness  Recently treated for UTI during last admission 8/14, given prescription for cefuroxime 250mg x5 days. Urine culture at that time grew >100K E.coli resistant to ampicillin and Unasyn. WBC 22.9, Lactic acid 2.4. Sepsis criteria met with WBC and lactic acid and source of infection, urine. UA with mod blood, large leuk esterase, >182 WBCs, few bacteria and WBC clumps. Reporting discomfort in low abdomen and cloudy urine.  - Admit inpatient  - Low rate IVF @50ml/hr for now, with close eye on respiratory status  - Telemetry, continuous pulse ox  - Repeat Lactic acid is 3.3- procalcitonin is 2.31-- broaden abx from ceftriaxone to zosyn  - Follow up blood and urine cx  - monitor intake    RODRÍGUEZ on CKD-III  Baseline Cr appears 1.4 range. On admit was 2.3, likely pre-renal in setting of decreased intake last few days  - Check FENA  - Give low rate IVF with close eye on respiratory status  - BMP in AM    Hyperkalemia  - K 5.4, likely in setting of RODRÍGUEZ on CKD, monitor with IVF repletion  - Recheck BMP tonight to assess for improvement    Elevated bilirubin, direct bilirubin  Elevated INR  - Noted in the past as well, today is 2.3, d bili is 0.9. INR 1.29, not previously noted to be elevated  - trend bili in AM, repeat INR in AM  - Watch for bleeding  - Check RUQ US with ongoing abdominal pain    Normocytic/borderline macrocytic Anemia   Hgb 11.3, ? Related to recent blood loss with hematuria and intermittent epistaxis. On B12 supplement as outpatient, continue while inpatient  - Continue to monitor. No chemical anticoagulation    Chronic HFpEF  Severe tricuspid regurgitation and mod-severe  mitral regurgitation  HTN, DLD  BNP 66K, worse than her last admit, but she also has concurrent RODRÍGUEZ. No worsening respiratory status and CXR without pulmonary edema. Weaned off O2. Family reports a more liberal diet at home in regards to salt. They are thinking about having a do-not-hospitalize or palliative discussion with her, but state that they haven't quite gotten there yet. SONIA. Statin discontinued July 2019 due to age. Echo at last admit showed new severe TR and MR; mild decreased RV systolic function and elevated pressure (69mmHg + RAP), severe bi-atrial enlargement, moderate LVH, and dilated IVC. Weight on discharge was 103-104. PTA ASA 81mg, Toprol XL 50mg daily, torsemide 10mg BID.  - She has been weaned off oxygen. However her weight is down to 97lbs secondary to poor PO intake.  - Resumed PTA medications with exception of torsemide.  - IVF as above for now, low threshold to add back torsemide and stop IVF pending respiratory status  - Trend daily weights, I/Os  - supplemental O2 as needed     Recent NSTEMI  Last admit, Trop 1.5--1.00--0.928. Evaluated by cardiology, recommended angiogram, but she does not want to pursue it.  - now with slightly elevated troponin again 0.243. Will trend out troponin while here. No plans to resume heparin gtt and not likely to want angiogram     Intermittent epistaxis  - Discussed at her last cardiology appt, there was discussion of possibly stopping her ASA if she continued to have issues with epistaxis. Thought that oxygen use may have been contributing  - Continue PTA nasal saline spray prn    Recent Hematuria  Noted on last admission due to traumatic cath placement while on heparin gtt. 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 Will monitor for recurrent hematuria this admission.     Loose stools  - She states related to boost drinks. 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 Not on anticoagulation PTA d/t hx severe epistaxis.     Hx Pulmonary fibrosis  Extensive disease as evidenced by CT chest. No indication acute alveolitis.     Physical deconditioning, frailty  Lives at the [facility] in assisted living. Ambulates with walker at baseline.  - PT/OT and care transitions consults     False positive CXR showing nodular pulmonary nodule, RML  Incidental finding on CXR (again seen this admit). CT did not show this or other acute pathology.     Chronic back pain  Continues on PTA Percocet daily prn     Severe malnutrition  With fat and muscle loss as per dietitian documentation during last admission  - She reports boost supplements recommended last admit give her loose stools.  - Reconsult nutrition this admit, perhaps there are other supplements that she may tolerate better and be more likely to take.    Goals of care  - Patient is DNR/DNI. Family is thinking about discussing palliative care with her or hospice. They have already liberalized her diet to include salt. There was brief mention of palliative care discussion with last cardiologist appt.  - Would approach this subject again with patient/family throughout her admission here.  - Would recommend palliative care consult while inpatient if family agreeable (and if patient still there through the weekend)    DVT Prophylaxis: Pneumatic Compression Devices  Code Status: DNR / DNI  Expected discharge: 2 - 3 days, recommended to TCU vs prior living arrangement    Tanja Gould DO    Primary Care Physician   Marbin Rouse    Chief Complaint   Generalized weakness    History is obtained from the patient and previous medical record    History of Present Illness   Theresa Alves is a 89 year old female who presents with worsening weakness, cloudy urine, decreased intake over the past few days since  "being discharged from TCU on Tuesday. She reports intermittent loose stools, but relates them to drinking Boost. She states she has NO appetite and the thought of food makes her nauseous. She might have had a boost yesterday, but otherwise was not drinking much fluid otherwise. She is thirsty today and states she has been drinking \"a lot more\". She was weaned off oxygen while at the TCU and has been doing fairly well. No fevers, chills, vomiting, chest pain, shortness of breath. Does report some abdominal pain that is located suprapubically. 3 nieces are at bedside with her and briefly mention that they have thought about palliative care discussion with patient, but have not yet pursued it.    Past Medical History    I have reviewed this patient's medical history and updated it with pertinent information if needed.   Past Medical History:   Diagnosis Date     Atrial fibrillation (H)      Chronic back pain      Chronic diastolic CHF (congestive heart failure) (H)      Chronic kidney disease, stage III (moderate) (H)      Pacemaker Jnauary 2019     Pulmonary fibrosis (H)      Pulmonary hypertension (H)      Unspecified essential hypertension     Essential hypertension       Past Surgical History   I have reviewed this patient's surgical history and updated it with pertinent information if needed.  Past Surgical History:   Procedure Laterality Date     CARPAL TUNNEL RELEASE RT/LT  1990's    R     CATARACT IOL, RT/LT  5/03    R     EP ABLATION AV NODE N/A 1/18/2019    Procedure: EP Ablation AV Node;  Surgeon: Johny Walker MD;  Location:  [facility]     HERNIA REPAIR, INGUINAL RT/LT  1954    R     hiatal hernia repair  6/10    large paraesophageal hiatal hernia; Nissen fundoplication        Prior to Admission Medications   Prior to Admission Medications   Prescriptions Last Dose Informant Patient Reported? Taking?   Vitamin D, Cholecalciferol, 1000 units TABS Past Week at Unknown time Self Yes Yes "   Sig: Take 1,000 Units by mouth daily   acetaminophen (TYLENOL) 500 MG tablet Past Week at Unknown time Self Yes Yes   Sig: Take 500 mg by mouth 2 times daily 1400 and hs    aspirin (ASA) 81 MG EC tablet Past Week at Unknown time Self No Yes   Sig: Take 1 tablet (81 mg) by mouth daily   docusate sodium (COLACE) 100 MG capsule Past Week at Unknown time Self Yes Yes   Sig: Take 100 mg by mouth At Bedtime   escitalopram (LEXAPRO) 5 MG tablet 8/29/2019 at am Self No Yes   Sig: Take 1 tablet (5 mg) by mouth daily   metoprolol succinate ER (TOPROL XL) 50 MG 24 hr tablet 8/29/2019 at am Self No Yes   Sig: Take 1 tablet (50 mg) by mouth daily (Hold for SBP <100 or HR <60)   order for DME  Self No No   Sig: Equipment being ordered: Oxygen, 2L w activity   oxyCODONE-acetaminophen (PERCOCET) 5-325 MG tablet 8/30/2019 at am Self No Yes   Sig: Take 0.5 tab every morning.  Max of 0.5 tab daily.   sodium chloride (OCEAN) 0.65 % nasal spray  Self Yes Yes   Sig: Spray 2 sprays into both nostrils daily as needed for congestion    torsemide (DEMADEX) 10 MG tablet 8/29/2019 at am Self No Yes   Sig: Take 1 tablet (10 mg) by mouth 2 times daily (Hold for SBP <105)   vitamin B-12 (CYANOCOBALAMIN) 500 MCG tablet Past Week at Unknown time Self Yes Yes   Sig: Take 500 mcg by mouth daily      Facility-Administered Medications: None     Allergies   No Known Allergies    Social History   I have reviewed this patient's social history and updated it with pertinent information if needed. Theresa Alves  reports that she has never smoked. She has never used smokeless tobacco. She reports that she drinks alcohol. She reports that she does not use drugs.    Family History   I have reviewed this patient's family history and updated it with pertinent information if needed.   Family History   Problem Relation Age of Onset     Unknown/Adopted Mother      Unknown/Adopted Father      Diabetes No family hx of      Coronary Artery Disease No family hx  of      Hypertension No family hx of      Hyperlipidemia No family hx of      Cerebrovascular Disease No family hx of      Breast Cancer No family hx of      Colon Cancer No family hx of      Prostate Cancer No family hx of      Other Cancer No family hx of      Depression No family hx of      Anxiety Disorder No family hx of      Mental Illness No family hx of      Substance Abuse No family hx of      Anesthesia Reaction No family hx of      Asthma No family hx of      Osteoporosis No family hx of      Genetic Disorder No family hx of      Thyroid Disease No family hx of      Obesity No family hx of        Review of Systems   The 10 point Review of Systems is negative other than noted in the HPI.    Physical Exam   Temp: 97.8  F (36.6  C) Temp src: Oral BP: 113/54 Pulse: 62   Resp: 16 SpO2: 97 %      Vital Signs with Ranges  Temp:  [97.8  F (36.6  C)] 97.8  F (36.6  C)  Pulse:  [60-69] 62  Resp:  [16] 16  BP: ()/(54-63) 113/54  SpO2:  [93 %-97 %] 97 %  97 lbs 0 oz    Constitutional: Awake, alert, cooperative, no apparent distress.  Eyes: Conjunctiva and pupils examined and normal.  HEENT: Tacky mucous membranes, normal dentition.  Respiratory: Clear to auscultation bilaterally, no crackles or wheezing.  Cardiovascular: Regular rate and rhythm, normal S1 and S2, + systolic murmur  GI: scaphoid, nondistended, non-tender, ++bowel sounds  Lymph/Hematologic: No anterior cervical or supraclavicular adenopathy.  Skin: No rashes, no cyanosis, no edema.  Musculoskeletal: No joint swelling, erythema or tenderness.  Neurologic: Cranial nerves 2-12 intact, normal strength and sensation.  Psychiatric: Alert, oriented to person, place and time, no obvious anxiety or depression.    Data   Data reviewed today:  I personally reviewed EKG shows paced rhythm. CXR shows interstitial lung changes, no pulmonary edema.  Recent Labs   Lab 08/30/19  0757   WBC 22.9*   HGB 11.3*         INR 1.29*      POTASSIUM  5.4*   CHLORIDE 102   CO2 22   BUN 76*   CR 2.30*   ANIONGAP 10   PAWEL 8.7   *   ALBUMIN 3.1*   PROTTOTAL 7.2   BILITOTAL 2.3*   ALKPHOS 68   ALT 13   AST 16   TROPI 0.243*       Recent Results (from the past 24 hour(s))   XR Chest 2 Views    Narrative    CHEST TWO VIEWS   8/30/2019 8:46 AM     HISTORY: Shortness of breath.    COMPARISON: Chest x-ray 8/10/2019.      Impression    IMPRESSION: Two views of the chest are performed. Interstitial lung  changes appear stable. Underlying COPD is likely. Tiny nodular opacity  right upper lung is stable if not slightly smaller in appearance  compared to prior chest x-ray. Heart size remains enlarged. Aorta is  tortuous and calcified. 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  No pneumothorax or pleural effusions. Bones are osteopenic. Mild  kyphosis. Implantable cardiac device left upper chest demonstrates a  single lead overlying the right ventricle.    JACQUELINE LOVING MD

## 2019-08-30 NOTE — ED NOTES
"[facility]  ED Nurse Handoff Report    ED Chief complaint: Generalized Weakness (increase weakness today after feeling ill 3-4 days with decrease appetite hasn;t eaten since noon yesterday, cloudy urine stools normal)      ED Diagnosis:   Final diagnoses:   Acute UTI   Sepsis, due to unspecified organism (H)   Renal insufficiency   Dehydration       Code Status: DNR / DNI    Allergies: No Known Allergies    Activity level - Baseline/Home:  Stand with Assist  Activity Level - Current:   Stand with Assist of 2    Patient's Preferred language: english   Needed?: No    Isolation: Yes  Infection: Not Applicable  MRSA  Bariatric?: No    Vital Signs:   Vitals:    08/30/19 0836 08/30/19 0922 08/30/19 1025 08/30/19 1100   BP:  98/56 98/56 115/63   Pulse:  63 63 60   Resp:       Temp:       TempSrc:       SpO2: 95%  93% 96%   Weight:       Height:           Cardiac Rhythm: ,        Pain level: 0-10 Pain Scale: 4    Is this patient confused?: No   Does this patient have a guardian?  No         If yes, is there guardianship documents in the Epic \"Code/ACP\" activity?  N/A         Guardian Notified?  N/A  [place] - Suicide Severity Rating Scale Completed?  Yes  If yes, what color did the patient score?  White    Patient Report: Initial Complaint: pt from assisted living with c/o of increase weakness today, pt reports some urinary symptoms and intermittent abd pain  Focused Assessment: see above  Tests Performed: see epic  Abnormal Results: see epic  Treatments provided: see [facility]    Family Comments: pt has 3 neices that are here currently    OBS brochure/video discussed/provided to patient/family: N/A              Name of person given brochure if not patient: na                Relationship to patient: na      ED Medications:   Medications   ondansetron (ZOFRAN) injection 4 mg (4 mg Intravenous Given 8/30/19 0814)   0.9% sodium chloride BOLUS (0 mLs Intravenous Stopped 8/30/19 1010)   0.9% sodium " chloride BOLUS (0 mLs Intravenous Stopped 8/30/19 1141)   cefTRIAXone (ROCEPHIN) 1 g vial to attach to  mL bag for ADULTS or NS 50 mL bag for PEDS (0 g Intravenous Stopped 8/30/19 1141)       Drips infusing?:  No    For the majority of the shift this patient was Green.   Interventions performed were na  .    Severe Sepsis OR Septic Shock Diagnosis Present: No    To be done/followed up on inpatient unit:  none    ED NURSE PHONE NUMBER: *[id number]

## 2019-08-31 NOTE — PROGRESS NOTES
[facility]    Hospitalist Progress Note    Date of Service (when I saw the patient): 08/31/2019    Assessment & Plan   Theresa Alves is a 89 year old female who presents with ongoing weakness, found to have sepsis secondary to persistent urinary tract infection     Sepsis secondary to persistent urinary tract infection  Weakness  Recently treated for UTI during last admission 8/14, given prescription for cefuroxime 250mg x5 days. Urine culture at that time grew >100K E.coli resistant to ampicillin and Unasyn. WBC 22.9, Lactic acid 2.4. Sepsis criteria met with WBC and lactic acid and source of infection, urine. UA with mod blood, large leuk esterase, >182 WBCs, few bacteria and WBC clumps. Reporting discomfort in low abdomen and cloudy urine.  - Admit inpatient  - Telemetry, continuous pulse ox  - Repeat Lactic acid is 3.3- procalcitonin is 2.31-- broaden abx from ceftriaxone to zosyn  - Follow up blood and urine cx     RODRÍGUEZ on CKD-III  Baseline Cr appears 1.4 range. On admit was 2.3, likely pre-renal in setting of decreased intake last few days  - FENa 0.6%, suggests prerenal failure  - Bolus 500 ml x 2 then increase NS to 75 ml/hr, recheck labs in AM  - BMP in AM     Hyperkalemia  - K 5.3, likely in setting of RODRÍGUEZ on CKD, monitor with IVF repletion  - Recheck BMP tonight to assess for improvement     Elevated bilirubin, direct bilirubin  Elevated INR  - Noted in the past as well, today is 2.3, d bili is 0.9. INR 1.29, not previously noted to be elevated  - repeat LFTs improved slightly  - RUQ US unremarkable  - AXR suggests severe PAD, consider mesenteric ischemia  - Will meet with family on Monday to discuss palliative care     Normocytic/borderline macrocytic Anemia   Hgb 11.3, ? Related to recent blood loss with hematuria and intermittent epistaxis. On B12 supplement as outpatient, continue while inpatient  - Continue to monitor. No chemical anticoagulation     Chronic HFpEF  Severe  tricuspid regurgitation and mod-severe mitral regurgitation  HTN, DLD  BNP 66K, worse than her last admit, but she also has concurrent RODRÍGUEZ. No worsening respiratory status and CXR without pulmonary edema. Weaned off O2. Family reports a more liberal diet at home in regards to salt. They are thinking about having a do-not-hospitalize or palliative discussion with her, but state that they haven't quite gotten there yet. SONIA. Statin discontinued July 2019 due to age. Echo at last admit showed new severe TR and MR; mild decreased RV systolic function and elevated pressure (69mmHg + RAP), severe bi-atrial enlargement, moderate LVH, and dilated IVC. Weight on discharge was 103-104. PTA ASA 81mg, Toprol XL 50mg daily, torsemide 10mg BID.  - She has been weaned off oxygen. However her weight is down to 97lbs secondary to poor PO intake.  - Resumed PTA medications with exception of torsemide.  - IVF as above for now, low threshold to add back torsemide and stop IVF pending respiratory status  - Trend daily weights, I/Os  - supplemental O2 as needed     Recent NSTEMI  Last admit, Trop 1.5--1.00--0.928. Evaluated by cardiology, recommended angiogram, but she does not want to pursue it.  - now with slightly elevated troponin again 0.243. Will trend out troponin while here. No plans to resume heparin gtt and not likely to want angiogram     Intermittent epistaxis  - Discussed at her last cardiology appt, there was discussion of possibly stopping her ASA if she continued to have issues with epistaxis. Thought that oxygen use may have been contributing  - Continue PTA nasal saline spray prn     Recent Hematuria  Noted on last admission due to traumatic cath placement while on heparin gtt. 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 Will monitor for recurrent hematuria this admission.     Loose stools  - She states related to boost drinks. 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 Not on anticoagulation PTA d/t hx severe epistaxis.     Hx Pulmonary fibrosis  Extensive disease as evidenced by CT chest. No indication acute alveolitis.     Physical deconditioning, frailty  Lives at the [facility] in assisted living. Ambulates with walker at baseline.  - PT/OT and care transitions consults     False positive CXR showing nodular pulmonary nodule, RML  Incidental finding on CXR (again seen this admit). CT did not show this or other acute pathology.     Chronic back pain  Continues on PTA Percocet daily prn     Severe malnutrition  With fat and muscle loss as per dietitian documentation during last admission  - She reports boost supplements recommended last admit give her loose stools.  - Reconsult nutrition this admit, perhaps there are other supplements that she may tolerate better and be more likely to take.     Goals of care  - Patient is DNR/DNI. Family is thinking about discussing palliative care with her or hospice. They have already liberalized her diet to include salt. There was brief mention of palliative care discussion with last cardiologist appt.  - Would approach this subject again with patient/family throughout her admission here.  - Would recommend palliative care consult while inpatient if family agreeable (and if patient still there through the weekend)     DVT Prophylaxis: Pneumatic Compression Devices  Code Status: DNR / DNI  Expected discharge: 2 - 3 days, recommended to TCU vs prior living arrangement  Marbin Sheets  Text Page (7 am to 6 pm)    Interval History   Patient complains of pain after eating.  She does not want to pursue further evaluation.    -Data reviewed today: I reviewed all new labs and imaging results over the last 24 hours. I personally reviewed no images or EKG's today.    Physical  Exam   Temp: 97.2  F (36.2  C) Temp src: Oral BP: 103/59 Pulse: 60   Resp: 18 SpO2: 95 % O2 Device: None (Room air)    Vitals:    08/30/19 0744 08/31/19 0625   Weight: 44 kg (97 lb) 49.5 kg (109 lb 2 oz)     Vital Signs with Ranges  Temp:  [97.2  F (36.2  C)-97.3  F (36.3  C)] 97.2  F (36.2  C)  Pulse:  [60-66] 60  Resp:  [18-24] 18  BP: ()/(53-65) 103/59  SpO2:  [94 %-98 %] 95 %  I/O last 3 completed shifts:  In: 649 [P.O.:300; I.V.:349]  Out: -     Gen: Thin elderly female, no acute distressed, flat affect  HEENT: Atraumatic, normocephalic  Lungs: Clear to ausculation without wheezes, rhonchi, or rales  Heart: Regular rate and rhythm, no gallops or rubs, 2/6 NAIMA  GI: Bowel sound normal, no hepatosplenomegaly or masses  Lymph: No lymphadenopathy or edema  Skin: No rashes     Medications     sodium chloride 75 mL/hr at 08/31/19 0934       aspirin  81 mg Oral Daily     escitalopram  5 mg Oral Daily     insulin aspart  1-3 Units Subcutaneous TID AC     insulin aspart  1-3 Units Subcutaneous At Bedtime     [START ON 9/1/2019] meropenem  500 mg Intravenous Q24H     metoprolol succinate ER  50 mg Oral Daily     sodium chloride (PF)  3 mL Intracatheter Q8H       Data   Recent Labs   Lab 08/31/19  0854 08/31/19  0243 08/31/19  0042 08/30/19 2014 08/30/19  1628 08/30/19  0757   WBC 24.0*  --   --   --   --  22.9*   HGB 11.3*  --   --   --   --  11.3*   MCV 99  --   --   --   --  100     --   --   --   --  198   INR 1.35*  --   --   --   --  1.29*   *  --   --  132*  --  134   POTASSIUM 5.3 5.2 5.9* 5.8*  --  5.4*   CHLORIDE 102  --   --  98  --  102   CO2 20  --   --  20  --  22   BUN 88*  --   --  90*  --  76*   CR 2.80*  --   --  2.59*  --  2.30*   ANIONGAP 10  --   --  14  --  10   PAWEL 8.0*  --   --  8.0*  --  8.7   GLC 99  --   --  242*  --  149*   ALBUMIN 2.5*  --   --   --   --  3.1*   PROTTOTAL 6.4*  --   --   --   --  7.2   BILITOTAL 1.2  --   --   --   --  2.3*   ALKPHOS 68  --   --   --   --   68   ALT 20  --   --   --   --  13   AST 27  --   --   --   --  16   TROPI  --   --   --  0.242* 0.257* 0.243*       Recent Results (from the past 24 hour(s))   XR Abdomen Port 1 View    Narrative    ABDOMEN ONE VIEW PORTABLE  8/30/2019 7:25 PM     HISTORY: Abdominal pain.    COMPARISON: None.       Impression    IMPRESSION: Gas distended stomach. Small amount of stool.   Nonobstructed bowel gas pattern.    HORTENCIA MYLES MD   US Abdomen Complete    Narrative    ULTRASOUND ABDOMEN COMPLETE   8/31/2019 8:17 AM     HISTORY:  Elevated bilirubin, abdominal pain.    COMPARISON: Abdominal x-ray on 8/30/2019.    FINDINGS:    Gallbladder: Normal with no cholelithiasis, wall thickening or focal  tenderness.      Bile ducts:  CHD is normal diameter.  No intrahepatic biliary  dilatation.    Liver: Normal.     Pancreas:  Partially obscured by overlying bowel gas,  but grossly  unremarkable.     Spleen:  Normal.     Right kidney:  No hydronephrosis or shadowing calculus.    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 No  hydronephrosis or shadowing calculus.    Aorta and IVC:  IVC is patent. Atherosclerotic plaque in the  visualized abdominal aorta.    Urinary bladder: Echogenic layering debris within the urinary bladder.      Impression    IMPRESSION:    1. Echogenic layering debris within the urinary bladder.  2. No hydronephrosis or shadowing calculus in either kidney.  3. 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    KHALIDA MODI MD

## 2019-08-31 NOTE — PROGRESS NOTES
Called by nursing staff with report of increased potassium level, orders submitted for low dose sliding scale insulin, 12 lead ekg, reduced volume fluid bolus, low potassium diet, and repeat lactic acid and potassium draws. Additionally added renal ultrasound, bladder scan. Consider double coverage for pseudomonas with addition of levaquin to current zosyn order if not improving lactic acid.

## 2019-08-31 NOTE — PLAN OF CARE
Alert and oriented. VSS on room air. Severe upper abdominal pain, tylenol and heat given.  Lung sounds diminished. Abdominal and renal US need to be done. BGM 20. K trending up, MD notified. Started on s/s insulin for elevated glucose level.

## 2019-08-31 NOTE — PROVIDER NOTIFICATION
MD Notification    Notified Person: MD    Notified Person Name: Sudeep    Notification Date/Time: 8/31/2019 1743    Notification Interaction: Text    Purpose of Notification: BP 90/44    Orders Received: Paged MD, no new orders. Continue to monitor pt and if becomes symptomatic call RRT    Comments:

## 2019-08-31 NOTE — PROGRESS NOTES
Paged for progressive hyperkalemia to 5.9. STAT EKG shows some peaked T waves in precordial leads. Insulin 10 units with dextrose given and repeat K 5.2.    Also informed of urine culture results showing E coli that is ESBL resistant. Also blood cultures positive for gram negative rods. Will stop Zosyn and order STAT Meropenem.

## 2019-08-31 NOTE — PLAN OF CARE
DATE & TIME: 08/31/19, 2462-0230   Cognitive Concerns/ Orientation : A/O x4   BEHAVIOR & AGGRESSION TOOL COLOR: Green  CIWA SCORE:    ABNL VS/O2: VSS, room air  MOBILITY: Assist-2  PAIN MANAGMENT: tylenol x1  DIET: Renal  BOWEL/BLADDER: Bladder scan 150 mL at 0615  ABNL LAB/BG: Potassium 5.2, Lactic acid 2.3, sodium 132, Troponin 0.242  DRAIN/DEVICES: PIV infusing NS 50 mL/hr, IV antibiotics  TELEMETRY RHYTHM:   SKIN: pale, blanchable redness  TESTS/PROCEDURES: Abdominal and renal ultrasound, abdominal xray reveals gas  D/C DAY/GOALS/PLACE: Pending  OTHER IMPORTANT INFO: Needs a urine sample

## 2019-08-31 NOTE — PROVIDER NOTIFICATION
MD Notification    Notified Person: MD    Notified Person Name:  Hammad    Notification Date/Time: 08/31/19,  0100    Notification Interaction: Spoke to MD    Purpose of Notification: Potassium 5.9, Lactic acid 2.3, positive E-coli    Orders Received: MD to add orders    Comments: MD entered orders for IV insulin, IV dextrose and potassium recheck.

## 2019-08-31 NOTE — PLAN OF CARE
"OT: Order received, chart reviewed, eval attempted. Pt politely declined OT today stating \"I just don't feel well and I'm very tired.\" Pt well-aware of OT role and importance of OT. Pt agreeable to OT eval and intervention tomorrow morning.   "

## 2019-08-31 NOTE — PROVIDER NOTIFICATION
MD Notification    Notified Person: MD    Notified Person Name:  Dr. Jaquez    Notification Date/Time: 8/30 2110    Notification Interaction: paged/spoke to MD    Purpose of Notification: K 5.8    Orders Received: MD to enter orders    Comments:

## 2019-08-31 NOTE — PROVIDER NOTIFICATION
MD Notification    Notified Person: MD    Notified Person Name: Dr. Jaquez    Notification Date/Time: 8/30 2230    Notification Interaction: Spoke to MD    Purpose of Notification: Positive blood cultures from right and left arms at 0933 today, growing  gram negative rods     Orders Received: MD to enter order    Comments:

## 2019-08-31 NOTE — PLAN OF CARE
PT: Orders received, chart reviewed, spoke with RN and patient. Pt declines any PT today stating she didn't sleep all night last night and wants to just rest today.

## 2019-08-31 NOTE — PROVIDER NOTIFICATION
MD Notification    Notified Person: MD    Notified Person Name: Sudeep    Notification Date/Time: 8/31/2019 1538    Notification Interaction: Text     Purpose of Notification: BP 91/47    Orders Received: Give 500mL bolus of 0.9% NaCl over 2 hours    Comments:

## 2019-08-31 NOTE — PLAN OF CARE
DATE & TIME: 8/30/2019 6900-8023  Cognitive Concerns/ Orientation : A&O x4, calm and cooperative, slept most of shift   BEHAVIOR & AGGRESSION TOOL COLOR: Green  ABNL VS/O2: BP 91/47 (500mL bolus given), recheck BP 90/44 (MD notified, no new orders)  MOBILITY: 2 assist/total care, turn and repo Q2H  PAIN MANAGMENT: C/o lower back pain, Oxycodone given x1  DIET: Renal (non-dialysis), refusing to eat  BOWEL/BLADDER: Continent, urine sample sent  ABNL LAB/BG: Creat 2.80, Na 132, WBC 24, INR 1.35, Hgb 11.3, Bilirubin 0.7, BGM 98, 87, 118  DRAIN/DEVICES: IVF running at 75mL/hr  SKIN: Bruised, pale, redness-blanchable on coccyx with Mepilex in place.   TESTS/PROCEDURES: Had ultrasound of abdomen this AM  D/C DAY/GOALS/PLACE: Discharge pending  OTHER IMPORTANT INFO: LS diminished, BS active x4. Refusing PCDs. On IV abx Merrem. Urine culture growing E-Coli, blood cultures positive. Numbness and tingling noted per pt due to scoliosis and arthritis.500mL bolus given over four hours per MD. Another 500mL bolus given over two hours for blood pressure of 91/47. Contact precautions maintained for MRSA. MD would like to have care conference with family and patient. Family agreeable to have care conference on Monday and will let staff know what time works best. OT, PT, social work following.

## 2019-09-01 NOTE — PLAN OF CARE
DATE & TIME: 9/1/19 8099-9338  Cognitive Concerns/ Orientation : A&O x4, calm and cooperative   BEHAVIOR & AGGRESSION TOOL COLOR: Green  ABNL VS/O2: BP soft 91/44 on RA  MOBILITY: Total care, turn and repo Q2H  PAIN MANAGMENT: C/o chronic back pain, has scheduled Tylenol, declined Oxycodone  DIET: Renal (non-dialysis), NPO @ midnight for abdominal/pelvic ultrasound  BOWEL/BLADDER: Incontinent, large BM   ABNL LAB/BG: WBC 15.6, Hgb 10.8, Creat 2.80, , 84, 113  DRAIN/DEVICES: IVF running at 100mL/hr  SKIN: Pale, bruised, redness blanchable on coccyx, mepilex in place  TESTS/PROCEDURES: CT of abdomen/pelvis completed. Abdominal ultrasound scheduled for tomorrow  D/C DAY/GOALS/PLACE: Discharge pending  OTHER IMPORTANT INFO: LS diminished, BS active x4. Care conference scheduled for tomorrow @ 1000. Need urine sample. SW, PT, OT following. GI consulted. Started Protonix.

## 2019-09-01 NOTE — PLAN OF CARE
Discharge Planner PT   Patient plan for discharge: Home, refusing to discuss TCU  Current status: PT orders received, chart reviewed, eval completed and treatment initiated. Pt is a 89 year old year old female admitted 8/30/19 for weakness and sepsis from UTI. PMHx signficant for CKD, NSTEMI, pulmonary fibrosis. Pt previously living in an [facility] alone with 0 stairs. Prior level of mobility including Marifer ambulating with FWW, IND with dressing and toileting, and receives assist for showering.     Currently, pt rolling in bed Marifer with additional time and use of bed rails and transitioning supine<>sit requiring SBA, use of bed rails, and additional time with HOB flat. Patient strongly declining sit<>stand and ambulation due to fatigue, back pain. Patient dangled EOB with SBA x20 seconds before self-limiting and returning to supine due to fatigue. Patient declining any discussion of TCU stay this session.    Barriers to return to prior living situation: lives alone, current level of assist, refusing standing/ ambulation, significant impairments in activity tolerance  Recommendations for discharge: home with 24/7 supervision  Rationale for recommendations: Patient is currently below baseline mobility independence and activity tolerance. Refusing standing and ambulation secondary to significant fatigue and low back pain this session; has refused OT and PT activity earlier this stay due to fatigue. Skilled IP PT services indicated to progress IND with mobility and increase activity tolerance. Recommend discharge home to [facility] with 24/7 assist for transfers and ambulation due to falls risk and decreased activity tolerance. If facility/ family unable to provide, would recommend TCU stay to address above limitations.       Entered by: Gianna Pimentel 09/01/2019 11:00 AM

## 2019-09-01 NOTE — CONSULTS
[facility] - GASTROENTEROLOGY CONSULTATION      Theresa Alves  [address]   [facility] [address]  89 year old female     Admission Date/Time: 8/30/2019  Primary Care Provider: Marbin Rouse     We were asked to see the patient in consultation by Dr. Sheets for evaluation of abdominal pain.    ASSESSMENT:    1. Post-prandial abdominal pain - abdominal ultrasound negative for a source. Chronic mesenteric ischemia is a possibility, peptic etiologies as well. Agree with non-contrast CT, given the abdominal pain, but will need repeat ultrasound with doppler of mesenteric arteries to evaluate for stenosis.  -- Will await the family conference Monday to determine if other work-up is desired, ie an EGD. Patient would need MAC sedation if EGD is needed.    RECOMMENDATIONS:  1. Await non-contrast CT  2. Doppler ultrasound of abdomen    Uche Glover MD   [facility] - Dig[facility]ive [facility]  Cell [phone]  After 5 PM, please call [phone]  ________________________________________________________________________        HPI:  Theresa Alves is a 89 year old female who has a history of A-fib, recent NSTEMI, pulmonary fibrosis,  Malnutrition, heart failure with severe TR and mod-severe MR, chronic renal insufficiency. She was admitted with UTI and secondary sepsis. Patient's family is contemplating palliative care. History is limited. She reports abdominal pain after eating for the last few days, but question if this has been longer. She denies nausea and vomiting after meals, but does report the site of food can make her nauseated. No GI bleeding. LFTs (bilirubin) initially increased, they are normal now.       ROS: A comprehensive ten point review of systems was negative aside from those in mentioned in the HPI.      PAST MEDICAL HISTORY:  Past Medical History:   Diagnosis Date     Atrial fibrillation (H)      Chronic back pain      Chronic diastolic CHF (congestive heart failure) (H)      Chronic kidney  disease, stage III (moderate) (H)      Pacemaker Jnauary 2019     Pulmonary fibrosis (H)      Pulmonary hypertension (H)      Unspecified essential hypertension     Essential hypertension     SOCIAL HISTORY:  Social History     Tobacco Use     Smoking status: Never Smoker     Smokeless tobacco: Never Used   Substance Use Topics     Alcohol use: Yes     Comment: 1 drink every other week     Drug use: No     FAMILY HISTORY:  Family History   Problem Relation Age of Onset     Unknown/Adopted Mother      Unknown/Adopted Father      Diabetes No family hx of      Coronary Artery Disease No family hx of      Hypertension No family hx of      Hyperlipidemia No family hx of      Cerebrovascular Disease No family hx of      Breast Cancer No family hx of      Colon Cancer No family hx of      Prostate Cancer No family hx of      Other Cancer No family hx of      Depression No family hx of      Anxiety Disorder No family hx of      Mental Illness No family hx of      Substance Abuse No family hx of      Anesthesia Reaction No family hx of      Asthma No family hx of      Osteoporosis No family hx of      Genetic Disorder No family hx of      Thyroid Disease No family hx of      Obesity No family hx of      ALLERGIES: No Known Allergies  MEDICATIONS:   Prior to Admission medications    Medication Sig Start Date End Date Taking? Authorizing Provider   acetaminophen (TYLENOL) 500 MG tablet Take 500 mg by mouth 2 times daily 1400 and hs    Yes Unknown, Entered By History   aspirin (ASA) 81 MG EC tablet Take 1 tablet (81 mg) by mouth daily 8/15/19  Yes Barthell, Joanna Kersten Ulmen, PA-C   docusate sodium (COLACE) 100 MG capsule Take 100 mg by mouth At Bedtime   Yes Unknown, Entered By History   escitalopram (LEXAPRO) 5 MG tablet Take 1 tablet (5 mg) by mouth daily 4/3/19  Yes Marbin Rouse MD   metoprolol succinate ER (TOPROL XL) 50 MG 24 hr tablet Take 1 tablet (50 mg) by mouth daily (Hold for SBP <100 or HR <60) 8/14/19  Yes  "Barthell, Joanna Kersten Ulmen, PA-C   oxyCODONE-acetaminophen (PERCOCET) 5-325 MG tablet Take 0.5 tab every morning.  Max of 0.5 tab daily. 8/15/19  Yes Barthell, Joanna Kersten Ulmen, PA-C   sodium chloride (OCEAN) 0.65 % nasal spray Spray 2 sprays into both nostrils daily as needed for congestion    Yes Reported, Patient   torsemide (DEMADEX) 10 MG tablet Take 1 tablet (10 mg) by mouth 2 times daily (Hold for SBP <105) 8/15/19  Yes Barthell, Joanna Kersten Ulmen, PA-C   vitamin B-12 (CYANOCOBALAMIN) 500 MCG tablet Take 500 mcg by mouth daily   Yes Unknown, Entered By History   Vitamin D, Cholecalciferol, 1000 units TABS Take 1,000 Units by mouth daily   Yes Unknown, Entered By History   order for DME Equipment being ordered: Oxygen, 2L w activity 6/11/19   Marbin Rouse MD     PHYSICAL EXAM:   BP 91/44 (BP Location: Left arm)   Pulse 66   Temp 97.5  F (36.4  C) (Oral)   Resp 16   Ht 1.676 m (5' 6\")   Wt 50.4 kg (111 lb 1.8 oz)   LMP  (LMP Unknown)   SpO2 95%   BMI 17.93 kg/m     GEN: Alert, oriented x3, NAD.  BRENDEN: AT, anicteric, OP without erythema, exudate, or ulcers.    NECK: Supple.    LYMPH: No LAD noted.  HRT: RRR, no AC  LUNGS: CTA  ABD: +BS, non-tender, non-distended, no rebound or guarding.  SKIN: No rash, jaundice   NEURO: MS intact       ADDITIONAL DATA:   I reviewed the patient's new clinical lab test results.   Recent Labs   Lab Test 09/01/19  0813 08/31/19  0854 08/30/19  0757   WBC 15.6* 24.0* 22.9*   HGB 10.8* 11.3* 11.3*   MCV 98 99 100    189 198   INR  --  1.35* 1.29*     Recent Labs   Lab Test 09/01/19  0813 08/31/19  0854 08/31/19  0243  08/30/19 2014   POTASSIUM 5.3 5.3 5.2   < > 5.8*   CHLORIDE 107 102  --   --  98   CO2 18* 20  --   --  20   BUN 91* 88*  --   --  90*   ANIONGAP 10 10  --   --  14    < > = values in this interval not displayed.     Recent Labs   Lab Test 08/31/19  0854 08/30/19  1110 08/30/19  0757 08/13/19  1000 08/10/19  2250 06/11/19  0829   ALBUMIN " 2.5*  --  3.1*  --  3.4  --    BILITOTAL 1.2  --  2.3*  --  1.6*  --    ALT 20  --  13  --  19  --    AST 27  --  16  --  33  --    PROTEIN  --  100*  --  100*  --  30*        I reviewed the patient's new imaging results.     ABDOMEN ONE VIEW PORTABLE  8/30/2019 7:25 PM      HISTORY: Abdominal pain.     COMPARISON: None.                                                                       IMPRESSION: Gas distended stomach. Small amount of stool.   Nonobstructed bowel gas pattern.    ULTRASOUND ABDOMEN COMPLETE   8/31/2019 8:17 AM      HISTORY:  Elevated bilirubin, abdominal pain.     COMPARISON: Abdominal x-ray on 8/30/2019.     FINDINGS:    Gallbladder: Normal with no cholelithiasis, wall thickening or focal  tenderness.       Bile ducts:  CHD is normal diameter.  No intrahepatic biliary  dilatation.     Liver: Normal.      Pancreas:  Partially obscured by overlying bowel gas,  but grossly  unremarkable.      Spleen:  Normal.      Right kidney:  No hydronephrosis or shadowing calculus.     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 No  hydronephrosis or shadowing calculus.     Aorta and IVC:  IVC is patent. Atherosclerotic plaque in the  visualized abdominal aorta.     Urinary bladder: Echogenic layering debris within the urinary bladder.                                                                      IMPRESSION:    1. Echogenic layering debris within the urinary bladder.  2. No hydronephrosis or shadowing calculus in either kidney.  3. 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

## 2019-09-01 NOTE — PLAN OF CARE
DATE & TIME: 08/31/19 nights   Cognitive Concerns/ Orientation : A/Ox4   BEHAVIOR & AGGRESSION TOOL COLOR: Green  CIWA SCORE:    ABNL VS/O2: BP 97/46  MOBILITY: Assist 2  PAIN MANAGMENT: Denies this shift  DIET: Renal  BOWEL/BLADDER: incontinent  ABNL LAB/BG: Sodium 132, Creatinine 2.8, GFR 14, Hgb 11.3, INR 1.35,   DRAIN/DEVICES: PIV infusing NS 75 mL/hr  TELEMETRY RHYTHM:   SKIN: Mepilex to coccyx, intact; bruising, fragile skin, pale  TESTS/PROCEDURES:   D/C DAY/GOALS/PLACE: Pending  OTHER IMPORTANT INFO: Need urine sample for osmolality. Family conference likely on Monday. Family will contact staff about best time.

## 2019-09-01 NOTE — PROGRESS NOTES
09/01/19 1106   Quick Adds   Type of Visit Initial PT Evaluation   Living Environment   Lives With alone   Living Arrangements assisted living   Home Accessibility no concerns   Transportation Anticipated family or friend will provide   Living Environment Comment elevator   Self-Care   Usual Activity Tolerance fair   Current Activity Tolerance poor   Regular Exercise No   Equipment Currently Used at Home walker, rolling   Activity/Exercise/Self-Care Comment 1 meal provided per day, patient reports she has not been eating at home due to lack of appetite   Functional Level Prior   Ambulation 1-->assistive equipment   Transferring 1-->assistive equipment   Toileting 1-->assistive equipment   Bathing 3-->assistive equipment and person   Communication 0-->understands/communicates without difficulty   Fall history within last six months no   Which of the above functional risks had a recent onset or change? ambulation;transferring   General Information   Onset of Illness/Injury or Date of Surgery - Date 08/30/19   Referring Physician Tanja Gould, DO   Patient/Family Goals Statement Home   Pertinent History of Current Problem (include personal factors and/or comorbidities that impact the POC) Pt is a 89 year old year old female admitted 8/30/19 for weakness and sepsis from UTI. PMHx signficant for CKD, NSTEMI, pulmonary fibrosis. Pt previously living in an halfway alone with 0 stairs. Prior level of mobility including Marifer ambulating with FWW, IND with dressing and toileting, and receives assist for showering.    Precautions/Limitations fall precautions   General Observations Patient refusing PT activity   Cognitive Status Examination   Orientation person;place   Level of Consciousness lethargic/somnolent   Follows Commands and Answers Questions 100% of the time   Personal Safety and Judgment impaired   Pain Assessment   Patient Currently in Pain Yes, see Vital Sign flowsheet  (back pain, reports 8/10 in low  "back)   Posture    Posture Forward head position;Protracted shoulders;Kyphosis   Range of Motion (ROM)   ROM Comment B LE ROM WNL   Strength   Strength Comments B LE strength WFL for bed mobility, impaired grossly   Bed Mobility   Bed Mobility Comments Pt rolling in bed Marifer with additional time and use of bed rails and transitioning supine<>sit requiring SBA, use of bed rails, and additional time with HOB flat. Patient strongly declining sit<>stand and ambulation due to fatigue, back pain. Patient dangled EOB with SBA x20 seconds before self-limiting and returning to supine due to fatigue. Patient scooting and boosting in bed minimally with Marifer using bed rails.   Transfer Skills   Transfer Comments refusing   Gait   Gait Comments refusing   Balance   Balance Comments seated balance limited by fatigue   General Therapy Interventions   Planned Therapy Interventions balance training;bed mobility training;gait training;transfer training   Intervention Comments seated balance, activity tolerance, return to baseline   Clinical Impression   Criteria for Skilled Therapeutic Intervention yes, treatment indicated   PT Diagnosis reduced mobility   Influenced by the following impairments impaired strength, activity tolerance, and balance   Functional limitations due to impairments impaired IND with functional mobility, unable to initiate ambulation/ transfers   Clinical Presentation Stable/Uncomplicated   Clinical Presentation Rationale clinical judgment   Clinical Decision Making (Complexity) Low complexity   Therapy Frequency Daily   Predicted Duration of Therapy Intervention (days/wks) 3-5 days   Anticipated Discharge Disposition Home with Assist  (home with 24/7 supervision and physical assist)   Risk & Benefits of therapy have been explained Yes   Patient, Family & other staff in agreement with plan of care Yes   [facility] AM-PAC TM \"6 Clicks\"   2016, Trustees of [facility], under license to [employer].  " "All rights reserved.   6 Clicks Short Forms Basic Mobility Inpatient Short Form   [facility] AM-PAC  \"6 Clicks\" V.2 Basic Mobility Inpatient Short Form   1. Turning from your back to your side while in a flat bed without using bedrails? 4 - None   2. Moving from lying on your back to sitting on the side of a flat bed without using bedrails? 4 - None   3. Moving to and from a bed to a chair (including a wheelchair)? 2 - A Lot   4. Standing up from a chair using your arms (e.g., wheelchair, or bedside chair)? 2 - A Lot   5. To walk in hospital room? 2 - A Lot   6. Climbing 3-5 steps with a railing? 2 - A Lot   Basic Mobility Raw Score (Score out of 24.Lower scores equate to lower levels of function) 16   Total Evaluation Time   Total Evaluation Time (Minutes) 12     "

## 2019-09-01 NOTE — PLAN OF CARE
DATE & TIME: 8/31 7-11PM                         Cognitive Concerns/ Orientation : Alert and oriented, occasionally forgetful   BEHAVIOR & AGGRESSION TOOL COLOR: Green, pleasant             ABNL VS/O2:  Low BP, had bolus on prior shift  MOBILITY: Assist of 2, repos in bed  PAIN MANAGMENT: Tylenol once for back pain  DIET: Renal, poor appetite  BOWEL/BLADDER: Incontinent at times  ABNL LAB/BG: Na 132, WBC 24 (trend up), Cr 2.8 (trend up), K 5.3,   DRAIN/DEVICES: IV saline at 75/hr  SKIN: pale, fragile, coccyx red but blanchable with mepilex intact, bruised  TESTS/PROCEDURES:    D/C DAY/GOALS/PLACE: pending  OTHER IMPORTANT INFO: Family care conference likely on Monday, family will let staff know what time works best

## 2019-09-01 NOTE — PLAN OF CARE
OT:Chart reviewed. Pt. admitted w/ongoing weakness, found to have sepsis secondary to persistent urinary tract infection. Pt. had recent hospital stay, 8/10-8/15, then went to [facility], 8/15-8/27.     Attempted evaluation(also was attempted yesterday);Pt. declined, stated she had been up all night and wanted to rest.

## 2019-09-01 NOTE — PROGRESS NOTES
[facility]    Hospitalist Progress Note    Date of Service (when I saw the patient): 09/01/2019    Assessment & Plan   Theresa Alves is a 89 year old female who presents with ongoing weakness, found to have sepsis secondary to persistent urinary tract infection     Sepsis secondary to persistent urinary tract infection  Weakness  Recently treated for UTI during last admission 8/14, given prescription for cefuroxime 250mg x5 days. Urine culture at that time grew >100K E.coli resistant to ampicillin and Unasyn. WBC 22.9, Lactic acid 2.4. Sepsis criteria met with WBC and lactic acid and source of infection, urine. UA with mod blood, large leuk esterase, >182 WBCs, few bacteria and WBC clumps. Reporting discomfort in low abdomen and cloudy urine.  - Admit inpatient  - Initially on Rocephin, now on Meropenem since 9/1 due to ESBL E coli  - Blood culture x 2 growing E coli     RODRÍGUEZ on CKD-III  Baseline Cr appears 1.4 range. On admit was 2.3, likely pre-renal in setting of decreased intake last few days  - FENa 0.6%, suggests prerenal failure  - Bolus 500 ml x 2 then increase NS to 75 ml/hr, recheck labs in AM  - BMP in AM  - Cr stable overnight, will repeat in AM     Hyperkalemia  - K 5.3, likely in setting of RODRÍGUEZ on CKD, monitor with IVF repletion  - Recheck BMP in AM to assess for improvement     Elevated bilirubin, direct bilirubin  Elevated INR  - Noted in the past as well, today is 2.3, d bili is 0.9. INR 1.29, not previously noted to be elevated  - repeat LFTs improved slightly  - RUQ US unremarkable  - AXR suggests severe PAD, consider mesenteric ischemia  - CT AP with nonspecific inflammation near rectum  - GI consult appreciated, Doppler US ordered  - Ordered scheduled acetaminophen and Zofran  - Will meet with family on Monday to discuss palliative care     Normocytic/borderline macrocytic Anemia   Hgb 11.3, ? Related to recent blood loss with hematuria and intermittent epistaxis. On B12  supplement as outpatient, continue while inpatient  - Continue to monitor. No chemical anticoagulation     Chronic HFpEF  Severe tricuspid regurgitation and mod-severe mitral regurgitation  HTN, DLD  BNP 66K, worse than her last admit, but she also has concurrent RODRÍGUEZ. No worsening respiratory status and CXR without pulmonary edema. Weaned off O2. Family reports a more liberal diet at home in regards to salt. They are thinking about having a do-not-hospitalize or palliative discussion with her, but state that they haven't quite gotten there yet. MCFP. Statin discontinued July 2019 due to age. Echo at last admit showed new severe TR and MR; mild decreased RV systolic function and elevated pressure (69mmHg + RAP), severe bi-atrial enlargement, moderate LVH, and dilated IVC. Weight on discharge was 103-104. PTA ASA 81mg, Toprol XL 50mg daily, torsemide 10mg BID.  - She has been weaned off oxygen. However her weight is down to 97lbs secondary to poor PO intake.  - Resumed PTA medications with exception of torsemide.  - IVF as above for now, low threshold to add back torsemide and stop IVF pending respiratory status  - Trend daily weights, I/Os  - supplemental O2 as needed     Recent NSTEMI  Last admit, Trop 1.5--1.00--0.928. Evaluated by cardiology, recommended angiogram, but she does not want to pursue it.  - now with slightly elevated troponin again 0.243. Will trend out troponin while here. No plans to resume heparin gtt and not likely to want angiogram  - Reduce metoprolol due to low BP     Intermittent epistaxis  - Discussed at her last cardiology appt, there was discussion of possibly stopping her ASA if she continued to have issues with epistaxis. Thought that oxygen use may have been contributing  - Continue PTA nasal saline spray prn     Recent Hematuria  Noted on last admission due to traumatic cath placement while on heparin gtt. 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 Will monitor for recurrent  hematuria this admission.     Loose stools  - She states related to boost drinks. 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 Not on anticoagulation PTA d/t hx severe epistaxis.     Hx Pulmonary fibrosis  Extensive disease as evidenced by CT chest. No indication acute alveolitis.     Physical deconditioning, frailty  Lives at the [facility] in assisted living. Ambulates with walker at baseline.  - PT/OT and care transitions consults     False positive CXR showing nodular pulmonary nodule, RML  Incidental finding on CXR (again seen this admit). CT did not show this or other acute pathology.     Chronic back pain  Continues on PTA Percocet daily prn     Severe malnutrition  With fat and muscle loss as per dietitian documentation during last admission  - She reports boost supplements recommended last admit give her loose stools.  - Reconsult nutrition this admit, perhaps there are other supplements that she may tolerate better and be more likely to take.     Goals of care  - Patient is DNR/DNI. Family is thinking about discussing palliative care with her or hospice. They have already liberalized her diet to include salt. There was brief mention of palliative care discussion with last cardiologist appt.  - Patient expresses desire for Palliative Care.  Will meet with family tomorrow to discuss further goals of care.  - Patient does not want aggressive measures.     DVT Prophylaxis: Pneumatic Compression Devices  Code Status: DNR / DNI  Expected discharge: 2 - 3 days  Marbin Sheets  Text Page (7 am to 6 pm)    Interval History   Patient complains of pain after eating.  Feels nauseated and has little appetite.    -Data reviewed today: I reviewed all new labs and imaging results over the last 24 hours. I  personally reviewed no images or EKG's today.    Physical Exam   Temp: 97.8  F (36.6  C) Temp src: Oral BP: 103/52 Pulse: 62   Resp: 16 SpO2: 93 % O2 Device: None (Room air)    Vitals:    08/30/19 0744 08/31/19 0625 09/01/19 0500   Weight: 44 kg (97 lb) 49.5 kg (109 lb 2 oz) 50.4 kg (111 lb 1.8 oz)     Vital Signs with Ranges  Temp:  [97.1  F (36.2  C)-98.1  F (36.7  C)] 97.8  F (36.6  C)  Pulse:  [60-66] 62  Resp:  [16] 16  BP: ()/(41-52) 103/52  SpO2:  [93 %-95 %] 93 %  I/O last 3 completed shifts:  In: 2518 [P.O.:360; I.V.:2158]  Out: -     Gen: Thin elderly female, no acute distressed, flat affect  HEENT: Atraumatic, normocephalic  Lungs: Clear to ausculation without wheezes, rhonchi, or rales  Heart: Regular rate and rhythm, no gallops or rubs, 2/6 NAIMA  GI: Bowel sound normal, no hepatosplenomegaly or masses  Lymph: No lymphadenopathy or edema  Skin: No rashes     Medications     sodium chloride 75 mL/hr at 09/01/19 0530       acetaminophen  975 mg Oral Q8H     aspirin  81 mg Oral Daily     escitalopram  5 mg Oral Daily     insulin aspart  1-3 Units Subcutaneous TID AC     insulin aspart  1-3 Units Subcutaneous At Bedtime     meropenem  500 mg Intravenous Q24H     metoprolol succinate ER  25 mg Oral Daily     ondansetron  4 mg Oral TID AC     pantoprazole  40 mg Oral QAM AC     sodium chloride (PF)  3 mL Intracatheter Q8H       Data   Recent Labs   Lab 09/01/19  0813 08/31/19  0854 08/31/19  0243  08/30/19 2014 08/30/19  1628 08/30/19  0757   WBC 15.6* 24.0*  --   --   --   --  22.9*   HGB 10.8* 11.3*  --   --   --   --  11.3*   MCV 98 99  --   --   --   --  100    189  --   --   --   --  198   INR  --  1.35*  --   --   --   --  1.29*    132*  --   --  132*  --  134   POTASSIUM 5.3 5.3 5.2   < > 5.8*  --  5.4*   CHLORIDE 107 102  --   --  98  --  102   CO2 18* 20  --   --  20  --  22   BUN 91* 88*  --   --  90*  --  76*   CR 2.80* 2.80*  --   --  2.59*  --  2.30*   ANIONGAP 10 10  --   --   14  --  10   PAWEL 8.0* 8.0*  --   --  8.0*  --  8.7   * 99  --   --  242*  --  149*   ALBUMIN  --  2.5*  --   --   --   --  3.1*   PROTTOTAL  --  6.4*  --   --   --   --  7.2   BILITOTAL  --  1.2  --   --   --   --  2.3*   ALKPHOS  --  68  --   --   --   --  68   ALT  --  20  --   --   --   --  13   AST  --  27  --   --   --   --  16   TROPI  --   --   --   --  0.242* 0.257* 0.243*    < > = values in this interval not displayed.       Recent Results (from the past 24 hour(s))   CT Abdomen Pelvis w/o Contrast    Narrative    CT ABDOMEN AND PELVIS WITHOUT CONTRAST 9/1/2019 1:11 PM     HISTORY: Abdominal pain, unspecified. Possible mesenteric ischemia.    COMPARISON: None.    TECHNIQUE: Volumetric helical acquisition of CT images from the lung  bases through the symphysis pubis without intravenous contrast.  Radiation dose for this scan was reduced using automated exposure  control, adjustment of the mA and/or kV according to patient size, or  iterative reconstruction technique.    FINDINGS: Motion artifact limits detail. There is stranding around the  rectum compatible with a nonspecific proctitis. Ischemia would be  unusual here, but would be difficult to exclude. There are no dilated  loops of small intestine or large bowel to suggest ileus or  obstruction. There is moderate diverticulosis without evidence for  diverticulitis. No gross free air. Probable trace free fluid. There  are extensive atherosclerotic changes of the visualized aorta and its  branches. There is no evidence of abdominal aortic aneurysm.   Mild  distal thoracic aortic aneurysm measuring 3.4 cm. Fibrosis and trace  pleural fluid seen at both lung bases. Gallbladder not well seen.  Probable bilateral adrenal thickening. Visualized solid organs  demonstrate no gross focal lesion. No frankly destructive bony  lesions.      Impression    IMPRESSION:   1. Some mild stranding around the rectum compatible with a nonspecific  proctitis. Infectious  or inflammatory etiologies are favored, ischemia  would be difficult to exclude.  2. Otherwise no acute process demonstrated in the abdomen and pelvis.    HORTENCIA MYLES MD

## 2019-09-01 NOTE — PLAN OF CARE
"OT: zeb attempted again and pt continued to decline stating \"I don't feel like doing anything right now.\" Offered multiple options for therapy and pt continued to decline stating she is too weak. Educated on OOB benefits and continued to decline.   "

## 2019-09-02 NOTE — PLAN OF CARE
DATE & TIME: 9/1 [id number]  Cognitive Concerns/ Orientation : A&Ox4, calm, cooperative   BEHAVIOR & AGGRESSION TOOL COLOR: Green  ABNL VS/O2: VSS on RA - pt BP runs lower, this is normal for her  MOBILITY: Total care, turn and repo Q2H  PAIN MANAGMENT: Scheduled Tylenol given   DIET: NPO   BOWEL/BLADDER: Incontinent   ABNL LAB/BG: WBC 15.6, Hgb 10.8, Creat 2.80  DRAIN/DEVICES: PIV infusing NS at 75 mL/hr   SKIN: Pale, bruised, redness blanchable on coccyx, mepilex in place  TESTS/PROCEDURES: Abdominal ultrasound scheduled for tomorrow  D/C DAY/GOALS/PLACE: Discharge pending  OTHER IMPORTANT INFO: Care conference scheduled for tomorrow @ 1000. Need urine sample. SW, PT, OT following. GI consulted. Started Protonix.

## 2019-09-02 NOTE — PROGRESS NOTES
[facility] note    Discussed the abdominal ultrasound with doppler results, no significant stenosis, with patient's family. Patient is sleeping. They do not want anymore invasive tests or procedures per notes and our discussion today. We discussed that if they wanted to work-up the abdominal pain or the possible inflammation in the rectum, it would require an EGD and flex sig. They do not want to pursue these tests.    We will sign off, please call with any questions or concerns.     Uche Glover MD   [facility]  Cell [phone]  After 5 PM, please call [phone]

## 2019-09-02 NOTE — PLAN OF CARE
Pt here with sepsis secondary to UTI. A&O to self. VSS. Renal diet, thin liquids. Takes pills whole. Up with A2 + lift. Oxycodone given for low back pain. Pt scoring green on the Aggression Stop Light Tool. Continue to monitor and follow POC.

## 2019-09-02 NOTE — PLAN OF CARE
DATE & TIME: 9/1 [id number]  Cognitive Concerns/ Orientation : A&Ox4, calm, cooperative   BEHAVIOR & AGGRESSION TOOL COLOR: Green  ABNL VS/O2: VSS on RA - pt BP runs lower, this is normal for her  MOBILITY: Total care, turn and repo Q2H  PAIN MANAGMENT: Scheduled Tylenol given   DIET: NPO   BOWEL/BLADDER: Incontinent   ABNL LAB/BG: WBC 15.6, Hgb 10.8, Creat 2.80  DRAIN/DEVICES: PIV infusing NS at 75 mL/hr   SKIN: Pale, bruised, redness blanchable on coccyx, mepilex in place  TESTS/PROCEDURES: Abdominal ultrasound scheduled for tomorrow  D/C DAY/GOALS/PLACE: Discharge pending  OTHER IMPORTANT INFO: Care conference scheduled for tomorrow @ 1000. Need urine sample. SW, PT, OT following. GI consulted. Started Protonix.

## 2019-09-02 NOTE — PLAN OF CARE
OT: Attempted eval this morning; however, pt out of room at test. Per chart review and discussion with RN, pt and family planning for care conference this morning to discuss POC and goals.

## 2019-09-02 NOTE — PROGRESS NOTES
[facility]    Hospitalist Progress Note    Date of Service (when I saw the patient): 09/02/2019    Assessment & Plan   Theresa Alves is a 89 year old female who presents with ongoing weakness, found to have sepsis secondary to persistent urinary tract infection     Sepsis secondary to persistent urinary tract infection  Weakness  Recently treated for UTI during last admission 8/14, given prescription for cefuroxime 250mg x5 days. Urine culture at that time grew >100K E.coli resistant to ampicillin and Unasyn. WBC 22.9, Lactic acid 2.4. Sepsis criteria met with WBC and lactic acid and source of infection, urine. UA with mod blood, large leuk esterase, >182 WBCs, few bacteria and WBC clumps. Reporting discomfort in low abdomen and cloudy urine.  - Admit inpatient  - Initially on Rocephin, now on Meropenem since 9/1 due to ESBL E coli  - Blood culture x 2 growing E coli  - Recheck blood cultures in AM  - WBC improving     RODRÍGUEZ on CKD-III  Baseline Cr appears 1.4 range. On admit was 2.3, likely pre-renal in setting of decreased intake last few days  - FENa 0.6%, suggests prerenal failure  - Bolused 500 ml x 2 on 8/31 and has been on NS at 75 ml/hr since  - BMP in AM  - Cr slightly improved overnight, expect Cr further improved in AM     Hyperkalemia  - K 5.3, likely in setting of RODRÍGUEZ on CKD, monitor with IVF repletion  - Recheck BMP in AM to assess for improvement     Post prandial abdominal pain and poor appetite  - Noted in the past as well, today t bili is 2.3, d bili is 0.9. INR 1.29, not previously noted to be elevated  - repeat LFTs improved slightly  - RUQ US unremarkable  - AXR suggests severe PAD, consider mesenteric ischemia  - CT AP with nonspecific inflammation near rectum  - GI consult appreciated  - Abdominal Doppler did not demonstrate vascular stenoses  - Continue scheduled acetaminophen and Zofran  - Met with family today, will continue current cares but not escalate or pursue  further imaging  - Ordered calorie counts     Normocytic/borderline macrocytic Anemia   Hgb 11.3, ? Related to recent blood loss with hematuria and intermittent epistaxis. On B12 supplement as outpatient, continue while inpatient  - Continue to monitor. No chemical anticoagulation     Chronic HFpEF  Severe tricuspid regurgitation and mod-severe mitral regurgitation  HTN, DLD  BNP 66K, worse than her last admit, but she also has concurrent RODRÍGUEZ. No worsening respiratory status and CXR without pulmonary edema. Weaned off O2. Family reports a more liberal diet at home in regards to salt. They are thinking about having a do-not-hospitalize or palliative discussion with her, but state that they haven't quite gotten there yet. SONIA. Statin discontinued July 2019 due to age. Echo at last admit showed new severe TR and MR; mild decreased RV systolic function and elevated pressure (69mmHg + RAP), severe bi-atrial enlargement, moderate LVH, and dilated IVC. Weight on discharge was 103-104. PTA ASA 81mg, Toprol XL 50mg daily, torsemide 10mg BID.  - She has been weaned off oxygen. However her weight is down to 97lbs secondary to poor PO intake.  - Resumed PTA medications with exception of torsemide.  - IVF as above for now, low threshold to add back torsemide and stop IVF pending respiratory status  - Trend daily weights, I/Os  - supplemental O2 as needed     Recent NSTEMI  Last admit, Trop 1.5--1.00--0.928. Evaluated by cardiology, recommended angiogram, but she does not want to pursue it.  - now with slightly elevated troponin again 0.243. Will trend out troponin while here. No plans to resume heparin gtt and not likely to want angiogram  - Reduce metoprolol due to low BP     Intermittent epistaxis  - Discussed at her last cardiology appt, there was discussion of possibly stopping her ASA if she continued to have issues with epistaxis. Thought that oxygen use may have been contributing  - Continue PTA nasal saline spray  prn     Recent Hematuria  Noted on last admission due to traumatic cath placement while on heparin gtt. 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 Will monitor for recurrent hematuria this admission.     Loose stools  - She states related to boost drinks. 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 Not on anticoagulation PTA d/t hx severe epistaxis.     Hx Pulmonary fibrosis  Extensive disease as evidenced by CT chest. No indication acute alveolitis.     Physical deconditioning, frailty  Lives at [facility] in assisted living. Ambulates with walker at baseline.  - PT/OT and care transitions consults     False positive CXR showing nodular pulmonary nodule, RML  Incidental finding on CXR (again seen this admit). CT did not show this or other acute pathology.     Chronic back pain  Continues on PTA Percocet daily prn     Severe malnutrition  With fat and muscle loss as per dietitian documentation during last admission  - She reports boost supplements recommended last admit give her loose stools.    Greater than 35 minutes were spent discussing care options and prognosis with patient and family.     DVT Prophylaxis: Pneumatic Compression Devices  Code Status: DNR / DNI  Expected discharge: 2 - 3 days  Marbin Sheets  Text Page (7 am to 6 pm)    Interval History   Patient complains of pain after eating.  Feels less nauseated today and slightly more hungry.    -Data reviewed today: I reviewed all new labs and imaging results over the last 24 hours. I personally reviewed no images or EKG's today.    Physical Exam   Temp: 97.2  F (36.2  C) Temp src: Oral BP: 104/59 Pulse: 60   Resp: 16 SpO2: 93 % O2 Device: None (Room air)    Vitals:    08/31/19 0625 09/01/19 0500 09/02/19 0608   Weight: 49.5 kg (109 lb 2 oz) 50.4 kg (111 lb 1.8 oz) 45.5 kg (100 lb 5 oz)     Vital Signs with  Ranges  Temp:  [97.1  F (36.2  C)-97.8  F (36.6  C)] 97.2  F (36.2  C)  Pulse:  [59-64] 60  Resp:  [16] 16  BP: ()/(45-59) 104/59  SpO2:  [93 %-96 %] 93 %  I/O last 3 completed shifts:  In: 240 [P.O.:240]  Out: -     Gen: Thin elderly female, no acute distressed, flat affect  HEENT: Atraumatic, normocephalic  Lungs: Clear to ausculation without wheezes, rhonchi, or rales  Heart: Regular rate and rhythm, no gallops or rubs, 2/6 NAIMA  GI: Bowel sound normal, no hepatosplenomegaly or masses  Lymph: No lymphadenopathy or edema  Skin: No rashes     Medications     sodium chloride 75 mL/hr at 09/01/19 1610       acetaminophen  975 mg Oral Q8H     aspirin  81 mg Oral Daily     escitalopram  5 mg Oral Daily     meropenem  500 mg Intravenous Q24H     metoprolol succinate ER  25 mg Oral Daily     ondansetron  4 mg Oral TID AC     pantoprazole  40 mg Oral QAM AC     sodium chloride (PF)  3 mL Intracatheter Q8H       Data   Recent Labs   Lab 09/02/19  0724 09/01/19  0813 08/31/19  0854  08/30/19 2014 08/30/19  1628 08/30/19  0757   WBC 13.4* 15.6* 24.0*  --   --   --  22.9*   HGB 10.1* 10.8* 11.3*  --   --   --  11.3*   MCV 98 98 99  --   --   --  100    212 189  --   --   --  198   INR  --   --  1.35*  --   --   --  1.29*    135 132*  --  132*  --  134   POTASSIUM 4.8 5.3 5.3   < > 5.8*  --  5.4*   CHLORIDE 108 107 102  --  98  --  102   CO2 18* 18* 20  --  20  --  22   BUN 94* 91* 88*  --  90*  --  76*   CR 2.75* 2.80* 2.80*  --  2.59*  --  2.30*   ANIONGAP 10 10 10  --  14  --  10   PAWEL 7.8* 8.0* 8.0*  --  8.0*  --  8.7   GLC 87 102* 99  --  242*  --  149*   ALBUMIN  --   --  2.5*  --   --   --  3.1*   PROTTOTAL  --   --  6.4*  --   --   --  7.2   BILITOTAL  --   --  1.2  --   --   --  2.3*   ALKPHOS  --   --  68  --   --   --  68   ALT  --   --  20  --   --   --  13   AST  --   --  27  --   --   --  16   TROPI  --   --   --   --  0.242* 0.257* 0.243*    < > = values in this interval not displayed.        Recent Results (from the past 24 hour(s))   US Aorta/IVC/Iliac Duplex Limited    Narrative    US AORTA/IVC/ILIAC DUPLEX LIMITED 9/2/2019 8:17 AM    HISTORY: 89-year-old woman with clinical history of chronic mesenteric  ischemia.    COMPARISON: None    TECHNIQUE: Color Doppler and spectral waveform analysis obtained  through the visceral arteries and abdominal aorta.    FINDINGS: The proximal abdominal aorta is patent with velocity of 63/7  cm/sec. Celiac artery origin is patent with velocity of 61/11 cm/sec  and a diameter of 6.1 mm. Minimal velocity elevation in the proximal  superior mesenteric artery measuring 218/15 cm/sec with mid-SMA  velocity of 124/13 cm/sec and distal SMA with velocity of 82/7 cm/sec.  Hepatic artery is patent with 44/11 cm/sec and splenic artery is 99/16  cm/sec. The inferior mesenteric artery is difficult to visualize,  though not unexpected.      Impression    IMPRESSION: Patent celiac artery and SMA without suggestion of  stenosis.    JONNIE BIRD MD

## 2019-09-03 NOTE — PROGRESS NOTES
CALORIE COUNT      Approximate Oral Intake for:    9/2/19  Calories:  522 kcal   Protein:  17 grams         Estimated Needs:    Calories: 6374-2597 kcal (30-35 kcal/kg)  Protein:  53-66 grams (1.2-1.5 g/kg)    Silvana Jones RD, LD, CNSC   Clinical Dietitian - [facility]

## 2019-09-03 NOTE — PLAN OF CARE
DATE & TIME: 9/2 [id number]  Cognitive Concerns/ Orientation : A&Ox4, forgetful  BEHAVIOR & AGGRESSION TOOL COLOR: Green   CIWA SCORE: n/a  ABNL VS/O2: VSS on RA - BP runs low normally.   MOBILITY: Total - turn/repo q2h   PAIN MANAGMENT: Scheduled Tylenol  DIET: Regular, 3 day calorie counting, pt needs assistance with eating.   BOWEL/BLADDER: Incontinent   ABNL LAB/BG: Cr 2.75, WBC 13.4, Hgb 10.1  DRAIN/DEVICES: PIV infusing NS at 75 mL/hr   TELEMETRY RHYTHM: n/a  SKIN: Redness blanchable on coccyx.   TESTS/PROCEDURES: n/a  D/C DAY/GOALS/PLACE: Pending, 2-3 days.   OTHER IMPORTANT INFO: GI signed off - pt and family do not wish to have any further interventions.

## 2019-09-03 NOTE — PLAN OF CARE
DATE & TIME: 9/3/19 [id number]  Cognitive Concerns/ Orientation : A&Ox4, forgetful  BEHAVIOR & AGGRESSION TOOL COLOR: Green   CIWA SCORE: NA  ABNL VS/O2: VSS on RA   MOBILITY: Total - turn/repo q2h   PAIN MANAGMENT: Scheduled Tylenol. Oxycodone given x1 for back pain with relief.   DIET: Regular, 3 day calorie counting, pt needs assistance with eating. Good appetite today.  BOWEL/BLADDER: Incontinent   ABNL LAB/BG: WBC 13.4, Hgb 10.1, Creat 2.61  DRAIN/DEVICES: PIV infusing NS at 75 mL/hr   TELEMETRY RHYTHM: NA  SKIN: Redness blanchable on coccyx.   TESTS/PROCEDURES: NA  D/C DAY/GOALS/PLACE: Pending progress  OTHER IMPORTANT INFO: GI signed off - pt and family do not wish to have any further interventions.

## 2019-09-03 NOTE — PROGRESS NOTES
Antimicrobial Stewardship Team Note    Antimicrobial Stewardship Program - A joint venture between [facility] and [facility] to optimize antibiotic management.     Patient: Theresa Alves  MRN: [id number]  Allergies: Patient has no known allergies.    Brief Summary: 89 year old female who presents with ongoing weakness, found to have sepsis secondary to persistent urinary tract infection     Sepsis secondary to persistent urinary tract infection  Weakness  Recently treated for UTI during last admission 8/14, given prescription for cefuroxime 250mg x5 days. Urine culture at that time grew >100K E.coli resistant to ampicillin and Unasyn. WBC 22.9, Lactic acid 2.4. Sepsis criteria met with WBC and lactic acid and source of infection, urine. UA with mod blood, large leuk esterase, >182 WBCs, few bacteria and WBC clumps. Reporting discomfort in low abdomen and cloudy urine.  - Initially on Rocephin, now on Meropenem since 9/1 due to ESBL E coli per hospitalist notes  - Blood culture x 2 growing E coli  - Recheck blood cultures in AM  - WBC improving       Active Anti-infective Medications   (From admission, onward)                Start     Stop    08/31/19   meropenem  500 mg,   Intravenous,   EVERY 12 HOURS     Bacteremia        --          Assessment: No ESBL organisms isolated.  The E coli isolated in the blood is sensitive to all antibiotics with the exception of ampicillin and ampicillin/sulbactam.  Recommend de-escalating meropenem.    Recommendations:  Medication Change: de-escalate antibiotic regimen -  .meropenem  Consult Recommendation:     Recommended labs:     Other Recommendation(s):   -      Pharmacy took the following actions: Sticky note reminder created, Electronic note created.    Discussed with ID Staff Dr. Cindy Marcelo, PharmD, BCPS    Vital Signs/Clinical Features:  Vitals         09/01 0700  -  09/02 0659 09/02 0700  -  09/03 0659 09/03 0700   -  09/03 1253   Most Recent    Temp ( F) 97.1 -  97.8    97.2 -  97.9      97.3     97.3 (36.3)    Pulse 59 -  66    60 -  62      60     60    Resp   16    12 -  18      18     18    BP 90/45 -  103/52    94/51 -  106/58      113/58     113/58    SpO2 (%) 93 -  96    93 -  95      97     97            Labs  Estimated Creatinine Clearance: 10.5 mL/min (A) (based on SCr of 2.61 mg/dL (H)).  Recent Labs   Lab Test 08/30/19 0757 08/30/19 2014 08/31/19  0854 09/01/19  0813 09/02/19  0724 09/03/19  0727   CR 2.30* 2.59* 2.80* 2.80* 2.75* 2.61*       Recent Labs   Lab Test 02/21/18  1531 03/16/18  2304  06/05/18  1142 11/05/18  2323  08/10/19  2250 08/11/19  0432 08/12/19  0547 08/13/19  0919 08/15/19  0556 08/19/19 08/30/19  0757 08/31/19  0854 09/01/19  0813 09/02/19  0724   WBC 8.4 11.2*   < > 10.2 8.2   < > 8.1 7.8 7.4  --  8.6  --  22.9* 24.0* 15.6* 13.4*   ANEU 4.6 7.6  --  5.9 3.3  --  5.0  --   --   --   --   --  20.2*  --   --   --    ALYM 2.2 1.5  --  2.6 3.5  --  1.8  --   --   --   --   --  0.8  --   --   --    KAMRAN 1.3 1.6*  --  1.4* 1.0  --  1.0  --   --   --   --   --  1.7*  --   --   --    AEOS 0.3 0.5  --  0.3 0.3  --  0.2  --   --   --   --   --  0.0  --   --   --    HGB 12.7 12.7   < > 13.4 13.2   < > 12.9 11.8 12.0 12.5  --  11.9* 11.3* 11.3* 10.8* 10.1*   HCT 38.5 38.3   < > 40.3 39.2   < > 39.1 35.4 35.4  --   --   --  33.6* 33.6* 31.7* 30.1*   * 103*   < > 104* 104*   < > 103* 102* 100  --   --   --  100 99 98 98    189   < > 221 197   < > 201 190 194  --  176  --  198 189 212 213    < > = values in this interval not displayed.       Recent Labs   Lab Test 11/28/17  1454 03/16/18  2304 03/18/18  0540 08/10/19  2250 08/30/19  0757 08/31/19  0854   BILITOTAL 1.2 1.2 1.8* 1.6* 2.3* 1.2   ALKPHOS 60 92 67 71 68 68   ALBUMIN 3.4 3.5 2.9* 3.4 3.1* 2.5*   AST 15 43 24 33 16 27   ALT 15 45 27 19 13 20       Recent Labs   Lab Test 08/30/19  0855 08/30/19  1628 08/31/19  0042 08/31/19  0854    PCAL  --  2.31  --   --    LACT 2.4* 3.3* 2.3* 1.5       Recent Labs   Lab Test 01/19/19  0831   VANCOMYCIN 20.5       Culture Results:  7-Day Micro Results       Procedure Component Value Units Date/Time    Blood culture [[id number]] Collected:  09/03/19 0737    Order Status:  Completed Lab Status:  Preliminary result Updated:  09/03/19 1044    Specimen:  Blood      Specimen Description Blood Left Arm     Special Requests Received in aerobic bottle only     Culture Micro PENDING    Blood culture [[id number]] Collected:  09/03/19 0726    Order Status:  Completed Lab Status:  Preliminary result Updated:  09/03/19 1045    Specimen:  Blood      Specimen Description Blood Right Arm     Special Requests Aerobic and anaerobic bottles received     Culture Micro PENDING    Blood culture [[id number]]  (Abnormal)  (Susceptibility) Collected:  08/30/19 0933    Order Status:  Completed Lab Status:  Final result Updated:  09/01/19 0723    Specimen:  Blood      Specimen Description Blood Right Arm     Special Requests Aerobic and anaerobic bottles received     Culture Micro Cultured on the 1st day of incubation:  Escherichia coli        Critical Value/Significant Value, preliminary result only, called to and read back by  Amanda Barbosa RN at 2222 [id number]        (Note)  POSITIVE for E.COLI by Verigene multiplex nucleic acid test. Final  identification and antimicrobial susceptibility testing will be  verified by standard methods. Verigene test will not distinguish  E.coli from Shigella species including S.dysenteriae, S.flexneri,  S.boydii, and S.sonnei. Specimens containing Shigella species or  E.coli will be reported as Positive for E.coli.    Specimen tested with Verigene multiplex, gram-negative blood culture  nucleic acid test for the following targets: Acinetobacter sp.,  Citrobacter sp., Enterobacter sp., Proteus sp., E. coli, K.  pneumoniae/oxytoca, P. aeruginosa, and the following resistance  markers: CTXM, KPC, NDM, VIM, IMP  and OXA.    Critical Value/Significant Value called to and read back by Susan Wesley RN at 0051 on 8.31.19 by SS.        Susceptibility       Escherichia coli (1)       Antibiotic Interpretation Sensitivity Method Status    AMIKACIN Sensitive <=2 ug/mL YAZ Final    AMPICILLIN Resistant >=32 ug/mL YAZ Final    AMPICILLIN/SULBACTAM Resistant >=32 ug/mL YAZ Final    CEFAZOLIN [*]  Sensitive <=4 ug/mL YAZ Final    CEFEPIME Sensitive <=1 ug/mL YAZ Final    CEFTAZIDIME Sensitive <=1 ug/mL YAZ Final    CEFTRIAXONE Sensitive <=1 ug/mL YAZ Final    CIPROFLOXACIN Sensitive <=0.25 ug/mL YAZ Final    GENTAMICIN Sensitive <=1 ug/mL YAZ Final    LEVOFLOXACIN Sensitive <=0.12 ug/mL YAZ Final    Piperacillin/Tazo Sensitive <=4 ug/mL YAZ Final    TOBRAMYCIN Sensitive <=1 ug/mL YAZ Final    Trimethoprim/Sulfa Sensitive <=1/19 ug/mL YAZ Final    MEROPENEM Sensitive <=0.25 ug/mL YAZ Final    Ext Spect B Lac Prod [*]  Sensitive Negative  YAZ Final               [*]   Suppressed Antibiotic                   Blood culture [[id number]]  (Abnormal) Collected:  08/30/19 0858    Order Status:  Completed Lab Status:  Final result Updated:  09/01/19 0724    Specimen:  Blood      Specimen Description Blood Left Arm     Special Requests Aerobic and anaerobic bottles received     Culture Micro Cultured on the 1st day of incubation:  Escherichia coli        Critical Value/Significant Value, preliminary result only, called to and read back by  Tiffanie Hebert RN at 5351 [id number]        Susceptibility testing done on previous specimen    Blood culture     Order Status:  Canceled Lab Status:  No result     Specimen:  Blood             Recent Labs   Lab Test 09/02/12  1820 09/27/12  1245 11/06/18  0525 06/11/19  0829 08/13/19  1000 08/30/19  1110   URINEPH 7.5* 6.0 6.0 6.5 8.0* 5.5   NITRITE Positive* Negative Negative Negative Positive* Negative   LEUKEST Large* Trace* Negative Trace* Large* Large*   WBCU >182* 5-10*  --  0 - 5 120* >182*                          Imaging: Xr Chest 2 Views    Result Date: 8/30/2019  CHEST TWO VIEWS   8/30/2019 8:46 AM HISTORY: Shortness of breath. COMPARISON: Chest x-ray 8/10/2019.     IMPRESSION: Two views of the chest are performed. Interstitial lung changes appear stable. Underlying COPD is likely. Tiny nodular opacity right upper lung is stable if not slightly smaller in appearance compared to prior chest x-ray. Heart size remains enlarged. Aorta is tortuous and calcified. Probable distal descending thoracic aortic aneurysm at approximately 3.9 cm not well appreciated on prior study. No pneumothorax or pleural effusions. Bones are osteopenic. Mild kyphosis. Implantable cardiac device left upper chest demonstrates a single lead overlying the right ventricle. JACQUELINE LOVING MD    Us Abdomen Complete    Result Date: 8/31/2019  ULTRASOUND ABDOMEN COMPLETE   8/31/2019 8:17 AM HISTORY:  Elevated bilirubin, abdominal pain. COMPARISON: Abdominal x-ray on 8/30/2019. FINDINGS:  Gallbladder: Normal with no cholelithiasis, wall thickening or focal tenderness.  Bile ducts:  CHD is normal diameter.  No intrahepatic biliary dilatation. Liver: Normal. Pancreas:  Partially obscured by overlying bowel gas,  but grossly unremarkable. Spleen:  Normal. Right kidney:  No hydronephrosis or shadowing calculus. 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 No hydronephrosis or shadowing calculus. Aorta and IVC:  IVC is patent. Atherosclerotic plaque in the visualized abdominal aorta. Urinary bladder: Echogenic layering debris within the urinary bladder.     IMPRESSION:  1. Echogenic layering debris within the urinary bladder. 2. No hydronephrosis or shadowing calculus in either kidney. 3. 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 KHALIDA MODI MD    Us Aorta/ivc/iliac Duplex Limited    Result Date: 9/2/2019  US  AORTA/IVC/ILIAC DUPLEX LIMITED 9/2/2019 8:17 AM HISTORY: 89-year-old woman with clinical history of chronic mesenteric ischemia. COMPARISON: None TECHNIQUE: Color Doppler and spectral waveform analysis obtained through the visceral arteries and abdominal aorta. FINDINGS: The proximal abdominal aorta is patent with velocity of 63/7 cm/sec. Celiac artery origin is patent with velocity of 61/11 cm/sec and a diameter of 6.1 mm. Minimal velocity elevation in the proximal superior mesenteric artery measuring 218/15 cm/sec with mid-SMA velocity of 124/13 cm/sec and distal SMA with velocity of 82/7 cm/sec. Hepatic artery is patent with 44/11 cm/sec and splenic artery is 99/16 cm/sec. The inferior mesenteric artery is difficult to visualize, though not unexpected.     IMPRESSION: Patent celiac artery and SMA without suggestion of stenosis. JONNIE BIRD MD    Xr Abdomen Port 1 View    Result Date: 8/30/2019  ABDOMEN ONE VIEW PORTABLE  8/30/2019 7:25 PM HISTORY: Abdominal pain. COMPARISON: None.     IMPRESSION: Gas distended stomach. Small amount of stool. Nonobstructed bowel gas pattern. HORTENCIA MYLES MD    Ct Abdomen Pelvis W/o Contrast    Result Date: 9/1/2019  CT ABDOMEN AND PELVIS WITHOUT CONTRAST 9/1/2019 1:11 PM HISTORY: Abdominal pain, unspecified. Possible mesenteric ischemia. COMPARISON: None. TECHNIQUE: Volumetric helical acquisition of CT images from the lung bases through the symphysis pubis without intravenous contrast. Radiation dose for this scan was reduced using automated exposure control, adjustment of the mA and/or kV according to patient size, or iterative reconstruction technique. FINDINGS: Motion artifact limits detail. There is stranding around the rectum compatible with a nonspecific proctitis. Ischemia would be unusual here, but would be difficult to exclude. There are no dilated loops of small intestine or large bowel to suggest ileus or obstruction. There is moderate diverticulosis without  evidence for diverticulitis. No gross free air. Probable trace free fluid. There are extensive atherosclerotic changes of the visualized aorta and its branches. There is no evidence of abdominal aortic aneurysm.   Mild distal thoracic aortic aneurysm measuring 3.4 cm. Fibrosis and trace pleural fluid seen at both lung bases. Gallbladder not well seen. Probable bilateral adrenal thickening. Visualized solid organs demonstrate no gross focal lesion. No frankly destructive bony lesions.     IMPRESSION: 1. Some mild stranding around the rectum compatible with a nonspecific proctitis. Infectious or inflammatory etiologies are favored, ischemia would be difficult to exclude. 2. Otherwise no acute process demonstrated in the abdomen and pelvis. HORTENCIA MYLES MD

## 2019-09-03 NOTE — PROGRESS NOTES
[facility]    Hospitalist Progress Note    Date of Service (when I saw the patient): 09/03/2019    Assessment & Plan   Theresa Alves is a 89 year old female who presents with ongoing weakness, found to have sepsis secondary to persistent urinary tract infection     Sepsis secondary to persistent urinary tract infection  Weakness  Recently treated for UTI during last admission 8/14, given prescription for cefuroxime 250mg x5 days. Urine culture at that time grew >100K E.coli resistant to ampicillin and Unasyn. WBC 22.9, Lactic acid 2.4. Sepsis criteria met with WBC and lactic acid and source of infection, urine. UA with mod blood, large leuk esterase, >182 WBCs, few bacteria and WBC clumps. Reporting discomfort in low abdomen and cloudy urine.  - Admit inpatient  - Initially on Rocephin, now on Meropenem since 9/1 due to ESBL E coli  - Blood culture x 2 growing E coli  - Repeat blood cultures 9/3 NGTD  - WBC improving     RODRÍGUEZ on CKD-III  Baseline Cr appears 1.4 range. On admit was 2.3, likely pre-renal in setting of decreased intake last few days  - FENa 0.6%, suggests prerenal failure  - Bolused 500 ml x 2 on 8/31 and has been on NS at 75 ml/hr since  - BMP in AM  - Cr slightly improved overnight, expect Cr further improved in AM     Hyperkalemia  - Serum K level normalized with IVF    Post prandial abdominal pain and poor appetite  - Noted in the past as well, today t bili is 2.3, d bili is 0.9. INR 1.29, not previously noted to be elevated  - repeat LFTs improved slightly  - RUQ US unremarkable  - AXR suggests severe PAD, consider mesenteric ischemia  - CT AP with nonspecific inflammation near rectum  - GI consult appreciated  - Abdominal Doppler did not demonstrate vascular stenoses  - Continue scheduled acetaminophen and Zofran  - Met with family today, will continue current cares but not escalate or pursue further imaging  - Ordered calorie counts     Normocytic/borderline macrocytic  Anemia   Hgb 11.3, ? Related to recent blood loss with hematuria and intermittent epistaxis. On B12 supplement as outpatient, continue while inpatient  - Continue to monitor. No chemical anticoagulation     Chronic HFpEF  Severe tricuspid regurgitation and mod-severe mitral regurgitation  HTN, DLD  BNP 66K, worse than her last admit, but she also has concurrent RODRÍGUEZ. No worsening respiratory status and CXR without pulmonary edema. Weaned off O2. Family reports a more liberal diet at home in regards to salt. They are thinking about having a do-not-hospitalize or palliative discussion with her, but state that they haven't quite gotten there yet. senior living. Statin discontinued July 2019 due to age. Echo at last admit showed new severe TR and MR; mild decreased RV systolic function and elevated pressure (69mmHg + RAP), severe bi-atrial enlargement, moderate LVH, and dilated IVC. Weight on discharge was 103-104. PTA ASA 81mg, Toprol XL 50mg daily, torsemide 10mg BID.  - She has been weaned off oxygen. However her weight is down to 97lbs secondary to poor PO intake.  - Resumed PTA medications with exception of torsemide.  - IVF as above for now, low threshold to add back torsemide and stop IVF pending respiratory status  - Trend daily weights, I/Os  - supplemental O2 as needed     Recent NSTEMI  Last admit, Trop 1.5--1.00--0.928. Evaluated by cardiology, recommended angiogram, but she does not want to pursue it.  - now with slightly elevated troponin again 0.243. Will trend out troponin while here. No plans to resume heparin gtt and not likely to want angiogram  - Reduce metoprolol due to low BP     Intermittent epistaxis  - Discussed at her last cardiology appt, there was discussion of possibly stopping her ASA if she continued to have issues with epistaxis. Thought that oxygen use may have been contributing  - Continue PTA nasal saline spray prn     Recent Hematuria  Noted on last admission due to traumatic cath placement  while on heparin gtt. 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 Will monitor for recurrent hematuria this admission.     Loose stools  - She states related to boost drinks. 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 Not on anticoagulation PTA d/t hx severe epistaxis.     Hx Pulmonary fibrosis  Extensive disease as evidenced by CT chest. No indication acute alveolitis.     Physical deconditioning, frailty  Lives at the [facility] in assisted living. Ambulates with walker at baseline.  - PT/OT and care transitions consults     False positive CXR showing nodular pulmonary nodule, RML  Incidental finding on CXR (again seen this admit). CT did not show this or other acute pathology.     Chronic back pain  Continues on PTA Percocet daily prn     Severe malnutrition  With fat and muscle loss as per dietitian documentation during last admission  - She reports boost supplements recommended last admit give her loose stools.     DVT Prophylaxis: Pneumatic Compression Devices  Code Status: DNR / DNI  Expected discharge: 2 - 3 days  Marbin Sheets  Text Page (7 am to 6 pm)    Interval History   Patient complains of pain after eating.  Very sleepy this morning and not interested in breakfast.  Ate modest amount of dinner last night.    -Data reviewed today: I reviewed all new labs and imaging results over the last 24 hours. I personally reviewed no images or EKG's today.    Physical Exam   Temp: 97.3  F (36.3  C) Temp src: Oral BP: 113/58 Pulse: 60   Resp: 18 SpO2: 97 % O2 Device: None (Room air)    Vitals:    09/01/19 0500 09/02/19 0608 09/03/19 0550   Weight: 50.4 kg (111 lb 1.8 oz) 45.5 kg (100 lb 5 oz) 51.6 kg (113 lb 12.1 oz)     Vital Signs with Ranges  Temp:  [97.3  F (36.3  C)-97.9  F (36.6  C)] 97.3  F (36.3  C)  Pulse:  [60-62] 60  Resp:  [12-18] 18  BP: ()/(51-58)  113/58  SpO2:  [93 %-97 %] 97 %  I/O last 3 completed shifts:  In: 791 [P.O.:360; I.V.:431]  Out: -     Gen: Thin elderly female, no acute distressed, flat affect  HEENT: Atraumatic, normocephalic  Lungs: Clear to ausculation without wheezes, rhonchi, or rales  Heart: Regular rate and rhythm, no gallops or rubs, 2/6 NAIMA  GI: Bowel sound normal, no hepatosplenomegaly or masses  Lymph: No lymphadenopathy or edema  Skin: No rashes     Medications     sodium chloride 75 mL/hr at 09/03/19 0953       acetaminophen  975 mg Oral Q8H     aspirin  81 mg Oral Daily     escitalopram  5 mg Oral Daily     meropenem  500 mg Intravenous Q12H     metoprolol succinate ER  25 mg Oral Daily     ondansetron  4 mg Oral TID AC     pantoprazole  40 mg Oral QAM AC     sodium chloride (PF)  3 mL Intracatheter Q8H       Data   Recent Labs   Lab 09/03/19  0727 09/02/19  0724 09/01/19  0813 08/31/19  0854  08/30/19 2014 08/30/19  1628 08/30/19  0757   WBC  --  13.4* 15.6* 24.0*  --   --   --  22.9*   HGB  --  10.1* 10.8* 11.3*  --   --   --  11.3*   MCV  --  98 98 99  --   --   --  100   PLT  --  213 212 189  --   --   --  198   INR  --   --   --  1.35*  --   --   --  1.29*    136 135 132*  --  132*  --  134   POTASSIUM 4.5 4.8 5.3 5.3   < > 5.8*  --  5.4*   CHLORIDE 109 108 107 102  --  98  --  102   CO2 17* 18* 18* 20  --  20  --  22   BUN 83* 94* 91* 88*  --  90*  --  76*   CR 2.61* 2.75* 2.80* 2.80*  --  2.59*  --  2.30*   ANIONGAP 9 10 10 10  --  14  --  10   PAWEL 8.0* 7.8* 8.0* 8.0*  --  8.0*  --  8.7   GLC 87 87 102* 99  --  242*  --  149*   ALBUMIN  --   --   --  2.5*  --   --   --  3.1*   PROTTOTAL  --   --   --  6.4*  --   --   --  7.2   BILITOTAL  --   --   --  1.2  --   --   --  2.3*   ALKPHOS  --   --   --  68  --   --   --  68   ALT  --   --   --  20  --   --   --  13   AST  --   --   --  27  --   --   --  16   TROPI  --   --   --   --   --  0.242* 0.257* 0.243*    < > = values in this interval not displayed.       No  results found for this or any previous visit (from the past 24 hour(s)).

## 2019-09-03 NOTE — PLAN OF CARE
DATE & TIME: 9/2/2019 5611-3896   Cognitive Concerns/ Orientation : A&O x4, lethargic due to pain meds.    BEHAVIOR & AGGRESSION TOOL COLOR: green   CIWA SCORE: na   ABNL VS/O2: vss, on RA  MOBILITY: Up with lift. Turn Q 2 with Ax1.   PAIN MANAGMENT: PRN oxycodone once per pt request for chronic lower back pain along with hot pack. Pain decreased from 6 to 3. On scheduled tylenol as well.   DIET: Regular, fair appetite, needs assistance with eating per pt's family. 3 day Calorie counting started today.   BOWEL/BLADDER: INC of B&B, wet brief with each turning.   ABNL LAB/BG: Cr. 2.75--RODRÍGUEZ on CKD3, baseline Cr. 1.4. On IVF 75 ml/hour.   DRAIN/DEVICES: PIV on left arm, infusing.   TELEMETRY RHYTHM: na  SKIN: redness blanchable on coccyx.   TESTS/PROCEDURES: GI signed off--pt declined further testing.   D/C DAY/GOALS/PLACE: pending, 2-3 days  OTHER IMPORTANT INFO: lives alone in [place].

## 2019-09-03 NOTE — PLAN OF CARE
OT: attempted to see pt, however on arrival pt was asleep. Pt was not able to keep eyes open after verbal and physical prompts from therapy. Family was present stating that she had just gotten medication and was tired. OT discussed POC with family and they verbalized understanding. Pt family requesting that therapy comes during mid morning since pt is the most awake during this time frame.

## 2019-09-03 NOTE — PROGRESS NOTES
[facility]  Patient is currently open to home care services with [facility], newly admitted on 8/28/19. The patient is currently receiving SN services, and had orders for PT, OT, SW, and HHA who have not seen patient yet due to this hospitalization. [facility]  and team have been notified of patient admission. [facility] liaison will continue to follow patient during stay. If appropriate provide orders to resume home care at time of discharge.

## 2019-09-03 NOTE — PLAN OF CARE
"Discharge Planner PT   Patient plan for discharge: none stated  Current status: PT pt initially agreeable to attempt up to chair, bed mob with roll to side with min A and side to sit with min A, pt immediately stating she \"can't do this\"  encouarged and educated on need to move and be up OOB, pt with c/o back pain sitting on EOB, attempted to have pt scoot to EOB for transfer to chair , pt cont to state she had to lay back down.  lasted about 1 min in sitting, min A to return to supine, dep scoot in bed.   Barriers to return to prior living situation: level of A for all mob, not able to transfer or amb  Recommendations for discharge: tcu  Rationale for recommendations: Pt not completing transfers or amb, not rose functional mob/act.  Will need cont therapy and participation from pt to improve strength and act rose.to progress functional mob I and safety to allow return to PLOF       Entered by: NADIRA MONTERO 09/03/2019 11:47 AM       "

## 2019-09-04 NOTE — TELEPHONE ENCOUNTER
See message below. Would provider be willing to continue to follow and sign off on hospice orders?

## 2019-09-04 NOTE — PLAN OF CARE
DATE & TIME: 9/4/19 3857-0250  Cognitive Concerns/ Orientation : A&O x4, forgetful  BEHAVIOR & AGGRESSION TOOL COLOR: green  CIWA SCORE: n/a  ABNL VS/O2: VSS on RA  MOBILITY: Assist of 2, lift. Up to chair with lift briefly. Turn/repo q2hr.   PAIN MANAGMENT: denies pain, on scheduled Tylenol  DIET: Regular diet, poor appetite, needs encouragement. Denies nausea, on scheduled Zofran.   BOWEL/BLADDER: incontinent of b/b  ABNL LAB/BG: Cr 2.33; WBC 13.1  DRAIN/DEVICES: PIV, IVF infusing   TELEMETRY RHYTHM: n/a  SKIN: pale, bruised, scabs on feet/ankles. Blanchable redness on buttocks.  TESTS/PROCEDURES: n/a  D/C DAY/GOALS/PLACE: pending  OTHER IMPORTANT INFO: Hospice informational meeting today. PT/OT discontinued.

## 2019-09-04 NOTE — PLAN OF CARE
OT: Attempted evaluation. Pt with MD for >7 minutes.     Addendum: OT orders were discontinued by MD. Discussed with RN, palliative to become involved with goals of care.

## 2019-09-04 NOTE — PLAN OF CARE
DATE & TIME: 9/4/19, Night shift  Cognitive Concerns/ Orientation : alert and oriented x4, forgetful   BEHAVIOR & AGGRESSION TOOL COLOR: GREEN  CIWA SCORE: NA   ABNL VS/O2: WDL on RA  MOBILITY: states SBAx1  PAIN MANAGMENT: none needed, except hot packs to back  DIET: Regular diet  BOWEL/BLADDER: incontinent of urine, no BM  ABNL LAB/BG: Cr 2.61; WBC 13.4  DRAIN/DEVICES: IV infusing NS at 75 ml/hr  TELEMETRY RHYTHM: NA  SKIN: pale, bruised, scabs on feet/ankles. Blanchable buttocks.  TESTS/PROCEDURES: None  D/C DAY/GOALS/PLACE: Patient states she wants to get up to eat in chair today. With zofran given before each meal, appetite has increased. PT/OT following.   OTHER IMPORTANT INFO:

## 2019-09-04 NOTE — TELEPHONE ENCOUNTER
Left VM with Amanda to inform her that provider did approve request to follow patient and sign off on orders.

## 2019-09-04 NOTE — PROGRESS NOTES
CALORIE COUNT    Current Diet Order:   Regular    Supplement Order:   Plus2 Between meals  Magic cup w/ meals    Approximate Oral Intake for 9/3:   Calories: 608 kcal  Protein: 32 g pro    ASSESSED NUTRITION NEEDS:  Dosing Weight 44 kg (admit weight)  Estimated Energy Needs: 6767-3233 kcals (30-35 Kcal/Kg)  Justification: maintenance and underweight, increased needs with respiratory status  Estimated Protein Needs: 53-66 grams protein (1.2-1.5 g pro/Kg)  Justification: preservation of lean body mass  Estimated Fluid Needs: 1  ML/kcal   Justification: per provider pending fluid status    Summary:    - Met approx 46% estimated energy, 60% estimated protein needs yesterday.   - continue diana cts through today.     Jessi France RD, LD  [facility], [facility], [facility], MH   Pager: [phone]  Weekend Pager: [phone]

## 2019-09-04 NOTE — PROGRESS NOTES
[facility]    Hospitalist Progress Note    Date of Service (when I saw the patient): 09/04/2019    Assessment & Plan   Theresa Alves is a 89 year old female who presents with ongoing weakness, found to have sepsis secondary to persistent urinary tract infection     Sepsis secondary to persistent urinary tract infection  Weakness  Recently treated for UTI during last admission 8/14, given prescription for cefuroxime 250mg x5 days. Urine culture at that time grew >100K E.coli resistant to ampicillin and Unasyn. WBC 22.9, Lactic acid 2.4. Sepsis criteria met with WBC and lactic acid and source of infection, urine. UA with mod blood, large leuk esterase, >182 WBCs, few bacteria and WBC clumps. Reporting discomfort in low abdomen and cloudy urine.  - Admit inpatient  - Initially on Rocephin, now on Meropenem since 9/1 due to ESBL E coli, changed to Cipro 500 mg daily on 9/4.  - Blood culture x 2 growing E coli  - Repeat blood cultures 9/3 NGTD  - WBC improving     RODRÍGUEZ on CKD-III  Baseline Cr appears 1.4 range. On admit was 2.3, likely pre-renal in setting of decreased intake last few days  - FENa 0.6%, suggests prerenal failure  - Bolused 500 ml x 2 on 8/31 and has been on NS at 75 ml/hr since  - BMP in AM  - Cr slowly improving     Hyperkalemia  - Serum K level normalized with IVF    Post prandial abdominal pain and poor appetite  - Noted in the past as well, today t bili is 2.3, d bili is 0.9. INR 1.29, not previously noted to be elevated  - repeat LFTs improved slightly  - RUQ US unremarkable  - AXR suggests severe PAD, consider mesenteric ischemia  - CT AP with nonspecific inflammation near rectum  - GI consult appreciated  - Abdominal Doppler did not demonstrate vascular stenoses  - Continue scheduled acetaminophen and Zofran  - Met with family today, will continue current cares but not escalate or pursue further imaging  - Ordered calorie counts     Normocytic/borderline macrocytic Anemia    Hgb 11.3, ? Related to recent blood loss with hematuria and intermittent epistaxis. On B12 supplement as outpatient, continue while inpatient  - Continue to monitor. No chemical anticoagulation     Chronic HFpEF  Severe tricuspid regurgitation and mod-severe mitral regurgitation  HTN, DLD  BNP 66K, worse than her last admit, but she also has concurrent RODRÍGUEZ. No worsening respiratory status and CXR without pulmonary edema. Weaned off O2. Family reports a more liberal diet at home in regards to salt. They are thinking about having a do-not-hospitalize or palliative discussion with her, but state that they haven't quite gotten there yet. [facility]. Statin discontinued July 2019 due to age. Echo at last admit showed new severe TR and MR; mild decreased RV systolic function and elevated pressure (69mmHg + RAP), severe bi-atrial enlargement, moderate LVH, and dilated IVC. Weight on discharge was 103-104. PTA ASA 81mg, Toprol XL 50mg daily, torsemide 10mg BID.  - She has been weaned off oxygen. However her weight is down to 97lbs secondary to poor PO intake.  - Resumed PTA medications with exception of torsemide.  - IVF as above for now, low threshold to add back torsemide and stop IVF pending respiratory status  - Trend daily weights, I/Os  - supplemental O2 as needed     Recent NSTEMI  Last admit, Trop 1.5--1.00--0.928. Evaluated by cardiology, recommended angiogram, but she does not want to pursue it.  - now with slightly elevated troponin again 0.243. Will trend out troponin while here. No plans to resume heparin gtt and not likely to want angiogram  - Reduce metoprolol due to low BP     Intermittent epistaxis  - Discussed at her last cardiology appt, there was discussion of possibly stopping her ASA if she continued to have issues with epistaxis. Thought that oxygen use may have been contributing  - Continue PTA nasal saline spray prn     Recent Hematuria  Noted on last admission due to traumatic cath placement while on  heparin gtt. 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 Will monitor for recurrent hematuria this admission.     Loose stools  - She states related to boost drinks. 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 Not on anticoagulation PTA d/t hx severe epistaxis.     Hx Pulmonary fibrosis  Extensive disease as evidenced by CT chest. No indication acute alveolitis.     Physical deconditioning, frailty  Lives at the [facility] in assisted living. Ambulates with walker at baseline.  - PT/OT and care transitions consults     False positive CXR showing nodular pulmonary nodule, RML  Incidental finding on CXR (again seen this admit). CT did not show this or other acute pathology.     Chronic back pain  Continues on PTA Percocet daily prn     Severe malnutrition  With fat and muscle loss as per dietitian documentation during last admission  - She reports boost supplements recommended last admit give her loose stools.     DVT Prophylaxis: Pneumatic Compression Devices  Code Status: DNR / DNI  Expected discharge: 2 - 3 days  Marbin Sheets  Text Page (7 am to 6 pm)    Greater than 35 minutes were spent in direct patient care.    Interval History   Discussed goals of care with patient and family.  Patient not sure she wants aggressive cares in the future, but unwilling to commit to do not hospitalize.    -Data reviewed today: I reviewed all new labs and imaging results over the last 24 hours. I personally reviewed no images or EKG's today.    Physical Exam   Temp: 97.8  F (36.6  C) Temp src: Oral BP: 125/69 Pulse: 60   Resp: 16 SpO2: 97 % O2 Device: None (Room air)    Vitals:    09/01/19 0500 09/02/19 0608 09/03/19 0550   Weight: 50.4 kg (111 lb 1.8 oz) 45.5 kg (100 lb 5 oz) 51.6 kg (113 lb 12.1 oz)     Vital Signs with Ranges  Temp:  [97.5  F (36.4  C)-97.8  F (36.6  C)] 97.8  F  (36.6  C)  Pulse:  [60] 60  Resp:  [16-18] 16  BP: (117-125)/(57-69) 125/69  SpO2:  [92 %-97 %] 97 %  I/O last 3 completed shifts:  In: 600 [P.O.:600]  Out: -     Gen: Thin elderly female, no acute distressed, flat affect  HEENT: Atraumatic, normocephalic  Lungs: Clear to ausculation without wheezes, rhonchi, or rales  Heart: Regular rate and rhythm, no gallops or rubs, 2/6 NAIMA  GI: Bowel sound normal, no hepatosplenomegaly or masses  Lymph: No lymphadenopathy or edema  Skin: No rashes     Medications     sodium chloride 75 mL/hr at 09/04/19 1521       acetaminophen  975 mg Oral Q8H     aspirin  81 mg Oral Daily     ciprofloxacin  500 mg Oral Q24H JELANI     escitalopram  5 mg Oral Daily     metoprolol succinate ER  25 mg Oral Daily     ondansetron  4 mg Oral TID AC     pantoprazole  40 mg Oral QAM AC     sodium chloride (PF)  3 mL Intracatheter Q8H       Data   Recent Labs   Lab 09/04/19  0752 09/03/19  0727 09/02/19  0724 09/01/19  0813 08/31/19  0854  08/30/19 2014 08/30/19  1628 08/30/19  0757   WBC 13.1*  --  13.4* 15.6* 24.0*  --   --   --  22.9*   HGB 11.4*  --  10.1* 10.8* 11.3*  --   --   --  11.3*   MCV 99  --  98 98 99  --   --   --  100     --  213 212 189  --   --   --  198   INR  --   --   --   --  1.35*  --   --   --  1.29*    135 136 135 132*  --  132*  --  134   POTASSIUM 4.5 4.5 4.8 5.3 5.3   < > 5.8*  --  5.4*   CHLORIDE 112* 109 108 107 102  --  98  --  102   CO2 16* 17* 18* 18* 20  --  20  --  22   BUN 70* 83* 94* 91* 88*  --  90*  --  76*   CR 2.33* 2.61* 2.75* 2.80* 2.80*  --  2.59*  --  2.30*   ANIONGAP 8 9 10 10 10  --  14  --  10   PAWEL 7.7* 8.0* 7.8* 8.0* 8.0*  --  8.0*  --  8.7   * 87 87 102* 99  --  242*  --  149*   ALBUMIN  --   --   --   --  2.5*  --   --   --  3.1*   PROTTOTAL  --   --   --   --  6.4*  --   --   --  7.2   BILITOTAL  --   --   --   --  1.2  --   --   --  2.3*   ALKPHOS  --   --   --   --  68  --   --   --  68   ALT  --   --   --   --  20  --   --   --   13   AST  --   --   --   --  27  --   --   --  16   TROPI  --   --   --   --   --   --  0.242* 0.257* 0.243*    < > = values in this interval not displayed.       No results found for this or any previous visit (from the past 24 hour(s)).

## 2019-09-04 NOTE — CONSULTS
Care Transition Initial Assessment - RN        Met with: Patient. Nilaurent Calderonjose c ErickaPenelope  DATA   Active Problems:    Sepsis (H)       Cognitive Status: awake, alert and oriented.    Contact information and PCP information verified: Yes  Lives With: alone   Living Arrangements: assisted living             Insurance concerns: No Insurance issues identified  ASSESSMENT  Patient currently receives the following services:  Pt lives at the [facility].  She receives daily wt, BPx2 and escort to meals.  She also is open to [facility] for RN/PT/OT/HHA.  [facility]          Identified issues/concerns regarding health management: Patient admitted with sepsis, persistent UTI, RODRÍGUEZ.  Dr. Morgan has been having ongoing conversation with Pt/above nieces regarding goals of care, palliative, vs hospice.  Care coordinator updated in rounds and met with patient/nieces to explain role.  Pt wishes to return to [facility].  She is open to meeting with  hospice for an informational meeting.  TIARRA Cabrera Liasion ( [phone]).  Pt's nilaurent would like to be present.  Meeting arranged for 2pm on 9/4 in patient's room.    Spoke with ALISHA Agrawal at [facility] ([phone]) to confirm patient's current level of services and plan going forward.  Pt could have increased services and family had discussed with Tanja about moving her to the care suites where she could receive more frequent checks.  [facility]/Care suites could take patient any time this week.  New Medications should be filled by hospital  Fax orders to [phone].  CC/SW to await meeting with hospice to decide plan going forward.  MD indicates discharge in 2-3 days.  PLAN  Financial costs for the patient include TBD .  Patient given options and choices for discharge yes .  Patient/family is agreeable to the plan?  Yes:   Patient anticipates discharging to [facility]/Care suites .        Patient anticipates needs for home equipment: Does not know  Transportation/person  available to transport on day of discharge  is TBD and have they been notified/set up   Plan/Disposition: [facility]   Appointments: TBD     Care  (CTS) will continue to follow as needed.

## 2019-09-04 NOTE — PLAN OF CARE
DATE & TIME: 9/4/19 8355-3110  Cognitive Concerns/ Orientation : A&O x4, forgetful  BEHAVIOR & AGGRESSION TOOL COLOR: green  CIWA SCORE: n/a  ABNL VS/O2: VSS on RA  MOBILITY: Assist of 2, lift. Turn/repo q2hr.   PAIN MANAGMENT: C/o pain right arm given PRN oxycodone and scheduled Tylenol  DIET: Regular diet, poor appetite, needs encouragement. Denies nausea, on scheduled Zofran.   BOWEL/BLADDER: incontinent of b/b, Purwick in place.   ABNL LAB/BG: Cr 2.33; WBC 13.1  DRAIN/DEVICES: PIV, IVF infusing 75 ml/hr  TELEMETRY RHYTHM: n/a  SKIN: pale, bruised, scabs on feet/ankles. Blanchable redness on buttocks.  TESTS/PROCEDURES: n/a  D/C DAY/GOALS/PLACE:pending. CC, SW and hospice following.   OTHER IMPORTANT INFO:

## 2019-09-04 NOTE — CONSULTS
Met with patient, nieces Penelope, Ericka, and Karley, and great-nephew Brock at pt bedside. Pt appeared oriented, responds appropriately, however would doze during our conversation. Reviewed hospice philosophy, services, and medicare benefit. Family had hoped hospice would provide 1:1 care as pt approaches the final weeks of life. Answered questions regarding symptom management in the assisted setting, including continuous care benefit and when that is used. Patient would like to return to the [facility], family considering placement in their care suites. Patient stated she would like more time to think about hospice. Informational sheet with hospice contact number provided to patient and family. Family to call if they have additional questions. Aura BRITO Care Coordinator and Emely camarena. Thank you for this referral.   Amanda Veras RN  Hospice  [phone]

## 2019-09-04 NOTE — PLAN OF CARE
PT:  Attempted to see patient for PT this AM. Patient declining participation despite education on benefits of mobility/ambulation and exercises to upkeep strength and tolerance to activity. RN aware, present in room to encourage patient, patient continues to decline working with PT.     PM update: Attempted to see patient this PM, per chart review, palliative meeting scheduled with family and patient this date at 2 PM.

## 2019-09-04 NOTE — PLAN OF CARE
A/o x4  forgetful at times. VSS.RA IVF. IV ABX. Fair intake at dinner, nausea maintained with scheduled  zofran. Back pain managed with prn oxycodone and scheduled Tylenol. Incontinent of bowel and bladder. +BM smear. Redness blanchable intact skin, continue to t/r q 2. Discussed with family about care options with palliative and hospice care and what they entail, may benefit from actual consult. Plan pending pt progress. Sw consult placed to update healthcare directive, the first agent has passed away.

## 2019-09-04 NOTE — TELEPHONE ENCOUNTER
Reason for Call:  Other call back    Detailed comments: Amanda from [facility] is going to meet with Theresa and family this afternoon and present the [facility] program.      If the patient and family decide to go into the Hospice program, is Dr. Rouse willing to continue to follow and sign off on Hospice orders.    Phone Number Patient can be reached at:   Amanda [phone]    Best Time: soon    Can we leave a detailed message on this number? YES    Call taken on 9/4/2019 at 1:19 PM by Andree Alvarez

## 2019-09-05 NOTE — PROGRESS NOTES
"CLINICAL NUTRITION SERVICES - REASSESSMENT NOTE    Malnutrition:   % Weight Loss:  > 5% in 1 month (severe malnutrition)  % Intake:  </= 50% for >/= 5 days (severe malnutrition)  Subcutaneous Fat Loss:  Orbital region depletion, Upper arm region depletion and Thoracic region depletion: severe  Muscle Loss:  Temporal region depletion, Clavicle bone region depletion, Acromion bone region depletion and Dorsal hand region depletion: severe  Fluid Retention:  None noted     Malnutrition Diagnosis: Severe malnutrition  In Context of:  Chronic illness or disease     EVALUATION OF PROGRESS TOWARD GOALS   Diet: Regular diet + Magic cup w/ meals + Plus2 supplement BID between meals  Intake/Tolerance:   Calorie Count Data 9/2-9/4 9/2: 522 kcal, 17 g pro  9/3: 608 kcal, 32 g pro  9/4: 866 kcal, 22 g pro  3-Day Average: 665 kcal, 24 g protein  This meets 50% estimated energy, 45% estimated protein needs.   Per patient: foods are not tasting great. \"the cantaloupe is so hard, no dressing or butter on bread makes it so dry\" is no eating meat or eggs. Thinks the shakes are okay and she tolerates them better than boost, but she actually prefers the taste of boost.     ASSESSED NUTRITION NEEDS:  Dosing Weight 44 kg (admit weight)  Estimated Energy Needs: 0249-0091 kcals (30-35 Kcal/Kg)  Justification: maintenance and underweight, increased needs with respiratory status  Estimated Protein Needs: 53-66 grams protein (1.2-1.5 g pro/Kg)  Justification: preservation of lean body mass  Estimated Fluid Needs: 1  ML/kcal   Justification: per provider pending fluid status    NEW FINDINGS:   - Hospice following, info-only meeting occurred yesterday 9/4. Patient receptive, thinking about her options. No escalation of current cares.   - Meds: Zofran - 4 mg TID, NaCl IVF @ 75 mL/hr  - Labs reviewed.   - Stooling regularly (last documented 9/4)  -   Vitals:    08/30/19 0744 08/31/19 0625 09/01/19 0500 09/02/19 0608   Weight: 44 kg (97 lb) 49.5 kg " (109 lb 2 oz) 50.4 kg (111 lb 1.8 oz) 45.5 kg (100 lb 5 oz)    09/03/19 0550   Weight: 51.6 kg (113 lb 12.1 oz)       Previous Goals:   Pt will consume >50% of supplements QID  Evaluation: Not met  Pt will consume >50% of meals TID  Evaluation: Not met consistently    Previous Nutrition Diagnosis:   Malnutrition related to chronic increased needs w/ pulmonary fibrosis and inadequate oral intake r/t aging process with taste and smell loss as evidenced by verbalized disinterest in eating, progressive weight loss over the past 6 months with current underwt BMI <18, e/o severe fat and muscle loss, coding for severe malnutrition.  Evaluation: No change    CURRENT NUTRITION DIAGNOSIS  Inadequate protein-energy intake related to increased needs w/ pulmonary fibrosis and inadequate oral intakes r/t aging process as evidenced by disinterest in eating, progressive weight loss w/ underweight BMI <18, protein/energy intakes over the past 3 days meeting 50% est needs or less.     INTERVENTIONS  Recommendations / Nutrition Prescription  Continue diet + supplements as tolerated. No escalation of cares indicated.     Implementation  None new today. Discontinue calorie count.     Goals  Patient to tolerate 50% meals TID.     MONITORING AND EVALUATION:  Progress towards goals will be monitored and evaluated per protocol and Practice Guidelines    Jessi France RD,   [facility] [place], [address], [address], [facility]   Pager: [phone]  Weekend Pager: [phone]

## 2019-09-05 NOTE — PLAN OF CARE
A/O, VSS on RA. Pt incontinent with purewick in place. Turned and repositioned q2-3hours. IVF infusing.

## 2019-09-05 NOTE — PLAN OF CARE
PT: Attempted to see the patient for PT session. Pt busy with nursing and then family arrived to visit.

## 2019-09-05 NOTE — PLAN OF CARE
DATE & TIME: 9/5/19 3992-4486   Cognitive Concerns/ Orientation : Confused  BEHAVIOR & AGGRESSION TOOL COLOR: Green   CIWA SCORE: NA  ABNL VS/O2: VSS on RA  MOBILITY: Total, up with lift. Turn/repo q2 hrs.   PAIN MANAGMENT: Scheduled tylenol and PRN oxycodone  DIET: Regular  BOWEL/BLADDER: Incontinent of B&B, BM x1 this shift. Pure wick in place.   ABNL LAB/BG: WBC 13.1, Ca 7.7  DRAIN/DEVICES: PIV infusing NS at 75 ml/hr  TELEMETRY RHYTHM: NA  SKIN: Pale, bruised, scabs on feet/ankles. Blanchable redness on buttocks.  TESTS/PROCEDURES: NA  D/C DAY/GOALS/PLACE: Pending progress  OTHER IMPORTANT INFO: CC, SW and hospice following.

## 2019-09-05 NOTE — PLAN OF CARE
DATE & TIME: 9/5/19 8749-9327   Cognitive Concerns/ Orientation : A&Ox4, forgetful    BEHAVIOR & AGGRESSION TOOL COLOR: Green   CIWA SCORE: NA  ABNL VS/O2: VSS on RA  MOBILITY: Total, up with lift. Turn/repo q2 hrs.   PAIN MANAGMENT: Scheduled tylenol and PRN oxycodone given x1   DIET: Regular  BOWEL/BLADDER: Incontinent of B&B, BM x1 this shift. Pure wick in place.   ABNL LAB/BG: WBC 13.1, Ca 7.7  DRAIN/DEVICES: PIV infusing NS at 75 ml/hr  TELEMETRY RHYTHM: NA  SKIN: Pale, bruised, scabs on feet/ankles. Blanchable redness on buttocks.  TESTS/PROCEDURES: NA  D/C DAY/GOALS/PLACE: Pending progress  OTHER IMPORTANT INFO: CC, SW and hospice following.

## 2019-09-05 NOTE — CONSULTS
SW:  Responding to consult regarding patient's HCD and one of the agents being .  Second consult for end of life issues.  In the HCD document viewed in EPIC, the alternate HCA becomes the primary.    Will review with patient and or family member who has questions.    The care coordintor RN has been working with hospice liaison and family.  She is coordinating the discharge plan.

## 2019-09-05 NOTE — PROGRESS NOTES
[facility]    Hospitalist Progress Note    Date of Service (when I saw the patient): 09/05/2019    Assessment & Plan      Theresa Alves is a 89 year old female who presents with ongoing weakness, found to have sepsis secondary to persistent urinary tract infection     Sepsis secondary to persistent urinary tract infection  Weakness  Assessment: Recently treated for UTI during last admission 8/14, given prescription for cefuroxime 250mg x5 days. Urine culture at that time grew >100K E.coli resistant to ampicillin and Unasyn. WBC 22.9, Lactic acid 2.4. Sepsis criteria met with WBC and lactic acid and source of infection, urine. UA with mod blood, large leuk esterase, >182 WBCs, few bacteria and WBC clumps. Reporting discomfort in low abdomen and cloudy urine.  Plan:  - Initially on Rocephin, now on Meropenem since 9/1 due to ESBL E coli  - Blood culture x 2 growing E coli - sensitive to Cipro  - Repeat blood cultures 9/3 NGTD  - WBC improving  - Continue PO Ciprofloxacin      RODRÍGUEZ on CKD-III  Assessment: Baseline Cr appears 1.4 range. On admit was 2.3, likely pre-renal in setting of decreased intake last few days  Plan:  - FENa 0.6%, suggests prerenal failure  - Bolused 500 ml x 2 on 8/31 and has been on NS at 75 ml/hr since  - Cr continues to improve, expect Cr further improved in AM  - BMP in AM     Hyperkalemia. Serum K level normalized with IVF    Post prandial abdominal pain and poor appetite- Noted in the past as well, today t bili is 2.3, d bili is 0.9. INR 1.29, not previously noted to be elevated. Repeat LFTs improved slightly. RUQ US unremarkable. AXR suggests severe PAD, consider mesenteric ischemia. CT AP with nonspecific inflammation near rectum. Abdominal Doppler did not demonstrate vascular stenoses  Plan:  - Continue scheduled acetaminophen and Zofran  - previous provider met with family , will continue current cares but not escalate or pursue further imaging. GI consult not  warranted at this time.   - Ordered calorie counts     Normocytic/borderline macrocytic Anemia   Hgb 11.3, ? Related to recent blood loss with hematuria and intermittent epistaxis. On B12 supplement as outpatient, continue while inpatient  - Continue to monitor. No chemical anticoagulation     Chronic HFpEF  Severe tricuspid regurgitation and mod-severe mitral regurgitation  HTN, DLD  BNP 66K, worse than her last admit, but she also has concurrent RODRÍGUEZ. No worsening respiratory status and CXR without pulmonary edema. Weaned off O2. Family reports a more liberal diet at home in regards to salt. They are thinking about having a do-not-hospitalize or palliative discussion with her, but state that they haven't quite gotten there yet. senior care. Statin discontinued July 2019 due to age. Echo at last admit showed new severe TR and MR; mild decreased RV systolic function and elevated pressure (69mmHg + RAP), severe bi-atrial enlargement, moderate LVH, and dilated IVC. Weight on discharge was 103-104. PTA ASA 81mg, Toprol XL 50mg daily, torsemide 10mg BID.  Plan:  - She has been weaned off oxygen. However her weight is down to 97lbs secondary to poor PO intake.  - Resumed PTA medications with exception of torsemide.  - IVF as above for now, low threshold to add back torsemide and stop IVF pending respiratory status  - Trend daily weights, I/Os  - supplemental O2 as needed     Recent NSTEMI  Last admit, Trop 1.5--1.00--0.928. Evaluated by cardiology, recommended angiogram, but she does not want to pursue it.  - now with slightly elevated troponin again 0.243. Will trend out troponin while here. No plans to resume heparin gtt and not likely to want angiogram  - Reduce metoprolol due to low BP     Intermittent epistaxis  - Discussed at her last cardiology appt, there was discussion of possibly stopping her ASA if she continued to have issues with epistaxis. Thought that oxygen use may have been contributing  - Continue PTA nasal  saline spray prn     Recent Hematuria  Noted on last admission due to traumatic cath placement while on heparin gtt. 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 Will monitor for recurrent hematuria this admission.     Loose stools  - She states related to boost drinks. 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 Not on anticoagulation PTA d/t hx severe epistaxis.     Hx Pulmonary fibrosis  Extensive disease as evidenced by CT chest. No indication acute alveolitis.     Physical deconditioning, frailty  Lives at [facility] in assisted living. Ambulates with walker at baseline.  - PT/OT and care transitions consults     False positive CXR showing nodular pulmonary nodule, RML  Incidental finding on CXR (again seen this admit). CT did not show this or other acute pathology.     Chronic back pain  Continues on PTA Percocet daily prn     Severe malnutrition  With fat and muscle loss as per dietitian documentation during last admission  - She reports boost supplements recommended last admit give her loose stools.     DVT Prophylaxis: Pneumatic Compression Devices  Code Status: DNR / DNI  Expected discharge: 2 - 3 days  Dimas Olivares  Text Page (7 am to 6 pm)    Interval History      No acute events overnight.   Patient with no complaints today, very sleepy this morning and not interested in breakfast. Still with no appetite overall, no new complaints.  Family at bedside. Considering hospice at this time.       -Data reviewed today: I reviewed all new labs and imaging results over the last 24 hours. I personally reviewed no images or EKG's today.    Physical Exam   Temp: 97.3  F (36.3  C) Temp src: Axillary BP: 122/70 Pulse: 66   Resp: 18 SpO2: 95 % O2 Device: None (Room air)    Vitals:    09/01/19 0500 09/02/19 0608 09/03/19 0550   Weight: 50.4 kg (111 lb 1.8 oz) 45.5 kg (100 lb 5  oz) 51.6 kg (113 lb 12.1 oz)     Vital Signs with Ranges  Temp:  [97.3  F (36.3  C)-97.8  F (36.6  C)] 97.3  F (36.3  C)  Pulse:  [57-66] 66  Resp:  [14-18] 18  BP: (122-131)/(67-70) 122/70  SpO2:  [92 %-97 %] 95 %  I/O last 3 completed shifts:  In: 921 [P.O.:240; I.V.:681]  Out: 400 [Urine:400]    Gen: Thin elderly female, no acute distressed, flat affect  HEENT: Atraumatic, normocephalic  Lungs: Clear to ausculation without wheezes, rhonchi, or rales  Heart: Regular rate and rhythm, no gallops or rubs, 2/6 NAIMA  GI: Bowel sound normal, no hepatosplenomegaly or masses  Lymph: No lymphadenopathy or edema  Skin: No rashes     Medications     sodium chloride 75 mL/hr at 09/04/19 1521       acetaminophen  975 mg Oral Q8H     aspirin  81 mg Oral Daily     ciprofloxacin  500 mg Oral Q24H JELANI     escitalopram  5 mg Oral Daily     metoprolol succinate ER  25 mg Oral Daily     ondansetron  4 mg Oral TID AC     pantoprazole  40 mg Oral QAM AC     sodium chloride (PF)  3 mL Intracatheter Q8H       Data   Recent Labs   Lab 09/05/19  0721 09/04/19  0752 09/03/19  0727 09/02/19  0724 09/01/19  0813 08/31/19  0854  08/30/19 2014 08/30/19  1628 08/30/19  0757   WBC  --  13.1*  --  13.4* 15.6* 24.0*  --   --   --  22.9*   HGB  --  11.4*  --  10.1* 10.8* 11.3*  --   --   --  11.3*   MCV  --  99  --  98 98 99  --   --   --  100   PLT  --  220  --  213 212 189  --   --   --  198   INR  --   --   --   --   --  1.35*  --   --   --  1.29*    136 135 136 135 132*  --  132*  --  134   POTASSIUM 4.9 4.5 4.5 4.8 5.3 5.3   < > 5.8*  --  5.4*   CHLORIDE 116* 112* 109 108 107 102  --  98  --  102   CO2 20 16* 17* 18* 18* 20  --  20  --  22   BUN 60* 70* 83* 94* 91* 88*  --  90*  --  76*   CR 1.87* 2.33* 2.61* 2.75* 2.80* 2.80*  --  2.59*  --  2.30*   ANIONGAP 4 8 9 10 10 10  --  14  --  10   PAWEL 7.7* 7.7* 8.0* 7.8* 8.0* 8.0*  --  8.0*  --  8.7   GLC 90 106* 87 87 102* 99  --  242*  --  149*   ALBUMIN  --   --   --   --   --  2.5*  --   --    --  3.1*   PROTTOTAL  --   --   --   --   --  6.4*  --   --   --  7.2   BILITOTAL  --   --   --   --   --  1.2  --   --   --  2.3*   ALKPHOS  --   --   --   --   --  68  --   --   --  68   ALT  --   --   --   --   --  20  --   --   --  13   AST  --   --   --   --   --  27  --   --   --  16   TROPI  --   --   --   --   --   --   --  0.242* 0.257* 0.243*    < > = values in this interval not displayed.       No results found for this or any previous visit (from the past 24 hour(s)).

## 2019-09-05 NOTE — PROGRESS NOTES
"Care Coordination:    Care coordination stopped by patient's nieces in the hallway.  They are continuing to discuss hospice with the patient and are hoping she will decide to discharge on hospice. They voice that Pt is having trouble with the name \"hospice\" even though patient has expressed a desire to not have continued treatment and be re-hospitalized.  They are wanting to look at other agencies. Family spoke with Tanja at [facility] who gave them several names.  They are researching these today.  They would like patient to return to room at [facility] with hospice and possible night HHA (they realize this would be private pay.)  SW gave names of other hospice agencies.   Encouraged family to discuss options with Tanja of who [facility] uses for these services.    Per MD Pt would be ready for discharge this weekend (?Sat).    Message left for Tanja at [facility] to discuss discharge planning.   CC/SW to continue to follow for discharge plan.    Addendum:  Received call back from Tanja at [facility].  She voiced that she had a care conference with Pt's nieces/family today at [facility].  Tanja gave information/brochures on hospice agencies, brochures on 24/7 care.  She reviewed hospice philosophy, goals, and answered family questions.  They discussed having patient return to her apartment and enroll in hospice  Tanja/family have concerns about patient returning to care suite area and having frequent admissions to hospital.  All agreed it would be better to come back to her apartment.  Tanja stated she has had several meetings with the family in the last few weeks and they seem reluctant to make decisions.    Tanja again voiced that patient could return on the weekend.  Pt does not need to enroll in hospice in the hospital but could have an order for hospice at discharge.  CC/SW to follow for discharge plan.        Aura Galicia RN BSN  Care Coordinator  "

## 2019-09-05 NOTE — PLAN OF CARE
PT: 2nd attempt to see pt for PT today. Pt sleeping, aroused to voice, kindly declining any PT today reports she is so tired and would like to continue to rest at this time. Pt agreeable for this service to return tomorrow.

## 2019-09-06 NOTE — PROGRESS NOTES
Care Coordination:    Met with patient, and 3 nieces (Karley, Ericka, Penelope) in Pt room.  Pt was alert but would nod off to sleep during conversation at times.  Patient (Shelley) talked about her desire to return home to [facility] and to go on hospice.  She is leaving it up to her nieces to decide which agency.  She appointed Ericka as a spokesperson. Ericka stated family had looked at agencies provided and want to go with [facility].    Called [facility] ([phone]) and spoke with  Lourdes.  Clinical information, face sheet/ H+P, HCD faxed to them ( [phone]).  [facility] will coordinate with Tanja and Ericka to arrange for enrollment on 9/7.  New medications filled here. Hospice meds will be ordered and filled by [facility] after discharge from hospital.  HE called and ride arranged for 9/7 12:30.  Pt/family felt patient could go by w/c.  They are aware of cost  Tanja Freed (At [facility]), Dr Olivares updated of discharge ride.  Hospice will plan to be at [facility] at 1:30pm.    Family and patient updated.    CC/SW on Saturday to fax orders to Hospice [phone] and to [facility] [phone].        Aura Galicia RN BSN  Care Coordinator

## 2019-09-06 NOTE — PLAN OF CARE
DATE & TIME: 9/6/19 4467-0266  Cognitive Concerns/ Orientation : A&Ox4, forgetful  BEHAVIOR & AGGRESSION TOOL COLOR: Green   CIWA SCORE: NA  ABNL VS/O2: VSS on RA, denies pain  MOBILITY: Total, up with lift. Turn/repo q2 hrs.   PAIN MANAGMENT: PRN oxycodone given x1, scheduled tylenol   DIET:Regular with supplemental magic cup and Plus 2  BOWEL/BLADDER: Incontinent of B&B. Pure wick in place.    ABNL LAB/BG: WBC 13.6, creat 1.66  DRAIN/DEVICES: PIV SL. IV fluids discontinued today d/t +1/+2 natividad ankle edema and increased SOB. SOB and edema improving once fluids stopped.   TELEMETRY RHYTHM: NA  SKIN: Pale, bruised, scabs on feet/ankles. Blanchable redness on buttocks. Karuna area red/moist, baby powder and barrier lotion applied.   TESTS/PROCEDURES: NA  D/C DAY/GOALS/PLACE: Possibly tomorrow on hospice  OTHER IMPORTANT INFO: PT/SW/CC/Hospice are following.

## 2019-09-06 NOTE — PROGRESS NOTES
MD Notification    Notified Person: MD    Notified Person Name: Dr. Olivares     Notification Date/Time: 9/6/19 1181    Notification Interaction: Text-page    Purpose of Notification: Increased SOB and 1-2+ edema feet/ankles.     Orders Received: Stop IV fluids.     Comments:

## 2019-09-06 NOTE — PLAN OF CARE
DATE & TIME: 9/5/19 [id number]  Cognitive Concerns/ Orientation : Forgetful, oriented x4  BEHAVIOR & AGGRESSION TOOL COLOR: Green   CIWA SCORE: NA  ABNL VS/O2: VSS on RA  MOBILITY: Total, up with lift. Turn/repo q2 hrs.   PAIN MANAGMENT: Scheduled tylenol and PRN oxycodone  DIET: Regular  BOWEL/BLADDER: Incontinent of B&B. Pure wick in place.   ABNL LAB/BG: WBC 13.1, Ca 7.7  DRAIN/DEVICES: IV infusing NS at 75 ml/hr  TELEMETRY RHYTHM: NA  SKIN: Pale, bruised, scabs on feet/ankles. Blanchable redness on buttocks. +2 LLE edema/ +1RLE  TESTS/PROCEDURES: NA  D/C DAY/GOALS/PLACE: Pending progress  OTHER IMPORTANT INFO: CC, SW and hospice following.

## 2019-09-06 NOTE — PLAN OF CARE
PT: Attempted to see pt this am, pt with SOB, not appropriate for PT this am. Notes indicate continued discussion of hospice, additionally, PT notes indicate the pt frequently politely declines activity secondary to fatigue and wanting to rest. Last seen by PT on 9/3/19 and the pt was only able to tolerate sitting at EOB x 1 minute, Min A with sup<>sit-refused standing or up to chair. If to return to [facility] will need assist with transfers and ADLs.

## 2019-09-06 NOTE — PLAN OF CARE
A/Ox4 with forgetfulness. AVSS on RA. LS diminished, clear.  Prn oxy given 1x for abdominal pain, effective. Family at bedside. Saline locked. Plan to discharge on hospice tomorrow.

## 2019-09-06 NOTE — PLAN OF CARE
DATE & TIME: 9/6/19 6 AM  Cognitive Concerns/ Orientation : Forgetful, oriented x 3. Disoriented to time, pt thinks it is 1991.   BEHAVIOR & AGGRESSION TOOL COLOR: Green   CIWA SCORE: NA  ABNL VS/O2: VSS on RA, denies pain  MOBILITY: Total, up with lift. Turn/repo q2 hrs.   PAIN MANAGMENT: Scheduled tylenol and PRN oxycodone available  DIET:Regular with supplemental magic cup and Plus 2  BOWEL/BLADDER: Incontinent of B&B. Pure wick in place.    ABNL LAB/BG: Cr 1.87  DRAIN/DEVICES: IV infusing NS at 75 ml/hr  TELEMETRY RHYTHM: NA  SKIN: Pale, bruised, scabs on feet/ankles. Blanchable redness on buttocks. Karuna area red/moist, baby powder and barrier lotion applied. +1/+2 natividad ankle edema  TESTS/PROCEDURES: NA  D/C DAY/GOALS/PLACE: Pending progress. Continues on PO antibiotic.   OTHER IMPORTANT INFO: PT/SW/CC/Hospice are following.

## 2019-09-06 NOTE — PROGRESS NOTES
SPIRITUAL HEALTH SERVICES Progress Note  [facility]    LOS visit. Patient was asleep upon arrival. Pt. had visitors (family) in the room while she slept. SH offered spiritual and emotional support to family. Family declined and explained that pt s [employer] is aware and has reached out to offer support.    Cassandra Balderas   Intern

## 2019-09-06 NOTE — PROGRESS NOTES
[facility]    Hospitalist Progress Note    Date of Service (when I saw the patient): 09/06/2019    Assessment & Plan      Theresa Alves is a 89 year old female who presents with ongoing weakness, found to have sepsis secondary to persistent urinary tract infection     Sepsis secondary to persistent urinary tract infection  Weakness  Assessment: Recently treated for UTI during last admission 8/14, given prescription for cefuroxime 250mg x5 days. Urine culture at that time grew >100K E.coli resistant to ampicillin and Unasyn. WBC 22.9, Lactic acid 2.4. Sepsis criteria met with WBC and lactic acid and source of infection, urine. UA with mod blood, large leuk esterase, >182 WBCs, few bacteria and WBC clumps. Reporting discomfort in low abdomen and cloudy urine.  Plan:  - Initially on Rocephin, now on Meropenem since 9/1 due to ESBL E coli  - Blood culture x 2 growing E coli - sensitive to Cipro  - Repeat blood cultures 9/3 NGTD  - WBC improving  - Continue PO Ciprofloxacin      RODRÍGUEZ on CKD-III  Assessment: Baseline Cr appears 1.4 range. On admit was 2.3, likely pre-renal in setting of decreased intake last few days  Plan:  - FENa 0.6%, suggests prerenal failure  - Bolused 500 ml x 2 on 8/31 and has been on NS at 75 ml/hr since  - Cr continues to improve, expect Cr further improved in AM  - BMP in AM     Hyperkalemia. Serum K level normalized with IVF    Post prandial abdominal pain and poor appetite- Noted in the past as well, today t bili is 2.3, d bili is 0.9. INR 1.29, not previously noted to be elevated. Repeat LFTs improved slightly. RUQ US unremarkable. AXR suggests severe PAD, consider mesenteric ischemia. CT AP with nonspecific inflammation near rectum. Abdominal Doppler did not demonstrate vascular stenoses  Plan:  - Continue scheduled acetaminophen and Zofran  - previous provider met with family , will continue current cares but not escalate or pursue further imaging. GI consult not  warranted at this time.   - Ordered calorie counts     Normocytic/borderline macrocytic Anemia   Hgb 11.3, ? Related to recent blood loss with hematuria and intermittent epistaxis. On B12 supplement as outpatient, continue while inpatient  Plan:  - Continue to monitor. No chemical anticoagulation     Chronic HFpEF  Severe tricuspid regurgitation and mod-severe mitral regurgitation  HTN, DLD  BNP 66K, worse than her last admit, but she also has concurrent RODRÍGUEZ. No worsening respiratory status and CXR without pulmonary edema. Weaned off O2. Family reports a more liberal diet at home in regards to salt. They are thinking about having a do-not-hospitalize or palliative discussion with her, but state that they haven't quite gotten there yet. [facility]. Statin discontinued July 2019 due to age. Echo at last admit showed new severe TR and MR; mild decreased RV systolic function and elevated pressure (69mmHg + RAP), severe bi-atrial enlargement, moderate LVH, and dilated IVC. Weight on discharge was 103-104. PTA ASA 81mg, Toprol XL 50mg daily, torsemide 10mg BID.  Plan:  - She has been weaned off oxygen. However her weight is down to 97lbs secondary to poor PO intake.  - Resumed PTA medications with exception of torsemide, can resume at discharge at 10 mg daily  - Stop IVF today  - Trend daily weights, I/Os  - supplemental O2 as needed     Recent NSTEMI  Last admit, Trop 1.5--1.00--0.928. Evaluated by cardiology, recommended angiogram, but she does not want to pursue it.  - now with slightly elevated troponin again 0.243. Will trend out troponin while here. No plans to resume heparin gtt and not likely to want angiogram  - Reduce metoprolol due to low BP     Intermittent epistaxis  - Discussed at her last cardiology appt, there was discussion of possibly stopping her ASA if she continued to have issues with epistaxis. Thought that oxygen use may have been contributing  - Continue PTA nasal saline spray prn     Recent  Hematuria  Noted on last admission due to traumatic cath placement while on heparin gtt. 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 Will monitor for recurrent hematuria this admission.     Loose stools  - She states related to boost drinks. 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 Not on anticoagulation PTA d/t hx severe epistaxis.     Hx Pulmonary fibrosis  Extensive disease as evidenced by CT chest. No indication acute alveolitis.     Physical deconditioning, frailty  Lives at [facility] in assisted living. Ambulates with walker at baseline.  - PT/OT and care transitions consults     False positive CXR showing nodular pulmonary nodule, RML  Incidental finding on CXR (again seen this admit). CT did not show this or other acute pathology.     Chronic back pain  Continues on PTA Percocet daily prn     Severe malnutrition  With fat and muscle loss as per dietitian documentation during last admission  - She reports boost supplements recommended last admit give her loose stools.     DVT Prophylaxis: Pneumatic Compression Devices  Code Status: DNR / DNI  Expected discharge: Tomorrow back to [place]    Dimas Olivares  Text Page (7 am to 6 pm)    Interval History      No acute events overnight.   Patient with no complaints today, minimal appetite. Having no CP. Had some dyspnea this AM, now resolved. No nausea/vomiting or abdominal pain.   Family at bedside. Family has opted for back to [place] with hospice.       -Data reviewed today: I reviewed all new labs and imaging results over the last 24 hours. I personally reviewed no images or EKG's today.    Physical Exam   Temp: 97.5  F (36.4  C) Temp src: Oral BP: (!) 132/98 Pulse: 63   Resp: 18 SpO2: 93 % O2 Device: None (Room air)    Vitals:    09/01/19 0500 09/02/19 0608 09/03/19 0550   Weight: 50.4 kg (111 lb 1.8 oz) 45.5 kg (100 lb 5 oz)  51.6 kg (113 lb 12.1 oz)     Vital Signs with Ranges  Temp:  [97.1  F (36.2  C)-97.9  F (36.6  C)] 97.5  F (36.4  C)  Pulse:  [58-68] 63  Resp:  [16-20] 18  BP: (122-132)/(63-98) 132/98  SpO2:  [91 %-93 %] 93 %  I/O last 3 completed shifts:  In: 3291 [P.O.:150; I.V.:3141]  Out: 400 [Urine:400]    Gen: Thin elderly female, no acute distressed, flat affect  HEENT: Atraumatic, normocephalic  Lungs: Clear to ausculation without wheezes, rhonchi, or rales  Heart: Regular rate and rhythm, no gallops or rubs, 2/6 NAIMA  GI: Bowel sound normal, no hepatosplenomegaly or masses  Lymph: No lymphadenopathy or edema  Skin: No rashes     Medications       acetaminophen  975 mg Oral Q8H     aspirin  81 mg Oral Daily     ciprofloxacin  500 mg Oral Q24H JELANI     escitalopram  5 mg Oral Daily     metoprolol succinate ER  25 mg Oral Daily     ondansetron  4 mg Oral TID AC     pantoprazole  40 mg Oral QAM AC     sodium chloride (PF)  3 mL Intracatheter Q8H       Data   Recent Labs   Lab 09/06/19  0722 09/05/19  0721 09/04/19  0752  09/02/19  0724  08/31/19  0854  08/30/19 2014 08/30/19  1628   WBC 13.6*  --  13.1*  --  13.4*   < > 24.0*  --   --   --   --    HGB 11.7  --  11.4*  --  10.1*   < > 11.3*  --   --   --   --    *  --  99  --  98   < > 99  --   --   --   --      --  220  --  213   < > 189  --   --   --   --    INR  --   --   --   --   --   --  1.35*  --   --   --   --     140 136   < > 136   < > 132*  --  132*   < >  --    POTASSIUM 5.0 4.9 4.5   < > 4.8   < > 5.3   < > 5.8*  --   --    CHLORIDE 114* 116* 112*   < > 108   < > 102  --  98   < >  --    CO2 16* 20 16*   < > 18*   < > 20  --  20   < >  --    BUN 53* 60* 70*   < > 94*   < > 88*  --  90*   < >  --    CR 1.66* 1.87* 2.33*   < > 2.75*   < > 2.80*  --  2.59*   < >  --    ANIONGAP 7 4 8   < > 10   < > 10  --  14   < >  --    PAWEL 7.9* 7.7* 7.7*   < > 7.8*   < > 8.0*  --  8.0*   < >  --    * 90 106*   < > 87   < > 99  --  242*   < >  --     ALBUMIN  --   --   --   --   --   --  2.5*  --   --   --   --    PROTTOTAL  --   --   --   --   --   --  6.4*  --   --   --   --    BILITOTAL  --   --   --   --   --   --  1.2  --   --   --   --    ALKPHOS  --   --   --   --   --   --  68  --   --   --   --    ALT  --   --   --   --   --   --  20  --   --   --   --    AST  --   --   --   --   --   --  27  --   --   --   --    TROPI  --   --   --   --   --   --   --   --  0.242*  --  0.257*    < > = values in this interval not displayed.       No results found for this or any previous visit (from the past 24 hour(s)).

## 2019-09-07 NOTE — PLAN OF CARE
DATE & TIME: 9/7/19 6 AM  Cognitive Concerns/ Orientation : Forgetful, oriented x 4.  BEHAVIOR & AGGRESSION TOOL COLOR: Green   CIWA SCORE: NA  ABNL VS/O2: VSS on RA except O2 sat 88%-89%, initiated 2L of oxygen via NC, pt sating 95%-98%. Denies SOB after oxygen placement.   MOBILITY: Total, up with lift. Turn/repo q2 hrs.   PAIN MANAGMENT: C/o back pain, scheduled tylenol x 1 given, good relief. Also, PRN oxycodone available  DIET:Regular with supplemental magic cup and Plus 2  BOWEL/BLADDER: Incontinent of B&B. Pure wick in place. Low urinal output. Bladder scanned for 40 ml.   ABNL LAB/BG: Cr 1.66  DRAIN/DEVICES: PIV SL  TELEMETRY RHYTHM: NA  SKIN: Pale, bruised, scabs on feet/ankles. Blanchable redness on buttocks and mid back. Karuna area red/moist, baby powder and barrier lotion applied. +1/+2 natividad ankle edema  TESTS/PROCEDURES: NA  D/C DAY/GOALS/PLACE: Pending progress. Continues on PO antibiotic.   OTHER IMPORTANT INFO: PT/SW/CC/Hospice are following.

## 2019-09-07 NOTE — PLAN OF CARE
Physical Therapy Discharge Summary    Reason for therapy discharge:    Discharged to [facility] with hospice cares     Progress towards therapy goal(s). See goals on Care Plan in Epic electronic health record for goal details.  Goals not met.  Barriers to achieving goals:   limited tolerance for therapy and discharge from facility.    Therapy recommendation(s):    No further therapy is recommended. Noted patient discharged back to [facility] with hospice cares.

## 2019-09-07 NOTE — PLAN OF CARE
Discharge    Patient discharged to [facility] via [facility] with [facility].  Care plan note:    A&Ox4, forgetful. Lethargic. Dyspnea on exertion and SOB. VSS on RA. 1-2+ edema bilat arms/hands/legs/feet. Total life, turn/repo q2 hrs. Minimal urine output. Tylenol given x1 for back pain with some relief. PIV removed. [facility]charged to [facility] on hosp[facility].     Listed belongings gathered and returned to patient. Yes  Care Plan and Patient education resolved: Yes  Prescriptions if needed, hard copies sent with patient  NA  Home and hospital acquired medications returned to patient: NA  Medication Bin checked and emptied on discharge Yes  Follow up appointment made for patient: Yes

## 2019-09-07 NOTE — PLAN OF CARE
DATE & TIME: 9/6/19 1900-2300  Cognitive Concerns/ Orientation : A&Ox4  BEHAVIOR & AGGRESSION TOOL COLOR: Green   CIWA SCORE: NA  ABNL VS/O2: VSS on RA, denies pain  MOBILITY: Total, up with lift. Turn/repo q2 hrs, odd hours.   PAIN MANAGMENT:  scheduled tylenol   DIET:Regular with supplemental magic cup and Plus 2   BOWEL/BLADDER: Incontinent of B&B. Pure wick in place, changed. Low UO.    ABNL LAB/BG: WBC 13.6, creat 1.66  DRAIN/DEVICES: PIV SL. IV fluids discontinued today d/t +1/+2 natividad ankle edema and increased SOB. SOB and edema improving once fluids stopped.   TELEMETRY RHYTHM: NA  SKIN: Pale, bruised, scabs on feet/ankles. Blanchable redness on buttocks. Karuna area red/moist, baby powder and barrier lotion applied.   TESTS/PROCEDURES: NA  D/C DAY/GOALS/PLACE: Possibly tomorrow on hospice  OTHER IMPORTANT INFO: PT/SW/CC/Hospice are following.

## 2019-09-07 NOTE — DISCHARGE SUMMARY
[facility]  Hospitalist Discharge Summary       Date of Admission:  8/30/2019  Date of Discharge:  9/7/2019  Discharging Provider: Dimas Olivares MD      Discharge Diagnoses     Sepsis secondary to persistent urinary tract infection  Acute Kidney injury    Follow-ups Needed After Discharge   Follow-up Appointments     Follow-up and recommended labs and tests       Follow up with primary care provider, Marbin Rouse, within 7 days for   hospital follow- up.  The following labs/tests are recommended: None.           Unresulted Labs Ordered in the Past 30 Days of this Admission     Date and Time Order Name Status Description    9/3/2019 0000 Blood culture Preliminary     9/3/2019 0000 Blood culture Preliminary         Discharge Disposition   Discharged to assisted living with hospice  Condition at discharge: Stable    Hospital Course         Theresa Alves is a 89 year old female who presents with ongoing weakness, found to have sepsis secondary to persistent urinary tract infection     Sepsis secondary to persistent urinary tract infection  Weakness  Assessment: Recently treated for UTI during last admission 8/14, given prescription for cefuroxime 250mg x5 days. Urine culture at that time grew >100K E.coli resistant to ampicillin and Unasyn. WBC 22.9, Lactic acid 2.4. Sepsis criteria met with WBC and lactic acid and source of infection, urine. UA with mod blood, large leuk esterase, >182 WBCs, few bacteria and WBC clumps. Reporting discomfort in low abdomen and cloudy urine.  Plan:  - Initially on Rocephin, now on Meropenem since 9/1 due to ESBL E coli  - Blood culture x 2 growing E coli - sensitive to Cipro  - Repeat blood cultures 9/3 NGTD  - Continue PO Ciprofloxacin at discharge  - Will be discharging home with home hospice.      RODRÍGUEZ on CKD-III  Assessment: Baseline Cr appears 1.4 range. On admit was 2.3, likely pre-renal in setting of decreased intake last few days. FENa 0.6%, suggests  prerenal failure. Was bolused 500 ml x 2 on 8/31 and Cr improved to 1.66. On day of discharge 1.82, likely hew new baseline   Plan:  - Consider BMP in coming days, but unsure utility as patient will be on hospice     Hyperkalemia. Serum K initally level normalized with IVF but mildly high 5.6 on day of discharge. Given 30 mg Kayexalate/lasix 40 mg IV/ and sodium bicarbonate prior to discharge.     Post prandial abdominal pain and poor appetite- Noted in the past as well, today t bili is 2.3, d bili is 0.9. INR 1.29, not previously noted to be elevated. Repeat LFTs improved slightly. RUQ US unremarkable. AXR suggests severe PAD, consider mesenteric ischemia. CT AP with nonspecific inflammation near rectum. Abdominal Doppler did not demonstrate vascular stenoses  Plan:  - Continue scheduled acetaminophen and Zofran  - per family will continue current cares but not escalate or pursue further imaging. GI consult not warranted at this time.      Normocytic/borderline macrocytic Anemia   Hgb 11.3, ? Related to recent blood loss with hematuria and intermittent epistaxis. On B12 supplement as outpatient, continue while inpatient  Plan:  - No chemical anticoagulation     Chronic HFpEF  Severe tricuspid regurgitation and mod-severe mitral regurgitation  HTN, DLD  BNP 66K, worse than her last admit, but she also has concurrent RODRÍGUEZ. No worsening respiratory status and CXR without pulmonary edema. Weaned off O2. Family reports a more liberal diet at home in regards to salt. They are thinking about having a do-not-hospitalize or palliative discussion with her, but state that they haven't quite gotten there yet. snf. Statin discontinued July 2019 due to age. Echo at last admit showed new severe TR and MR; mild decreased RV systolic function and elevated pressure (69mmHg + RAP), severe bi-atrial enlargement, moderate LVH, and dilated IVC. Weight on discharge was 103-104. PTA ASA 81mg, Toprol XL 50mg daily, torsemide 10mg BID. On RA  at discharge and Pro BNP improved 66K -> 49K  Plan:  - Continue PTA BB at reduced dose 25 mg daily / continue ASA  - Resumed PTA torsemide 10 mg daily     Recent NSTEMI  Last admit, Trop 1.5--1.00--0.928. Evaluated by cardiology, recommended angiogram, but she does not want to pursue it.  Plan:  - with slightly elevated troponin again 0.243. No plans to resume heparin gtt and not likely to want angiogram  - Reduced metoprolol due to low BP     Intermittent epistaxis  - Discussed at her last cardiology appt, there was discussion of possibly stopping her ASA if she continued to have issues with epistaxis. Thought that oxygen use may have been contributing  - Continue PTA nasal saline spray prn     Recent Hematuria  Noted on last admission due to traumatic cath placement while on heparin gtt. 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 Will monitor for recurrent hematuria this admission.     Loose stools  - She states related to boost drinks. 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 Not on anticoagulation PTA d/t hx severe epistaxis.     Hx Pulmonary fibrosis  Extensive disease as evidenced by CT chest. No indication acute alveolitis.     Physical deconditioning, frailty  Lives at the [facility] in assisted living. Ambulates with walker at baseline.  - PT/OT and care transitions consults     False positive CXR showing nodular pulmonary nodule, RML  Incidental finding on CXR (again seen this admit). CT did not show this or other acute pathology.     Chronic back pain  Continues on PTA Percocet daily prn     Severe malnutrition  With fat and muscle loss as per dietitian documentation during last admission     Dimas Olivares  Text Page (7 am to 6 pm)    Consultations This Hospital Stay   CARE TRANSITION RN/SW IP CONSULT  OCCUPATIONAL THERAPY ADULT IP CONSULT  PHYSICAL THERAPY ADULT IP CONSULT  NUTRITION SERVICES ADULT IP CONSULT  GASTROENTEROLOGY IP  CONSULT  SOCIAL WORK IP CONSULT  SOCIAL WORK IP CONSULT  PALLIATIVE CARE ADULT IP CONSULT  SOCIAL WORK IP CONSULT  CARE TRANSITION RN/SW IP CONSULT  PHARMACY IP CONSULT    Code Status   DNR/DNI    Time Spent on this Encounter   I, Dimas Olivares MD, personally saw the patient today and spent greater than 30 minutes discharging this patient.       Dimas Olivares MD  [facility] [place]  ______________________________________________________________________    Physical Exam   Vital Signs: Temp: 97.2  F (36.2  C) Temp src: Axillary BP: 111/55   Heart Rate: 60 Resp: 18 SpO2: 96 % O2 Device: Nasal cannula Oxygen Delivery: 2 LPM  Weight: 135 lbs 9.33 oz    Primary Care Physician   Marbin Rouse    Discharge Orders      HOSPICE REFERRAL      Reason for your hospital stay    You had weakness from urinary tract infection and needed IV antibiotics and referral to hospice.     Follow-up and recommended labs and tests     Follow up with primary care provider, Marbin Rouse, within 7 days for hospital follow- up.  The following labs/tests are recommended: None.     Activity    Your activity upon discharge: activity as tolerated     DNR/DNI     Diet    Follow this diet upon discharge: Orders Placed This Encounter      Snacks/Supplements Adult: Plus 2; Between Meals      Snacks/Supplements Adult: Magic Cup; With Meals      Calorie Counts      Regular Diet Adult       Significant Results and Procedures   Most Recent 3 CBC's:  Recent Labs   Lab Test 09/06/19 0722 09/04/19  0752 09/02/19  0724   WBC 13.6* 13.1* 13.4*   HGB 11.7 11.4* 10.1*   * 99 98    220 213     Most Recent 3 BMP's:  Recent Labs   Lab Test 09/07/19  0937 09/06/19  0722 09/05/19  0721    137 140   POTASSIUM 5.6* 5.0 4.9   CHLORIDE 115* 114* 116*   CO2 17* 16* 20   BUN 52* 53* 60*   CR 1.82* 1.66* 1.87*   ANIONGAP 6 7 4   PAWEL 8.4* 7.9* 7.7*   * 107* 90     Most Recent 2 LFT's:  Recent Labs   Lab Test 08/31/19  0854 08/30/19  0757    AST 27 16   ALT 20 13   ALKPHOS 68 68   BILITOTAL 1.2 2.3*     Most Recent 3 INR's:  Recent Labs   Lab Test 08/31/19  0854 08/30/19  0757   INR 1.35* 1.29*     Most Recent 3 Troponin's:  Recent Labs   Lab Test 08/30/19  2014 08/30/19  1628 08/30/19  0757   TROPI 0.242* 0.257* 0.243*     Most Recent 3 BNP's:  Recent Labs   Lab Test 09/07/19  0937 08/30/19  0757 08/10/19  2250 06/11/19  0830 04/01/19  1522 02/13/19  1006   NTBNPI 49,385* 66,387* 22,614*  --   --   --    NTBNP  --   --   --  11,928* 14,191* 14,730*     Most Recent 6 Bacteria Isolates From Any Culture (See EPIC Reports for Culture Details):  Recent Labs   Lab Test 09/03/19  0737 09/03/19  0726 08/30/19  0933 08/30/19  0858 08/13/19  1000 09/28/12  1029   CULT No growth after 4 days No growth after 4 days Cultured on the 1st day of incubation:  Escherichia coli  *  Critical Value/Significant Value, preliminary result only, called to and read back by  Amanda Barbosa RN at 2222 [id number]    (Note)  POSITIVE for E.COLI by Verigene multiplex nucleic acid test. Final  identification and antimicrobial susceptibility testing will be  verified by standard methods. Verigene test will not distinguish  E.coli from Shigella species including S.dysenteriae, S.flexneri,  S.boydii, and S.sonnei. Specimens containing Shigella species or  E.coli will be reported as Positive for E.coli.    Specimen tested with Verigene multiplex, gram-negative blood culture  nucleic acid test for the following targets: Acinetobacter sp.,  Citrobacter sp., Enterobacter sp., Proteus sp., E. coli, K.  pneumoniae/oxytoca, P. aeruginosa, and the following resistance  markers: CTXM, KPC, NDM, VIM, IMP and OXA.    Critical Value/Significant Value called to and read back by Susan Wesley RN at 0051 on 8.31.19 by .     Cultured on the 1st day of incubation:  Escherichia coli  *  Critical Value/Significant Value, preliminary result only, called to and read back by  Tiffanie Hebert RN at  2235 [id number]    Susceptibility testing done on previous specimen >100,000 colonies/mL  Escherichia coli  * >100,000 colonies/mL Methicillin resistant Staphylococcus aureus (MRSA) 10 to 50,000 colonies/mL Coagulase negative Staphylococcus     Most Recent Urinalysis:  Recent Labs   Lab Test 08/30/19  1110  06/11/19  0829   COLOR Yellow   < > Yellow   APPEARANCE Cloudy   < > Clear   URINEGLC Negative   < > Negative   URINEBILI Negative   < > Negative   URINEKETONE Negative   < > Negative   SG 1.017   < > 1.015   UBLD Moderate*   < > Negative   URINEPH 5.5   < > 6.5   PROTEIN 100*   < > 30*   UROBILINOGEN  --   --  1.0   NITRITE Negative   < > Negative   LEUKEST Large*   < > Trace*   RBCU 5*   < > O - 2   WBCU >182*   < > 0 - 5    < > = values in this interval not displayed.   ,   Results for orders placed or performed during the hospital encounter of 08/30/19   XR Chest 2 Views    Narrative    CHEST TWO VIEWS   8/30/2019 8:46 AM     HISTORY: Shortness of breath.    COMPARISON: Chest x-ray 8/10/2019.      Impression    IMPRESSION: Two views of the chest are performed. Interstitial lung  changes appear stable. Underlying COPD is likely. Tiny nodular opacity  right upper lung is stable if not slightly smaller in appearance  compared to prior chest x-ray. Heart size remains enlarged. Aorta is  tortuous and calcified. 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  No pneumothorax or pleural effusions. Bones are osteopenic. Mild  kyphosis. Implantable cardiac device left upper chest demonstrates a  single lead overlying the right ventricle.    JACQUELINE LOVING MD   XR Abdomen Port 1 View    Narrative    ABDOMEN ONE VIEW PORTABLE  8/30/2019 7:25 PM     HISTORY: Abdominal pain.    COMPARISON: None.       Impression    IMPRESSION: Gas distended stomach. Small amount of stool.   Nonobstructed bowel gas pattern.    HORTENCIA MYLES MD   US Abdomen Complete     Narrative    ULTRASOUND ABDOMEN COMPLETE   8/31/2019 8:17 AM     HISTORY:  Elevated bilirubin, abdominal pain.    COMPARISON: Abdominal x-ray on 8/30/2019.    FINDINGS:    Gallbladder: Normal with no cholelithiasis, wall thickening or focal  tenderness.      Bile ducts:  CHD is normal diameter.  No intrahepatic biliary  dilatation.    Liver: Normal.     Pancreas:  Partially obscured by overlying bowel gas,  but grossly  unremarkable.     Spleen:  Normal.     Right kidney:  No hydronephrosis or shadowing calculus.    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 No  hydronephrosis or shadowing calculus.    Aorta and IVC:  IVC is patent. Atherosclerotic plaque in the  visualized abdominal aorta.    Urinary bladder: Echogenic layering debris within the urinary bladder.      Impression    IMPRESSION:    1. Echogenic layering debris within the urinary bladder.  2. No hydronephrosis or shadowing calculus in either kidney.  3. 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    KHALIDA MODI MD   CT Abdomen Pelvis w/o Contrast    Narrative    CT ABDOMEN AND PELVIS WITHOUT CONTRAST 9/1/2019 1:11 PM     HISTORY: Abdominal pain, unspecified. Possible mesenteric ischemia.    COMPARISON: None.    TECHNIQUE: Volumetric helical acquisition of CT images from the lung  bases through the symphysis pubis without intravenous contrast.  Radiation dose for this scan was reduced using automated exposure  control, adjustment of the mA and/or kV according to patient size, or  iterative reconstruction technique.    FINDINGS: Motion artifact limits detail. There is stranding around the  rectum compatible with a nonspecific proctitis. Ischemia would be  unusual here, but would be difficult to exclude. There are no dilated  loops of small intestine or large bowel to suggest ileus or  obstruction. There is  moderate diverticulosis without evidence for  diverticulitis. No gross free air. Probable trace free fluid. There  are extensive atherosclerotic changes of the visualized aorta and its  branches. There is no evidence of abdominal aortic aneurysm.   Mild  distal thoracic aortic aneurysm measuring 3.4 cm. Fibrosis and trace  pleural fluid seen at both lung bases. Gallbladder not well seen.  Probable bilateral adrenal thickening. Visualized solid organs  demonstrate no gross focal lesion. No frankly destructive bony  lesions.      Impression    IMPRESSION:   1. Some mild stranding around the rectum compatible with a nonspecific  proctitis. Infectious or inflammatory etiologies are favored, ischemia  would be difficult to exclude.  2. Otherwise no acute process demonstrated in the abdomen and pelvis.    HORTENCIA MYLES MD   US Aorta/IVC/Iliac Duplex Limited    Narrative    US AORTA/IVC/ILIAC DUPLEX LIMITED 9/2/2019 8:17 AM    HISTORY: 89-year-old woman with clinical history of chronic mesenteric  ischemia.    COMPARISON: None    TECHNIQUE: Color Doppler and spectral waveform analysis obtained  through the visceral arteries and abdominal aorta.    FINDINGS: The proximal abdominal aorta is patent with velocity of 63/7  cm/sec. Celiac artery origin is patent with velocity of 61/11 cm/sec  and a diameter of 6.1 mm. Minimal velocity elevation in the proximal  superior mesenteric artery measuring 218/15 cm/sec with mid-SMA  velocity of 124/13 cm/sec and distal SMA with velocity of 82/7 cm/sec.  Hepatic artery is patent with 44/11 cm/sec and splenic artery is 99/16  cm/sec. The inferior mesenteric artery is difficult to visualize,  though not unexpected.      Impression    IMPRESSION: Patent celiac artery and SMA without suggestion of  stenosis.    JONNIE BIRD MD       Discharge Medications   Current Discharge Medication List      START taking these medications    Details   ciprofloxacin (CIPRO) 500 MG tablet Take 1 tablet  (500 mg) by mouth every 24 hours for 5 days  Qty: 5 tablet, Refills: 0    Associated Diagnoses: Acute UTI; Sepsis, due to unspecified organism (H)      pantoprazole (PROTONIX) 40 MG EC tablet Take 1 tablet (40 mg) by mouth every morning (before breakfast)  Qty: 30 tablet, Refills: 0    Associated Diagnoses: Gastroesophageal reflux disease without esophagitis         CONTINUE these medications which have CHANGED    Details   metoprolol succinate ER (TOPROL-XL) 25 MG 24 hr tablet Take 1 tablet (25 mg) by mouth daily  Qty: 30 tablet, Refills: 0    Associated Diagnoses: Chronic congestive heart failure, unspecified heart failure type (H)      torsemide (DEMADEX) 10 MG tablet Take 1 tablet (10 mg) by mouth daily  Qty: 30 tablet, Refills: 0    Associated Diagnoses: Chronic congestive heart failure, unspecified heart failure type (H)         CONTINUE these medications which have NOT CHANGED    Details   acetaminophen (TYLENOL) 500 MG tablet Take 500 mg by mouth 2 times daily 1400 and hs       aspirin (ASA) 81 MG EC tablet Take 1 tablet (81 mg) by mouth daily  Qty: 30 tablet, Refills: 0    Associated Diagnoses: Acute on chronic congestive heart failure, unspecified heart failure type (H)      docusate sodium (COLACE) 100 MG capsule Take 100 mg by mouth At Bedtime      escitalopram (LEXAPRO) 5 MG tablet Take 1 tablet (5 mg) by mouth daily  Qty: 30 tablet, Refills: 5    Associated Diagnoses: Mild major depression (H)      sodium chloride (OCEAN) 0.65 % nasal spray Spray 2 sprays into both nostrils daily as needed for congestion       vitamin B-12 (CYANOCOBALAMIN) 500 MCG tablet Take 500 mcg by mouth daily      Vitamin D, Cholecalciferol, 1000 units TABS Take 1,000 Units by mouth daily      order for DME Equipment being ordered: Oxygen, 2L w activity  Qty: 1 each, Refills: 0    Associated Diagnoses: SOB (shortness of breath); Pulmonary fibrosis (H); Chronic congestive heart failure, unspecified heart failure type (H)          STOP taking these medications       oxyCODONE-acetaminophen (PERCOCET) 5-325 MG tablet Comments:   Reason for Stopping:             Allergies   No Known Allergies

## 2019-09-09 NOTE — PROGRESS NOTES
Clinic Care Coordination Contact  [facility]/Voicemail    Referral Source: IP Report  UTI, referral to hospice    Clinical Data: Care Coordinator Outreach  Outreach attempted x 1.  No voicemail set up on patient's phone; unable to leave message.  Left message on assisted living facility's RN station voicemail with call back information and requested return call.  Plan: Care Coordinator will try to reach patientcare again in 1-2 business days to determine patient's status with hospice.    Minesh Minor, RN  Clinic Care Coordinator  [facility]  Ph: [phone]

## 2019-09-10 NOTE — TELEPHONE ENCOUNTER
Received a call from patient's niece today that she has been transferred to hospice care. I discussed that her pacemaker will remain on and talked through this process with her. They would like to keep her scheduled remote device check on 9/23/19 and decide after that if they want to continue remote checks. She also requested to cancel appointment with Lolis Wren 9/17/19. Cancelled this appointment and updated Lolis and her team.

## 2019-09-10 NOTE — PROGRESS NOTES
Clinic Care Coordination Contact  Care Team Conversations    CCC RN spoke with ALISHA Perez at patient's assisted living, [facility].  Ana updated that the patient is now on hospice through [facility] (ph: [phone]).  She also updated that they are now managing the patient's medications.  Ana denied questions or concerns at this time, but noted CCC RN contact information and agreed to call should any patient needs arise.    Due to patient's active status with hospice, CCC RN will close patient to Care Coordination at this time but will remain available should any patient needs arise.    Minesh Minor, RN  Clinic Care Coordinator  [facility]  Ph: [phone]

## 2019-09-11 NOTE — TELEPHONE ENCOUNTER
GLORYI:  Patient's niece is calling and wanted to let Dr Rouse know that patient is at home on hospice. If any questions or would like to speak to niece please call Ericka at   [phone].

## 2019-09-18 NOTE — TELEPHONE ENCOUNTER
Our goal is to have forms completed within 72 hours, however some forms may require a visit or additional information.    What clinic location was the form placed at [facility] or [place].?     Who is the form from?   Where did the form come from? Faxed to clinic   The form was placed in the inbox of Marbin Rouse MD      Please fax to [phone]   Phone number:      Additional comments: [facility] 8/29/19 to 10/26/19    Call take on 9/18/2019 at 4:53 PM by Rachana Ontiveros

## 2019-10-11 NOTE — TELEPHONE ENCOUNTER
Our goal is to have forms completed within 72 hours, however some forms may require a visit or additional information.    What clinic location was the form placed at [facility] or [place].?     Who is the form from?   Where did the form come from? Faxed to clinic   The form was placed in the inbox of Marbin Rouse MD      Please fax to [phone]  Phone number:      Additional comments: [facility] 8/28/19 to 10/26/19    Call take on 10/11/2019 at 11:20 AM by Rachana Ontiveros

## 2020-08-26 NOTE — PROGRESS NOTES
SUBJECTIVE:   Theresa Alves is a 89 year old female who presents to clinic today for the following health issues:       Hyperlipidemia Follow-Up      Rate your low fat/cholesterol diet?: good    Taking statin?  Yes, no muscle aches from statin    Other lipid medications/supplements?:  none    Hypertension Follow-up      Outpatient blood pressures are not being checked.    Low Salt Diet: no added salt          Amount of exercise or physical activity: None    Problems taking medications regularly: No    Medication side effects: none  Diet: low salt and low fat/cholesterol  Medication Followup of other    Taking Medication as prescribed: yes    Side Effects:  None    Medication Helping Symptoms:  yes         This patient has not been seen in 15 months.                     She has her usual complaints of dyspnea on exertion, fatigue, severe back pain such that she can only stand for about half a minute before she needs to sit down again.              She continues to take very low dose Percocet, one half tablet once or twice per day.   no furosemide for a month or so.         She felt that she didn't need this because her legs are not edematous.     She is here with a friend.          Her friend states the patient's cognition is intact.         I reviewed the patient's medications with the patient, and she knows her medicines and seems to be taking them all accurately, except for the furosemide which was 20 mg, twice per week.                                                                    She would like a rolling walker with brakes and seat.    Problem list and histories reviewed & adjusted, as indicated.      Patient Active Problem List    Diagnosis Date Noted     Chronic pain syndrome 09/14/2015     Priority: High     Patient is followed by [facility] for ongoing prescription of pain medication.  All refills should be approved by this provider, or covering partner.    Medication(s): percocet.    Maximum quantity per month: 30  Clinic visit frequency required: Q 6  months     Controlled substance agreement:  Encounter-Level CSA:     There are no encounter-level csa.        Pain Clinic evaluation in the past: No    DIRE Total Score(s):  No flowsheet data found.    Last [facility] website verification:  ?   [email or web address]  No CSA on file    Last  check -9-18-17-no concerns           Weight loss 10/01/2014     Priority: High     Osteoporosis 04/01/2014     Priority: High     Rx Evista [id number], Actonel [id number]  Problem list name updated by automated process. Provider to review       Chronic fatigue 05/17/2013     Priority: High     SOB (shortness of breath) 05/08/2013     Priority: High     Congestive heart failure (H) 05/08/2013     Priority: High     BNP 1890; echo in 9/12; mild LVH, nml LV EF; trial of furosemide.                            Echo in 1/16= mild LVH,dilated L atrium,? Run of a fib/SVT during the echo       Hypertension goal BP (blood pressure) < 140/80 05/07/2013     Priority: High     Pulmonary fibrosis      Priority: High     See pulmonary notes 1/16 and 2/16         Chronic kidney disease, stage III (moderate) 09/06/2012     Priority: High     Memory loss 08/31/2016     Priority: Medium     Numbness of right foot 08/31/2016     Priority: Medium     Low back pain 02/03/2015     Priority: Medium     Diagnosis updated by automated process. Provider to review and confirm.       Physical deconditioning 02/03/2015     Priority: Medium     Right shoulder pain 01/28/2015     Priority: Medium     Degenerative arthritis of spine 10/01/2013     Priority: Medium     With scoliosis; see [facility]       Dizziness - light-headed 05/17/2013     Priority: Medium     Edema 09/11/2012     Priority: Medium     Hyperlipidemia with target LDL less than 130 09/06/2012     Priority: Medium     Diagnosis updated by automated process. Provider to review and confirm.       Preventive measure  05/07/2013     Priority: Low     APRIMA DATA BASE UNDER THE 12/11/12 NOTE                            She avoids influenza vaccine  Colonoscopy 5/00;  Pap smear 1990; mammogram 2010  Refuses influenza vac         Health Care Home 09/05/2012     Priority: Low     Estefany Ross BS, RN, PHN  FPA    [phone]               DX V65.8 REPLACED WITH [id number] (04/08/2013)         Past Surgical History:   Procedure Laterality Date     CARPAL TUNNEL RELEASE RT/LT  1990's    R     CATARACT IOL, RT/LT  5/03    R     HERNIA REPAIR, INGUINAL RT/LT  1954    R     hiatal hernia repair  6/10    large paraesophageal hiatal hernia; Nissen fundoplication      Social History     Social History     Marital status:      Spouse name: N/A     Number of children: 0     Years of education: N/A     Occupational History     worked at [employer] for 40 yrs Retired     Social History Main Topics     Smoking status: Never Smoker     Smokeless tobacco: Never Used     Alcohol use Yes      Comment: daily-vodka tonic x2     Drug use: No     Sexual activity: No     Other Topics Concern     Not on file     Social History Narrative     Immunization History   Administered Date(s) Administered     Pneumococcal (PCV 13) 09/29/2015     Pneumococcal 23 valent 10/26/2009     TD (ADULT, 7+) 10/26/2009       Current Outpatient Prescriptions   Medication Sig Dispense Refill     simvastatin (ZOCOR) 20 MG tablet TAKE HALF TABLET BY MOUTH AT BEDTIME  15 tablet 0     oxyCODONE-acetaminophen (PERCOCET) 5-325 MG per tablet Take 0.5 tablets by mouth 2 times daily as needed for pain 60 tablet 0     metoprolol (TOPROL-XL) 25 MG 24 hr tablet TAKE ONE TABLET BY MOUTH ONCE DAILY  90 tablet 1     losartan (COZAAR) 100 MG tablet TAKE ONE TABLET BY MOUTH ONCE DAILY  90 tablet 2     furosemide (LASIX) 20 MG tablet Take 1 tablet (20 mg) by mouth twice a week 30 tablet 3     Cyanocobalamin (VITAMIN B 12 PO) Take  by mouth.       aspirin 81 MG  "chewable tablet Take 81 mg by mouth daily. Every other day       VITAMIN D, CHOLECALCIFEROL, PO Take 1,000 Units by mouth daily.         No Known Allergies  BP Readings from Last 3 Encounters:   11/28/17 146/72   08/31/16 120/70   12/22/15 140/70    Wt Readings from Last 3 Encounters:   11/28/17 130 lb (59 kg)   08/31/16 133 lb (60.3 kg)   12/22/15 134 lb (60.8 kg)                      Reviewed and updated as needed this visit by clinical staff       Reviewed and updated as needed this visit by Provider         ROS:  CONSTITUTIONAL:POSITIVE  for fatigue and weight loss and NEGATIVE  for chills and fever   RESP:POSITIVE for cough-non productive  CV: NEGATIVE for chest pain, palpitations or peripheral edema  NEURO: POSITIVE for gait disturbance    OBJECTIVE:                                                    /72 (BP Location: Left arm, Patient Position: Chair, Cuff Size: Adult Regular)  Pulse 71  Temp 97.5  F (36.4  C)  Resp 20  Ht 5' 1.5\" (1.562 m)  Wt 130 lb (59 kg)  LMP  (LMP Unknown)  SpO2 95%  Breastfeeding? No  BMI 24.17 kg/m2  Body mass index is 24.17 kg/(m^2).  GENERAL APPEARANCE: alert, no distress and fatigued  RESP: Velcro type crackles bilaterally  CV: regular rates and rhythm, normal S1 S2, no S3 or S4, no murmur, click or rub and no edema  NEURO: gait abnormal ; she uses a walker  PSYCH: mentation appears normal    Diagnostic test results:  Pending       ASSESSMENT/PLAN:                                                        ICD-10-CM    1. Chronic pain syndrome G89.4 oxyCODONE-acetaminophen (PERCOCET) 5-325 MG per tablet     DME REFERRAL   2. Pulmonary fibrosis J84.10 CBC with platelets and differential     DME REFERRAL   3. Chronic kidney disease, stage III (moderate) N18.3 Comprehensive metabolic panel (BMP + Alb, Alk Phos, ALT, AST, Total. Bili, TP)   4. Chronic congestive heart failure, unspecified congestive heart failure type (H) I50.9 Comprehensive metabolic panel (BMP + Alb, Alk " Phos, ALT, AST, Total. Bili, TP)     BNP-N terminal pro     metoprolol (TOPROL-XL) 25 MG 24 hr tablet   5. Hyperlipidemia with target LDL less than 130 E78.5 Lipid Profile (Chol, Trig, HDL, LDL calc)     TSH with free T4 reflex     simvastatin (ZOCOR) 20 MG tablet   6. Hypertension goal BP (blood pressure) < 140/80 I10 metoprolol (TOPROL-XL) 25 MG 24 hr tablet     losartan (COZAAR) 100 MG tablet   7. Spondylosis of lumbar region without myelopathy or radiculopathy M47.816 DME REFERRAL       Summary and implications:  We reviewed multiple issues. Overall, she appears to be stable despite her musculoskeletal, pulmonary, and cardiac problems.                   Check labs and adjust medications as indicated.   Okay for oxycodone refill.         Resume use of furosemide 20 mg, twice per week.      Labs are 6 hours post prandial.  Patient Instructions   I will let you know your lab results.   Have a good safe winter.                  Marbin Rouse MD  [facility]  Answers for HPI/ROS submitted by the patient on 11/28/2017   If you checked off any problems, how difficult have these problems made it for you to do your work, take care of things at home, or get along with other people?: Somewhat difficult  PHQ9 TOTAL SCORE: 6                             Results for orders placed or performed in visit on 11/28/17   Comprehensive metabolic panel (BMP + Alb, Alk Phos, ALT, AST, Total. Bili, TP)   Result Value Ref Range    Sodium 136 133 - 144 mmol/L    Potassium 4.6 3.4 - 5.3 mmol/L    Chloride 105 94 - 109 mmol/L    Carbon Dioxide 22 20 - 32 mmol/L    Anion Gap 9 3 - 14 mmol/L    Glucose 100 (H) 70 - 99 mg/dL    Urea Nitrogen 30 7 - 30 mg/dL    Creatinine 1.19 (H) 0.52 - 1.04 mg/dL    GFR Estimate 42 (L) >60 mL/min/1.7m2    GFR Estimate If Black 51 (L) >60 mL/min/1.7m2    Calcium 9.1 8.5 - 10.1 mg/dL    Bilirubin Total 1.2 0.2 - 1.3 mg/dL    Albumin 3.4 3.4 - 5.0 g/dL    Protein Total 7.3 6.8 -  8.8 g/dL    Alkaline Phosphatase 60 40 - 150 U/L    ALT 15 0 - 50 U/L    AST 15 0 - 45 U/L   Lipid Profile (Chol, Trig, HDL, LDL calc)   Result Value Ref Range    Cholesterol 142 <200 mg/dL    Triglycerides 107 <150 mg/dL    HDL Cholesterol 69 >49 mg/dL    LDL Cholesterol Calculated 52 <100 mg/dL    Non HDL Cholesterol 73 <130 mg/dL   TSH with free T4 reflex   Result Value Ref Range    TSH 2.77 0.40 - 4.00 mU/L   CBC with platelets and differential   Result Value Ref Range    WBC 6.8 4.0 - 11.0 10e9/L    RBC Count 3.63 (L) 3.8 - 5.2 10e12/L    Hemoglobin 12.1 11.7 - 15.7 g/dL    Hematocrit 36.8 35.0 - 47.0 %     (H) 78 - 100 fl    MCH 33.3 (H) 26.5 - 33.0 pg    MCHC 32.9 31.5 - 36.5 g/dL    RDW 14.4 10.0 - 15.0 %    Platelet Count 261 150 - 450 10e9/L    Diff Method Automated Method     % Neutrophils 54.1 %    % Lymphocytes 24.0 %    % Monocytes 16.0 %    % Eosinophils 4.9 %    % Basophils 1.0 %    Absolute Neutrophil 3.7 1.6 - 8.3 10e9/L    Absolute Lymphocytes 1.6 0.8 - 5.3 10e9/L    Absolute Monocytes 1.1 0.0 - 1.3 10e9/L    Absolute Eosinophils 0.3 0.0 - 0.7 10e9/L    Absolute Basophils 0.1 0.0 - 0.2 10e9/L   BNP-N terminal pro   Result Value Ref Range    N-Terminal Pro Bnp 2510 (H) 0 - 450 pg/mL     Letter sent.                                 Most of your lab results are either good, or acceptable, including the liver, kidney, potassium, thyroid, bone marrow, cholesterol, and glucose levels.   Be sure to take the 20 mg of furosemide on 2 days per week.      Cooperative

## 2021-10-19 PROBLEM — F32.9 MAJOR DEPRESSION: Status: ACTIVE | Noted: 2019-01-01

## 2024-10-08 NOTE — TELEPHONE ENCOUNTER
I let her know every other day baby as ok unless problems with nose bleeds  Marbella oBrja RN- Triage [employer]     Detail Level: Detailed Depth Of Biopsy: dermis Was A Bandage Applied: Yes Size Of Lesion In Cm: 0 Biopsy Type: H and E Biopsy Method: Dermablade Anesthesia Type: 1% lidocaine without epinephrine and a 1:12 solution of 8.4% sodium bicarbonate Anesthesia Volume In Cc: 0.5 Hemostasis: Drysol Wound Care: Petrolatum Dressing: bandage Destruction After The Procedure: No Type Of Destruction Used: Curettage Curettage Text: The wound bed was treated with curettage after the biopsy was performed. Cryotherapy Text: The wound bed was treated with cryotherapy after the biopsy was performed. Electrodesiccation Text: The wound bed was treated with electrodesiccation after the biopsy was performed. Electrodesiccation And Curettage Text: The wound bed was treated with electrodesiccation and curettage after the biopsy was performed. Silver Nitrate Text: The wound bed was treated with silver nitrate after the biopsy was performed. Lab: [id number] Lab Facility: [id number] Consent: Written consent was obtained and risks were reviewed including but not limited to scarring, infection, bleeding, scabbing, incomplete removal, nerve damage and allergy to anesthesia. Post-Care Instructions: I reviewed with the patient in detail post-care instructions. Patient is to keep the biopsy site dry overnight, and then apply bacitracin twice daily until healed. Patient may apply hydrogen peroxide soaks to remove any crusting. Notification Instructions: Patient will be notified of biopsy results. However, patient instructed to call the office if not contacted within 2 weeks. Billing Type: Third-Party Bill Information: Selecting Yes will display possible errors in your note based on the variables you have selected. This validation is only offered as a suggestion for you. PLEASE NOTE THAT THE VALIDATION TEXT WILL BE REMOVED WHEN YOU FINALIZE YOUR NOTE. IF YOU WANT TO FAX A PRELIMINARY NOTE YOU WILL NEED TO TOGGLE THIS TO 'NO' IF YOU DO NOT WANT IT IN YOUR FAXED NOTE.

## (undated) DEVICE — RAD INTRODUCER KIT MICRO 5FRX10CM .018 NITINOL G/W

## (undated) DEVICE — PACK PCMKR PERM SRG PROC LF SAN32PC573

## (undated) DEVICE — INTRO SHEALTH 8.5FRX63CM SRO 406853

## (undated) DEVICE — PRO PADZ LIQUID GEL RADIOLUCENT ADULT MULTI-FUNCTION LVP ELECTRODES

## (undated) DEVICE — PACK EP SRG PROC LF DISP SAN32EPFSR

## (undated) DEVICE — CATH EP 110CM 7FR BLZR2 HTD 2

## (undated) DEVICE — SHEATH PRELUDE SNAP 13CM 6FR

## (undated) RX ORDER — VANCOMYCIN HYDROCHLORIDE 1 G/200ML
INJECTION, SOLUTION INTRAVENOUS
Status: DISPENSED
Start: 2019-01-01

## (undated) RX ORDER — FENTANYL CITRATE 50 UG/ML
INJECTION, SOLUTION INTRAMUSCULAR; INTRAVENOUS
Status: DISPENSED
Start: 2019-01-01

## (undated) RX ORDER — LIDOCAINE HYDROCHLORIDE 10 MG/ML
INJECTION, SOLUTION EPIDURAL; INFILTRATION; INTRACAUDAL; PERINEURAL
Status: DISPENSED
Start: 2019-01-01

## (undated) RX ORDER — HEPARIN SODIUM 1000 [USP'U]/ML
INJECTION, SOLUTION INTRAVENOUS; SUBCUTANEOUS
Status: DISPENSED
Start: 2019-01-01

## (undated) RX ORDER — BUPIVACAINE HYDROCHLORIDE 2.5 MG/ML
INJECTION, SOLUTION EPIDURAL; INFILTRATION; INTRACAUDAL
Status: DISPENSED
Start: 2019-01-01